# Patient Record
Sex: FEMALE | Race: WHITE | NOT HISPANIC OR LATINO | Employment: FULL TIME | URBAN - METROPOLITAN AREA
[De-identification: names, ages, dates, MRNs, and addresses within clinical notes are randomized per-mention and may not be internally consistent; named-entity substitution may affect disease eponyms.]

---

## 2017-01-17 ENCOUNTER — OFFICE VISIT (OUTPATIENT)
Dept: INTERNAL MEDICINE | Facility: CLINIC | Age: 27
End: 2017-01-17
Payer: COMMERCIAL

## 2017-01-17 VITALS
TEMPERATURE: 98 F | BODY MASS INDEX: 50.14 KG/M2 | OXYGEN SATURATION: 96 % | HEART RATE: 90 BPM | RESPIRATION RATE: 17 BRPM | HEIGHT: 62 IN | DIASTOLIC BLOOD PRESSURE: 86 MMHG | WEIGHT: 272.5 LBS | SYSTOLIC BLOOD PRESSURE: 110 MMHG

## 2017-01-17 DIAGNOSIS — L68.0 HIRSUTISM: ICD-10-CM

## 2017-01-17 DIAGNOSIS — E66.01 MORBID OBESITY DUE TO EXCESS CALORIES: ICD-10-CM

## 2017-01-17 DIAGNOSIS — Z83.3 FAMILY HISTORY OF DIABETES MELLITUS: Primary | ICD-10-CM

## 2017-01-17 DIAGNOSIS — Z23 NEED FOR INFLUENZA VACCINATION: ICD-10-CM

## 2017-01-17 DIAGNOSIS — R06.83 SNORES: ICD-10-CM

## 2017-01-17 DIAGNOSIS — Z87.42 HISTORY OF PCOS: ICD-10-CM

## 2017-01-17 PROCEDURE — 99203 OFFICE O/P NEW LOW 30 MIN: CPT | Mod: 25,S$GLB,, | Performed by: INTERNAL MEDICINE

## 2017-01-17 PROCEDURE — 90686 IIV4 VACC NO PRSV 0.5 ML IM: CPT | Mod: S$GLB,,, | Performed by: INTERNAL MEDICINE

## 2017-01-17 PROCEDURE — 99999 PR PBB SHADOW E&M-EST. PATIENT-LVL III: CPT | Mod: PBBFAC,,, | Performed by: INTERNAL MEDICINE

## 2017-01-17 PROCEDURE — 90471 IMMUNIZATION ADMIN: CPT | Mod: S$GLB,,, | Performed by: INTERNAL MEDICINE

## 2017-01-17 PROCEDURE — 1159F MED LIST DOCD IN RCRD: CPT | Mod: S$GLB,,, | Performed by: INTERNAL MEDICINE

## 2017-01-17 NOTE — PROGRESS NOTES
"Subjective:       Patient ID: Glenda De La Fuente is a 26 y.o. female.    Chief Complaint: Establish Care    HPI Comments: Est. pcp    HPI: 25 y/o with PCOS morbid obesity here to establish care. Has regular menses +hirsutisim not exercising regularlly counting calories with goal of 1500 zee/day. Still not losing weight. Does admit to emotional binge eating at time no purging or self induced vomitting. Formed bowel movements. Does have witness snoring with apnea does endorse frequent daytime somulence both mother and father with DM    Review of Systems   Constitutional: Negative for activity change, appetite change, fatigue, fever and unexpected weight change.   HENT: Negative for ear pain, rhinorrhea and sore throat.    Eyes: Negative for discharge and visual disturbance.   Respiratory: Negative for chest tightness, shortness of breath and wheezing.    Cardiovascular: Negative for chest pain, palpitations and leg swelling.   Gastrointestinal: Negative for abdominal pain, constipation and diarrhea.   Endocrine: Negative for cold intolerance and heat intolerance.   Genitourinary: Negative for dysuria and hematuria.   Musculoskeletal: Negative for joint swelling and neck stiffness.   Skin: Negative for rash.   Neurological: Negative for dizziness, syncope, weakness and headaches.   Psychiatric/Behavioral: Negative for suicidal ideas.       Objective:     Vitals:    01/17/17 1511   BP: 110/86   BP Location: Left arm   Patient Position: Sitting   BP Method: Manual   Pulse: 90   Resp: 17   Temp: 98.1 °F (36.7 °C)   TempSrc: Oral   SpO2: 96%   Weight: 123.6 kg (272 lb 7.8 oz)   Height: 5' 2" (1.575 m)          Physical Exam   Constitutional: She is oriented to person, place, and time. She appears well-developed and well-nourished.   HENT:   Head: Normocephalic and atraumatic.   Eyes: Conjunctivae are normal. Pupils are equal, round, and reactive to light.   Neck: Normal range of motion.   Cardiovascular: Normal rate and " regular rhythm.  Exam reveals no gallop and no friction rub.    No murmur heard.  Pulmonary/Chest: Effort normal and breath sounds normal. She has no wheezes. She has no rales.   Abdominal: Soft. Bowel sounds are normal. There is no tenderness. There is no rebound and no guarding.   Musculoskeletal: Normal range of motion. She exhibits no edema or tenderness.   Neurological: She is alert and oriented to person, place, and time. No cranial nerve deficit.   Skin: Skin is warm and dry.   Psychiatric: She has a normal mood and affect.       Assessment:       1. Family history of diabetes mellitus    2. Hirsutism    3. History of PCOS    4. Morbid obesity due to excess calories    5. Snores    6. Need for influenza vaccination        Plan:    1/2/3. A.m. Cortisol, a1c and testosterone,     4. The patient is asked to make an attempt to improve diet and exercise patterns to aid in medical management of this problem.      5. Referral for sleep study    6. Flu vaccine today    F/u six weeks

## 2017-01-17 NOTE — LETTER
January 17, 2017      Glenda Krystle De La Fuente  [address] [place] [address]             [place] [address]  [place] [address]  Phone: [phone] Ms. De La Fuente    Was treated here on 01/17/2017    May Return to work on 01/18/2017    No Restrictions            Adeel Soto MD

## 2017-01-20 ENCOUNTER — TELEPHONE (OUTPATIENT)
Dept: FAMILY MEDICINE | Facility: CLINIC | Age: 27
End: 2017-01-20

## 2017-01-30 ENCOUNTER — HOSPITAL ENCOUNTER (EMERGENCY)
Facility: OTHER | Age: 27
Discharge: HOME | End: 2017-01-30
Attending: EMERGENCY MEDICINE
Payer: COMMERCIAL

## 2017-01-30 VITALS
WEIGHT: 274.69 LBS | SYSTOLIC BLOOD PRESSURE: 137 MMHG | RESPIRATION RATE: 20 BRPM | TEMPERATURE: 96 F | BODY MASS INDEX: 50.55 KG/M2 | HEIGHT: 62 IN | OXYGEN SATURATION: 100 % | HEART RATE: 88 BPM | DIASTOLIC BLOOD PRESSURE: 92 MMHG

## 2017-01-30 DIAGNOSIS — M54.2 CHRONIC NECK PAIN: ICD-10-CM

## 2017-01-30 DIAGNOSIS — G89.29 CHRONIC NECK PAIN: ICD-10-CM

## 2017-01-30 DIAGNOSIS — M54.50 ACUTE RIGHT-SIDED LOW BACK PAIN WITHOUT SCIATICA: Primary | ICD-10-CM

## 2017-01-30 LAB
B-HCG UR QL: NEGATIVE
CTP QC/QA: YES

## 2017-01-30 PROCEDURE — 96372 THER/PROPH/DIAG INJ SC/IM: CPT

## 2017-01-30 PROCEDURE — 99283 EMERGENCY DEPT VISIT LOW MDM: CPT

## 2017-01-30 PROCEDURE — 81003 URINALYSIS AUTO W/O SCOPE: CPT

## 2017-01-30 PROCEDURE — 63600175 PHARM REV CODE 636 W HCPCS: Performed by: EMERGENCY MEDICINE

## 2017-01-30 PROCEDURE — 81025 URINE PREGNANCY TEST: CPT | Performed by: EMERGENCY MEDICINE

## 2017-01-30 PROCEDURE — 81025 URINE PREGNANCY TEST: CPT

## 2017-01-30 RX ORDER — DIAZEPAM 10 MG/2ML
10 INJECTION INTRAMUSCULAR
Status: COMPLETED | OUTPATIENT
Start: 2017-01-30 | End: 2017-01-30

## 2017-01-30 RX ORDER — KETOROLAC TROMETHAMINE 30 MG/ML
60 INJECTION, SOLUTION INTRAMUSCULAR; INTRAVENOUS
Status: COMPLETED | OUTPATIENT
Start: 2017-01-30 | End: 2017-01-30

## 2017-01-30 RX ORDER — DIAZEPAM 10 MG/1
10 TABLET ORAL EVERY 8 HOURS PRN
Qty: 12 TABLET | Refills: 0 | Status: SHIPPED | OUTPATIENT
Start: 2017-01-30 | End: 2017-04-10

## 2017-01-30 RX ORDER — IBUPROFEN 800 MG/1
800 TABLET ORAL EVERY 6 HOURS PRN
Qty: 20 TABLET | Refills: 0 | Status: SHIPPED | OUTPATIENT
Start: 2017-01-30 | End: 2017-09-24

## 2017-01-30 RX ADMIN — KETOROLAC TROMETHAMINE 60 MG: 30 INJECTION, SOLUTION INTRAMUSCULAR at 08:01

## 2017-01-30 RX ADMIN — DIAZEPAM 10 MG: 5 INJECTION, SOLUTION INTRAMUSCULAR; INTRAVENOUS at 08:01

## 2017-01-30 NOTE — ED PROVIDER NOTES
"Encounter Date: 1/30/2017       History     Chief Complaint   Patient presents with    Neck Pain     Pt here with c/o neck pain that began on Saturday, pt now c/o lower back pain. Denies injury/trauma     Review of patient's allergies indicates:  No Known Allergies  The history is provided by the patient.    26-year-old who complains of pain to her lower back.  Patient reports "pain" to her right lower back that began 2 days ago and has been constant.  It worsens when she moves.  Patient took Percocet yesterday without improvement.  (She had this left over from a surgery in August).  The pain does not radiate.  She denies urinary symptoms.  Patient also has "slipped disc" in her neck.  This is a chronic problem but has worsened recently.  She denies trauma or heavy lifting.  No fever.  She denies abdominal pain.  She rates her pain as 8 out of 10.  Past Medical History   Diagnosis Date    Asthma childhood    Morbid obesity     PCOS (polycystic ovarian syndrome)      No past medical history pertinent negatives.  Past Surgical History   Procedure Laterality Date    Appendectomy  2011    Salpingoopherectomy Right 2008    Hernia repair       Family History   Problem Relation Age of Onset    Heart attack Father     Hypertension Father     Diabetes Mother     Heart attack Mother      Social History   Substance Use Topics    Smoking status: Never Smoker    Smokeless tobacco: None    Alcohol use Yes      Comment: occasionally apple cider     Review of Systems   Constitutional: Negative for fever.   Respiratory: Negative for shortness of breath.    Cardiovascular: Negative for chest pain.   Gastrointestinal: Negative for abdominal pain.   Genitourinary: Negative for dysuria.   Musculoskeletal: Positive for back pain and neck pain.   Skin: Negative for rash.   Neurological: Negative for weakness and numbness.       Physical Exam   Initial Vitals   BP Pulse Resp Temp SpO2   01/30/17 0722 01/30/17 0722 01/30/17 " "0722 01/30/17 0722 01/30/17 0722   137/92 88 20 96.3 °F (35.7 °C) 100 %     Physical Exam    Nursing note and vitals reviewed.  Constitutional: She appears well-developed and well-nourished.   HENT:   Head: Normocephalic and atraumatic.   Eyes: Conjunctivae and EOM are normal. Pupils are equal, round, and reactive to light.   Neck: Normal range of motion. Neck supple.       Cardiovascular: Normal rate and regular rhythm.   Pulmonary/Chest: Breath sounds normal.   Abdominal: Soft. There is no tenderness. There is no rebound and no guarding.   Musculoskeletal: Normal range of motion.        Arms:  Neurological: She is alert and oriented to person, place, and time. She has normal strength. No cranial nerve deficit.   Skin: Skin is warm and dry.   Psychiatric: She has a normal mood and affect.         ED Course   Procedures  Labs Reviewed   POCT URINE PREGNANCY   POCT URINALYSIS W/O SCOPE             Medical Decision Making:   Initial Assessment:   26-year-old who complains of pain to the lower back.  She also has chronic neck pain that has "flared up" over the last few days.  Patient has no neurological deficits.  ED Management:  Patient's pain appears to be musculoskeletal.  Her urine shows no evidence of hematuria or leukocytosis.  She has no neurological deficits.  Patient will be treated with Toradol and Valium and discharged on ibuprofen and Valium (12 pills)                   ED Course     Clinical Impression:   The primary encounter diagnosis was Acute right-sided low back pain without sciatica. A diagnosis of Chronic neck pain was also pertinent to this visit.          Gricelda Rainey MD  01/30/17 0750    "

## 2017-01-30 NOTE — ED AVS SNAPSHOT
[facility] EMERGENCY DEPARTMENT  [address] [address]               Glenda De La Fuente   2017  7:14 AM   ED    Description:  Female : 1990   Department:  [facility] Emergency Department           Your Care was Coordinated By:     Provider Role From To    Gricelda Rainey MD Attending Provider 17 0717 --      Reason for Visit     Neck Pain           Diagnoses this Visit        Comments    Acute right-sided low back pain without sciatica    -  Primary     Chronic neck pain           ED Disposition     None           To Do List           Follow-up Information     Follow up with Adeel Soto MD. Schedule an appointment as soon as possible for a visit today.    Specialty:  Internal Medicine    Contact information:    [address]  [address]  [place] [address]  [phone]          Follow up with [facility] Emergency Department.    Specialty:  Emergency Medicine    Why:  If symptoms worsen    Contact information:    [address] [address]  [phone]       These Medications        Disp Refills Start End    diazePAM (VALIUM) 10 MG Tab 12 tablet 0 2017 3/1/2017    Take 1 tablet (10 mg total) by mouth every 8 (eight) hours as needed (muscle spasm). - Oral    Pharmacy: [facility] [address] [place] [address] [address] Ph #: [phone]       ibuprofen (ADVIL,MOTRIN) 800 MG tablet 20 tablet 0 2017     Take 1 tablet (800 mg total) by mouth every 6 (six) hours as needed for Pain. - Oral    Pharmacy: [facility] [address] [place], [place] [address] [address] Ph #: [phone]         [facility] On Call     [facility] On Call Nurse Care Line -  Assistance  Registered nurses in the [facility] On Call Center provide clinical advisement, health education, appointment booking, and other advisory services.  Call for this free service at [phone].             Medications           Message regarding Medications     Verify the  "changes and/or additions to your medication regime listed below are the same as discussed with your clinician today.  If any of these changes or additions are incorrect, please notify your healthcare provider.        START taking these NEW medications        Refills    diazePAM (VALIUM) 10 MG Tab 0    Sig: Take 1 tablet (10 mg total) by mouth every 8 (eight) hours as needed (muscle spasm).    Class: Print    Route: Oral    ibuprofen (ADVIL,MOTRIN) 800 MG tablet 0    Sig: Take 1 tablet (800 mg total) by mouth every 6 (six) hours as needed for Pain.    Class: Print    Route: Oral      These medications were administered today        Dose Freq    ketorolac injection 60 mg 60 mg ED 1 Time    Sig: Inject 60 mg into the muscle ED 1 Time.    Class: Normal    Route: Intramuscular    diazePAM injection 10 mg 10 mg ED 1 Time    Sig: Inject 2 mLs (10 mg total) into the muscle ED 1 Time.    Class: Normal    Route: Intramuscular           Verify that the below list of medications is an accurate representation of the medications you are currently taking.  If none reported, the list may be blank. If incorrect, please contact your healthcare provider. Carry this list with you in case of emergency.           Current Medications     diazePAM (VALIUM) 10 MG Tab Take 1 tablet (10 mg total) by mouth every 8 (eight) hours as needed (muscle spasm).    diazePAM injection 10 mg Inject 2 mLs (10 mg total) into the muscle ED 1 Time.    ibuprofen (ADVIL,MOTRIN) 800 MG tablet Take 1 tablet (800 mg total) by mouth every 6 (six) hours as needed for Pain.    ketorolac injection 60 mg Inject 60 mg into the muscle ED 1 Time.           Clinical Reference Information           Your Vitals Were     BP Pulse Temp Resp Height Weight    137/92 88 96.3 °F (35.7 °C) (Oral) 20 5' 2" (1.575 m) 124.6 kg (274 lb 11.1 oz)    Last Period SpO2 BMI          01/08/2017 100% 50.24 kg/m2        Allergies as of 1/30/2017     No Known Allergies      Immunizations " Administered on Date of Encounter - 1/30/2017     None      ED Micro, Lab, POCT     Start Ordered       Status Ordering Provider    01/30/17 0724 01/30/17 0723  POCT urine pregnancy  Once      Final result     01/30/17 0724 01/30/17 0723  POCT URINALYSIS W/O SCOPE  Once      Final result       ED Imaging Orders     None        Discharge Instructions         Back Pain (Acute or Chronic)    Back pain is one of the most common problems. The good news is that most people feel better in 1 to 2 weeks, and most of the rest in 1 to 2 months. Most people can remain active.  People experience and describe pain differently; not everyone is the same.  · The pain can be sharp, stabbing, shooting, aching, cramping or burning.  · Movement, standing, bending, lifting, sitting, or walking may worsen pain.  · It can be localized to one spot or area, or it can be more generalized.  · It can spread or radiate upwards, to the front, or go down your arms or legs (sciatica).  · It can cause muscle spasm.  Most of the time, mechanical problems with the muscles or spine cause the pain. Mechanical problems are usually caused by an injury to the muscles or ligaments. While illness can cause back pain, it is usually not caused by a serious illness. Mechanical problems include:   · Physical activity such as sports, exercise, work, or normal activity  · Overexertion, lifting, pushing, pulling incorrectly or too aggressively  · Sudden twisting, bending, or stretching from an accident, or accidental movement  · Poor posture  · Stretching or moving wrong, without noticing pain at the time  · Poor coordination, lack of regular exercise (check with your doctor about this)  · Spinal disc disease or arthritis  · Stress  Pain can also be related to pregnancy, or illness like appendicitis, bladder or kidney infections, pelvic infections, and many other things.  Acute back pain usually gets better in 1 to 2 weeks. Back pain related to disk disease,  arthritis in the spinal joints or spinal stenosis (narrowing of the spinal canal) can become chronic and last for months or years.  Unless you had a physical injury (for example, a car accident or fall) X-rays are usually not needed for the initial evaluation of back pain. If pain continues and does not respond to medical treatment, X-rays and other tests may be needed.  Home care  Try these home care recommendations:  · When in bed, try to find a position of comfort. A firm mattress is best. Try lying flat on your back with pillows under your knees. You can also try lying on your side with your knees bent up towards your chest and a pillow between your knees.  · At first, do not try to stretch out the sore spots. If there is a strain, it is not like the good soreness you get after exercising without an injury. In this case, stretching may make it worse.  · Avoid prolong sitting, long car rides, or travel. This puts more stress on the lower back than standing or walking.  · During the first 24 to 72 hours after an acute injury or flare up of chronic back pain, apply an ice pack to the painful area for 20 minutes and then remove it for 20 minutes. Do this over a period of 60 to 90 minutes or several times a day. This will reduce swelling and pain. Wrap the ice pack in a thin towel or plastic to protect your skin.  · You can start with ice, then switch to heat. Heat (hot shower, hot bath, or heating pad) reduces pain and works well for muscle spasms. Heat can be applied to the painful area for 20 minutes then remove it for 20 minutes. Do this over a period of 60 to 90 minutes or several times a day. Do not sleep on a heating pad. It can lead to skin burns or tissue damage.  · You can alternate ice and heat therapy. Talk with your doctor about the best treatment for your back pain.  · Therapeutic massage can help relax the back muscles without stretching them.  · Be aware of safe lifting methods and do not lift  anything without stretching first.  Medicines  Talk to your doctor before using medicine, especially if you have other medical problems or are taking other medicines.  · You may use over-the-counter medicine as directed on the bottle to control pain, unless another pain medicine was prescribed. If you have chronic conditions like diabetes, liver or kidney disease, stomach ulcers, or gastrointestinal bleeding, or are taking blood thinners, talk to your doctor before taking any medicine.  · Be careful if you are given a prescription medicines, narcotics, or medicine for muscle spasms. They can cause drowsiness, affect your coordination, reflexes, and judgement. Do not drive or operate heavy machinery.  Follow-up care  Follow up with your healthcare provider, or as advised.   A radiologist will review any X-rays that were taken. Your provide will notify you of any new findings that may affect your care.  Call 911  Call emergency services if any of the following occur:  · Trouble breathing  · Confusion  · Very drowsy or trouble awakening  · Fainting or loss of consciousness  · Rapid or very slow heart rate  · Loss of bowel or bladder control  When to seek medical advice  Call your healthcare provider right away if any of these occur:   · Pain becomes worse or spreads to your legs  · Weakness or numbness in one or both legs  · Numbness in the groin or genital area  © 8621-1856 [employer]. [address] [place] [address], [place] [address]. All rights reserved. This information is not intended as a substitute for professional medical care. Always follow your healthcare professional's instructions.          Your Scheduled Appointments     Feb 28, 2017  3:00 PM CST   Established Patient Visit with Adeel Soto MD   [facility] ([facility]    [address] [place], [place] [address] [place] [address]   [phone]            Mar 24, 2017  8:30 AM CDT   Consult with Neri Trujillo MD   [facility]  [facility]    [address]  [place] [address] [phone]               [facility] complies with applicable Federal civil rights laws and does not discriminate on the basis of race, color, national origin, age, disability, or sex.        Language Assistance Services     ATTENTION: Language assistance services are available, free of charge. Please call [phone].      ATENCIÓN: Si habla español, tiene a hendrickson disposición servicios gratuitos de asistencia lingüística. Llame al [phone].     CHÚ Ý: N?u b?n nói Ti?ng Vi?t, có các d?ch v? h? tr? ngôn ng? mi?n phí dành cho b?n. G?i s? [phone].

## 2017-01-30 NOTE — DISCHARGE INSTRUCTIONS
Back Pain (Acute or Chronic)    Back pain is one of the most common problems. The good news is that most people feel better in 1 to 2 weeks, and most of the rest in 1 to 2 months. Most people can remain active.  People experience and describe pain differently; not everyone is the same.  · The pain can be sharp, stabbing, shooting, aching, cramping or burning.  · Movement, standing, bending, lifting, sitting, or walking may worsen pain.  · It can be localized to one spot or area, or it can be more generalized.  · It can spread or radiate upwards, to the front, or go down your arms or legs (sciatica).  · It can cause muscle spasm.  Most of the time, mechanical problems with the muscles or spine cause the pain. Mechanical problems are usually caused by an injury to the muscles or ligaments. While illness can cause back pain, it is usually not caused by a serious illness. Mechanical problems include:   · Physical activity such as sports, exercise, work, or normal activity  · Overexertion, lifting, pushing, pulling incorrectly or too aggressively  · Sudden twisting, bending, or stretching from an accident, or accidental movement  · Poor posture  · Stretching or moving wrong, without noticing pain at the time  · Poor coordination, lack of regular exercise (check with your doctor about this)  · Spinal disc disease or arthritis  · Stress  Pain can also be related to pregnancy, or illness like appendicitis, bladder or kidney infections, pelvic infections, and many other things.  Acute back pain usually gets better in 1 to 2 weeks. Back pain related to disk disease, arthritis in the spinal joints or spinal stenosis (narrowing of the spinal canal) can become chronic and last for months or years.  Unless you had a physical injury (for example, a car accident or fall) X-rays are usually not needed for the initial evaluation of back pain. If pain continues and does not respond to medical treatment, X-rays and other tests may be  needed.  Home care  Try these home care recommendations:  · When in bed, try to find a position of comfort. A firm mattress is best. Try lying flat on your back with pillows under your knees. You can also try lying on your side with your knees bent up towards your chest and a pillow between your knees.  · At first, do not try to stretch out the sore spots. If there is a strain, it is not like the good soreness you get after exercising without an injury. In this case, stretching may make it worse.  · Avoid prolong sitting, long car rides, or travel. This puts more stress on the lower back than standing or walking.  · During the first 24 to 72 hours after an acute injury or flare up of chronic back pain, apply an ice pack to the painful area for 20 minutes and then remove it for 20 minutes. Do this over a period of 60 to 90 minutes or several times a day. This will reduce swelling and pain. Wrap the ice pack in a thin towel or plastic to protect your skin.  · You can start with ice, then switch to heat. Heat (hot shower, hot bath, or heating pad) reduces pain and works well for muscle spasms. Heat can be applied to the painful area for 20 minutes then remove it for 20 minutes. Do this over a period of 60 to 90 minutes or several times a day. Do not sleep on a heating pad. It can lead to skin burns or tissue damage.  · You can alternate ice and heat therapy. Talk with your doctor about the best treatment for your back pain.  · Therapeutic massage can help relax the back muscles without stretching them.  · Be aware of safe lifting methods and do not lift anything without stretching first.  Medicines  Talk to your doctor before using medicine, especially if you have other medical problems or are taking other medicines.  · You may use over-the-counter medicine as directed on the bottle to control pain, unless another pain medicine was prescribed. If you have chronic conditions like diabetes, liver or kidney disease,  stomach ulcers, or gastrointestinal bleeding, or are taking blood thinners, talk to your doctor before taking any medicine.  · Be careful if you are given a prescription medicines, narcotics, or medicine for muscle spasms. They can cause drowsiness, affect your coordination, reflexes, and judgement. Do not drive or operate heavy machinery.  Follow-up care  Follow up with your healthcare provider, or as advised.   A radiologist will review any X-rays that were taken. Your provide will notify you of any new findings that may affect your care.  Call 911  Call emergency services if any of the following occur:  · Trouble breathing  · Confusion  · Very drowsy or trouble awakening  · Fainting or loss of consciousness  · Rapid or very slow heart rate  · Loss of bowel or bladder control  When to seek medical advice  Call your healthcare provider right away if any of these occur:   · Pain becomes worse or spreads to your legs  · Weakness or numbness in one or both legs  · Numbness in the groin or genital area  © 4249-3843 [employer]. [address] [place] [address], [place] [address]. All rights reserved. This information is not intended as a substitute for professional medical care. Always follow your healthcare professional's instructions.

## 2017-03-07 ENCOUNTER — HOSPITAL ENCOUNTER (EMERGENCY)
Facility: HOSPITAL | Age: 27
Discharge: HOME | End: 2017-03-07
Attending: EMERGENCY MEDICINE
Payer: COMMERCIAL

## 2017-03-07 VITALS
HEART RATE: 88 BPM | HEIGHT: 62 IN | RESPIRATION RATE: 18 BRPM | SYSTOLIC BLOOD PRESSURE: 158 MMHG | WEIGHT: 275 LBS | DIASTOLIC BLOOD PRESSURE: 79 MMHG | TEMPERATURE: 99 F | OXYGEN SATURATION: 98 % | BODY MASS INDEX: 50.61 KG/M2

## 2017-03-07 DIAGNOSIS — R16.0 HEPATOMEGALY: ICD-10-CM

## 2017-03-07 DIAGNOSIS — K21.9 GASTROESOPHAGEAL REFLUX DISEASE, ESOPHAGITIS PRESENCE NOT SPECIFIED: ICD-10-CM

## 2017-03-07 DIAGNOSIS — R10.33 PERIUMBILICAL ABDOMINAL PAIN: ICD-10-CM

## 2017-03-07 DIAGNOSIS — R11.0 NAUSEA: ICD-10-CM

## 2017-03-07 DIAGNOSIS — R10.31 RIGHT LOWER QUADRANT ABDOMINAL PAIN: Primary | ICD-10-CM

## 2017-03-07 LAB
ALBUMIN SERPL BCP-MCNC: 3.6 G/DL
ALP SERPL-CCNC: 63 U/L
ALT SERPL W/O P-5'-P-CCNC: 33 U/L
ANION GAP SERPL CALC-SCNC: 9 MMOL/L
AST SERPL-CCNC: 17 U/L
B-HCG UR QL: NEGATIVE
BACTERIA #/AREA URNS HPF: ABNORMAL /HPF
BASOPHILS # BLD AUTO: 0.02 K/UL
BASOPHILS NFR BLD: 0.2 %
BILIRUB SERPL-MCNC: 0.2 MG/DL
BILIRUB UR QL STRIP: NEGATIVE
BUN SERPL-MCNC: 10 MG/DL
CALCIUM SERPL-MCNC: 9.4 MG/DL
CHLORIDE SERPL-SCNC: 102 MMOL/L
CLARITY UR: ABNORMAL
CO2 SERPL-SCNC: 26 MMOL/L
COLOR UR: YELLOW
CREAT SERPL-MCNC: 0.9 MG/DL
CTP QC/QA: YES
DIFFERENTIAL METHOD: NORMAL
EOSINOPHIL # BLD AUTO: 0.2 K/UL
EOSINOPHIL NFR BLD: 1.8 %
ERYTHROCYTE [DISTWIDTH] IN BLOOD BY AUTOMATED COUNT: 14.3 %
EST. GFR  (AFRICAN AMERICAN): >60 ML/MIN/1.73 M^2
EST. GFR  (NON AFRICAN AMERICAN): >60 ML/MIN/1.73 M^2
GLUCOSE SERPL-MCNC: 119 MG/DL
GLUCOSE UR QL STRIP: NEGATIVE
HCT VFR BLD AUTO: 38 %
HGB BLD-MCNC: 12.4 G/DL
HGB UR QL STRIP: NEGATIVE
KETONES UR QL STRIP: NEGATIVE
LEUKOCYTE ESTERASE UR QL STRIP: ABNORMAL
LIPASE SERPL-CCNC: 46 U/L
LYMPHOCYTES # BLD AUTO: 2.4 K/UL
LYMPHOCYTES NFR BLD: 25.6 %
MCH RBC QN AUTO: 27.9 PG
MCHC RBC AUTO-ENTMCNC: 32.6 %
MCV RBC AUTO: 86 FL
MICROSCOPIC COMMENT: ABNORMAL
MONOCYTES # BLD AUTO: 0.7 K/UL
MONOCYTES NFR BLD: 6.9 %
NEUTROPHILS # BLD AUTO: 6.3 K/UL
NEUTROPHILS NFR BLD: 65.5 %
NITRITE UR QL STRIP: NEGATIVE
PH UR STRIP: 5 [PH] (ref 5–8)
PLATELET # BLD AUTO: 217 K/UL
PMV BLD AUTO: 10.7 FL
POTASSIUM SERPL-SCNC: 3.8 MMOL/L
PROT SERPL-MCNC: 8.5 G/DL
PROT UR QL STRIP: NEGATIVE
RBC # BLD AUTO: 4.44 M/UL
RBC #/AREA URNS HPF: 1 /HPF (ref 0–4)
SODIUM SERPL-SCNC: 137 MMOL/L
SP GR UR STRIP: 1.01 (ref 1–1.03)
SQUAMOUS #/AREA URNS HPF: 7 /HPF
URN SPEC COLLECT METH UR: ABNORMAL
UROBILINOGEN UR STRIP-ACNC: NEGATIVE EU/DL
WBC # BLD AUTO: 9.54 K/UL
WBC #/AREA URNS HPF: 2 /HPF (ref 0–5)

## 2017-03-07 PROCEDURE — 96374 THER/PROPH/DIAG INJ IV PUSH: CPT

## 2017-03-07 PROCEDURE — 83690 ASSAY OF LIPASE: CPT

## 2017-03-07 PROCEDURE — 25500020 PHARM REV CODE 255: Performed by: EMERGENCY MEDICINE

## 2017-03-07 PROCEDURE — 80053 COMPREHEN METABOLIC PANEL: CPT

## 2017-03-07 PROCEDURE — 25000003 PHARM REV CODE 250: Performed by: PHYSICIAN ASSISTANT

## 2017-03-07 PROCEDURE — 87086 URINE CULTURE/COLONY COUNT: CPT

## 2017-03-07 PROCEDURE — 99284 EMERGENCY DEPT VISIT MOD MDM: CPT | Mod: 25

## 2017-03-07 PROCEDURE — 85025 COMPLETE CBC W/AUTO DIFF WBC: CPT

## 2017-03-07 PROCEDURE — 81025 URINE PREGNANCY TEST: CPT | Performed by: PHYSICIAN ASSISTANT

## 2017-03-07 PROCEDURE — 63600175 PHARM REV CODE 636 W HCPCS: Performed by: PHYSICIAN ASSISTANT

## 2017-03-07 PROCEDURE — 81000 URINALYSIS NONAUTO W/SCOPE: CPT

## 2017-03-07 PROCEDURE — 96361 HYDRATE IV INFUSION ADD-ON: CPT

## 2017-03-07 RX ORDER — SODIUM CHLORIDE 9 MG/ML
1000 INJECTION, SOLUTION INTRAVENOUS
Status: COMPLETED | OUTPATIENT
Start: 2017-03-07 | End: 2017-03-07

## 2017-03-07 RX ORDER — ONDANSETRON 4 MG/1
4 TABLET, FILM COATED ORAL EVERY 6 HOURS PRN
Qty: 12 TABLET | Refills: 0 | Status: SHIPPED | OUTPATIENT
Start: 2017-03-07 | End: 2017-03-12

## 2017-03-07 RX ORDER — ONDANSETRON 4 MG/1
4 TABLET, FILM COATED ORAL EVERY 6 HOURS PRN
Qty: 12 TABLET | Refills: 0 | Status: SHIPPED | OUTPATIENT
Start: 2017-03-07 | End: 2017-03-07 | Stop reason: CLARIF

## 2017-03-07 RX ORDER — ONDANSETRON 2 MG/ML
4 INJECTION INTRAMUSCULAR; INTRAVENOUS
Status: COMPLETED | OUTPATIENT
Start: 2017-03-07 | End: 2017-03-07

## 2017-03-07 RX ORDER — FAMOTIDINE 20 MG/1
20 TABLET, FILM COATED ORAL 2 TIMES DAILY
Qty: 12 TABLET | Refills: 0 | Status: SHIPPED | OUTPATIENT
Start: 2017-03-07 | End: 2017-11-28

## 2017-03-07 RX ADMIN — SODIUM CHLORIDE 1000 ML: 0.9 INJECTION, SOLUTION INTRAVENOUS at 07:03

## 2017-03-07 RX ADMIN — IOHEXOL 15 ML: 300 INJECTION, SOLUTION INTRAVENOUS at 08:03

## 2017-03-07 RX ADMIN — ONDANSETRON 4 MG: 2 INJECTION INTRAMUSCULAR; INTRAVENOUS at 07:03

## 2017-03-07 RX ADMIN — IOHEXOL 100 ML: 350 INJECTION, SOLUTION INTRAVENOUS at 08:03

## 2017-03-07 RX ADMIN — LIDOCAINE HYDROCHLORIDE: 20 SOLUTION ORAL; TOPICAL at 09:03

## 2017-03-07 NOTE — ED AVS SNAPSHOT
[facility]  [address] [place] [place] [address]               Glenda De La Fuente   3/7/2017  6:54 PM   ED    Description:  Female : 1990   Department:  [facility]           Your Care was Coordinated By:     Provider Role From To    Analy Pineda MD Attending Provider 17 --    Karlee Yi PA-C Physician Assistant 17 --      Reason for Visit     Abdominal Pain           Diagnoses this Visit        Comments    Right lower quadrant abdominal pain    -  Primary     Periumbilical abdominal pain         Nausea         Gastroesophageal reflux disease, esophagitis presence not specified         Hepatomegaly           ED Disposition     None           To Do List           Follow-up Information     Follow up with Adeel Soto MD. Schedule an appointment as soon as possible for a visit in 2 days.    Specialty:  Internal Medicine    Why:  for follow up    Contact information:    [address]  [address]  [facility] [address]  [phone]          Follow up with Tunde Estrada MD. Schedule an appointment as soon as possible for a visit in 2 days.    Specialties:  General Surgery, Bariatrics    Why:  for re-evaluation    Contact information:    [address]  [address]  [facility] [address]  [facility] [address]  [phone]          Go to [facility].    Specialty:  Emergency Medicine    Why:  As needed, If symptoms worsen    Contact information:    [address] [place] [address] [phone]       These Medications        Disp Refills Start End    ondansetron (ZOFRAN) 4 MG tablet 12 tablet 0 3/7/2017 3/12/2017    Take 1 tablet (4 mg total) by mouth every 6 (six) hours as needed. - Oral    Pharmacy: [id number] [place] [address] Ph #: [phone]       famotidine (PEPCID) 20 MG tablet 12 tablet 0 3/7/2017 3/13/2017    Take 1 tablet (20 mg total) by  mouth 2 (two) times daily. - Oral    Pharmacy: [id number] - [place] [facility] Ph #: [phone]         [facility]     [facility] Nurse Care Line - 24/7 Assistance  Registered nurses in the [facility] Center provide clinical advisement, health education, appointment booking, and other advisory services.  Call for this free service at [phone].             Medications           Message regarding Medications     Verify the changes and/or additions to your medication regime listed below are the same as discussed with your clinician today.  If any of these changes or additions are incorrect, please notify your healthcare provider.        START taking these NEW medications        Refills    ondansetron (ZOFRAN) 4 MG tablet 0    Sig: Take 1 tablet (4 mg total) by mouth every 6 (six) hours as needed.    Class: Normal    Route: Oral    famotidine (PEPCID) 20 MG tablet 0    Sig: Take 1 tablet (20 mg total) by mouth 2 (two) times daily.    Class: Print    Route: Oral      These medications were administered today        Dose Freq    0.9%  NaCl infusion 1,000 mL ED 1 Time    Sig: Inject 1,000 mLs into the vein ED 1 Time.    Class: Normal    Route: Intravenous    Cosign for Ordering: Required by Analy Pineda MD    ondansetron injection 4 mg 4 mg ED 1 Time    Sig: Inject 4 mg into the vein ED 1 Time.    Class: Normal    Route: Intravenous    Cosign for Ordering: Required by Analy Pineda MD    omnipaque 300 iohexol 15 mL 15 mL IMG once as needed    Sig: Take 15 mLs by mouth ONCE PRN for contrast.    Class: Normal    Route: Oral    omnipaque 350 iohexol 100 mL 100 mL IMG once as needed    Sig: Inject 100 mLs into the vein ONCE PRN for contrast.    Class: Normal    Route: Intravenous    (pyxis) gi cocktail (mylanta 30 mL, lidocaine 2 % viscous 10 mL, dicyclomine 10 mL) 50 mL  ED 1 Time    Sig: Take by mouth ED 1 Time.    Class: Normal    Route: Oral    Cosign for Ordering:  "Required by Analy Pineda MD           Verify that the below list of medications is an accurate representation of the medications you are currently taking.  If none reported, the list may be blank. If incorrect, please contact your healthcare provider. Carry this list with you in case of emergency.           Current Medications     diazePAM (VALIUM) 10 MG Tab Take 1 tablet (10 mg total) by mouth every 8 (eight) hours as needed (muscle spasm).    famotidine (PEPCID) 20 MG tablet Take 1 tablet (20 mg total) by mouth 2 (two) times daily.    ibuprofen (ADVIL,MOTRIN) 800 MG tablet Take 1 tablet (800 mg total) by mouth every 6 (six) hours as needed for Pain.    ondansetron (ZOFRAN) 4 MG tablet Take 1 tablet (4 mg total) by mouth every 6 (six) hours as needed.           Clinical Reference Information           Your Vitals Were     BP Pulse Temp Resp Height Weight    158/79 (BP Location: Right arm, Patient Position: Sitting, BP Method: Automatic) 88 98.5 °F (36.9 °C) (Oral) 18 5' 2" (1.575 m) 124.7 kg (275 lb)    Last Period SpO2 BMI          02/05/2017 98% 50.3 kg/m2        Allergies as of 3/7/2017     No Known Allergies      Immunizations Administered on Date of Encounter - 3/7/2017     None      ED Micro, Lab, POCT     Start Ordered       Status Ordering Provider    03/07/17 1927 03/07/17 1926  Urinalysis  STAT      Final result     03/07/17 1927 03/07/17 1926  Urine culture **CANNOT BE ORDERED STAT**  Once      In process     03/07/17 1926 03/07/17 1926  Urinalysis Microscopic  Once      Final result     03/07/17 1919 03/07/17 1921  POCT urine pregnancy  Once      Final result     03/07/17 1917 03/07/17 1916  CBC auto differential  STAT      Final result     03/07/17 1917 03/07/17 1916  Comprehensive metabolic panel  STAT      Final result     03/07/17 1917 03/07/17 1916  Lipase  STAT      Final result       ED Imaging Orders     Start Ordered       Status Ordering Provider    03/07/17 1924 03/07/17 1926  CT Abdomen " Pelvis With Contrast  1 time imaging      Final result     03/07/17 1918 03/07/17 1921    1 time imaging,   Status:  Canceled      Canceled         Discharge Instructions             Abdominal Pain    Abdominal pain is pain in the stomach or belly area. Everyone has this pain from time to time. In many cases it goes away on its own. But abdominal pain can sometimes be due to a serious problem, such as appendicitis. So its important to know when to seek help.  Causes of abdominal pain  There are many possible causes of abdominal pain. Common causes in adults include:  · Constipation, diarrhea, or gas  · Stomach acid flowing back up into the esophagus (acid reflux or heartburn)  · Severe acid reflux, called GERD (gastroesophageal reflux disease)  · A sore in the lining of the stomach or small intestine (peptic ulcer)  · Inflammation of the gallbladder, liver, or pancreas  · Gallstones or kidney stones  · Appendicitis   · Intestinal blockage   · An internal organ pushing through a muscle or other tissue (hernia)  · Urinary tract infections  · In women, menstrual cramps, fibroids, or endometriosis  · Inflammation or infection of the intestines  Diagnosing the cause of abdominal pain  Your healthcare provider will do a physical exam help find the cause of your pain. If needed, tests will be ordered. Belly pain has many possible causes. So it can be hard to find the reason for your pain. Giving details about your pain can help. Tell your provider where and when you feel the pain, and what makes it better or worse. Also let your provider know if you have other symptoms such as:  · Fever  · Tiredness  · Upset stomach (nausea)  · Vomiting  · Changes in bathroom habits  Treating abdominal pain  Some causes of pain need emergency medical treatment right away. These include appendicitis or a bowel blockage. Other problems can be treated with rest, fluids, or medicines. Your healthcare provider can give you specific  instructions for treatment or self-care based on what is causing your pain.  If you have vomiting or diarrhea, sip water or other clear fluids. When you are ready to eat solid foods again, start with small amounts of easy-to-digest, low-fat foods. These include Bananas, Rice, Applesauce, Toast   When to seek medical care  Call 911 or go to the hospital right away if you:  · Cant pass stool and are vomiting  · Are vomiting blood or have bloody diarrhea or black, tarry diarrhea  · Have chest, neck, or shoulder pain  · Feel like you might pass out  · Have pain in your shoulder blades with nausea  · Have sudden, severe belly pain  · Have new, severe pain unlike any you have felt before  · Have a belly that is rigid, hard, and tender to touch  Call your healthcare provider if you have:  · Pain for more than 5 days  · Bloating for more than 2 days  · Diarrhea for more than 5 days  · A fever of 100.4°F (38.0°C) or higher, or as directed by your provider  · Pain that gets worse  · Weight loss for no reason  · Continued lack of appetite  · Blood in your stool  How to prevent abdominal pain  Here are some tips to help prevent abdominal pain:  · Eat smaller amounts of food at one time.  · Avoid greasy, fried, or other high-fat foods.  · Avoid foods that give you gas.  · Exercise regularly.  · Drink plenty of fluids.  To help prevent GERD symptoms:  · Quit smoking.  · Reduce alcohol and certain foods that increase stomach acid.  · Avoid aspirin and over-the-counter pain and fever medicines (NSAIDS or nonsteroidal anti-inflammatory drugs), if possible  · Lose extra weight.  · Finish eating at least 2 hours before you go to bed or lie down.  · Raise the head of your bed.  Date Last Reviewed: 7/1/2016  © 4049-4519 [employer]. [address] [place] [address], [place] [address]. All rights reserved. This information is not intended as a substitute for professional medical care. Always follow your healthcare professional's  instructions.        Discharge Instructions for Gastroesophageal Reflux Disease (GERD)  Gastroesophageal reflux disease (GERD) is a backflow of acid from the stomach into the swallowing tube (esophagus).  Home care  These home care steps can help you manage GERD:  · Maintain a healthy weight. Get help to lose any extra pounds.  · Avoid lying down after meals.  · Avoid eating late at night.  · Elevate the head of your bed by 6 inches. You can do this by placing wooden blocks or bed risers under the head of your bed.  · Avoid wearing tight-fitting clothes.  · Avoid foods that might irritate your stomach, such as the following:  ¨ Alcohol  ¨ Fat  ¨ Chocolate  ¨ Caffeine  ¨ Spearmint or peppermint  · Talk to your healthcare provider if you are taking any of the following medicines. These medicines can make GERD symptoms worse:  ¨ Calcium channel blockers  ¨ Theophylline  ¨ Anticholinergic medicines, such as oxybutynin and benzatropine  · Begin an exercise program. Ask your healthcare provider how to get started. You can benefit from simple activities, such as walking or gardening.  · Break the smoking habit. Enroll in a stop-smoking program to improve your chances of success.  · Limit alcohol intake to no more than 2 drinks a day.  · Take your medicines exactly as directed. Dont skip doses.  · Avoid over-the-counter nonsteroidal anti-inflammatory medicines, such as aspirin and ibuprofen, unless recommended by your healthcare provider for certain conditions.   · If possible, avoid nitrates (heart medicines, such as nitroglycerin and isosorbide dinitrate ).  Follow-up care  Make a follow-up appointment as directed by our staff.     When to call the healthcare provider  Call your healthcare provider immediately if you have any of the following:  · Trouble swallowing  · Pain when swallowing  · Feeling of food caught in your chest or throat  · Pain in the neck, chest, or back  · Heartburn that causes you to  vomit  · Vomiting blood  · Black or tarry stools (from digested blood)  · More saliva (watering of the mouth) than usual  · Weight loss of more than 3% to 5% of your total body weight in a month  · Hoarseness or sore throat that wont go away  · Choking, coughing, or wheezing   Date Last Reviewed: 7/1/2016 © 2000-2016 [employer]. [address] [place] [address], [place] [address]. All rights reserved. This information is not intended as a substitute for professional medical care. Always follow your healthcare professional's instructions.            Lifestyle Changes for Controlling GERD    When you have GERD, stomach acid feels as if its backing up toward your mouth. Whether or not you take medicine to control your GERD, your symptoms can often be improved with lifestyle changes. Talk to your healthcare provider about the following suggestions. These suggestions may help you get relief from your symptoms.  Raise your head  Reflux is more likely to strike when youre lying down flat, because stomach fluid can flow backward more easily. Raising the head of your bed 4 to 6 inches can help. To do this:  · Slide blocks or books under the legs at the head of your bed. Or, place a wedge under the mattress. Many foam [employer] can make a suitable wedge for you. The wedge should run from your waist to the top of your head.  · Dont just prop your head on several pillows. This increases pressure on your stomach. It can make GERD worse.  Watch your eating habits  Certain foods may increase the acid in your stomach or relax the lower esophageal sphincter. This makes GERD more likely. Its best to avoid the following if they cause you symptoms:  · Coffee, tea, and carbonated drinks (with and without caffeine)  · Fatty, fried, or spicy food  · Mint, chocolate, onions, and tomatoes  · Peppermint  · Any other foods that seem to irritate your stomach or cause you pain  Relieve the pressure  Tips include the following:  · Eat  smaller meals, even if you have to eat more often.  · Dont lie down right after you eat. Wait a few hours for your stomach to empty.  · Avoid tight belts and tight-fitting clothes.  · Lose excess weight.  Tobacco and alcohol  Avoid smoking tobacco and drinking alcohol. They can make GERD symptoms worse.  Date Last Reviewed: 7/1/2016  © 6162-8830 [employer]. [address] [place] [address], [place] [address]. All rights reserved. This information is not intended as a substitute for professional medical care. Always follow your healthcare professional's instructions.        Your Scheduled Appointments     Mar 24, 2017  8:30 AM CDT   Consult with Neri Trujillo MD   [facility]    [address] [place] [address] [phone]               [facility] complies with applicable Federal civil rights laws and does not discriminate on the basis of race, color, national origin, age, disability, or sex.        Language Assistance Services     ATTENTION: Language assistance services are available, free of charge. Please call [phone].      ATENCIÓN: Si habla español, tiene a hendrickson disposición servicios gratuitos de asistencia lingüística. Llame al [phone].     CHÚ Ý: N?u b?n nói Ti?ng Vi?t, có các d?ch v? h? tr? ngôn ng? mi?n phí dành cho b?n. G?i s? [phone].

## 2017-03-08 NOTE — ED PROVIDER NOTES
"Encounter Date: 3/7/2017    SCRIBE #1 NOTE: I, Nancy Mckoy, am scribing for, and in the presence of,  Karlee Yi PA-C. I have scribed the following portions of the note - Other sections scribed: HPI/ROS.       History     Chief Complaint   Patient presents with    Abdominal Pain     pt c/o generalized abd pain this morning, RLQ hernia repain last august, "the same area is now protuding out."     Review of patient's allergies indicates:  No Known Allergies  HPI Comments: CC: Abdominal Pain    HPI: This obese 26 y.o. F presents to ED for evaluation of acute onset moderate lower abdominal pain with associated N/V/D and heart burn intermittently for 6 days. The pt states that she has not vomited in 6 days, but she is still intermittently nauseous.  She reports 6 episodes of diarrhea today and states that she has not had a formed stool since the onset of worsening pain. The abdominal pain is exacerbated by palpation. She had a RLQ hernia repair in August 2016 by Dr. Estrada with no complications. She reports the surgical area has gradually been protruding for 1.5 months but became more painful and protruding more within the past 6 days. The pt denies fever, chills, blood in stool, headache, light-headedness, and any other associated symptoms. No alleviating factors reported.     The history is provided by the patient. No  was used.     Past Medical History:   Diagnosis Date    Asthma childhood    Morbid obesity     PCOS (polycystic ovarian syndrome)      Past Surgical History:   Procedure Laterality Date    APPENDECTOMY  2011    HERNIA REPAIR      salpingoopherectomy Right 2008     Family History   Problem Relation Age of Onset    Heart attack Father     Hypertension Father     Diabetes Mother     Heart attack Mother      Social History   Substance Use Topics    Smoking status: Never Smoker    Smokeless tobacco: None    Alcohol use Yes      Comment: occasionally apple cider "     Review of Systems   Constitutional: Negative for chills and fever.   HENT: Negative for congestion, ear pain, rhinorrhea and sore throat.    Eyes: Negative for redness and visual disturbance.   Respiratory: Negative for cough and shortness of breath.    Cardiovascular: Negative for chest pain.   Gastrointestinal: Positive for abdominal pain (lower), diarrhea and nausea. Negative for blood in stool and vomiting.        (+) heart burn   Genitourinary: Negative for dysuria and frequency.   Musculoskeletal: Negative for myalgias.   Skin: Negative for wound.   Neurological: Negative for dizziness, light-headedness and headaches.       Physical Exam   Initial Vitals   BP Pulse Resp Temp SpO2   03/07/17 1807 03/07/17 1807 03/07/17 1807 03/07/17 1807 03/07/17 1807   164/72 93 18 98.1 °F (36.7 °C) 97 %     Physical Exam    Nursing note and vitals reviewed.  Constitutional: She appears well-developed and well-nourished.   HENT:   Head: Normocephalic.   Right Ear: External ear normal.   Left Ear: External ear normal.   Mouth/Throat: Oropharynx is clear and moist.   Eyes: Conjunctivae are normal.   Neck: Normal range of motion.   Cardiovascular: Normal rate and regular rhythm. Exam reveals no gallop and no friction rub.    No murmur heard.  Pulmonary/Chest: Breath sounds normal. No respiratory distress. She has no wheezes. She has no rhonchi. She has no rales.   Abdominal: Soft. Bowel sounds are normal. She exhibits mass. She exhibits no distension. There is tenderness. There is no rebound, no guarding, no tenderness at McBurney's point and negative Martinez's sign.   + small protruding mass in RLQ, severely tender to palpation. Soft and reducible. Moderate tenderness to palpation in periumbilical and epigastric areas.   Neurological: She is alert.   Skin: Skin is warm and dry.   Psychiatric: She has a normal mood and affect.         ED Course   Procedures  Labs Reviewed   COMPREHENSIVE METABOLIC PANEL - Abnormal; Notable for  the following:        Result Value    Glucose 119 (*)     Total Protein 8.5 (*)     All other components within normal limits   URINALYSIS - Abnormal; Notable for the following:     Appearance, UA Hazy (*)     Leukocytes, UA Trace (*)     All other components within normal limits   URINALYSIS MICROSCOPIC - Abnormal; Notable for the following:     Bacteria, UA Few (*)     All other components within normal limits   CULTURE, URINE   CBC W/ AUTO DIFFERENTIAL   LIPASE   POCT URINE PREGNANCY             Medical Decision Making:   Initial Assessment:   Pt is 25 y/o female with PSHx of hernia repair who presents for evaluation of 6 days of worsening abdominal pain and protrusion of mass at site of her previous hernia with associated NVD. Pt is afebrile and appears uncomfortable due to pain. On exam, there is a small protruding mass in RLQ that is severely tender to palpation, soft and reducible. Moderate tenderness in periumbilical and epigastric. No peritoneal signs. CBC, CMP and lipase unremarkable. CT abdomen shows no evidence of obstruction or recurrence of hernia. + for hepatomegaly and hepatic steatosis which could be a cause of abdominal pain. Pt was given Gi cocktail, fluids  And zofran. Upon re-evaluation pt reports she is feeling better. Discussed CT findings with pt and instructed to follow up with PCP. Discharged to home with zofran and pepcid for symptomatic treatment. Return to ED if develop worsening symptoms, unable to tolerate PO or as needed.    I discussed this pt with Dr. Pineda who agrees with assessment and plan.  Differential Diagnosis:   Recurrent hernia, strangulated or incarcerated hernia, bowel obstruction, appendicitis, pancreatitis.            Scribe Attestation:   Scribe #1: I performed the above scribed service and the documentation accurately describes the services I performed. I attest to the accuracy of the note.    Attending Attestation:           Physician Attestation for  Scribe:  Physician Attestation Statement for Scribe #1: I, Karlee Yi PA-C, reviewed documentation, as scribed by Nancy Mckoy in my presence, and it is both accurate and complete.                 ED Course     Clinical Impression:   The primary encounter diagnosis was Right lower quadrant abdominal pain. Diagnoses of Periumbilical abdominal pain, Nausea, Gastroesophageal reflux disease, esophagitis presence not specified, and Hepatomegaly were also pertinent to this visit.    Disposition:   Disposition: Discharged  Condition: Stable       Karlee Yi PA-C  03/13/17 0942

## 2017-03-08 NOTE — DISCHARGE INSTRUCTIONS
Abdominal Pain    Abdominal pain is pain in the stomach or belly area. Everyone has this pain from time to time. In many cases it goes away on its own. But abdominal pain can sometimes be due to a serious problem, such as appendicitis. So its important to know when to seek help.  Causes of abdominal pain  There are many possible causes of abdominal pain. Common causes in adults include:  · Constipation, diarrhea, or gas  · Stomach acid flowing back up into the esophagus (acid reflux or heartburn)  · Severe acid reflux, called GERD (gastroesophageal reflux disease)  · A sore in the lining of the stomach or small intestine (peptic ulcer)  · Inflammation of the gallbladder, liver, or pancreas  · Gallstones or kidney stones  · Appendicitis   · Intestinal blockage   · An internal organ pushing through a muscle or other tissue (hernia)  · Urinary tract infections  · In women, menstrual cramps, fibroids, or endometriosis  · Inflammation or infection of the intestines  Diagnosing the cause of abdominal pain  Your healthcare provider will do a physical exam help find the cause of your pain. If needed, tests will be ordered. Belly pain has many possible causes. So it can be hard to find the reason for your pain. Giving details about your pain can help. Tell your provider where and when you feel the pain, and what makes it better or worse. Also let your provider know if you have other symptoms such as:  · Fever  · Tiredness  · Upset stomach (nausea)  · Vomiting  · Changes in bathroom habits  Treating abdominal pain  Some causes of pain need emergency medical treatment right away. These include appendicitis or a bowel blockage. Other problems can be treated with rest, fluids, or medicines. Your healthcare provider can give you specific instructions for treatment or self-care based on what is causing your pain.  If you have vomiting or diarrhea, sip water or other clear fluids. When you are ready to eat solid foods again,  start with small amounts of easy-to-digest, low-fat foods. These include Bananas, Rice, Applesauce, Toast   When to seek medical care  Call 911 or go to the hospital right away if you:  · Cant pass stool and are vomiting  · Are vomiting blood or have bloody diarrhea or black, tarry diarrhea  · Have chest, neck, or shoulder pain  · Feel like you might pass out  · Have pain in your shoulder blades with nausea  · Have sudden, severe belly pain  · Have new, severe pain unlike any you have felt before  · Have a belly that is rigid, hard, and tender to touch  Call your healthcare provider if you have:  · Pain for more than 5 days  · Bloating for more than 2 days  · Diarrhea for more than 5 days  · A fever of 100.4°F (38.0°C) or higher, or as directed by your provider  · Pain that gets worse  · Weight loss for no reason  · Continued lack of appetite  · Blood in your stool  How to prevent abdominal pain  Here are some tips to help prevent abdominal pain:  · Eat smaller amounts of food at one time.  · Avoid greasy, fried, or other high-fat foods.  · Avoid foods that give you gas.  · Exercise regularly.  · Drink plenty of fluids.  To help prevent GERD symptoms:  · Quit smoking.  · Reduce alcohol and certain foods that increase stomach acid.  · Avoid aspirin and over-the-counter pain and fever medicines (NSAIDS or nonsteroidal anti-inflammatory drugs), if possible  · Lose extra weight.  · Finish eating at least 2 hours before you go to bed or lie down.  · Raise the head of your bed.  Date Last Reviewed: 7/1/2016  © 2174-8843 [employer]. [address] [place] [address], [place] [address]. All rights reserved. This information is not intended as a substitute for professional medical care. Always follow your healthcare professional's instructions.        Discharge Instructions for Gastroesophageal Reflux Disease (GERD)  Gastroesophageal reflux disease (GERD) is a backflow of acid from the stomach into the swallowing tube  (esophagus).  Home care  These home care steps can help you manage GERD:  · Maintain a healthy weight. Get help to lose any extra pounds.  · Avoid lying down after meals.  · Avoid eating late at night.  · Elevate the head of your bed by 6 inches. You can do this by placing wooden blocks or bed risers under the head of your bed.  · Avoid wearing tight-fitting clothes.  · Avoid foods that might irritate your stomach, such as the following:  ¨ Alcohol  ¨ Fat  ¨ Chocolate  ¨ Caffeine  ¨ Spearmint or peppermint  · Talk to your healthcare provider if you are taking any of the following medicines. These medicines can make GERD symptoms worse:  ¨ Calcium channel blockers  ¨ Theophylline  ¨ Anticholinergic medicines, such as oxybutynin and benzatropine  · Begin an exercise program. Ask your healthcare provider how to get started. You can benefit from simple activities, such as walking or gardening.  · Break the smoking habit. Enroll in a stop-smoking program to improve your chances of success.  · Limit alcohol intake to no more than 2 drinks a day.  · Take your medicines exactly as directed. Dont skip doses.  · Avoid over-the-counter nonsteroidal anti-inflammatory medicines, such as aspirin and ibuprofen, unless recommended by your healthcare provider for certain conditions.   · If possible, avoid nitrates (heart medicines, such as nitroglycerin and isosorbide dinitrate ).  Follow-up care  Make a follow-up appointment as directed by our staff.     When to call the healthcare provider  Call your healthcare provider immediately if you have any of the following:  · Trouble swallowing  · Pain when swallowing  · Feeling of food caught in your chest or throat  · Pain in the neck, chest, or back  · Heartburn that causes you to vomit  · Vomiting blood  · Black or tarry stools (from digested blood)  · More saliva (watering of the mouth) than usual  · Weight loss of more than 3% to 5% of your total body weight in a  month  · Hoarseness or sore throat that wont go away  · Choking, coughing, or wheezing   Date Last Reviewed: 7/1/2016 © 2000-2016 [employer]. [address] [place] [address], [place] [address]. All rights reserved. This information is not intended as a substitute for professional medical care. Always follow your healthcare professional's instructions.            Lifestyle Changes for Controlling GERD    When you have GERD, stomach acid feels as if its backing up toward your mouth. Whether or not you take medicine to control your GERD, your symptoms can often be improved with lifestyle changes. Talk to your healthcare provider about the following suggestions. These suggestions may help you get relief from your symptoms.  Raise your head  Reflux is more likely to strike when youre lying down flat, because stomach fluid can flow backward more easily. Raising the head of your bed 4 to 6 inches can help. To do this:  · Slide blocks or books under the legs at the head of your bed. Or, place a wedge under the mattress. Many [employer] can make a suitable wedge for you. The wedge should run from your waist to the top of your head.  · Dont just prop your head on several pillows. This increases pressure on your stomach. It can make GERD worse.  Watch your eating habits  Certain foods may increase the acid in your stomach or relax the lower esophageal sphincter. This makes GERD more likely. Its best to avoid the following if they cause you symptoms:  · Coffee, tea, and carbonated drinks (with and without caffeine)  · Fatty, fried, or spicy food  · Mint, chocolate, onions, and tomatoes  · Peppermint  · Any other foods that seem to irritate your stomach or cause you pain  Relieve the pressure  Tips include the following:  · Eat smaller meals, even if you have to eat more often.  · Dont lie down right after you eat. Wait a few hours for your stomach to empty.  · Avoid tight belts and tight-fitting clothes.  · Lose  excess weight.  Tobacco and alcohol  Avoid smoking tobacco and drinking alcohol. They can make GERD symptoms worse.  Date Last Reviewed: 7/1/2016  © 5434-7718 [employer]. [address] [place] [address], [place] [address]. All rights reserved. This information is not intended as a substitute for professional medical care. Always follow your healthcare professional's instructions.

## 2017-03-08 NOTE — ED TRIAGE NOTES
C/o abd pain to area of hernia repair (tightness) with some protrusion, pain across abd since today, , heartburn x 1 week  , N/D x 6 days. Diarrhea x 6 today. Took pepto bismal tabs today with no relief.

## 2017-03-09 LAB — BACTERIA UR CULT: NORMAL

## 2017-03-24 ENCOUNTER — OFFICE VISIT (OUTPATIENT)
Dept: SLEEP MEDICINE | Facility: CLINIC | Age: 27
End: 2017-03-24
Payer: COMMERCIAL

## 2017-03-24 ENCOUNTER — LAB VISIT (OUTPATIENT)
Dept: LAB | Facility: HOSPITAL | Age: 27
End: 2017-03-24
Attending: INTERNAL MEDICINE
Payer: COMMERCIAL

## 2017-03-24 VITALS
OXYGEN SATURATION: 97 % | DIASTOLIC BLOOD PRESSURE: 82 MMHG | HEART RATE: 73 BPM | SYSTOLIC BLOOD PRESSURE: 129 MMHG | BODY MASS INDEX: 49.84 KG/M2 | WEIGHT: 272.5 LBS

## 2017-03-24 DIAGNOSIS — Z83.3 FAMILY HISTORY OF DIABETES MELLITUS: ICD-10-CM

## 2017-03-24 DIAGNOSIS — E66.01 MORBID OBESITY DUE TO EXCESS CALORIES: ICD-10-CM

## 2017-03-24 DIAGNOSIS — Z87.42 HISTORY OF PCOS: ICD-10-CM

## 2017-03-24 DIAGNOSIS — L68.0 HIRSUTISM: ICD-10-CM

## 2017-03-24 DIAGNOSIS — G47.33 OSA (OBSTRUCTIVE SLEEP APNEA): Primary | ICD-10-CM

## 2017-03-24 DIAGNOSIS — F51.12 INSUFFICIENT SLEEP SYNDROME: ICD-10-CM

## 2017-03-24 LAB
ALBUMIN SERPL BCP-MCNC: 3.9 G/DL
ALP SERPL-CCNC: 64 U/L
ALT SERPL W/O P-5'-P-CCNC: 74 U/L
ANION GAP SERPL CALC-SCNC: 10 MMOL/L
AST SERPL-CCNC: 60 U/L
BASOPHILS # BLD AUTO: 0.02 K/UL
BASOPHILS NFR BLD: 0.4 %
BILIRUB SERPL-MCNC: 0.4 MG/DL
BUN SERPL-MCNC: 10 MG/DL
CALCIUM SERPL-MCNC: 9.8 MG/DL
CHLORIDE SERPL-SCNC: 101 MMOL/L
CHOLEST/HDLC SERPL: 7.3 {RATIO}
CO2 SERPL-SCNC: 27 MMOL/L
CORTIS SERPL-MCNC: 15.8 UG/DL
CREAT SERPL-MCNC: 0.8 MG/DL
DIFFERENTIAL METHOD: NORMAL
EOSINOPHIL # BLD AUTO: 0.1 K/UL
EOSINOPHIL NFR BLD: 1.7 %
ERYTHROCYTE [DISTWIDTH] IN BLOOD BY AUTOMATED COUNT: 13.9 %
EST. GFR  (AFRICAN AMERICAN): >60 ML/MIN/1.73 M^2
EST. GFR  (NON AFRICAN AMERICAN): >60 ML/MIN/1.73 M^2
GLUCOSE SERPL-MCNC: 141 MG/DL
HCT VFR BLD AUTO: 37.9 %
HDL/CHOLESTEROL RATIO: 13.6 %
HDLC SERPL-MCNC: 213 MG/DL
HDLC SERPL-MCNC: 29 MG/DL
HGB BLD-MCNC: 12.3 G/DL
LDLC SERPL CALC-MCNC: ABNORMAL MG/DL
LYMPHOCYTES # BLD AUTO: 1.5 K/UL
LYMPHOCYTES NFR BLD: 30.4 %
MCH RBC QN AUTO: 27.2 PG
MCHC RBC AUTO-ENTMCNC: 32.5 %
MCV RBC AUTO: 84 FL
MONOCYTES # BLD AUTO: 0.4 K/UL
MONOCYTES NFR BLD: 7.7 %
NEUTROPHILS # BLD AUTO: 2.9 K/UL
NEUTROPHILS NFR BLD: 59.4 %
NONHDLC SERPL-MCNC: 184 MG/DL
PLATELET # BLD AUTO: 227 K/UL
PMV BLD AUTO: 10.1 FL
POTASSIUM SERPL-SCNC: 4 MMOL/L
PROLACTIN SERPL IA-MCNC: 33.9 NG/ML
PROT SERPL-MCNC: 8.5 G/DL
RBC # BLD AUTO: 4.53 M/UL
SODIUM SERPL-SCNC: 138 MMOL/L
TRIGL SERPL-MCNC: 424 MG/DL
TSH SERPL DL<=0.005 MIU/L-ACNC: 2.19 UIU/ML
WBC # BLD AUTO: 4.83 K/UL

## 2017-03-24 PROCEDURE — 82533 TOTAL CORTISOL: CPT

## 2017-03-24 PROCEDURE — 84443 ASSAY THYROID STIM HORMONE: CPT

## 2017-03-24 PROCEDURE — 85025 COMPLETE CBC W/AUTO DIFF WBC: CPT

## 2017-03-24 PROCEDURE — 84402 ASSAY OF FREE TESTOSTERONE: CPT

## 2017-03-24 PROCEDURE — 83036 HEMOGLOBIN GLYCOSYLATED A1C: CPT

## 2017-03-24 PROCEDURE — 80061 LIPID PANEL: CPT

## 2017-03-24 PROCEDURE — 84146 ASSAY OF PROLACTIN: CPT

## 2017-03-24 PROCEDURE — 99999 PR PBB SHADOW E&M-EST. PATIENT-LVL III: CPT | Mod: PBBFAC,,, | Performed by: INTERNAL MEDICINE

## 2017-03-24 PROCEDURE — 80053 COMPREHEN METABOLIC PANEL: CPT

## 2017-03-24 PROCEDURE — 36415 COLL VENOUS BLD VENIPUNCTURE: CPT

## 2017-03-24 PROCEDURE — 99244 OFF/OP CNSLTJ NEW/EST MOD 40: CPT | Mod: S$GLB,,, | Performed by: INTERNAL MEDICINE

## 2017-03-24 NOTE — PROGRESS NOTES
Glenda De La Fuente  was seen as a new patient at the request of  Adeel Soto MD for the evaluation of kari.    CHIEF COMPLAINT:    Chief Complaint   Patient presents with    Sleep Apnea       HISTORY OF PRESENT ILLNESS: Glenda De La Fuente is a 26 y.o. female is here for sleep evaluation.   Patient with loud snoring.  Waken up due to loud snoring.  +fatigue upon awake 3-4 days per week.  +sleepiness a/w driving.  No mva a/w driving. No cataplexy.  No parasomnia.      +discomfort (numb and tingling sensation) in legs at night when laying down or sitting still.  No occurrence during the day.  Usually improved with getting up and walk or rubbing of legs against bed.  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  Patient work night shift on Wednesday, Friday and Saturday.  Patient work day job on Thursday.      [place] Sleepiness Scale score during initial sleep evaluation was 14.    SLEEP ROUTINE:  Activity the hour prior to sleep: shower and watch tv    Bed partner:  girlfriend  Time to bed:  11 pm   Lights off:  yes  Sleep onset latency:  5 minutes        Disruptions or awakenings:    3 times (no difficulty going back to sleep)    Wakeup time:      5-7 am   Perceived sleep quality:  Tire on some days       Daytime naps:      none  Weekend sleep routine:      same  Caffeine use: none  exercise habit:   Gym 3-4 times per week      PAST MEDICAL HISTORY:    Active Ambulatory Problems     Diagnosis Date Noted    Abdominal wall mass 07/20/2016    Ventral incisional hernia without obstruction or gangrene 08/11/2016    Recurrent ventral hernia 08/31/2016    History of PCOS 01/17/2017    Hirsutism 01/17/2017     Resolved Ambulatory Problems     Diagnosis Date Noted    Annual physical exam 07/20/2016     Past Medical History:   Diagnosis Date    Asthma childhood    GERD (gastroesophageal reflux disease)     Morbid obesity     PCOS (polycystic ovarian syndrome)                 PAST SURGICAL  HISTORY:    Past Surgical History:   Procedure Laterality Date    APPENDECTOMY  2011    HERNIA REPAIR      HERNIA REPAIR      salpingoopherectomy Right 2008         FAMILY HISTORY:                Family History   Problem Relation Age of Onset    Heart attack Father     Hypertension Father     Diabetes Mother     Heart attack Mother        SOCIAL HISTORY:          Tobacco:   History   Smoking Status    Never Smoker   Smokeless Tobacco    Not on file       alcohol use:    History   Alcohol Use    Yes     Comment: occasionally apple cider                 Occupation:  Sitter and caremanager    ALLERGIES:  Review of patient's allergies indicates:  No Known Allergies    CURRENT MEDICATIONS:    Current Outpatient Prescriptions   Medication Sig Dispense Refill    ibuprofen (ADVIL,MOTRIN) 800 MG tablet Take 1 tablet (800 mg total) by mouth every 6 (six) hours as needed for Pain. 20 tablet 0    diazePAM (VALIUM) 10 MG Tab Take 1 tablet (10 mg total) by mouth every 8 (eight) hours as needed (muscle spasm). 12 tablet 0    famotidine (PEPCID) 20 MG tablet Take 1 tablet (20 mg total) by mouth 2 (two) times daily. 12 tablet 0     No current facility-administered medications for this visit.                   REVIEW OF SYSTEMS:     Sleep related symptoms as per HPI.  CONST:  Fluctuating weight  HEENT: +intermittent sinus congestion  PULM: Denies dyspnea  CARD:  Denies palpitations   GI:  + acid reflux  : Denies polyuria  NEURO: Denies headaches  PSYCH: +stress.  HEME: Denies anemia   Otherwise, a balance of systems reviewed is negative.          PHYSICAL EXAM:  Vitals:    03/24/17 0841   BP: 129/82   Pulse: 73   SpO2: 97%   Weight: 123.6 kg (272 lb 7.8 oz)   PainSc: 0-No pain     Body mass index is 49.84 kg/(m^2).     GENERAL: Normal development, well groomed  HEENT:  Conjunctivae are non-erythematous; Pupils equal, round, and reactive to light; Nose is symmetrical; Nasal mucosa is pink and moist; Septum is midline;  Inferior turbinates are normal; Nasal airflow is normal; Posterior pharynx is pink; Modified Mallampati: 4; Posterior palate is normal; Tonsils +1; Uvula is normal and pink;Tongue is normal; Dentition is fair; No TMJ tenderness; Jaw opening and protrusion without click and without discomfort.  NECK: Supple. Neck circumference is 20 inches. No thyromegaly. No palpable nodes.     SKIN: On face and neck: No abrasions, no rashes, no lesions.  No subcutaneous nodules are palpable.  RESPIRATORY: Chest is clear to auscultation.  Normal chest expansion and non-labored breathing at rest.  CARDIOVASCULAR: Normal S1, S2.  No murmurs, gallops or rubs. No carotid bruits bilaterally.  EXTREMITIES: No edema. No clubbing. No cyanosis. Station normal. Gait normal.        NEURO/PSYCH: Oriented to time, place and person. Normal attention span and concentration. Affect is full. Mood is normal.                                              DATA   No prior sleep study  Lab Results   Component Value Date    TSH 2.187 03/24/2017     ASSESSMENT    ICD-10-CM ICD-9-CM    1. NICHOLE (obstructive sleep apnea) G47.33 327.23 Home Sleep Studies   2. Morbid obesity due to excess calories E66.01 278.01 Ambulatory Referral to Medical [facility]   3. Insufficient sleep syndrome F51.12 307.44        PLAN:    Sleep Apnea NEC. The patient symptomatically has loud snoring, restless sleep, daytime somnolence with findings of elevated bmi, enlarged neck. This warrants further investigation for possible obstructive sleep apnea.  Patient will be contacted after sleep study is done.      Obesity - candidate for bariatric surgery.  Exercising regularly.  Will refer to [facility].      Insufficient sleep - constrained by work schedule.      Diagnostic: Polysomnogram. The nature of this procedure and its indication was discussed with the patient.     Education: During our discussion today, we talked about the etiology of obstructive sleep apnea as well as the  potential ramifications of untreated sleep apnea, which could include daytime sleepiness, hypertension, heart disease and/or stroke.     Precautions: The patient was advised to abstain from driving should they feel sleepy or drowsy.       Thank you for allowing me the opportunity to participate in the care of your patient.    Patient will Return after sleep study.    Please cc note to  Adeel Soto MD.

## 2017-03-24 NOTE — LETTER
March 24, 2017      Adeel Soto MD  [address]  [address]  [place] [address]           [facility]  [address]  [place] [address]  Phone: [phone]  Fax: [phone]          Patient: Glenda De La Fuente   MR Number: [id number]   YOB: 1990   Date of Visit: 3/24/2017       Dear Dr. Adeel Soto:    Thank you for referring Glenda De La Fuente to me for evaluation. Attached you will find relevant portions of my assessment and plan of care.    If you have questions, please do not hesitate to call me. I look forward to following Glenda De La Fuente along with you.    Sincerely,    Neri Trujillo MD    Enclosure  CC:  No Recipients    If you would like to receive this communication electronically, please contact [email or web address] or [phone] to request more information on [employer] access.    For providers and/or their staff who would like to refer a patient to [facility], please contact us through our one-stop-shop provider referral line, [facility], at [phone].    If you feel you have received this communication in error or would no longer like to receive these types of communications, please e-mail [email or web address]

## 2017-03-24 NOTE — MR AVS SNAPSHOT
[facility]  [address]  [place] [address]  Phone: [phone]  Fax: [phone]                  Glenda De La Fuente   3/24/2017 8:30 AM   Office Visit    Description:  Female : 1990   Provider:  Neri Trujillo MD   Department:  [facility]           Reason for Visit     Sleep Apnea           Diagnoses this Visit        Comments    NICHOLE (obstructive sleep apnea)    -  Primary     Morbid obesity due to excess calories         Insufficient sleep syndrome                To Do List           Goals (5 Years of Data)     None      Follow-Up and Disposition     Return after sleep study.      [facility]     [facility] -  Assistance  Registered nurses in the [facility] provide clinical advisement, health education, appointment booking, and other advisory services.  Call for this free service at [phone].             Medications           Message regarding Medications     Verify the changes and/or additions to your medication regime listed below are the same as discussed with your clinician today.  If any of these changes or additions are incorrect, please notify your healthcare provider.             Verify that the below list of medications is an accurate representation of the medications you are currently taking.  If none reported, the list may be blank. If incorrect, please contact your healthcare provider. Carry this list with you in case of emergency.           Current Medications     ibuprofen (ADVIL,MOTRIN) 800 MG tablet Take 1 tablet (800 mg total) by mouth every 6 (six) hours as needed for Pain.    diazePAM (VALIUM) 10 MG Tab Take 1 tablet (10 mg total) by mouth every 8 (eight) hours as needed (muscle spasm).    famotidine (PEPCID) 20 MG tablet Take 1 tablet (20 mg total) by mouth 2 (two) times daily.           Clinical Reference Information           Your Vitals Were     BP Pulse Weight Last Period SpO2 BMI    129/82 73  123.6 kg (272 lb 7.8 oz) 02/05/2017 97% 49.84 kg/m2      Blood Pressure          Most Recent Value    BP  129/82      Allergies as of 3/24/2017     No Known Allergies      Immunizations Administered on Date of Encounter - 3/24/2017     None      Orders Placed During Today's Visit      Normal Orders This Visit    Ambulatory Referral to [facility]     Future Labs/Procedures Expected by Expires    Home Sleep Studies  As directed 3/24/2018      Instructions    [facility] or [facility] will contact you to schedule your sleep study. Their number is [phone] (ext 1). The [facility] is located on 7th floor of the [facility].    We will call you when the sleep study results are ready - if you have not heard from us by 2 weeks from the date of the study, please call [phone] (ext 2).    You are advised to abstain from driving should you feel sleepy or drowsy.     [facility]  [place]  [phone]    Programs include   *30 day free membership   *1 hour complimentary personal training session   *1 hour nutrition talk   *discounted [facility] membership      Bariatric surgery [phone]          Language Assistance Services     ATTENTION: Language assistance services are available, free of charge. Please call [phone].      ATENCIÓN: Si habla español, tiene a hendrickson disposición servicios gratuitos de asistencia lingüística. Llame al [phone].     CHÚ Ý: N?u b?n nói Ti?ng Vi?t, có các d?ch v? h? tr? ngôn ng? mi?n phí dành cho b?n. G?i s? [phone].         [facility] complies with applicable Federal civil rights laws and does not discriminate on the basis of race, color, national origin, age, disability, or sex.

## 2017-03-24 NOTE — PATIENT INSTRUCTIONS
G[facility]da or [facility] will contact you to schedule your sleep study. Their number is [phone] (ext 1). The [facility] is located on 7th floor of the [facility].    We will call you when the sleep study results are ready - if you have not heard from us by 2 weeks from the date of the study, please call [phone] (ext 2).    You are advised to abstain from driving should you feel sleepy or drowsy.     [facility]  [place]  [phone]    Programs include   *30 day free membership   *1 hour complimentary personal training session   *1 hour nutrition talk   *discounted [facility] membership      Bariatric surgery [phone]

## 2017-03-25 LAB
ESTIMATED AVG GLUCOSE: 140 MG/DL
HBA1C MFR BLD HPLC: 6.5 %

## 2017-03-26 LAB — TESTOST FREE SERPL-MCNC: 2.9 PG/ML

## 2017-03-29 ENCOUNTER — TELEPHONE (OUTPATIENT)
Dept: SLEEP MEDICINE | Facility: OTHER | Age: 27
End: 2017-03-29

## 2017-04-06 ENCOUNTER — PATIENT MESSAGE (OUTPATIENT)
Dept: ADMINISTRATIVE | Facility: OTHER | Age: 27
End: 2017-04-06

## 2017-04-07 ENCOUNTER — TELEPHONE (OUTPATIENT)
Dept: FAMILY MEDICINE | Facility: CLINIC | Age: 27
End: 2017-04-07

## 2017-04-07 NOTE — TELEPHONE ENCOUNTER
----- Message from Marah Tran sent at 4/6/2017  4:16 PM CDT -----  Contact: self  Pt is requesting a call in regards to upcoming sleep study.  [phone]          Thanks

## 2017-05-01 ENCOUNTER — TELEPHONE (OUTPATIENT)
Dept: SLEEP MEDICINE | Facility: OTHER | Age: 27
End: 2017-05-01

## 2017-05-01 NOTE — TELEPHONE ENCOUNTER
Called to confirm home sleep study for May 2nd.  Patient canceled.  Cannot afford study at this time.

## 2017-09-24 ENCOUNTER — HOSPITAL ENCOUNTER (EMERGENCY)
Facility: HOSPITAL | Age: 27
Discharge: HOME | End: 2017-09-24
Attending: EMERGENCY MEDICINE
Payer: COMMERCIAL

## 2017-09-24 VITALS
SYSTOLIC BLOOD PRESSURE: 114 MMHG | DIASTOLIC BLOOD PRESSURE: 55 MMHG | RESPIRATION RATE: 18 BRPM | HEIGHT: 62 IN | TEMPERATURE: 99 F | BODY MASS INDEX: 51.53 KG/M2 | OXYGEN SATURATION: 97 % | HEART RATE: 83 BPM | WEIGHT: 280 LBS

## 2017-09-24 DIAGNOSIS — K43.9 VENTRAL HERNIA WITHOUT OBSTRUCTION OR GANGRENE: Primary | ICD-10-CM

## 2017-09-24 LAB
B-HCG UR QL: NEGATIVE
CTP QC/QA: YES

## 2017-09-24 PROCEDURE — 99283 EMERGENCY DEPT VISIT LOW MDM: CPT | Mod: 25

## 2017-09-24 PROCEDURE — 81025 URINE PREGNANCY TEST: CPT | Performed by: EMERGENCY MEDICINE

## 2017-09-24 NOTE — ED TRIAGE NOTES
Right lower abdominal pain for a few days intermittent sharp diarrhea no nausea or vomiting had hernia surgery a year ago and now noticed a 2nd lump to area

## 2017-09-24 NOTE — ED PROVIDER NOTES
"Encounter Date: 9/24/2017    SCRIBE #1 NOTE: I, Shiva Vogt, am scribing for, and in the presence of,  Dave Sanders NP. I have scribed the following portions of the note - Other sections scribed: HPI and ROS.       History     Chief Complaint   Patient presents with    Abdominal Pain     RLQ abdominal pain, intermitten sharp pain x 3 day;  hernia repair a year ago, reports a second lump appeared a month ago, "like when I had hernia;" denies N/V; reports loose stools     CC: Abdominal Pain    HPI: This 27 y.o. Morbid obese Female with asthma, GERD and PCOS presents to the ED c/o acute onset of intermittent and mild (3/10) RLQ abdominal pain described as "sharp pain." She reports having a hernia repair a year ago and states a second lump appeared 1 month ago similar to her previous hernia. When the second lump appeared a month ago, the pt began experiencing abdominal pain and diarrhea. RLQ abdominal pain is exacerbated with palpation. She has not taken any medications to relieve her pain. She denies fever, nausea, chills, chest pain, neck pain, back pain or emesis. No other symptoms or alleviating factors present.      The history is provided by the patient. No  was used.     Review of patient's allergies indicates:  No Known Allergies  Past Medical History:   Diagnosis Date    Asthma childhood    GERD (gastroesophageal reflux disease)     Morbid obesity     PCOS (polycystic ovarian syndrome)      Past Surgical History:   Procedure Laterality Date    APPENDECTOMY  2011    HERNIA REPAIR      salpingoopherectomy Right 2008     Family History   Problem Relation Age of Onset    Heart attack Father     Hypertension Father     Diabetes Mother     Heart attack Mother      Social History   Substance Use Topics    Smoking status: Never Smoker    Smokeless tobacco: Never Used    Alcohol use Yes      Comment: occasionally apple cider     Review of Systems   Constitutional: Negative for " chills and fever.   HENT: Negative for rhinorrhea.    Eyes: Negative for redness.   Respiratory: Negative for cough and shortness of breath.    Cardiovascular: Negative for chest pain.   Gastrointestinal: Positive for abdominal pain (RLQ) and diarrhea (intermittent). Negative for nausea and vomiting.   Genitourinary: Negative for dysuria and hematuria.   Musculoskeletal: Negative for arthralgias, back pain and neck pain.   Skin: Negative for rash.   Neurological: Negative for dizziness and headaches.       Physical Exam     Initial Vitals [09/24/17 1034]   BP Pulse Resp Temp SpO2   (!) 146/79 79 20 98.7 °F (37.1 °C) 98 %      MAP       101.33         Physical Exam    Nursing note and vitals reviewed.  Constitutional: Vital signs are normal. She appears well-developed and well-nourished. She is not diaphoretic. She is Obese .  Non-toxic appearance. She does not appear ill. No distress.   HENT:   Head: Normocephalic and atraumatic.   Right Ear: External ear normal.   Left Ear: External ear normal.   Nose: Nose normal.   Eyes: Conjunctivae and EOM are normal. Pupils are equal, round, and reactive to light. Right eye exhibits no discharge. Left eye exhibits no discharge. No scleral icterus.   Neck: Normal range of motion. Neck supple. No thyromegaly present. No tracheal deviation present. No JVD present.   Cardiovascular: Normal rate, regular rhythm, normal heart sounds and intact distal pulses. Exam reveals no gallop and no friction rub.    No murmur heard.  Pulmonary/Chest: Breath sounds normal. No stridor. No respiratory distress. She has no wheezes. She has no rhonchi. She has no rales.   Abdominal: Soft. Bowel sounds are normal. She exhibits mass. She exhibits no distension. There is no tenderness. There is no rigidity, no rebound, no guarding, no CVA tenderness, no tenderness at McBurney's point and negative Martinez's sign. A hernia is present. Hernia confirmed positive in the ventral area.       Musculoskeletal:  Normal range of motion. She exhibits no edema or tenderness.   Lymphadenopathy:     She has no cervical adenopathy.   Neurological: She is alert and oriented to person, place, and time. She has normal strength and normal reflexes. She displays normal reflexes. No cranial nerve deficit or sensory deficit.   Skin: Skin is warm and dry. No rash and no abscess noted. No erythema. No pallor.   Psychiatric: She has a normal mood and affect. Her behavior is normal. Judgment and thought content normal.         ED Course   Procedures  Labs Reviewed   POCT URINE PREGNANCY             Medical Decision Making:   Differential Diagnosis:   I considered but doubt incarcerated hernia, strangulated hernia, bowel obstruction, cholecystitis, gastroenteritis, appendicitis  ED Management:  Urgent evaluation of a 27-year-old morbidly obese female presenting complaining of mild intermittent right lower quadrant abdominal pain that began one month ago and has been continuous. Patient reports previous ventral hernia repair in the same area that occurred secondary to an appendectomy. Patient states that she feels a mass that feels similar to her previous hernia. She denies fever, vomiting, constipation, or any other systemic symptoms. She does report intermittent diarrhea but states that she has not had diarrhea for several days. She is well-appearing and in no apparent distress. There is no abdominal TTP. There is a palpable mass in the area of the right lower quadrant. Patient's body habitus makes reduction difficult but hernia is reducible. Suspect ventral hernia secondary to abdominal surgery. I doubt incarceration or strangulation. Agents previous surgery was performed by Dr. Estrada. I instructed the patient to follow-up with Dr. Estrada in his office sometime in the coming week. ED return precautions given. Patient is pain-free at the time of discharge. Patient expressed understanding of diagnosis and discharge instructions.  Other:   I  have discussed this case with another health care provider.       <> Summary of the Discussion: Case discussed with my attending physician Danny Sky M.D. who agreed with the assessment and plan.            Scribe Attestation:   Scribe #1: I performed the above scribed service and the documentation accurately describes the services I performed. I attest to the accuracy of the note.    Attending Attestation:     Physician Attestation Statement for NP/PA:   I discussed this assessment and plan of this patient with the NP/PA, but I did not personally examine the patient. The face to face encounter was performed by the NP/PA.        Physician Attestation for Scribe:  Physician Attestation Statement for Scribe #1: I, Dave Sanders NP, reviewed documentation, as scribed by Shiva Vogt in my presence, and it is both accurate and complete.                 ED Course      Clinical Impression:   The encounter diagnosis was Ventral hernia without obstruction or gangrene.    Disposition:   Disposition: Discharged  Condition: Stable                        Dave Sanders NP  09/24/17 1712       Danny Sky MD  09/25/17 [id number]

## 2017-11-28 ENCOUNTER — HOSPITAL ENCOUNTER (EMERGENCY)
Facility: HOSPITAL | Age: 27
Discharge: HOME | End: 2017-11-28
Attending: FAMILY MEDICINE
Payer: COMMERCIAL

## 2017-11-28 VITALS
HEART RATE: 80 BPM | RESPIRATION RATE: 16 BRPM | SYSTOLIC BLOOD PRESSURE: 129 MMHG | OXYGEN SATURATION: 97 % | HEIGHT: 62 IN | WEIGHT: 280 LBS | TEMPERATURE: 99 F | DIASTOLIC BLOOD PRESSURE: 77 MMHG | BODY MASS INDEX: 51.53 KG/M2

## 2017-11-28 DIAGNOSIS — R10.31 RIGHT LOWER QUADRANT ABDOMINAL PAIN: Primary | ICD-10-CM

## 2017-11-28 DIAGNOSIS — K43.9 VENTRAL HERNIA WITHOUT OBSTRUCTION OR GANGRENE: ICD-10-CM

## 2017-11-28 LAB
ALBUMIN SERPL BCP-MCNC: 3.2 G/DL
ALP SERPL-CCNC: 74 U/L
ALT SERPL W/O P-5'-P-CCNC: 38 U/L
ANION GAP SERPL CALC-SCNC: 12 MMOL/L
AST SERPL-CCNC: 48 U/L
B-HCG UR QL: NEGATIVE
BACTERIA #/AREA URNS AUTO: ABNORMAL /HPF
BASOPHILS # BLD AUTO: 0.02 K/UL
BASOPHILS NFR BLD: 0.2 %
BILIRUB SERPL-MCNC: 0.2 MG/DL
BILIRUB UR QL STRIP: NEGATIVE
BUN SERPL-MCNC: 9 MG/DL
CALCIUM SERPL-MCNC: 9.6 MG/DL
CHLORIDE SERPL-SCNC: 101 MMOL/L
CLARITY UR REFRACT.AUTO: CLEAR
CO2 SERPL-SCNC: 24 MMOL/L
COLOR UR AUTO: YELLOW
CREAT SERPL-MCNC: 0.8 MG/DL
CTP QC/QA: YES
DIFFERENTIAL METHOD: ABNORMAL
EOSINOPHIL # BLD AUTO: 0.1 K/UL
EOSINOPHIL NFR BLD: 1.3 %
ERYTHROCYTE [DISTWIDTH] IN BLOOD BY AUTOMATED COUNT: 14.4 %
EST. GFR  (AFRICAN AMERICAN): >60 ML/MIN/1.73 M^2
EST. GFR  (NON AFRICAN AMERICAN): >60 ML/MIN/1.73 M^2
GLUCOSE SERPL-MCNC: 142 MG/DL
GLUCOSE UR QL STRIP: NEGATIVE
HCT VFR BLD AUTO: 36 %
HGB BLD-MCNC: 11.6 G/DL
HGB UR QL STRIP: NEGATIVE
HYALINE CASTS UR QL AUTO: 2 /LPF
IMM GRANULOCYTES # BLD AUTO: 0.04 K/UL
IMM GRANULOCYTES NFR BLD AUTO: 0.4 %
KETONES UR QL STRIP: ABNORMAL
LEUKOCYTE ESTERASE UR QL STRIP: NEGATIVE
LIPASE SERPL-CCNC: 41 U/L
LYMPHOCYTES # BLD AUTO: 2.4 K/UL
LYMPHOCYTES NFR BLD: 26.8 %
MCH RBC QN AUTO: 27.5 PG
MCHC RBC AUTO-ENTMCNC: 32.2 G/DL
MCV RBC AUTO: 85 FL
MICROSCOPIC COMMENT: ABNORMAL
MONOCYTES # BLD AUTO: 0.6 K/UL
MONOCYTES NFR BLD: 6.1 %
NEUTROPHILS # BLD AUTO: 5.9 K/UL
NEUTROPHILS NFR BLD: 65.2 %
NITRITE UR QL STRIP: NEGATIVE
NRBC BLD-RTO: 0 /100 WBC
PH UR STRIP: 5 [PH] (ref 5–8)
PLATELET # BLD AUTO: 222 K/UL
PMV BLD AUTO: 10.3 FL
POTASSIUM SERPL-SCNC: 4 MMOL/L
PROT SERPL-MCNC: 8.4 G/DL
PROT UR QL STRIP: ABNORMAL
RBC # BLD AUTO: 4.22 M/UL
RBC #/AREA URNS AUTO: 1 /HPF (ref 0–4)
SODIUM SERPL-SCNC: 137 MMOL/L
SP GR UR STRIP: 1.02 (ref 1–1.03)
SQUAMOUS #/AREA URNS AUTO: 1 /HPF
URN SPEC COLLECT METH UR: ABNORMAL
UROBILINOGEN UR STRIP-ACNC: NEGATIVE EU/DL
WBC # BLD AUTO: 9.08 K/UL
WBC #/AREA URNS AUTO: 1 /HPF (ref 0–5)

## 2017-11-28 PROCEDURE — 96374 THER/PROPH/DIAG INJ IV PUSH: CPT

## 2017-11-28 PROCEDURE — 81001 URINALYSIS AUTO W/SCOPE: CPT

## 2017-11-28 PROCEDURE — 99284 EMERGENCY DEPT VISIT MOD MDM: CPT | Mod: 25

## 2017-11-28 PROCEDURE — 63600175 PHARM REV CODE 636 W HCPCS

## 2017-11-28 PROCEDURE — 83690 ASSAY OF LIPASE: CPT

## 2017-11-28 PROCEDURE — 25000003 PHARM REV CODE 250

## 2017-11-28 PROCEDURE — 80053 COMPREHEN METABOLIC PANEL: CPT

## 2017-11-28 PROCEDURE — 85025 COMPLETE CBC W/AUTO DIFF WBC: CPT

## 2017-11-28 PROCEDURE — 99284 EMERGENCY DEPT VISIT MOD MDM: CPT | Mod: ,,,

## 2017-11-28 PROCEDURE — 81025 URINE PREGNANCY TEST: CPT | Performed by: EMERGENCY MEDICINE

## 2017-11-28 PROCEDURE — 96375 TX/PRO/DX INJ NEW DRUG ADDON: CPT

## 2017-11-28 PROCEDURE — 25500020 PHARM REV CODE 255: Performed by: FAMILY MEDICINE

## 2017-11-28 RX ORDER — ONDANSETRON 2 MG/ML
4 INJECTION INTRAMUSCULAR; INTRAVENOUS
Status: COMPLETED | OUTPATIENT
Start: 2017-11-28 | End: 2017-11-28

## 2017-11-28 RX ORDER — ACETAMINOPHEN 10 MG/ML
1000 INJECTION, SOLUTION INTRAVENOUS EVERY 8 HOURS
Status: DISCONTINUED | OUTPATIENT
Start: 2017-11-28 | End: 2017-11-28 | Stop reason: HOSPADM

## 2017-11-28 RX ORDER — MECLIZINE HCL 12.5 MG 12.5 MG/1
25 TABLET ORAL
Status: COMPLETED | OUTPATIENT
Start: 2017-11-28 | End: 2017-11-28

## 2017-11-28 RX ADMIN — ONDANSETRON 4 MG: 2 INJECTION INTRAMUSCULAR; INTRAVENOUS at 01:11

## 2017-11-28 RX ADMIN — ACETAMINOPHEN 1000 MG: 10 INJECTION, SOLUTION INTRAVENOUS at 02:11

## 2017-11-28 RX ADMIN — IOHEXOL 100 ML: 350 INJECTION, SOLUTION INTRAVENOUS at 03:11

## 2017-11-28 RX ADMIN — MECLIZINE 25 MG: 12.5 TABLET ORAL at 02:11

## 2017-11-28 NOTE — ED TRIAGE NOTES
"Patient states RLQ abd pain with "mass" that is tender to palpation, diarrhea x 1 month daily. Nausea vomiting x 2 days. Diarrhea 1 this am, vomited x 1 this am.   "

## 2017-11-28 NOTE — ED PROVIDER NOTES
"Encounter Date: 11/28/2017       History     Chief Complaint   Patient presents with    Abdominal Pain     for 3 days lower r abd pain, nausea, vomiting , now dizzy     27-year-old female with medical history of obesity, asthma, PCOS, acid reflux and hernia repair presents to the ED with abdominal pain.  Patient states abdominal pain is located in the right lower quadrant.  Patient had history of hernia repair.  Patient states pain is worse with palpation and standing.  Pain ceases when laying down.  Patient also endorses diarrhea ×1 month and intermittent nausea and vomiting ×3 days. Patient also endorses intermittent dizziness described as "I feel like I'm on a boat rocking back and forth".  Patient is able to tolerate some solids and liquids.  Patient denies fever, chills, chest pain, shortness of breath, constipation, changes in urination, back pain.          Review of patient's allergies indicates:  No Known Allergies  Past Medical History:   Diagnosis Date    Asthma childhood    GERD (gastroesophageal reflux disease)     Morbid obesity     PCOS (polycystic ovarian syndrome)      Past Surgical History:   Procedure Laterality Date    APPENDECTOMY  2011    HERNIA REPAIR      salpingoopherectomy Right 2008     Family History   Problem Relation Age of Onset    Heart attack Father     Hypertension Father     Diabetes Mother     Heart attack Mother      Social History   Substance Use Topics    Smoking status: Never Smoker    Smokeless tobacco: Never Used    Alcohol use Yes      Comment: occasionally apple cider     Review of Systems   Constitutional: Negative for chills, diaphoresis, fatigue and fever.   HENT: Negative for sore throat.    Respiratory: Negative for shortness of breath.    Cardiovascular: Negative for chest pain.   Gastrointestinal: Positive for abdominal pain, diarrhea, nausea and vomiting. Negative for anal bleeding and blood in stool.   Genitourinary: Negative for dysuria, flank pain, " pelvic pain and vaginal bleeding.   Musculoskeletal: Negative for back pain and myalgias.   Skin: Negative for rash.   Neurological: Positive for dizziness. Negative for syncope, weakness, light-headedness and headaches.   Hematological: Does not bruise/bleed easily.   Psychiatric/Behavioral: The patient is not nervous/anxious.        Physical Exam     Initial Vitals [11/28/17 1013]   BP Pulse Resp Temp SpO2   (!) 189/87 85 18 98.7 °F (37.1 °C) 99 %      MAP       121         Physical Exam    Vitals reviewed.  Constitutional: Vital signs are normal. She appears well-developed and well-nourished. She is not diaphoretic. No distress.   HENT:   Head: Normocephalic and atraumatic.   Right Ear: Hearing, tympanic membrane, external ear and ear canal normal. No tenderness. No mastoid tenderness. Tympanic membrane is not erythematous. No middle ear effusion.   Left Ear: Hearing, tympanic membrane, external ear and ear canal normal. No tenderness. No mastoid tenderness. Tympanic membrane is not erythematous.  No middle ear effusion.   Nose: Nose normal.   Mouth/Throat: Oropharynx is clear and moist.   Eyes: Conjunctivae, EOM and lids are normal. Pupils are equal, round, and reactive to light. Lids are everted and swept, no foreign bodies found. Right eye exhibits no discharge. Left eye exhibits no discharge.   Neck: Trachea normal and normal range of motion. Neck supple.   Cardiovascular: Normal rate, regular rhythm, intact distal pulses and normal pulses.   Pulmonary/Chest: Breath sounds normal. She has no wheezes. She has no rhonchi. She has no rales. She exhibits no tenderness.   Abdominal: Normal appearance and bowel sounds are normal. She exhibits no distension. There is tenderness in the right lower quadrant. There is no rebound and no guarding.       Two areas of tenderness in RLQ. Reproducible hernia palpated in distal RLQ. No hernia palpated near umbilicus where patient has moderate tenderness. Patient states h/o  ventral hernia near umbilicus but mesh is in place.   Musculoskeletal: She exhibits no edema or tenderness.   Neurological: She is alert and oriented to person, place, and time. She has normal strength. No cranial nerve deficit or sensory deficit.   Skin: Skin is warm. Capillary refill takes less than 2 seconds. No rash noted. No cyanosis.   Psychiatric: She has a normal mood and affect.         ED Course   Procedures  Labs Reviewed   URINALYSIS, REFLEX TO URINE CULTURE - Abnormal; Notable for the following:        Result Value    Protein, UA 1+ (*)     Ketones, UA 1+ (*)     All other components within normal limits    Narrative:     Preferred Collection Type->Urine, Clean Catch   URINALYSIS MICROSCOPIC - Abnormal; Notable for the following:     Hyaline Casts, UA 2 (*)     All other components within normal limits    Narrative:     Preferred Collection Type->Urine, Clean Catch   CBC W/ AUTO DIFFERENTIAL - Abnormal; Notable for the following:     Hemoglobin 11.6 (*)     Hematocrit 36.0 (*)     All other components within normal limits   COMPREHENSIVE METABOLIC PANEL - Abnormal; Notable for the following:     Glucose 142 (*)     Albumin 3.2 (*)     AST 48 (*)     All other components within normal limits   LIPASE   POCT URINE PREGNANCY        Imaging Results          CT Abdomen Pelvis With Contrast (Final result)  Result time 11/28/17 15:43:13    Final result by Andrea Bejarano MD (11/28/17 15:43:13)                 Impression:        1.  Right lower quadrant suspected previous ventral hernia repair with anterolateral inferior ventral abdominal wall two wide necked bowel containing hernias, grossly stable in size and configuration from most recent prior, as above. No evidence of bowel obstruction, strangulation, or inflammation at this time.    2. Right adnexal interval 2.6 cm low-attenuation mass, which may represent a complex ovarian cyst, and could result in patient's current reported history of right lower  quadrant/pelvic pain. Further evaluation with pelvic ultrasound recommended.    3. Hepatomegaly and hepatic steatosis.    4. Grossly stable few additional findings as above.      Electronically signed by: SYBIL RODRIGUEZ MD, MD  Date:     11/28/17  Time:    15:43              Narrative:    CT of the abdomen and pelvis with 100 cc of Omni 350 IV contrast. No oral contrast.    Results: Comparison made to CT abdomen and pelvis most recent 3/7/17 and 1/22/12.    The lung bases show minimal dependent atelectasis and several small pulmonary and pleural-based soft tissue nodules within the bilateral lower lobes and lingula, unchanged from 2012 and likely benign.  The visualized heart and pericardium are unremarkable.    The liver is enlarged with diffuse parenchymal low attenuation consistent with fatty infiltration, without focal process seen. The gallbladder, pancreas, spleen, stomach, duodenum, and adrenal glands are without significant abnormality.  No intrahepatic or extrahepatic biliary ductal dilatation. Small accessory splenule noted.    The kidneys are normal in size, shape and location with symmetric enhancement.  No hydronephrosis.   The urinary bladder is within normal limits.  There is a 2.6 cm low attenuation (average 35 Hounsfield units) rounded structure with subtle peripheral enhancement at the right adnexa region. The uterus and left adnexa region are within normal limits. Trace volume nonspecific free pelvic fluid. Small pelvic phleboliths noted.    No ascites or free air.  No lymphadenopathy.    Prior right lower quadrant suspected ventral hernia repair with 2 more separate compartments along the lower aspect again noted. The inferior most aspect is wide-necked containing a few loops of small bowel, without evidence of strangulation or adjacent inflammation grossly similar in size and configuration to most recent prior. There is grossly stable size and configuration moderate sized wide-neck hernia at the  "anterolateral infraumbilical lower right ventral abdominal wall, which now contains the cecum and also base of the terminal ileum without adjacent inflammation or evidence of strangulation. This was previously filled with fluid on CT exam dated 3/7/17, and also previously contained the cecum and terminal ileum on study dated 8/4/16.    Appendix is not identified. The terminal ileum otherwise appear within normal limits.  The small and large loops of bowel are normal in caliber without focal wall thickening or adjacent fat stranding.  No pneumatosis or portal venous gas.    The aorta tapers appropriately in its descent through the abdomen.    The osseous structures appear grossly stable without acute or destructive process seen.                                 Medical Decision Making:   History:   Old Medical Records: I decided to obtain old medical records.  Old Records Summarized: records from clinic visits.  Clinical Tests:   Lab Tests: Ordered and Reviewed  Radiological Study: Ordered and Reviewed       APC / Resident Notes:   27-year-old female with medical history of obesity, asthma, PCOS, acid reflux and hernia repair presents to the ED with abdominal pain.     DDX includes is not limited to hernia, gastroenteritis, pancreatitis, electrolyte abnormality, muscle strain.  We will get labs and CT abdomen and pelvis. IV zofran 4mg, IV tylenol 1g given for pain and nausea control. Patient reports improvement in symptoms. Labs benign. CT shows, "Right lower quadrant suspected previous ventral hernia repair with anterolateral inferior ventral abdominal wall two wide necked bowel containing hernias, grossly stable in size and configuration from most recent prior, as above. No evidence of bowel obstruction, strangulation, or inflammation at this time" also shows right adnexal mass representing complex cyst. On reassessment, minimal abdominal tenderness around umbilicus, with no guarding or rebound. Hernia is " reproducible. No tenderness in pelvic region. Will have patient follow up with general surgery and gynecology outpatient. Patient tolerated po challenge. Discharged to home in stable condition, return to ED warnings given, follow up and patient care instructions given.        I have discussed and reviewed with my supervising physician.              ED Course      Clinical Impression:   The primary encounter diagnosis was Right lower quadrant abdominal pain. A diagnosis of Ventral hernia without obstruction or gangrene was also pertinent to this visit.    Disposition:   Disposition: Discharged  Condition: Stable                        Brit Donaldson PA-C  11/28/17 6246

## 2017-11-28 NOTE — ED NOTES
Patient identifiers verified and correct for Ms De La Fuente  C/C: Abd pain diarrhea  APPEARANCE: awake and alert in NAD.  SKIN: warm, dry and intact. No breakdown or bruising.  MUSCULOSKELETAL: Patient moving all extremities spontaneously, no obvious swelling or deformities noted. Ambulates independently.  RESPIRATORY: Denies shortness of breath.Respirations unlabored.   CARDIAC: Denies CP, 2+ distal pulses; no peripheral edema  ABDOMEN: Abdomen large, round tender to RLQ  : voids spontaneously, denies difficulty  Neurologic: AAO x 4; follows commands equal strength in all extremities; denies numbness/tingling. Occas dizziness denies weakness

## 2017-12-05 PROCEDURE — 81025 URINE PREGNANCY TEST: CPT | Performed by: EMERGENCY MEDICINE

## 2017-12-05 PROCEDURE — 87880 STREP A ASSAY W/OPTIC: CPT

## 2017-12-05 PROCEDURE — 99283 EMERGENCY DEPT VISIT LOW MDM: CPT | Mod: 25

## 2017-12-06 ENCOUNTER — HOSPITAL ENCOUNTER (EMERGENCY)
Facility: HOSPITAL | Age: 27
Discharge: HOME | End: 2017-12-06
Attending: EMERGENCY MEDICINE
Payer: COMMERCIAL

## 2017-12-06 VITALS
DIASTOLIC BLOOD PRESSURE: 70 MMHG | SYSTOLIC BLOOD PRESSURE: 125 MMHG | HEART RATE: 87 BPM | RESPIRATION RATE: 18 BRPM | WEIGHT: 280 LBS | HEIGHT: 62 IN | BODY MASS INDEX: 51.53 KG/M2 | OXYGEN SATURATION: 98 % | TEMPERATURE: 98 F

## 2017-12-06 DIAGNOSIS — J02.0 STREP PHARYNGITIS: Primary | ICD-10-CM

## 2017-12-06 LAB
B-HCG UR QL: NEGATIVE
CTP QC/QA: YES
DEPRECATED S PYO AG THROAT QL EIA: POSITIVE

## 2017-12-06 PROCEDURE — 63600175 PHARM REV CODE 636 W HCPCS: Performed by: NURSE PRACTITIONER

## 2017-12-06 PROCEDURE — 96372 THER/PROPH/DIAG INJ SC/IM: CPT

## 2017-12-06 PROCEDURE — 25000003 PHARM REV CODE 250: Performed by: NURSE PRACTITIONER

## 2017-12-06 RX ORDER — DEXAMETHASONE SODIUM PHOSPHATE 4 MG/ML
8 INJECTION, SOLUTION INTRA-ARTICULAR; INTRALESIONAL; INTRAMUSCULAR; INTRAVENOUS; SOFT TISSUE
Status: COMPLETED | OUTPATIENT
Start: 2017-12-06 | End: 2017-12-06

## 2017-12-06 RX ADMIN — DEXAMETHASONE SODIUM PHOSPHATE 8 MG: 4 INJECTION, SOLUTION INTRAMUSCULAR; INTRAVENOUS at 01:12

## 2017-12-06 RX ADMIN — PENICILLIN G BENZATHINE 1.2 MILLION UNITS: 1200000 INJECTION, SUSPENSION INTRAMUSCULAR at 01:12

## 2017-12-06 RX ADMIN — Medication 15 ML: at 02:12

## 2017-12-06 NOTE — ED PROVIDER NOTES
Encounter Date: 12/5/2017    SCRIBE #1 NOTE: I, Rod Tobar, am scribing for, and in the presence of,  CORIE Card. I have scribed the following portions of the note - Other sections scribed: HPI, ROS.       History     Chief Complaint   Patient presents with    Sore Throat     starting today; pt reports tonsil are swolen and bleeding     CC: Sore Throat    HPI: This 27 y.o. female with a medical history of Asthma (childhood); GERD (gastroesophageal reflux disease); Morbid obesity; and PCOS (polycystic ovarian syndrome) presents to the ED c/o sore throat accompanied by subjective fevers and dry coughs that began 1 day ago. Pt has attempted treatment with Advil PM at 1930 yesterday. Pt has a history of throat infections. Symptoms are acute in onset and severe (10/10). Pt denies any rhinorrhea, shortness of breath, dysuria, or any other associated symptoms. Pt has no modifying factors.        The history is provided by the patient. No  was used.     Review of patient's allergies indicates:  No Known Allergies  Past Medical History:   Diagnosis Date    Asthma childhood    GERD (gastroesophageal reflux disease)     Morbid obesity     PCOS (polycystic ovarian syndrome)      Past Surgical History:   Procedure Laterality Date    APPENDECTOMY  2011    HERNIA REPAIR      salpingoopherectomy Right 2008     Family History   Problem Relation Age of Onset    Heart attack Father     Hypertension Father     Diabetes Mother     Heart attack Mother      Social History   Substance Use Topics    Smoking status: Never Smoker    Smokeless tobacco: Never Used    Alcohol use Yes      Comment: occasionally apple cider     Review of Systems   Constitutional: Positive for fever (Subjective).   HENT: Positive for sore throat and trouble swallowing (painful). Negative for congestion, ear pain and rhinorrhea.    Respiratory: Positive for cough (Nonproductive). Negative for shortness of breath.     Cardiovascular: Negative for chest pain.   Gastrointestinal: Negative for nausea.   Genitourinary: Negative for dysuria.   Musculoskeletal: Negative for neck pain and neck stiffness.   Skin: Negative for rash and wound.   Neurological: Negative for weakness.   Psychiatric/Behavioral: Negative for confusion.       Physical Exam     Initial Vitals [12/05/17 2344]   BP Pulse Resp Temp SpO2   132/82 96 18 97.6 °F (36.4 °C) 98 %      MAP       98.67         Physical Exam    Nursing note and vitals reviewed.  Constitutional: She appears well-developed and well-nourished. She is not diaphoretic. She is Obese . She is cooperative.  Non-toxic appearance. She does not have a sickly appearance. No distress.   Appears uncomfortable.   HENT:   Head: Normocephalic and atraumatic.   Right Ear: Tympanic membrane and external ear normal. Tympanic membrane is not erythematous.   Left Ear: Tympanic membrane and external ear normal. Tympanic membrane is not erythematous.   Mouth/Throat: Uvula is midline and mucous membranes are normal. No trismus in the jaw. Oropharyngeal exudate, posterior oropharyngeal edema (+3 tonsils) and posterior oropharyngeal erythema present. No tonsillar abscesses.   Eyes: Conjunctivae and EOM are normal.   Neck: Normal range of motion, full passive range of motion without pain and phonation normal. No stridor present. No tracheal tenderness present. No tracheal deviation present.   Cardiovascular: Normal rate, regular rhythm and normal heart sounds. Exam reveals no gallop and no friction rub.    No murmur heard.  Pulmonary/Chest: Effort normal and breath sounds normal. No stridor. No tachypnea and no bradypnea. No respiratory distress. She has no wheezes. She has no rhonchi. She has no rales. She exhibits no tenderness.   Lymphadenopathy:     She has cervical adenopathy.        Right cervical: Superficial cervical adenopathy present.        Left cervical: Superficial cervical adenopathy present.    Neurological: She is alert and oriented to person, place, and time. She has normal strength. No sensory deficit. Coordination normal. GCS eye subscore is 4. GCS verbal subscore is 5. GCS motor subscore is 6.   Skin: Skin is warm, dry and intact. No bruising and no rash noted. No cyanosis or erythema. Nails show no clubbing.   Psychiatric: She has a normal mood and affect. Her behavior is normal. Thought content normal.         ED Course   Procedures  Labs Reviewed   THROAT SCREEN, RAPID - Abnormal; Notable for the following:        Result Value    Rapid Strep A Screen Positive (*)     All other components within normal limits   POCT URINE PREGNANCY             Medical Decision Making:   Clinical Tests:   Lab Tests: Ordered and Reviewed       APC / Resident Notes:   This is an evaluation of a 27 y.o. female that presents to the Emergency Department for sore throat.  The patient is a non-toxic, afebrile, and comfortable however overall well appearing female. On physical exam, there is tonsillar exudates, erythema, with +3 tonsils. She has no trismus, uvula deviation, drooling, stridor, or respiratory distress. No sign of PTA. There is cervical lymphadenopathy. Neck is soft and supple with no meningeal signs. Breath sounds clear and equal bilaterally.  Vital Signs Are Reassuring. Rapid Strep Test: Positive    Given the above findings, my overall impression is strep pharyngitis. Given the above findings, I do not think the patient has OM, OE, peritonsillar abscess, retropharyngeal abscess, epiglotitis, meningitis, or airway compromise.    ED Course: Magic Mouthwash, Bicillin and Decadron. The patient will be discharged home. Home care: OTC medications for symptomatic relief, sore throat self care DC instructions. The diagnosis, treatment plan, instructions for follow-up and reevaluation with Primary Care as well as ED return precautions have been discussed with the patient and understanding of the information was  verbalized. All questions or concerns from the patient have been addressed. This case was discussed with Dr. Adler who is in agreement with my assessment and plan. DEMETRIA Hernandez, JAYLYN          Scribe Attestation:   Scribe #1: I performed the above scribed service and the documentation accurately describes the services I performed. I attest to the accuracy of the note.    Attending Attestation:           Physician Attestation for Scribe:  Physician Attestation Statement for Scribe #1: I, CORIE Card, reviewed documentation, as scribed by Rod Tobar in my presence, and it is both accurate and complete.                 ED Course      Clinical Impression:   The encounter diagnosis was Strep pharyngitis.    Disposition:   Disposition: Discharged  Condition: Stable                        CORIE Card  12/06/17 0217

## 2017-12-06 NOTE — ED TRIAGE NOTES
"Pt reports "tonislls are swollen, and unable to lay down or sleep.  Swallowing complaints.  Symptoms began yesterday.  Pt reports fever and took Advil at 1930.  "

## 2017-12-06 NOTE — DISCHARGE INSTRUCTIONS
Please return to the Emergency Department for any new or worsening symptoms including: Difficulty swallowing, difficulty breathing, worsening sore throat, fever, chest pain, shortness of breath, loss of consciousness, dizziness, weakness, or any other concerns.     Please follow up with your Primary Care Provider within in the week. If you do not have one, you may contact the one listed on your discharge paperwork or you may also call the [facility] Appointment Desk at [phone] to schedule an appointment with one.     Please take all medication as prescribed.  Magic mouthwash, gargle and spit to help with sore throat.  You can also use Cepacol over-the-counter lozenges to help with the sore throat.

## 2017-12-27 ENCOUNTER — OFFICE VISIT (OUTPATIENT)
Dept: FAMILY MEDICINE | Facility: CLINIC | Age: 27
End: 2017-12-27
Payer: COMMERCIAL

## 2017-12-27 ENCOUNTER — LAB VISIT (OUTPATIENT)
Dept: LAB | Facility: HOSPITAL | Age: 27
End: 2017-12-27
Attending: INTERNAL MEDICINE
Payer: COMMERCIAL

## 2017-12-27 VITALS
HEART RATE: 96 BPM | DIASTOLIC BLOOD PRESSURE: 76 MMHG | WEIGHT: 293 LBS | SYSTOLIC BLOOD PRESSURE: 124 MMHG | TEMPERATURE: 97 F | RESPIRATION RATE: 17 BRPM | HEIGHT: 62 IN | BODY MASS INDEX: 53.92 KG/M2 | OXYGEN SATURATION: 97 %

## 2017-12-27 DIAGNOSIS — N94.9 ADNEXAL CYST: ICD-10-CM

## 2017-12-27 DIAGNOSIS — E28.2 PCOS (POLYCYSTIC OVARIAN SYNDROME): ICD-10-CM

## 2017-12-27 DIAGNOSIS — J03.90 TONSILLITIS: Primary | ICD-10-CM

## 2017-12-27 DIAGNOSIS — E66.01 MORBID OBESITY: ICD-10-CM

## 2017-12-27 DIAGNOSIS — G47.33 OSA ON CPAP: ICD-10-CM

## 2017-12-27 LAB
ANION GAP SERPL CALC-SCNC: 11 MMOL/L
BASOPHILS # BLD AUTO: 0.04 K/UL
BASOPHILS NFR BLD: 0.5 %
BUN SERPL-MCNC: 6 MG/DL
CALCIUM SERPL-MCNC: 9.8 MG/DL
CHLORIDE SERPL-SCNC: 100 MMOL/L
CO2 SERPL-SCNC: 23 MMOL/L
CREAT SERPL-MCNC: 0.8 MG/DL
DIFFERENTIAL METHOD: NORMAL
EOSINOPHIL # BLD AUTO: 0.3 K/UL
EOSINOPHIL NFR BLD: 3.9 %
ERYTHROCYTE [DISTWIDTH] IN BLOOD BY AUTOMATED COUNT: 14.2 %
EST. GFR  (AFRICAN AMERICAN): >60 ML/MIN/1.73 M^2
EST. GFR  (NON AFRICAN AMERICAN): >60 ML/MIN/1.73 M^2
GLUCOSE SERPL-MCNC: 176 MG/DL
HCT VFR BLD AUTO: 37.2 %
HGB BLD-MCNC: 12.4 G/DL
LYMPHOCYTES # BLD AUTO: 2.2 K/UL
LYMPHOCYTES NFR BLD: 27.5 %
MCH RBC QN AUTO: 28 PG
MCHC RBC AUTO-ENTMCNC: 33.3 G/DL
MCV RBC AUTO: 84 FL
MONOCYTES # BLD AUTO: 0.5 K/UL
MONOCYTES NFR BLD: 6 %
NEUTROPHILS # BLD AUTO: 5 K/UL
NEUTROPHILS NFR BLD: 61.5 %
PLATELET # BLD AUTO: 243 K/UL
PMV BLD AUTO: 10.5 FL
POTASSIUM SERPL-SCNC: 4.1 MMOL/L
RBC # BLD AUTO: 4.43 M/UL
SODIUM SERPL-SCNC: 134 MMOL/L
TSH SERPL DL<=0.005 MIU/L-ACNC: 2.04 UIU/ML
WBC # BLD AUTO: 8.11 K/UL

## 2017-12-27 PROCEDURE — 99214 OFFICE O/P EST MOD 30 MIN: CPT | Mod: S$GLB,,, | Performed by: INTERNAL MEDICINE

## 2017-12-27 PROCEDURE — 80048 BASIC METABOLIC PNL TOTAL CA: CPT

## 2017-12-27 PROCEDURE — 84443 ASSAY THYROID STIM HORMONE: CPT

## 2017-12-27 PROCEDURE — 83036 HEMOGLOBIN GLYCOSYLATED A1C: CPT

## 2017-12-27 PROCEDURE — 99999 PR PBB SHADOW E&M-EST. PATIENT-LVL IV: CPT | Mod: PBBFAC,,, | Performed by: INTERNAL MEDICINE

## 2017-12-27 PROCEDURE — 85025 COMPLETE CBC W/AUTO DIFF WBC: CPT

## 2017-12-27 PROCEDURE — 36415 COLL VENOUS BLD VENIPUNCTURE: CPT | Mod: PN

## 2017-12-27 RX ORDER — LIDOCAINE HYDROCHLORIDE 20 MG/ML
SOLUTION ORAL; TOPICAL
COMMUNITY
Start: 2017-12-21 | End: 2018-04-16

## 2017-12-27 RX ORDER — PENICILLIN V POTASSIUM 500 MG/1
TABLET, FILM COATED ORAL
COMMUNITY
Start: 2017-12-21 | End: 2018-04-16

## 2017-12-27 NOTE — PROGRESS NOTES
"Subjective:       Patient ID: Glenda De La Fuente is a 27 y.o. female.    Chief Complaint: Sore Throat    F/u urgent care    HPI: 26 y/o here for follow up of recurrent sore throat seen in ED three weeks ago with +strep throat swab given single dose of PCN while in ED and discharge with oral steroids. Symptoms abated for approximately two weeks and then returned went to outside ED five days ago and was prescribed PCN to take orally (on day 5 of 7) sore throat has improved mild non productive cough no difficulty with speech or swallowing. No fevers/chills   Regarding weight not following any diet up 20lbs in last year. Has been prescribed metformin in past for PCOS stopped due to loose stools and abdominal cramping      Review of Systems   Constitutional: Negative for activity change, appetite change, fatigue, fever and unexpected weight change.   HENT: Positive for sore throat. Negative for ear pain and rhinorrhea.    Eyes: Negative for discharge and visual disturbance.   Respiratory: Negative for chest tightness, shortness of breath and wheezing.    Cardiovascular: Negative for chest pain, palpitations and leg swelling.   Gastrointestinal: Negative for abdominal pain, constipation and diarrhea.   Endocrine: Negative for cold intolerance and heat intolerance.   Genitourinary: Negative for dysuria and hematuria.   Musculoskeletal: Negative for joint swelling and neck stiffness.   Skin: Negative for rash.   Neurological: Negative for dizziness, syncope, weakness and headaches.   Psychiatric/Behavioral: Negative for suicidal ideas.       Objective:     Vitals:    12/27/17 1424   BP: 124/76   BP Location: Right arm   Patient Position: Sitting   BP Method: Large (Manual)   Pulse: 96   Resp: 17   Temp: 97.4 °F (36.3 °C)   TempSrc: Oral   SpO2: 97%   Weight: 133 kg (293 lb 3.4 oz)   Height: 5' 2" (1.575 m)          Physical Exam   Constitutional: She is oriented to person, place, and time. She appears well-developed and " well-nourished.   HENT:   Head: Normocephalic and atraumatic.   Symmetric tonisils 2+ without exudate minmal erythema   Eyes: Conjunctivae are normal. Pupils are equal, round, and reactive to light.   Neck: Normal range of motion. No thyromegaly present.   Cardiovascular: Normal rate and regular rhythm.  Exam reveals no gallop and no friction rub.    No murmur heard.  Pulmonary/Chest: Effort normal and breath sounds normal. She has no wheezes. She has no rales.   Abdominal: Soft. Bowel sounds are normal. There is no tenderness. There is no rebound and no guarding.   Musculoskeletal: Normal range of motion. She exhibits no edema or tenderness.   Lymphadenopathy:     She has no cervical adenopathy.   Neurological: She is alert and oriented to person, place, and time. No cranial nerve deficit.   Skin: Skin is warm and dry.   Psychiatric: She has a normal mood and affect.       Assessment:       1. Tonsillitis    2. Morbid obesity    3. Adnexal cyst    4. PCOS (polycystic ovarian syndrome)    5. NICHOLE on CPAP        Plan:    1. Resolving complete oral PCN    2/4. Needs lifestyle modifications would possible benefit from metformin therapy but had adverse GI effects in past, referral to medical fitness program return in two months if no significant improvement will plan to start metformin    3. Incidental finding on abdomina CT one month ago for abdominal pain has irregular menses needs vaginal ultrasound to better characterize    5. New script for cpap supplies with nasal pillows

## 2017-12-28 ENCOUNTER — PATIENT MESSAGE (OUTPATIENT)
Dept: FAMILY MEDICINE | Facility: CLINIC | Age: 27
End: 2017-12-28

## 2017-12-28 DIAGNOSIS — E11.9 TYPE 2 DIABETES MELLITUS WITHOUT COMPLICATION, WITHOUT LONG-TERM CURRENT USE OF INSULIN: ICD-10-CM

## 2017-12-28 LAB
ESTIMATED AVG GLUCOSE: 189 MG/DL
HBA1C MFR BLD HPLC: 8.2 %

## 2017-12-28 RX ORDER — METFORMIN HYDROCHLORIDE 500 MG/1
TABLET ORAL
Qty: 120 TABLET | Refills: 1 | Status: SHIPPED | OUTPATIENT
Start: 2017-12-28 | End: 2018-02-28 | Stop reason: SDUPTHER

## 2018-01-02 ENCOUNTER — HOSPITAL ENCOUNTER (OUTPATIENT)
Dept: RADIOLOGY | Facility: HOSPITAL | Age: 28
Discharge: HOME | End: 2018-01-02
Attending: INTERNAL MEDICINE
Payer: COMMERCIAL

## 2018-01-02 DIAGNOSIS — N94.9 ADNEXAL CYST: ICD-10-CM

## 2018-01-02 PROCEDURE — 76856 US EXAM PELVIC COMPLETE: CPT | Mod: 26,,, | Performed by: RADIOLOGY

## 2018-01-02 PROCEDURE — 76830 TRANSVAGINAL US NON-OB: CPT | Mod: TC

## 2018-01-02 PROCEDURE — 76830 TRANSVAGINAL US NON-OB: CPT | Mod: 26,,, | Performed by: RADIOLOGY

## 2018-01-03 ENCOUNTER — PATIENT MESSAGE (OUTPATIENT)
Dept: FAMILY MEDICINE | Facility: CLINIC | Age: 28
End: 2018-01-03

## 2018-01-04 ENCOUNTER — PATIENT MESSAGE (OUTPATIENT)
Dept: FAMILY MEDICINE | Facility: CLINIC | Age: 28
End: 2018-01-04

## 2018-01-04 NOTE — TELEPHONE ENCOUNTER
No evidence of complex cyst seen on transvaginal ultrasound to correleate with cyst seen on CT from Nov 2017.

## 2018-01-05 DIAGNOSIS — E11.9 TYPE 2 DIABETES MELLITUS WITHOUT COMPLICATION: ICD-10-CM

## 2018-02-02 DIAGNOSIS — Z13.5 DIABETIC RETINOPATHY SCREENING: ICD-10-CM

## 2018-02-28 ENCOUNTER — OFFICE VISIT (OUTPATIENT)
Dept: OBSTETRICS AND GYNECOLOGY | Facility: CLINIC | Age: 28
End: 2018-02-28
Payer: COMMERCIAL

## 2018-02-28 ENCOUNTER — OFFICE VISIT (OUTPATIENT)
Dept: FAMILY MEDICINE | Facility: CLINIC | Age: 28
End: 2018-02-28
Payer: COMMERCIAL

## 2018-02-28 VITALS
SYSTOLIC BLOOD PRESSURE: 125 MMHG | HEIGHT: 62 IN | DIASTOLIC BLOOD PRESSURE: 73 MMHG | BODY MASS INDEX: 53.43 KG/M2 | WEIGHT: 290.38 LBS

## 2018-02-28 VITALS
HEART RATE: 100 BPM | SYSTOLIC BLOOD PRESSURE: 118 MMHG | RESPIRATION RATE: 17 BRPM | BODY MASS INDEX: 53.84 KG/M2 | DIASTOLIC BLOOD PRESSURE: 72 MMHG | TEMPERATURE: 99 F | OXYGEN SATURATION: 98 % | HEIGHT: 62 IN | WEIGHT: 292.56 LBS

## 2018-02-28 DIAGNOSIS — K43.6 VENTRAL HERNIA WITH BOWEL OBSTRUCTION: ICD-10-CM

## 2018-02-28 DIAGNOSIS — E66.01 MORBID OBESITY: ICD-10-CM

## 2018-02-28 DIAGNOSIS — E11.9 TYPE 2 DIABETES MELLITUS WITHOUT COMPLICATION, WITHOUT LONG-TERM CURRENT USE OF INSULIN: Primary | ICD-10-CM

## 2018-02-28 DIAGNOSIS — Z79.4 TYPE 2 DIABETES MELLITUS WITHOUT COMPLICATION, WITH LONG-TERM CURRENT USE OF INSULIN: ICD-10-CM

## 2018-02-28 DIAGNOSIS — E11.9 TYPE 2 DIABETES MELLITUS WITHOUT COMPLICATION, WITH LONG-TERM CURRENT USE OF INSULIN: ICD-10-CM

## 2018-02-28 DIAGNOSIS — Z00.00 ANNUAL PHYSICAL EXAM: Primary | ICD-10-CM

## 2018-02-28 DIAGNOSIS — N76.3 CHRONIC VULVITIS: ICD-10-CM

## 2018-02-28 DIAGNOSIS — N76.1 CHRONIC VAGINITIS: ICD-10-CM

## 2018-02-28 PROCEDURE — 87480 CANDIDA DNA DIR PROBE: CPT

## 2018-02-28 PROCEDURE — 99999 PR PBB SHADOW E&M-EST. PATIENT-LVL IV: CPT | Mod: PBBFAC,,, | Performed by: INTERNAL MEDICINE

## 2018-02-28 PROCEDURE — 99214 OFFICE O/P EST MOD 30 MIN: CPT | Mod: S$GLB,,, | Performed by: INTERNAL MEDICINE

## 2018-02-28 PROCEDURE — 3008F BODY MASS INDEX DOCD: CPT | Mod: S$GLB,,, | Performed by: OBSTETRICS & GYNECOLOGY

## 2018-02-28 PROCEDURE — 99999 PR PBB SHADOW E&M-EST. PATIENT-LVL III: CPT | Mod: PBBFAC,,, | Performed by: OBSTETRICS & GYNECOLOGY

## 2018-02-28 PROCEDURE — 99395 PREV VISIT EST AGE 18-39: CPT | Mod: S$GLB,,, | Performed by: OBSTETRICS & GYNECOLOGY

## 2018-02-28 RX ORDER — INSULIN PUMP SYRINGE, 3 ML
EACH MISCELLANEOUS
Qty: 1 EACH | Refills: 0 | Status: SHIPPED | OUTPATIENT
Start: 2018-02-28 | End: 2019-06-07

## 2018-02-28 RX ORDER — LANCETS
EACH MISCELLANEOUS
Qty: 30 EACH | Refills: 5 | Status: SHIPPED | OUTPATIENT
Start: 2018-02-28 | End: 2019-06-07

## 2018-02-28 RX ORDER — CLOTRIMAZOLE AND BETAMETHASONE DIPROPIONATE 10; .64 MG/G; MG/G
CREAM TOPICAL
Qty: 15 G | Refills: 6 | Status: SHIPPED | OUTPATIENT
Start: 2018-02-28 | End: 2019-02-28

## 2018-02-28 RX ORDER — METFORMIN HYDROCHLORIDE 500 MG/1
500 TABLET ORAL 2 TIMES DAILY WITH MEALS
Qty: 60 TABLET | Refills: 3 | Status: SHIPPED | OUTPATIENT
Start: 2018-02-28 | End: 2018-03-22 | Stop reason: SDUPTHER

## 2018-02-28 NOTE — PATIENT INSTRUCTIONS

## 2018-02-28 NOTE — PROGRESS NOTES
Subjective:      Chief Complaint:    Chief Complaint   Patient presents with    Gynecologic Exam       Menstrual History:    OB History     No data available                                                 GR   0    Menarche age: 12     Patient's last menstrual period was 01/01/2018.            Objective:        History of Present Illness AND  Examination detailed DICTATE:       HISTORY OF PRESENT ILLNESS:  The patient is a 27-year-old lady, nulligravida,   here for yearly annual examination.    PAST MEDICAL HISTORY:  Asthma, obesity, diabetes mellitus, alternative   lifestyle.    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    The patient is complaining of vulvitis, irritation.  Reviewing the patient's   blood sugar is elevated, hemoglobin A1c 8.2, the patient is taking metformin.  I   urged the patient to go back to her primary care and reevaluate a treatment for   diabetes and possibly initiate insulin.  The patient's ultrasound in December   was negative; however, the CT scan indicated recurrence of ventral hernia.    REVIEW OF SYSTEMS:  HEAD, EAR, EYES, NOSE, AND THROAT:  Negative.  CARDIORESPIRATORY:  Negative.  GASTROINTESTINAL:  See present illness.  GENITOURINARY:  See present illness.  NEUROMUSCULAR:  No problem.    PHYSICAL EXAMINATION:  GENERAL:  Well-developed, well-nourished, alert, oriented female, not in acute   distress.  VITAL SIGNS:  Blood pressure 125/73, weight 290.  HEAD:  Normocephalic.  EYES:  React.  NECK:  Supple.  Thyroid is not palpable.  No nodes.  CHEST:  Clear.  HEART:  Regular sinus, no murmur.  BREASTS:  No lumps, mass, discharge, skin changes, retraction, nipple changes   noted.  Axilla negative.  ABDOMEN:  Upper normal.  Lower, hernia palpable on the right side.  PELVIC:  External marked vulvitis compatible with diabetes.  [place], urethral   and [place] glands are negative.  Vagina is reasonably clear.   Cervix is small.    Uterus small.  Right adnexa absent, left is normal.  Cultures were taken to rule   out the possibility of yeast infection.  RECTAL:  Negative.  MUSCULOSKELETAL:  Normal range of motion.  SKIN:  Normal.  Blood vessels palpable.    IMPRESSION:  Vulvitis most likely diabetic.    PLAN:  We will put the patient on Lotrisone ointment twice a day for the next 10   days, then p.r.n.  Continue with calcium, vitamin D and multivitamins.    Depending on the culture, we will decide treatment.  I have strongly urged the   patient to return to her primary care for diabetes control.      TAHIRA/QUINTON  dd: 02/28/2018 10:21:14 (CST)  td: 03/01/2018 01:14:01 (CST)  Doc ID   #[id number]  Job ID #[id number]    CC:           Assessment:      Diagnosis:ANNUAL  EXAM    VENTRAL   HERNIA    DM     VULVITIS        Plan:      Return in 12  months

## 2018-02-28 NOTE — PROGRESS NOTES
Subjective:       Patient ID: Glenda Peña is a 27 y.o. female.    Chief Complaint: forms (State paperwork adoption)    Diabetes   She presents for her follow-up diabetic visit. She has type 2 diabetes mellitus. There are no hypoglycemic associated symptoms. Pertinent negatives for hypoglycemia include no dizziness or headaches. Pertinent negatives for diabetes include no chest pain, no fatigue, no foot paresthesias, no foot ulcerations, no polydipsia, no polyuria, no visual change, no weakness and no weight loss. When asked about current treatments, none (stopped metformin 1000mg one month ago due to loose bowels.) were reported. She is compliant with treatment none of the time. She is following a generally unhealthy diet. When asked about meal planning, she reported none. An ACE inhibitor/angiotensin II receptor blocker is not being taken. She does not see a podiatrist.Eye exam is not current.   she did well on metformin 500mg bid without side effects but when attempting to titrate further she developed loose stool to point she was not able to work so she discontinue medicaiton all together. She and partner are applying for foster parents she has not been exercising regularlly she admits to poor dietary habits  Review of Systems   Constitutional: Negative for activity change, appetite change, fatigue, fever, unexpected weight change and weight loss.   HENT: Negative for ear pain, rhinorrhea and sore throat.    Eyes: Negative for discharge and visual disturbance.   Respiratory: Negative for chest tightness, shortness of breath and wheezing.    Cardiovascular: Negative for chest pain, palpitations and leg swelling.   Gastrointestinal: Negative for abdominal pain, constipation and diarrhea.   Endocrine: Negative for cold intolerance, heat intolerance, polydipsia and polyuria.   Genitourinary: Negative for dysuria and hematuria.   Musculoskeletal: Negative for joint swelling and neck stiffness.   Skin: Negative  "for rash.   Neurological: Negative for dizziness, syncope, weakness and headaches.   Psychiatric/Behavioral: Negative for suicidal ideas.       Objective:     Vitals:    02/28/18 1539   BP: 118/72   BP Location: Right arm   Patient Position: Sitting   BP Method: Large (Manual)   Pulse: 100   Resp: 17   Temp: 98.5 °F (36.9 °C)   TempSrc: Oral   SpO2: 98%   Weight: 132.7 kg (292 lb 8.8 oz)   Height: 5' 2" (1.575 m)          Physical Exam   Constitutional: She is oriented to person, place, and time. She appears well-developed and well-nourished.   HENT:   Head: Normocephalic and atraumatic.   Eyes: Conjunctivae are normal. Pupils are equal, round, and reactive to light. No scleral icterus.   Neck: Normal range of motion. No thyromegaly present.   Cardiovascular: Normal rate and regular rhythm.  Exam reveals no gallop and no friction rub.    No murmur heard.  No LE edema   Pulmonary/Chest: Effort normal and breath sounds normal. She has no wheezes. She has no rales.   Abdominal: Soft. Bowel sounds are normal. There is no tenderness. There is no rebound and no guarding.   Musculoskeletal: Normal range of motion. She exhibits no edema or tenderness.   Neurological: She is alert and oriented to person, place, and time. No cranial nerve deficit.   Protective Sensation (w/ 10 gram monofilament):  Right: Intact  Left: Intact    Visual Inspection:  Normal -  Bilateral    Pedal Pulses:   Right: Present  Left: Present    Posterior tibialis:   Right:Present  Left: Present       Skin: Skin is warm and dry.   Psychiatric: She has a normal mood and affect.       Assessment:       1. Type 2 diabetes mellitus without complication, without long-term current use of insulin    2. Morbid obesity        Plan:    1. Resume metformin to titrate to 500mg BID given obesity would like to hold basal insulin due to risk of weight gain. Trial GLP-1 inhibitor. BP on lowe end of normal and she has had difficulty with vaginitis in past therefore will " avoid SGT inhibitors. Repeat labs in one month. lirgurtatid sent to [facility] to assist with PA authorization.     2. The patient is asked to make an attempt to improve diet and exercise patterns to aid in medical management of this problem.

## 2018-03-01 ENCOUNTER — TELEPHONE (OUTPATIENT)
Dept: OPTOMETRY | Facility: CLINIC | Age: 28
End: 2018-03-01

## 2018-03-01 ENCOUNTER — PATIENT MESSAGE (OUTPATIENT)
Dept: ADMINISTRATIVE | Facility: OTHER | Age: 28
End: 2018-03-01

## 2018-03-01 LAB
CANDIDA RRNA VAG QL PROBE: POSITIVE
G VAGINALIS RRNA GENITAL QL PROBE: POSITIVE
T VAGINALIS RRNA GENITAL QL PROBE: NEGATIVE

## 2018-03-05 ENCOUNTER — TELEPHONE (OUTPATIENT)
Dept: FAMILY MEDICINE | Facility: CLINIC | Age: 28
End: 2018-03-05

## 2018-03-05 NOTE — LETTER
March 5, 2018    Glenda Peña  [address] [place] [address]             [facility]  [address]  [place] [address]  Phone: [phone]  Fax: [phone] Dear Ms. Casey:    I have been unable to reach you by phone for your appointment to attend a Diabetes class.  Please call me at the clinic [phone] to book your appointment.      If you have any questions or concerns, please don't hesitate to call.    Sincerely,        Jacinda Avila MA

## 2018-03-05 NOTE — TELEPHONE ENCOUNTER
----- Message from Sandra Nathan sent at 3/5/2018 12:19 PM CST -----  Contact: Self   Patient returned your call. Please call again at [phone].

## 2018-03-21 ENCOUNTER — LAB VISIT (OUTPATIENT)
Dept: LAB | Facility: HOSPITAL | Age: 28
End: 2018-03-21
Attending: INTERNAL MEDICINE
Payer: COMMERCIAL

## 2018-03-21 DIAGNOSIS — E11.9 TYPE 2 DIABETES MELLITUS WITHOUT COMPLICATION, WITHOUT LONG-TERM CURRENT USE OF INSULIN: ICD-10-CM

## 2018-03-21 LAB
ALBUMIN SERPL BCP-MCNC: 3.3 G/DL
ALP SERPL-CCNC: 90 U/L
ALT SERPL W/O P-5'-P-CCNC: 42 U/L
ANION GAP SERPL CALC-SCNC: 9 MMOL/L
AST SERPL-CCNC: 41 U/L
BASOPHILS # BLD AUTO: 0.02 K/UL
BASOPHILS NFR BLD: 0.3 %
BILIRUB SERPL-MCNC: 0.2 MG/DL
BUN SERPL-MCNC: 9 MG/DL
CALCIUM SERPL-MCNC: 9.3 MG/DL
CHLORIDE SERPL-SCNC: 101 MMOL/L
CO2 SERPL-SCNC: 26 MMOL/L
CREAT SERPL-MCNC: 0.8 MG/DL
DIFFERENTIAL METHOD: ABNORMAL
EOSINOPHIL # BLD AUTO: 0.1 K/UL
EOSINOPHIL NFR BLD: 1.1 %
ERYTHROCYTE [DISTWIDTH] IN BLOOD BY AUTOMATED COUNT: 14.3 %
EST. GFR  (AFRICAN AMERICAN): >60 ML/MIN/1.73 M^2
EST. GFR  (NON AFRICAN AMERICAN): >60 ML/MIN/1.73 M^2
ESTIMATED AVG GLUCOSE: 235 MG/DL
GLUCOSE SERPL-MCNC: 254 MG/DL
HBA1C MFR BLD HPLC: 9.8 %
HCT VFR BLD AUTO: 38.8 %
HGB BLD-MCNC: 12.1 G/DL
LYMPHOCYTES # BLD AUTO: 2.1 K/UL
LYMPHOCYTES NFR BLD: 25.7 %
MCH RBC QN AUTO: 27.3 PG
MCHC RBC AUTO-ENTMCNC: 31.2 G/DL
MCV RBC AUTO: 87 FL
MONOCYTES # BLD AUTO: 0.6 K/UL
MONOCYTES NFR BLD: 7.3 %
NEUTROPHILS # BLD AUTO: 5.2 K/UL
NEUTROPHILS NFR BLD: 65.3 %
PLATELET # BLD AUTO: 233 K/UL
PMV BLD AUTO: 10.9 FL
POTASSIUM SERPL-SCNC: 4.2 MMOL/L
PROT SERPL-MCNC: 7.7 G/DL
RBC # BLD AUTO: 4.44 M/UL
SODIUM SERPL-SCNC: 136 MMOL/L
WBC # BLD AUTO: 7.98 K/UL

## 2018-03-21 PROCEDURE — 85025 COMPLETE CBC W/AUTO DIFF WBC: CPT

## 2018-03-21 PROCEDURE — 80053 COMPREHEN METABOLIC PANEL: CPT

## 2018-03-21 PROCEDURE — 83036 HEMOGLOBIN GLYCOSYLATED A1C: CPT

## 2018-03-21 PROCEDURE — 36415 COLL VENOUS BLD VENIPUNCTURE: CPT | Mod: PN

## 2018-03-22 ENCOUNTER — PATIENT MESSAGE (OUTPATIENT)
Dept: FAMILY MEDICINE | Facility: CLINIC | Age: 28
End: 2018-03-22

## 2018-03-22 ENCOUNTER — TELEPHONE (OUTPATIENT)
Dept: FAMILY MEDICINE | Facility: CLINIC | Age: 28
End: 2018-03-22

## 2018-03-22 DIAGNOSIS — E11.9 TYPE 2 DIABETES MELLITUS WITHOUT COMPLICATION, WITHOUT LONG-TERM CURRENT USE OF INSULIN: ICD-10-CM

## 2018-03-22 RX ORDER — LISINOPRIL 2.5 MG/1
2.5 TABLET ORAL DAILY
Qty: 90 TABLET | Refills: 0 | Status: SHIPPED | OUTPATIENT
Start: 2018-03-22 | End: 2018-07-06 | Stop reason: SDUPTHER

## 2018-03-22 RX ORDER — METFORMIN HYDROCHLORIDE 500 MG/1
1000 TABLET ORAL 2 TIMES DAILY WITH MEALS
Qty: 120 TABLET | Refills: 2 | Status: SHIPPED | OUTPATIENT
Start: 2018-03-22 | End: 2019-01-30 | Stop reason: SDUPTHER

## 2018-03-28 ENCOUNTER — OFFICE VISIT (OUTPATIENT)
Dept: FAMILY MEDICINE | Facility: CLINIC | Age: 28
End: 2018-03-28
Payer: COMMERCIAL

## 2018-03-28 VITALS
BODY MASS INDEX: 53.92 KG/M2 | OXYGEN SATURATION: 96 % | WEIGHT: 293 LBS | HEIGHT: 62 IN | SYSTOLIC BLOOD PRESSURE: 120 MMHG | RESPIRATION RATE: 17 BRPM | DIASTOLIC BLOOD PRESSURE: 88 MMHG | TEMPERATURE: 99 F | HEART RATE: 108 BPM

## 2018-03-28 DIAGNOSIS — E66.01 MORBID OBESITY: ICD-10-CM

## 2018-03-28 DIAGNOSIS — R80.9 TYPE 2 DIABETES MELLITUS WITH MICROALBUMINURIA, WITHOUT LONG-TERM CURRENT USE OF INSULIN: Primary | ICD-10-CM

## 2018-03-28 DIAGNOSIS — E11.29 TYPE 2 DIABETES MELLITUS WITH MICROALBUMINURIA, WITHOUT LONG-TERM CURRENT USE OF INSULIN: Primary | ICD-10-CM

## 2018-03-28 PROCEDURE — 99999 PR PBB SHADOW E&M-EST. PATIENT-LVL III: CPT | Mod: PBBFAC,,, | Performed by: INTERNAL MEDICINE

## 2018-03-28 PROCEDURE — 3046F HEMOGLOBIN A1C LEVEL >9.0%: CPT | Mod: CPTII,S$GLB,, | Performed by: INTERNAL MEDICINE

## 2018-03-28 PROCEDURE — 99214 OFFICE O/P EST MOD 30 MIN: CPT | Mod: S$GLB,,, | Performed by: INTERNAL MEDICINE

## 2018-03-28 RX ORDER — PEN NEEDLE, DIABETIC 30 GX3/16"
1 NEEDLE, DISPOSABLE MISCELLANEOUS DAILY
Qty: 100 EACH | Refills: 1 | Status: SHIPPED | OUTPATIENT
Start: 2018-03-28 | End: 2019-06-07

## 2018-03-28 NOTE — PROGRESS NOTES
"Subjective:       Patient ID: Glenda Peña is a 27 y.o. female.    Chief Complaint: Medication Management and Follow-up    Diabetes   She presents for her follow-up diabetic visit. She has type 2 diabetes mellitus. Pertinent negatives for hypoglycemia include no dizziness or headaches. Pertinent negatives for diabetes include no blurred vision, no chest pain, no fatigue, no foot paresthesias, no foot ulcerations, no weakness and no weight loss. Symptoms are stable. Diabetic complications include nephropathy. Risk factors for coronary artery disease include diabetes mellitus, obesity and sedentary lifestyle. Current diabetic treatment includes oral agent (monotherapy) (since last vist restarted metformin and has titrated to 1000mg bid, got vicotza yesterday first dose yesterday).     Review of Systems   Constitutional: Negative for activity change, appetite change, fatigue, fever, unexpected weight change and weight loss.   HENT: Negative for ear pain, rhinorrhea and sore throat.    Eyes: Negative for blurred vision, discharge and visual disturbance.   Respiratory: Negative for chest tightness, shortness of breath and wheezing.    Cardiovascular: Negative for chest pain, palpitations and leg swelling.   Gastrointestinal: Negative for abdominal pain, constipation and diarrhea.   Endocrine: Negative for cold intolerance and heat intolerance.   Genitourinary: Negative for dysuria and hematuria.   Musculoskeletal: Negative for joint swelling and neck stiffness.   Skin: Negative for rash.   Neurological: Negative for dizziness, syncope, weakness and headaches.   Psychiatric/Behavioral: Negative for suicidal ideas.       Objective:     Vitals:    03/28/18 1555   BP: 120/88   BP Location: Left arm   Patient Position: Sitting   BP Method: Large (Manual)   Pulse: 108   Resp: 17   Temp: 98.7 °F (37.1 °C)   TempSrc: Oral   SpO2: 96%   Weight: 133 kg (293 lb 3.4 oz)   Height: 5' 2" (1.575 m)          Physical Exam "   Constitutional: She is oriented to person, place, and time. She appears well-developed and well-nourished.   obese   HENT:   Head: Normocephalic and atraumatic.   Eyes: Conjunctivae are normal. Pupils are equal, round, and reactive to light.   Neck: Normal range of motion.   Cardiovascular: Normal rate and regular rhythm.  Exam reveals no gallop and no friction rub.    No murmur heard.  No pitting edema ot LE    Pulmonary/Chest: Effort normal and breath sounds normal. She has no wheezes. She has no rales.   Abdominal: Soft. Bowel sounds are normal. There is no tenderness. There is no rebound and no guarding.   Musculoskeletal: Normal range of motion. She exhibits no edema or tenderness.   Neurological: She is alert and oriented to person, place, and time. No cranial nerve deficit.   Skin: Skin is warm and dry.   Psychiatric: She has a normal mood and affect.       Assessment:       1. Type 2 diabetes mellitus with microalbuminuria, without long-term current use of insulin    2. Morbid obesity        Plan:        1. With microalbuminuria started low dose acei, titrate glp-1 inhibitor, liraglutide weekly to goal of 1.8mg daily. Repeat a1c and urine microalbumin in three months return with log.   2. The patient is asked to make an attempt to improve diet and exercise patterns to aid in medical management of this problem.  Discussed graduated exercise program declines nutrion referal at this time

## 2018-04-04 DIAGNOSIS — E11.9 TYPE 2 DIABETES MELLITUS WITHOUT COMPLICATION, WITH LONG-TERM CURRENT USE OF INSULIN: Primary | ICD-10-CM

## 2018-04-04 DIAGNOSIS — Z79.4 TYPE 2 DIABETES MELLITUS WITHOUT COMPLICATION, WITH LONG-TERM CURRENT USE OF INSULIN: Primary | ICD-10-CM

## 2018-04-13 ENCOUNTER — TELEPHONE (OUTPATIENT)
Dept: FAMILY MEDICINE | Facility: CLINIC | Age: 28
End: 2018-04-13

## 2018-04-13 ENCOUNTER — PATIENT MESSAGE (OUTPATIENT)
Dept: FAMILY MEDICINE | Facility: CLINIC | Age: 28
End: 2018-04-13

## 2018-04-13 NOTE — TELEPHONE ENCOUNTER
----- Message from Adeel Soto MD sent at 4/13/2018  2:04 PM CDT -----  Needs ov with any provider next week

## 2018-04-13 NOTE — TELEPHONE ENCOUNTER
Patient advised of Dr. Soto's recommendation of scheduling an office visit for further evaluation of caused of nausea and vomiting.  Patient verbalized understanding, scheduled appointment for 4/16/2018.

## 2018-04-16 ENCOUNTER — LAB VISIT (OUTPATIENT)
Dept: LAB | Facility: HOSPITAL | Age: 28
End: 2018-04-16
Attending: FAMILY MEDICINE
Payer: COMMERCIAL

## 2018-04-16 ENCOUNTER — OFFICE VISIT (OUTPATIENT)
Dept: FAMILY MEDICINE | Facility: CLINIC | Age: 28
End: 2018-04-16
Payer: COMMERCIAL

## 2018-04-16 VITALS
HEART RATE: 90 BPM | TEMPERATURE: 98 F | BODY MASS INDEX: 53.71 KG/M2 | SYSTOLIC BLOOD PRESSURE: 102 MMHG | HEIGHT: 62 IN | RESPIRATION RATE: 12 BRPM | DIASTOLIC BLOOD PRESSURE: 70 MMHG | WEIGHT: 291.88 LBS | OXYGEN SATURATION: 97 %

## 2018-04-16 DIAGNOSIS — K21.00 GERD WITH ESOPHAGITIS: Primary | ICD-10-CM

## 2018-04-16 DIAGNOSIS — E66.01 MORBID OBESITY WITH BMI OF 50.0-59.9, ADULT: ICD-10-CM

## 2018-04-16 DIAGNOSIS — K43.2 VENTRAL INCISIONAL HERNIA WITHOUT OBSTRUCTION OR GANGRENE: ICD-10-CM

## 2018-04-16 DIAGNOSIS — K21.00 GERD WITH ESOPHAGITIS: ICD-10-CM

## 2018-04-16 PROCEDURE — 99999 PR PBB SHADOW E&M-EST. PATIENT-LVL IV: CPT | Mod: PBBFAC,,, | Performed by: FAMILY MEDICINE

## 2018-04-16 PROCEDURE — 99214 OFFICE O/P EST MOD 30 MIN: CPT | Mod: S$GLB,,, | Performed by: FAMILY MEDICINE

## 2018-04-16 PROCEDURE — 87449 NOS EACH ORGANISM AG IA: CPT

## 2018-04-16 PROCEDURE — 87338 HPYLORI STOOL AG IA: CPT

## 2018-04-16 RX ORDER — PANTOPRAZOLE SODIUM 40 MG/1
40 TABLET, DELAYED RELEASE ORAL
Qty: 60 TABLET | Refills: 2 | Status: SHIPPED | OUTPATIENT
Start: 2018-04-16 | End: 2019-12-04 | Stop reason: SDUPTHER

## 2018-04-16 RX ORDER — ONDANSETRON 8 MG/1
8 TABLET, ORALLY DISINTEGRATING ORAL
Qty: 12 TABLET | Refills: 0 | Status: SHIPPED | OUTPATIENT
Start: 2018-04-16 | End: 2019-08-28

## 2018-04-16 NOTE — PROGRESS NOTES
Chief Complaint   Patient presents with    Nausea     with  vomiting, lower abd pain and heartburn x 1 month.        HPI    Glenda Peña is 27 y.o. female. The primary encounter diagnosis was GERD with esophagitis. Diagnoses of Morbid obesity with BMI of 50.0-59.9, adult and Ventral incisional hernia without obstruction or gangrene were also pertinent to this visit.    27 year old female comes to clinic with report of persistent nausea and vomiting.  Patient reports that this issue has been present for about 2 months.  Patient reports awakening with extreme nausea.  She notes that she has a history of GERD and avoids her triggers.  She then begins to have vomiting with severe heartburn.  This occurred 2 times each week over the last month.    She does note abdominal pain that developed over the last week which she associates with nausea and vomiting.  She also reports a history of a ventral hernia.  She does report loose liquid stools. Patient reports that the Victoza worsened her symptoms but after discontinuing the medication it did not resolve them.    Review of Systems   Constitutional: Negative for activity change.   HENT: Negative for congestion.    Respiratory: Negative for shortness of breath.    Cardiovascular: Negative for chest pain.   Gastrointestinal: Positive for abdominal distention, abdominal pain, diarrhea, nausea and vomiting. Negative for constipation.   Genitourinary: Negative for dysuria.   Musculoskeletal: Negative for gait problem.   Skin: Negative for rash.   Neurological: Negative for dizziness.   Psychiatric/Behavioral: Negative for suicidal ideas.           Current Outpatient Prescriptions:     blood sugar diagnostic Strp, To check BG one time daily, to use with insurance preferred meter, Disp: 30 each, Rfl: 5    blood-glucose meter kit, To check BG one time daily, to use with insurance preferred meter, Disp: 1 each, Rfl: 0    clotrimazole-betamethasone 1-0.05% (LOTRISONE)  "cream, Apply to affected area 2 times daily, Disp: 15 g, Rfl: 6    lancets [facility], To check BG one time daily, to use with insurance preferred meter, Disp: 30 each, Rfl: 5    liraglutide 0.6 mg/0.1 mL, 18 mg/3 mL, subq PNIJ 0.6 mg/0.1 mL (18 mg/3 mL) PnIj, Inject 1.8 mg into the skin once daily., Disp: 9 mL, Rfl: 11    lisinopril (PRINIVIL,ZESTRIL) 2.5 MG tablet, Take 1 tablet (2.5 mg total) by mouth once daily., Disp: 90 tablet, Rfl: 0    metFORMIN (GLUCOPHAGE) 500 MG tablet, Take 2 tablets (1,000 mg total) by mouth 2 (two) times daily with meals., Disp: 120 tablet, Rfl: 2    ondansetron (ZOFRAN-ODT) 8 MG TbDL, Take 1 tablet (8 mg total) by mouth every 24 hours as needed (severe nausea)., Disp: 12 tablet, Rfl: 0    pantoprazole (PROTONIX) 40 MG tablet, Take 1 tablet (40 mg total) by mouth before breakfast., Disp: 60 tablet, Rfl: 2    pen needle, diabetic 31 gauge x 3/16" Ndle, 1 Units by Misc.(Non-Drug; Combo Route) route once daily., Disp: 100 each, Rfl: 1    ranitidine (ZANTAC) 75 MG tablet, Take 75 mg by mouth 2 (two) times daily., Disp: , Rfl:       Blood pressure 102/70, pulse 90, temperature 98.2 °F (36.8 °C), temperature source Oral, resp. rate 12, height 5' 2" (1.575 m), weight 132.4 kg (291 lb 14.2 oz), last menstrual period 03/25/2018, SpO2 97 %.    Physical Exam   Constitutional: Vital signs are normal. She appears well-developed and well-nourished. She does not appear ill. No distress.   Morbidly obese female   HENT:   Mouth/Throat: Normal dentition.   Neck: Trachea normal. No thyromegaly present.   Cardiovascular: Normal rate, regular rhythm and intact distal pulses.    No murmur heard.  Pulmonary/Chest: Effort normal. She has no decreased breath sounds. She has no wheezes. She exhibits no deformity.   Abdominal: Bowel sounds are normal. She exhibits no distension, no fluid wave and no mass. There is no tenderness. A hernia is present. Hernia confirmed positive in the ventral area. "   Musculoskeletal:   Normal gait. No decreased range of motion of major joints.   Neurological: She is not disoriented.   Skin: Skin is intact. Capillary refill takes less than 2 seconds.   Psychiatric: Her speech is normal and behavior is normal. Her mood appears not anxious. She does not exhibit a depressed mood.       Lab Visit on 04/16/2018   Component Date Value Ref Range Status    C. diff Antigen 04/16/2018 Negative  Negative Final    C difficile Toxins A+B, EIA 04/16/2018 Negative  Negative Final   Lab Visit on 03/21/2018   Component Date Value Ref Range Status    Microalbum.,U,Random 03/21/2018 287.0  ug/mL Final    Creatinine, Random Ur 03/21/2018 162.0  15.0 - 325.0 mg/dL Final    Microalb Creat Ratio 03/21/2018 177.2* 0.0 - 30.0 ug/mg Final   Lab Visit on 03/21/2018   Component Date Value Ref Range Status    WBC 03/21/2018 7.98  3.90 - 12.70 K/uL Final    RBC 03/21/2018 4.44  4.00 - 5.40 M/uL Final    Hemoglobin 03/21/2018 12.1  12.0 - 16.0 g/dL Final    Hematocrit 03/21/2018 38.8  37.0 - 48.5 % Final    MCV 03/21/2018 87  82 - 98 fL Final    MCH 03/21/2018 27.3  27.0 - 31.0 pg Final    MCHC 03/21/2018 31.2* 32.0 - 36.0 g/dL Final    RDW 03/21/2018 14.3  11.5 - 14.5 % Final    Platelets 03/21/2018 233  150 - 350 K/uL Final    MPV 03/21/2018 10.9  9.2 - 12.9 fL Final    Gran # (ANC) 03/21/2018 5.2  1.8 - 7.7 K/uL Final    Lymph # 03/21/2018 2.1  1.0 - 4.8 K/uL Final    Mono # 03/21/2018 0.6  0.3 - 1.0 K/uL Final    Eos # 03/21/2018 0.1  0.0 - 0.5 K/uL Final    Baso # 03/21/2018 0.02  0.00 - 0.20 K/uL Final    Gran% 03/21/2018 65.3  38.0 - 73.0 % Final    Lymph% 03/21/2018 25.7  18.0 - 48.0 % Final    Mono% 03/21/2018 7.3  4.0 - 15.0 % Final    Eosinophil% 03/21/2018 1.1  0.0 - 8.0 % Final    Basophil% 03/21/2018 0.3  0.0 - 1.9 % Final    Differential Method 03/21/2018 Automated   Final    Sodium 03/21/2018 136  136 - 145 mmol/L Final    Potassium 03/21/2018 4.2  3.5 - 5.1 mmol/L  Final    Chloride 03/21/2018 101  95 - 110 mmol/L Final    CO2 03/21/2018 26  23 - 29 mmol/L Final    Glucose 03/21/2018 254* 70 - 110 mg/dL Final    BUN, Bld 03/21/2018 9  6 - 20 mg/dL Final    Creatinine 03/21/2018 0.8  0.5 - 1.4 mg/dL Final    Calcium 03/21/2018 9.3  8.7 - 10.5 mg/dL Final    Total Protein 03/21/2018 7.7  6.0 - 8.4 g/dL Final    Albumin 03/21/2018 3.3* 3.5 - 5.2 g/dL Final    Total Bilirubin 03/21/2018 0.2  0.1 - 1.0 mg/dL Final    Alkaline Phosphatase 03/21/2018 90  55 - 135 U/L Final    AST 03/21/2018 41* 10 - 40 U/L Final    ALT 03/21/2018 42  10 - 44 U/L Final    Anion Gap 03/21/2018 9  8 - 16 mmol/L Final    eGFR if African American 03/21/2018 >60  >60 mL/min/1.73 m^2 Final    eGFR if non African American 03/21/2018 >60  >60 mL/min/1.73 m^2 Final    Hemoglobin A1C 03/21/2018 9.8* 4.0 - 5.6 % Final    Estimated Avg Glucose 03/21/2018 235* 68 - 131 mg/dL Final   Office Visit on 02/28/2018   Component Date Value Ref Range Status    Trichomonas vaginalis 02/28/2018 Negative  Negative Final    Gardnerella vaginalis 02/28/2018 Positive* Negative Final    Candida sp 02/28/2018 Positive* Negative Final   ]    Assessment:    1. GERD with esophagitis    2. Morbid obesity with BMI of 50.0-59.9, adult    3. Ventral incisional hernia without obstruction or gangrene          Glenda was seen today for nausea.    Diagnoses and all orders for this visit:    GERD with esophagitis  -     H. pylori antigen, stool; Future  -     pantoprazole (PROTONIX) 40 MG tablet; Take 1 tablet (40 mg total) by mouth before breakfast.  -     ondansetron (ZOFRAN-ODT) 8 MG TbDL; Take 1 tablet (8 mg total) by mouth every 24 hours as needed (severe nausea).  -     Clostridium difficile EIA; Future  - Worsened. Patient now with loose stools.  Obtain H.pylori and C.diff.  If negative testing will proceed with EGD and possible colonoscopy.  - PPI ordered. Consider elimination diet.    Morbid obesity with BMI of  50.0-59.9, adult  -     Ambulatory consult to Bariatric Medicine  - Unstable. Patient notes extreme difficulty. Referral to medical Bariatrics placed.    Ventral incisional hernia without obstruction or gangrene   -Unchanged. Previously evaluated by surgery. No surgical intervention until appropriate weight loss.   -See plan above.        FOLLOW UP: Follow-up in about 4 weeks (around 5/14/2018) for Follow up.

## 2018-04-16 NOTE — PATIENT INSTRUCTIONS
Understanding H. pylori and Ulcers     An ulcer can form in two areas of the digestive tract; the stomach and the duodenum (where the stomach meets the small intestine).     Traditionally, ulcers, or sores in the lining of your digestive tract, were thought to be caused by too much spicy food, stress, or an anxious personality. We now know that most ulcers are probably due to infection with bacteria known as Helicobacter pylori (H. pylori).  Common ulcer symptoms  These include the following:  · Burning, cramping, or hungerlike pain in the stomach area, often 1 to 3 hours after a meal or in the middle of the night  · Pain that gets better or worse with eating  · Nausea or vomiting  · Black, tarry, or bloody stools (which means the ulcer is bleeding)  Or you may have no symptoms.  Your evaluation  An evaluation by your healthcare provider can show if you have an ulcer and determine whether it was caused by H. pylori. Your healthcare provider may ask you questions, examine you, and possibly do some tests. These may include:  · A special X-ray called an upper gastrointestinal series, to help locate an ulcer. Before the test, you drink a chalky liquid, called barium. This liquid helps the ulcer show up on the X-ray.  · An endoscopic exam, done with a long tube with a camera on the end. The tube is passed through your mouth into your stomach, and allows the healthcare provider to get a closer look at your ulcer. You will be lightly sedated for this procedure. Your healthcare provider can also take a tissue sample to test for H. pylori.  · Blood, stool, and breath tests are also available to show whether you have H. pylori in your digestive tract.  Your treatment  To kill H. pylori so your ulcer can heal, your healthcare provider will probably prescribe antibiotics. Other ulcer medicines that help reduce stomach acid may also be prescribed as well. Testing after treatment may be recommended to be sure the H. pylori  infection is gone. Usually, killing H. pylori helps keep the ulcer from returning. However, you can develop ulcers more than once in your lifetime.   Date Last Reviewed: 7/1/2016 © 2000-2017 [employer]. [address] [place] [address], [place] [address]. All rights reserved. This information is not intended as a substitute for professional medical care. Always follow your healthcare professional's instructions.        Clostridium Difficile Toxin (Stool)  Does this test have other names?  C. diff, C. difficile  What is this test?  This is a test to look at your stool for toxins produced by Clostridium difficile bacteria.  Your gastrointestinal (GI) tract is home to many healthy bacteria, and sometimes C. difficile is one of them. But in some cases, taking broad-spectrum antibiotics can upset the balance of healthy bacteria in your gut and cause new or antibiotic-resistant strains of C. difficile to grow out of control. These germs can then release toxins into your GI tract, inflaming the colon and causing continuing diarrhea. The resulting illness can be serious, even life-threatening, if you have a weak immune system.  Even if they are not taking antibiotics, people with a weak immune system often have an overabundance of C. difficile bacteria.  Why do I need this test?  Your doctor might order this test if you have any of these symptoms, especially if you are in the hospital or were recently taking antibiotics:  · Diarrhea. This means 3 or more loose stools per day for at least 2 days.  · Blood or mucus in your stool  · Stomach pain, nausea, loss of appetite, or fever, particularly if you have persistent diarrhea  What other tests might I have along with this test?  Your healthcare provider might order other stool tests to look for C. difficile infection, such as a glutamate dehydrogenase, or GDH, test and a stool culture test.  If your stool tests are negative, but your doctor still strongly suspects C.  difficile infection, you may have a sigmoidoscopy or colonoscopy to help make a diagnosis. In this procedure, a doctor examines your colon with a very thin, flexible lighted tube that is equipped with a tiny video camera on the end.  This infection is a major cause of illness and death among older adults who are in the hospital, so healthcare providers are likely to test people in this group who develop persistent diarrhea after taking antibiotics.  What do my test results mean?  Many things may affect your lab test results. These include the method each lab uses to do the test. Even if your test results are different from the normal value, you may not have a problem. To learn what the results mean for you, talk with your healthcare provider.  A normal result for this stool test is negative, which means you had no C. difficile toxins in your sample. But this test result is not accurate all of the time. A small portion of people might have the infection even if the result is negative. If your healthcare provider still suspects infection, he or she may do other tests.  If your stool tests positive for C. difficile toxins, your doctor may decide that you have antibiotic-associated colitis, or inflammation of the colon.  How is this test done?  This test requires a stool sample. Your healthcare provider will instruct you how to collect a sample into a disposable specimen container with a lid. Do not collect fecal material from the toilet bowl or put toilet paper into the specimen container.  Does this test pose any risks?  This test poses no known risks.  What might affect my test results?  Contaminating the stool sample with toilet water, urine, or other substances can make it unfit for testing or skew the results.  How do I get ready for this test?  You don't need to prepare for this test.  © 8949-3817 [employer]. [address] [place] [address], [place] [address]. All rights reserved. This information is not  intended as a substitute for professional medical care. Always follow your healthcare professional's instructions.

## 2018-04-17 LAB
C DIFF GDH STL QL: NEGATIVE
C DIFF TOX A+B STL QL IA: NEGATIVE

## 2018-04-20 LAB — H PYLORI AG STL QL: NOT DETECTED

## 2018-04-22 ENCOUNTER — HOSPITAL ENCOUNTER (OUTPATIENT)
Facility: HOSPITAL | Age: 28
Discharge: HOME | End: 2018-04-23
Attending: EMERGENCY MEDICINE | Admitting: EMERGENCY MEDICINE
Payer: COMMERCIAL

## 2018-04-22 DIAGNOSIS — Z79.4 TYPE 2 DIABETES MELLITUS WITHOUT COMPLICATION, WITH LONG-TERM CURRENT USE OF INSULIN: ICD-10-CM

## 2018-04-22 DIAGNOSIS — E11.9 TYPE 2 DIABETES MELLITUS WITHOUT COMPLICATION, WITH LONG-TERM CURRENT USE OF INSULIN: ICD-10-CM

## 2018-04-22 DIAGNOSIS — R10.9 INTRACTABLE ABDOMINAL PAIN: ICD-10-CM

## 2018-04-22 DIAGNOSIS — I10 ESSENTIAL HYPERTENSION: ICD-10-CM

## 2018-04-22 DIAGNOSIS — R10.32 LEFT LOWER QUADRANT PAIN: Primary | ICD-10-CM

## 2018-04-22 LAB
ALBUMIN SERPL BCP-MCNC: 3.6 G/DL
ALP SERPL-CCNC: 80 U/L
ALT SERPL W/O P-5'-P-CCNC: 75 U/L
ANION GAP SERPL CALC-SCNC: 14 MMOL/L
AST SERPL-CCNC: 71 U/L
B-HCG UR QL: NEGATIVE
BACTERIA #/AREA URNS HPF: ABNORMAL /HPF
BASOPHILS # BLD AUTO: 0.03 K/UL
BASOPHILS NFR BLD: 0.3 %
BILIRUB SERPL-MCNC: 0.3 MG/DL
BILIRUB UR QL STRIP: NEGATIVE
BUN SERPL-MCNC: 7 MG/DL
CALCIUM SERPL-MCNC: 10.3 MG/DL
CHLORIDE SERPL-SCNC: 97 MMOL/L
CLARITY UR: CLEAR
CO2 SERPL-SCNC: 23 MMOL/L
COLOR UR: YELLOW
CREAT SERPL-MCNC: 0.9 MG/DL
CTP QC/QA: YES
DIFFERENTIAL METHOD: NORMAL
EOSINOPHIL # BLD AUTO: 0.1 K/UL
EOSINOPHIL NFR BLD: 1.1 %
EPITH CASTS #/AREA URNS LPF: 3 /LPF
ERYTHROCYTE [DISTWIDTH] IN BLOOD BY AUTOMATED COUNT: 14.3 %
EST. GFR  (AFRICAN AMERICAN): >60 ML/MIN/1.73 M^2
EST. GFR  (NON AFRICAN AMERICAN): >60 ML/MIN/1.73 M^2
GLUCOSE SERPL-MCNC: 283 MG/DL
GLUCOSE UR QL STRIP: ABNORMAL
HCT VFR BLD AUTO: 38.2 %
HGB BLD-MCNC: 12.6 G/DL
HGB UR QL STRIP: NEGATIVE
KETONES UR QL STRIP: ABNORMAL
LEUKOCYTE ESTERASE UR QL STRIP: NEGATIVE
LIPASE SERPL-CCNC: 68 U/L
LYMPHOCYTES # BLD AUTO: 2.5 K/UL
LYMPHOCYTES NFR BLD: 23.4 %
MCH RBC QN AUTO: 27.6 PG
MCHC RBC AUTO-ENTMCNC: 33 G/DL
MCV RBC AUTO: 84 FL
MICROSCOPIC COMMENT: ABNORMAL
MONOCYTES # BLD AUTO: 0.6 K/UL
MONOCYTES NFR BLD: 5.7 %
NEUTROPHILS # BLD AUTO: 7.4 K/UL
NEUTROPHILS NFR BLD: 69.1 %
NITRITE UR QL STRIP: NEGATIVE
PH UR STRIP: 5 [PH] (ref 5–8)
PLATELET # BLD AUTO: 243 K/UL
PMV BLD AUTO: 10.9 FL
POTASSIUM SERPL-SCNC: 3.9 MMOL/L
PROT SERPL-MCNC: 8.2 G/DL
PROT UR QL STRIP: NEGATIVE
RBC # BLD AUTO: 4.56 M/UL
SODIUM SERPL-SCNC: 134 MMOL/L
SP GR UR STRIP: 1.02 (ref 1–1.03)
URN SPEC COLLECT METH UR: ABNORMAL
UROBILINOGEN UR STRIP-ACNC: NEGATIVE EU/DL
WBC # BLD AUTO: 10.68 K/UL
YEAST URNS QL MICRO: ABNORMAL

## 2018-04-22 PROCEDURE — 80053 COMPREHEN METABOLIC PANEL: CPT

## 2018-04-22 PROCEDURE — 63600175 PHARM REV CODE 636 W HCPCS: Performed by: EMERGENCY MEDICINE

## 2018-04-22 PROCEDURE — 81000 URINALYSIS NONAUTO W/SCOPE: CPT

## 2018-04-22 PROCEDURE — 96376 TX/PRO/DX INJ SAME DRUG ADON: CPT

## 2018-04-22 PROCEDURE — 96361 HYDRATE IV INFUSION ADD-ON: CPT

## 2018-04-22 PROCEDURE — 96374 THER/PROPH/DIAG INJ IV PUSH: CPT

## 2018-04-22 PROCEDURE — 85025 COMPLETE CBC W/AUTO DIFF WBC: CPT

## 2018-04-22 PROCEDURE — 81025 URINE PREGNANCY TEST: CPT | Performed by: EMERGENCY MEDICINE

## 2018-04-22 PROCEDURE — 99285 EMERGENCY DEPT VISIT HI MDM: CPT | Mod: 25

## 2018-04-22 PROCEDURE — 25000003 PHARM REV CODE 250: Performed by: EMERGENCY MEDICINE

## 2018-04-22 PROCEDURE — 83690 ASSAY OF LIPASE: CPT

## 2018-04-22 PROCEDURE — 96375 TX/PRO/DX INJ NEW DRUG ADDON: CPT

## 2018-04-22 PROCEDURE — 82150 ASSAY OF AMYLASE: CPT

## 2018-04-22 RX ORDER — MORPHINE SULFATE 10 MG/ML
4 INJECTION INTRAMUSCULAR; INTRAVENOUS; SUBCUTANEOUS
Status: COMPLETED | OUTPATIENT
Start: 2018-04-22 | End: 2018-04-22

## 2018-04-22 RX ORDER — ONDANSETRON 2 MG/ML
4 INJECTION INTRAMUSCULAR; INTRAVENOUS
Status: COMPLETED | OUTPATIENT
Start: 2018-04-22 | End: 2018-04-22

## 2018-04-22 RX ADMIN — MORPHINE SULFATE 4 MG: 10 INJECTION INTRAVENOUS at 10:04

## 2018-04-22 RX ADMIN — SODIUM CHLORIDE 1000 ML: 0.9 INJECTION, SOLUTION INTRAVENOUS at 10:04

## 2018-04-22 RX ADMIN — ONDANSETRON 4 MG: 2 INJECTION INTRAMUSCULAR; INTRAVENOUS at 10:04

## 2018-04-22 NOTE — LETTER
April 23, 2018         [place] [address]  Phone: [phone]  Fax: [phone]       Patient: Glenda Peña   YOB: 1990  Date of Visit: 04/23/2018    To Whom It May Concern:    Opal Peña  was at [facility] on 04/22/18 and  04/23/2018. He may return to work/school on 04/27/18 with no restrictions. If you have any questions or concerns, or if I can be of further assistance, please do not hesitate to contact me.    Sincerely,        DEMETRIA Felix, FNP-C  Hospitalist - Department of Hospital Medicine  [facility]  [address] [place] [address]  Office [phone]; Pager [phone]

## 2018-04-23 VITALS
RESPIRATION RATE: 18 BRPM | HEART RATE: 81 BPM | BODY MASS INDEX: 53.84 KG/M2 | WEIGHT: 292.56 LBS | SYSTOLIC BLOOD PRESSURE: 109 MMHG | TEMPERATURE: 99 F | HEIGHT: 62 IN | DIASTOLIC BLOOD PRESSURE: 60 MMHG | OXYGEN SATURATION: 92 %

## 2018-04-23 PROBLEM — R10.32 LEFT LOWER QUADRANT PAIN: Status: ACTIVE | Noted: 2018-04-23

## 2018-04-23 PROBLEM — I10 ESSENTIAL HYPERTENSION: Status: ACTIVE | Noted: 2018-04-23

## 2018-04-23 LAB
AMYLASE SERPL-CCNC: 38 U/L
BACTERIA GENITAL QL WET PREP: ABNORMAL
C TRACH DNA SPEC QL NAA+PROBE: NOT DETECTED
CLUE CELLS VAG QL WET PREP: ABNORMAL
FILAMENT FUNGI VAG WET PREP-#/AREA: ABNORMAL
N GONORRHOEA DNA SPEC QL NAA+PROBE: NOT DETECTED
POCT GLUCOSE: 158 MG/DL (ref 70–110)
SPECIMEN SOURCE: ABNORMAL
T VAGINALIS GENITAL QL WET PREP: ABNORMAL
WBC #/AREA VAG WET PREP: ABNORMAL
YEAST GENITAL QL WET PREP: ABNORMAL

## 2018-04-23 PROCEDURE — 63600175 PHARM REV CODE 636 W HCPCS: Performed by: EMERGENCY MEDICINE

## 2018-04-23 PROCEDURE — G0378 HOSPITAL OBSERVATION PER HR: HCPCS

## 2018-04-23 PROCEDURE — 63600175 PHARM REV CODE 636 W HCPCS: Performed by: SURGERY

## 2018-04-23 PROCEDURE — 87210 SMEAR WET MOUNT SALINE/INK: CPT

## 2018-04-23 PROCEDURE — 96376 TX/PRO/DX INJ SAME DRUG ADON: CPT

## 2018-04-23 PROCEDURE — 25000003 PHARM REV CODE 250: Performed by: SURGERY

## 2018-04-23 PROCEDURE — 25500020 PHARM REV CODE 255: Performed by: EMERGENCY MEDICINE

## 2018-04-23 PROCEDURE — 63600175 PHARM REV CODE 636 W HCPCS: Performed by: NURSE PRACTITIONER

## 2018-04-23 PROCEDURE — 87491 CHLMYD TRACH DNA AMP PROBE: CPT

## 2018-04-23 PROCEDURE — 96375 TX/PRO/DX INJ NEW DRUG ADDON: CPT

## 2018-04-23 PROCEDURE — 96374 THER/PROPH/DIAG INJ IV PUSH: CPT

## 2018-04-23 PROCEDURE — 25000003 PHARM REV CODE 250: Performed by: EMERGENCY MEDICINE

## 2018-04-23 RX ORDER — AMOXICILLIN 250 MG
1 CAPSULE ORAL 2 TIMES DAILY
Status: DISCONTINUED | OUTPATIENT
Start: 2018-04-23 | End: 2018-04-23 | Stop reason: HOSPADM

## 2018-04-23 RX ORDER — ONDANSETRON 2 MG/ML
4 INJECTION INTRAMUSCULAR; INTRAVENOUS EVERY 8 HOURS PRN
Status: DISCONTINUED | OUTPATIENT
Start: 2018-04-23 | End: 2018-04-23

## 2018-04-23 RX ORDER — ONDANSETRON 8 MG/1
8 TABLET, ORALLY DISINTEGRATING ORAL EVERY 8 HOURS PRN
Status: DISCONTINUED | OUTPATIENT
Start: 2018-04-23 | End: 2018-04-23 | Stop reason: HOSPADM

## 2018-04-23 RX ORDER — IBUPROFEN 200 MG
24 TABLET ORAL
Status: DISCONTINUED | OUTPATIENT
Start: 2018-04-23 | End: 2018-04-23 | Stop reason: HOSPADM

## 2018-04-23 RX ORDER — IBUPROFEN 200 MG
16 TABLET ORAL
Status: DISCONTINUED | OUTPATIENT
Start: 2018-04-23 | End: 2018-04-23 | Stop reason: HOSPADM

## 2018-04-23 RX ORDER — ACETAMINOPHEN 325 MG/1
650 TABLET ORAL EVERY 8 HOURS PRN
Status: DISCONTINUED | OUTPATIENT
Start: 2018-04-23 | End: 2018-04-23 | Stop reason: HOSPADM

## 2018-04-23 RX ORDER — ZOLPIDEM TARTRATE 5 MG/1
5 TABLET ORAL NIGHTLY PRN
Status: DISCONTINUED | OUTPATIENT
Start: 2018-04-23 | End: 2018-04-23 | Stop reason: HOSPADM

## 2018-04-23 RX ORDER — MORPHINE SULFATE 10 MG/ML
2 INJECTION INTRAMUSCULAR; INTRAVENOUS; SUBCUTANEOUS EVERY 4 HOURS PRN
Status: DISCONTINUED | OUTPATIENT
Start: 2018-04-23 | End: 2018-04-23

## 2018-04-23 RX ORDER — METRONIDAZOLE 500 MG/1
250 TABLET ORAL EVERY 12 HOURS
Qty: 7 TABLET | Refills: 0 | Status: SHIPPED | OUTPATIENT
Start: 2018-04-23 | End: 2018-04-30

## 2018-04-23 RX ORDER — FAMOTIDINE 20 MG/1
20 TABLET, FILM COATED ORAL 2 TIMES DAILY
Status: DISCONTINUED | OUTPATIENT
Start: 2018-04-23 | End: 2018-04-23 | Stop reason: SDUPTHER

## 2018-04-23 RX ORDER — LISINOPRIL 2.5 MG/1
2.5 TABLET ORAL DAILY
Status: DISCONTINUED | OUTPATIENT
Start: 2018-04-23 | End: 2018-04-23 | Stop reason: HOSPADM

## 2018-04-23 RX ORDER — MORPHINE SULFATE 10 MG/ML
4 INJECTION INTRAMUSCULAR; INTRAVENOUS; SUBCUTANEOUS EVERY 4 HOURS PRN
Status: DISCONTINUED | OUTPATIENT
Start: 2018-04-23 | End: 2018-04-23

## 2018-04-23 RX ORDER — MORPHINE SULFATE 10 MG/ML
4 INJECTION INTRAMUSCULAR; INTRAVENOUS; SUBCUTANEOUS
Status: COMPLETED | OUTPATIENT
Start: 2018-04-23 | End: 2018-04-23

## 2018-04-23 RX ORDER — INSULIN ASPART 100 [IU]/ML
1-10 INJECTION, SOLUTION INTRAVENOUS; SUBCUTANEOUS
Status: DISCONTINUED | OUTPATIENT
Start: 2018-04-23 | End: 2018-04-23 | Stop reason: HOSPADM

## 2018-04-23 RX ORDER — DEXTROSE MONOHYDRATE, SODIUM CHLORIDE, AND POTASSIUM CHLORIDE 50; 1.49; 9 G/1000ML; G/1000ML; G/1000ML
INJECTION, SOLUTION INTRAVENOUS CONTINUOUS
Status: DISCONTINUED | OUTPATIENT
Start: 2018-04-23 | End: 2018-04-23 | Stop reason: HOSPADM

## 2018-04-23 RX ORDER — ENOXAPARIN SODIUM 100 MG/ML
40 INJECTION SUBCUTANEOUS EVERY 24 HOURS
Status: DISCONTINUED | OUTPATIENT
Start: 2018-04-23 | End: 2018-04-23 | Stop reason: HOSPADM

## 2018-04-23 RX ORDER — KETOROLAC TROMETHAMINE 30 MG/ML
15 INJECTION, SOLUTION INTRAMUSCULAR; INTRAVENOUS EVERY 6 HOURS PRN
Status: DISCONTINUED | OUTPATIENT
Start: 2018-04-23 | End: 2018-04-23 | Stop reason: HOSPADM

## 2018-04-23 RX ORDER — FAMOTIDINE 20 MG/1
20 TABLET, FILM COATED ORAL 2 TIMES DAILY
Status: DISCONTINUED | OUTPATIENT
Start: 2018-04-23 | End: 2018-04-23 | Stop reason: HOSPADM

## 2018-04-23 RX ADMIN — DEXTROSE MONOHYDRATE, SODIUM CHLORIDE, AND POTASSIUM CHLORIDE: 50; 9; 1.49 INJECTION, SOLUTION INTRAVENOUS at 07:04

## 2018-04-23 RX ADMIN — MORPHINE SULFATE 4 MG: 10 INJECTION INTRAVENOUS at 03:04

## 2018-04-23 RX ADMIN — IOHEXOL 15 ML: 350 INJECTION, SOLUTION INTRAVENOUS at 01:04

## 2018-04-23 RX ADMIN — FAMOTIDINE 20 MG: 20 TABLET, FILM COATED ORAL at 09:04

## 2018-04-23 RX ADMIN — MORPHINE SULFATE 4 MG: 10 INJECTION INTRAVENOUS at 07:04

## 2018-04-23 RX ADMIN — PIPERACILLIN AND TAZOBACTAM 4.5 G: 4; .5 INJECTION, POWDER, LYOPHILIZED, FOR SOLUTION INTRAVENOUS; PARENTERAL at 09:04

## 2018-04-23 RX ADMIN — IOHEXOL 100 ML: 350 INJECTION, SOLUTION INTRAVENOUS at 01:04

## 2018-04-23 RX ADMIN — MORPHINE SULFATE 2 MG: 10 INJECTION INTRAVENOUS at 12:04

## 2018-04-23 RX ADMIN — DOCUSATE SODIUM AND SENNOSIDES 1 TABLET: 8.6; 5 TABLET, FILM COATED ORAL at 09:04

## 2018-04-23 RX ADMIN — ENOXAPARIN SODIUM 40 MG: 100 INJECTION SUBCUTANEOUS at 03:04

## 2018-04-23 RX ADMIN — LISINOPRIL 2.5 MG: 2.5 TABLET ORAL at 09:04

## 2018-04-23 RX ADMIN — KETOROLAC TROMETHAMINE 15 MG: 30 INJECTION, SOLUTION INTRAMUSCULAR; INTRAVENOUS at 01:04

## 2018-04-23 NOTE — ED PROVIDER NOTES
"Encounter Date: 4/22/2018    SCRIBE #1 NOTE: I, Henri Terry II, am scribing for, and in the presence of,  Markus Shay MD. I have scribed the following portions of the note - Other sections scribed: HPI, ROS, PE.       History     Chief Complaint   Patient presents with    Abdominal Pain     Pt c/o LLQ abdominal "stabbing" pain since 3pm with N/V/D. Pt states shes been having loose stools for a few mos.  Pt states she was just recently been tested for h-pylori. No meds taken at home      CC: Abdominal Pain      HPI: This 27 y.o. female presents to the ED c/o acute onset, non radiating abdominal pain with associated nausea and one episode of vomiting x7 hours. Pt reports at 3pm she began having LLQ abdominal pain and describes the pain as cramping sensation. Pt reports her abdominal pain progressively worsened and her pain became a stabbing sensation. Pt reports similar pain in the past consistent with her ruptured appendix. No alleviating factors. Abdominal pain is exacerbated with palpation. Pt denies diarrhea, weakness, back pain, and dysuria.     Pt reports she was being tested for H. Pylori because of prior diarrhea issues. She reports her last BM was normal earlier today. She reports her LMP was the end of March. Pt denies the possibility of being pregnant because her spouse is transgender.      PMHx: asthma, morbid obesity, PCOS, GERD, and IDDM      The history is provided by the patient. No  was used.     Review of patient's allergies indicates:   Allergen Reactions    Iodine and iodide containing products Hives    Latex, natural rubber Rash     Past Medical History:   Diagnosis Date    Asthma childhood    Diabetes mellitus     GERD (gastroesophageal reflux disease)     Morbid obesity     PCOS (polycystic ovarian syndrome)      Past Surgical History:   Procedure Laterality Date    APPENDECTOMY  2011    HERNIA REPAIR      salpingoopherectomy Right 2008     Family History "   Problem Relation Age of Onset    Heart attack Father     Hypertension Father     Diabetes Mother     Heart attack Mother      Social History   Substance Use Topics    Smoking status: Never Smoker    Smokeless tobacco: Never Used    Alcohol use Yes      Comment: occasionally apple cider     Review of Systems   Constitutional: Negative for chills and fever.   HENT: Negative for congestion, ear pain, rhinorrhea and sore throat.    Eyes: Negative for pain and visual disturbance.   Respiratory: Negative for cough and shortness of breath.    Cardiovascular: Negative for chest pain.   Gastrointestinal: Positive for abdominal pain, nausea and vomiting. Negative for diarrhea.   Genitourinary: Negative for dysuria.   Musculoskeletal: Negative for back pain and neck pain.   Skin: Negative for rash.   Neurological: Negative for headaches.   Psychiatric/Behavioral: Negative for confusion.       Physical Exam     Initial Vitals [04/22/18 2116]   BP Pulse Resp Temp SpO2   (!) 146/67 105 16 98.3 °F (36.8 °C) 99 %      MAP       93.33         Physical Exam    Nursing note and vitals reviewed.  Constitutional: She appears well-developed and well-nourished. She is not diaphoretic. No distress.   HENT:   Head: Normocephalic and atraumatic.   Nose: Nose normal.   Eyes: EOM are normal. Pupils are equal, round, and reactive to light. No scleral icterus.   Neck: Normal range of motion. Neck supple.   Cardiovascular: Normal rate, regular rhythm, normal heart sounds and intact distal pulses. Exam reveals no gallop and no friction rub.    No murmur heard.  Pulmonary/Chest: Breath sounds normal. No stridor. No respiratory distress. She has no wheezes. She has no rhonchi. She has no rales.   Abdominal: Soft. Normal appearance and bowel sounds are normal. She exhibits no distension. There is tenderness in the left lower quadrant.   LLQ tenderness to palpation with referred pain to rebound and guarding   Musculoskeletal: Normal range of  motion. She exhibits no edema or tenderness.   Neurological: She is oriented to person, place, and time. No cranial nerve deficit.   Skin: Skin is warm and dry. No rash noted.   Psychiatric: She has a normal mood and affect. Her behavior is normal.         ED Course   Procedures  Labs Reviewed   COMPREHENSIVE METABOLIC PANEL - Abnormal; Notable for the following:        Result Value    Sodium 134 (*)     Glucose 283 (*)     AST 71 (*)     ALT 75 (*)     All other components within normal limits   LIPASE - Abnormal; Notable for the following:     Lipase 68 (*)     All other components within normal limits   URINALYSIS - Abnormal; Notable for the following:     Glucose, UA 3+ (*)     Ketones, UA Trace (*)     All other components within normal limits   URINALYSIS MICROSCOPIC - Abnormal; Notable for the following:     Epithelial Casts, UA 3 (*)     All other components within normal limits   VAGINAL SCREEN - Abnormal; Notable for the following:     Bacteria - Vaginal Screen Occasional (*)     All other components within normal limits   CBC W/ AUTO DIFFERENTIAL   POCT URINE PREGNANCY             Medical Decision Making:   Initial Assessment:   26 yo female 3 of PCO as presenting with sudden onset left lower quadrant versus adnexal pain.  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  She denies vaginal bleeding.  Patient does not believe she is pregnant as she does not have sex with men.  Has a history of loose bowels for which she is currently being worked up with primary care.  States that this pain is not similar to her previous pain that she has had either due to a chronic hernia or gastrointestinal discomfort.  On exam, patient is very tender to palpation with guarding and some peritoneal signs.  She is referred pain with palpation on the right to her left adnexa.  Mild tachycardia.  Patient given morphine and Zofran.  We'll get a pelvic ultrasound.  Differential  includes ovarian torsion, TOA, small bowel traction, diverticulitis.    Pelvic ultrasound not revealing for ovarian torsion or TOA.  CT scan of her abdomen showed fat stranding in the left lower quadrant though relatively nonspecific.  Patient continues to have significant abdominal pain requiring IV pain medication.  She is in a moderate amount of distress on exam.  I'm not exactly certain what the cause of her symptoms are, diverticulitis considered less likely given no fevers and no diverticular disease on CT scan.  We will admit for intractable pain and observation with general surgery consult in the morning.            Scribe Attestation:   Scribe #1: I performed the above scribed service and the documentation accurately describes the services I performed. I attest to the accuracy of the note.    Attending Attestation:           Physician Attestation for Scribe:  Physician Attestation Statement for Scribe #1: I, Markus Shay MD, reviewed documentation, as scribed by Henri Hunt II in my presence, and it is both accurate and complete.                    Clinical Impression:   The primary encounter diagnosis was Left lower quadrant pain. Diagnoses of Intractable abdominal pain, Type 2 diabetes mellitus without complication, with long-term current use of insulin, and Essential hypertension were also pertinent to this visit.    Disposition:   Disposition: Placed in Observation  Condition: Fair                        Markus Shay MD  04/23/18 3468

## 2018-04-23 NOTE — ED TRIAGE NOTES
"Pt presents to ED c/o LLQ abdominal pain since approx 1500 that has gotten worse. "Severe lower to the left abdominal pain." Currently rates pain 10/10.   "

## 2018-04-23 NOTE — HOSPITAL COURSE
Patient responded well to bowel rest and IV fluids. Seen by surgery who did not believe CT findings were the source of the patient's pain. She does have 2 hernias that she is aware of that are problematic to her for a long time. She tells me that Dr. Estrada refuses to do surgery unless she looses weight first. There is no obvious evidence of obstruction. Patient reports good BMs (NB) and her bowel sounds are positive X 4 quads. She has been able to tolerate her meal here without vomiting. No documented loose stools or diarrhea while hospitalized. I have reviewed the patients chart and find that the chronicity of the symptoms date back several months. She has seen GYN and primary care and has had extensive scanning. Surgery did note the importance of patient follow up with GI outpatient for colonoscopy. Her vitals are stable.

## 2018-04-23 NOTE — NURSING
Patient arrived to unit via stretcher and escorted to bed. Vital signs found in flow sheets and patient is in NAD.

## 2018-04-23 NOTE — ASSESSMENT & PLAN NOTE
LL quad - report history of R ovary removal and appendectomy - CT abdomen significant for hernia and non-specific soft tissue stranding in the LLQ + stable appearance eo ventral abd wall hernia and R lateral anterior abdominal wall hernia unobstructed loops of small bowel and large bowel - Seen by surgery who does not think this is the source of the patient's problem. Her abdominal pain has been chronic for some time and she tells me that Dr. Estrada keeps telling her that he won't repair her hernia's. Patient has history of ventricle hernia repair - has anterior lateral hernia as well. Likely the source of her chronic abdominal complaints. Will try clear liquid and advance as tolerated. Other DD given her symptom of diarrhea and nausea is gastroenteritis but doubt as this is a chronic problem - has been worked up outpatient for viriff and others that has been basically unrevealing - no documented diarrhea while in the hospital  Continue home protonix  Stop taking zantac

## 2018-04-23 NOTE — PROGRESS NOTES
TN taught mother in law, pt very drowsy, TN taught S&S for Sepsis.  Teach back Marifer: 1. Shaking and chills, 2. Pain in joints.  .Arlette Harvey RN, BSN, [facility]  4/23/2018    TN reviewed what pt is responsible for when she is discharged:  Things that YOU are responsible for to Manage Your Care At Home:  1. Getting your prescriptions filled.  2. Taking you medications as directed. DO NOT MISS ANY DOSES!  3. Going to your follow-up doctor appointments. This is important because it allows the doctor to monitor your progress and to determine if any changes need to be made to your treatment plan.    Arlette Harvey RN [facility] [phone]

## 2018-04-23 NOTE — H&P
"[facility] Medicine  History & Physical    Patient Name: Glenda Peña  MRN: [id number]  Admission Date: 4/22/2018  Attending Physician: Alka Quezada MD   Primary Care Provider: Adeel Soto MD         Patient information was obtained from patient and ER records.     Subjective:     Principal Problem:Left lower quadrant pain    Chief Complaint:   Chief Complaint   Patient presents with    Abdominal Pain     Pt c/o LLQ abdominal "stabbing" pain since 3pm with N/V/D. Pt states shes been having loose stools for a few mos.  Pt states she was just recently been tested for h-pylori. No meds taken at home         HPI: Glenda Peña is a 27 y.o. with history SHX including appendectomy 2008, R ovary tumor removal 2008. Patient present with what she reports as a 2 month history of LLQ abdominal pain that is non-radiating. She reports that she has had multiple workup for abdominal pain. For the past 3-4 days she reports diarrhea 3-4 loose stools daily and one episode of vomiting NBNB. She endorses nausea without vomiting since that time. Patient reports normal Menstrual period that is regular and ended at the end of march. Important to mention is patient had recent GYN appointment and was RX ABX for bacterial vaginosis (unkn medications) which she reports completing.    Past Medical History:   Diagnosis Date    Asthma childhood    Diabetes mellitus     GERD (gastroesophageal reflux disease)     Morbid obesity     PCOS (polycystic ovarian syndrome)        Past Surgical History:   Procedure Laterality Date    APPENDECTOMY  2011    HERNIA REPAIR      salpingoopherectomy Right 2008       Review of patient's allergies indicates:   Allergen Reactions    Shellfish containing products Hives     To hands    Latex, natural rubber Rash     Burning sensation       No current facility-administered medications on file prior to encounter.      Current Outpatient Prescriptions on " "File Prior to Encounter   Medication Sig    lisinopril (PRINIVIL,ZESTRIL) 2.5 MG tablet Take 1 tablet (2.5 mg total) by mouth once daily.    metFORMIN (GLUCOPHAGE) 500 MG tablet Take 2 tablets (1,000 mg total) by mouth 2 (two) times daily with meals.    ranitidine (ZANTAC) 75 MG tablet Take 75 mg by mouth 2 (two) times daily.    blood sugar diagnostic Strp To check BG one time daily, to use with insurance preferred meter    blood-glucose meter kit To check BG one time daily, to use with insurance preferred meter    clotrimazole-betamethasone 1-0.05% (LOTRISONE) cream Apply to affected area 2 times daily    lancets Misc To check BG one time daily, to use with insurance preferred meter    liraglutide 0.6 mg/0.1 mL, 18 mg/3 mL, subq PNIJ 0.6 mg/0.1 mL (18 mg/3 mL) PnIj Inject 1.8 mg into the skin once daily.    ondansetron (ZOFRAN-ODT) 8 MG TbDL Take 1 tablet (8 mg total) by mouth every 24 hours as needed (severe nausea).    pantoprazole (PROTONIX) 40 MG tablet Take 1 tablet (40 mg total) by mouth before breakfast.    pen needle, diabetic 31 gauge x 3/16" Ndle 1 Units by Misc.(Non-Drug; Combo Route) route once daily.     Family History     Problem Relation (Age of Onset)    Diabetes Mother    Heart attack Father, Mother    Hypertension Father        Social History Main Topics    Smoking status: Never Smoker    Smokeless tobacco: Never Used    Alcohol use Yes      Comment: occasionally apple cider    Drug use: No    Sexual activity: Yes     Partners: Female     Review of Systems   Constitutional: Negative for appetite change, chills, diaphoresis and fever.        Body mass index is 53.51 kg/m².   HENT: Negative for congestion, hearing loss, sore throat, tinnitus and trouble swallowing.    Eyes: Negative for photophobia, discharge, itching and visual disturbance.   Respiratory: Negative for apnea, cough, wheezing and stridor.    Cardiovascular: Negative for chest pain, palpitations and leg swelling. "   Gastrointestinal: Positive for abdominal pain, diarrhea, nausea and vomiting. Negative for abdominal distention, blood in stool and constipation.   Endocrine: Negative for polydipsia, polyphagia and polyuria.   Genitourinary: Positive for vaginal discharge. Negative for difficulty urinating, dysuria, flank pain and frequency.   Musculoskeletal: Negative for arthralgias, joint swelling and neck stiffness.   Skin: Negative for color change, rash and wound.   Neurological: Negative for dizziness, tremors, seizures, light-headedness, numbness and headaches.   Hematological: Negative for adenopathy.   Psychiatric/Behavioral: Negative for hallucinations and self-injury.     Objective:     Vital Signs (Most Recent):  Temp: 98.2 °F (36.8 °C) (04/23/18 1127)  Pulse: 82 (04/23/18 1127)  Resp: 20 (04/23/18 1127)  BP: (!) 115/53 (04/23/18 1127)  SpO2: (!) 93 % (04/23/18 1127) Vital Signs (24h Range):  Temp:  [98.2 °F (36.8 °C)-98.5 °F (36.9 °C)] 98.2 °F (36.8 °C)  Pulse:  [] 82  Resp:  [16-20] 20  SpO2:  [93 %-99 %] 93 %  BP: (105-146)/(53-84) 115/53     Weight: 132.7 kg (292 lb 8.8 oz)  Body mass index is 53.51 kg/m².    Physical Exam   Constitutional: She appears well-developed and well-nourished. She is cooperative.   HENT:   Head: Normocephalic and atraumatic.   Eyes: Conjunctivae, EOM and lids are normal. Pupils are equal, round, and reactive to light.   Neck: Normal range of motion and full passive range of motion without pain. Neck supple. No JVD present. No edema present. No thyroid mass present.   Cardiovascular: Normal rate, S1 normal, S2 normal and intact distal pulses.    No murmur heard.  Pulmonary/Chest: Effort normal.   Abdominal: Soft. Bowel sounds are normal. She exhibits no distension and no abdominal bruit. There is no splenomegaly or hepatomegaly. There is no tenderness. There is no CVA tenderness.   Musculoskeletal: Normal range of motion.   Lymphadenopathy:     She has no cervical adenopathy.      She has no axillary adenopathy.   Neurological: She is alert. She has normal reflexes. She displays no tremor. She displays no seizure activity.   Skin: Skin is warm, dry and intact.   Psychiatric: She has a normal mood and affect. Her speech is normal. Thought content normal. Cognition and memory are normal.         CRANIAL NERVES     CN III, IV, VI   Pupils are equal, round, and reactive to light.  Extraocular motions are normal.        Significant Labs:   BMP:   Recent Labs  Lab 04/22/18 2157   *   *   K 3.9   CL 97   CO2 23   BUN 7   CREATININE 0.9   CALCIUM 10.3     CBC:   Recent Labs  Lab 04/22/18 2157   WBC 10.68   HGB 12.6   HCT 38.2        CMP:   Recent Labs  Lab 04/22/18 2157   *   K 3.9   CL 97   CO2 23   *   BUN 7   CREATININE 0.9   CALCIUM 10.3   PROT 8.2   ALBUMIN 3.6   BILITOT 0.3   ALKPHOS 80   AST 71*   ALT 75*   ANIONGAP 14   EGFRNONAA >60     Lipid Panel: No results for input(s): CHOL, HDL, LDLCALC, TRIG, CHOLHDL in the last 48 hours.  TSH:   Recent Labs  Lab 12/27/17  1528   TSH 2.035     Urine Culture: No results for input(s): LABURIN in the last 48 hours.  Urine Studies:   Recent Labs  Lab 04/22/18 2157   COLORU Yellow   APPEARANCEUA Clear   PHUR 5.0   SPECGRAV 1.020   PROTEINUA Negative   GLUCUA 3+*   KETONESU Trace*   BILIRUBINUA Negative   OCCULTUA Negative   NITRITE Negative   UROBILINOGEN Negative   LEUKOCYTESUR Negative   BACTERIA Rare       Significant Imaging:   Imaging Results          CT Abdomen Pelvis With Contrast (Final result)  Result time 04/23/18 02:28:51    Final result by Mehul Fenton MD (04/23/18 02:28:51)                 Impression:      Nonspecific soft tissue stranding in the left lower abdominal quadrant adjacent to the sigmoid colon.  No evidence of diverticular disease to suggest acute diverticulitis.    Grossly stable appearance of ventral abdominal wall hernia and right lateral anterior abdominal wall hernia containing unobstructed  loops of small and large bowel.    Hepatic steatosis along with hepatomegaly.    Additional findings as above.      Electronically signed by: Mehul Fenton MD  Date:    04/23/2018  Time:    02:28             Narrative:    EXAMINATION:  CT ABDOMEN PELVIS WITH CONTRAST    CLINICAL HISTORY:  LLQ abd pain, Hx of hernia and repair;    TECHNIQUE:  Low dose axial images, sagittal and coronal reformations were obtained from the lung bases to the pubic symphysis following the IV administration of 100 mL of Omnipaque 350 and the oral administration of 15 mL of Omnipaque 350.    COMPARISON:  CT scan dated 11/28/2017 and pelvic ultrasound dated 04/22/2018.    FINDINGS:  There are no pleural effusions.  There is no evidence of a pneumothorax.  No airspace opacities are present.    The heart is unremarkable.  There is normal tapering of the abdominal aorta.  The abdominal aorta and its branch vessels are within normal limits.  The portal veins and mesenteric vessels are within normal limits.  The IVC the remainder of the venous structures are unremarkable.  There is no evidence of lymphadenopathy in the abdomen or pelvis.    The esophagus, stomach, and duodenum are within normal limits.  The small bowel loops are unremarkable.  The appendix is not visualized.  The cecum is herniated through a right lateral anterior abdominal wall hernia.  Similar findings were present on the prior examination.  Portion of the small bowel loops extend into the abdominal hernia.  There is no evidence of bowel obstruction.  There is also a large ventral abdominal wall hernia containing nonobstructed loops of small bowel.  The large bowel is otherwise unremarkable.    There is fatty infiltration of the liver.  The liver is also enlarged.  The gallbladder is unremarkable.  The biliary tree is within normal limits.  The spleen, pancreas, and adrenal glands are within normal limits.    The kidneys, ureters, and urinary bladder are within normal limits.   The uterus appears unremarkable.  There is stable appearance of soft tissue in the left adnexa.  The right ovary is not definitively identified.    There is no evidence of free fluid in the abdomen or pelvis.  There is no evidence of free air.  There is no evidence of pneumatosis.    The psoas margins are unremarkable.  There is a stable appearance of a ventral abdominal wall hernia as described above.                               US Pelvis Comp with Transvag NON-OB (xpd) (Final result)  Result time 04/22/18 23:29:11   Procedure changed from US Pelvis Complete Non OB     Final result by Mehul Fenton MD (04/22/18 23:29:11)                 Impression:      Difficult examination secondary to patient body habitus.  No definitive sonographic evidence of ovarian torsion.  However, torsion/detorsion physiology is not entirely excluded.  Suggest Obstetrics and Gynecology assessment.    Possible soft tissue in the right adnexa.  Outpatient MRI may be obtained to evaluate for residual right-sided ovarian tissue.      Electronically signed by: Mehul Fenton MD  Date:    04/22/2018  Time:    23:29             Narrative:    EXAMINATION:  US PELVIS COMP WITH TRANSVAG NON-OB (XPD)    CLINICAL HISTORY:  Hx of PCOS, ovarian tumor. Severe Left adnexal pain, sudden onset. Torsion?;    TECHNIQUE:  Transabdominal sonography of the pelvis was performed, followed by transvaginal sonography to better evaluate the uterus and ovaries.  The examination was technically difficult due to patient's body habitus.    COMPARISON:  Ultrasound dated 01/02/2018 and CT scan of the abdomen and pelvis dated 11/28/2017    FINDINGS:  The uterus measures 5.8 x 3.1 x 3.7 cm.  The endometrial stripe is within normal limits measuring 5.2 mm.  There are multiple Nabothian cysts in the cervix.    There is a 2.4 x 3.0 x 2.5 cm soft tissue in the right adnexa.  There is an adjacent cystic structure, which is difficult to delineate.  The left ovary measures 3.2 x  2.3 x 2.8 cm.  There is a left ovarian cyst measuring 1.1 cm in the greatest dimension.  Flow to both adnexa is the very difficult given patient body habitus.    No evidence of free fluid identified.                                  Assessment/Plan:     * Left lower quadrant pain    LL quad - report history of R ovary removal and appendectomy - CT abdomen significant for hernia and non-specific soft tissue stranding in the LLQ + stable appearance eo ventral abd wall hernia and R lateral anterior abdominal wall hernia unobstructed loops of small bowel and large bowel - Seen by surgery who does not think this is the source of the patient's problem. Her abdominal pain has been chronic for some time and she tells me that Dr. Estrada keeps telling her that he won't repair her hernia's. Patient has history of ventricle hernia repair - has anterior lateral hernia as well. Likely the source of her chronic abdominal complaints. Will try clear liquid and advance as tolerated. Other DD given her symptom of diarrhea and nausea is gastroenteritis but doubt as this is a chronic problem - has been worked up outpatient for cdiff and others that has been basically unrevealing - no documented diarrhea while in the hospital  Continue home protonix  Stop taking zantac        Type 2 diabetes mellitus without complication, with long-term current use of insulin    Hold home oral hyperglycemics if indicated - while hospitalized will use combined insulin therapy with basal and prandial insulin coverage, POCT glucose checks, hypoglycemic protocol and correction scale - HgA1c          Essential hypertension    Grossly stable BP - continue home lisinopril 2.5          VTE Risk Mitigation         Ordered     enoxaparin injection 40 mg  Daily      04/23/18 1427             DEMETRIA Felix, FNP-C  Hospitalist - Department of Hospital Medicine  [address], [place] [address]  Office [phone]; Pager  [phone]

## 2018-04-23 NOTE — PROGRESS NOTES
gen surg  Consult dictated [id number]  Sigmoid colitis-bowel rest, IV abx, if resolves will need cscope in month or two to determine etiology  eventual repair of recurrent RLQ inc hernia after weight loss-this does not appear to be causing her current symptoms  Thank you for consult  Will follow

## 2018-04-23 NOTE — HPI
Glenda Peña is a 27 y.o. with history SHX including appendectomy 2008, R ovary tumor removal 2008. Patient present with what she reports as a 2 month history of LLQ abdominal pain that is non-radiating. She reports that she has had multiple workup for abdominal pain. For the past 3-4 days she reports diarrhea 3-4 loose stools daily and one episode of vomiting NBNB. She endorses nausea without vomiting since that time. Patient reports normal Menstrual period that is regular and ended at the end of march. Important to mention is patient had recent GYN appointment and was RX ABX for bacterial vaginosis (unkn medications) which she reports completing.

## 2018-04-23 NOTE — PROGRESS NOTES
Patient is discharged. Telemetry monitor removed. IV taken out. Tip intact. Awaiting trasnportation

## 2018-04-23 NOTE — SUBJECTIVE & OBJECTIVE
"Past Medical History:   Diagnosis Date    Asthma childhood    Diabetes mellitus     GERD (gastroesophageal reflux disease)     Morbid obesity     PCOS (polycystic ovarian syndrome)        Past Surgical History:   Procedure Laterality Date    APPENDECTOMY  2011    HERNIA REPAIR      salpingoopherectomy Right 2008       Review of patient's allergies indicates:   Allergen Reactions    Shellfish containing products Hives     To hands    Latex, natural rubber Rash     Burning sensation       No current facility-administered medications on file prior to encounter.      Current Outpatient Prescriptions on File Prior to Encounter   Medication Sig    lisinopril (PRINIVIL,ZESTRIL) 2.5 MG tablet Take 1 tablet (2.5 mg total) by mouth once daily.    metFORMIN (GLUCOPHAGE) 500 MG tablet Take 2 tablets (1,000 mg total) by mouth 2 (two) times daily with meals.    ranitidine (ZANTAC) 75 MG tablet Take 75 mg by mouth 2 (two) times daily.    blood sugar diagnostic Strp To check BG one time daily, to use with insurance preferred meter    blood-glucose meter kit To check BG one time daily, to use with insurance preferred meter    clotrimazole-betamethasone 1-0.05% (LOTRISONE) cream Apply to affected area 2 times daily    lancets Misc To check BG one time daily, to use with insurance preferred meter    liraglutide 0.6 mg/0.1 mL, 18 mg/3 mL, subq PNIJ 0.6 mg/0.1 mL (18 mg/3 mL) PnIj Inject 1.8 mg into the skin once daily.    ondansetron (ZOFRAN-ODT) 8 MG TbDL Take 1 tablet (8 mg total) by mouth every 24 hours as needed (severe nausea).    pantoprazole (PROTONIX) 40 MG tablet Take 1 tablet (40 mg total) by mouth before breakfast.    pen needle, diabetic 31 gauge x 3/16" Ndle 1 Units by Misc.(Non-Drug; Combo Route) route once daily.     Family History     Problem Relation (Age of Onset)    Diabetes Mother    Heart attack Father, Mother    Hypertension Father        Social History Main Topics    Smoking status: Never " Smoker    Smokeless tobacco: Never Used    Alcohol use Yes      Comment: occasionally apple cider    Drug use: No    Sexual activity: Yes     Partners: Female     Review of Systems   Constitutional: Negative for appetite change, chills, diaphoresis and fever.        Body mass index is 53.51 kg/m².   HENT: Negative for congestion, hearing loss, sore throat, tinnitus and trouble swallowing.    Eyes: Negative for photophobia, discharge, itching and visual disturbance.   Respiratory: Negative for apnea, cough, wheezing and stridor.    Cardiovascular: Negative for chest pain, palpitations and leg swelling.   Gastrointestinal: Positive for abdominal pain, diarrhea, nausea and vomiting. Negative for abdominal distention, blood in stool and constipation.   Endocrine: Negative for polydipsia, polyphagia and polyuria.   Genitourinary: Positive for vaginal discharge. Negative for difficulty urinating, dysuria, flank pain and frequency.   Musculoskeletal: Negative for arthralgias, joint swelling and neck stiffness.   Skin: Negative for color change, rash and wound.   Neurological: Negative for dizziness, tremors, seizures, light-headedness, numbness and headaches.   Hematological: Negative for adenopathy.   Psychiatric/Behavioral: Negative for hallucinations and self-injury.     Objective:     Vital Signs (Most Recent):  Temp: 98.2 °F (36.8 °C) (04/23/18 1127)  Pulse: 82 (04/23/18 1127)  Resp: 20 (04/23/18 1127)  BP: (!) 115/53 (04/23/18 1127)  SpO2: (!) 93 % (04/23/18 1127) Vital Signs (24h Range):  Temp:  [98.2 °F (36.8 °C)-98.5 °F (36.9 °C)] 98.2 °F (36.8 °C)  Pulse:  [] 82  Resp:  [16-20] 20  SpO2:  [93 %-99 %] 93 %  BP: (105-146)/(53-84) 115/53     Weight: 132.7 kg (292 lb 8.8 oz)  Body mass index is 53.51 kg/m².    Physical Exam   Constitutional: She appears well-developed and well-nourished. She is cooperative.   HENT:   Head: Normocephalic and atraumatic.   Eyes: Conjunctivae, EOM and lids are normal. Pupils  are equal, round, and reactive to light.   Neck: Normal range of motion and full passive range of motion without pain. Neck supple. No JVD present. No edema present. No thyroid mass present.   Cardiovascular: Normal rate, S1 normal, S2 normal and intact distal pulses.    No murmur heard.  Pulmonary/Chest: Effort normal.   Abdominal: Soft. Bowel sounds are normal. She exhibits no distension and no abdominal bruit. There is no splenomegaly or hepatomegaly. There is no tenderness. There is no CVA tenderness.   Musculoskeletal: Normal range of motion.   Lymphadenopathy:     She has no cervical adenopathy.     She has no axillary adenopathy.   Neurological: She is alert. She has normal reflexes. She displays no tremor. She displays no seizure activity.   Skin: Skin is warm, dry and intact.   Psychiatric: She has a normal mood and affect. Her speech is normal. Thought content normal. Cognition and memory are normal.         CRANIAL NERVES     CN III, IV, VI   Pupils are equal, round, and reactive to light.  Extraocular motions are normal.        Significant Labs:   BMP:   Recent Labs  Lab 04/22/18 2157   *   *   K 3.9   CL 97   CO2 23   BUN 7   CREATININE 0.9   CALCIUM 10.3     CBC:   Recent Labs  Lab 04/22/18 2157   WBC 10.68   HGB 12.6   HCT 38.2        CMP:   Recent Labs  Lab 04/22/18 2157   *   K 3.9   CL 97   CO2 23   *   BUN 7   CREATININE 0.9   CALCIUM 10.3   PROT 8.2   ALBUMIN 3.6   BILITOT 0.3   ALKPHOS 80   AST 71*   ALT 75*   ANIONGAP 14   EGFRNONAA >60     Lipid Panel: No results for input(s): CHOL, HDL, LDLCALC, TRIG, CHOLHDL in the last 48 hours.  TSH:   Recent Labs  Lab 12/27/17  1528   TSH 2.035     Urine Culture: No results for input(s): LABURIN in the last 48 hours.  Urine Studies:   Recent Labs  Lab 04/22/18 2157   COLORU Yellow   APPEARANCEUA Clear   PHUR 5.0   SPECGRAV 1.020   PROTEINUA Negative   GLUCUA 3+*   KETONESU Trace*   BILIRUBINUA Negative   OCCULTUA  Negative   NITRITE Negative   UROBILINOGEN Negative   LEUKOCYTESUR Negative   BACTERIA Rare       Significant Imaging:   Imaging Results          CT Abdomen Pelvis With Contrast (Final result)  Result time 04/23/18 02:28:51    Final result by Mehul Fenton MD (04/23/18 02:28:51)                 Impression:      Nonspecific soft tissue stranding in the left lower abdominal quadrant adjacent to the sigmoid colon.  No evidence of diverticular disease to suggest acute diverticulitis.    Grossly stable appearance of ventral abdominal wall hernia and right lateral anterior abdominal wall hernia containing unobstructed loops of small and large bowel.    Hepatic steatosis along with hepatomegaly.    Additional findings as above.      Electronically signed by: Mehul Fenton MD  Date:    04/23/2018  Time:    02:28             Narrative:    EXAMINATION:  CT ABDOMEN PELVIS WITH CONTRAST    CLINICAL HISTORY:  LLQ abd pain, Hx of hernia and repair;    TECHNIQUE:  Low dose axial images, sagittal and coronal reformations were obtained from the lung bases to the pubic symphysis following the IV administration of 100 mL of Omnipaque 350 and the oral administration of 15 mL of Omnipaque 350.    COMPARISON:  CT scan dated 11/28/2017 and pelvic ultrasound dated 04/22/2018.    FINDINGS:  There are no pleural effusions.  There is no evidence of a pneumothorax.  No airspace opacities are present.    The heart is unremarkable.  There is normal tapering of the abdominal aorta.  The abdominal aorta and its branch vessels are within normal limits.  The portal veins and mesenteric vessels are within normal limits.  The IVC the remainder of the venous structures are unremarkable.  There is no evidence of lymphadenopathy in the abdomen or pelvis.    The esophagus, stomach, and duodenum are within normal limits.  The small bowel loops are unremarkable.  The appendix is not visualized.  The cecum is herniated through a right lateral anterior  abdominal wall hernia.  Similar findings were present on the prior examination.  Portion of the small bowel loops extend into the abdominal hernia.  There is no evidence of bowel obstruction.  There is also a large ventral abdominal wall hernia containing nonobstructed loops of small bowel.  The large bowel is otherwise unremarkable.    There is fatty infiltration of the liver.  The liver is also enlarged.  The gallbladder is unremarkable.  The biliary tree is within normal limits.  The spleen, pancreas, and adrenal glands are within normal limits.    The kidneys, ureters, and urinary bladder are within normal limits.  The uterus appears unremarkable.  There is stable appearance of soft tissue in the left adnexa.  The right ovary is not definitively identified.    There is no evidence of free fluid in the abdomen or pelvis.  There is no evidence of free air.  There is no evidence of pneumatosis.    The psoas margins are unremarkable.  There is a stable appearance of a ventral abdominal wall hernia as described above.                               US Pelvis Comp with Transvag NON-OB (xpd) (Final result)  Result time 04/22/18 23:29:11   Procedure changed from US Pelvis Complete Non OB     Final result by Mehul Fenton MD (04/22/18 23:29:11)                 Impression:      Difficult examination secondary to patient body habitus.  No definitive sonographic evidence of ovarian torsion.  However, torsion/detorsion physiology is not entirely excluded.  Suggest Obstetrics and Gynecology assessment.    Possible soft tissue in the right adnexa.  Outpatient MRI may be obtained to evaluate for residual right-sided ovarian tissue.      Electronically signed by: Mehul Fenton MD  Date:    04/22/2018  Time:    23:29             Narrative:    EXAMINATION:  US PELVIS COMP WITH TRANSVAG NON-OB (XPD)    CLINICAL HISTORY:  Hx of PCOS, ovarian tumor. Severe Left adnexal pain, sudden onset. Torsion?;    TECHNIQUE:  Transabdominal  sonography of the pelvis was performed, followed by transvaginal sonography to better evaluate the uterus and ovaries.  The examination was technically difficult due to patient's body habitus.    COMPARISON:  Ultrasound dated 01/02/2018 and CT scan of the abdomen and pelvis dated 11/28/2017    FINDINGS:  The uterus measures 5.8 x 3.1 x 3.7 cm.  The endometrial stripe is within normal limits measuring 5.2 mm.  There are multiple Nabothian cysts in the cervix.    There is a 2.4 x 3.0 x 2.5 cm soft tissue in the right adnexa.  There is an adjacent cystic structure, which is difficult to delineate.  The left ovary measures 3.2 x 2.3 x 2.8 cm.  There is a left ovarian cyst measuring 1.1 cm in the greatest dimension.  Flow to both adnexa is the very difficult given patient body habitus.    No evidence of free fluid identified.

## 2018-04-23 NOTE — CONSULTS
Consult is from Dr. Quezada to Dr. Gunn regarding left lower quadrant   abdominal pain.    HISTORY OF PRESENT ILLNESS:  A 27-year-old female who presents with several week   history of intermittent episodes of crampy left lower abdominal pain which   would first come and go, but returned yesterday and would not resolve.  She has   been having about six months of loose brown bowel movements.  She denies   hematochezia.  She also occasionally has episodes of nausea and vomiting, which   has been going on for a few months.  She denies fever or chills.  She has never   had upper or lower endoscopy.    PAST MEDICAL HISTORY:  Polycystic ovary, gastroesophageal reflux disease and   diabetes mellitus.    PAST SURGICAL HISTORY:  Appendectomy, right oophorectomy and laparoscopic hernia   repair.    ALLERGIES:  SHELLFISH.    SOCIAL HISTORY:  She does not smoke.  She socially consumes alcoholic beverages.    PHYSICAL EXAMINATION:  VITAL SIGNS:  Blood pressure 135/84, respiratory rate 20, pulse 95, temperature   98.2.  GENERAL:  Alert and oriented x4.  HEENT:  No cervical or supraclavicular masses.  LUNGS:  Clear to auscultation bilaterally.  CARDIOVASCULAR:  Regular rate and rhythm.  GENITOURINARY:  No inguinal or femoral hernia bilaterally.  ABDOMEN:  Obese, soft, positive bowel sounds, nondistended, moderate tenderness   to palpation left lower quadrant.  No peritoneal signs.  She has fullness in her   right lower quadrant at the site of her recurrent incisional hernia.    CT scan of the abdomen and pelvis revealed some stranding around the fat of her   sigmoid colon with no visualized diverticula and a recurrent hernia in her right   lower quadrant containing bowel.  There is no sign of mechanical obstruction or   bowel wall thickening in or around the hernia -- I have reviewed the films.    ASSESSMENT AND PLAN:  Left lower quadrant abdominal pain -- likely due to   colitis -- agree with admit bowel rest.  We will begin  intravenous antibiotics,   if resolves.  The patient should have lower endoscopy in the next month or two   to delineate the etiology, whether it be diverticulitis, inflammatory bowel   disease for another etiology, if she would fail to improve with antibiotics this   hospital stay could end up requiring sigmoid colectomy with an ostomy, also   will likely require repair of her recurrent right lower quadrant incisional   hernia, which does not appear to be causing her current symptoms, discussed with   the patient at length that she should lose a large amount of weight before   repair to maximize chances of successful repair and minimize chances of   recurrence and wound healing complications.  Thank you for the consult, we will   follow.      DANETTE/IN  dd: 04/23/2018 07:20:16 (CDT)  td: 04/23/2018 11:41:50 (CDT)  Doc ID   #[id number]  Job ID #[id number]    CC:

## 2018-04-23 NOTE — PROGRESS NOTES
[facility]    WRITTEN HEALTHCARE AND DISCHARGE INFORMATION    Follow-up Information     Adeel Soto MD On 5/1/2018.    Specialty:  Internal Medicine  Why:  @4:00pm, for Hospital Follow up  Contact information:  [place] [address]  [phone]                       Help at Home           [phone]  After discharge for assistance [facility] On Call Nurse Care Line 24/7  Assistance     Things You are responsible For To Manage Your Care At Home:  1.    Getting your prescriptions filled   2.    Taking your medications as directed, DO NOT MISS ANY DOSES!  3.    Going to your follow-up doctor appointment. This is important because it  allow the doctor to monitor your progress and determine if  any changes need to made to your treatment plan.     Thank you for choosing [facility] for your care.  Please answer any calls you may receive from [facility] we want to continue to support you as you manage your healthcare needs. [facility] is happy to have the opportunity to serve you.      Sincerely,  Your [facility] Team,  RUDY Mccabe, ACM-RN; [facility]  [phone]

## 2018-04-23 NOTE — DISCHARGE SUMMARY
[facility] Hospital Medicine  Discharge Summary      Patient Name: Glenda Peña  MRN: [id number]  Admission Date: 4/22/2018  Hospital Length of Stay: 0 days  Discharge Date and Time:  04/23/2018 5:16 PM  Attending Physician: Alka Quezada MD   Discharging Provider: CORIE Hall  Primary Care Provider: Adeel Soto MD      HPI:   Glenda Peña is a 27 y.o. with history SHX including appendectomy 2008, R ovary tumor removal 2008. Patient present with what she reports as a 2 month history of LLQ abdominal pain that is non-radiating. She reports that she has had multiple workup for abdominal pain. For the past 3-4 days she reports diarrhea 3-4 loose stools daily and one episode of vomiting NBNB. She endorses nausea without vomiting since that time. Patient reports normal Menstrual period that is regular and ended at the end of march. Important to mention is patient had recent GYN appointment and was RX ABX for bacterial vaginosis (unkn medications) which she reports completing.    * No surgery found *      Hospital Course:   Patient responded well to bowel rest and IV fluids. Seen by surgery who did not believe CT findings were the source of the patient's pain. She does have 2 hernias that she is aware of that are problematic to her for a long time. She tells me that Dr. Estrada refuses to do surgery unless she looses weight first. There is no obvious evidence of obstruction. Patient reports good BMs (NB) and her bowel sounds are positive X 4 quads. She has been able to tolerate her meal here without vomiting. No documented loose stools or diarrhea while hospitalized. I have reviewed the patients chart and find that the chronicity of the symptoms date back several months. She has seen GYN and primary care and has had extensive scanning. Surgery did note the importance of patient follow up with GI outpatient for colonoscopy. Her vitals are stable.     Consults:   Consults          Status Ordering Provider     Inpatient consult to General Surgery  Once     Provider:  Real Swartz III, MD    Acknowledged FIGUEROA EDMOND          No new Assessment & Plan notes have been filed under this hospital service since the last note was generated.  Service: Hospital Medicine    Final Active Diagnoses:    Diagnosis Date Noted POA    PRINCIPAL PROBLEM:  Left lower quadrant pain [R10.32] 04/23/2018 Yes    Type 2 diabetes mellitus without complication, with long-term current use of insulin [E11.9, Z79.4] 12/28/2017 Not Applicable    Essential hypertension [I10] 04/23/2018 Yes      Problems Resolved During this Admission:    Diagnosis Date Noted Date Resolved POA       Discharged Condition: stable    Disposition: Home or Self Care    Follow Up:  Follow-up Information     Adeel Soto MD On 5/1/2018.    Specialty:  Internal Medicine  Why:  @4:00pm, for Hospital Follow up  Contact information:  [address] [place] [id number] [phone]             Romero Baires MD.    Specialty:  Gastroenterology  Contact information:  [address] [place] [address]  [address]  [facility]  [place] [address] [phone]                 Patient Instructions:     Diet Cardiac     Activity as tolerated         Significant Diagnostic Studies: Labs:   CMP   Recent Labs  Lab 04/22/18  2157   *   K 3.9   CL 97   CO2 23   *   BUN 7   CREATININE 0.9   CALCIUM 10.3   PROT 8.2   ALBUMIN 3.6   BILITOT 0.3   ALKPHOS 80   AST 71*   ALT 75*   ANIONGAP 14   ESTGFRAFRICA >60   EGFRNONAA >60   , CBC   Recent Labs  Lab 04/22/18  2157   WBC 10.68   HGB 12.6   HCT 38.2      , INR   Lab Results   Component Value Date    INR 1.0 01/22/2012   , Lipid Panel   Lab Results   Component Value Date    CHOL 213 (H) 03/24/2017    HDL 29 (L) 03/24/2017    LDLCALC Invalid, Trig>400.0 03/24/2017    TRIG 424 (H) 03/24/2017    CHOLHDL 13.6 (L) 03/24/2017   , Troponin No results for  "input(s): TROPONINI in the last 168 hours. and A1C:   Recent Labs  Lab 12/27/17  1528 03/21/18  0708   HGBA1C 8.2* 9.8*       Pending Diagnostic Studies:     None         Medications:  Reconciled Home Medications:      Medication List      START taking these medications    metroNIDAZOLE 500 MG tablet  Commonly known as:  FLAGYL  Take 0.5 tablets (250 mg total) by mouth every 12 (twelve) hours.        CONTINUE taking these medications    blood sugar diagnostic Strp  To check BG one time daily, to use with insurance preferred meter     blood-glucose meter kit  To check BG one time daily, to use with insurance preferred meter     clotrimazole-betamethasone 1-0.05% cream  Commonly known as:  LOTRISONE  Apply to affected area 2 times daily     lancets Misc  To check BG one time daily, to use with insurance preferred meter     liraglutide 0.6 mg/0.1 mL (18 mg/3 mL) subq PNIJ 0.6 mg/0.1 mL (18 mg/3 mL) Pnij  Inject 1.8 mg into the skin once daily.     lisinopril 2.5 MG tablet  Commonly known as:  PRINIVIL,ZESTRIL  Take 1 tablet (2.5 mg total) by mouth once daily.     metFORMIN 500 MG tablet  Commonly known as:  GLUCOPHAGE  Take 2 tablets (1,000 mg total) by mouth 2 (two) times daily with meals.     ondansetron 8 MG Tbdl  Commonly known as:  ZOFRAN-ODT  Take 1 tablet (8 mg total) by mouth every 24 hours as needed (severe nausea).     pantoprazole 40 MG tablet  Commonly known as:  PROTONIX  Take 1 tablet (40 mg total) by mouth before breakfast.     pen needle, diabetic 31 gauge x 3/16" Ndle  1 Units by Misc.(Non-Drug; Combo Route) route once daily.     ranitidine 75 MG tablet  Commonly known as:  ZANTAC  Take 75 mg by mouth 2 (two) times daily.            Indwelling Lines/Drains at time of discharge:   Lines/Drains/Airways     Epidural Line                 Perineural Analgesia/Anesthesia Assessment (using dermatomes) Epidural 08/31/16 1111 600 days                Time spent on the discharge of patient: 45 minutes  Patient " was seen and examined on the date of discharge and determined to be suitable for discharge.         DEMETRIA Felix, FNP-C  Hospitalist - Department of Hospital Medicine  [address] [place] [address]  Office [phone]; Pager [phone]

## 2018-05-01 ENCOUNTER — OFFICE VISIT (OUTPATIENT)
Dept: FAMILY MEDICINE | Facility: CLINIC | Age: 28
End: 2018-05-01
Payer: COMMERCIAL

## 2018-05-01 VITALS
RESPIRATION RATE: 17 BRPM | BODY MASS INDEX: 53.02 KG/M2 | WEIGHT: 288.13 LBS | HEART RATE: 90 BPM | HEIGHT: 62 IN | TEMPERATURE: 99 F | OXYGEN SATURATION: 97 % | SYSTOLIC BLOOD PRESSURE: 130 MMHG | DIASTOLIC BLOOD PRESSURE: 80 MMHG

## 2018-05-01 DIAGNOSIS — R10.32 LLQ ABDOMINAL PAIN: Primary | ICD-10-CM

## 2018-05-01 DIAGNOSIS — Z79.4 TYPE 2 DIABETES MELLITUS WITHOUT COMPLICATION, WITH LONG-TERM CURRENT USE OF INSULIN: ICD-10-CM

## 2018-05-01 DIAGNOSIS — E66.01 MORBID OBESITY WITH BMI OF 50.0-59.9, ADULT: ICD-10-CM

## 2018-05-01 DIAGNOSIS — E11.9 TYPE 2 DIABETES MELLITUS WITHOUT COMPLICATION, WITH LONG-TERM CURRENT USE OF INSULIN: ICD-10-CM

## 2018-05-01 DIAGNOSIS — K43.2 VENTRAL INCISIONAL HERNIA WITHOUT OBSTRUCTION OR GANGRENE: Primary | ICD-10-CM

## 2018-05-01 PROCEDURE — 99214 OFFICE O/P EST MOD 30 MIN: CPT | Mod: S$GLB,,, | Performed by: INTERNAL MEDICINE

## 2018-05-01 PROCEDURE — 99999 PR PBB SHADOW E&M-EST. PATIENT-LVL III: CPT | Mod: PBBFAC,,, | Performed by: INTERNAL MEDICINE

## 2018-05-01 PROCEDURE — 3046F HEMOGLOBIN A1C LEVEL >9.0%: CPT | Mod: CPTII,S$GLB,, | Performed by: INTERNAL MEDICINE

## 2018-05-01 PROCEDURE — 3075F SYST BP GE 130 - 139MM HG: CPT | Mod: CPTII,S$GLB,, | Performed by: INTERNAL MEDICINE

## 2018-05-01 PROCEDURE — 3079F DIAST BP 80-89 MM HG: CPT | Mod: CPTII,S$GLB,, | Performed by: INTERNAL MEDICINE

## 2018-05-02 PROBLEM — K43.6 VENTRAL HERNIA WITH BOWEL OBSTRUCTION: Status: RESOLVED | Noted: 2018-02-28 | Resolved: 2018-05-02

## 2018-05-02 NOTE — PROGRESS NOTES
"Subjective:       Patient ID: Glenda Peña is a 27 y.o. female.    Chief Complaint: Hospital Follow Up    Hospital follow up    HPI: 26 y/o w/ morbid obesity DM surigcal ventral hernia presents after hospitalization for nausea and lower abdominal pain. Treated conservatively with bowel rest, had imaging concerning for distal colitis. She has seen GI today and planning colonscopy within next month. Abdominal pain has completely resolved moving bowels two to three times per day no melena or BRBPR. She had stopped victoza prior to onset of abdominal pain due to nausea. She continues on twice daily metformin. Not checking blood glucose at home.       Review of Systems   Constitutional: Negative for activity change, appetite change, fatigue, fever and unexpected weight change.   HENT: Negative for ear pain, rhinorrhea and sore throat.    Eyes: Negative for discharge and visual disturbance.   Respiratory: Negative for chest tightness, shortness of breath and wheezing.    Cardiovascular: Negative for chest pain, palpitations and leg swelling.   Gastrointestinal: Negative for abdominal pain, constipation and diarrhea.   Endocrine: Negative for cold intolerance and heat intolerance.   Genitourinary: Negative for dysuria and hematuria.   Musculoskeletal: Negative for joint swelling and neck stiffness.   Skin: Negative for rash.   Neurological: Negative for dizziness, syncope, weakness and headaches.   Psychiatric/Behavioral: Negative for suicidal ideas.       Objective:     Vitals:    05/01/18 1555   BP: 130/80   BP Location: Left arm   Patient Position: Sitting   BP Method: Large (Manual)   Pulse: 90   Resp: 17   Temp: 98.8 °F (37.1 °C)   TempSrc: Oral   SpO2: 97%   Weight: 130.7 kg (288 lb 2.3 oz)   Height: 5' 2" (1.575 m)          Physical Exam   Constitutional: She is oriented to person, place, and time. She appears well-developed and well-nourished.   HENT:   Head: Normocephalic and atraumatic.   Eyes: " Conjunctivae are normal. No scleral icterus.   Neck: Normal range of motion.   Cardiovascular: Normal rate and regular rhythm.  Exam reveals no gallop and no friction rub.    No murmur heard.  Pulmonary/Chest: Effort normal and breath sounds normal. She has no wheezes. She has no rales.   Abdominal: Soft. Bowel sounds are normal. There is no tenderness. There is no rebound and no guarding.   No tenderness to palpation   Musculoskeletal: Normal range of motion. She exhibits no edema or tenderness.   Lymphadenopathy:     She has no cervical adenopathy.   Neurological: She is alert and oriented to person, place, and time. No cranial nerve deficit.   Skin: Skin is warm and dry.   Psychiatric: She has a normal mood and affect.       Assessment and Plan   1. Ventral incisional hernia without obstruction or gangrene  No evidence of acute obstruction needs weight loss to improve recovery    2. Type 2 diabetes mellitus without complication, with long-term current use of insulin  Will likely need insulin therapy trial of GLP-1 exacerbated nausea. Will await completion of GI work up before addition of further medications    3. Morbid obesity with BMI of 50.0-59.9, adult  The patient is asked to make an attempt to improve diet and exercise patterns to aid in medical management of this problem.  One month f/u after cscope

## 2018-05-15 ENCOUNTER — HOSPITAL ENCOUNTER (OUTPATIENT)
Dept: RADIOLOGY | Facility: HOSPITAL | Age: 28
Discharge: HOME | End: 2018-05-15
Attending: INTERNAL MEDICINE
Payer: COMMERCIAL

## 2018-05-15 DIAGNOSIS — R10.32 LLQ ABDOMINAL PAIN: ICD-10-CM

## 2018-05-15 PROCEDURE — 76700 US EXAM ABDOM COMPLETE: CPT | Mod: 26,,, | Performed by: RADIOLOGY

## 2018-05-15 PROCEDURE — 76700 US EXAM ABDOM COMPLETE: CPT | Mod: TC

## 2018-06-21 ENCOUNTER — OFFICE VISIT (OUTPATIENT)
Dept: OBSTETRICS AND GYNECOLOGY | Facility: CLINIC | Age: 28
End: 2018-06-21
Payer: COMMERCIAL

## 2018-06-21 VITALS
WEIGHT: 282.31 LBS | HEIGHT: 62 IN | DIASTOLIC BLOOD PRESSURE: 63 MMHG | BODY MASS INDEX: 51.95 KG/M2 | SYSTOLIC BLOOD PRESSURE: 125 MMHG

## 2018-06-21 DIAGNOSIS — R10.2 PELVIC PAIN IN FEMALE: Primary | ICD-10-CM

## 2018-06-21 DIAGNOSIS — K43.9 VENTRAL HERNIA WITHOUT OBSTRUCTION OR GANGRENE: ICD-10-CM

## 2018-06-21 DIAGNOSIS — N76.1 CHRONIC VAGINITIS: ICD-10-CM

## 2018-06-21 DIAGNOSIS — E10.9 TYPE 1 DIABETES MELLITUS WITHOUT COMPLICATION: ICD-10-CM

## 2018-06-21 DIAGNOSIS — B37.31 CANDIDAL VULVITIS: ICD-10-CM

## 2018-06-21 PROCEDURE — 99213 OFFICE O/P EST LOW 20 MIN: CPT | Mod: S$GLB,,, | Performed by: OBSTETRICS & GYNECOLOGY

## 2018-06-21 PROCEDURE — 3078F DIAST BP <80 MM HG: CPT | Mod: CPTII,S$GLB,, | Performed by: OBSTETRICS & GYNECOLOGY

## 2018-06-21 PROCEDURE — 87480 CANDIDA DNA DIR PROBE: CPT

## 2018-06-21 PROCEDURE — 3074F SYST BP LT 130 MM HG: CPT | Mod: CPTII,S$GLB,, | Performed by: OBSTETRICS & GYNECOLOGY

## 2018-06-21 PROCEDURE — 3008F BODY MASS INDEX DOCD: CPT | Mod: CPTII,S$GLB,, | Performed by: OBSTETRICS & GYNECOLOGY

## 2018-06-21 PROCEDURE — 99999 PR PBB SHADOW E&M-EST. PATIENT-LVL III: CPT | Mod: PBBFAC,,, | Performed by: OBSTETRICS & GYNECOLOGY

## 2018-06-21 PROCEDURE — 87510 GARDNER VAG DNA DIR PROBE: CPT

## 2018-06-21 PROCEDURE — 3046F HEMOGLOBIN A1C LEVEL >9.0%: CPT | Mod: CPTII,S$GLB,, | Performed by: OBSTETRICS & GYNECOLOGY

## 2018-06-21 NOTE — PROGRESS NOTES
Subjective:      Chief Complaint:    Chief Complaint   Patient presents with    Pelvic Pain       Menstrual History:    OB History     No data available                             GR   0    Menarche age: 12     Patient's last menstrual period was 01/01/2018.            Objective:        History of Present Illness AND  Examination detailed DICTATE:       HISTORY OF PRESENT ILLNESS:  The patient is 27 years of age, nulligravida.  The   patient has been here in early February period for yearly followup and checkup.    The patient has a severe uncontrolled diabetes.  Also patient in early May was   in Emergency Room because of sudden onset of pelvic pain that spontaneously   resolved.  The patient had a CT scan of pelvis and ultrasound of pelvis, which   were all normal limits.  All the things shown up as a ventral hernia.  My   impression is that most likely the pain is due to possible strangulation of a   small piece of a bowel or omentum that is pushed in the hernia causing pain,   then it recedes.  Then pain disappears.  So my recommendations ought to be at   hernia repair.  Also patient when was here was found to have severe diabetic   vulvitis and vaginitis, bacteria and yeast.  The patient was given at that time   Lotrisone ointment  and bacterial vaginosis was treated.  The patient has   returned now for followup and exam.    PHYSICAL EXAMINATION:  VITAL SIGNS:  Blood pressure is 125/63 and weight 282.  ABDOMEN:  The hernia is palpable.  PELVIC:  External vulva is much, much better.  The inflammation of vulva is   substantially resolved.  Vagina now is clear.  Uterus normal size.  Adnexa   negative.  No pain elicited on examination.  Repeat cultures were obtained.    PLAN:  Repeat culture, and if it is indicated, we will treat, but on exam it   appears to be the infection has resolved.    RECOMMENDATION:  Also patient to follow up with her surgeon that she already has   been communicating with.      TAHIRA/HN   dd: 06/21/2018 15:51:11 (CDT)  td: 06/22/2018 03:58:47 (CDT)  Doc ID   #[id number]  Job ID #[id number]    CC:           Assessment:      Diagnosis:VENTRAL   HERNIA   VAGINITIS   PELVIC   PAIN   VULVITIS        Plan:      Return in 6  months

## 2018-06-22 LAB
CANDIDA RRNA VAG QL PROBE: NEGATIVE
G VAGINALIS RRNA GENITAL QL PROBE: POSITIVE
T VAGINALIS RRNA GENITAL QL PROBE: NEGATIVE

## 2018-06-23 ENCOUNTER — TELEPHONE (OUTPATIENT)
Dept: ADMINISTRATIVE | Facility: HOSPITAL | Age: 28
End: 2018-06-23

## 2018-06-25 ENCOUNTER — TELEPHONE (OUTPATIENT)
Dept: FAMILY MEDICINE | Facility: CLINIC | Age: 28
End: 2018-06-25

## 2018-06-25 NOTE — TELEPHONE ENCOUNTER
----- Message from Hollie Moss sent at 6/25/2018 11:56 AM CDT -----  Contact: Sharlene /Dr Real Dorman/[phone]  Calling TO get updated clinical notes due to Pt coming in tomorrow for surg.Need asap

## 2018-06-25 NOTE — TELEPHONE ENCOUNTER
Spoke with Jorge Alberto Christine re: med records and was informed that records are needed for consultation on tomorrow. Also spoke with pt and informed her that a release of medical records is needed.Suggested she come in ASAP to sign release.

## 2018-06-26 ENCOUNTER — OFFICE VISIT (OUTPATIENT)
Dept: FAMILY MEDICINE | Facility: CLINIC | Age: 28
End: 2018-06-26
Payer: COMMERCIAL

## 2018-06-26 VITALS
SYSTOLIC BLOOD PRESSURE: 116 MMHG | WEIGHT: 286.19 LBS | HEIGHT: 62 IN | HEART RATE: 89 BPM | TEMPERATURE: 98 F | BODY MASS INDEX: 52.66 KG/M2 | OXYGEN SATURATION: 97 % | RESPIRATION RATE: 12 BRPM | DIASTOLIC BLOOD PRESSURE: 84 MMHG

## 2018-06-26 DIAGNOSIS — K21.9 CHRONIC GERD: ICD-10-CM

## 2018-06-26 DIAGNOSIS — K43.2 RECURRENT VENTRAL HERNIA: Primary | ICD-10-CM

## 2018-06-26 PROCEDURE — 3008F BODY MASS INDEX DOCD: CPT | Mod: CPTII,S$GLB,, | Performed by: FAMILY MEDICINE

## 2018-06-26 PROCEDURE — 99999 PR PBB SHADOW E&M-EST. PATIENT-LVL IV: CPT | Mod: PBBFAC,,, | Performed by: FAMILY MEDICINE

## 2018-06-26 PROCEDURE — 3079F DIAST BP 80-89 MM HG: CPT | Mod: CPTII,S$GLB,, | Performed by: FAMILY MEDICINE

## 2018-06-26 PROCEDURE — 3074F SYST BP LT 130 MM HG: CPT | Mod: CPTII,S$GLB,, | Performed by: FAMILY MEDICINE

## 2018-06-26 PROCEDURE — 99213 OFFICE O/P EST LOW 20 MIN: CPT | Mod: S$GLB,,, | Performed by: FAMILY MEDICINE

## 2018-06-26 NOTE — PATIENT INSTRUCTIONS
Small Bowel Obstruction     Small bowel obstruction can lead to tissue damage and even tissue death.   A small bowel obstruction occurs when part or all of the small intestine (bowel) is blocked. As a result, digestive contents cant move through the bowel properly and out of the body. Treatment is needed right away to remove the blockage. This can ease painful symptoms. It can also prevent serious problems, such as tissue death or bursting (rupture) of the small bowel. Without treatment, a small bowel obstruction can be fatal.  Causes of small bowel obstruction  A small bowel obstruction can be caused by:  · Scar tissue (adhesions). These may form after belly (abdominal) surgery or an infection.  · Hernia. A hernia is when an organ pushes through a weak spot or tear in the abdomen wall. Part of the small bowel can push out and be seen as a bulge under the belly. Hernias can also occur internally.  · Certain health problems. These include when part of the bowel slides inside another part (intussusception). Other causes include irritable bowel disease such as Crohns disease, and inflammation and sores in the intestine (ulcerative colitis).  · Abnormal tissue growths (tumors). These can form on the inside or outside of the small bowel. They are usually due to cancer.  Symptoms of small bowel obstruction  Common symptoms include:  · Belly cramping and pain  · Belly swelling and bloating  · Upset stomach (nausea) and vomiting  · Can't  pass gas  · Can't pass stool (constipation)  · Diarrhea  Diagnosing small bowel obstruction  Your provider will ask about your symptoms and health history. Youll also have a physical exam. Tests may also be done to confirm the problem. These can include:  · Imaging tests. These provide pictures of the small bowel. Common tests include X-rays and a CT scan.  · Blood tests. These check for infection and other problems, such as excess fluid loss (dehydration).  · Upper GI  (gastrointestinal) series with a small bowel follow-through. This test takes X-rays of the upper digestive tract from the mouth through the small bowel. An X-ray dye (contrast fluid) is used. The dye coats the inside of your upper digestive tract so it will show up clearly on X-rays.  Treating small bowel obstruction  Treatment takes place in a hospital. As part of your care, the following may be done:  · No food or drink is given by mouth. This allows your bowels to rest.  · An IV (intravenous) line is placed in a vein in your arm or hand. The IV line is used to give fluids. It may also be used to give medicines. These may be needed to ease pain, nausea, and other symptoms. They may also be needed to treat or prevent infections.  · A soft, thin, flexible tube (nasogastric tube) is inserted through your nose and into your stomach. The tube is used to remove extra gas and fluid in your stomach and bowels. This helps to ease symptoms such as pain and swelling.  · In severe cases, surgery is done. This may be needed if the small bowel is almost or totally blocked, or there is a hole in the bowel (bowel perforation). During surgery, the blockage is removed. Parts of the bowel may also be removed if there is tissue death. Other repair may be done as well, depending on what caused the blockage. Your healthcare provider will give you more information about surgery, if needed.  · Youll be watched closely in the hospital until your symptoms improve. Your provider will tell you when you can go home.  Long-term concerns   After treatment, most people recover with no lasting effects. If a long part of the bowel is removed, there is a greater chance for lifelong digestive problems. Bowel movements may become irregular. Work with your provider to learn the best ways to manage any symptoms you may have, and to protect your health.  When to call your healthcare provider  Call your provider right away if you have any of the  following:  · Severe pain (Call 911)  · Belly swelling or cramping that wont go away  · Cant pass stool or gas  · Nausea or vomiting (especially if the vomit looks or smells like stool)   Date Last Reviewed: 7/1/2016  © 8218-1387 [employer]. [address] [place] [address], [place] [address]. All rights reserved. This information is not intended as a substitute for professional medical care. Always follow your healthcare professional's instructions.

## 2018-06-26 NOTE — PROGRESS NOTES
Chief Complaint   Patient presents with    Follow-up       HPI    Glenda Peña is 27 y.o. female. The primary encounter diagnosis was Recurrent ventral hernia. A diagnosis of Chronic GERD was also pertinent to this visit.    27-year-old female comes to clinic for follow up of abdominal pain.  Patient reports since her last visit she has had no improvement.  She reports that prior to her appointment today she suffered a severe episode of acid reflux follow by nausea and vomiting.  Patient reports that this continues to happen episodically throughout the day.  It is also accompanied by bloating and abdominal cramping/pain.  She denies changes in her bowel movement during this time.    Patient reports that she is due for follow up with her primary care doctor is Oliverio with general surgery who plans to repair a large hernia.    Review of Systems   Constitutional: Negative for activity change, chills, diaphoresis, fatigue and fever.   Respiratory: Negative for shortness of breath.    Cardiovascular: Negative for chest pain.   Gastrointestinal: Positive for abdominal distention, abdominal pain, nausea and vomiting. Negative for constipation and diarrhea.   Musculoskeletal: Negative for gait problem.   Psychiatric/Behavioral: Negative for suicidal ideas.           Current Outpatient Prescriptions:     blood sugar diagnostic Strp, To check BG one time daily, to use with insurance preferred meter, Disp: 30 each, Rfl: 5    blood-glucose meter kit, To check BG one time daily, to use with insurance preferred meter, Disp: 1 each, Rfl: 0    clotrimazole-betamethasone 1-0.05% (LOTRISONE) cream, Apply to affected area 2 times daily, Disp: 15 g, Rfl: 6    lancets [facility], To check BG one time daily, to use with insurance preferred meter, Disp: 30 each, Rfl: 5    metFORMIN (GLUCOPHAGE) 500 MG tablet, Take 2 tablets (1,000 mg total) by mouth 2 (two) times daily with meals., Disp: 120 tablet, Rfl: 2    ondansetron  "(ZOFRAN-ODT) 8 MG TbDL, Take 1 tablet (8 mg total) by mouth every 24 hours as needed (severe nausea)., Disp: 12 tablet, Rfl: 0    pantoprazole (PROTONIX) 40 MG tablet, Take 1 tablet (40 mg total) by mouth before breakfast., Disp: 60 tablet, Rfl: 2    pen needle, diabetic 31 gauge x 3/16" Ndle, 1 Units by Misc.(Non-Drug; Combo Route) route once daily., Disp: 100 each, Rfl: 1    ranitidine (ZANTAC) 75 MG tablet, Take 75 mg by mouth 2 (two) times daily., Disp: , Rfl:     lisinopril (PRINIVIL,ZESTRIL) 2.5 MG tablet, Take 1 tablet (2.5 mg total) by mouth once daily., Disp: 90 tablet, Rfl: 0      Blood pressure 116/84, pulse 89, temperature 98 °F (36.7 °C), temperature source Oral, resp. rate 12, height 5' 2" (1.575 m), weight 129.8 kg (286 lb 2.5 oz), last menstrual period 06/22/2018, SpO2 97 %.    Physical Exam   Constitutional: Vital signs are normal. She appears well-developed.   Cardiovascular: Normal heart sounds.    No murmur heard.  Pulmonary/Chest: Effort normal and breath sounds normal.   Psychiatric: She has a normal mood and affect. Her behavior is normal.       Office Visit on 06/21/2018   Component Date Value Ref Range Status    Trichomonas vaginalis 06/21/2018 Negative  Negative Final    Gardnerella vaginalis 06/21/2018 Positive* Negative Final    Candida sp 06/21/2018 Negative  Negative Final   Admission on 04/22/2018, Discharged on 04/23/2018   Component Date Value Ref Range Status    WBC 04/22/2018 10.68  3.90 - 12.70 K/uL Final    RBC 04/22/2018 4.56  4.00 - 5.40 M/uL Final    Hemoglobin 04/22/2018 12.6  12.0 - 16.0 g/dL Final    Hematocrit 04/22/2018 38.2  37.0 - 48.5 % Final    MCV 04/22/2018 84  82 - 98 fL Final    MCH 04/22/2018 27.6  27.0 - 31.0 pg Final    MCHC 04/22/2018 33.0  32.0 - 36.0 g/dL Final    RDW 04/22/2018 14.3  11.5 - 14.5 % Final    Platelets 04/22/2018 243  150 - 350 K/uL Final    MPV 04/22/2018 10.9  9.2 - 12.9 fL Final    Gran # (ANC) 04/22/2018 7.4  1.8 - 7.7 " K/uL Final    Lymph # 04/22/2018 2.5  1.0 - 4.8 K/uL Final    Mono # 04/22/2018 0.6  0.3 - 1.0 K/uL Final    Eos # 04/22/2018 0.1  0.0 - 0.5 K/uL Final    Baso # 04/22/2018 0.03  0.00 - 0.20 K/uL Final    Gran% 04/22/2018 69.1  38.0 - 73.0 % Final    Lymph% 04/22/2018 23.4  18.0 - 48.0 % Final    Mono% 04/22/2018 5.7  4.0 - 15.0 % Final    Eosinophil% 04/22/2018 1.1  0.0 - 8.0 % Final    Basophil% 04/22/2018 0.3  0.0 - 1.9 % Final    Differential Method 04/22/2018 Automated   Final    Sodium 04/22/2018 134* 136 - 145 mmol/L Final    Potassium 04/22/2018 3.9  3.5 - 5.1 mmol/L Final    Chloride 04/22/2018 97  95 - 110 mmol/L Final    CO2 04/22/2018 23  23 - 29 mmol/L Final    Glucose 04/22/2018 283* 70 - 110 mg/dL Final    BUN, Bld 04/22/2018 7  6 - 20 mg/dL Final    Creatinine 04/22/2018 0.9  0.5 - 1.4 mg/dL Final    Calcium 04/22/2018 10.3  8.7 - 10.5 mg/dL Final    Total Protein 04/22/2018 8.2  6.0 - 8.4 g/dL Final    Albumin 04/22/2018 3.6  3.5 - 5.2 g/dL Final    Total Bilirubin 04/22/2018 0.3  0.1 - 1.0 mg/dL Final    Alkaline Phosphatase 04/22/2018 80  55 - 135 U/L Final    AST 04/22/2018 71* 10 - 40 U/L Final    ALT 04/22/2018 75* 10 - 44 U/L Final    Anion Gap 04/22/2018 14  8 - 16 mmol/L Final    eGFR if African American 04/22/2018 >60  >60 mL/min/1.73 m^2 Final    eGFR if non African American 04/22/2018 >60  >60 mL/min/1.73 m^2 Final    Lipase 04/22/2018 68* 4 - 60 U/L Final    Specimen UA 04/22/2018 Urine, Clean Catch   Final    Color, UA 04/22/2018 Yellow  Yellow, Straw, Meghan Final    Appearance, UA 04/22/2018 Clear  Clear Final    pH, UA 04/22/2018 5.0  5.0 - 8.0 Final    Specific Gravity, UA 04/22/2018 1.020  1.005 - 1.030 Final    Protein, UA 04/22/2018 Negative  Negative Final    Glucose, UA 04/22/2018 3+* Negative Final    Ketones, UA 04/22/2018 Trace* Negative Final    Bilirubin (UA) 04/22/2018 Negative  Negative Final    Occult Blood UA 04/22/2018 Negative   Negative Final    Nitrite, UA 04/22/2018 Negative  Negative Final    Urobilinogen, UA 04/22/2018 Negative  <2.0 EU/dL Final    Leukocytes, UA 04/22/2018 Negative  Negative Final    POC Preg Test, Ur 04/22/2018 Negative  Negative Final     Acceptable 04/22/2018 Yes   Final    Bacteria, UA 04/22/2018 Rare  None-Occ /hpf Final    Yeast, UA 04/22/2018 None  None Final    Epithelial Casts, UA 04/22/2018 3* None /lpf Final    Microscopic Comment 04/22/2018 SEE COMMENT   Final    Trichomonas 04/23/2018 None  None Final    Clue Cells, Wet Prep 04/23/2018 None  None Final    Budding Yeast 04/23/2018 None  None Final    Fungal Hyphae 04/23/2018 None  None Final    WBC - Vaginal Screen 04/23/2018 None  None Final    Bacteria - Vaginal Screen 04/23/2018 Occasional* None Final    Wet Prep Source 04/23/2018 Vagina  None Final    Chlamydia, Amplified DNA 04/23/2018 Not Detected  Not Detected Final    N gonorrhoeae, amplified DNA 04/23/2018 Not Detected  Not Detected Final    POCT Glucose 04/23/2018 158* 70 - 110 mg/dL Final    Amylase 04/22/2018 38  20 - 110 U/L Final   Lab Visit on 04/16/2018   Component Date Value Ref Range Status    H. Pylori Antigen, Stool 04/16/2018 Not detected  Not detected Final    C. diff Antigen 04/16/2018 Negative  Negative Final    C difficile Toxins A+B, EIA 04/16/2018 Negative  Negative Final   ]    Assessment:    1. Recurrent ventral hernia    2. Chronic GERD          Glenda was seen today for follow-up.    Diagnoses and all orders for this visit:    Recurrent ventral hernia   -Stable. Suspect symptoms due to intermittent obstruction.  Proceed with hernia repair.    Chronic GERD   -Unchanged. Patient counseled to use PPI in morning before breakfast, H2 blocker BID prn symptoms.   -Avoid food triggers and continue to make lifestyle modifications.    Other orders  -     Cancel: (In Office Administered) Pneumococcal Polysaccharide Vaccine (23 Valent) (SQ/IM)  -      Cancel: (In Office Administered) Tdap Vaccine          FOLLOW UP: Follow-up if symptoms worsen or fail to improve.

## 2018-07-06 RX ORDER — LISINOPRIL 2.5 MG/1
2.5 TABLET ORAL DAILY
Qty: 90 TABLET | Refills: 0 | Status: SHIPPED | OUTPATIENT
Start: 2018-07-06 | End: 2019-08-19 | Stop reason: SDUPTHER

## 2018-07-23 ENCOUNTER — PATIENT MESSAGE (OUTPATIENT)
Dept: BARIATRICS | Facility: CLINIC | Age: 28
End: 2018-07-23

## 2018-08-08 ENCOUNTER — HOSPITAL ENCOUNTER (EMERGENCY)
Facility: HOSPITAL | Age: 28
Discharge: HOME | End: 2018-08-08
Attending: EMERGENCY MEDICINE
Payer: COMMERCIAL

## 2018-08-08 VITALS
HEART RATE: 77 BPM | DIASTOLIC BLOOD PRESSURE: 66 MMHG | SYSTOLIC BLOOD PRESSURE: 131 MMHG | BODY MASS INDEX: 51.53 KG/M2 | RESPIRATION RATE: 18 BRPM | WEIGHT: 280 LBS | OXYGEN SATURATION: 95 % | HEIGHT: 62 IN | TEMPERATURE: 99 F

## 2018-08-08 DIAGNOSIS — R11.2 NON-INTRACTABLE VOMITING WITH NAUSEA, UNSPECIFIED VOMITING TYPE: Primary | ICD-10-CM

## 2018-08-08 DIAGNOSIS — K21.9 GASTROESOPHAGEAL REFLUX DISEASE, ESOPHAGITIS PRESENCE NOT SPECIFIED: ICD-10-CM

## 2018-08-08 LAB
ALBUMIN SERPL BCP-MCNC: 4.3 G/DL
ALP SERPL-CCNC: 88 U/L
ALT SERPL W/O P-5'-P-CCNC: 108 U/L
AMORPH CRY URNS QL MICRO: NORMAL
ANION GAP SERPL CALC-SCNC: 16 MMOL/L
AST SERPL-CCNC: 111 U/L
B-HCG UR QL: NEGATIVE
B-OH-BUTYR BLD STRIP-SCNC: 0.1 MMOL/L
BACTERIA #/AREA URNS HPF: NORMAL /HPF
BASOPHILS # BLD AUTO: 0.01 K/UL
BASOPHILS NFR BLD: 0.1 %
BILIRUB SERPL-MCNC: 0.5 MG/DL
BILIRUB UR QL STRIP: NEGATIVE
BUN SERPL-MCNC: 10 MG/DL
CALCIUM SERPL-MCNC: 10 MG/DL
CHLORIDE SERPL-SCNC: 102 MMOL/L
CLARITY UR: CLEAR
CO2 SERPL-SCNC: 19 MMOL/L
COLOR UR: YELLOW
CREAT SERPL-MCNC: 0.9 MG/DL
CTP QC/QA: YES
DIFFERENTIAL METHOD: ABNORMAL
EOSINOPHIL # BLD AUTO: 0 K/UL
EOSINOPHIL NFR BLD: 0.4 %
ERYTHROCYTE [DISTWIDTH] IN BLOOD BY AUTOMATED COUNT: 14.5 %
EST. GFR  (AFRICAN AMERICAN): >60 ML/MIN/1.73 M^2
EST. GFR  (NON AFRICAN AMERICAN): >60 ML/MIN/1.73 M^2
GLUCOSE SERPL-MCNC: 258 MG/DL
GLUCOSE UR QL STRIP: ABNORMAL
HCT VFR BLD AUTO: 41.9 %
HGB BLD-MCNC: 13.5 G/DL
HGB UR QL STRIP: NEGATIVE
HYALINE CASTS #/AREA URNS LPF: 0 /LPF
KETONES UR QL STRIP: ABNORMAL
LEUKOCYTE ESTERASE UR QL STRIP: NEGATIVE
LIPASE SERPL-CCNC: 56 U/L
LYMPHOCYTES # BLD AUTO: 1.4 K/UL
LYMPHOCYTES NFR BLD: 13.7 %
MCH RBC QN AUTO: 27.6 PG
MCHC RBC AUTO-ENTMCNC: 32.2 G/DL
MCV RBC AUTO: 86 FL
MICROSCOPIC COMMENT: NORMAL
MONOCYTES # BLD AUTO: 0.7 K/UL
MONOCYTES NFR BLD: 6.4 %
NEUTROPHILS # BLD AUTO: 8.2 K/UL
NEUTROPHILS NFR BLD: 79.2 %
NITRITE UR QL STRIP: NEGATIVE
PH UR STRIP: 5 [PH] (ref 5–8)
PLATELET # BLD AUTO: 261 K/UL
PMV BLD AUTO: 10.6 FL
POCT GLUCOSE: 227 MG/DL (ref 70–110)
POCT GLUCOSE: 262 MG/DL (ref 70–110)
POTASSIUM SERPL-SCNC: 4.1 MMOL/L
PROT SERPL-MCNC: 9.7 G/DL
PROT UR QL STRIP: ABNORMAL
RBC # BLD AUTO: 4.9 M/UL
RBC #/AREA URNS HPF: 1 /HPF (ref 0–4)
SODIUM SERPL-SCNC: 137 MMOL/L
SP GR UR STRIP: 1.03 (ref 1–1.03)
SQUAMOUS #/AREA URNS HPF: 2 /HPF
URN SPEC COLLECT METH UR: ABNORMAL
UROBILINOGEN UR STRIP-ACNC: NEGATIVE EU/DL
WBC # BLD AUTO: 10.36 K/UL
WBC #/AREA URNS HPF: 1 /HPF (ref 0–5)
YEAST URNS QL MICRO: NORMAL

## 2018-08-08 PROCEDURE — 63600175 PHARM REV CODE 636 W HCPCS: Performed by: NURSE PRACTITIONER

## 2018-08-08 PROCEDURE — 82962 GLUCOSE BLOOD TEST: CPT

## 2018-08-08 PROCEDURE — 96374 THER/PROPH/DIAG INJ IV PUSH: CPT

## 2018-08-08 PROCEDURE — 25000003 PHARM REV CODE 250: Performed by: PHYSICIAN ASSISTANT

## 2018-08-08 PROCEDURE — 80053 COMPREHEN METABOLIC PANEL: CPT

## 2018-08-08 PROCEDURE — 81000 URINALYSIS NONAUTO W/SCOPE: CPT

## 2018-08-08 PROCEDURE — 81025 URINE PREGNANCY TEST: CPT | Performed by: NURSE PRACTITIONER

## 2018-08-08 PROCEDURE — 83690 ASSAY OF LIPASE: CPT

## 2018-08-08 PROCEDURE — 96361 HYDRATE IV INFUSION ADD-ON: CPT

## 2018-08-08 PROCEDURE — 82010 KETONE BODYS QUAN: CPT

## 2018-08-08 PROCEDURE — 99284 EMERGENCY DEPT VISIT MOD MDM: CPT | Mod: 25

## 2018-08-08 PROCEDURE — 25000003 PHARM REV CODE 250: Performed by: NURSE PRACTITIONER

## 2018-08-08 PROCEDURE — 85025 COMPLETE CBC W/AUTO DIFF WBC: CPT

## 2018-08-08 RX ORDER — ONDANSETRON 2 MG/ML
8 INJECTION INTRAMUSCULAR; INTRAVENOUS
Status: COMPLETED | OUTPATIENT
Start: 2018-08-08 | End: 2018-08-08

## 2018-08-08 RX ADMIN — SODIUM CHLORIDE 1000 ML: 0.9 INJECTION, SOLUTION INTRAVENOUS at 02:08

## 2018-08-08 RX ADMIN — LIDOCAINE HYDROCHLORIDE: 20 SOLUTION ORAL; TOPICAL at 05:08

## 2018-08-08 RX ADMIN — ONDANSETRON 8 MG: 2 INJECTION INTRAMUSCULAR; INTRAVENOUS at 02:08

## 2018-08-08 NOTE — ED PROVIDER NOTES
Encounter Date: 8/8/2018     This is a 28 y.o. female complaining of nausea, vomiting, diarrhea, and weakness. Symptoms began about 2 hours. 10+ episodes of vomiting. Denies significant abdominal pain. Hx of ventral hernia repair and appendectomy.    I have evaluated and conducted a medical screening exam with initial orders entered, if indicated, to expedite care. The patient will be placed in a treatment area when one is available. Care will be transferred to an alternate provider for a full assessment including but not limited to: history, physical exam, additional orders, and final disposition.    Dave Sanders NP         History   No chief complaint on file.    28-year-old female with past medical history PCOS, DM, morbid obesity, recurrent ventral hernia, right anterior lateral wall hernia, s/p R salpingo-oophorectomy, severe GERD, presents to ED complaining of nausea and vomiting began around noon today.  Patient states she woke with belching and acid taste in her mouth, around noon she began with emesis.  She admits to greater than 10 episodes nonbloody emesis, although she does admit to bilious emesis over the past few episodes. No urinary complaints. No flank pain. No vaginal complaints. LMP 2 weeks ago. No fever. No change in bowel or bladder habits. Denies significant abdominal pain. States ate meat wrapped in roblero for dinner. No alleviating/exacerbating factors. No radiation.          Review of patient's allergies indicates:   Allergen Reactions    Shellfish containing products Hives     To hands    Latex, natural rubber Rash     Burning sensation     Past Medical History:   Diagnosis Date    Asthma childhood    Diabetes mellitus     GERD (gastroesophageal reflux disease)     Morbid obesity     PCOS (polycystic ovarian syndrome)      Past Surgical History:   Procedure Laterality Date    APPENDECTOMY  2011    HERNIA REPAIR      salpingoopherectomy Right 2008     Family History   Problem  Relation Age of Onset    Heart attack Father     Hypertension Father     Diabetes Mother     Heart attack Mother      Social History   Substance Use Topics    Smoking status: Never Smoker    Smokeless tobacco: Never Used    Alcohol use Yes      Comment: occasionally apple cider     Review of Systems   Constitutional: Negative for chills and fever.   HENT: Negative for sore throat.    Eyes: Negative.    Respiratory: Negative for shortness of breath.    Cardiovascular: Negative for chest pain.   Gastrointestinal: Positive for nausea and vomiting. Negative for abdominal pain, constipation and diarrhea.   Endocrine: Negative.    Genitourinary: Negative for dysuria, flank pain, frequency, hematuria, pelvic pain and vaginal bleeding.   Musculoskeletal: Negative for back pain, neck pain and neck stiffness.   Skin: Negative for rash.   Neurological: Negative for dizziness, weakness, light-headedness and headaches.   Hematological: Does not bruise/bleed easily.   Psychiatric/Behavioral: Negative.    All other systems reviewed and are negative.      Physical Exam     Initial Vitals [08/08/18 1421]   BP Pulse Resp Temp SpO2   (!) 171/96 (!) 111 16 98.8 °F (37.1 °C) 98 %      MAP       --         Physical Exam    Nursing note and vitals reviewed.  Constitutional: She appears well-developed and well-nourished. She is not diaphoretic. No distress.   Well-appearing, nontoxic. Resting comfortably on exam table.   HENT:   Head: Normocephalic and atraumatic.   Eyes: Conjunctivae and EOM are normal. Pupils are equal, round, and reactive to light.   Neck: Normal range of motion. Neck supple. No tracheal deviation present.   Cardiovascular: Intact distal pulses.   Pulmonary/Chest: Breath sounds normal. No stridor. No respiratory distress. She has no wheezes.   Abdominal:   Abdomen overall soft, normal bowel sounds ×4.  No significant tenderness to palpation of any quadrant.  No rebound or guarding.  No palpable mass or  distention.  No flank or CVA tenderness.  Negative Martinez sign.  No pain over McBurney's point.   Lymphadenopathy:     She has no cervical adenopathy.   Neurological: She is alert and oriented to person, place, and time.   Skin: Skin is warm and dry. Capillary refill takes less than 2 seconds.   Psychiatric: She has a normal mood and affect. Her behavior is normal. Judgment and thought content normal.         ED Course   Procedures  Labs Reviewed   CBC W/ AUTO DIFFERENTIAL - Abnormal; Notable for the following:        Result Value    Gran # (ANC) 8.2 (*)     Gran% 79.2 (*)     Lymph% 13.7 (*)     All other components within normal limits   COMPREHENSIVE METABOLIC PANEL - Abnormal; Notable for the following:     CO2 19 (*)     Glucose 258 (*)     Total Protein 9.7 (*)      (*)      (*)     All other components within normal limits   URINALYSIS, REFLEX TO URINE CULTURE - Abnormal; Notable for the following:     Protein, UA 2+ (*)     Glucose, UA 3+ (*)     Ketones, UA 1+ (*)     All other components within normal limits    Narrative:     Preferred Collection Type->Urine, Clean Catch   POCT GLUCOSE - Abnormal; Notable for the following:     POCT Glucose 262 (*)     All other components within normal limits   POCT GLUCOSE - Abnormal; Notable for the following:     POCT Glucose 227 (*)     All other components within normal limits   LIPASE   URINALYSIS MICROSCOPIC    Narrative:     Preferred Collection Type->Urine, Clean Catch   BETA - HYDROXYBUTYRATE, SERUM   POCT URINE PREGNANCY   POCT GLUCOSE MONITORING CONTINUOUS          Imaging Results          X-Ray Abdomen Flat And Erect (Final result)  Result time 08/08/18 17:18:37    Final result by Jhonny Guzman Jr., MD (08/08/18 17:18:37)                 Impression:      No findings to a to indicate small bowel obstruction.    There are some dysraphism changes in the lower lumbar spine.      Electronically signed by: Jhonny Guzman  MD  Date:    08/08/2018  Time:    17:18             Narrative:    EXAMINATION:  XR ABDOMEN FLAT AND ERECT    CLINICAL HISTORY:  abdominal pain, r/o obstructive pattern;    TECHNIQUE:  Flat and erect AP views of the abdomen were performed.    COMPARISON:  None    FINDINGS:  There appears to be some this raphe changes in the lower lumbar spine.  Scattered bowel gas though no focally dilated loops of bowel.  No free intraperitoneal air.  No significant bowel dilatation.                                 Medical Decision Making:   Differential Diagnosis:   Small-bowel obstruction, gastroenteritis, GERD, PUD, UTI, colitis  ED Management:  27yo F with 2 abdominal hernias, with severe GERD, with excessive n/v. No significant abdominal pain. Overall well-appearing, nontoxic. Vitals reassuring.     Following up with general surgeon, however he will not repair recurrent ventral hernia until pt loses 50lbs. Urinalysis with evidence of mild dehydration. No leukocytosis. CMP without significant electrolyte disturbance. Beta wnl. Bicarb mildly decreased. I do not think this is an acute change. Repeat glucose improved.  Without significant ttp, now tolerating PO, low suspicion for SBO. Xray without obvious obstructive pattern. I do not feel need for CT at this time. I've encouraged vigorous rehydration, follow-up with GI. On reevaluation, pt feels improved, comfortable with d/c. I do feel she is safe and stable for d/c without further intervention. Return precautions given.  Other:   I have discussed this case with another health care provider.       <> Summary of the Discussion: Dr. Sosa              Attending Attestation:     Physician Attestation Statement for NP/PA:   I discussed this assessment and plan of this patient with the NP/PA, but I did not personally examine the patient. The face to face encounter was performed by the NP/PA.                     Clinical Impression:   The primary encounter diagnosis was  Non-intractable vomiting with nausea, unspecified vomiting type. A diagnosis of Gastroesophageal reflux disease, esophagitis presence not specified was also pertinent to this visit.      Disposition:   Disposition: Discharged  Condition: Stable                        Ishmael Dotson PA-C  08/08/18 [id number]

## 2018-08-08 NOTE — DISCHARGE INSTRUCTIONS
Continue current care. Follow-up with your gastroenterologist for further management. Return to this ED if you begin with fever, if unable to tolerate by mouth intake, if any other problems occur.

## 2018-11-04 ENCOUNTER — PATIENT MESSAGE (OUTPATIENT)
Dept: OBSTETRICS AND GYNECOLOGY | Facility: CLINIC | Age: 28
End: 2018-11-04

## 2018-11-14 ENCOUNTER — TELEPHONE (OUTPATIENT)
Dept: OBSTETRICS AND GYNECOLOGY | Facility: CLINIC | Age: 28
End: 2018-11-14

## 2018-11-14 NOTE — TELEPHONE ENCOUNTER
11/14/18 @ 1725 (TIFFANIE)  SPOKE WITH MS GAYE, APPT MADE FOR PT ON 11/21/18 @ 3422 TO SEE DR DEE ABOUT POSSIBLE B.V.      ----- Message from Jah Johnson sent at 11/14/2018  3:37 PM CST -----  Contact: Glenda [phone]  Patient is requesting a call back from the staff. She would like to speak to someone in regards to a reoccurring issue. Please call at your earliest convenience.

## 2018-11-28 ENCOUNTER — HOSPITAL ENCOUNTER (EMERGENCY)
Facility: HOSPITAL | Age: 28
Discharge: HOME | End: 2018-11-28
Attending: EMERGENCY MEDICINE
Payer: COMMERCIAL

## 2018-11-28 VITALS
DIASTOLIC BLOOD PRESSURE: 71 MMHG | OXYGEN SATURATION: 97 % | TEMPERATURE: 99 F | HEIGHT: 62 IN | SYSTOLIC BLOOD PRESSURE: 136 MMHG | BODY MASS INDEX: 51.16 KG/M2 | RESPIRATION RATE: 16 BRPM | WEIGHT: 278 LBS | HEART RATE: 98 BPM

## 2018-11-28 DIAGNOSIS — K43.2 INCISIONAL HERNIA, WITHOUT OBSTRUCTION OR GANGRENE: Primary | ICD-10-CM

## 2018-11-28 DIAGNOSIS — R19.7 DIARRHEA, UNSPECIFIED TYPE: ICD-10-CM

## 2018-11-28 DIAGNOSIS — R11.0 NAUSEA: ICD-10-CM

## 2018-11-28 DIAGNOSIS — R10.9 ABDOMINAL PAIN, UNSPECIFIED ABDOMINAL LOCATION: ICD-10-CM

## 2018-11-28 LAB
ALBUMIN SERPL BCP-MCNC: 3.7 G/DL
ALP SERPL-CCNC: 98 U/L
ALT SERPL W/O P-5'-P-CCNC: 67 U/L
ANION GAP SERPL CALC-SCNC: 13 MMOL/L
AST SERPL-CCNC: 50 U/L
B-HCG UR QL: NEGATIVE
BACTERIA #/AREA URNS HPF: NORMAL /HPF
BACTERIA GENITAL QL WET PREP: ABNORMAL
BASOPHILS # BLD AUTO: 0.01 K/UL
BASOPHILS NFR BLD: 0.1 %
BILIRUB SERPL-MCNC: 0.5 MG/DL
BILIRUB UR QL STRIP: NEGATIVE
BUN SERPL-MCNC: 9 MG/DL
CALCIUM SERPL-MCNC: 9.7 MG/DL
CHLORIDE SERPL-SCNC: 97 MMOL/L
CLARITY UR: ABNORMAL
CLUE CELLS VAG QL WET PREP: ABNORMAL
CO2 SERPL-SCNC: 24 MMOL/L
COLOR UR: YELLOW
CREAT SERPL-MCNC: 0.9 MG/DL
CTP QC/QA: YES
DIFFERENTIAL METHOD: NORMAL
EOSINOPHIL # BLD AUTO: 0.1 K/UL
EOSINOPHIL NFR BLD: 0.9 %
ERYTHROCYTE [DISTWIDTH] IN BLOOD BY AUTOMATED COUNT: 13.9 %
EST. GFR  (AFRICAN AMERICAN): >60 ML/MIN/1.73 M^2
EST. GFR  (NON AFRICAN AMERICAN): >60 ML/MIN/1.73 M^2
FILAMENT FUNGI VAG WET PREP-#/AREA: ABNORMAL
GLUCOSE SERPL-MCNC: 339 MG/DL
GLUCOSE UR QL STRIP: ABNORMAL
HCT VFR BLD AUTO: 40 %
HGB BLD-MCNC: 12.9 G/DL
HGB UR QL STRIP: NEGATIVE
HYALINE CASTS #/AREA URNS LPF: 0 /LPF
KETONES UR QL STRIP: ABNORMAL
LEUKOCYTE ESTERASE UR QL STRIP: ABNORMAL
LIPASE SERPL-CCNC: 38 U/L
LYMPHOCYTES # BLD AUTO: 2.2 K/UL
LYMPHOCYTES NFR BLD: 24.9 %
MCH RBC QN AUTO: 27.8 PG
MCHC RBC AUTO-ENTMCNC: 32.3 G/DL
MCV RBC AUTO: 86 FL
MICROSCOPIC COMMENT: NORMAL
MONOCYTES # BLD AUTO: 0.4 K/UL
MONOCYTES NFR BLD: 4.4 %
NEUTROPHILS # BLD AUTO: 6 K/UL
NEUTROPHILS NFR BLD: 69.7 %
NITRITE UR QL STRIP: NEGATIVE
PH UR STRIP: 5 [PH] (ref 5–8)
PLATELET # BLD AUTO: 203 K/UL
PMV BLD AUTO: 10.7 FL
POCT GLUCOSE: 328 MG/DL (ref 70–110)
POTASSIUM SERPL-SCNC: 3.7 MMOL/L
PROT SERPL-MCNC: 8.6 G/DL
PROT UR QL STRIP: ABNORMAL
RBC # BLD AUTO: 4.64 M/UL
RBC #/AREA URNS HPF: 0 /HPF (ref 0–4)
SODIUM SERPL-SCNC: 134 MMOL/L
SP GR UR STRIP: 1.03 (ref 1–1.03)
SPECIMEN SOURCE: ABNORMAL
SQUAMOUS #/AREA URNS HPF: NORMAL /HPF
T VAGINALIS GENITAL QL WET PREP: ABNORMAL
URN SPEC COLLECT METH UR: ABNORMAL
UROBILINOGEN UR STRIP-ACNC: NEGATIVE EU/DL
WBC # BLD AUTO: 8.64 K/UL
WBC #/AREA URNS HPF: 2 /HPF (ref 0–5)
WBC #/AREA VAG WET PREP: ABNORMAL
YEAST GENITAL QL WET PREP: ABNORMAL
YEAST URNS QL MICRO: NORMAL

## 2018-11-28 PROCEDURE — 96374 THER/PROPH/DIAG INJ IV PUSH: CPT

## 2018-11-28 PROCEDURE — 83690 ASSAY OF LIPASE: CPT

## 2018-11-28 PROCEDURE — 99285 EMERGENCY DEPT VISIT HI MDM: CPT | Mod: 25

## 2018-11-28 PROCEDURE — 80053 COMPREHEN METABOLIC PANEL: CPT

## 2018-11-28 PROCEDURE — 87210 SMEAR WET MOUNT SALINE/INK: CPT

## 2018-11-28 PROCEDURE — 87491 CHLMYD TRACH DNA AMP PROBE: CPT

## 2018-11-28 PROCEDURE — 96375 TX/PRO/DX INJ NEW DRUG ADDON: CPT

## 2018-11-28 PROCEDURE — 25500020 PHARM REV CODE 255: Performed by: EMERGENCY MEDICINE

## 2018-11-28 PROCEDURE — 96376 TX/PRO/DX INJ SAME DRUG ADON: CPT

## 2018-11-28 PROCEDURE — 81000 URINALYSIS NONAUTO W/SCOPE: CPT

## 2018-11-28 PROCEDURE — 85025 COMPLETE CBC W/AUTO DIFF WBC: CPT

## 2018-11-28 PROCEDURE — 63600175 PHARM REV CODE 636 W HCPCS: Performed by: EMERGENCY MEDICINE

## 2018-11-28 PROCEDURE — 81025 URINE PREGNANCY TEST: CPT | Performed by: EMERGENCY MEDICINE

## 2018-11-28 PROCEDURE — 25000003 PHARM REV CODE 250: Performed by: EMERGENCY MEDICINE

## 2018-11-28 RX ORDER — HYDROCODONE BITARTRATE AND ACETAMINOPHEN 5; 325 MG/1; MG/1
1 TABLET ORAL EVERY 4 HOURS PRN
Qty: 10 TABLET | Refills: 0 | Status: SHIPPED | OUTPATIENT
Start: 2018-11-28 | End: 2019-06-07

## 2018-11-28 RX ORDER — ONDANSETRON 4 MG/1
4 TABLET, FILM COATED ORAL EVERY 6 HOURS
Qty: 12 TABLET | Refills: 0 | Status: SHIPPED | OUTPATIENT
Start: 2018-11-28 | End: 2019-06-07

## 2018-11-28 RX ORDER — MORPHINE SULFATE 4 MG/ML
6 INJECTION, SOLUTION INTRAMUSCULAR; INTRAVENOUS
Status: COMPLETED | OUTPATIENT
Start: 2018-11-28 | End: 2018-11-28

## 2018-11-28 RX ORDER — CIPROFLOXACIN 500 MG/1
500 TABLET ORAL 2 TIMES DAILY
Qty: 14 TABLET | Refills: 0 | Status: SHIPPED | OUTPATIENT
Start: 2018-11-28 | End: 2018-12-05

## 2018-11-28 RX ORDER — ONDANSETRON 2 MG/ML
4 INJECTION INTRAMUSCULAR; INTRAVENOUS
Status: COMPLETED | OUTPATIENT
Start: 2018-11-28 | End: 2018-11-28

## 2018-11-28 RX ORDER — METRONIDAZOLE 500 MG/1
500 TABLET ORAL 3 TIMES DAILY
Qty: 21 TABLET | Refills: 0 | Status: SHIPPED | OUTPATIENT
Start: 2018-11-28 | End: 2018-12-05

## 2018-11-28 RX ADMIN — IOHEXOL 85 ML: 350 INJECTION, SOLUTION INTRAVENOUS at 10:11

## 2018-11-28 RX ADMIN — MORPHINE SULFATE 6 MG: 4 INJECTION INTRAVENOUS at 09:11

## 2018-11-28 RX ADMIN — SODIUM CHLORIDE 1000 ML: 0.9 INJECTION, SOLUTION INTRAVENOUS at 09:11

## 2018-11-28 RX ADMIN — ONDANSETRON 4 MG: 2 INJECTION INTRAMUSCULAR; INTRAVENOUS at 09:11

## 2018-11-28 RX ADMIN — MORPHINE SULFATE 6 MG: 4 INJECTION INTRAVENOUS at 12:11

## 2018-11-28 NOTE — DISCHARGE INSTRUCTIONS
You had a CT scan today with contrast.  You should not take year metformin for the next 48 hr.  After 48 hr, you may resume taking metformin.

## 2018-11-28 NOTE — ED TRIAGE NOTES
"Pt arrived to the ED via personal transport with c/o abdominal pain. Pt stated "that pain started on last night and had diarrhea with nausea that comes and goes". Pt denies chest pain/discomfort, SOB, vomiting, fever.  "

## 2018-11-28 NOTE — ED PROVIDER NOTES
"Encounter Date: 11/28/2018    SCRIBE #1 NOTE: I, Nicolás Joyce, am scribing for, and in the presence of,  Zina Gaxiola MD. I have scribed the following portions of the note - Other sections scribed: HPI and ROS.       History     Chief Complaint   Patient presents with    Abdominal Pain     "I have two hernias and they are hurting real bad" since last night. C/O left lower abd pain. + diarrhea, + nausea.  Denies any urinary complaint.     CC: Abdominal Pain    HPI: This is a 28 y.o. female PMHx morbid obesity, polycystic ovarian syndrome, GERD, DM and PSHx appendectomy, right salpingectomy, hernia repair who presents with non-radiating, waxing/waning abdominal pain which she relates to her 2 hernias. Describes pain as achy, stabbing. States her hernias are "protruding" and that they hurt with palpation. There is associated nausea and diarrhea. No emesis, urinary symptoms, fever, chills. No known medical allergies. She has had a previous hernia repair with mesh placed in 2016.     Patient also reports abnormal vagina itching but attributes this to BV. She was diagnosed with BV approximately 3 months ago and treated, states she gets itching but no discharge with BV.       The history is provided by the patient. No  was used.     Review of patient's allergies indicates:   Allergen Reactions    Shellfish containing products Hives     To hands    Latex, natural rubber Rash     Burning sensation     Past Medical History:   Diagnosis Date    Asthma childhood    Diabetes mellitus     GERD (gastroesophageal reflux disease)     Morbid obesity     PCOS (polycystic ovarian syndrome)      Past Surgical History:   Procedure Laterality Date    APPENDECTOMY  2011    HERNIA REPAIR      REPAIR-HERNIA-LAPAROSCOPIC-VENTRAL N/A 8/31/2016    Performed by Tunde Estrada [facility] at [facility]    salpingoopherectomy Right 2008     Family History   Problem Relation Age of Onset    Heart attack Father     " Hypertension Father     Diabetes Mother     Heart attack Mother      Social History     Tobacco Use    Smoking status: Never Smoker    Smokeless tobacco: Never Used   Substance Use Topics    Alcohol use: Yes     Comment: occasionally apple cider    Drug use: No     Review of Systems   Constitutional: Negative for chills, diaphoresis and fever.   HENT: Negative for rhinorrhea and sore throat.    Eyes: Negative for visual disturbance.   Respiratory: Negative for cough and shortness of breath.    Cardiovascular: Negative for chest pain.   Gastrointestinal: Positive for abdominal pain, diarrhea and nausea. Negative for constipation and vomiting.   Genitourinary: Negative for dysuria and vaginal discharge.        +vaginal itching   Musculoskeletal: Negative for arthralgias.   Skin: Negative for rash.   Allergic/Immunologic: Negative for immunocompromised state.   Neurological: Negative for weakness and numbness.   All other systems reviewed and are negative.      Physical Exam     Initial Vitals [11/28/18 0836]   BP Pulse Resp Temp SpO2   (!) 149/88 107 18 98.8 °F (37.1 °C) 98 %      MAP       --         Physical Exam    Constitutional: She appears well-developed and well-nourished. She is not diaphoretic. No distress.   Obese female   HENT:   Mouth/Throat: Oropharynx is clear and moist.   Eyes: Pupils are equal, round, and reactive to light.   Patient noted to have drooping of her right eyelid, she states she has a lazy eye and this is not new or changed.   Neck: Neck supple.   Cardiovascular: Normal rate and regular rhythm.   Pulmonary/Chest: Breath sounds normal.   Abdominal: Soft. Bowel sounds are normal. She exhibits no distension. There is tenderness (There is tenderness in the left lower quadrant without rigidity or guarding.).   There is a large incisional hernia located to the right lower quadrant superior to well-healed abdominal surgical scar.  There is no overlying skin changes noted.  This area is  tender to palpation.   Genitourinary: Vagina normal. Pelvic exam was performed with patient prone. Cervix exhibits discharge (scant white discharge). Cervix exhibits no motion tenderness and no friability. Right adnexum displays no tenderness. Left adnexum displays no mass and no tenderness. No vaginal discharge found.   Genitourinary Comments: Pelvic exam performed with RUDY Hawkins present as chaperone   Musculoskeletal: She exhibits no edema.   Neurological: She is alert and oriented to person, place, and time.   Skin: Skin is warm and dry.   Psychiatric: She has a normal mood and affect.         ED Course   Procedures  Labs Reviewed   COMPREHENSIVE METABOLIC PANEL - Abnormal; Notable for the following components:       Result Value    Sodium 134 (*)     Glucose 339 (*)     Total Protein 8.6 (*)     AST 50 (*)     ALT 67 (*)     All other components within normal limits   URINALYSIS, REFLEX TO URINE CULTURE - Abnormal; Notable for the following components:    Appearance, UA Hazy (*)     Protein, UA 2+ (*)     Glucose, UA 3+ (*)     Ketones, UA Trace (*)     Leukocytes, UA Trace (*)     All other components within normal limits    Narrative:     Preferred Collection Type->Urine, Clean Catch   VAGINAL SCREEN - Abnormal; Notable for the following components:    Bacteria - Vaginal Screen Few (*)     All other components within normal limits   POCT GLUCOSE - Abnormal; Notable for the following components:    POCT Glucose 328 (*)     All other components within normal limits   C. TRACHOMATIS/N. GONORRHOEAE BY AMP DNA   CBC W/ AUTO DIFFERENTIAL   LIPASE   URINALYSIS MICROSCOPIC    Narrative:     Preferred Collection Type->Urine, Clean Catch   POCT URINE PREGNANCY          Imaging Results          CT Abdomen Pelvis With Contrast (Final result)  Result time 11/28/18 10:47:12    Final result by Chet Barrett MD (11/28/18 10:47:12)                 Impression:      Large right lower quadrant ventral wall hernias containing  loops of small bowel and proximal colon, similar to the 04/23/2018 exam.  There is mild fat stranding/ inflammatory change and trace amount of fluid within the hernia sacs.  No evidence of obstruction, free air, or loculated fluid collection.    Markedly enlarged liver (30.5 cm) with extensive fatty change.    Splenomegaly.      Electronically signed by: Chet Barrett MD  Date:    11/28/2018  Time:    10:47             Narrative:    EXAMINATION:  CT ABDOMEN PELVIS WITH CONTRAST    CLINICAL HISTORY:  Hernia, complicated;    TECHNIQUE:  Low dose axial images, sagittal and coronal reformations were obtained from the lung bases to the pubic symphysis following the IV administration of 85 mL of Omnipaque 350 and the oral administration of 30 mL of Omnipaque 350.    COMPARISON:  None.    FINDINGS:  The liver is markedly enlarged measuring 30.5 cm.  There is extensive low attenuation consistent with fatty change.  No biliary or pancreatic ductal dilatation.  The gallbladder is grossly unremarkable.  There is a small focus of enhancement in the inferior right hepatic lobe, unchanged.    Spleen is enlarged measuring 15.5 cm in length.  Adrenal glands unremarkable.  Increased number of small periaortic lymph nodes.    No renal masses or hydronephrosis.  The abdominal aorta tapers normally.    The uterus, bladder, and adnexa are grossly unremarkable.    There are multiple right lower quadrant ventral wall hernias containing loops of small bowel and proximal colon.  There is mild scattered fat stranding/inflammatory change within the hernia sacs.  Trace amount of fluid in the central lower hernia sac.  No evidence of bowel obstruction.  No free air.  No loculated fluid collections.  Terminal ileum and appendix are unremarkable.    No mesenteric lymphadenopathy.  Subcentimeter subpleural pulmonary micro nodules in the lower lobes, unchanged.    Partial osseous fusion at L4-5.  There is prominent L3-4 and L5-S1 facet  arthropathy.                                 Medical Decision Making:   Initial Assessment:   28-year-old female presenting with abdominal pain, nausea, diarrhea.  Differential includes incarcerated versus strangulated hernia versus gastroenteritis versus diverticulitis versus UTI verses BV.  Workup initiated with labs, will get CT abdomen pelvis as patient has had multiple previous abdominal surgeries, will perform pelvic exam.  Will treat with IV fluids, Zofran and morphine.  ED Management:  Wet prep negative for Trichomonas, yeast or clue cells.  I reviewed laboratory findings as well as CT findings with patient.  Will start course of Cipro and Flagyl as there is some stranding noted around the colon in combination with some nausea and diarrhea.  I have given patient the information for general surgeon to follow up.  We reviewed strict return precautions including vomiting, fever, worsening or changing abdominal pain. This time, will discharge with referral to general surgery, medications as above.            Scribe Attestation:   Scribe #1: I performed the above scribed service and the documentation accurately describes the services I performed. I attest to the accuracy of the note.    Attending Attestation:           Physician Attestation for Scribe:  Physician Attestation Statement for Scribe #1: I, Zina Gaxiola MD, reviewed documentation, as scribed by Nicolás Cook in my presence, and it is both accurate and complete.                 ED Course as of Nov 28 1150   Wed Nov 28, 2018   1110 Patient re-examined.  There is still mild tenderness to palpation of the right lower quadrant.  With gentle pressure I do feel the hernia reduced, however does not stay EN.  CT scan does show loops of bowel in the right lower quadrant hernia with mild fat stranding noted.  There is no signs of obstruction or strangulation.  Patient does not have a white count, she is afebrile.  Urinalysis with some leukocytes.  Vaginal  screen negative for BV.  Labs concerning for hyperglycemia, patient given 1 L of fluids, will recheck.  She has seen Dr. Sladana and past for possible redo of incisional hernia, she was told that she would need to lose approximately 75 lb.  She says that she is trying to lose weight but it is difficult.  Given report of some diarrhea along with some stranding of the loops of bowel in the hernia, will cover for colitis.  [LH]      ED Course User Index  [LH] Zina Gaxiola MD     Clinical Impression:   The primary encounter diagnosis was Incisional hernia, without obstruction or gangrene. Diagnoses of Abdominal pain, unspecified abdominal location, Diarrhea, unspecified type, and Nausea were also pertinent to this visit.                             Zina Gaxiola MD  11/28/18 7708

## 2018-11-29 LAB
C TRACH DNA SPEC QL NAA+PROBE: NOT DETECTED
N GONORRHOEA DNA SPEC QL NAA+PROBE: NOT DETECTED

## 2018-12-20 ENCOUNTER — HOSPITAL ENCOUNTER (EMERGENCY)
Facility: HOSPITAL | Age: 28
Discharge: HOME | End: 2018-12-20
Attending: EMERGENCY MEDICINE
Payer: COMMERCIAL

## 2018-12-20 VITALS
WEIGHT: 272 LBS | HEART RATE: 98 BPM | HEIGHT: 62 IN | RESPIRATION RATE: 20 BRPM | BODY MASS INDEX: 50.05 KG/M2 | TEMPERATURE: 98 F | DIASTOLIC BLOOD PRESSURE: 67 MMHG | SYSTOLIC BLOOD PRESSURE: 135 MMHG | OXYGEN SATURATION: 96 %

## 2018-12-20 DIAGNOSIS — R10.9 ABDOMINAL PAIN, UNSPECIFIED ABDOMINAL LOCATION: Primary | ICD-10-CM

## 2018-12-20 DIAGNOSIS — K43.2 INCISIONAL HERNIA, WITHOUT OBSTRUCTION OR GANGRENE: ICD-10-CM

## 2018-12-20 LAB
ALBUMIN SERPL BCP-MCNC: 3.8 G/DL
ALP SERPL-CCNC: 90 U/L
ALT SERPL W/O P-5'-P-CCNC: 77 U/L
ANION GAP SERPL CALC-SCNC: 12 MMOL/L
AST SERPL-CCNC: 57 U/L
B-HCG UR QL: NEGATIVE
BACTERIA #/AREA URNS HPF: NORMAL /HPF
BASOPHILS # BLD AUTO: 0.02 K/UL
BASOPHILS NFR BLD: 0.2 %
BILIRUB SERPL-MCNC: 0.6 MG/DL
BILIRUB UR QL STRIP: NEGATIVE
BUN SERPL-MCNC: 8 MG/DL
CALCIUM SERPL-MCNC: 9.9 MG/DL
CHLORIDE SERPL-SCNC: 99 MMOL/L
CLARITY UR: CLEAR
CO2 SERPL-SCNC: 26 MMOL/L
COLOR UR: YELLOW
CREAT SERPL-MCNC: 0.8 MG/DL
CTP QC/QA: YES
DIFFERENTIAL METHOD: NORMAL
EOSINOPHIL # BLD AUTO: 0.1 K/UL
EOSINOPHIL NFR BLD: 0.9 %
ERYTHROCYTE [DISTWIDTH] IN BLOOD BY AUTOMATED COUNT: 14.1 %
EST. GFR  (AFRICAN AMERICAN): >60 ML/MIN/1.73 M^2
EST. GFR  (NON AFRICAN AMERICAN): >60 ML/MIN/1.73 M^2
GLUCOSE SERPL-MCNC: 216 MG/DL
GLUCOSE UR QL STRIP: ABNORMAL
HCT VFR BLD AUTO: 38.8 %
HGB BLD-MCNC: 12.7 G/DL
HGB UR QL STRIP: ABNORMAL
HYALINE CASTS #/AREA URNS LPF: 1 /LPF
KETONES UR QL STRIP: ABNORMAL
LACTATE SERPL-SCNC: 2 MMOL/L
LEUKOCYTE ESTERASE UR QL STRIP: ABNORMAL
LYMPHOCYTES # BLD AUTO: 3 K/UL
LYMPHOCYTES NFR BLD: 30.1 %
MCH RBC QN AUTO: 28 PG
MCHC RBC AUTO-ENTMCNC: 32.7 G/DL
MCV RBC AUTO: 86 FL
MICROSCOPIC COMMENT: NORMAL
MONOCYTES # BLD AUTO: 0.6 K/UL
MONOCYTES NFR BLD: 5.8 %
NEUTROPHILS # BLD AUTO: 6.3 K/UL
NEUTROPHILS NFR BLD: 63 %
NITRITE UR QL STRIP: NEGATIVE
PH UR STRIP: 5 [PH] (ref 5–8)
PLATELET # BLD AUTO: 215 K/UL
PMV BLD AUTO: 10.6 FL
POTASSIUM SERPL-SCNC: 3.8 MMOL/L
PROT SERPL-MCNC: 8.8 G/DL
PROT UR QL STRIP: ABNORMAL
RBC # BLD AUTO: 4.53 M/UL
RBC #/AREA URNS HPF: 1 /HPF (ref 0–4)
SODIUM SERPL-SCNC: 137 MMOL/L
SP GR UR STRIP: 1.03 (ref 1–1.03)
SQUAMOUS #/AREA URNS HPF: 5 /HPF
URN SPEC COLLECT METH UR: ABNORMAL
UROBILINOGEN UR STRIP-ACNC: NEGATIVE EU/DL
WBC # BLD AUTO: 10 K/UL
WBC #/AREA URNS HPF: 3 /HPF (ref 0–5)

## 2018-12-20 PROCEDURE — 96375 TX/PRO/DX INJ NEW DRUG ADDON: CPT

## 2018-12-20 PROCEDURE — 99284 EMERGENCY DEPT VISIT MOD MDM: CPT | Mod: 25

## 2018-12-20 PROCEDURE — 81025 URINE PREGNANCY TEST: CPT | Performed by: PHYSICIAN ASSISTANT

## 2018-12-20 PROCEDURE — 25500020 PHARM REV CODE 255: Performed by: EMERGENCY MEDICINE

## 2018-12-20 PROCEDURE — 96374 THER/PROPH/DIAG INJ IV PUSH: CPT

## 2018-12-20 PROCEDURE — 83605 ASSAY OF LACTIC ACID: CPT

## 2018-12-20 PROCEDURE — 63600175 PHARM REV CODE 636 W HCPCS: Performed by: PHYSICIAN ASSISTANT

## 2018-12-20 PROCEDURE — 80053 COMPREHEN METABOLIC PANEL: CPT

## 2018-12-20 PROCEDURE — 81000 URINALYSIS NONAUTO W/SCOPE: CPT

## 2018-12-20 PROCEDURE — 85025 COMPLETE CBC W/AUTO DIFF WBC: CPT

## 2018-12-20 RX ORDER — ONDANSETRON 4 MG/1
4 TABLET, ORALLY DISINTEGRATING ORAL EVERY 6 HOURS PRN
Qty: 20 TABLET | Refills: 0 | Status: SHIPPED | OUTPATIENT
Start: 2018-12-20 | End: 2019-06-07

## 2018-12-20 RX ORDER — METRONIDAZOLE 500 MG/1
500 TABLET ORAL 3 TIMES DAILY
Qty: 30 TABLET | Refills: 0 | Status: SHIPPED | OUTPATIENT
Start: 2018-12-20 | End: 2018-12-30

## 2018-12-20 RX ORDER — ONDANSETRON 2 MG/ML
4 INJECTION INTRAMUSCULAR; INTRAVENOUS
Status: COMPLETED | OUTPATIENT
Start: 2018-12-20 | End: 2018-12-20

## 2018-12-20 RX ORDER — OXYCODONE AND ACETAMINOPHEN 5; 325 MG/1; MG/1
1 TABLET ORAL EVERY 6 HOURS PRN
Qty: 12 TABLET | Refills: 0 | Status: SHIPPED | OUTPATIENT
Start: 2018-12-20 | End: 2019-06-07

## 2018-12-20 RX ORDER — CIPROFLOXACIN 500 MG/1
500 TABLET ORAL 2 TIMES DAILY
Qty: 20 TABLET | Refills: 0 | Status: SHIPPED | OUTPATIENT
Start: 2018-12-20 | End: 2018-12-30

## 2018-12-20 RX ORDER — MORPHINE SULFATE 4 MG/ML
4 INJECTION, SOLUTION INTRAMUSCULAR; INTRAVENOUS
Status: COMPLETED | OUTPATIENT
Start: 2018-12-20 | End: 2018-12-20

## 2018-12-20 RX ADMIN — ONDANSETRON 4 MG: 2 INJECTION INTRAMUSCULAR; INTRAVENOUS at 07:12

## 2018-12-20 RX ADMIN — MORPHINE SULFATE 4 MG: 4 INJECTION INTRAVENOUS at 07:12

## 2018-12-20 RX ADMIN — IOHEXOL 100 ML: 350 INJECTION, SOLUTION INTRAVENOUS at 08:12

## 2018-12-21 NOTE — ED TRIAGE NOTES
Pt. Reports LLQ abd pain that started on yesterday. Pt. Denies any vomiting, c/o nausea. C/o diarrhea on yesterday. Pt. States pain is squeezing and intense pain. Pain is 10/10, denies taking any medication for the pain.

## 2018-12-21 NOTE — ED PROVIDER NOTES
"Encounter Date: 12/20/2018    SCRIBE #1 NOTE: I, Jasper Puckett, am scribing for, and in the presence of,  Ishmael Dotson PA-C. I have scribed the following portions of the note - Other sections scribed: HPI, ROS.       History     Chief Complaint   Patient presents with    Abdominal Pain     " I have pain in my lower left abdomen and my abdomen is swollen" Pt denies urinary symptoms     CC: Abdominal Pain    HPI  The patient is a 29 y/o female with pmhx of asthma, DM, GERD, PCOS, and morbid obesity that presents for emergent evaluation of severe (10/10) lower abdominal pain with distention, nausea, and diarrhea that began 7219-3540 yesterday evening. She reports her symptoms are the same from her last visit, noting her history of abdominal hernias. Her pain is worse from the midline to RLQ. Her last BM was last night. She otherwise denies fever, chills, vomiting, melena, blood in stool, HA, weakness, numbness, or SOB.     Past Surgical HX: Appendectomy, hernia repairs, salpingoopherectomy       The history is provided by the patient. No  was used.     Review of patient's allergies indicates:   Allergen Reactions    Shellfish containing products Hives     To hands    Latex, natural rubber Rash     Burning sensation     Past Medical History:   Diagnosis Date    Asthma childhood    Diabetes mellitus     GERD (gastroesophageal reflux disease)     Morbid obesity     PCOS (polycystic ovarian syndrome)      Past Surgical History:   Procedure Laterality Date    APPENDECTOMY  2011    HERNIA REPAIR      REPAIR-HERNIA-LAPAROSCOPIC-VENTRAL N/A 8/31/2016    [facility]formed [facility]    salpingoopherectomy Right 2008     Family History   Problem Relation Age of Onset    Heart attack Father     Hypertension Father     Diabetes Mother     Heart attack Mother      Social History     Tobacco Use    Smoking status: Never Smoker    Smokeless tobacco: Never Used   Substance Use " Topics    Alcohol use: Yes     Comment: occasionally apple cider    Drug use: No     Review of Systems   Constitutional: Negative for appetite change, chills and fever.   HENT: Negative for congestion, rhinorrhea and sore throat.    Eyes: Negative for visual disturbance.   Respiratory: Negative for cough and shortness of breath.    Cardiovascular: Negative for chest pain.   Gastrointestinal: Positive for abdominal distention, abdominal pain, diarrhea and nausea. Negative for anal bleeding, blood in stool, constipation and vomiting.   Genitourinary: Negative for dysuria, flank pain and hematuria.   Musculoskeletal: Negative for back pain and neck pain.   Skin: Negative for rash.   Neurological: Negative for dizziness, syncope and weakness.   All other systems reviewed and are negative.      Physical Exam     Initial Vitals [12/20/18 1852]   BP Pulse Resp Temp SpO2   137/84 101 18 98.4 °F (36.9 °C) 97 %      MAP       --         Physical Exam    Nursing note and vitals reviewed.  Constitutional: She appears well-developed and well-nourished. She is not diaphoretic. No distress.   Well-appearing, nontoxic.  Uncomfortable.   HENT:   Head: Normocephalic and atraumatic.   Eyes: Conjunctivae and EOM are normal. Pupils are equal, round, and reactive to light.   Neck: Normal range of motion. Neck supple. No tracheal deviation present.   Cardiovascular: Intact distal pulses.   Pulmonary/Chest: Breath sounds normal. No stridor. No respiratory distress. She has no wheezes.   Abdominal:   Abdomen overall soft, normal bowel sounds ×4. TTP suprapubic, LLQ. There is large, midline lower abdomen hernia with exquisite ttp; unable to assess reducibility due to size. Well-healed horizontal RLQ scar. No rebound. Voluntary guarding to lower abdomen.  No flank or CVA tenderness.  Negative Martinez sign.  No pain over McBurney's point.   Musculoskeletal: Normal range of motion. She exhibits no tenderness.   Lymphadenopathy:     She has no  cervical adenopathy.   Neurological: She is alert and oriented to person, place, and time.   Skin: Skin is warm and dry. Capillary refill takes less than 2 seconds.   Psychiatric: She has a normal mood and affect. Her behavior is normal. Judgment and thought content normal.         ED Course   Procedures  Labs Reviewed   URINALYSIS, REFLEX TO URINE CULTURE - Abnormal; Notable for the following components:       Result Value    Protein, UA 2+ (*)     Glucose, UA 1+ (*)     Ketones, UA Trace (*)     Occult Blood UA 2+ (*)     Leukocytes, UA Trace (*)     All other components within normal limits    Narrative:     Preferred Collection Type->Urine, Clean Catch   COMPREHENSIVE METABOLIC PANEL - Abnormal; Notable for the following components:    Glucose 216 (*)     Total Protein 8.8 (*)     AST 57 (*)     ALT 77 (*)     All other components within normal limits   CBC W/ AUTO DIFFERENTIAL   LACTIC ACID, PLASMA   URINALYSIS MICROSCOPIC    Narrative:     Preferred Collection Type->Urine, Clean Catch   POCT URINE PREGNANCY          Imaging Results          CT Abdomen Pelvis With Contrast (Final result)  Result time 12/20/18 21:30:45    Final result by Andrea Bejarano MD (12/20/18 21:30:45)                 Impression:      1. Continued large right lower quadrant ventral wall hernias containing loops of small bowel and proximal colon with mild stranding of the inferior-most associated mesentery that may represent scarring, overall similar to most recent exam.  No evidence of obstruction, free air or loculated fluid collection.  2. Continued subcutaneous stranding of the overlying ventral wall with interval skin thickening/induration that may represent a nonspecific cellulitis.  Correlate clinically.  3. Hepatomegaly and hepatic steatosis.  4. Splenomegaly.      Electronically signed by: Andrea Bejarano MD  Date:    12/20/2018  Time:    21:30             Narrative:    EXAMINATION:  CT ABDOMEN PELVIS WITH CONTRAST    CLINICAL  HISTORY:  RLQ herniea. R/O obstruction;    TECHNIQUE:  Low dose axial images, sagittal and coronal reformations were obtained from the lung bases to the pubic symphysis following the IV administration of 100 mL of Omnipaque 350 .  Oral contrast was not given.    COMPARISON:  CT abdomen and pelvis 11/28/2018    FINDINGS:  Lung bases show a few scattered linear opacities consistent with subsegmental scarring versus atelectasis and a small simple appearing pulmonary cyst within the left lower lobe, without focal consolidation.  Base of the heart is within normal limits.    Liver remains enlarged with diffuse parenchymal hypoattenuation and peripheral sparing consistent with fatty infiltration.  Spleen is enlarged without focal process seen.  Gallbladder, pancreas, stomach, duodenum and bilateral adrenal glands are within normal limits.  Small accessory splenule noted.  No biliary ductal dilatation.    Bilateral kidneys are normal in size, shape and location with symmetric enhancement.  No hydronephrosis or significant perinephric stranding.  Bilateral ureters are within normal limits.  Urinary bladder is within normal limits.  Uterus is within normal limits.  There is a 2.9 cm well-circumscribed oval-shaped fluid density structure along the superior aspect of the left adnexal region suggestive of a simple cyst.  No right adnexal mass seen.  A few punctate pelvic phleboliths noted.    No ascites, free air or lymphadenopathy.  Aorta is within normal limits.    Redemonstration of multiple right lower quadrant ventral wall hernias containing loops of small bowel and proximal colon with mild stranding of the fat along the more inferior aspect grossly similar in configuration to most recent prior.  There is mild overlying subcutaneous fat stranding and skin thickening which may reflect cellulitis, but no organized fluid collection or subcutaneous gas foci.  No associated bowel obstruction.  No convincing evidence for bowel  inflammation.  Appendix is not identified; however, no pericecal inflammatory change.  Terminal ileum is within normal limits.  No pneumatosis or portal venous gas.    Osseous structures appear stable without acute process seen.                                 Medical Decision Making:   Differential Diagnosis:   Bowel obstruction, chronic abdominal pain, constipation  Clinical Tests:   Lab Tests: Ordered and Reviewed  Radiological Study: Ordered and Reviewed  ED Management:  19-year-old female, NIDDM, obesity, with history of surgical ventral hernia with lower abdominal pain x2 days.  Patient states she has had pain like this before, however pain is radiating from left right which is atypical.  Nausea without emesis.  No vaginal complaints. No fever.  Seen on 11/28/2018 with similar presentation, treated for possible distal colitis.  She admits symptoms had resolved, however acutely worsened yesterday.  There is a large midline lower abdomen hernia with exquisite tenderness.  There is tenderness to the left lower quadrant as well.  There is voluntary guarding.  No rebound. No obvious peritoneal signs. Labs pending, pain control, CT to rule out obstruction.    CT without obstruction. There is some stranding about mesentery; may be some induration of the overlying skin, however I see no evidence of infectious process on my evaluation. Given continued stranding, acute pain, will empirically cover with cipro/flagyl. Pt will f/u with general surgery. She has a surgeon, however he won't intervene until patient loses weight. I feel she can be safely discharged without further intervention. Return precautions given.             Scribe Attestation:   Scribe #1: I performed the above scribed service and the documentation accurately describes the services I performed. I attest to the accuracy of the note.    Attending Attestation:           Physician Attestation for Scribe:  Physician Attestation Statement for Scribe #1: I,  Ishmael Dotson PA-C, reviewed documentation, as scribed by Jasper Puckett in my presence, and it is both accurate and complete.                    Clinical Impression:   The primary encounter diagnosis was Abdominal pain, unspecified abdominal location. A diagnosis of Incisional hernia, without obstruction or gangrene was also pertinent to this visit.      Disposition:   Disposition: Discharged  Condition: Stable                        Ishmael Dotson PA-C  12/21/18 0100

## 2018-12-21 NOTE — DISCHARGE INSTRUCTIONS
Take all medications as prescribed.  Try to take with meals to limit nausea.  Zofran for nausea.  Percocet for pain. Follow up with General surgery.  Return to this ED if unable to tolerate by mouth intake, if pain persists or worsens despite treatment, if you begin with fever, if any other problems occur.

## 2019-01-30 DIAGNOSIS — E11.9 TYPE 2 DIABETES MELLITUS WITHOUT COMPLICATION, WITHOUT LONG-TERM CURRENT USE OF INSULIN: ICD-10-CM

## 2019-01-30 RX ORDER — METFORMIN HYDROCHLORIDE 500 MG/1
1000 TABLET ORAL 2 TIMES DAILY WITH MEALS
Qty: 120 TABLET | Refills: 0 | Status: SHIPPED | OUTPATIENT
Start: 2019-01-30 | End: 2019-02-07

## 2019-02-07 DIAGNOSIS — E11.9 TYPE 2 DIABETES MELLITUS WITHOUT COMPLICATION, WITHOUT LONG-TERM CURRENT USE OF INSULIN: ICD-10-CM

## 2019-02-07 RX ORDER — METFORMIN HYDROCHLORIDE 500 MG/1
TABLET ORAL
Qty: 120 TABLET | Refills: 0 | Status: SHIPPED | OUTPATIENT
Start: 2019-02-07 | End: 2019-04-18 | Stop reason: SDUPTHER

## 2019-03-30 ENCOUNTER — OFFICE VISIT (OUTPATIENT)
Dept: URGENT CARE | Facility: CLINIC | Age: 29
End: 2019-03-30
Payer: COMMERCIAL

## 2019-03-30 VITALS
OXYGEN SATURATION: 98 % | BODY MASS INDEX: 48.76 KG/M2 | HEIGHT: 62 IN | WEIGHT: 265 LBS | RESPIRATION RATE: 18 BRPM | HEART RATE: 87 BPM | TEMPERATURE: 99 F | DIASTOLIC BLOOD PRESSURE: 88 MMHG | SYSTOLIC BLOOD PRESSURE: 139 MMHG

## 2019-03-30 DIAGNOSIS — J02.9 SORE THROAT: ICD-10-CM

## 2019-03-30 DIAGNOSIS — J01.00 ACUTE NON-RECURRENT MAXILLARY SINUSITIS: Primary | ICD-10-CM

## 2019-03-30 LAB
CTP QC/QA: YES
S PYO RRNA THROAT QL PROBE: NEGATIVE

## 2019-03-30 PROCEDURE — 3075F PR MOST RECENT SYSTOLIC BLOOD PRESS GE 130-139MM HG: ICD-10-PCS | Mod: CPTII,S$GLB,, | Performed by: SURGERY

## 2019-03-30 PROCEDURE — 87880 STREP A ASSAY W/OPTIC: CPT | Mod: QW,S$GLB,, | Performed by: SURGERY

## 2019-03-30 PROCEDURE — 3079F PR MOST RECENT DIASTOLIC BLOOD PRESSURE 80-89 MM HG: ICD-10-PCS | Mod: CPTII,S$GLB,, | Performed by: SURGERY

## 2019-03-30 PROCEDURE — 3079F DIAST BP 80-89 MM HG: CPT | Mod: CPTII,S$GLB,, | Performed by: SURGERY

## 2019-03-30 PROCEDURE — 3008F BODY MASS INDEX DOCD: CPT | Mod: CPTII,S$GLB,, | Performed by: SURGERY

## 2019-03-30 PROCEDURE — 87880 POCT RAPID STREP A: ICD-10-PCS | Mod: QW,S$GLB,, | Performed by: SURGERY

## 2019-03-30 PROCEDURE — 3008F PR BODY MASS INDEX (BMI) DOCUMENTED: ICD-10-PCS | Mod: CPTII,S$GLB,, | Performed by: SURGERY

## 2019-03-30 PROCEDURE — 96372 THER/PROPH/DIAG INJ SC/IM: CPT | Mod: S$GLB,,, | Performed by: SURGERY

## 2019-03-30 PROCEDURE — 99214 OFFICE O/P EST MOD 30 MIN: CPT | Mod: 25,S$GLB,, | Performed by: SURGERY

## 2019-03-30 PROCEDURE — 96372 PR INJECTION,THERAP/PROPH/DIAG2ST, IM OR SUBCUT: ICD-10-PCS | Mod: S$GLB,,, | Performed by: SURGERY

## 2019-03-30 PROCEDURE — 3075F SYST BP GE 130 - 139MM HG: CPT | Mod: CPTII,S$GLB,, | Performed by: SURGERY

## 2019-03-30 PROCEDURE — 99214 PR OFFICE/OUTPT VISIT, EST, LEVL IV, 30-39 MIN: ICD-10-PCS | Mod: 25,S$GLB,, | Performed by: SURGERY

## 2019-03-30 RX ORDER — AZELASTINE 1 MG/ML
2 SPRAY, METERED NASAL 2 TIMES DAILY
Qty: 30 ML | Refills: 0 | Status: SHIPPED | OUTPATIENT
Start: 2019-03-30 | End: 2019-06-07

## 2019-03-30 RX ORDER — BETAMETHASONE SODIUM PHOSPHATE AND BETAMETHASONE ACETATE 3; 3 MG/ML; MG/ML
6 INJECTION, SUSPENSION INTRA-ARTICULAR; INTRALESIONAL; INTRAMUSCULAR; SOFT TISSUE
Status: COMPLETED | OUTPATIENT
Start: 2019-03-30 | End: 2019-03-30

## 2019-03-30 RX ORDER — AMOXICILLIN AND CLAVULANATE POTASSIUM 875; 125 MG/1; MG/1
1 TABLET, FILM COATED ORAL 2 TIMES DAILY
Qty: 14 TABLET | Refills: 0 | Status: SHIPPED | OUTPATIENT
Start: 2019-03-30 | End: 2019-04-06

## 2019-03-30 RX ORDER — NAPROXEN 500 MG/1
500 TABLET ORAL 2 TIMES DAILY PRN
Qty: 20 TABLET | Refills: 0 | Status: SHIPPED | OUTPATIENT
Start: 2019-03-30 | End: 2019-04-09

## 2019-03-30 RX ADMIN — BETAMETHASONE SODIUM PHOSPHATE AND BETAMETHASONE ACETATE 6 MG: 3; 3 INJECTION, SUSPENSION INTRA-ARTICULAR; INTRALESIONAL; INTRAMUSCULAR; SOFT TISSUE at 10:03

## 2019-03-30 NOTE — PROGRESS NOTES
"Subjective:       Patient ID: Glenda Peña is a 28 y.o. female.    Vitals:  height is 5' 2" (1.575 m) and weight is 120.2 kg (265 lb). Her temperature is 99.4 °F (37.4 °C). Her blood pressure is 139/88 and her pulse is 87. Her respiration is 18 and oxygen saturation is 98%.     Chief Complaint: URI    URI    This is a new problem. Episode onset: 4-5 days. The problem has been gradually worsening. There has been no fever. Associated symptoms include congestion, coughing, ear pain, a plugged ear sensation, rhinorrhea, sinus pain and a sore throat. Pertinent negatives include no nausea, rash, vomiting or wheezing. She has tried nothing for the symptoms. The treatment provided no relief.       Constitution: Negative for chills, sweating, fatigue and fever.   HENT: Positive for ear pain, congestion, postnasal drip, sinus pain, sinus pressure and sore throat. Negative for voice change.    Neck: Negative for painful lymph nodes.   Eyes: Negative for eye redness.   Respiratory: Positive for cough. Negative for chest tightness, sputum production, bloody sputum, COPD, shortness of breath, stridor, wheezing and asthma.    Gastrointestinal: Negative for nausea and vomiting.   Musculoskeletal: Negative for muscle ache.   Skin: Negative for rash.   Allergic/Immunologic: Negative for seasonal allergies and asthma.   Hematologic/Lymphatic: Negative for swollen lymph nodes.       Objective:      Physical Exam   Constitutional: She is oriented to person, place, and time. She appears well-developed and well-nourished. She is cooperative.  Non-toxic appearance. She does not appear ill. No distress.   HENT:   Head: Normocephalic and atraumatic.   Right Ear: Hearing, tympanic membrane, external ear and ear canal normal.   Left Ear: Hearing, tympanic membrane, external ear and ear canal normal.   Nose: Mucosal edema and rhinorrhea present. No nasal deformity. No epistaxis. Right sinus exhibits maxillary sinus tenderness. Right " sinus exhibits no frontal sinus tenderness. Left sinus exhibits maxillary sinus tenderness. Left sinus exhibits no frontal sinus tenderness.   Mouth/Throat: Uvula is midline and mucous membranes are normal. No trismus in the jaw. Normal dentition. No uvula swelling. Posterior oropharyngeal edema and posterior oropharyngeal erythema present. Tonsils are 2+ on the right. Tonsils are 2+ on the left. Tonsillar exudate.   Green purulent PND   Eyes: Conjunctivae and lids are normal. No scleral icterus.   Sclera clear bilat   Neck: Trachea normal, full passive range of motion without pain and phonation normal. Neck supple.   Cardiovascular: Normal rate, regular rhythm, normal heart sounds, intact distal pulses and normal pulses.   Pulmonary/Chest: Effort normal and breath sounds normal. No respiratory distress.   Abdominal: Soft. Normal appearance and bowel sounds are normal. She exhibits no distension. There is no tenderness.   Musculoskeletal: Normal range of motion. She exhibits no edema or deformity.   Neurological: She is alert and oriented to person, place, and time. She exhibits normal muscle tone. Coordination normal.   Skin: Skin is warm, dry and intact. She is not diaphoretic. No pallor.   Psychiatric: She has a normal mood and affect. Her speech is normal and behavior is normal. Judgment and thought content normal. Cognition and memory are normal.   Nursing note and vitals reviewed.      Assessment:       1. Acute non-recurrent maxillary sinusitis    2. Sore throat        Plan:         Acute non-recurrent maxillary sinusitis  -     betamethasone acetate-betamethasone sodium phosphate injection 6 mg  -     amoxicillin-clavulanate 875-125mg (AUGMENTIN) 875-125 mg per tablet; Take 1 tablet by mouth 2 (two) times daily. for 7 days  Dispense: 14 tablet; Refill: 0  -     azelastine (ASTELIN) 137 mcg (0.1 %) nasal spray; 2 sprays (274 mcg total) by Nasal route 2 (two) times daily.  Dispense: 30 mL; Refill: 0  -      naproxen (NAPROSYN) 500 MG tablet; Take 1 tablet (500 mg total) by mouth 2 (two) times daily as needed (for pain, with meals).  Dispense: 20 tablet; Refill: 0    Sore throat  -     POCT rapid strep A      Results for orders placed or performed in visit on 03/30/19   POCT rapid strep A   Result Value Ref Range    Rapid Strep A Screen Negative Negative     Acceptable Yes        Patient Instructions     Sinusitis (Antibiotic Treatment)    The sinuses are air-filled spaces within the bones of the face. They connect to the inside of the nose. Sinusitis is an inflammation of the tissue lining the sinus cavity. Sinus inflammation can occur during a cold. It can also be due to allergies to pollens and other particles in the air. Sinusitis can cause symptoms of sinus congestion and fullness. A sinus infection causes fever, headache and facial pain. There is often green or yellow drainage from the nose or into the back of the throat (post-nasal drip). You have been given antibiotics to treat this condition.  Home care:  · Take the full course of antibiotics as instructed. Do not stop taking them, even if you feel better.  · Drink plenty of water, hot tea, and other liquids. This may help thin mucus. It also may promote sinus drainage.  · Heat may help soothe painful areas of the face. Use a towel soaked in hot water. Or,  the shower and direct the hot spray onto your face. Using a vaporizer along with a menthol rub at night may also help.   · An expectorant containing guaifenesin may help thin the mucus and promote drainage from the sinuses.  · Over-the-counter decongestants may be used unless a similar medicine was prescribed. Nasal sprays work the fastest. Use one that contains phenylephrine or oxymetazoline. First blow the nose gently. Then use the spray. Do not use these medicines more often than directed on the label or symptoms may get worse. You may also use tablets containing pseudoephedrine.  Avoid products that combine ingredients, because side effects may be increased. Read labels. You can also ask the pharmacist for help. (NOTE: Persons with high blood pressure should not use decongestants. They can raise blood pressure.)  · Over-the-counter antihistamines may help if allergies contributed to your sinusitis.    · Do not use nasal rinses or irrigation during an acute sinus infection, unless told to by your health care provider. Rinsing may spread the infection to other sinuses.  · Use acetaminophen or ibuprofen to control pain, unless another pain medicine was prescribed. (If you have chronic liver or kidney disease or ever had a stomach ulcer, talk with your doctor before using these medicines. Aspirin should never be used in anyone under 18 years of age who is ill with a fever. It may cause severe liver damage.)  · Don't smoke. This can worsen symptoms.  Follow-up care  Follow up with your healthcare provider or our staff if you are not improving within the next week.  When to seek medical advice  Call your healthcare provider if any of these occur:  · Facial pain or headache becoming more severe  · Stiff neck  · Unusual drowsiness or confusion  · Swelling of the forehead or eyelids  · Vision problems, including blurred or double vision  · Fever of 100.4ºF (38ºC) or higher, or as directed by your healthcare provider  · Seizure  · Breathing problems  · Symptoms not resolving within 10 days  Date Last Reviewed: 4/13/2015  © 3354-0826 [employer] [address] [place] [address], [place] [address]. All rights reserved. This information is not intended as a substitute for professional medical care. Always follow your healthcare professional's instructions.

## 2019-03-30 NOTE — PATIENT INSTRUCTIONS
Sinusitis (Antibiotic Treatment)    The sinuses are air-filled spaces within the bones of the face. They connect to the inside of the nose. Sinusitis is an inflammation of the tissue lining the sinus cavity. Sinus inflammation can occur during a cold. It can also be due to allergies to pollens and other particles in the air. Sinusitis can cause symptoms of sinus congestion and fullness. A sinus infection causes fever, headache and facial pain. There is often green or yellow drainage from the nose or into the back of the throat (post-nasal drip). You have been given antibiotics to treat this condition.  Home care:  · Take the full course of antibiotics as instructed. Do not stop taking them, even if you feel better.  · Drink plenty of water, hot tea, and other liquids. This may help thin mucus. It also may promote sinus drainage.  · Heat may help soothe painful areas of the face. Use a towel soaked in hot water. Or,  the shower and direct the hot spray onto your face. Using a vaporizer along with a menthol rub at night may also help.   · An expectorant containing guaifenesin may help thin the mucus and promote drainage from the sinuses.  · Over-the-counter decongestants may be used unless a similar medicine was prescribed. Nasal sprays work the fastest. Use one that contains phenylephrine or oxymetazoline. First blow the nose gently. Then use the spray. Do not use these medicines more often than directed on the label or symptoms may get worse. You may also use tablets containing pseudoephedrine. Avoid products that combine ingredients, because side effects may be increased. Read labels. You can also ask the pharmacist for help. (NOTE: Persons with high blood pressure should not use decongestants. They can raise blood pressure.)  · Over-the-counter antihistamines may help if allergies contributed to your sinusitis.    · Do not use nasal rinses or irrigation during an acute sinus infection, unless told to by  your health care provider. Rinsing may spread the infection to other sinuses.  · Use acetaminophen or ibuprofen to control pain, unless another pain medicine was prescribed. (If you have chronic liver or kidney disease or ever had a stomach ulcer, talk with your doctor before using these medicines. Aspirin should never be used in anyone under 18 years of age who is ill with a fever. It may cause severe liver damage.)  · Don't smoke. This can worsen symptoms.  Follow-up care  Follow up with your healthcare provider or our staff if you are not improving within the next week.  When to seek medical advice  Call your healthcare provider if any of these occur:  · Facial pain or headache becoming more severe  · Stiff neck  · Unusual drowsiness or confusion  · Swelling of the forehead or eyelids  · Vision problems, including blurred or double vision  · Fever of 100.4ºF (38ºC) or higher, or as directed by your healthcare provider  · Seizure  · Breathing problems  · Symptoms not resolving within 10 days  Date Last Reviewed: 4/13/2015  © 3567-1492 [employer]. [address] [place] [address], [place] [address]. All rights reserved. This information is not intended as a substitute for professional medical care. Always follow your healthcare professional's instructions.

## 2019-04-18 DIAGNOSIS — E11.9 TYPE 2 DIABETES MELLITUS WITHOUT COMPLICATION, WITHOUT LONG-TERM CURRENT USE OF INSULIN: ICD-10-CM

## 2019-04-18 RX ORDER — METFORMIN HYDROCHLORIDE 500 MG/1
1000 TABLET ORAL 2 TIMES DAILY WITH MEALS
Qty: 120 TABLET | Refills: 0 | Status: SHIPPED | OUTPATIENT
Start: 2019-04-18 | End: 2019-08-19 | Stop reason: SDUPTHER

## 2019-06-07 ENCOUNTER — OFFICE VISIT (OUTPATIENT)
Dept: OBSTETRICS AND GYNECOLOGY | Facility: CLINIC | Age: 29
End: 2019-06-07
Payer: COMMERCIAL

## 2019-06-07 VITALS
SYSTOLIC BLOOD PRESSURE: 126 MMHG | HEIGHT: 62 IN | WEIGHT: 267.88 LBS | DIASTOLIC BLOOD PRESSURE: 74 MMHG | BODY MASS INDEX: 49.3 KG/M2

## 2019-06-07 DIAGNOSIS — N89.8 VAGINAL DISCHARGE: Primary | ICD-10-CM

## 2019-06-07 DIAGNOSIS — L29.2 VULVAR ITCHING: ICD-10-CM

## 2019-06-07 LAB
BACTERIAL VAGINOSIS DNA: NEGATIVE
CANDIDA GLABRATA DNA: NEGATIVE
CANDIDA KRUSEI DNA: NEGATIVE
CANDIDA RRNA VAG QL PROBE: POSITIVE
T VAGINALIS RRNA GENITAL QL PROBE: NEGATIVE

## 2019-06-07 PROCEDURE — 99999 PR PBB SHADOW E&M-EST. PATIENT-LVL III: CPT | Mod: PBBFAC,,, | Performed by: OBSTETRICS & GYNECOLOGY

## 2019-06-07 PROCEDURE — 99213 PR OFFICE/OUTPT VISIT, EST, LEVL III, 20-29 MIN: ICD-10-PCS | Mod: S$GLB,,, | Performed by: OBSTETRICS & GYNECOLOGY

## 2019-06-07 PROCEDURE — 99999 PR PBB SHADOW E&M-EST. PATIENT-LVL III: ICD-10-PCS | Mod: PBBFAC,,, | Performed by: OBSTETRICS & GYNECOLOGY

## 2019-06-07 PROCEDURE — 3078F PR MOST RECENT DIASTOLIC BLOOD PRESSURE < 80 MM HG: ICD-10-PCS | Mod: CPTII,S$GLB,, | Performed by: OBSTETRICS & GYNECOLOGY

## 2019-06-07 PROCEDURE — 87510 GARDNER VAG DNA DIR PROBE: CPT

## 2019-06-07 PROCEDURE — 3074F PR MOST RECENT SYSTOLIC BLOOD PRESSURE < 130 MM HG: ICD-10-PCS | Mod: CPTII,S$GLB,, | Performed by: OBSTETRICS & GYNECOLOGY

## 2019-06-07 PROCEDURE — 99213 OFFICE O/P EST LOW 20 MIN: CPT | Mod: S$GLB,,, | Performed by: OBSTETRICS & GYNECOLOGY

## 2019-06-07 PROCEDURE — 87480 CANDIDA DNA DIR PROBE: CPT

## 2019-06-07 PROCEDURE — 3008F PR BODY MASS INDEX (BMI) DOCUMENTED: ICD-10-PCS | Mod: CPTII,S$GLB,, | Performed by: OBSTETRICS & GYNECOLOGY

## 2019-06-07 PROCEDURE — 3074F SYST BP LT 130 MM HG: CPT | Mod: CPTII,S$GLB,, | Performed by: OBSTETRICS & GYNECOLOGY

## 2019-06-07 PROCEDURE — 3078F DIAST BP <80 MM HG: CPT | Mod: CPTII,S$GLB,, | Performed by: OBSTETRICS & GYNECOLOGY

## 2019-06-07 PROCEDURE — 3008F BODY MASS INDEX DOCD: CPT | Mod: CPTII,S$GLB,, | Performed by: OBSTETRICS & GYNECOLOGY

## 2019-06-07 RX ORDER — CLOTRIMAZOLE AND BETAMETHASONE DIPROPIONATE 10; .64 MG/G; MG/G
CREAM TOPICAL
Qty: 15 G | Refills: 1 | Status: SHIPPED | OUTPATIENT
Start: 2019-06-07 | End: 2019-08-28

## 2019-06-07 NOTE — PATIENT INSTRUCTIONS
Clothing and Laundry   Wear all-white cotton underwear.   Do not wear pantyhose (wear thigh high or knee high hose instead).   Wear loose-fitting pants or skirts.   Remove wet bathing suits and exercise clothing promptly.   Use hypoallergenic, dermatologically approved detergent such as Tide Free, or All Free and Clear.   Double-rinse underwear and any other clothing that comes into contact with the vulva.   Do not use fabric softener on undergarments.   Hygiene   Use soft, white, unscented toilet paper.   Use lukewarm or cool sitz baths to relieve burning and irritation.   Avoid getting shampoo on the vulvar area.   Do not use bubble bath, feminine hygiene products, or any perfumed creams or soaps.   Wash the vulva with cool to lukewarm water only.   Rinse the vulva with water after urination.   Urinate before the bladder is full.   Prevent constipation by adding fiber to your diet (if necessary, use a psyllium product such as Metamucil) and drinking at least 8 glasses of water daily.   Use unscented menstrual pads and tampons. Do not wear panty liners daily.   Sexual intercourse   Use a lubricant that is water soluble, e.g., Astroglide or KY and unscented.   Ask your physician for a prescription for a topical anesthestic, e.g., Lidocaine gel 5%. (This may sting for the first 3-5 minutes after application.)   Apply ice or a frozen blue gel pack (lunch box size) wrapped in one layer of a hand towel to relieve burning after intercourse.   Urinate (to prevent infection) and rinse vulva with cool water after sexual intercourse.   Do not use contraceptive creams or spermicides.   Physical Activities   Avoid exercises that put direct pressure on the vulva such as bicycle riding and horseback riding.   Limit intense exercises that create a lot of friction in the vulvar area (try lower intensity exercises such as walking).   Use a frozen gel pack wrapped in a towel to relieve symptoms after exercise.   Enroll in an  exercise class such as yoga to learn stretching and relaxation exercises.   Don't swim in highly chlorinated pools.   Avoid the use of hot tubs.

## 2019-06-07 NOTE — PROGRESS NOTES
Gynecology    SUBJECTIVE:     Chief Complaint: Vaginitis       History of Present Illness:  28 year old with symptoms of a yeast infection.  Recently had antibiotics for a sinus infection.  Has had clumpy, white discharge that started three weeks ago.  She tried the one day monistat. Also tried teraconazole for 7 days.  Itching improved, but then returned.  Adjusted sugar intake at home to try and resolve the situation.      Does also have what she thinks is some condyloma on the labia.      Review of Systems:  Review of Systems   Constitutional: Negative for appetite change, fever and unexpected weight change.   Respiratory: Negative for shortness of breath.    Cardiovascular: Negative for chest pain.   Gastrointestinal: Negative for nausea and vomiting.   Genitourinary: Positive for vaginal discharge. Negative for menorrhagia, menstrual problem, pelvic pain, vaginal bleeding, vaginal pain and vaginal odor.        OBJECTIVE:     Physical Exam:  Physical Exam   Constitutional: She is oriented to person, place, and time. She appears well-developed and well-nourished.   Pulmonary/Chest: Effort normal.   Abdominal: Soft.   Genitourinary: Vagina normal and uterus normal. No labial fusion. There is no rash, tenderness, lesion or injury on the right labia. There is no rash, tenderness, lesion or injury on the left labia. Uterus is not deviated, not enlarged, not fixed and not tender. Cervix exhibits no motion tenderness, no discharge and no friability. Right adnexum displays no mass, no tenderness and no fullness. Left adnexum displays no mass, no tenderness and no fullness. No erythema, tenderness or bleeding in the vagina. No foreign body in the vagina. No signs of injury around the vagina. No vaginal discharge found.   Genitourinary Comments: Urethra: normal appearing urethra with no masses, tenderness or lesions  Urethral meatus: normal size, anterior vaginal wall with no prolapse, no lesions  Erythema on the vulva;  few occasional skin tags- not consistent with condyloma   Neurological: She is alert and oriented to person, place, and time.   Psychiatric: She has a normal mood and affect. Her behavior is normal. Judgment and thought content normal.   Nursing note and vitals reviewed.      Chaperoned by: Argelia    ASSESSMENT:       ICD-10-CM ICD-9-CM    1. Vaginal discharge N89.8 623.5 Vaginosis Screen by DNA Probe   2. Vulvar itching L29.2 698.1 clotrimazole-betamethasone 1-0.05% (LOTRISONE) cream          Plan:      Glenda was seen today for vaginitis.    Diagnoses and all orders for this visit:    Vaginal discharge  -     Vaginosis Screen by DNA Probe    Vulvar itching  -     clotrimazole-betamethasone 1-0.05% (LOTRISONE) cream; Apply to affected area 2 times daily    - vaginosis screen today- treat based on results  - lotrisone for suspected yeast of the skin  - discussed likely this is a result of elevated glcuose; discussed better glucose control to reduce recurrent yeast infections/vulvar irritation  - patient also mentions her desire for pregnancy one day; we discussed the importance of her health prior to becoming pregnant; reviewed the risks of obesity and diabetes in pregnancy and with becoming pregnant; briefly discussed the option of the medi fit program    Orders Placed This Encounter   Procedures    Vaginosis Screen by DNA Probe       Follow up if symptoms worsen or fail to improve.    Jennifer Hwang

## 2019-06-09 DIAGNOSIS — B37.9 YEAST INFECTION: Primary | ICD-10-CM

## 2019-06-09 RX ORDER — FLUCONAZOLE 150 MG/1
150 TABLET ORAL ONCE
Qty: 1 TABLET | Refills: 0 | Status: SHIPPED | OUTPATIENT
Start: 2019-06-09 | End: 2019-06-09

## 2019-06-28 ENCOUNTER — PATIENT MESSAGE (OUTPATIENT)
Dept: OBSTETRICS AND GYNECOLOGY | Facility: CLINIC | Age: 29
End: 2019-06-28

## 2019-06-28 RX ORDER — FLUCONAZOLE 150 MG/1
150 TABLET ORAL DAILY
Qty: 1 TABLET | Refills: 1 | Status: SHIPPED | OUTPATIENT
Start: 2019-06-28 | End: 2019-06-29

## 2019-07-08 ENCOUNTER — OFFICE VISIT (OUTPATIENT)
Dept: URGENT CARE | Facility: CLINIC | Age: 29
End: 2019-07-08
Payer: COMMERCIAL

## 2019-07-08 VITALS
WEIGHT: 267 LBS | TEMPERATURE: 98 F | HEART RATE: 86 BPM | DIASTOLIC BLOOD PRESSURE: 80 MMHG | SYSTOLIC BLOOD PRESSURE: 136 MMHG | OXYGEN SATURATION: 99 % | BODY MASS INDEX: 49.13 KG/M2 | HEIGHT: 62 IN

## 2019-07-08 DIAGNOSIS — A08.4 VIRAL GASTROENTERITIS: Primary | ICD-10-CM

## 2019-07-08 PROCEDURE — 3079F PR MOST RECENT DIASTOLIC BLOOD PRESSURE 80-89 MM HG: ICD-10-PCS | Mod: CPTII,S$GLB,, | Performed by: NURSE PRACTITIONER

## 2019-07-08 PROCEDURE — 3079F DIAST BP 80-89 MM HG: CPT | Mod: CPTII,S$GLB,, | Performed by: NURSE PRACTITIONER

## 2019-07-08 PROCEDURE — 3008F PR BODY MASS INDEX (BMI) DOCUMENTED: ICD-10-PCS | Mod: CPTII,S$GLB,, | Performed by: NURSE PRACTITIONER

## 2019-07-08 PROCEDURE — 3008F BODY MASS INDEX DOCD: CPT | Mod: CPTII,S$GLB,, | Performed by: NURSE PRACTITIONER

## 2019-07-08 PROCEDURE — 3075F PR MOST RECENT SYSTOLIC BLOOD PRESS GE 130-139MM HG: ICD-10-PCS | Mod: CPTII,S$GLB,, | Performed by: NURSE PRACTITIONER

## 2019-07-08 PROCEDURE — 99214 OFFICE O/P EST MOD 30 MIN: CPT | Mod: S$GLB,,, | Performed by: NURSE PRACTITIONER

## 2019-07-08 PROCEDURE — 99214 PR OFFICE/OUTPT VISIT, EST, LEVL IV, 30-39 MIN: ICD-10-PCS | Mod: S$GLB,,, | Performed by: NURSE PRACTITIONER

## 2019-07-08 PROCEDURE — 3075F SYST BP GE 130 - 139MM HG: CPT | Mod: CPTII,S$GLB,, | Performed by: NURSE PRACTITIONER

## 2019-07-08 RX ORDER — PROMETHAZINE HYDROCHLORIDE 12.5 MG/1
12.5 TABLET ORAL EVERY 6 HOURS PRN
Qty: 10 TABLET | Refills: 0 | Status: SHIPPED | OUTPATIENT
Start: 2019-07-08 | End: 2019-08-28

## 2019-07-08 RX ORDER — ONDANSETRON 4 MG/1
4 TABLET, FILM COATED ORAL EVERY 6 HOURS PRN
Qty: 18 TABLET | Refills: 0 | Status: SHIPPED | OUTPATIENT
Start: 2019-07-08 | End: 2019-08-28 | Stop reason: SDUPTHER

## 2019-07-08 RX ORDER — DICYCLOMINE HYDROCHLORIDE 10 MG/1
10 CAPSULE ORAL 4 TIMES DAILY
Qty: 28 CAPSULE | Refills: 0 | Status: SHIPPED | OUTPATIENT
Start: 2019-07-08 | End: 2019-07-15

## 2019-07-08 NOTE — LETTER
July 8, 2019      [facility] [address], [address] [address]  Phone: [phone]  Fax: [phone]       Patient: Glenda Peña   YOB: 1990  Date of Visit: 07/08/2019    To Whom It May Concern:    Opal Peña  was at [facility] on 07/08/2019. She may return to work/school on 07/09/19 with no restrictions. If you have any questions or concerns, or if I can be of further assistance, please do not hesitate to contact me.    Sincerely,    Charly Vinson MA

## 2019-07-08 NOTE — PROGRESS NOTES
"Subjective:       Patient ID: Glenda Peña is a 28 y.o. female.    Vitals:  height is 5' 2" (1.575 m) and weight is 121.1 kg (267 lb). Her temperature is 98 °F (36.7 °C). Her blood pressure is 136/80 and her pulse is 86. Her oxygen saturation is 99%.     Chief Complaint: Emesis    Pt has history of gerd, she ate [employer] which induced nausea/vomiting due to her history.  She complains of nausea/vomiting    Emesis    This is a new problem. The current episode started today. The problem occurs 5 to 10 times per day. The problem has been unchanged. The emesis has an appearance of stomach contents and projectile. There has been no fever. Pertinent negatives include no arthralgias, chest pain, chills, coughing, diarrhea, dizziness, fever, headaches or myalgias. Risk factors include suspect food intake. She has tried nothing for the symptoms. The treatment provided no relief.       Constitution: Negative for chills, fatigue and fever.   HENT: Negative for congestion and sore throat.    Neck: Negative for painful lymph nodes.   Cardiovascular: Negative for chest pain and leg swelling.   Eyes: Negative for double vision and blurred vision.   Respiratory: Negative for cough and shortness of breath.    Gastrointestinal: Positive for nausea and vomiting. Negative for diarrhea.   Genitourinary: Negative for dysuria, frequency, urgency and history of kidney stones.   Musculoskeletal: Negative for joint pain, joint swelling, muscle cramps and muscle ache.   Skin: Negative for color change, pale, rash and bruising.   Allergic/Immunologic: Negative for seasonal allergies.   Neurological: Negative for dizziness, history of vertigo, light-headedness, passing out and headaches.   Hematologic/Lymphatic: Negative for swollen lymph nodes.   Psychiatric/Behavioral: Negative for nervous/anxious, sleep disturbance and depression. The patient is not nervous/anxious.        Objective:      Physical Exam   Constitutional: She is " oriented to person, place, and time. Vital signs are normal. She appears well-developed and well-nourished. She is active and cooperative. She is easily aroused.  Non-toxic appearance. She does not have a sickly appearance. She does not appear ill. No distress.   HENT:   Head: Normocephalic and atraumatic.   Right Ear: External ear normal.   Left Ear: External ear normal.   Nose: Nose normal.   Mouth/Throat: Mucous membranes are normal.   Eyes: Pupils are equal, round, and reactive to light. Conjunctivae, EOM and lids are normal.   Neck: Trachea normal and full passive range of motion without pain. Neck supple.   Cardiovascular: Normal rate, regular rhythm and normal heart sounds.   Pulses:       Radial pulses are 2+ on the right side, and 2+ on the left side.        Dorsalis pedis pulses are 2+ on the right side, and 2+ on the left side.        Posterior tibial pulses are 2+ on the right side, and 2+ on the left side.   Pulmonary/Chest: Effort normal and breath sounds normal. No respiratory distress.   Abdominal: Soft. Normal appearance and bowel sounds are normal. She exhibits no distension, no abdominal bruit, no pulsatile midline mass and no mass. There is tenderness (mild). There is no rigidity, no rebound, no guarding, no CVA tenderness, no tenderness at McBurney's point and negative Martinez's sign. A hernia is present.       Musculoskeletal: Normal range of motion. She exhibits no edema.   Neurological: She is alert, oriented to person, place, and time and easily aroused. She has normal strength. Gait normal.   Skin: Skin is warm, dry and intact. Capillary refill takes less than 2 seconds. No rash noted. She is not diaphoretic. No pallor.   Psychiatric: She has a normal mood and affect. Her speech is normal and behavior is normal. Judgment and thought content normal. Cognition and memory are normal.   Nursing note and vitals reviewed.      Assessment:       1. Viral gastroenteritis        Plan:        Presenting to the Urgent Care for generalized abdominal pain associated with nausea, non-bloody/bilious vomiting, and non-bloody diarrhea. Denies fever, inability to tolerate PO, significant weight loss, recent hospitalization or use of antibiotics, or symptoms lasting greater than a week. Reported eating [employer] prior to symptoms. Vitals reassuring. Patient is non-toxic appearing and in no acute distress. Abdomen soft, but has mild diffuse TTP. There is large, midline right lower abdomen hernia with no exquisite ttp; Well-healed horizontal RLQ scar. No rebound.    Moist mucus membranes- no evidence of volume depletion or dehydration.    Given rapid onset and characteristics of this patient's spectrum of symptoms, short duration of symptoms, and benign abdominal exam, patient likely has a variety of viral gastroenteritis. I do not feel there is indication for stool studies to assess for bacterial etiology at this time. No strong indication for imaging of abdomen at this time. Given the above, I have also considered but doubt C. difficile, IBD, infectious  SBO, pancreatitis, acute cholecystitis,  and appendicitis. Marijuana denies.     Patient asked for Phenergan and advised to take only if no relief with Zofran. ER precautions discussed.       Viral gastroenteritis  -     ondansetron (ZOFRAN) 4 MG tablet; Take 1 tablet (4 mg total) by mouth every 6 (six) hours as needed for Nausea.  Dispense: 18 tablet; Refill: 0  -     dicyclomine (BENTYL) 10 MG capsule; Take 1 capsule (10 mg total) by mouth 4 (four) times daily. for 7 days  Dispense: 28 capsule; Refill: 0  -     promethazine (PHENERGAN) 12.5 MG Tab; Take 1 tablet (12.5 mg total) by mouth every 6 (six) hours as needed (take for severe nausea).  Dispense: 10 tablet; Refill: 0      Patient Instructions   Go to the Emergency Department for any new or worsening symptoms including: worsening abdominal pain, dark\black\bloody bowel movements, vomiting blood, hard  abdomen, fever, chest pain, shortness of breath, loss of consciousness or any other concerns.    If you were prescribed a narcotic or controlled medication, do not drive or operate heavy equipment or machinery while taking these medications.    You must understand that you've received an Urgent Care treatment only and that you may be released before all your medical problems are known or treated. You, the patient, will arrange for follow up care as instructed.    Follow up with your PCP or specialty clinic as directed within 2-5 days if not improved or as needed.  You can call [phone] to schedule an appointment with the appropriate provider.      Viral Gastroenteritis (Adult)    Gastroenteritis is commonly called the stomach flu. It is most often caused by a virus that affects the stomach and intestinal tract and usually lasts from 2 to 7 days. Common viruses causing gastroenteritis include norovirus, rotavirus, and hepatitis A. Non-viral causes of gastroenteritis include bacteria, parasites, and toxins.  The danger from repeated vomiting or diarrhea is dehydration. This is the loss of too much fluid from the body. When this occurs, body fluids must be replaced. Antibiotics do not help with this illness because it is usually viral.Simple home treatment will be helpful.  Symptoms of viral gastroenteritis may include:  · Watery, loose stools  · Stomach pain or abdominal cramps  · Fever and chills  · Nausea and vomiting  · Loss of bowel control  · Headache  Home care  Gastroenteritis is transmitted by contact with the stool or vomit of an infected person. This can occur from person to person or from contact with a contaminated surface.  Follow these guidelines when caring for yourself at home:  · If symptoms are severe, rest at home for the next 24 hours or until you are feeling better.  · Wash your hands with soap and water or use alcohol-based  to prevent the spread of infection. Wash your hands after  touching anyone who is sick.  · Wash your hands or use alcohol-based  after using the toilet and before meals. Clean the toilet after each use.  Remember these tips when preparing food:  · People with diarrhea should not prepare or serve food to others. When preparing foods, wash your hands before and after.  · Wash your hands after using cutting boards, countertops, knives, or utensils that have been in contact with raw food.  · Keep uncooked meats away from cooked and ready-to-eat foods.  Medicine  You may use acetaminophen or NSAID medicines like ibuprofen or naproxen to control fever unless another medicine was given. If you have chronic liver or kidney disease, talk with your healthcare provider before using these medicines. Also talk with your provider if you've had a stomach ulcer or gastrointestinal bleeding. Don't give aspirin to anyone under 18 years of age who is ill with a fever. It may cause severe liver damage. Don't use NSAIDS is you are already taking one for another condition (like arthritis) or are on aspirin (such as for heart disease or after a stroke).  If medicine for vomiting or diarrhea are prescribed, take these only as directed. Do not take over-the-counter medicines for vomiting or diarrhea unless instructed by your healthcare provider.  Diet  Follow these guidelines for food:  · Water and liquids are important so you don't get dehydrated. Drink a small amount at a time or suck on ice chips if you are vomiting.  · If you eat, avoid fatty, greasy, spicy, or fried foods.  · Don't eat dairy if you have diarrhea. This can make diarrhea worse.  · Avoid tobacco, alcohol, and caffeine which may worsen symptoms.  During the first 24 hours (the first full day), follow the diet below:  · Beverages. Sports drinks, soft drinks without caffeine, ginger ale, mineral water (plain or flavored), decaffeinated tea and coffee. If you are very dehydrated, sports drinks aren't a good choice. They have  too much sugar and not enough electrolytes. In this case, commercially available products called oral rehydration solutions, are best.  · Soups. Eat clear broth, consommé, and bouillon.  · Desserts. Eat gelatin, popsicles, and fruit juice bars.  During the next 24 hours (the second day), you may add the following to the above:  · Hot cereal, plain toast, bread, rolls, and crackers  · Plain noodles, rice, mashed potatoes, chicken noodle or rice soup  · Unsweetened canned fruit (avoid pineapple), bananas  · Limit fat intake to less than 15 grams per day. Do this by avoiding margarine, butter, oils, mayonnaise, sauces, gravies, fried foods, peanut butter, meat, poultry, and fish.  · Limit fiber and avoid raw or cooked vegetables, fresh fruits (except bananas), and bran cereals.  · Limit caffeine and chocolate. Don't use spices or seasonings other than salt.  · Limit dairy products.  · Avoid alcohol.  During the next 24 hours:  · Gradually resume a normal diet as you feel better and your symptoms improve.  · If at any time it starts getting worse again, go back to clear liquids until you feel better.  Follow-up care  Follow up with your healthcare provider, or as advised. Call your provider if you don't get better within 24 hours or if diarrhea lasts more than a week. Also follow up if you are unable to keep down liquids and get dehydrated. If a stool (diarrhea) sample was taken, call as directed for the results.  Call 911  Call 911 if any of these occur:  · Trouble breathing  · Chest pain  · Confused  · Severe drowsiness or trouble awakening  · Fainting or loss of consciousness  · Rapid heart rate  · Seizure  · Stiff neck  When to seek medical advice  Call your healthcare provider right away if any of these occur:  · Abdominal pain that gets worse  · Continued vomiting (unable to keep liquids down)  · Frequent diarrhea (more than 5 times a day)  · Blood in vomit or stool (black or red color)  · Dark urine, reduced  urine output, or extreme thirst  · Weakness or dizziness  · Drowsiness  · Fever of 100.4°F (38°C) oral or higher that does not get better with fever medicine  · New rash  Date Last Reviewed: 1/3/2016  © 7488-6277 [employer]. [address] [place] [address], [place] [address]. All rights reserved. This information is not intended as a substitute for professional medical care. Always follow your healthcare professional's instructions.

## 2019-07-10 ENCOUNTER — TELEPHONE (OUTPATIENT)
Dept: URGENT CARE | Facility: CLINIC | Age: 29
End: 2019-07-10

## 2019-07-23 DIAGNOSIS — N76.1 CHRONIC VAGINITIS: ICD-10-CM

## 2019-07-23 RX ORDER — TERCONAZOLE 80 MG/1
80 SUPPOSITORY VAGINAL NIGHTLY
Qty: 6 SUPPOSITORY | Refills: 2 | Status: ON HOLD | OUTPATIENT
Start: 2019-07-23 | End: 2020-03-10

## 2019-08-05 ENCOUNTER — PATIENT OUTREACH (OUTPATIENT)
Dept: ADMINISTRATIVE | Facility: HOSPITAL | Age: 29
End: 2019-08-05

## 2019-08-06 DIAGNOSIS — Z13.5 SCREENING FOR DIABETIC RETINOPATHY: Primary | ICD-10-CM

## 2019-08-07 ENCOUNTER — OFFICE VISIT (OUTPATIENT)
Dept: FAMILY MEDICINE | Facility: CLINIC | Age: 29
End: 2019-08-07
Payer: COMMERCIAL

## 2019-08-07 ENCOUNTER — CLINICAL SUPPORT (OUTPATIENT)
Dept: FAMILY MEDICINE | Facility: CLINIC | Age: 29
End: 2019-08-07
Attending: INTERNAL MEDICINE
Payer: COMMERCIAL

## 2019-08-07 VITALS
HEART RATE: 89 BPM | OXYGEN SATURATION: 98 % | DIASTOLIC BLOOD PRESSURE: 86 MMHG | WEIGHT: 268.5 LBS | BODY MASS INDEX: 49.41 KG/M2 | SYSTOLIC BLOOD PRESSURE: 138 MMHG | RESPIRATION RATE: 17 BRPM | TEMPERATURE: 98 F | HEIGHT: 62 IN

## 2019-08-07 DIAGNOSIS — E11.65 TYPE 2 DIABETES MELLITUS WITH HYPERGLYCEMIA, WITHOUT LONG-TERM CURRENT USE OF INSULIN: ICD-10-CM

## 2019-08-07 DIAGNOSIS — Z87.42 HISTORY OF PCOS: ICD-10-CM

## 2019-08-07 DIAGNOSIS — Z00.00 WELL WOMAN EXAM (NO GYNECOLOGICAL EXAM): Primary | ICD-10-CM

## 2019-08-07 DIAGNOSIS — E66.01 MORBID OBESITY WITH BMI OF 45.0-49.9, ADULT: ICD-10-CM

## 2019-08-07 DIAGNOSIS — Z13.5 SCREENING FOR DIABETIC RETINOPATHY: ICD-10-CM

## 2019-08-07 PROBLEM — K43.9 VENTRAL HERNIA WITHOUT OBSTRUCTION OR GANGRENE: Status: RESOLVED | Noted: 2018-06-21 | Resolved: 2019-08-07

## 2019-08-07 LAB
LEFT EYE DM RETINOPATHY: NEGATIVE
RIGHT EYE DM RETINOPATHY: NEGATIVE

## 2019-08-07 PROCEDURE — 3075F SYST BP GE 130 - 139MM HG: CPT | Mod: CPTII,S$GLB,, | Performed by: FAMILY MEDICINE

## 2019-08-07 PROCEDURE — 92250 FUNDUS PHOTOGRAPHY W/I&R: CPT | Mod: TC,S$GLB,, | Performed by: INTERNAL MEDICINE

## 2019-08-07 PROCEDURE — 3079F DIAST BP 80-89 MM HG: CPT | Mod: CPTII,S$GLB,, | Performed by: FAMILY MEDICINE

## 2019-08-07 PROCEDURE — 99999 PR PBB SHADOW E&M-EST. PATIENT-LVL I: ICD-10-PCS | Mod: PBBFAC,,,

## 2019-08-07 PROCEDURE — 99999 PR PBB SHADOW E&M-EST. PATIENT-LVL IV: ICD-10-PCS | Mod: PBBFAC,,, | Performed by: FAMILY MEDICINE

## 2019-08-07 PROCEDURE — 3079F PR MOST RECENT DIASTOLIC BLOOD PRESSURE 80-89 MM HG: ICD-10-PCS | Mod: CPTII,S$GLB,, | Performed by: FAMILY MEDICINE

## 2019-08-07 PROCEDURE — 99999 PR PBB SHADOW E&M-EST. PATIENT-LVL IV: CPT | Mod: PBBFAC,,, | Performed by: FAMILY MEDICINE

## 2019-08-07 PROCEDURE — 99395 PREV VISIT EST AGE 18-39: CPT | Mod: S$GLB,,, | Performed by: FAMILY MEDICINE

## 2019-08-07 PROCEDURE — 99395 PR PREVENTIVE VISIT,EST,18-39: ICD-10-PCS | Mod: S$GLB,,, | Performed by: FAMILY MEDICINE

## 2019-08-07 PROCEDURE — 3075F PR MOST RECENT SYSTOLIC BLOOD PRESS GE 130-139MM HG: ICD-10-PCS | Mod: CPTII,S$GLB,, | Performed by: FAMILY MEDICINE

## 2019-08-07 PROCEDURE — 92250 DIABETIC EYE SCREENING PHOTO: ICD-10-PCS | Mod: 26,S$GLB,, | Performed by: OPTOMETRIST

## 2019-08-07 PROCEDURE — 92250 DIABETIC EYE SCREENING PHOTO: ICD-10-PCS | Mod: TC,S$GLB,, | Performed by: INTERNAL MEDICINE

## 2019-08-07 PROCEDURE — 99999 PR PBB SHADOW E&M-EST. PATIENT-LVL I: CPT | Mod: PBBFAC,,,

## 2019-08-07 PROCEDURE — 92250 FUNDUS PHOTOGRAPHY W/I&R: CPT | Mod: 26,S$GLB,, | Performed by: OPTOMETRIST

## 2019-08-07 RX ORDER — NORETHINDRONE AND ETHINYL ESTRADIOL 0.4-0.035
1 KIT ORAL DAILY
Refills: 11 | Status: ON HOLD | COMMUNITY
Start: 2019-08-02 | End: 2020-03-10

## 2019-08-07 NOTE — PROGRESS NOTES
"Well Woman VISIT      CHIEF COMPLAINT  Chief Complaint   Patient presents with    [facility] [place] [facility]       HPI  Glenda Peña is a 28 y.o. female who presents for physical.     Social Factors  Tobacco use: No  Ready to Quit: No  Alcohol: No  Intimate partner violence screening  "Do you feel safe in your current relationship?" Yes   "Have you ever been in a relationship in which your partner frightened you or hurt you?" No  Living Will/POA: No  Regular Exercise: No    Depression  Over the past two weeks, have you felt down, depressed, or hopeless? No  Over the past two weeks, have you felt little interest or pleasure in doing things? No    Reproductive Health  Followed by OBGYN    CHD, HTN, DM2  CHD Risk Factors: diabetes mellitus, hypertension and obesity (BMI >= 30 kg/m2)  Women 45 years and older should be screened for dyslipidemia if at increased risk of CHD  Women 20 to 45 years of age should be screened for dyslipidemia if at increased risk of CHD  Asymptomatic adults with sustained blood pressure greater than 135/80 mm Hg (treated or untreated) should be screened for type 2 diabetes mellitus    Estimated body mass index is 49.1 kg/m² as calculated from the following:    Height as of this encounter: 5' 2.01" (1.575 m).    Weight as of this encounter: 121.8 kg (268 lb 8.3 oz).      Screening  Mammogram needed: n/a  Colonoscopy needed: n/a  Osteoporosis screen needed: n/a     Women 50 to 74 years of age should be screened for breast cancer with mammography biennially.  Women should be screened for cervical cancer with Pap tests beginning at 21 years of age. Low-risk women should receive Pap testing every three years. Co-testing for human papillomavirus is an option beginning at 30 years of age, and can extend the screening interval to five years. Cervical cancer screening should be discontinued at 65 years of age or after total hysterectomy if the woman has a benign gynecologic history  Adults 50 to 75 " "years of age should be screened for colorectal cancer with an FOBT annually, sigmoidoscopy every five years with an FOBT every three years, or colonoscopy every 10 years.  Women 65 years and older should be screened for osteoporosis. Women younger than 65 years should be screened if the risk of fracture is greater than or equal to that of a 65-year-old white woman without additional risk factors.      Immunizations  delayed    ALLERGIES and MEDS were verified.   PMHx, PSHx, FHx, SOCIALHx were updated as pertinent.    REVIEW OF SYSTEMS  Review of Systems   Constitutional: Negative.    HENT: Negative.    Eyes: Negative.    Respiratory: Negative.    Cardiovascular: Negative.    Gastrointestinal: Negative.    Genitourinary: Negative.    Musculoskeletal: Negative.    Skin: Negative.    Neurological: Negative.          PHYSICAL EXAM  VITAL SIGNS: /86 (BP Location: Left arm, Patient Position: Sitting, BP Method: Large (Manual))   Pulse 89   Temp 98.1 °F (36.7 °C) (Oral)   Resp 17   Ht 5' 2.01" (1.575 m)   Wt 121.8 kg (268 lb 8.3 oz)   LMP 07/27/2019   SpO2 98%   BMI 49.10 kg/m²   GEN: Well developed, Well nourished, No acute distress.  HENT: Normocephalic, Atraumatic, Bilateral external ears normal, Nose normal, Oropharynx moist, No oral exudates.   Eyes: PERRL, EOMI, Conjunctiva normal, No discharge.   Neck: Supple, No tenderness.  Lymphatic: No cervical or supraclavicular lymphadenopathy noted.   Cardiovascular: Normal heart rate, Normal rhythm, No murmurs, No rubs, No gallops.   Thorax & Lungs: Normal breath sounds, No respiratory distress, No wheezing.  Abdomen: Soft, No tenderness, Bowel sounds normal.  Breast: deferred  Genital: deferred   Skin: Warm, Dry, No erythema, No rash.   Extremities: No edema, No tenderness.       ASSESSMENT/PLAN    Glenda was seen today for establish care.    Diagnoses and all orders for this visit:    Well woman exam (no gynecological exam)  -     CBC auto differential; " Future  -     Comprehensive metabolic panel; Future  -     Lipid panel; Future  -     Hemoglobin A1c; Future  -     Microalbumin/creatinine urine ratio; Future  -     Urinalysis; Future    Morbid obesity with BMI of 45.0-49.9, adult    Type 2 diabetes mellitus with hyperglycemia, without long-term current use of insulin  -     Lipid panel; Future  -     Hemoglobin A1c; Future    History of PCOS         FOLLOW UP: 3 months or sooner if needed      Emiliano Dunbar MD

## 2019-08-07 NOTE — PROGRESS NOTES
.Glenda Peña is a 28 y.o. female here for a diabetic eye screening with non-dilated fundus photos per PCP orders.    Patient cooperative?: Yes  Small pupils?: Yes  Last eye exam: ?  Pt. states her right eye is lazy and photos may not be too clear.    For exam results, see Encounter Report.

## 2019-08-16 ENCOUNTER — HOSPITAL ENCOUNTER (EMERGENCY)
Facility: HOSPITAL | Age: 29
Discharge: HOME | End: 2019-08-16
Attending: EMERGENCY MEDICINE
Payer: COMMERCIAL

## 2019-08-16 ENCOUNTER — PATIENT MESSAGE (OUTPATIENT)
Dept: FAMILY MEDICINE | Facility: CLINIC | Age: 29
End: 2019-08-16

## 2019-08-16 VITALS
HEART RATE: 99 BPM | WEIGHT: 268 LBS | DIASTOLIC BLOOD PRESSURE: 72 MMHG | BODY MASS INDEX: 49.32 KG/M2 | OXYGEN SATURATION: 96 % | TEMPERATURE: 98 F | RESPIRATION RATE: 18 BRPM | SYSTOLIC BLOOD PRESSURE: 138 MMHG | HEIGHT: 62 IN

## 2019-08-16 DIAGNOSIS — M70.62 TROCHANTERIC BURSITIS OF LEFT HIP: ICD-10-CM

## 2019-08-16 DIAGNOSIS — E11.69 HYPERLIPIDEMIA ASSOCIATED WITH TYPE 2 DIABETES MELLITUS: ICD-10-CM

## 2019-08-16 DIAGNOSIS — E11.65 TYPE 2 DIABETES MELLITUS WITH HYPERGLYCEMIA, WITHOUT LONG-TERM CURRENT USE OF INSULIN: Primary | ICD-10-CM

## 2019-08-16 DIAGNOSIS — M25.559 HIP PAIN: ICD-10-CM

## 2019-08-16 DIAGNOSIS — R80.9 PROTEINURIA, UNSPECIFIED TYPE: ICD-10-CM

## 2019-08-16 DIAGNOSIS — E11.9 TYPE 2 DIABETES MELLITUS WITHOUT COMPLICATION, WITHOUT LONG-TERM CURRENT USE OF INSULIN: ICD-10-CM

## 2019-08-16 DIAGNOSIS — M54.32 SCIATICA OF LEFT SIDE: Primary | ICD-10-CM

## 2019-08-16 DIAGNOSIS — E66.01 MORBID OBESITY WITH BMI OF 45.0-49.9, ADULT: ICD-10-CM

## 2019-08-16 DIAGNOSIS — E78.5 HYPERLIPIDEMIA ASSOCIATED WITH TYPE 2 DIABETES MELLITUS: ICD-10-CM

## 2019-08-16 LAB
B-HCG UR QL: NEGATIVE
CTP QC/QA: YES

## 2019-08-16 PROCEDURE — 81025 URINE PREGNANCY TEST: CPT | Performed by: PHYSICIAN ASSISTANT

## 2019-08-16 PROCEDURE — 99284 EMERGENCY DEPT VISIT MOD MDM: CPT | Mod: 25

## 2019-08-16 PROCEDURE — 25000003 PHARM REV CODE 250: Performed by: PHYSICIAN ASSISTANT

## 2019-08-16 PROCEDURE — 96372 THER/PROPH/DIAG INJ SC/IM: CPT

## 2019-08-16 PROCEDURE — 63600175 PHARM REV CODE 636 W HCPCS: Performed by: PHYSICIAN ASSISTANT

## 2019-08-16 RX ORDER — LIDOCAINE 50 MG/G
1 PATCH TOPICAL
Status: DISCONTINUED | OUTPATIENT
Start: 2019-08-16 | End: 2019-08-16 | Stop reason: HOSPADM

## 2019-08-16 RX ORDER — KETOROLAC TROMETHAMINE 30 MG/ML
15 INJECTION, SOLUTION INTRAMUSCULAR; INTRAVENOUS
Status: COMPLETED | OUTPATIENT
Start: 2019-08-16 | End: 2019-08-16

## 2019-08-16 RX ORDER — ORPHENADRINE CITRATE 30 MG/ML
60 INJECTION INTRAMUSCULAR; INTRAVENOUS
Status: COMPLETED | OUTPATIENT
Start: 2019-08-16 | End: 2019-08-16

## 2019-08-16 RX ORDER — METHOCARBAMOL 500 MG/1
1000 TABLET, FILM COATED ORAL 3 TIMES DAILY
Qty: 30 TABLET | Refills: 0 | Status: SHIPPED | OUTPATIENT
Start: 2019-08-16 | End: 2019-08-21

## 2019-08-16 RX ADMIN — LIDOCAINE 1 PATCH: 50 PATCH TOPICAL at 07:08

## 2019-08-16 RX ADMIN — ORPHENADRINE CITRATE 60 MG: 60 INJECTION INTRAMUSCULAR; INTRAVENOUS at 08:08

## 2019-08-16 RX ADMIN — KETOROLAC TROMETHAMINE 15 MG: 30 INJECTION, SOLUTION INTRAMUSCULAR at 07:08

## 2019-08-17 NOTE — DISCHARGE INSTRUCTIONS
Please follow-up with family doctorThis week.  You may take medication as prescribed.  Please use heat pack to the lower back.  You may use an ice pack or heat pack to the left hip.

## 2019-08-17 NOTE — ED NOTES
Patient denies feeling and adverse reactions to injection and injection site.  Patient discharged to home in stable condition with prescription x 1.

## 2019-08-17 NOTE — ED PROVIDER NOTES
"Encounter Date: 8/16/2019    SCRIBE #1 NOTE: ILaura, am scribing for, and in the presence of,  Ama Gan PA-C. I have scribed the following portions of the note - Other sections scribed: HPI, ROS, PE.       History     Chief Complaint   Patient presents with    Hip Pain     Pt reports L hip pain increasing in the last 2 weeks. Pt states pain/numbness travels down the L leg into the last 3 toes.      CC: Hip Pain         Back Pain    HPI: This 29 y.o. Female with a PMHx of asthma, GERD, and PCOS presents to the ED for evaluation of "sharp" L hip pain for x2 weeks that worsened in the last x3 days. Patient also notes experiencing intermittent, "burning", lower back pain for x2 months which also gradually worsened in the last x3 days. Patient c/o secondary numbness to 1st, 2nd, and 3rd toe of L foot and notes tingling behind L leg. Patient notes certain positions exacerbate pain. Patient denies recent injury, trauma or MRI. Patient denies swelling, redness, fever, numbness/tingling to groin, chills or N/V/D. No alleviating factors reported.  No UTI symptoms including dysuria hematuria.  No saddle anesthesia, bowel or bladder dysfunction.    The history is provided by the patient. No  was used.     Review of patient's allergies indicates:   Allergen Reactions    Shellfish containing products Hives     To hands    Latex, natural rubber Rash     Burning sensation     Past Medical History:   Diagnosis Date    Asthma childhood    Diabetes mellitus     GERD (gastroesophageal reflux disease)     Morbid obesity     PCOS (polycystic ovarian syndrome)      Past Surgical History:   Procedure Laterality Date    APPENDECTOMY  2011    HERNIA REPAIR      OOPHORECTOMY      REPAIR-HERNIA-LAPAROSCOPIC-VENTRAL N/A 8/31/2016    Performed by [facility] at [facility]    salpingoopherectomy Right 2008     Family History   Problem Relation Age of Onset    Heart attack Father     " Hypertension Father     Diabetes Mother     Heart attack Mother     Breast cancer Neg Hx     Colon cancer Neg Hx     Ovarian cancer Neg Hx      Social History     Tobacco Use    Smoking status: Never Smoker    Smokeless tobacco: Never Used   Substance Use Topics    Alcohol use: Yes     Comment: occasionally apple cider    Drug use: No     Review of Systems   Constitutional: Negative for chills and fever.   Respiratory: Negative for cough and shortness of breath.    Cardiovascular: Negative for chest pain.   Gastrointestinal: Negative for abdominal pain, nausea and vomiting.   Genitourinary: Negative for dysuria and flank pain.   Musculoskeletal: Positive for back pain.        (+) L hip pain   Skin: Negative for rash and wound.   Neurological: Positive for numbness. Negative for weakness.        (+) tingling to posterior aspect of L leg     Hematological: Does not bruise/bleed easily.   Psychiatric/Behavioral: Negative for confusion.       Physical Exam     Initial Vitals [08/16/19 1730]   BP Pulse Resp Temp SpO2   (!) 167/81 96 18 98.4 °F (36.9 °C) 96 %      MAP       --         Physical Exam    Vitals reviewed.  Constitutional: She appears well-developed and well-nourished. She is not diaphoretic. No distress.   HENT:   Head: Normocephalic and atraumatic.   Eyes: EOM are normal.   Neck: Neck supple.   Cardiovascular: Normal rate, regular rhythm, normal heart sounds and intact distal pulses.   Pulmonary/Chest: Breath sounds normal.   Musculoskeletal:        Left hip: She exhibits tenderness. She exhibits normal range of motion.        Lumbar back: She exhibits tenderness.   There is tendernes to palpation to outer left hip over greater trochanter/bursa area. ROM in left hip is fully in tact. There is tenderness to palpation to the perispinal muscle of left lower back/buttock.    Neurological: She is alert and oriented to person, place, and time. She has normal strength. No sensory deficit. She exhibits  normal muscle tone. Gait normal.   Skin: Skin is warm and dry.         ED Course   Procedures  Labs Reviewed   POCT URINE PREGNANCY          Imaging Results          X-Ray Hip 2 View Left (Final result)  Result time 08/16/19 19:46:45    Final result by Boogie Harris MD (08/16/19 19:46:45)                 Impression:      No acute fracture      Electronically signed by: Boogie Harris MD  Date:    08/16/2019  Time:    19:46             Narrative:    EXAMINATION:  XR HIP 2 VIEW LEFT    CLINICAL HISTORY:  Pain in unspecified hip    TECHNIQUE:  AP view of the pelvis and frog leg lateral view of the left hip were performed.    COMPARISON:  CT 12/20/2018    FINDINGS:  There is no evidence of acute fracture or subluxation.  Scoliotic changes is noted in the lower lumbosacral spine.  Soft tissue structures are otherwise unremarkable.                                       APC / Resident Notes:   Patient was seen in the ER promptly upon arrival.  She is afebrile, no acute distress.  Physical examination reveals tenderness on palpation to the left paraspinal muscle of lumbar spine.  She also has tenderness to the left greater trochanter/bursa.  Range of motion of the hip is fully intact otherwise.  Sensation fully intact. No neurological deficits on exam.    X-ray of left hip was unremarkable. Patient given Toradol, Norflex and Lidoderm patch in ED.  Will prescribed home on Robaxin and Lidoderm patch.  She was advised to use a heat pack over the lower back.  Advised to take NSAIDs for pain.  The patient low back pain likely secondary to sciatica.  Her left hip pain likely secondary to bursitis.  She is to follow up with family doctor this week.  She was given strict return precautions to the ED.  Stable for discharge and close follow-up at this time.       Scribe Attestation:   Scribe #1: I performed the above scribed service and the documentation accurately describes the services I performed. I attest to the accuracy  of the note.    Attending Attestation:           Physician Attestation for Scribe:  Physician Attestation Statement for Scribe #1: I, Ama Gan PA-C, reviewed documentation, as scribed by Laura Ace in my presence, and it is both accurate and complete.                    Clinical Impression:       ICD-10-CM ICD-9-CM   1. Sciatica of left side M54.32 724.3   2. Hip pain M25.559 719.45   3. Trochanteric bursitis of left hip M70.62 726.5         Disposition:   Disposition: Discharged  Condition: Stable                        Ama Gan PA-C  08/16/19 2035

## 2019-08-19 PROBLEM — E78.5 HYPERLIPIDEMIA ASSOCIATED WITH TYPE 2 DIABETES MELLITUS: Status: ACTIVE | Noted: 2019-08-19

## 2019-08-19 PROBLEM — E11.69 HYPERLIPIDEMIA ASSOCIATED WITH TYPE 2 DIABETES MELLITUS: Status: ACTIVE | Noted: 2019-08-19

## 2019-08-19 PROBLEM — B37.31 CANDIDAL VULVITIS: Status: RESOLVED | Noted: 2018-06-21 | Resolved: 2019-08-19

## 2019-08-19 PROBLEM — N76.3 CHRONIC VULVITIS: Status: RESOLVED | Noted: 2018-02-28 | Resolved: 2019-08-19

## 2019-08-19 RX ORDER — METFORMIN HYDROCHLORIDE 500 MG/1
1000 TABLET ORAL 2 TIMES DAILY WITH MEALS
Qty: 120 TABLET | Refills: 0 | Status: SHIPPED | OUTPATIENT
Start: 2019-08-19 | End: 2019-11-04 | Stop reason: SDUPTHER

## 2019-08-19 RX ORDER — LISINOPRIL 2.5 MG/1
2.5 TABLET ORAL DAILY
Qty: 90 TABLET | Refills: 0 | Status: SHIPPED | OUTPATIENT
Start: 2019-08-19 | End: 2019-12-04 | Stop reason: SDUPTHER

## 2019-08-20 ENCOUNTER — TELEPHONE (OUTPATIENT)
Dept: FAMILY MEDICINE | Facility: CLINIC | Age: 29
End: 2019-08-20

## 2019-08-20 NOTE — TELEPHONE ENCOUNTER
She would need Trulicity 0.75mg every 7 days.  If it is covered.  I will send in the prescription    Thanks  Dr. Dunbar

## 2019-08-21 ENCOUNTER — PATIENT MESSAGE (OUTPATIENT)
Dept: FAMILY MEDICINE | Facility: CLINIC | Age: 29
End: 2019-08-21

## 2019-08-21 DIAGNOSIS — E11.65 TYPE 2 DIABETES MELLITUS WITH HYPERGLYCEMIA, WITHOUT LONG-TERM CURRENT USE OF INSULIN: Primary | ICD-10-CM

## 2019-08-22 ENCOUNTER — PATIENT MESSAGE (OUTPATIENT)
Dept: FAMILY MEDICINE | Facility: CLINIC | Age: 29
End: 2019-08-22

## 2019-08-27 NOTE — TELEPHONE ENCOUNTER
Please get PA started for trulicity as she had stomach pains with victoza in the past.    Thanks  Dr. Dunbar

## 2019-08-28 ENCOUNTER — OFFICE VISIT (OUTPATIENT)
Dept: FAMILY MEDICINE | Facility: CLINIC | Age: 29
End: 2019-08-28
Payer: COMMERCIAL

## 2019-08-28 VITALS
RESPIRATION RATE: 17 BRPM | TEMPERATURE: 98 F | BODY MASS INDEX: 47.51 KG/M2 | HEIGHT: 62 IN | SYSTOLIC BLOOD PRESSURE: 136 MMHG | HEART RATE: 102 BPM | OXYGEN SATURATION: 96 % | DIASTOLIC BLOOD PRESSURE: 84 MMHG | WEIGHT: 258.19 LBS

## 2019-08-28 DIAGNOSIS — A08.4 VIRAL GASTROENTERITIS: ICD-10-CM

## 2019-08-28 DIAGNOSIS — B96.89 ACUTE BACTERIAL BRONCHITIS: Primary | ICD-10-CM

## 2019-08-28 DIAGNOSIS — E11.65 TYPE 2 DIABETES MELLITUS WITH HYPERGLYCEMIA, WITHOUT LONG-TERM CURRENT USE OF INSULIN: Primary | ICD-10-CM

## 2019-08-28 DIAGNOSIS — J20.8 ACUTE BACTERIAL BRONCHITIS: Primary | ICD-10-CM

## 2019-08-28 DIAGNOSIS — E11.65 TYPE 2 DIABETES MELLITUS WITH HYPERGLYCEMIA, WITHOUT LONG-TERM CURRENT USE OF INSULIN: ICD-10-CM

## 2019-08-28 PROCEDURE — 3075F SYST BP GE 130 - 139MM HG: CPT | Mod: CPTII,S$GLB,, | Performed by: FAMILY MEDICINE

## 2019-08-28 PROCEDURE — 3008F BODY MASS INDEX DOCD: CPT | Mod: CPTII,S$GLB,, | Performed by: FAMILY MEDICINE

## 2019-08-28 PROCEDURE — 2024F 7 FLD RTA PHOTO EVC RTNOPTHY: CPT | Mod: S$GLB,,, | Performed by: FAMILY MEDICINE

## 2019-08-28 PROCEDURE — 3046F PR MOST RECENT HEMOGLOBIN A1C LEVEL > 9.0%: ICD-10-PCS | Mod: CPTII,S$GLB,, | Performed by: FAMILY MEDICINE

## 2019-08-28 PROCEDURE — 99999 PR PBB SHADOW E&M-EST. PATIENT-LVL IV: ICD-10-PCS | Mod: PBBFAC,,, | Performed by: FAMILY MEDICINE

## 2019-08-28 PROCEDURE — 99999 PR PBB SHADOW E&M-EST. PATIENT-LVL IV: CPT | Mod: PBBFAC,,, | Performed by: FAMILY MEDICINE

## 2019-08-28 PROCEDURE — 99214 PR OFFICE/OUTPT VISIT, EST, LEVL IV, 30-39 MIN: ICD-10-PCS | Mod: S$GLB,,, | Performed by: FAMILY MEDICINE

## 2019-08-28 PROCEDURE — 2024F PR 7 FIELD PHOTOS WITH INTERP/ REVIEW: ICD-10-PCS | Mod: S$GLB,,, | Performed by: FAMILY MEDICINE

## 2019-08-28 PROCEDURE — 3079F PR MOST RECENT DIASTOLIC BLOOD PRESSURE 80-89 MM HG: ICD-10-PCS | Mod: CPTII,S$GLB,, | Performed by: FAMILY MEDICINE

## 2019-08-28 PROCEDURE — 3008F PR BODY MASS INDEX (BMI) DOCUMENTED: ICD-10-PCS | Mod: CPTII,S$GLB,, | Performed by: FAMILY MEDICINE

## 2019-08-28 PROCEDURE — 3046F HEMOGLOBIN A1C LEVEL >9.0%: CPT | Mod: CPTII,S$GLB,, | Performed by: FAMILY MEDICINE

## 2019-08-28 PROCEDURE — 99214 OFFICE O/P EST MOD 30 MIN: CPT | Mod: S$GLB,,, | Performed by: FAMILY MEDICINE

## 2019-08-28 PROCEDURE — 3079F DIAST BP 80-89 MM HG: CPT | Mod: CPTII,S$GLB,, | Performed by: FAMILY MEDICINE

## 2019-08-28 PROCEDURE — 3075F PR MOST RECENT SYSTOLIC BLOOD PRESS GE 130-139MM HG: ICD-10-PCS | Mod: CPTII,S$GLB,, | Performed by: FAMILY MEDICINE

## 2019-08-28 RX ORDER — ONDANSETRON 4 MG/1
4 TABLET, FILM COATED ORAL EVERY 6 HOURS PRN
Qty: 18 TABLET | Refills: 0 | Status: SHIPPED | OUTPATIENT
Start: 2019-08-28 | End: 2020-03-17 | Stop reason: SDUPTHER

## 2019-08-28 RX ORDER — AZITHROMYCIN 250 MG/1
TABLET, FILM COATED ORAL
Qty: 6 TABLET | Refills: 0 | Status: SHIPPED | OUTPATIENT
Start: 2019-08-28 | End: 2019-09-02

## 2019-08-28 RX ORDER — ALBUTEROL SULFATE 90 UG/1
2 AEROSOL, METERED RESPIRATORY (INHALATION) EVERY 6 HOURS PRN
Qty: 18 G | Refills: 0 | Status: ON HOLD | OUTPATIENT
Start: 2019-08-28 | End: 2020-04-05 | Stop reason: SDUPTHER

## 2019-08-28 NOTE — PROGRESS NOTES
Routine Office Visit    Patient Name: Glenda Peña    : 1990  MRN: [id number]    Subjective:  Glenda is a 29 y.o. female who presents today for:    1. Congestion  Patient presenting today with 5 days of sore throat and then developed into congestion.  There is green nasal discharge.  There has been a productive cough as well.  All of her kids have been sick recently and her  is a teacher with several sick students.  No fevers, chills, or sweats.  She states she gets short of breath with small exertions.      Past Medical History  Past Medical History:   Diagnosis Date    Asthma childhood    Diabetes mellitus     GERD (gastroesophageal reflux disease)     Morbid obesity     PCOS (polycystic ovarian syndrome)        Past Surgical History  Past Surgical History:   Procedure Laterality Date    APPENDECTOMY      HERNIA REPAIR      OOPHORECTOMY      REPAIR-HERNIA-LAPAROSCOPIC-VENTRAL N/A 2016    [facility]    salpingoopherectomy Right        Family History  Family History   Problem Relation Age of Onset    Heart attack Father     Hypertension Father     Diabetes Mother     Heart attack Mother     Breast cancer Neg Hx     Colon cancer Neg Hx     Ovarian cancer Neg Hx        Social History  Social History     Socioeconomic History    Marital status:      Spouse name: Not on file    Number of children: Not on file    Years of education: Not on file    Highest education level: Not on file   Occupational History    Not on file   Social Needs    Financial resource strain: Not on file    Food insecurity:     Worry: Not on file     Inability: Not on file    Transportation needs:     Medical: Not on file     Non-medical: Not on file   Tobacco Use    Smoking status: Never Smoker    Smokeless tobacco: Never Used   Substance and Sexual Activity    Alcohol use: Yes     Comment: occasionally apple cider    Drug use: No    Sexual  activity: Yes     Partners: Female   Lifestyle    Physical activity:     Days per week: Not on file     Minutes per session: Not on file    Stress: Not on file   Relationships    Social connections:     Talks on phone: Not on file     Gets together: Not on file     Attends Synagogue service: Not on file     Active member of club or organization: Not on file     Attends meetings of clubs or organizations: Not on file     Relationship status: Not on file   Other Topics Concern    Not on file   Social History Narrative    Not on file       Current Medications  Current Outpatient Medications on File Prior to Visit   Medication Sig Dispense Refill    BALZIVA, 28, 0.4-35 mg-mcg per tablet Take 1 tablet by mouth once daily.  11    empagliflozin (JARDIANCE) 10 mg Tab Take 10 mg by mouth once daily. 30 tablet 2    lisinopril (PRINIVIL,ZESTRIL) 2.5 MG tablet Take 1 tablet (2.5 mg total) by mouth once daily. 90 tablet 0    metFORMIN (GLUCOPHAGE) 500 MG tablet Take 2 tablets (1,000 mg total) by mouth 2 (two) times daily with meals. 120 tablet 0    ranitidine (ZANTAC) 75 MG tablet Take 75 mg by mouth 2 (two) times daily.      terconazole (TERAZOL 3) 80 mg vaginal suppository Place 1 suppository (80 mg total) vaginally nightly. 6 suppository 2    [DISCONTINUED] ondansetron (ZOFRAN) 4 MG tablet Take 1 tablet (4 mg total) by mouth every 6 (six) hours as needed for Nausea. 18 tablet 0    [DISCONTINUED] ondansetron (ZOFRAN-ODT) 8 MG TbDL Take 1 tablet (8 mg total) by mouth every 24 hours as needed (severe nausea). 12 tablet 0    [DISCONTINUED] promethazine (PHENERGAN) 12.5 MG Tab Take 1 tablet (12.5 mg total) by mouth every 6 (six) hours as needed (take for severe nausea). 10 tablet 0    pantoprazole (PROTONIX) 40 MG tablet Take 1 tablet (40 mg total) by mouth before breakfast. 60 tablet 2    [DISCONTINUED] clotrimazole-betamethasone 1-0.05% (LOTRISONE) cream Apply to affected area 2 times daily 15 g 1     "[DISCONTINUED] imiquimod (ALDARA) 5 % cream Apply topically 3 (three) times a week. 24 packet 1    [DISCONTINUED] semaglutide (OZEMPIC) 0.25 mg or 0.5 mg(2 mg/1.5 mL) PnIj Inject 0.25 mg into the skin every 7 days. 4.5 mL 0     No current facility-administered medications on file prior to visit.        Allergies   Review of patient's allergies indicates:   Allergen Reactions    Shellfish containing products Hives     To hands    Latex, natural rubber Rash     Burning sensation       Review of Systems (Pertinent positives)  Review of Systems   Constitutional: Positive for malaise/fatigue.   HENT: Positive for congestion.    Eyes: Negative.    Respiratory: Positive for cough, sputum production and shortness of breath. Negative for hemoptysis and wheezing.    Cardiovascular: Negative.    Gastrointestinal: Negative.    Musculoskeletal: Negative.    Skin: Negative.          /84 (BP Location: Left arm, Patient Position: Sitting, BP Method: X-Large (Manual))   Pulse 102   Temp 98.1 °F (36.7 °C) (Oral)   Resp 17   Ht 5' 2.01" (1.575 m)   Wt 117.1 kg (258 lb 2.5 oz)   SpO2 96%   BMI 47.21 kg/m²     GENERAL APPEARANCE: in no apparent distress and well developed and well nourished  HEENT: PERRL, EOMI, Sclera clear, anicteric, Oropharynx clear, no lesions, Neck supple with midline trachea  NECK: normal, supple, no adenopathy, thyroid normal in size  RESPIRATORY: appears well, vitals normal, no respiratory distress, acyanotic, normal RR, chest clear, no wheezing, crepitations, rhonchi, normal symmetric air entry  HEART: regular rate and rhythm, S1, S2 normal, no murmur, click, rub or gallop.    ABDOMEN: abdomen is soft without tenderness, no masses, no hernias, no organomegaly, no rebound, no guarding. Suprapubic tenderness absent. No CVA tenderness.  SKIN: flushed appearance  PSYCH: Alert, oriented x 3, thought content appropriate, speech normal, pleasant and cooperative, good eye contact, well " [facility]    Assessment/Plan:  Glenda Peña is a 29 y.o. female who presents today for :    Glenda was seen today for nasal congestion.    Diagnoses and all orders for this visit:    Acute bacterial bronchitis  -     albuterol (VENTOLIN HFA) 90 mcg/actuation inhaler; Inhale 2 puffs into the lungs every 6 (six) hours as needed for Wheezing. Rescue  -     azithromycin (Z-RONAN) 250 MG tablet; Take 2 tablets by mouth on day 1; Take 1 tablet by mouth on days 2-5    Viral gastroenteritis  -     ondansetron (ZOFRAN) 4 MG tablet; Take 1 tablet (4 mg total) by mouth every 6 (six) hours as needed for Nausea.      1.  Patient has history of childhood asthma and with shortness of breath with small exertion will treat as bronchitis  2.  zpak to be started on Saturday if no better  3.  She is to go to the ED for any worsening shortness of breath  4.  zofran refilled per her request for nausea from diabetes medications  5.  Follow up in December for type 2 DM and have labs done before        Emiliano Dunbar MD

## 2019-09-19 ENCOUNTER — PATIENT MESSAGE (OUTPATIENT)
Dept: FAMILY MEDICINE | Facility: CLINIC | Age: 29
End: 2019-09-19

## 2019-09-19 DIAGNOSIS — N76.0 VAGINITIS AND VULVOVAGINITIS: Primary | ICD-10-CM

## 2019-09-19 RX ORDER — FLUCONAZOLE 150 MG/1
150 TABLET ORAL ONCE
Qty: 1 TABLET | Refills: 0 | Status: SHIPPED | OUTPATIENT
Start: 2019-09-19 | End: 2019-09-19

## 2019-10-02 ENCOUNTER — PATIENT MESSAGE (OUTPATIENT)
Dept: FAMILY MEDICINE | Facility: CLINIC | Age: 29
End: 2019-10-02

## 2019-10-03 ENCOUNTER — PATIENT MESSAGE (OUTPATIENT)
Dept: FAMILY MEDICINE | Facility: CLINIC | Age: 29
End: 2019-10-03

## 2019-11-04 ENCOUNTER — PATIENT MESSAGE (OUTPATIENT)
Dept: FAMILY MEDICINE | Facility: CLINIC | Age: 29
End: 2019-11-04

## 2019-11-04 DIAGNOSIS — E11.65 TYPE 2 DIABETES MELLITUS WITH HYPERGLYCEMIA, WITHOUT LONG-TERM CURRENT USE OF INSULIN: ICD-10-CM

## 2019-11-04 DIAGNOSIS — E11.9 TYPE 2 DIABETES MELLITUS WITHOUT COMPLICATION, WITHOUT LONG-TERM CURRENT USE OF INSULIN: ICD-10-CM

## 2019-11-04 RX ORDER — METFORMIN HYDROCHLORIDE 500 MG/1
1000 TABLET ORAL 2 TIMES DAILY WITH MEALS
Qty: 120 TABLET | Refills: 0 | Status: SHIPPED | OUTPATIENT
Start: 2019-11-04 | End: 2019-12-30 | Stop reason: SDUPTHER

## 2019-11-15 ENCOUNTER — PATIENT OUTREACH (OUTPATIENT)
Dept: ADMINISTRATIVE | Facility: OTHER | Age: 29
End: 2019-11-15

## 2019-11-19 ENCOUNTER — OFFICE VISIT (OUTPATIENT)
Dept: SURGERY | Facility: CLINIC | Age: 29
End: 2019-11-19
Payer: COMMERCIAL

## 2019-11-19 VITALS
DIASTOLIC BLOOD PRESSURE: 65 MMHG | HEART RATE: 82 BPM | BODY MASS INDEX: 46.72 KG/M2 | TEMPERATURE: 99 F | SYSTOLIC BLOOD PRESSURE: 125 MMHG | HEIGHT: 62 IN | WEIGHT: 253.88 LBS

## 2019-11-19 DIAGNOSIS — K43.9 VENTRAL HERNIA WITHOUT OBSTRUCTION OR GANGRENE: Primary | ICD-10-CM

## 2019-11-19 PROCEDURE — 99203 OFFICE O/P NEW LOW 30 MIN: CPT | Mod: S$GLB,,, | Performed by: SURGERY

## 2019-11-19 PROCEDURE — 99999 PR PBB SHADOW E&M-EST. PATIENT-LVL IV: ICD-10-PCS | Mod: PBBFAC,,, | Performed by: SURGERY

## 2019-11-19 PROCEDURE — 99203 PR OFFICE/OUTPT VISIT, NEW, LEVL III, 30-44 MIN: ICD-10-PCS | Mod: S$GLB,,, | Performed by: SURGERY

## 2019-11-19 PROCEDURE — 99999 PR PBB SHADOW E&M-EST. PATIENT-LVL IV: CPT | Mod: PBBFAC,,, | Performed by: SURGERY

## 2019-11-19 NOTE — PROGRESS NOTES
History & Physical    SUBJECTIVE:     History of Present Illness:  Patient is a 29 y.o. female presents with large ventral hernia. Onset of symptoms was almost immediately after her last hernia repair with gradually worsening course since that time. She has previously had partial hysterectomy and open appendectomy. Last surgery was 2016 for incisional hernia repair. She reports episodes of pain and cramping which occur spontaneously and last for hours. During these episodes, she develops nausea, vomiting. She has chronic constipation and diarrhea. She is a diabetic, and she is morbidly obese. She does not take any blood thinners or steroids.     Chief Complaint   Patient presents with    Consult       Review of patient's allergies indicates:   Allergen Reactions    Shellfish containing products Hives     To hands    Latex, natural rubber Rash     Burning sensation       Current Outpatient Medications   Medication Sig Dispense Refill    albuterol (VENTOLIN HFA) 90 mcg/actuation inhaler Inhale 2 puffs into the lungs every 6 (six) hours as needed for Wheezing. Rescue 18 g 0    BALZIVA, 28, 0.4-35 mg-mcg per tablet Take 1 tablet by mouth once daily.  11    dulaglutide (TRULICITY) 0.75 mg/0.5 mL PnIj Inject 0.5 mLs (0.75 mg total) into the skin every 7 days. 4 Syringe 1    empagliflozin (JARDIANCE) 10 mg Tab Take 10 mg by mouth once daily. 30 tablet 2    lisinopril (PRINIVIL,ZESTRIL) 2.5 MG tablet Take 1 tablet (2.5 mg total) by mouth once daily. 90 tablet 0    metFORMIN (GLUCOPHAGE) 500 MG tablet Take 2 tablets (1,000 mg total) by mouth 2 (two) times daily with meals. 120 tablet 0    ondansetron (ZOFRAN) 4 MG tablet Take 1 tablet (4 mg total) by mouth every 6 (six) hours as needed for Nausea. 18 tablet 0    ranitidine (ZANTAC) 75 MG tablet Take 75 mg by mouth 2 (two) times daily.      terconazole (TERAZOL 3) 80 mg vaginal suppository Place 1 suppository (80 mg total) vaginally nightly. 6 suppository 2     "pantoprazole (PROTONIX) 40 MG tablet Take 1 tablet (40 mg total) by mouth before breakfast. 60 tablet 2     No current facility-administered medications for this visit.        Past Medical History:   Diagnosis Date    Asthma childhood    Diabetes mellitus     GERD (gastroesophageal reflux disease)     Morbid obesity     PCOS (polycystic ovarian syndrome)      Past Surgical History:   Procedure Laterality Date    APPENDECTOMY  2011    HERNIA REPAIR      OOPHORECTOMY      salpingoopherectomy Right 2008     Family History   Problem Relation Age of Onset    Heart attack Father     Hypertension Father     Diabetes Mother     Heart attack Mother     Breast cancer Neg Hx     Colon cancer Neg Hx     Ovarian cancer Neg Hx      Social History     Tobacco Use    Smoking status: Never Smoker    Smokeless tobacco: Never Used   Substance Use Topics    Alcohol use: Yes     Comment: occasionally apple cider    Drug use: No        Review of Systems:  Review of Systems   Constitutional: Negative for chills and fever.   HENT: Negative for congestion and rhinorrhea.    Eyes: Negative for photophobia and visual disturbance.   Respiratory: Negative for cough and shortness of breath.    Cardiovascular: Negative for chest pain and palpitations.   Gastrointestinal: Positive for abdominal pain, constipation, diarrhea and nausea. Negative for vomiting.   Endocrine: Negative for cold intolerance and heat intolerance.   Musculoskeletal: Negative for arthralgias and myalgias.   Skin: Negative for rash and wound.   Allergic/Immunologic: Negative for immunocompromised state.   Neurological: Negative for tremors and weakness.   Hematological: Negative for adenopathy.   Psychiatric/Behavioral: Negative for agitation.       OBJECTIVE:     Vital Signs (Most Recent)  Temp: 99.2 °F (37.3 °C) (11/19/19 0809)  Pulse: 82 (11/19/19 0809)  BP: 125/65 (11/19/19 0809)  5' 2" (1.575 m)  115.2 kg (253 lb 13.8 oz)     Physical Exam:  Physical " Exam   Constitutional: She is oriented to person, place, and time. She appears well-developed and well-nourished. No distress.   HENT:   Head: Normocephalic and atraumatic.   Eyes: EOM are normal. No scleral icterus.   Neck: Normal range of motion. Neck supple.   Cardiovascular: Normal rate and regular rhythm.   Pulmonary/Chest: Effort normal. No respiratory distress.   Abdominal:   Soft, obese  Large recurrent ventral hernia, well healed scar overlying hernia   Musculoskeletal: Normal range of motion. She exhibits no edema or tenderness.   Neurological: She is alert and oriented to person, place, and time.   Skin: Skin is warm and dry.   Psychiatric: She has a normal mood and affect.     ASSESSMENT/PLAN:   30 yo female w large recurrent ventral hernia  -referral to bariatric medicine for weight loss prior to surgery, depending on insurance coverage  -will need repeat CT scan prior to surgery

## 2019-11-22 ENCOUNTER — HOSPITAL ENCOUNTER (EMERGENCY)
Facility: HOSPITAL | Age: 29
Discharge: HOME | End: 2019-11-23
Attending: EMERGENCY MEDICINE
Payer: COMMERCIAL

## 2019-11-22 DIAGNOSIS — R10.31 RIGHT LOWER QUADRANT ABDOMINAL PAIN: Primary | ICD-10-CM

## 2019-11-22 DIAGNOSIS — K43.2 INCISIONAL HERNIA, WITHOUT OBSTRUCTION OR GANGRENE: ICD-10-CM

## 2019-11-22 LAB
ALBUMIN SERPL BCP-MCNC: 3.7 G/DL (ref 3.5–5.2)
ALP SERPL-CCNC: 73 U/L (ref 55–135)
ALT SERPL W/O P-5'-P-CCNC: 60 U/L (ref 10–44)
ANION GAP SERPL CALC-SCNC: 14 MMOL/L (ref 8–16)
AST SERPL-CCNC: 71 U/L (ref 10–40)
B-HCG UR QL: NEGATIVE
BACTERIA #/AREA URNS HPF: ABNORMAL /HPF
BASOPHILS # BLD AUTO: 0.02 K/UL (ref 0–0.2)
BASOPHILS NFR BLD: 0.4 % (ref 0–1.9)
BILIRUB SERPL-MCNC: 0.3 MG/DL (ref 0.1–1)
BILIRUB UR QL STRIP: NEGATIVE
BUN SERPL-MCNC: 7 MG/DL (ref 6–20)
CALCIUM SERPL-MCNC: 10.1 MG/DL (ref 8.7–10.5)
CHLORIDE SERPL-SCNC: 100 MMOL/L (ref 95–110)
CLARITY UR: CLEAR
CO2 SERPL-SCNC: 20 MMOL/L (ref 23–29)
COLOR UR: ABNORMAL
CREAT SERPL-MCNC: 0.8 MG/DL (ref 0.5–1.4)
CTP QC/QA: YES
DEPRECATED S PYO AG THROAT QL EIA: NEGATIVE
DIFFERENTIAL METHOD: ABNORMAL
EOSINOPHIL # BLD AUTO: 0 K/UL (ref 0–0.5)
EOSINOPHIL NFR BLD: 0.6 % (ref 0–8)
ERYTHROCYTE [DISTWIDTH] IN BLOOD BY AUTOMATED COUNT: 13.7 % (ref 11.5–14.5)
EST. GFR  (AFRICAN AMERICAN): >60 ML/MIN/1.73 M^2
EST. GFR  (NON AFRICAN AMERICAN): >60 ML/MIN/1.73 M^2
GLUCOSE SERPL-MCNC: 145 MG/DL (ref 70–110)
GLUCOSE SERPL-MCNC: 153 MG/DL (ref 70–110)
GLUCOSE UR QL STRIP: ABNORMAL
HCT VFR BLD AUTO: 39.2 % (ref 37–48.5)
HGB BLD-MCNC: 12.3 G/DL (ref 12–16)
HGB UR QL STRIP: NEGATIVE
IMM GRANULOCYTES # BLD AUTO: 0.03 K/UL (ref 0–0.04)
IMM GRANULOCYTES NFR BLD AUTO: 0.6 % (ref 0–0.5)
KETONES UR QL STRIP: NEGATIVE
LEUKOCYTE ESTERASE UR QL STRIP: NEGATIVE
LIPASE SERPL-CCNC: 89 U/L (ref 4–60)
LYMPHOCYTES # BLD AUTO: 1 K/UL (ref 1–4.8)
LYMPHOCYTES NFR BLD: 19.1 % (ref 18–48)
MCH RBC QN AUTO: 26.3 PG (ref 27–31)
MCHC RBC AUTO-ENTMCNC: 31.4 G/DL (ref 32–36)
MCV RBC AUTO: 84 FL (ref 82–98)
MICROSCOPIC COMMENT: ABNORMAL
MONOCYTES # BLD AUTO: 0.5 K/UL (ref 0.3–1)
MONOCYTES NFR BLD: 9.8 % (ref 4–15)
NEUTROPHILS # BLD AUTO: 3.5 K/UL (ref 1.8–7.7)
NEUTROPHILS NFR BLD: 69.5 % (ref 38–73)
NITRITE UR QL STRIP: NEGATIVE
NRBC BLD-RTO: 0 /100 WBC
PH UR STRIP: 8 [PH] (ref 5–8)
PLATELET # BLD AUTO: 215 K/UL (ref 150–350)
PMV BLD AUTO: 10.4 FL (ref 9.2–12.9)
POCT GLUCOSE: 153 MG/DL (ref 70–110)
POTASSIUM SERPL-SCNC: 4.2 MMOL/L (ref 3.5–5.1)
PROT SERPL-MCNC: 8.5 G/DL (ref 6–8.4)
PROT UR QL STRIP: NEGATIVE
RBC # BLD AUTO: 4.68 M/UL (ref 4–5.4)
SODIUM SERPL-SCNC: 134 MMOL/L (ref 136–145)
SP GR UR STRIP: 1.02 (ref 1–1.03)
URN SPEC COLLECT METH UR: ABNORMAL
UROBILINOGEN UR STRIP-ACNC: NEGATIVE EU/DL
WBC # BLD AUTO: 5.08 K/UL (ref 3.9–12.7)
YEAST URNS QL MICRO: ABNORMAL

## 2019-11-22 PROCEDURE — 81000 URINALYSIS NONAUTO W/SCOPE: CPT

## 2019-11-22 PROCEDURE — 87081 CULTURE SCREEN ONLY: CPT

## 2019-11-22 PROCEDURE — 81025 URINE PREGNANCY TEST: CPT | Performed by: EMERGENCY MEDICINE

## 2019-11-22 PROCEDURE — 80053 COMPREHEN METABOLIC PANEL: CPT

## 2019-11-22 PROCEDURE — 96374 THER/PROPH/DIAG INJ IV PUSH: CPT

## 2019-11-22 PROCEDURE — 99285 EMERGENCY DEPT VISIT HI MDM: CPT | Mod: 25

## 2019-11-22 PROCEDURE — 25000003 PHARM REV CODE 250: Performed by: PHYSICIAN ASSISTANT

## 2019-11-22 PROCEDURE — 25500020 PHARM REV CODE 255: Performed by: PHYSICIAN ASSISTANT

## 2019-11-22 PROCEDURE — 63600175 PHARM REV CODE 636 W HCPCS: Performed by: PHYSICIAN ASSISTANT

## 2019-11-22 PROCEDURE — 82962 GLUCOSE BLOOD TEST: CPT

## 2019-11-22 PROCEDURE — 87880 STREP A ASSAY W/OPTIC: CPT

## 2019-11-22 PROCEDURE — 85025 COMPLETE CBC W/AUTO DIFF WBC: CPT

## 2019-11-22 PROCEDURE — 83690 ASSAY OF LIPASE: CPT

## 2019-11-22 RX ORDER — MORPHINE SULFATE 10 MG/ML
6 INJECTION INTRAMUSCULAR; INTRAVENOUS; SUBCUTANEOUS
Status: COMPLETED | OUTPATIENT
Start: 2019-11-22 | End: 2019-11-22

## 2019-11-22 RX ORDER — ONDANSETRON 8 MG/1
8 TABLET, ORALLY DISINTEGRATING ORAL
Status: COMPLETED | OUTPATIENT
Start: 2019-11-22 | End: 2019-11-22

## 2019-11-22 RX ORDER — TRAMADOL HYDROCHLORIDE 50 MG/1
50 TABLET ORAL EVERY 6 HOURS PRN
Qty: 6 TABLET | Refills: 0 | Status: ON HOLD | OUTPATIENT
Start: 2019-11-22 | End: 2020-03-06 | Stop reason: CLARIF

## 2019-11-22 RX ADMIN — ONDANSETRON 8 MG: 8 TABLET, ORALLY DISINTEGRATING ORAL at 10:11

## 2019-11-22 RX ADMIN — IOHEXOL 100 ML: 350 INJECTION, SOLUTION INTRAVENOUS at 10:11

## 2019-11-22 RX ADMIN — MORPHINE SULFATE 6 MG: 10 INJECTION INTRAVENOUS at 10:11

## 2019-11-23 VITALS
OXYGEN SATURATION: 95 % | SYSTOLIC BLOOD PRESSURE: 123 MMHG | DIASTOLIC BLOOD PRESSURE: 67 MMHG | HEART RATE: 93 BPM | TEMPERATURE: 101 F | WEIGHT: 253 LBS | RESPIRATION RATE: 20 BRPM | BODY MASS INDEX: 46.56 KG/M2 | HEIGHT: 62 IN

## 2019-11-23 NOTE — DISCHARGE INSTRUCTIONS
You  may take over-the-counter Tylenol and/or Ibuprofen as directed as needed for pain relief.  For breakthrough pain not relieved with the above, take Tramadol 50 mg as directed.  Begin exercise and diet plan in an effort to lose weight, not only for surgery approval but for overall health and energy.  Follow-up with general surgery for further recommendations.  Return to the ED for any concerning symptoms.    Our goal in the emergency department is to always give you outstanding care and exceptional service. You may receive a survey by mail or e-mail in the next week regarding your experience in our ED. We would greatly appreciate your completing and returning the survey. Your feedback provides us with a way to recognize our staff who give very good care and it helps us learn how to improve when your experience was below our aspiration of excellence.

## 2019-11-23 NOTE — ED PROVIDER NOTES
"Encounter Date: 11/22/2019    SCRIBE #1 NOTE: I, Arin Petit, am scribing for, and in the presence of,  Kaylin Flores PA-C. I have scribed the following portions of the note - Other sections scribed: HPI, MDM.       History     Chief Complaint   Patient presents with    Cough     x 2 days; complaining of sob; took breathing tx today; complains of lower abdmoninal pain from hernia x 1 yr, worsening today    Shortness of Breath    Abdominal Pain       Time of initial assessment: 9:16 PM    CC: Abdominal Pain    HPI:  This is a 29 y.o. female, with a PMHx of Asthma (childhood), Diabetes Mellitus, GERD, Morbid Obesity, and Polycystic Ovarian Syndrome, who presents to the Emergency Department with a cc of abdominal pain secondary to two hernias x2 hours. She explains that she used a nebulizer breathing treatment that resulted in heart palpitations, feeling "shaky", and coughing 30 minutes PTA. She believes that coughing exacerbated the abdominal pain. She describes the sensation as a severe, intermittent, cramping RLQ abdominal pain accompanied by SOB that occurs every few minutes. She states, "it feels like someone is pinching and pulling me." Associated symptoms include abdominal distension, constipation, productive cough, and sore throat. Her last normal bowel movement was 3.5 hours ago. She denies congestion, fever, chills, n/v/d, back pain, CP, and dysuria. No medication was taken PTA. She notes that sometimes she attempts treatment with Percocet. She has had known sick contact with daughter who has Strep Throat, per her . She notes that her symptoms are inconsistent with her childhood Asthma. She reports a prior history of similar symptoms x1.5 years, and this was an exacerbated episode.     Pt has a PSHx of hernia repair, but the hernia returned 6 months post-op. She visited her doctor last week and was told that she currently has two hernias. She has been instructed to lose 100 lbs, but states " that it is difficult due to the severe pain. She has also been advised to have bariatric surgery, but states that this option is too expensive.         The history is provided by the patient.     Review of patient's allergies indicates:   Allergen Reactions    Shellfish containing products Hives     To hands    Latex, natural rubber Rash     Burning sensation     Past Medical History:   Diagnosis Date    Asthma childhood    Diabetes mellitus     GERD (gastroesophageal reflux disease)     Morbid obesity     PCOS (polycystic ovarian syndrome)      Past Surgical History:   Procedure Laterality Date    APPENDECTOMY  2011    HERNIA REPAIR      OOPHORECTOMY      salpingoopherectomy Right 2008     Family History   Problem Relation Age of Onset    Heart attack Father     Hypertension Father     Diabetes Mother     Heart attack Mother     Breast cancer Neg Hx     Colon cancer Neg Hx     Ovarian cancer Neg Hx      Social History     Tobacco Use    Smoking status: Never Smoker    Smokeless tobacco: Never Used   Substance Use Topics    Alcohol use: Yes     Comment: occasionally apple cider    Drug use: No     Review of Systems   Constitutional: Negative for chills and fever.   HENT: Positive for sore throat. Negative for congestion.    Eyes: Negative for visual disturbance.   Respiratory: Positive for cough (productive) and shortness of breath.    Cardiovascular: Positive for palpitations. Negative for chest pain.   Gastrointestinal: Positive for abdominal distention, abdominal pain and constipation. Negative for diarrhea, nausea and vomiting.   Genitourinary: Negative for dysuria.   Musculoskeletal: Negative for back pain.   Skin: Negative for rash.   Neurological: Negative for weakness.   Hematological: Does not bruise/bleed easily.   Psychiatric/Behavioral: Negative for dysphoric mood.       Physical Exam     Initial Vitals [11/22/19 2039]   BP Pulse Resp Temp SpO2   129/72 (!) 117 20 99.8 °F (37.7 °C)  97 %      MAP       --         Physical Exam    Nursing note and vitals reviewed.  Constitutional: She appears well-developed and well-nourished. She is not diaphoretic. She is Obese . She appears distressed.   HENT:   Head: Normocephalic and atraumatic.   Nose normal. Post oropharyngeal cavity moist with mild tonsillar edema and right tonsillar exudate. Airway patent. No drooling, stridor, or hot potato voice.   Eyes: Conjunctivae and EOM are normal. Pupils are equal, round, and reactive to light.   Neck: Normal range of motion. Neck supple.   Tonsillar exudates on right tonsil.   Cardiovascular: Regular rhythm, normal heart sounds and intact distal pulses. Tachycardia present.    Pulmonary/Chest: She has no wheezes. She has no rhonchi. She has no rales.   Patient tachypneic, speaking in short sentences. Lungs are clear bilaterally. SpO2 97% on RA. No adventitious breath sounds or accessory muscle usage.   Abdominal: Soft. Bowel sounds are normal. She exhibits distension. There is no tenderness. There is no rebound and no guarding.   Large, soft abdomen. Significant ttp to the RLQ, where there are two reducible incisional hernias palpated. No overlying skin changes. No rebound or guarding.   Musculoskeletal: Normal range of motion.   Lymphadenopathy:     She has no cervical adenopathy.   Neurological: She is alert and oriented to person, place, and time. GCS score is 15. GCS eye subscore is 4. GCS verbal subscore is 5. GCS motor subscore is 6.   Skin: Skin is warm and dry.   Psychiatric:   Patient appears anxious.         ED Course   Procedures  Labs Reviewed   CBC W/ AUTO DIFFERENTIAL - Abnormal; Notable for the following components:       Result Value    Mean Corpuscular Hemoglobin 26.3 (*)     Mean Corpuscular Hemoglobin Conc 31.4 (*)     Immature Granulocytes 0.6 (*)     All other components within normal limits   COMPREHENSIVE METABOLIC PANEL - Abnormal; Notable for the following components:    Sodium 134 (*)      CO2 20 (*)     Glucose 145 (*)     Total Protein 8.5 (*)     AST 71 (*)     ALT 60 (*)     All other components within normal limits   LIPASE - Abnormal; Notable for the following components:    Lipase 89 (*)     All other components within normal limits   URINALYSIS, REFLEX TO URINE CULTURE - Abnormal; Notable for the following components:    Glucose, UA 3+ (*)     All other components within normal limits    Narrative:     Preferred Collection Type->Urine, Clean Catch   URINALYSIS MICROSCOPIC - Abnormal; Notable for the following components:    Yeast, UA Few (*)     All other components within normal limits    Narrative:     Preferred Collection Type->Urine, Clean Catch   POCT GLUCOSE - Abnormal; Notable for the following components:    POCT Glucose 153 (*)     All other components within normal limits   THROAT SCREEN, RAPID   CULTURE, STREP A,  THROAT   POCT URINE PREGNANCY          Imaging Results          CT Abdomen Pelvis With Contrast (Final result)  Result time 11/22/19 23:14:27    Final result by Alonzo Shane MD (11/22/19 23:14:27)                 Impression:      1. No acute intra-abdominal abnormalities identified.  2. Stable large right lower quadrant ventral wall hernia containing multiple loops of small bowel and portion of the cecum.  No evidence of bowel obstruction.  3. Hepatosplenomegaly with hepatic steatosis.      Electronically signed by: Alonzo Shane MD  Date:    11/22/2019  Time:    23:14             Narrative:    EXAMINATION:  CT ABDOMEN PELVIS WITH CONTRAST    CLINICAL HISTORY:  Incisional hernia;    TECHNIQUE:  Low dose axial images, sagittal and coronal reformations were obtained from the lung bases to the pubic symphysis following the IV administration of 100 mL of Omnipaque 350 .  Oral contrast was not given.    COMPARISON:  CT abdomen and pelvis from December 2018.    FINDINGS:  The visualized portion of the heart is unremarkable.  The lung bases are clear.    Liver is  enlarged measuring 29 cm and diffusely hypoattenuating in appearance consistent with diffuse fatty infiltration.  Spleen is enlarged measuring 14 cm.  There is no intra-or extrahepatic biliary ductal dilatation.  The gallbladder is unremarkable.  The stomach, pancreas, spleen, and adrenal glands are unremarkable.    Kidneys, ureters, urinary bladder, and uterus show no significant abnormalities.  No significant adnexal abnormalities are seen.    Redemonstration of large right lower quadrant ventral wall hernia containing multiple loops of small bowel and portion of the cecum.  No evidence of bowel obstruction.  Appendix is not definitely visualized.  No free air or free fluid.    Aorta tapers normally.    No acute osseous abnormality identified.  Multilevel degenerative changes are seen within the spine.  Subcutaneous soft tissue structures are unremarkable.                                 Medical Decision Making:   History:   Old Medical Records: I decided to obtain old medical records.  Initial Assessment:   This is a 29 y.o. female, with a PMHx of Asthma (childhood), Diabetes Mellitus, GERD, Morbid Obesity, and Polycystic Ovarian Syndrome, who presents to the Emergency Department with a cc of abdominal pain secondary to two Hernias x2 hours.  Clinical Tests:   Lab Tests: Ordered and Reviewed  Radiological Study: Ordered and Reviewed  ED Management:  9:16 PM: Patient was initially assessed by Kaylin Flores PA-C.       APC / Resident Notes:   This is an evaluation of a 29 y.o. female that presents to the Emergency Department for Abdominal Pain. Physical Exam shows a non-toxic, afebrile, and uncomfortable appearing female.    Vital Signs Are Reassuring.  SpO2 97% on room air.  RESULTS:  CBC with no leukocytosis, normal H&H.  CMP with mild transaminitis.  Lipase 89.  UPT negative.  POCT glucose 153.  UA without evidence of infection, few budding yeast.  Rapid strep negative. CT without acute intra-abdominal  abnormalities identified, noting stable large right lower quadrant ventral wall hernia containing multiple loops of small bowel in portion of the cecum without evidence of bowel obstruction.    My overall impression is Abdominal Pain - Unclear etiology vs. Strangulated hernia.  Given the laboratory studies, and unremarkable CT, I have a very low suspicion for appendicitis, ischemic bowel, bowel perforation, bowel obstruction,  pancreatitis, UTI, pyelonephritis, kidney stone, diverticulitis, cholecystitis, or any other life threatening disease.    ED Course: Morphine 6 mg IV, Zofran.  Upon reassessment, patient appears much more comfortable with normal, even respirations.  Abdominal pain significantly improved with ED management.  Additional D/C Information: Abdominal Pain Precaution, cough suppressants, stool softeners.  Patient advised close follow-up with General surgery, diet and exercise plan. The diagnosis, treatment plan, instructions for follow-up and reevaluation with her PCP as well as ED return precautions were discussed and understanding was verbalized. All questions or concerns have been addressed.     This case was discussed with Dr. Adler who is in agreement with my assessment and plan.          Scribe Attestation:   Scribe #1: I performed the above scribed service and the documentation accurately describes the services I performed. I attest to the accuracy of the note.                          Clinical Impression:     1. Right lower quadrant abdominal pain    2. Incisional hernia, without obstruction or gangrene          Disposition:   Disposition: Discharged  Condition: Stable     Scribe attestation: I, Kaylin Flores, personally performed the services described in this documentation. All medical record entries made by the scribe were at my direction and in my presence.  I have reviewed the chart and agree that the record reflects my personal performance and is accurate and  complete.                   Kaylin Flores PA-C  11/23/19 0705

## 2019-11-23 NOTE — ED TRIAGE NOTES
Pt cc Abdominal Pain, Shortness of Breath, and  Cough Pt reports x 2 days; complaining of sob; took breathing tx today; complains of lower abdmoninal pain from hernia x 1 yr, worsening today

## 2019-11-24 LAB — BACTERIA THROAT CULT: NORMAL

## 2019-11-25 ENCOUNTER — PATIENT MESSAGE (OUTPATIENT)
Dept: SURGERY | Facility: CLINIC | Age: 29
End: 2019-11-25

## 2019-12-04 ENCOUNTER — OFFICE VISIT (OUTPATIENT)
Dept: FAMILY MEDICINE | Facility: CLINIC | Age: 29
End: 2019-12-04
Payer: COMMERCIAL

## 2019-12-04 ENCOUNTER — PATIENT MESSAGE (OUTPATIENT)
Dept: FAMILY MEDICINE | Facility: CLINIC | Age: 29
End: 2019-12-04

## 2019-12-04 VITALS
HEIGHT: 62 IN | BODY MASS INDEX: 46.57 KG/M2 | RESPIRATION RATE: 17 BRPM | HEART RATE: 88 BPM | DIASTOLIC BLOOD PRESSURE: 80 MMHG | WEIGHT: 253.06 LBS | TEMPERATURE: 100 F | SYSTOLIC BLOOD PRESSURE: 118 MMHG

## 2019-12-04 DIAGNOSIS — B37.31 CANDIDAL VAGINITIS: Primary | ICD-10-CM

## 2019-12-04 DIAGNOSIS — K21.00 GERD WITH ESOPHAGITIS: ICD-10-CM

## 2019-12-04 DIAGNOSIS — E11.69 HYPERLIPIDEMIA ASSOCIATED WITH TYPE 2 DIABETES MELLITUS: ICD-10-CM

## 2019-12-04 DIAGNOSIS — I10 ESSENTIAL HYPERTENSION: ICD-10-CM

## 2019-12-04 DIAGNOSIS — E78.5 HYPERLIPIDEMIA ASSOCIATED WITH TYPE 2 DIABETES MELLITUS: ICD-10-CM

## 2019-12-04 DIAGNOSIS — E11.9 TYPE 2 DIABETES MELLITUS WITHOUT COMPLICATION, WITHOUT LONG-TERM CURRENT USE OF INSULIN: Primary | ICD-10-CM

## 2019-12-04 DIAGNOSIS — E11.65 TYPE 2 DIABETES MELLITUS WITH HYPERGLYCEMIA, WITHOUT LONG-TERM CURRENT USE OF INSULIN: ICD-10-CM

## 2019-12-04 PROCEDURE — 3079F DIAST BP 80-89 MM HG: CPT | Mod: CPTII,S$GLB,, | Performed by: FAMILY MEDICINE

## 2019-12-04 PROCEDURE — 3046F HEMOGLOBIN A1C LEVEL >9.0%: CPT | Mod: CPTII,S$GLB,, | Performed by: FAMILY MEDICINE

## 2019-12-04 PROCEDURE — 3008F BODY MASS INDEX DOCD: CPT | Mod: CPTII,S$GLB,, | Performed by: FAMILY MEDICINE

## 2019-12-04 PROCEDURE — 99214 PR OFFICE/OUTPT VISIT, EST, LEVL IV, 30-39 MIN: ICD-10-PCS | Mod: S$GLB,,, | Performed by: FAMILY MEDICINE

## 2019-12-04 PROCEDURE — 3074F PR MOST RECENT SYSTOLIC BLOOD PRESSURE < 130 MM HG: ICD-10-PCS | Mod: CPTII,S$GLB,, | Performed by: FAMILY MEDICINE

## 2019-12-04 PROCEDURE — 2024F 7 FLD RTA PHOTO EVC RTNOPTHY: CPT | Mod: S$GLB,,, | Performed by: FAMILY MEDICINE

## 2019-12-04 PROCEDURE — 99214 OFFICE O/P EST MOD 30 MIN: CPT | Mod: S$GLB,,, | Performed by: FAMILY MEDICINE

## 2019-12-04 PROCEDURE — 99999 PR PBB SHADOW E&M-EST. PATIENT-LVL III: CPT | Mod: PBBFAC,,, | Performed by: FAMILY MEDICINE

## 2019-12-04 PROCEDURE — 3008F PR BODY MASS INDEX (BMI) DOCUMENTED: ICD-10-PCS | Mod: CPTII,S$GLB,, | Performed by: FAMILY MEDICINE

## 2019-12-04 PROCEDURE — 3046F PR MOST RECENT HEMOGLOBIN A1C LEVEL > 9.0%: ICD-10-PCS | Mod: CPTII,S$GLB,, | Performed by: FAMILY MEDICINE

## 2019-12-04 PROCEDURE — 3079F PR MOST RECENT DIASTOLIC BLOOD PRESSURE 80-89 MM HG: ICD-10-PCS | Mod: CPTII,S$GLB,, | Performed by: FAMILY MEDICINE

## 2019-12-04 PROCEDURE — 99999 PR PBB SHADOW E&M-EST. PATIENT-LVL III: ICD-10-PCS | Mod: PBBFAC,,, | Performed by: FAMILY MEDICINE

## 2019-12-04 PROCEDURE — 2024F PR 7 FIELD PHOTOS WITH INTERP/ REVIEW: ICD-10-PCS | Mod: S$GLB,,, | Performed by: FAMILY MEDICINE

## 2019-12-04 PROCEDURE — 3074F SYST BP LT 130 MM HG: CPT | Mod: CPTII,S$GLB,, | Performed by: FAMILY MEDICINE

## 2019-12-04 RX ORDER — PANTOPRAZOLE SODIUM 40 MG/1
40 TABLET, DELAYED RELEASE ORAL
Qty: 60 TABLET | Refills: 2 | Status: SHIPPED | OUTPATIENT
Start: 2019-12-04 | End: 2020-03-17 | Stop reason: SDUPTHER

## 2019-12-04 RX ORDER — FLUCONAZOLE 150 MG/1
TABLET ORAL
Refills: 0 | COMMUNITY
Start: 2019-09-19 | End: 2019-12-05 | Stop reason: SDUPTHER

## 2019-12-04 RX ORDER — GLIPIZIDE 5 MG/1
5 TABLET, FILM COATED, EXTENDED RELEASE ORAL
Qty: 90 TABLET | Refills: 3 | Status: SHIPPED | OUTPATIENT
Start: 2019-12-04 | End: 2020-03-19

## 2019-12-04 RX ORDER — LISINOPRIL 2.5 MG/1
2.5 TABLET ORAL DAILY
Qty: 90 TABLET | Refills: 1 | Status: SHIPPED | OUTPATIENT
Start: 2019-12-04 | End: 2020-08-12

## 2019-12-04 NOTE — PROGRESS NOTES
Routine Office Visit    Patient Name: Glenda Peña    : 1990  MRN: [id number]    Subjective:  Glenda is a 29 y.o. female who presents today for:    1. Type 2 dm  Patient presenting today for follow up of type 2 DM.  She has not been sticking with diabetic diet.  She states she craves salt and fruit.  She ha been taking her medications as prescribed.  No hypoglycemic episodes.  No neuropathy.  No changes in vision.  She does get episodes of polydipsia.      Past Medical History  Past Medical History:   Diagnosis Date    Asthma childhood    Diabetes mellitus     GERD (gastroesophageal reflux disease)     Morbid obesity     PCOS (polycystic ovarian syndrome)        Past Surgical History  Past Surgical History:   Procedure Laterality Date    APPENDECTOMY  2011    HERNIA REPAIR      OOPHORECTOMY      salpingoopherectomy Right 2008       Family History  Family History   Problem Relation Age of Onset    Heart attack Father     Hypertension Father     Diabetes Mother     Heart attack Mother     Breast cancer Neg Hx     Colon cancer Neg Hx     Ovarian cancer Neg Hx        Social History  Social History     Socioeconomic History    Marital status:      Spouse name: Not on file    Number of children: Not on file    Years of education: Not on file    Highest education level: Not on file   Occupational History    Not on file   Social Needs    Financial resource strain: Not on file    Food insecurity:     Worry: Not on file     Inability: Not on file    Transportation needs:     Medical: Not on file     Non-medical: Not on file   Tobacco Use    Smoking status: Never Smoker    Smokeless tobacco: Never Used   Substance and Sexual Activity    Alcohol use: Yes     Comment: occasionally apple cider    Drug use: No    Sexual activity: Yes     Partners: Female   Lifestyle    Physical activity:     Days per week: Not on file     Minutes per session: Not on file    Stress: Not on file    Relationships    Social connections:     Talks on phone: Not on file     Gets together: Not on file     Attends Rastafarian service: Not on file     Active member of club or organization: Not on file     Attends meetings of clubs or organizations: Not on file     Relationship status: Not on file   Other Topics Concern    Not on file   Social History Narrative    Not on file       Current Medications  Current Outpatient Medications on File Prior to Visit   Medication Sig Dispense Refill    albuterol (VENTOLIN HFA) 90 mcg/actuation inhaler Inhale 2 puffs into the lungs every 6 (six) hours as needed for Wheezing. Rescue 18 g 0    BALZIVA, 28, 0.4-35 mg-mcg per tablet Take 1 tablet by mouth once daily.  11    fluconazole (DIFLUCAN) 150 MG Tab TAKE ONE TABLET BY MOUTH AS A ONE TIME DOSE  0    metFORMIN (GLUCOPHAGE) 500 MG tablet Take 2 tablets (1,000 mg total) by mouth 2 (two) times daily with meals. 120 tablet 0    ondansetron (ZOFRAN) 4 MG tablet Take 1 tablet (4 mg total) by mouth every 6 (six) hours as needed for Nausea. 18 tablet 0    terconazole (TERAZOL 3) 80 mg vaginal suppository Place 1 suppository (80 mg total) vaginally nightly. 6 suppository 2    traMADol (ULTRAM) 50 mg tablet Take 1 tablet (50 mg total) by mouth every 6 (six) hours as needed for Pain. 6 tablet 0    [DISCONTINUED] dulaglutide (TRULICITY) 0.75 mg/0.5 mL PnIj Inject 0.5 mLs (0.75 mg total) into the skin every 7 days. 4 Syringe 1    [DISCONTINUED] empagliflozin (JARDIANCE) 10 mg Tab Take 10 mg by mouth once daily. 30 tablet 2    [DISCONTINUED] lisinopril (PRINIVIL,ZESTRIL) 2.5 MG tablet Take 1 tablet (2.5 mg total) by mouth once daily. 90 tablet 0    [DISCONTINUED] pantoprazole (PROTONIX) 40 MG tablet Take 1 tablet (40 mg total) by mouth before breakfast. 60 tablet 2    [DISCONTINUED] ranitidine (ZANTAC) 75 MG tablet Take 75 mg by mouth 2 (two) times daily.       No current facility-administered medications on file prior to  "visit.        Allergies   Review of patient's allergies indicates:   Allergen Reactions    Shellfish containing products Hives     To hands    Latex, natural rubber Rash     Burning sensation       Review of Systems (Pertinent positives)  Review of Systems   Constitutional: Negative for weight loss.   HENT: Negative.    Eyes: Positive for blurred vision.   Respiratory: Negative.    Cardiovascular: Negative for chest pain.   Gastrointestinal: Negative.    Musculoskeletal: Negative.    Skin: Negative.    Neurological: Positive for dizziness, weakness and headaches. Negative for tremors and seizures.   Endo/Heme/Allergies: Positive for polydipsia.   Psychiatric/Behavioral: The patient is not nervous/anxious.          /80 (BP Location: Left arm, Patient Position: Sitting, BP Method: Large (Automatic))   Pulse 88   Temp 99.5 °F (37.5 °C) (Oral)   Resp 17   Ht 5' 2" (1.575 m)   Wt 114.8 kg (253 lb 1.4 oz)   BMI 46.29 kg/m²     GENERAL APPEARANCE: in no apparent distress and well developed and well nourished  HEENT: PERRL, EOMI, Sclera clear, anicteric, Oropharynx clear, no lesions, Neck supple with midline trachea  NECK: normal, supple, no adenopathy, thyroid normal in size  RESPIRATORY: appears well, vitals normal, no respiratory distress, acyanotic, normal RR, chest clear, no wheezing, crepitations, rhonchi, normal symmetric air entry  HEART: regular rate and rhythm, S1, S2 normal, no murmur, click, rub or gallop.    ABDOMEN: abdomen is soft without tenderness, no masses, no hernias, no organomegaly, no rebound, no guarding. Suprapubic tenderness absent. No CVA tenderness.  NEUROLOGIC: normal without focal findings, CN II-XII are intact.   SKIN: no rashes, no wounds, no other lesions  PSYCH: Alert, oriented x 3, thought content appropriate, speech normal, pleasant and cooperative, good eye contact, well groomed    Assessment/Plan:  Glenda Peña is a 29 y.o. female who presents today for :    Glenda " was seen today for follow-up.    Diagnoses and all orders for this visit:    Type 2 diabetes mellitus without complication, without long-term current use of insulin  -     dulaglutide (TRULICITY) 1.5 mg/0.5 mL PnIj; Inject 1.5 mg into the skin every 7 days.  -     lisinopril (PRINIVIL,ZESTRIL) 2.5 MG tablet; Take 1 tablet (2.5 mg total) by mouth once daily.  -     glipiZIDE (GLUCOTROL) 5 MG TR24; Take 1 tablet (5 mg total) by mouth daily with breakfast.    GERD with esophagitis  -     pantoprazole (PROTONIX) 40 MG tablet; Take 1 tablet (40 mg total) by mouth before breakfast.    Hyperlipidemia associated with type 2 diabetes mellitus    Essential hypertension    Type 2 diabetes mellitus with hyperglycemia, without long-term current use of insulin      1.  Increase trulicity to 1.5mg weekly  2. Medications refilled  3.  Labs to be done today  4.  Follow up 3 months or sooner if needed  5.  Refer to diabetic education      Emiliano Dunbar MD

## 2019-12-05 ENCOUNTER — PATIENT MESSAGE (OUTPATIENT)
Dept: FAMILY MEDICINE | Facility: CLINIC | Age: 29
End: 2019-12-05

## 2019-12-05 DIAGNOSIS — E11.9 TYPE 2 DIABETES MELLITUS WITHOUT COMPLICATION, WITHOUT LONG-TERM CURRENT USE OF INSULIN: ICD-10-CM

## 2019-12-05 RX ORDER — FLUCONAZOLE 150 MG/1
TABLET ORAL
Qty: 1 TABLET | Refills: 1 | Status: ON HOLD | OUTPATIENT
Start: 2019-12-05 | End: 2020-03-11 | Stop reason: HOSPADM

## 2019-12-17 ENCOUNTER — PATIENT MESSAGE (OUTPATIENT)
Dept: FAMILY MEDICINE | Facility: CLINIC | Age: 29
End: 2019-12-17

## 2019-12-30 DIAGNOSIS — E11.9 TYPE 2 DIABETES MELLITUS WITHOUT COMPLICATION, WITHOUT LONG-TERM CURRENT USE OF INSULIN: ICD-10-CM

## 2019-12-30 RX ORDER — METFORMIN HYDROCHLORIDE 500 MG/1
1000 TABLET ORAL 2 TIMES DAILY WITH MEALS
Qty: 120 TABLET | Refills: 0 | Status: ON HOLD | OUTPATIENT
Start: 2019-12-30 | End: 2020-03-10

## 2020-01-07 ENCOUNTER — PATIENT MESSAGE (OUTPATIENT)
Dept: OBSTETRICS AND GYNECOLOGY | Facility: CLINIC | Age: 30
End: 2020-01-07

## 2020-02-17 ENCOUNTER — PATIENT MESSAGE (OUTPATIENT)
Dept: SURGERY | Facility: CLINIC | Age: 30
End: 2020-02-17

## 2020-02-17 ENCOUNTER — PATIENT OUTREACH (OUTPATIENT)
Dept: ADMINISTRATIVE | Facility: OTHER | Age: 30
End: 2020-02-17

## 2020-02-18 ENCOUNTER — TELEPHONE (OUTPATIENT)
Dept: SURGERY | Facility: CLINIC | Age: 30
End: 2020-02-18

## 2020-02-18 NOTE — TELEPHONE ENCOUNTER
Tried calling patient regarding appointment today with Dr. [facility] no answer.      Delaney Currie

## 2020-02-19 ENCOUNTER — PATIENT OUTREACH (OUTPATIENT)
Dept: ADMINISTRATIVE | Facility: HOSPITAL | Age: 30
End: 2020-02-19

## 2020-02-19 LAB — A1C: 8.9

## 2020-02-27 ENCOUNTER — PATIENT MESSAGE (OUTPATIENT)
Dept: FAMILY MEDICINE | Facility: CLINIC | Age: 30
End: 2020-02-27

## 2020-03-01 ENCOUNTER — PATIENT MESSAGE (OUTPATIENT)
Dept: FAMILY MEDICINE | Facility: CLINIC | Age: 30
End: 2020-03-01

## 2020-03-05 ENCOUNTER — PATIENT MESSAGE (OUTPATIENT)
Dept: SURGERY | Facility: CLINIC | Age: 30
End: 2020-03-05

## 2020-03-05 ENCOUNTER — HOSPITAL ENCOUNTER (INPATIENT)
Facility: HOSPITAL | Age: 30
LOS: 6 days | Discharge: HOME HEALTH | DRG: 372 | End: 2020-03-12
Attending: EMERGENCY MEDICINE | Admitting: EMERGENCY MEDICINE
Payer: COMMERCIAL

## 2020-03-05 DIAGNOSIS — K43.9 VENTRAL HERNIA WITHOUT OBSTRUCTION OR GANGRENE: ICD-10-CM

## 2020-03-05 DIAGNOSIS — N76.1 CHRONIC VAGINITIS: ICD-10-CM

## 2020-03-05 DIAGNOSIS — N76.3 CHRONIC VULVITIS: ICD-10-CM

## 2020-03-05 DIAGNOSIS — I10 ESSENTIAL HYPERTENSION: ICD-10-CM

## 2020-03-05 DIAGNOSIS — E11.69 HYPERLIPIDEMIA ASSOCIATED WITH TYPE 2 DIABETES MELLITUS: ICD-10-CM

## 2020-03-05 DIAGNOSIS — K43.6 VENTRAL HERNIA WITH BOWEL OBSTRUCTION: ICD-10-CM

## 2020-03-05 DIAGNOSIS — N70.93 TUBO-OVARIAN ABSCESS: ICD-10-CM

## 2020-03-05 DIAGNOSIS — K21.9 CHRONIC GERD: ICD-10-CM

## 2020-03-05 DIAGNOSIS — E11.65 TYPE 2 DIABETES MELLITUS WITH HYPERGLYCEMIA, UNSPECIFIED WHETHER LONG TERM INSULIN USE: ICD-10-CM

## 2020-03-05 DIAGNOSIS — K46.9 ABDOMINAL HERNIA WITHOUT OBSTRUCTION AND WITHOUT GANGRENE, RECURRENCE NOT SPECIFIED, UNSPECIFIED HERNIA TYPE: ICD-10-CM

## 2020-03-05 DIAGNOSIS — R10.32 LEFT LOWER QUADRANT PAIN: ICD-10-CM

## 2020-03-05 DIAGNOSIS — A42.9 ACTINOMYCES INFECTION: ICD-10-CM

## 2020-03-05 DIAGNOSIS — Z87.42 HISTORY OF PCOS: ICD-10-CM

## 2020-03-05 DIAGNOSIS — K43.2 RECURRENT VENTRAL HERNIA: ICD-10-CM

## 2020-03-05 DIAGNOSIS — R10.2 PELVIC PAIN IN FEMALE: ICD-10-CM

## 2020-03-05 DIAGNOSIS — R10.84 GENERALIZED ABDOMINAL PAIN: ICD-10-CM

## 2020-03-05 DIAGNOSIS — R22.2 ABDOMINAL WALL MASS: ICD-10-CM

## 2020-03-05 DIAGNOSIS — K43.2 VENTRAL INCISIONAL HERNIA WITHOUT OBSTRUCTION OR GANGRENE: ICD-10-CM

## 2020-03-05 DIAGNOSIS — L68.0 HIRSUTISM: ICD-10-CM

## 2020-03-05 DIAGNOSIS — E11.65 TYPE 2 DIABETES MELLITUS WITH HYPERGLYCEMIA, WITH LONG-TERM CURRENT USE OF INSULIN: ICD-10-CM

## 2020-03-05 DIAGNOSIS — B37.31 CANDIDAL VULVITIS: ICD-10-CM

## 2020-03-05 DIAGNOSIS — E66.01 MORBID OBESITY WITH BMI OF 45.0-49.9, ADULT: ICD-10-CM

## 2020-03-05 DIAGNOSIS — E78.5 HYPERLIPIDEMIA ASSOCIATED WITH TYPE 2 DIABETES MELLITUS: ICD-10-CM

## 2020-03-05 DIAGNOSIS — K65.1 INTRA-ABDOMINAL ABSCESS: Primary | ICD-10-CM

## 2020-03-05 DIAGNOSIS — Z79.4 TYPE 2 DIABETES MELLITUS WITH HYPERGLYCEMIA, WITH LONG-TERM CURRENT USE OF INSULIN: ICD-10-CM

## 2020-03-05 DIAGNOSIS — E11.65 TYPE 2 DIABETES MELLITUS WITH HYPERGLYCEMIA, WITHOUT LONG-TERM CURRENT USE OF INSULIN: ICD-10-CM

## 2020-03-05 LAB
ALBUMIN SERPL BCP-MCNC: 2.8 G/DL (ref 3.5–5.2)
ALP SERPL-CCNC: 89 U/L (ref 55–135)
ALT SERPL W/O P-5'-P-CCNC: 25 U/L (ref 10–44)
ANION GAP SERPL CALC-SCNC: 12 MMOL/L (ref 8–16)
AST SERPL-CCNC: 26 U/L (ref 10–40)
B-HCG UR QL: NEGATIVE
BASOPHILS # BLD AUTO: 0.02 K/UL (ref 0–0.2)
BASOPHILS NFR BLD: 0.3 % (ref 0–1.9)
BILIRUB SERPL-MCNC: 0.2 MG/DL (ref 0.1–1)
BILIRUB UR QL STRIP: NEGATIVE
BUN SERPL-MCNC: 11 MG/DL (ref 6–20)
CALCIUM SERPL-MCNC: 9.6 MG/DL (ref 8.7–10.5)
CHLORIDE SERPL-SCNC: 99 MMOL/L (ref 95–110)
CLARITY UR: ABNORMAL
CO2 SERPL-SCNC: 26 MMOL/L (ref 23–29)
COLOR UR: YELLOW
CREAT SERPL-MCNC: 0.9 MG/DL (ref 0.5–1.4)
CTP QC/QA: YES
DIFFERENTIAL METHOD: ABNORMAL
EOSINOPHIL # BLD AUTO: 0.2 K/UL (ref 0–0.5)
EOSINOPHIL NFR BLD: 3 % (ref 0–8)
ERYTHROCYTE [DISTWIDTH] IN BLOOD BY AUTOMATED COUNT: 14.5 % (ref 11.5–14.5)
EST. GFR  (AFRICAN AMERICAN): >60 ML/MIN/1.73 M^2
EST. GFR  (NON AFRICAN AMERICAN): >60 ML/MIN/1.73 M^2
GLUCOSE SERPL-MCNC: 241 MG/DL (ref 70–110)
GLUCOSE UR QL STRIP: ABNORMAL
HCT VFR BLD AUTO: 32.3 % (ref 37–48.5)
HGB BLD-MCNC: 9.7 G/DL (ref 12–16)
HGB UR QL STRIP: NEGATIVE
IMM GRANULOCYTES # BLD AUTO: 0.02 K/UL (ref 0–0.04)
IMM GRANULOCYTES NFR BLD AUTO: 0.3 % (ref 0–0.5)
KETONES UR QL STRIP: NEGATIVE
LEUKOCYTE ESTERASE UR QL STRIP: NEGATIVE
LYMPHOCYTES # BLD AUTO: 2.6 K/UL (ref 1–4.8)
LYMPHOCYTES NFR BLD: 34.9 % (ref 18–48)
MCH RBC QN AUTO: 26.1 PG (ref 27–31)
MCHC RBC AUTO-ENTMCNC: 30 G/DL (ref 32–36)
MCV RBC AUTO: 87 FL (ref 82–98)
MONOCYTES # BLD AUTO: 0.5 K/UL (ref 0.3–1)
MONOCYTES NFR BLD: 6.1 % (ref 4–15)
NEUTROPHILS # BLD AUTO: 4.1 K/UL (ref 1.8–7.7)
NEUTROPHILS NFR BLD: 55.4 % (ref 38–73)
NITRITE UR QL STRIP: NEGATIVE
NRBC BLD-RTO: 0 /100 WBC
PH UR STRIP: 5 [PH] (ref 5–8)
PLATELET # BLD AUTO: 474 K/UL (ref 150–350)
PMV BLD AUTO: 9.4 FL (ref 9.2–12.9)
POTASSIUM SERPL-SCNC: 4.2 MMOL/L (ref 3.5–5.1)
PROT SERPL-MCNC: 8.8 G/DL (ref 6–8.4)
PROT UR QL STRIP: NEGATIVE
RBC # BLD AUTO: 3.72 M/UL (ref 4–5.4)
SODIUM SERPL-SCNC: 137 MMOL/L (ref 136–145)
SP GR UR STRIP: 1.02 (ref 1–1.03)
URN SPEC COLLECT METH UR: ABNORMAL
UROBILINOGEN UR STRIP-ACNC: NEGATIVE EU/DL
WBC # BLD AUTO: 7.39 K/UL (ref 3.9–12.7)

## 2020-03-05 PROCEDURE — 80053 COMPREHEN METABOLIC PANEL: CPT

## 2020-03-05 PROCEDURE — 96365 THER/PROPH/DIAG IV INF INIT: CPT

## 2020-03-05 PROCEDURE — 99285 EMERGENCY DEPT VISIT HI MDM: CPT | Mod: 25

## 2020-03-05 PROCEDURE — 83036 HEMOGLOBIN GLYCOSYLATED A1C: CPT

## 2020-03-05 PROCEDURE — 81003 URINALYSIS AUTO W/O SCOPE: CPT

## 2020-03-05 PROCEDURE — 96361 HYDRATE IV INFUSION ADD-ON: CPT

## 2020-03-05 PROCEDURE — 96376 TX/PRO/DX INJ SAME DRUG ADON: CPT

## 2020-03-05 PROCEDURE — 81025 URINE PREGNANCY TEST: CPT | Performed by: EMERGENCY MEDICINE

## 2020-03-05 PROCEDURE — 85025 COMPLETE CBC W/AUTO DIFF WBC: CPT

## 2020-03-05 PROCEDURE — 63600175 PHARM REV CODE 636 W HCPCS: Performed by: EMERGENCY MEDICINE

## 2020-03-05 PROCEDURE — 96375 TX/PRO/DX INJ NEW DRUG ADDON: CPT

## 2020-03-05 RX ORDER — OXYCODONE AND ACETAMINOPHEN 5; 325 MG/1; MG/1
TABLET ORAL
Status: ON HOLD | COMMUNITY
Start: 2020-03-01 | End: 2020-03-11 | Stop reason: HOSPADM

## 2020-03-05 RX ORDER — HYDROMORPHONE HYDROCHLORIDE 2 MG/ML
2 INJECTION, SOLUTION INTRAMUSCULAR; INTRAVENOUS; SUBCUTANEOUS
Status: COMPLETED | OUTPATIENT
Start: 2020-03-05 | End: 2020-03-05

## 2020-03-05 RX ORDER — ONDANSETRON 2 MG/ML
4 INJECTION INTRAMUSCULAR; INTRAVENOUS
Status: COMPLETED | OUTPATIENT
Start: 2020-03-05 | End: 2020-03-05

## 2020-03-05 RX ORDER — AMOXICILLIN 500 MG/1
1000 CAPSULE ORAL
Status: ON HOLD | COMMUNITY
Start: 2020-02-29 | End: 2020-03-11 | Stop reason: HOSPADM

## 2020-03-05 RX ADMIN — ONDANSETRON HYDROCHLORIDE 4 MG: 2 SOLUTION INTRAMUSCULAR; INTRAVENOUS at 11:03

## 2020-03-05 RX ADMIN — SODIUM CHLORIDE 1000 ML: 0.9 INJECTION, SOLUTION INTRAVENOUS at 11:03

## 2020-03-05 RX ADMIN — HYDROMORPHONE HYDROCHLORIDE 2 MG: 2 INJECTION, SOLUTION INTRAMUSCULAR; INTRAVENOUS; SUBCUTANEOUS at 11:03

## 2020-03-06 PROBLEM — E11.65 TYPE 2 DIABETES MELLITUS WITH HYPERGLYCEMIA: Status: ACTIVE | Noted: 2020-03-06

## 2020-03-06 PROBLEM — K65.1 INTRA-ABDOMINAL ABSCESS: Status: ACTIVE | Noted: 2020-03-06

## 2020-03-06 LAB
APPEARANCE FLD: CLEAR
BODY FLD TYPE: NORMAL
BODY FLUID COMMENTS: NORMAL
COLOR FLD: YELLOW
ESTIMATED AVG GLUCOSE: 220 MG/DL (ref 68–131)
HBA1C MFR BLD HPLC: 9.3 % (ref 4–5.6)
INR PPP: 1 (ref 0.8–1.2)
LACTATE SERPL-SCNC: 1.6 MMOL/L (ref 0.5–2.2)
LYMPHOCYTES NFR FLD MANUAL: 56 %
MONOS+MACROS NFR FLD MANUAL: 8 %
NEUTROPHILS NFR FLD MANUAL: 36 %
POCT GLUCOSE: 112 MG/DL (ref 70–110)
PROTHROMBIN TIME: 11.4 SEC (ref 9–12.5)
WBC # FLD: NORMAL /CU MM

## 2020-03-06 PROCEDURE — 11000001 HC ACUTE MED/SURG PRIVATE ROOM

## 2020-03-06 PROCEDURE — 85610 PROTHROMBIN TIME: CPT

## 2020-03-06 PROCEDURE — 25500020 PHARM REV CODE 255: Performed by: EMERGENCY MEDICINE

## 2020-03-06 PROCEDURE — 36415 COLL VENOUS BLD VENIPUNCTURE: CPT

## 2020-03-06 PROCEDURE — 63600175 PHARM REV CODE 636 W HCPCS: Performed by: PHYSICIAN ASSISTANT

## 2020-03-06 PROCEDURE — 83605 ASSAY OF LACTIC ACID: CPT

## 2020-03-06 PROCEDURE — 63600175 PHARM REV CODE 636 W HCPCS: Performed by: EMERGENCY MEDICINE

## 2020-03-06 PROCEDURE — 99223 1ST HOSP IP/OBS HIGH 75: CPT | Mod: ,,, | Performed by: PHYSICIAN ASSISTANT

## 2020-03-06 PROCEDURE — 87205 SMEAR GRAM STAIN: CPT

## 2020-03-06 PROCEDURE — 89051 BODY FLUID CELL COUNT: CPT

## 2020-03-06 PROCEDURE — 87075 CULTR BACTERIA EXCEPT BLOOD: CPT

## 2020-03-06 PROCEDURE — 63600175 PHARM REV CODE 636 W HCPCS: Performed by: OBSTETRICS & GYNECOLOGY

## 2020-03-06 PROCEDURE — 99223 PR INITIAL HOSPITAL CARE,LEVL III: ICD-10-PCS | Mod: ,,, | Performed by: PHYSICIAN ASSISTANT

## 2020-03-06 PROCEDURE — 87070 CULTURE OTHR SPECIMN AEROBIC: CPT

## 2020-03-06 PROCEDURE — 63600175 PHARM REV CODE 636 W HCPCS: Performed by: RADIOLOGY

## 2020-03-06 RX ORDER — GLUCAGON 1 MG
1 KIT INJECTION
Status: DISCONTINUED | OUTPATIENT
Start: 2020-03-06 | End: 2020-03-12 | Stop reason: HOSPADM

## 2020-03-06 RX ORDER — HYDROMORPHONE HYDROCHLORIDE 2 MG/ML
0.5 INJECTION, SOLUTION INTRAMUSCULAR; INTRAVENOUS; SUBCUTANEOUS EVERY 4 HOURS PRN
Status: DISCONTINUED | OUTPATIENT
Start: 2020-03-06 | End: 2020-03-12 | Stop reason: HOSPADM

## 2020-03-06 RX ORDER — SODIUM CHLORIDE 9 MG/ML
INJECTION, SOLUTION INTRAVENOUS CONTINUOUS
Status: DISCONTINUED | OUTPATIENT
Start: 2020-03-06 | End: 2020-03-12 | Stop reason: HOSPADM

## 2020-03-06 RX ORDER — MIDAZOLAM HYDROCHLORIDE 1 MG/ML
INJECTION INTRAMUSCULAR; INTRAVENOUS CODE/TRAUMA/SEDATION MEDICATION
Status: COMPLETED | OUTPATIENT
Start: 2020-03-06 | End: 2020-03-06

## 2020-03-06 RX ORDER — ONDANSETRON 2 MG/ML
8 INJECTION INTRAMUSCULAR; INTRAVENOUS EVERY 8 HOURS PRN
Status: DISCONTINUED | OUTPATIENT
Start: 2020-03-06 | End: 2020-03-12 | Stop reason: HOSPADM

## 2020-03-06 RX ORDER — INSULIN ASPART 100 [IU]/ML
1-10 INJECTION, SOLUTION INTRAVENOUS; SUBCUTANEOUS EVERY 6 HOURS PRN
Status: DISCONTINUED | OUTPATIENT
Start: 2020-03-06 | End: 2020-03-12 | Stop reason: HOSPADM

## 2020-03-06 RX ORDER — FENTANYL CITRATE 50 UG/ML
INJECTION, SOLUTION INTRAMUSCULAR; INTRAVENOUS CODE/TRAUMA/SEDATION MEDICATION
Status: COMPLETED | OUTPATIENT
Start: 2020-03-06 | End: 2020-03-06

## 2020-03-06 RX ORDER — ONDANSETRON 2 MG/ML
4 INJECTION INTRAMUSCULAR; INTRAVENOUS EVERY 8 HOURS PRN
Status: DISCONTINUED | OUTPATIENT
Start: 2020-03-06 | End: 2020-03-06

## 2020-03-06 RX ORDER — HYDROMORPHONE HYDROCHLORIDE 2 MG/ML
1 INJECTION, SOLUTION INTRAMUSCULAR; INTRAVENOUS; SUBCUTANEOUS EVERY 4 HOURS PRN
Status: DISCONTINUED | OUTPATIENT
Start: 2020-03-06 | End: 2020-03-12 | Stop reason: HOSPADM

## 2020-03-06 RX ADMIN — ONDANSETRON HYDROCHLORIDE 8 MG: 2 SOLUTION INTRAMUSCULAR; INTRAVENOUS at 03:03

## 2020-03-06 RX ADMIN — HYDROMORPHONE HYDROCHLORIDE 1 MG: 2 INJECTION, SOLUTION INTRAMUSCULAR; INTRAVENOUS; SUBCUTANEOUS at 10:03

## 2020-03-06 RX ADMIN — HYDROMORPHONE HYDROCHLORIDE 1 MG: 2 INJECTION, SOLUTION INTRAMUSCULAR; INTRAVENOUS; SUBCUTANEOUS at 06:03

## 2020-03-06 RX ADMIN — FENTANYL CITRATE 50 MCG: 50 INJECTION, SOLUTION INTRAMUSCULAR; INTRAVENOUS at 04:03

## 2020-03-06 RX ADMIN — HYDROMORPHONE HYDROCHLORIDE 1 MG: 2 INJECTION, SOLUTION INTRAMUSCULAR; INTRAVENOUS; SUBCUTANEOUS at 03:03

## 2020-03-06 RX ADMIN — PIPERACILLIN AND TAZOBACTAM 4.5 G: 4; .5 INJECTION, POWDER, FOR SOLUTION INTRAVENOUS at 10:03

## 2020-03-06 RX ADMIN — PIPERACILLIN AND TAZOBACTAM 4.5 G: 4; .5 INJECTION, POWDER, FOR SOLUTION INTRAVENOUS at 01:03

## 2020-03-06 RX ADMIN — HYDROMORPHONE HYDROCHLORIDE 1 MG: 2 INJECTION, SOLUTION INTRAMUSCULAR; INTRAVENOUS; SUBCUTANEOUS at 02:03

## 2020-03-06 RX ADMIN — FENTANYL CITRATE 50 MCG: 50 INJECTION, SOLUTION INTRAMUSCULAR; INTRAVENOUS at 05:03

## 2020-03-06 RX ADMIN — MIDAZOLAM HYDROCHLORIDE 1 MG: 1 INJECTION, SOLUTION INTRAMUSCULAR; INTRAVENOUS at 04:03

## 2020-03-06 RX ADMIN — IOHEXOL 85 ML: 350 INJECTION, SOLUTION INTRAVENOUS at 12:03

## 2020-03-06 RX ADMIN — AMPICILLIN SODIUM AND SULBACTAM SODIUM 3 G: 2; 1 INJECTION, POWDER, FOR SOLUTION INTRAMUSCULAR; INTRAVENOUS at 06:03

## 2020-03-06 RX ADMIN — SODIUM CHLORIDE: 0.9 INJECTION, SOLUTION INTRAVENOUS at 02:03

## 2020-03-06 RX ADMIN — SODIUM CHLORIDE: 0.9 INJECTION, SOLUTION INTRAVENOUS at 03:03

## 2020-03-06 RX ADMIN — MIDAZOLAM HYDROCHLORIDE 1 MG: 1 INJECTION, SOLUTION INTRAMUSCULAR; INTRAVENOUS at 05:03

## 2020-03-06 NOTE — CONSULTS
Inpatient Radiology Pre-procedure Note    History of Present Illness:  Glenda Peña is a 29 y.o. female with h/o recent admission to [facility] (2/17/20) for tx of left TOA including percutaneous drainage catheter placement with Cx growing actinomyces and IV Abx. Pt subsequently d/c with Amox which she continues to this admission.     Pt now admitted with residual/recurrent Lt TOA with new CT showing multiple fluid collection concerning for abscesses as described: 0.9 x 1.5 x 2.1-cm in rectovaginal space, 1.3 x 2.0 x 5.1-cm surrounding the cecum and 1.7 x 0.8 x 3.1-cm anterior to the cecum within a large ventral hernia.     New inpatient IR consult placed to assess for potential image-guided decompression of these collections.     Admission H&P reviewed.  Past Medical History:   Diagnosis Date    Asthma childhood    Diabetes mellitus     GERD (gastroesophageal reflux disease)     Morbid obesity     PCOS (polycystic ovarian syndrome)      Past Surgical History:   Procedure Laterality Date    APPENDECTOMY  2011    HERNIA REPAIR      OOPHORECTOMY      salpingoopherectomy Right 2008     Review of Systems:   As documented in primary team H&P    Home Meds:   Prior to Admission medications    Medication Sig Start Date End Date Taking? Authorizing Provider   amoxicillin (AMOXIL) 500 MG capsule Take 1,000 mg by mouth. 2/29/20 3/30/20 Yes Historical Provider, MD   dulaglutide (TRULICITY) 1.5 mg/0.5 mL PnIj Inject 1.5 mg into the skin every 7 days. 12/5/19  Yes Emiliano Dunbar MD   glipiZIDE (GLUCOTROL) 5 MG TR24 Take 1 tablet (5 mg total) by mouth daily with breakfast. 12/4/19 12/3/20 Yes Emiliano Dunbar MD   lisinopril (PRINIVIL,ZESTRIL) 2.5 MG tablet Take 1 tablet (2.5 mg total) by mouth once daily. 12/4/19 12/3/20 Yes Emiliano Dunbar MD   metFORMIN (GLUCOPHAGE) 500 MG tablet Take 2 tablets (1,000 mg total) by mouth 2 (two) times daily with meals. 12/30/19  Yes Emiliano Dunbar MD   oxyCODONE-acetaminophen  (PERCOCET) 5-325 mg per tablet  3/1/20  Yes Historical Provider, MD   pantoprazole (PROTONIX) 40 MG tablet Take 1 tablet (40 mg total) by mouth before breakfast. 12/4/19 12/3/20 Yes Emiliano Dunbar MD   albuterol (VENTOLIN HFA) 90 mcg/actuation inhaler Inhale 2 puffs into the lungs every 6 (six) hours as needed for Wheezing. Rescue 8/28/19 8/27/20  MD JESSICA Rico, 28, 0.4-35 mg-mcg per tablet Take 1 tablet by mouth once daily. 8/2/19   Historical Provider, MD   fluconazole (DIFLUCAN) 150 MG Tab TAKE ONE TABLET BY MOUTH AS A ONE TIME DOSE 12/5/19   Emiliano Dunbar MD   ondansetron (ZOFRAN) 4 MG tablet Take 1 tablet (4 mg total) by mouth every 6 (six) hours as needed for Nausea. 8/28/19   Emiliano Dunbar MD   terconazole (TERAZOL 3) 80 mg vaginal suppository Place 1 suppository (80 mg total) vaginally nightly. 7/23/19   Prasad Espinoza MD   traMADol (ULTRAM) 50 mg tablet Take 1 tablet (50 mg total) by mouth every 6 (six) hours as needed for Pain. 11/22/19 3/6/20  Kaylin Flores PA-C     Scheduled Meds:    piperacillin-tazobactam (ZOSYN) IVPB  4.5 g Intravenous Q8H     Continuous Infusions:    sodium chloride 0.9% 100 mL/hr at 03/06/20 0357     PRN Meds:dextrose 50%, glucagon (human recombinant), HYDROmorphone, HYDROmorphone, influenza, insulin aspart U-100, ondansetron, pneumoc 13-mary conj-dip cr(PF)     Anticoagulants/Antiplatelets: no anticoagulation    Allergies:   Review of patient's allergies indicates:   Allergen Reactions    Shellfish containing products Hives     To hands    Latex, natural rubber Rash     Burning sensation     Sedation Hx: have not been any systemic reactions    Labs:  No results for input(s): INR in the last 168 hours.    Invalid input(s):  PT,  PTT    Recent Labs   Lab 03/05/20  2300   WBC 7.39   HGB 9.7*   HCT 32.3*   MCV 87   *      Recent Labs   Lab 03/05/20  2300   *      K 4.2   CL 99   CO2 26   BUN 11   CREATININE 0.9   CALCIUM 9.6   ALT 25    AST 26   ALBUMIN 2.8*   BILITOT 0.2     Vitals:  Temp: 97.8 °F (36.6 °C) (03/06/20 1119)  Pulse: 97 (03/06/20 1119)  Resp: 19 (03/06/20 1119)  BP: 109/60 (03/06/20 1119)  SpO2: (!) 93 % (03/06/20 1119)       Physical Exam:  ASA: III  Mallampati: III    General: no acute distress  Mental Status: alert and oriented to person, place and time  HEENT: normocephalic, atraumatic  Chest: unlabored breathing  Heart: regular heart rate  Abdomen: nondistended. +pain without TTP/r/g  Extremity: moves all extremities    A/P:  29 y.o. female with h/o recent admission to [facility] (2/17/20) for tx of left TOA including percutaneous drainage catheter placement with Cx growing actinomyces who presents with residual/recurrent Lt TOA with new CT showing multiple fluid collection concerning for abscesses.     1. Multiple intra-peritoneal abscesses - Sizable left adnexal TOA appears to be in location and of appropriate size for safe percutaneous access and drainage catheter placement.     However, the 1.3 x 2.0 x 5.1-cm surrounding the cecum is immediately against cecum at all locations with only 1.3-cm distance from abscess surface to colonic surface. Pt body habitus and location within a large ventral hernia both make safe guidance of needle very difficult and may make potential catheter placement extremely difficult if not impossible. Furthermore, small diameter of 1.3-cm to colon markedly increases risk of bowel perforation.     Adjacent 1.7 x 0.8 x 3.1-cm abscess wthin large ventral hernia again in location that would make access difficult and appears to be too small to place drain but, no bowel in the proximity to risk perforation.    0.9 x 1.5 x 2.1-cm in rectovaginal space against uterus is potentially accessible however with high risk of inadvertent uterine perforation and too small to accept a drainage catheter. Size of this collection may be amenable to conservative tx with IV Abx.     Will attempt CT-guided placement of a  percutaneous abscess drainage catheter into the left TOA under moderate conscious sedation.     Will likely attempt to aspirate fluid from the 1.7 x 0.8 x 3.1-cm collection in the ventral hernia, not adjacent to bowel and consider aspiration of the chapo-cecal collection pending results/difficulty encounter attempting to access with the 1st aspiration attempt.     Risks (including, but not limited to, pain, bleeding, infection, damage to nearby structures, failure to obtain sufficient material for a diagnosis, the need for additional procedures, and death), benefits, and alternatives were discussed with the patient. All questions were answered to the best of my abilities. The patient wishes to proceed with the procedure. Written informed consent was obtained.    Thank you for considering IR for the care of your patient.     Tommy White MD  Interventional Radiology

## 2020-03-06 NOTE — HPI
29 y.o.  presents to er c/o abdominal pain. Pt recently discharged from  after receiving ir drainage of left toa and iv abx for actinomyces. Pt also has large ventral hernias. Pt denies f/c/n/v or vb.

## 2020-03-06 NOTE — ED NOTES
Per MD, no order for blood cultures before antibiotic administration. Okay to start antibiotics at this time per MD without cultures.

## 2020-03-06 NOTE — H&P
29 y.o.  presents to er c/o abdominal pain. Pt recently discharged from  after receiving ir drainage of left toa and iv abx for actinomyces. Pt also has large ventral hernias. Pt denies f/c/n/v or vb.    Past Medical History:   Diagnosis Date    Asthma childhood    Diabetes mellitus     GERD (gastroesophageal reflux disease)     Morbid obesity     PCOS (polycystic ovarian syndrome)      Past Surgical History:   Procedure Laterality Date    APPENDECTOMY  2011    HERNIA REPAIR      OOPHORECTOMY      salpingoopherectomy Right      Family History   Problem Relation Age of Onset    Heart attack Father     Hypertension Father     Diabetes Mother     Heart attack Mother     Breast cancer Neg Hx     Colon cancer Neg Hx     Ovarian cancer Neg Hx      Review of patient's allergies indicates:   Allergen Reactions    Shellfish containing products Hives     To hands    Latex, natural rubber Rash     Burning sensation       Current Facility-Administered Medications:     0.9%  NaCl infusion, , Intravenous, Continuous, Rubens Emery MD, Last Rate: 100 mL/hr at 20 0357    hydromorphone (PF) injection 0.5 mg, 0.5 mg, Intravenous, Q4H PRN, Rubens Emery MD    hydromorphone (PF) injection 1 mg, 1 mg, Intravenous, Q4H PRN, Rubens Emery MD, 1 mg at 20 1053    influenza (QUADRIVALENT PF) vaccine 0.5 mL, 0.5 mL, Intramuscular, vaccine x 1 dose, Khai Parks MD    ondansetron injection 8 mg, 8 mg, Intravenous, Q8H PRN, Maryann Lopez MD    piperacillin-tazobactam 4.5 g in sodium chloride 0.9% 100 mL IVPB (ready to mix system), 4.5 g, Intravenous, Q8H, Rubens Emery MD, Last Rate: 25 mL/hr at 20 1045, 4.5 g at 20 1045    pneumoc 13-mary conj-dip cr(PF) (PREVNAR 13 (PF)) 0.5 mL, 0.5 mL, Intramuscular, vaccine x 1 dose, Khai Parks MD  Social History     Socioeconomic History    Marital status:      Spouse name: Not on file    Number of children:  Not on file    Years of education: Not on file    Highest education level: Not on file   Occupational History    Not on file   Social Needs    Financial resource strain: Not on file    Food insecurity:     Worry: Not on file     Inability: Not on file    Transportation needs:     Medical: Not on file     Non-medical: Not on file   Tobacco Use    Smoking status: Never Smoker    Smokeless tobacco: Never Used   Substance and Sexual Activity    Alcohol use: Not Currently     Comment: occasionally apple cider    Drug use: No    Sexual activity: Yes     Partners: Female   Lifestyle    Physical activity:     Days per week: Not on file     Minutes per session: Not on file    Stress: Not on file   Relationships    Social connections:     Talks on phone: Not on file     Gets together: Not on file     Attends Congregation service: Not on file     Active member of club or organization: Not on file     Attends meetings of clubs or organizations: Not on file     Relationship status: Not on file   Other Topics Concern    Not on file   Social History Narrative    Not on file         Vitals:    03/06/20 0751   BP: 134/72   Pulse: 96   Resp: 19   Temp: 97.3 °F (36.3 °C)     General Appearance: Alert, appropriate appearance for age. No acute distress.  Gastrointestinal Exam: obese, mildly tender to palpation, large right ventral hernias, no guarding or rebound  Pelvic Exam Female: Exam deferred.   Psychiatric Exam: Alert and oriented. Appropriate affect.    Labs/ ct reviewed    Assessment: left toa/ possible intra-abdominal abscess/ actinomyces/ large right ventral hernias/ uncontrolled dm  Plan: continue iv abx/ ir consult/ / id consult/ surgery consult/ medicine consult

## 2020-03-06 NOTE — HOSPITAL COURSE
29 years ole female is admitted for  Intraabdominal abscess,managed by primary OBGYN and surgery team,she has history of DM 2,hosptal medicine is consulted for DM management.she has blood sugar of 247 and last HbA1C 2/2020 was 8.9,started patient on SSI.

## 2020-03-06 NOTE — HPI
30 y/o F with RLQ pain for the past 3-4 days. She was recently admitted at [facility] on 02/17/2020 for lower abdominal pain and diagnosed with tubo-ovarian abscess, which was treated with pigtail catheter and IV antibiotics.  Cultures grew out Actinomyces, currently taking amoxicillin.  She has continued to have chronic pain since the surgery, but now the pain has localized to the right side.     She has a right-sided ventral wall hernia from previous open appendectomy. This was repaired in 2016 using Strattice, but has recurred.

## 2020-03-06 NOTE — ED NOTES
MD at bedside discussing results and plan of care with patient and patient's spouse. Both verbalized understanding.    Pt currently c/o pain 7/10 but denies wanting any more pain medicine at this time.

## 2020-03-06 NOTE — PROGRESS NOTES
Discussed POC re: placement of drain per doctor's notes. Advised patient needs to remain NPO. Understanding voiced. Family members at bedside. OSMAR.

## 2020-03-06 NOTE — SUBJECTIVE & OBJECTIVE
No current facility-administered medications on file prior to encounter.      Current Outpatient Medications on File Prior to Encounter   Medication Sig    amoxicillin (AMOXIL) 500 MG capsule Take 1,000 mg by mouth.    dulaglutide (TRULICITY) 1.5 mg/0.5 mL PnIj Inject 1.5 mg into the skin every 7 days.    glipiZIDE (GLUCOTROL) 5 MG TR24 Take 1 tablet (5 mg total) by mouth daily with breakfast.    lisinopril (PRINIVIL,ZESTRIL) 2.5 MG tablet Take 1 tablet (2.5 mg total) by mouth once daily.    metFORMIN (GLUCOPHAGE) 500 MG tablet Take 2 tablets (1,000 mg total) by mouth 2 (two) times daily with meals.    oxyCODONE-acetaminophen (PERCOCET) 5-325 mg per tablet     pantoprazole (PROTONIX) 40 MG tablet Take 1 tablet (40 mg total) by mouth before breakfast.    albuterol (VENTOLIN HFA) 90 mcg/actuation inhaler Inhale 2 puffs into the lungs every 6 (six) hours as needed for Wheezing. Rescue    BALZIVA, 28, 0.4-35 mg-mcg per tablet Take 1 tablet by mouth once daily.    fluconazole (DIFLUCAN) 150 MG Tab TAKE ONE TABLET BY MOUTH AS A ONE TIME DOSE    ondansetron (ZOFRAN) 4 MG tablet Take 1 tablet (4 mg total) by mouth every 6 (six) hours as needed for Nausea.    terconazole (TERAZOL 3) 80 mg vaginal suppository Place 1 suppository (80 mg total) vaginally nightly.    [DISCONTINUED] traMADol (ULTRAM) 50 mg tablet Take 1 tablet (50 mg total) by mouth every 6 (six) hours as needed for Pain.       Review of patient's allergies indicates:   Allergen Reactions    Shellfish containing products Hives     To hands    Latex, natural rubber Rash     Burning sensation       Past Medical History:   Diagnosis Date    Asthma childhood    Diabetes mellitus     GERD (gastroesophageal reflux disease)     Morbid obesity     PCOS (polycystic ovarian syndrome)      Past Surgical History:   Procedure Laterality Date    APPENDECTOMY  2011    HERNIA REPAIR      OOPHORECTOMY      salpingoopherectomy Right 2008     Family History      Problem Relation (Age of Onset)    Diabetes Mother    Heart attack Father, Mother    Hypertension Father        Tobacco Use    Smoking status: Never Smoker    Smokeless tobacco: Never Used   Substance and Sexual Activity    Alcohol use: Not Currently     Comment: occasionally apple cider    Drug use: No    Sexual activity: Yes     Partners: Female     Review of Systems   Constitutional: Positive for fatigue. Negative for chills and fever.   HENT: Negative.    Eyes: Negative.    Respiratory: Negative.    Cardiovascular: Negative.    Gastrointestinal: Positive for abdominal pain and diarrhea. Negative for nausea and vomiting.   Endocrine: Negative.    Genitourinary: Negative.    Musculoskeletal: Negative.    Allergic/Immunologic: Negative.    Neurological: Negative.    Hematological: Negative.    Psychiatric/Behavioral: Negative.      Objective:     Vital Signs (Most Recent):  Temp: 97.8 °F (36.6 °C) (03/06/20 1119)  Pulse: 97 (03/06/20 1119)  Resp: 19 (03/06/20 1119)  BP: 109/60 (03/06/20 1119)  SpO2: (!) 93 % (03/06/20 1119) Vital Signs (24h Range):  Temp:  [97.3 °F (36.3 °C)-99.5 °F (37.5 °C)] 97.8 °F (36.6 °C)  Pulse:  [] 97  Resp:  [17-20] 19  SpO2:  [93 %-98 %] 93 %  BP: (109-153)/(56-88) 109/60     Weight: 112.5 kg (248 lb)  Body mass index is 45.36 kg/m².    Physical Exam   Constitutional: She is oriented to person, place, and time. She appears well-developed and well-nourished.   HENT:   Head: Normocephalic and atraumatic.   Eyes: Conjunctivae and EOM are normal.   Neck: Normal range of motion.   Cardiovascular: Normal rate and regular rhythm.   Pulmonary/Chest: Effort normal and breath sounds normal.   Abdominal: Soft.   Large right sided incisional hernia, reducible, abdomen is tender in this area in addition lower pelvic tenderness. No overlying skin changes   Musculoskeletal: Normal range of motion.   Neurological: She is alert and oriented to person, place, and time.   Skin: Skin is warm  and dry.   Psychiatric: She has a normal mood and affect.       Significant Labs:  CBC:   Recent Labs   Lab 03/05/20  2300   WBC 7.39   RBC 3.72*   HGB 9.7*   HCT 32.3*   *   MCV 87   MCH 26.1*   MCHC 30.0*     BMP:   Recent Labs   Lab 03/05/20  2300   *      K 4.2   CL 99   CO2 26   BUN 11   CREATININE 0.9   CALCIUM 9.6     Recent Labs   Lab 03/05/20  2244   COLORU Yellow   SPECGRAV 1.020   PHUR 5.0   PROTEINUA Negative   NITRITE Negative   LEUKOCYTESUR Negative   UROBILINOGEN Negative       Significant Diagnostics:  CT: I have reviewed all pertinent results/findings within the past 24 hours and my personal findings are:  fluid collections adjacent to the right colon, pelvic fluid collection

## 2020-03-06 NOTE — PLAN OF CARE
POC discussed. Understanding voiced. Whiteboard updated with Spectralink. OSMAR    AML (acute myeloblastic leukemia)

## 2020-03-06 NOTE — ED TRIAGE NOTES
Pt presents to ED via POV with c/o abdominal pain x several days that worsened tonight. Pt reports being discharged from the hospital 2/29/20 after a diagnosis of a left sided tubo-ovarian abscess. She states the abscess was drained and sent for cultures and found to be infected. She is taking oral antibiotics at home and was prescribed pain medicine as well. She reports she has had pain since being discharged but the pain worsened tonight. It is RLQ cramping pain. She took two percocet 5's around 7 pm without relief in symptoms. She c/o waves of nausea but denies vomiting. She c/o some diarrhea which she attributes to the antibiotics. She reports having a hx of hernia repair in the same area as her current pain. Pt appears uncomfortable but in no acute distress; family at bedside.

## 2020-03-06 NOTE — SUBJECTIVE & OBJECTIVE
Past Medical History:   Diagnosis Date    Asthma childhood    Diabetes mellitus     GERD (gastroesophageal reflux disease)     Morbid obesity     PCOS (polycystic ovarian syndrome)        Past Surgical History:   Procedure Laterality Date    APPENDECTOMY  2011    HERNIA REPAIR      OOPHORECTOMY      salpingoopherectomy Right 2008       Review of patient's allergies indicates:   Allergen Reactions    Shellfish containing products Hives     To hands    Latex, natural rubber Rash     Burning sensation       No current facility-administered medications on file prior to encounter.      Current Outpatient Medications on File Prior to Encounter   Medication Sig    amoxicillin (AMOXIL) 500 MG capsule Take 1,000 mg by mouth.    dulaglutide (TRULICITY) 1.5 mg/0.5 mL PnIj Inject 1.5 mg into the skin every 7 days.    glipiZIDE (GLUCOTROL) 5 MG TR24 Take 1 tablet (5 mg total) by mouth daily with breakfast.    lisinopril (PRINIVIL,ZESTRIL) 2.5 MG tablet Take 1 tablet (2.5 mg total) by mouth once daily.    metFORMIN (GLUCOPHAGE) 500 MG tablet Take 2 tablets (1,000 mg total) by mouth 2 (two) times daily with meals.    oxyCODONE-acetaminophen (PERCOCET) 5-325 mg per tablet     pantoprazole (PROTONIX) 40 MG tablet Take 1 tablet (40 mg total) by mouth before breakfast.    albuterol (VENTOLIN HFA) 90 mcg/actuation inhaler Inhale 2 puffs into the lungs every 6 (six) hours as needed for Wheezing. Rescue    BALZIVA, 28, 0.4-35 mg-mcg per tablet Take 1 tablet by mouth once daily.    fluconazole (DIFLUCAN) 150 MG Tab TAKE ONE TABLET BY MOUTH AS A ONE TIME DOSE    ondansetron (ZOFRAN) 4 MG tablet Take 1 tablet (4 mg total) by mouth every 6 (six) hours as needed for Nausea.    terconazole (TERAZOL 3) 80 mg vaginal suppository Place 1 suppository (80 mg total) vaginally nightly.    [DISCONTINUED] traMADol (ULTRAM) 50 mg tablet Take 1 tablet (50 mg total) by mouth every 6 (six) hours as needed for Pain.     Family  History     Problem Relation (Age of Onset)    Diabetes Mother    Heart attack Father, Mother    Hypertension Father        Tobacco Use    Smoking status: Never Smoker    Smokeless tobacco: Never Used   Substance and Sexual Activity    Alcohol use: Not Currently     Comment: occasionally apple cider    Drug use: No    Sexual activity: Yes     Partners: Female     Review of Systems   Constitutional: Negative for activity change and appetite change.   HENT: Negative for congestion and dental problem.    Eyes: Negative for discharge and itching.   Respiratory: Negative for apnea and chest tightness.    Cardiovascular: Negative for chest pain and leg swelling.   Gastrointestinal: Positive for abdominal pain. Negative for abdominal distention.   Endocrine: Negative for cold intolerance, heat intolerance and polydipsia.   Genitourinary: Negative for difficulty urinating and dyspareunia.   Musculoskeletal: Negative for arthralgias and back pain.   Skin: Negative for color change and pallor.   Allergic/Immunologic: Negative for environmental allergies and food allergies.   Neurological: Negative for dizziness and facial asymmetry.   Hematological: Negative for adenopathy. Does not bruise/bleed easily.   Psychiatric/Behavioral: Negative for agitation and behavioral problems.     Objective:     Vital Signs (Most Recent):  Temp: 97.8 °F (36.6 °C) (03/06/20 1119)  Pulse: 97 (03/06/20 1119)  Resp: 19 (03/06/20 1119)  BP: 109/60 (03/06/20 1119)  SpO2: (!) 93 % (03/06/20 1119) Vital Signs (24h Range):  Temp:  [97.3 °F (36.3 °C)-99.5 °F (37.5 °C)] 97.8 °F (36.6 °C)  Pulse:  [] 97  Resp:  [17-20] 19  SpO2:  [93 %-98 %] 93 %  BP: (109-153)/(56-88) 109/60     Weight: 112.5 kg (248 lb)  Body mass index is 45.36 kg/m².    Physical Exam   Constitutional: She is oriented to person, place, and time. No distress.   HENT:   Head: Atraumatic.   Eyes: Pupils are equal, round, and reactive to light. EOM are normal.   Neck: Normal  range of motion. Neck supple.   Cardiovascular: Normal rate and normal heart sounds.   Pulmonary/Chest: Effort normal and breath sounds normal.   Abdominal: Soft.   Musculoskeletal: Normal range of motion. She exhibits no edema or deformity.   Neurological: She is oriented to person, place, and time. Coordination normal.   Skin: Skin is warm and dry.   Psychiatric: She has a normal mood and affect. Her behavior is normal.       Significant Labs:   BMP:   Recent Labs   Lab 03/05/20  2300   *      K 4.2   CL 99   CO2 26   BUN 11   CREATININE 0.9   CALCIUM 9.6     CBC:   Recent Labs   Lab 03/05/20  2300   WBC 7.39   HGB 9.7*   HCT 32.3*   *       Significant Imaging:reviewed.

## 2020-03-06 NOTE — ASSESSMENT & PLAN NOTE
30 y/o female with DMII presents with abdominal pain found to have intra-peritoneal abscess. She was recently admitted to [facility] (2/17/2020) for similar symptoms found to have left tuboovarian abscess s/p percutaneous drainage catheter. Cultures +actinomyces. She was discharged with oral amoxicillin. Her catheter was removed prior to discharged.     CT here showing multiple fluid collection concerning for abscesses as described: 0.9 x 1.5 x 2.1-cm in rectovaginal space, 1.3 x 2.0 x 5.1-cm surrounding the cecum and 1.7 x 0.8 x 3.1-cm anterior to the cecum within a large ventral hernia.     Seen by IR and will attempt to aspirate the fluid today. General surgery following. She is currently on zosyn. No fever chills or night sweats.       Recommendations  1. Will discontinue zosyn and de escalate to unasyn as without pseudomonal growth  2. Agree with IR aspiration today. Please send fluid for cell count and cultures  3. Will tailor abx accordingly  4. Discussed with ID staff. ID will follow closely with you

## 2020-03-06 NOTE — CONSULTS
[facility]  General Surgery  Consult Note    Patient Name: Glenda Peña  MRN: [id number]  Code Status: Full Code  Admission Date: 3/5/2020  Hospital Length of Stay: 0 days  Attending Physician: Khai Parks MD  Primary Care Provider: Emiliano Dunbar MD    Patient information was obtained from patient and ER records.     Consults  Subjective:     Principal Problem: <principal problem not specified>    History of Present Illness: 30 y/o F with RLQ pain for the past 3-4 days. She was recently admitted at [facility] on 02/17/2020 for lower abdominal pain and diagnosed with tubo-ovarian abscess, which was treated with pigtail catheter and IV antibiotics.  Cultures grew out Actinomyces, currently taking amoxicillin.   She has continued to have chronic pain since the surgery, but now the pain has localized to the right side.     She has a right-sided ventral wall hernia from previous open appendectomy. This was repaired in 2016 using Strattice, but has recurred.    No current facility-administered medications on file prior to encounter.      Current Outpatient Medications on File Prior to Encounter   Medication Sig    amoxicillin (AMOXIL) 500 MG capsule Take 1,000 mg by mouth.    dulaglutide (TRULICITY) 1.5 mg/0.5 mL PnIj Inject 1.5 mg into the skin every 7 days.    glipiZIDE (GLUCOTROL) 5 MG TR24 Take 1 tablet (5 mg total) by mouth daily with breakfast.    lisinopril (PRINIVIL,ZESTRIL) 2.5 MG tablet Take 1 tablet (2.5 mg total) by mouth once daily.    metFORMIN (GLUCOPHAGE) 500 MG tablet Take 2 tablets (1,000 mg total) by mouth 2 (two) times daily with meals.    oxyCODONE-acetaminophen (PERCOCET) 5-325 mg per tablet     pantoprazole (PROTONIX) 40 MG tablet Take 1 tablet (40 mg total) by mouth before breakfast.    albuterol (VENTOLIN HFA) 90 mcg/actuation inhaler Inhale 2 puffs into the lungs every 6 (six) hours as needed for Wheezing. Rescue    BALZIVA, 28, 0.4-35 mg-mcg per  tablet Take 1 tablet by mouth once daily.    fluconazole (DIFLUCAN) 150 MG Tab TAKE ONE TABLET BY MOUTH AS A ONE TIME DOSE    ondansetron (ZOFRAN) 4 MG tablet Take 1 tablet (4 mg total) by mouth every 6 (six) hours as needed for Nausea.    terconazole (TERAZOL 3) 80 mg vaginal suppository Place 1 suppository (80 mg total) vaginally nightly.    [DISCONTINUED] traMADol (ULTRAM) 50 mg tablet Take 1 tablet (50 mg total) by mouth every 6 (six) hours as needed for Pain.       Review of patient's allergies indicates:   Allergen Reactions    Shellfish containing products Hives     To hands    Latex, natural rubber Rash     Burning sensation       Past Medical History:   Diagnosis Date    Asthma childhood    Diabetes mellitus     GERD (gastroesophageal reflux disease)     Morbid obesity     PCOS (polycystic ovarian syndrome)      Past Surgical History:   Procedure Laterality Date    APPENDECTOMY  2011    HERNIA REPAIR      OOPHORECTOMY      salpingoopherectomy Right 2008     Family History     Problem Relation (Age of Onset)    Diabetes Mother    Heart attack Father, Mother    Hypertension Father        Tobacco Use    Smoking status: Never Smoker    Smokeless tobacco: Never Used   Substance and Sexual Activity    Alcohol use: Not Currently     Comment: occasionally apple cider    Drug use: No    Sexual activity: Yes     Partners: Female     Review of Systems   Constitutional: Positive for fatigue. Negative for chills and fever.   HENT: Negative.    Eyes: Negative.    Respiratory: Negative.    Cardiovascular: Negative.    Gastrointestinal: Positive for abdominal pain and diarrhea. Negative for nausea and vomiting.   Endocrine: Negative.    Genitourinary: Negative.    Musculoskeletal: Negative.    Allergic/Immunologic: Negative.    Neurological: Negative.    Hematological: Negative.    Psychiatric/Behavioral: Negative.      Objective:     Vital Signs (Most Recent):  Temp: 97.8 °F (36.6 °C) (03/06/20  1119)  Pulse: 97 (03/06/20 1119)  Resp: 19 (03/06/20 1119)  BP: 109/60 (03/06/20 1119)  SpO2: (!) 93 % (03/06/20 1119) Vital Signs (24h Range):  Temp:  [97.3 °F (36.3 °C)-99.5 °F (37.5 °C)] 97.8 °F (36.6 °C)  Pulse:  [] 97  Resp:  [17-20] 19  SpO2:  [93 %-98 %] 93 %  BP: (109-153)/(56-88) 109/60     Weight: 112.5 kg (248 lb)  Body mass index is 45.36 kg/m².    Physical Exam   Constitutional: She is oriented to person, place, and time. She appears well-developed and well-nourished.   HENT:   Head: Normocephalic and atraumatic.   Eyes: Conjunctivae and EOM are normal.   Neck: Normal range of motion.   Cardiovascular: Normal rate and regular rhythm.   Pulmonary/Chest: Effort normal and breath sounds normal.   Abdominal: Soft.   Large right sided incisional hernia, reducible, abdomen is tender in this area in addition lower pelvic tenderness. No overlying skin changes   Musculoskeletal: Normal range of motion.   Neurological: She is alert and oriented to person, place, and time.   Skin: Skin is warm and dry.   Psychiatric: She has a normal mood and affect.       Significant Labs:  CBC:   Recent Labs   Lab 03/05/20  2300   WBC 7.39   RBC 3.72*   HGB 9.7*   HCT 32.3*   *   MCV 87   MCH 26.1*   MCHC 30.0*     BMP:   Recent Labs   Lab 03/05/20  2300   *      K 4.2   CL 99   CO2 26   BUN 11   CREATININE 0.9   CALCIUM 9.6     Recent Labs   Lab 03/05/20  2244   COLORU Yellow   SPECGRAV 1.020   PHUR 5.0   PROTEINUA Negative   NITRITE Negative   LEUKOCYTESUR Negative   UROBILINOGEN Negative       Significant Diagnostics:  CT: I have reviewed all pertinent results/findings within the past 24 hours and my personal findings are:  fluid collections adjacent to the right colon, pelvic fluid collection    Assessment/Plan:     Intra-abdominal abscess  Recommend conservative treatment at this point  -IV antibiotics  -serial abdominal exams  -IR consult for possible abscess drainage     The ventral hernia does  not appear to be obstructing. Abscess likely caused from the prior tuboovarian abscess with the fluid layering in the dependant hernia sac. Will continue to follow the patients progress with conservative management    VTE Risk Mitigation (From admission, onward)         Ordered     IP VTE HIGH RISK PATIENT  Once      03/06/20 0350     Place ADRIANA hose  Until discontinued      03/06/20 0350                Thank you for your consult. I will follow-up with patient. Please contact us if you have any additional questions.    Ramila Zapata MD  General Surgery  [facility]

## 2020-03-06 NOTE — CONSULTS
[facility] Hospital Medicine  Consult Note    Patient Name: Glenda Peña  MRN: [id number]  Admission Date: 3/5/2020  Hospital Length of Stay: 0 days  Attending Physician: Charly  Primary Care Provider: Emiliano Dunbar MD           Patient information was obtained from parent and past medical records.     Consults  Subjective:     Principal Problem: Intra-abdominal abscess    Chief Complaint:   Chief Complaint   Patient presents with    Abdominal Pain     pt reports severe RLQ cramping abdominal pain ongoing since being discharged from hospital last 20 but started to worsen last night; pt reports being in the hospital last week for an ovarian abscess (left sided) and is still currently taking ABX; pt took 2 Percocets at 7pm tonight with no relief; pt crying and appears uncomfortable        HPI: 29 y.o.  presents to er c/o abdominal pain. Pt recently discharged from  after receiving ir drainage of left toa and iv abx for actinomyces. Pt also has large ventral hernias. Pt denies f/c/n/v or vb.    Past Medical History:   Diagnosis Date    Asthma childhood    Diabetes mellitus     GERD (gastroesophageal reflux disease)     Morbid obesity     PCOS (polycystic ovarian syndrome)        Past Surgical History:   Procedure Laterality Date    APPENDECTOMY  2011    HERNIA REPAIR      OOPHORECTOMY      salpingoopherectomy Right 2008       Review of patient's allergies indicates:   Allergen Reactions    Shellfish containing products Hives     To hands    Latex, natural rubber Rash     Burning sensation       No current facility-administered medications on file prior to encounter.      Current Outpatient Medications on File Prior to Encounter   Medication Sig    amoxicillin (AMOXIL) 500 MG capsule Take 1,000 mg by mouth.    dulaglutide (TRULICITY) 1.5 mg/0.5 mL PnIj Inject 1.5 mg into the skin every 7 days.    glipiZIDE (GLUCOTROL) 5 MG TR24 Take 1 tablet (5 mg total) by  mouth daily with breakfast.    lisinopril (PRINIVIL,ZESTRIL) 2.5 MG tablet Take 1 tablet (2.5 mg total) by mouth once daily.    metFORMIN (GLUCOPHAGE) 500 MG tablet Take 2 tablets (1,000 mg total) by mouth 2 (two) times daily with meals.    oxyCODONE-acetaminophen (PERCOCET) 5-325 mg per tablet     pantoprazole (PROTONIX) 40 MG tablet Take 1 tablet (40 mg total) by mouth before breakfast.    albuterol (VENTOLIN HFA) 90 mcg/actuation inhaler Inhale 2 puffs into the lungs every 6 (six) hours as needed for Wheezing. Rescue    BALZIVA, 28, 0.4-35 mg-mcg per tablet Take 1 tablet by mouth once daily.    fluconazole (DIFLUCAN) 150 MG Tab TAKE ONE TABLET BY MOUTH AS A ONE TIME DOSE    ondansetron (ZOFRAN) 4 MG tablet Take 1 tablet (4 mg total) by mouth every 6 (six) hours as needed for Nausea.    terconazole (TERAZOL 3) 80 mg vaginal suppository Place 1 suppository (80 mg total) vaginally nightly.    [DISCONTINUED] traMADol (ULTRAM) 50 mg tablet Take 1 tablet (50 mg total) by mouth every 6 (six) hours as needed for Pain.     Family History     Problem Relation (Age of Onset)    Diabetes Mother    Heart attack Father, Mother    Hypertension Father        Tobacco Use    Smoking status: Never Smoker    Smokeless tobacco: Never Used   Substance and Sexual Activity    Alcohol use: Not Currently     Comment: occasionally apple cider    Drug use: No    Sexual activity: Yes     Partners: Female     Review of Systems   Constitutional: Negative for activity change and appetite change.   HENT: Negative for congestion and dental problem.    Eyes: Negative for discharge and itching.   Respiratory: Negative for apnea and chest tightness.    Cardiovascular: Negative for chest pain and leg swelling.   Gastrointestinal: Positive for abdominal pain. Negative for abdominal distention.   Endocrine: Negative for cold intolerance, heat intolerance and polydipsia.   Genitourinary: Negative for difficulty urinating and dyspareunia.    Musculoskeletal: Negative for arthralgias and back pain.   Skin: Negative for color change and pallor.   Allergic/Immunologic: Negative for environmental allergies and food allergies.   Neurological: Negative for dizziness and facial asymmetry.   Hematological: Negative for adenopathy. Does not bruise/bleed easily.   Psychiatric/Behavioral: Negative for agitation and behavioral problems.     Objective:     Vital Signs (Most Recent):  Temp: 97.8 °F (36.6 °C) (03/06/20 1119)  Pulse: 97 (03/06/20 1119)  Resp: 19 (03/06/20 1119)  BP: 109/60 (03/06/20 1119)  SpO2: (!) 93 % (03/06/20 1119) Vital Signs (24h Range):  Temp:  [97.3 °F (36.3 °C)-99.5 °F (37.5 °C)] 97.8 °F (36.6 °C)  Pulse:  [] 97  Resp:  [17-20] 19  SpO2:  [93 %-98 %] 93 %  BP: (109-153)/(56-88) 109/60     Weight: 112.5 kg (248 lb)  Body mass index is 45.36 kg/m².    Physical Exam   Constitutional: She is oriented to person, place, and time. No distress.   HENT:   Head: Atraumatic.   Eyes: Pupils are equal, round, and reactive to light. EOM are normal.   Neck: Normal range of motion. Neck supple.   Cardiovascular: Normal rate and normal heart sounds.   Pulmonary/Chest: Effort normal and breath sounds normal.   Abdominal: Soft.   Musculoskeletal: Normal range of motion. She exhibits no edema or deformity.   Neurological: She is oriented to person, place, and time. Coordination normal.   Skin: Skin is warm and dry.   Psychiatric: She has a normal mood and affect. Her behavior is normal.       Significant Labs:   BMP:   Recent Labs   Lab 03/05/20  2300   *      K 4.2   CL 99   CO2 26   BUN 11   CREATININE 0.9   CALCIUM 9.6     CBC:   Recent Labs   Lab 03/05/20  2300   WBC 7.39   HGB 9.7*   HCT 32.3*   *       Significant Imaging:reviewed.    Assessment/Plan:     * Intra-abdominal abscess  Per primary and surgery.      Type 2 diabetes mellitus with hyperglycemia  On SSI.        VTE Risk Mitigation (From admission, onward)          Ordered     IP VTE HIGH RISK PATIENT  Once      03/06/20 0350     Place ADRIANA hose  Until discontinued      03/06/20 0350                    Thank you for your consult. I will sign off. Please contact us if you have any additional questions.    Dorie Cavazos MD  Department of Hospital Medicine   [facility]

## 2020-03-06 NOTE — ASSESSMENT & PLAN NOTE
Recommend conservative treatment at this point  -IV antibiotics  -serial abdominal exams  -IR consult for possible abscess drainage

## 2020-03-06 NOTE — SUBJECTIVE & OBJECTIVE
Past Medical History:   Diagnosis Date    Asthma childhood    Diabetes mellitus     GERD (gastroesophageal reflux disease)     Morbid obesity     PCOS (polycystic ovarian syndrome)        Past Surgical History:   Procedure Laterality Date    APPENDECTOMY  2011    HERNIA REPAIR      OOPHORECTOMY      salpingoopherectomy Right 2008       Review of patient's allergies indicates:   Allergen Reactions    Shellfish containing products Hives     To hands    Latex, natural rubber Rash     Burning sensation       Medications:  Medications Prior to Admission   Medication Sig    amoxicillin (AMOXIL) 500 MG capsule Take 1,000 mg by mouth.    dulaglutide (TRULICITY) 1.5 mg/0.5 mL PnIj Inject 1.5 mg into the skin every 7 days.    glipiZIDE (GLUCOTROL) 5 MG TR24 Take 1 tablet (5 mg total) by mouth daily with breakfast.    lisinopril (PRINIVIL,ZESTRIL) 2.5 MG tablet Take 1 tablet (2.5 mg total) by mouth once daily.    metFORMIN (GLUCOPHAGE) 500 MG tablet Take 2 tablets (1,000 mg total) by mouth 2 (two) times daily with meals.    oxyCODONE-acetaminophen (PERCOCET) 5-325 mg per tablet     pantoprazole (PROTONIX) 40 MG tablet Take 1 tablet (40 mg total) by mouth before breakfast.    albuterol (VENTOLIN HFA) 90 mcg/actuation inhaler Inhale 2 puffs into the lungs every 6 (six) hours as needed for Wheezing. Rescue    BALZIVA, 28, 0.4-35 mg-mcg per tablet Take 1 tablet by mouth once daily.    fluconazole (DIFLUCAN) 150 MG Tab TAKE ONE TABLET BY MOUTH AS A ONE TIME DOSE    ondansetron (ZOFRAN) 4 MG tablet Take 1 tablet (4 mg total) by mouth every 6 (six) hours as needed for Nausea.    terconazole (TERAZOL 3) 80 mg vaginal suppository Place 1 suppository (80 mg total) vaginally nightly.     Antibiotics (From admission, onward)    Start     Stop Route Frequency Ordered    03/06/20 0230  piperacillin-tazobactam 4.5 g in sodium chloride 0.9% 100 mL IVPB (ready to mix system)      -- IV Every 8 hours (non-standard times)  03/06/20 0117        Antifungals (From admission, onward)    None        Antivirals (From admission, onward)    None           Immunization History   Administered Date(s) Administered    DTaP 1989, 04/16/1990, 07/08/1993, 04/15/1994    HIB 07/08/1993    Hepatitis B, Pediatric/Adolescent 09/23/2002, 02/02/2004    IPV 1989, 04/15/1990, 04/15/1994    Influenza 11/24/2019    Influenza - Quadrivalent - PF (6 months and older) 01/17/2017    MMR 07/08/1993, 04/15/1994    Td (ADULT) 09/23/2002    Td (Adult), Unspecified Formulation 09/23/2002       Family History     Problem Relation (Age of Onset)    Diabetes Mother    Heart attack Father, Mother    Hypertension Father        Social History     Socioeconomic History    Marital status:      Spouse name: Not on file    Number of children: Not on file    Years of education: Not on file    Highest education level: Not on file   Occupational History    Not on file   Social Needs    Financial resource strain: Not on file    Food insecurity:     Worry: Not on file     Inability: Not on file    Transportation needs:     Medical: Not on file     Non-medical: Not on file   Tobacco Use    Smoking status: Never Smoker    Smokeless tobacco: Never Used   Substance and Sexual Activity    Alcohol use: Not Currently     Comment: occasionally apple cider    Drug use: No    Sexual activity: Yes     Partners: Female   Lifestyle    Physical activity:     Days per week: Not on file     Minutes per session: Not on file    Stress: Not on file   Relationships    Social connections:     Talks on phone: Not on file     Gets together: Not on file     Attends Confucianism service: Not on file     Active member of club or organization: Not on file     Attends meetings of clubs or organizations: Not on file     Relationship status: Not on file   Other Topics Concern    Not on file   Social History Narrative    Not on file     Review of Systems   Constitutional:  Negative for activity change, appetite change, chills, diaphoresis, fatigue and fever.   Respiratory: Negative for cough and shortness of breath.    Cardiovascular: Negative for chest pain.   Gastrointestinal: Positive for abdominal pain and diarrhea. Negative for constipation, nausea and vomiting.   Genitourinary: Negative for dyspareunia.   Musculoskeletal: Negative for arthralgias and back pain.   Skin: Positive for wound. Negative for color change, pallor and rash.   Neurological: Negative for headaches.     Objective:     Vital Signs (Most Recent):  Temp: 97.8 °F (36.6 °C) (03/06/20 1119)  Pulse: 97 (03/06/20 1119)  Resp: 19 (03/06/20 1119)  BP: 109/60 (03/06/20 1119)  SpO2: (!) 93 % (03/06/20 1119) Vital Signs (24h Range):  Temp:  [97.3 °F (36.3 °C)-99.5 °F (37.5 °C)] 97.8 °F (36.6 °C)  Pulse:  [] 97  Resp:  [17-20] 19  SpO2:  [93 %-98 %] 93 %  BP: (109-153)/(56-88) 109/60     Weight: 112.5 kg (248 lb)  Body mass index is 45.36 kg/m².    Estimated Creatinine Clearance: 108.4 mL/min (based on SCr of 0.9 mg/dL).    Physical Exam   Constitutional: She appears well-developed and well-nourished. No distress.   HENT:   Head: Normocephalic.   Eyes: Pupils are equal, round, and reactive to light.   Cardiovascular: Normal rate, regular rhythm and normal heart sounds. Exam reveals no friction rub.   No murmur heard.  Pulmonary/Chest: Effort normal. No stridor. No respiratory distress. She has no wheezes.   Abdominal: Soft. She exhibits no distension. There is no tenderness. There is no guarding. A hernia (ventral) is present.   Surgical scare from appendectomy   Musculoskeletal: Normal range of motion. She exhibits no edema.   Neurological: She is alert. No cranial nerve deficit. Coordination normal.   Skin: Skin is warm. She is not diaphoretic. No erythema.   previous catheter wound of LLQ   Psychiatric: She has a normal mood and affect.   Vitals reviewed.      Significant Labs: All pertinent labs within the past  24 hours have been reviewed.    Significant Imaging: I have reviewed all pertinent imaging results/findings within the past 24 hours.

## 2020-03-06 NOTE — HPI
30 y/o female presents with abdominal pain found to have intra-peritoneal abscess. She was recently admitted to [facility] (2/17/2020) for similar symptoms found to have left tuboovarian abscess s/p percutaneous drainage catheter. Cultures +actinomyces. She was discharged with oral amoxicillin. Her catheter was removed prior to discharged.     CT showing multiple fluid collection concerning for abscesses as described: 0.9 x 1.5 x 2.1-cm in rectovaginal space, 1.3 x 2.0 x 5.1-cm surrounding the cecum and 1.7 x 0.8 x 3.1-cm anterior to the cecum within a large ventral hernia.     Seen by IR and will attempt to aspirate the fluid today. General surgery following. She is currently on zosyn. No fever chills or night sweats.

## 2020-03-06 NOTE — PROGRESS NOTES
Patient arrived from ED via stretcher, oriented to surroundings and POC, stated an understanding. Call light within reach.

## 2020-03-06 NOTE — CONSULTS
[facility]  Infectious Disease  Consult Note    Patient Name: Glenda Peña  MRN: [id number]  Admission Date: 3/5/2020  Hospital Length of Stay: 0 days  Attending Physician: Khai Parks MD  Primary Care Provider: Emiliano Dunbar MD     Isolation Status: No active isolations    Inpatient consult to Infectious Diseases  Consult performed by: Diana Tobar PA-C  Consult ordered by: Maryann Lopez MD        Assessment/Plan:     * Intra-abdominal abscess  28 y/o female with DMII presents with abdominal pain found to have intra-peritoneal abscess. She was recently admitted to [facility] (2/17/2020) for similar symptoms found to have left tuboovarian abscess s/p percutaneous drainage catheter. Cultures +actinomyces. She was discharged with oral amoxicillin. Her catheter was removed prior to discharged.     CT here showing multiple fluid collection concerning for abscesses as described: 0.9 x 1.5 x 2.1-cm in rectovaginal space, 1.3 x 2.0 x 5.1-cm surrounding the cecum and 1.7 x 0.8 x 3.1-cm anterior to the cecum within a large ventral hernia.     Seen by IR and will attempt to aspirate the fluid today. General surgery following. She is currently on zosyn. No fever chills or night sweats.       Recommendations  1. Will discontinue zosyn and de escalate to unasyn as without pseudomonal growth  2. Agree with IR aspiration today. Please send fluid for cell count and cultures  3. Will tailor abx accordingly  4. Discussed with ID staff. ID will follow closely with you           Thank you for your consult. ID will follow. If you have any questions over the weekend please contact Dr. Hamida Tobar PA-C  Infectious Disease  [facility]    Subjective:     Principal Problem: Intra-abdominal abscess    HPI: 28 y/o female presents with abdominal pain found to have intra-peritoneal abscess. She was recently admitted to [facility] (2/17/2020) for similar symptoms found to have left  tuboovarian abscess s/p percutaneous drainage catheter. Cultures +actinomyces. She was discharged with oral amoxicillin. Her catheter was removed prior to discharged.     CT showing multiple fluid collection concerning for abscesses as described: 0.9 x 1.5 x 2.1-cm in rectovaginal space, 1.3 x 2.0 x 5.1-cm surrounding the cecum and 1.7 x 0.8 x 3.1-cm anterior to the cecum within a large ventral hernia.     Seen by IR and will attempt to aspirate the fluid today. General surgery following. She is currently on zosyn. No fever chills or night sweats.     Past Medical History:   Diagnosis Date    Asthma childhood    Diabetes mellitus     GERD (gastroesophageal reflux disease)     Morbid obesity     PCOS (polycystic ovarian syndrome)        Past Surgical History:   Procedure Laterality Date    APPENDECTOMY  2011    HERNIA REPAIR      OOPHORECTOMY      salpingoopherectomy Right 2008       Review of patient's allergies indicates:   Allergen Reactions    Shellfish containing products Hives     To hands    Latex, natural rubber Rash     Burning sensation       Medications:  Medications Prior to Admission   Medication Sig    amoxicillin (AMOXIL) 500 MG capsule Take 1,000 mg by mouth.    dulaglutide (TRULICITY) 1.5 mg/0.5 mL PnIj Inject 1.5 mg into the skin every 7 days.    glipiZIDE (GLUCOTROL) 5 MG TR24 Take 1 tablet (5 mg total) by mouth daily with breakfast.    lisinopril (PRINIVIL,ZESTRIL) 2.5 MG tablet Take 1 tablet (2.5 mg total) by mouth once daily.    metFORMIN (GLUCOPHAGE) 500 MG tablet Take 2 tablets (1,000 mg total) by mouth 2 (two) times daily with meals.    oxyCODONE-acetaminophen (PERCOCET) 5-325 mg per tablet     pantoprazole (PROTONIX) 40 MG tablet Take 1 tablet (40 mg total) by mouth before breakfast.    albuterol (VENTOLIN HFA) 90 mcg/actuation inhaler Inhale 2 puffs into the lungs every 6 (six) hours as needed for Wheezing. Rescue    BALZIVA, 28, 0.4-35 mg-mcg per tablet Take 1 tablet by  mouth once daily.    fluconazole (DIFLUCAN) 150 MG Tab TAKE ONE TABLET BY MOUTH AS A ONE TIME DOSE    ondansetron (ZOFRAN) 4 MG tablet Take 1 tablet (4 mg total) by mouth every 6 (six) hours as needed for Nausea.    terconazole (TERAZOL 3) 80 mg vaginal suppository Place 1 suppository (80 mg total) vaginally nightly.     Antibiotics (From admission, onward)    Start     Stop Route Frequency Ordered    03/06/20 0230  piperacillin-tazobactam 4.5 g in sodium chloride 0.9% 100 mL IVPB (ready to mix system)      -- IV Every 8 hours (non-standard times) 03/06/20 0117        Antifungals (From admission, onward)    None        Antivirals (From admission, onward)    None           Immunization History   Administered Date(s) Administered    DTaP 1989, 04/16/1990, 07/08/1993, 04/15/1994    HIB 07/08/1993    Hepatitis B, Pediatric/Adolescent 09/23/2002, 02/02/2004    IPV 1989, 04/15/1990, 04/15/1994    Influenza 11/24/2019    Influenza - Quadrivalent - PF (6 months and older) 01/17/2017    MMR 07/08/1993, 04/15/1994    Td (ADULT) 09/23/2002    Td (Adult), Unspecified Formulation 09/23/2002       Family History     Problem Relation (Age of Onset)    Diabetes Mother    Heart attack Father, Mother    Hypertension Father        Social History     Socioeconomic History    Marital status:      Spouse name: Not on file    Number of children: Not on file    Years of education: Not on file    Highest education level: Not on file   Occupational History    Not on file   Social Needs    Financial resource strain: Not on file    Food insecurity:     Worry: Not on file     Inability: Not on file    Transportation needs:     Medical: Not on file     Non-medical: Not on file   Tobacco Use    Smoking status: Never Smoker    Smokeless tobacco: Never Used   Substance and Sexual Activity    Alcohol use: Not Currently     Comment: occasionally apple cider    Drug use: No    Sexual activity: Yes      Partners: Female   Lifestyle    Physical activity:     Days per week: Not on file     Minutes per session: Not on file    Stress: Not on file   Relationships    Social connections:     Talks on phone: Not on file     Gets together: Not on file     Attends Nondenominational service: Not on file     Active member of club or organization: Not on file     Attends meetings of clubs or organizations: Not on file     Relationship status: Not on file   Other Topics Concern    Not on file   Social History Narrative    Not on file     Review of Systems   Constitutional: Negative for activity change, appetite change, chills, diaphoresis, fatigue and fever.   Respiratory: Negative for cough and shortness of breath.    Cardiovascular: Negative for chest pain.   Gastrointestinal: Positive for abdominal pain and diarrhea. Negative for constipation, nausea and vomiting.   Genitourinary: Negative for dyspareunia.   Musculoskeletal: Negative for arthralgias and back pain.   Skin: Positive for wound. Negative for color change, pallor and rash.   Neurological: Negative for headaches.     Objective:     Vital Signs (Most Recent):  Temp: 97.8 °F (36.6 °C) (03/06/20 1119)  Pulse: 97 (03/06/20 1119)  Resp: 19 (03/06/20 1119)  BP: 109/60 (03/06/20 1119)  SpO2: (!) 93 % (03/06/20 1119) Vital Signs (24h Range):  Temp:  [97.3 °F (36.3 °C)-99.5 °F (37.5 °C)] 97.8 °F (36.6 °C)  Pulse:  [] 97  Resp:  [17-20] 19  SpO2:  [93 %-98 %] 93 %  BP: (109-153)/(56-88) 109/60     Weight: 112.5 kg (248 lb)  Body mass index is 45.36 kg/m².    Estimated Creatinine Clearance: 108.4 mL/min (based on SCr of 0.9 mg/dL).    Physical Exam   Constitutional: She appears well-developed and well-nourished. No distress.   HENT:   Head: Normocephalic.   Eyes: Pupils are equal, round, and reactive to light.   Cardiovascular: Normal rate, regular rhythm and normal heart sounds. Exam reveals no friction rub.   No murmur heard.  Pulmonary/Chest: Effort normal. No stridor.  No respiratory distress. She has no wheezes.   Abdominal: Soft. She exhibits no distension. There is no tenderness. There is no guarding. A hernia (ventral) is present.   Surgical scare from appendectomy   Musculoskeletal: Normal range of motion. She exhibits no edema.   Neurological: She is alert. No cranial nerve deficit. Coordination normal.   Skin: Skin is warm. She is not diaphoretic. No erythema.   previous catheter wound of LLQ   Psychiatric: She has a normal mood and affect.   Vitals reviewed.      Significant Labs: All pertinent labs within the past 24 hours have been reviewed.    Significant Imaging: I have reviewed all pertinent imaging results/findings within the past 24 hours.

## 2020-03-07 LAB
GRAM STN SPEC: NORMAL
POCT GLUCOSE: 141 MG/DL (ref 70–110)
POCT GLUCOSE: 142 MG/DL (ref 70–110)
POCT GLUCOSE: 160 MG/DL (ref 70–110)
POCT GLUCOSE: 174 MG/DL (ref 70–110)
POCT GLUCOSE: 216 MG/DL (ref 70–110)

## 2020-03-07 PROCEDURE — 25000003 PHARM REV CODE 250: Performed by: OBSTETRICS & GYNECOLOGY

## 2020-03-07 PROCEDURE — 63600175 PHARM REV CODE 636 W HCPCS: Performed by: HOSPITALIST

## 2020-03-07 PROCEDURE — 63600175 PHARM REV CODE 636 W HCPCS: Performed by: PHYSICIAN ASSISTANT

## 2020-03-07 PROCEDURE — 11000001 HC ACUTE MED/SURG PRIVATE ROOM

## 2020-03-07 PROCEDURE — 63600175 PHARM REV CODE 636 W HCPCS: Performed by: EMERGENCY MEDICINE

## 2020-03-07 RX ORDER — OXYCODONE AND ACETAMINOPHEN 10; 325 MG/1; MG/1
1 TABLET ORAL EVERY 4 HOURS PRN
Status: DISCONTINUED | OUTPATIENT
Start: 2020-03-07 | End: 2020-03-12 | Stop reason: HOSPADM

## 2020-03-07 RX ORDER — OXYCODONE AND ACETAMINOPHEN 5; 325 MG/1; MG/1
1 TABLET ORAL EVERY 4 HOURS PRN
Status: DISCONTINUED | OUTPATIENT
Start: 2020-03-07 | End: 2020-03-12 | Stop reason: HOSPADM

## 2020-03-07 RX ORDER — ACETAMINOPHEN 325 MG/1
650 TABLET ORAL EVERY 6 HOURS PRN
Status: DISCONTINUED | OUTPATIENT
Start: 2020-03-07 | End: 2020-03-12 | Stop reason: HOSPADM

## 2020-03-07 RX ADMIN — AMPICILLIN SODIUM AND SULBACTAM SODIUM 3 G: 2; 1 INJECTION, POWDER, FOR SOLUTION INTRAMUSCULAR; INTRAVENOUS at 12:03

## 2020-03-07 RX ADMIN — ACETAMINOPHEN 650 MG: 325 TABLET ORAL at 06:03

## 2020-03-07 RX ADMIN — HYDROMORPHONE HYDROCHLORIDE 1 MG: 2 INJECTION, SOLUTION INTRAMUSCULAR; INTRAVENOUS; SUBCUTANEOUS at 07:03

## 2020-03-07 RX ADMIN — AMPICILLIN SODIUM AND SULBACTAM SODIUM 3 G: 2; 1 INJECTION, POWDER, FOR SOLUTION INTRAMUSCULAR; INTRAVENOUS at 06:03

## 2020-03-07 RX ADMIN — HYDROMORPHONE HYDROCHLORIDE 1 MG: 2 INJECTION, SOLUTION INTRAMUSCULAR; INTRAVENOUS; SUBCUTANEOUS at 02:03

## 2020-03-07 RX ADMIN — INSULIN ASPART 2 UNITS: 100 INJECTION, SOLUTION INTRAVENOUS; SUBCUTANEOUS at 04:03

## 2020-03-07 RX ADMIN — OXYCODONE HYDROCHLORIDE AND ACETAMINOPHEN 1 TABLET: 10; 325 TABLET ORAL at 11:03

## 2020-03-07 RX ADMIN — AMPICILLIN SODIUM AND SULBACTAM SODIUM 6 G: 2; 1 INJECTION, POWDER, FOR SOLUTION INTRAMUSCULAR; INTRAVENOUS at 11:03

## 2020-03-07 RX ADMIN — OXYCODONE HYDROCHLORIDE AND ACETAMINOPHEN 1 TABLET: 10; 325 TABLET ORAL at 04:03

## 2020-03-07 RX ADMIN — SODIUM CHLORIDE: 0.9 INJECTION, SOLUTION INTRAVENOUS at 12:03

## 2020-03-07 RX ADMIN — AMPICILLIN SODIUM AND SULBACTAM SODIUM 3 G: 2; 1 INJECTION, POWDER, FOR SOLUTION INTRAMUSCULAR; INTRAVENOUS at 05:03

## 2020-03-07 RX ADMIN — INSULIN ASPART 4 UNITS: 100 INJECTION, SOLUTION INTRAVENOUS; SUBCUTANEOUS at 11:03

## 2020-03-07 RX ADMIN — OXYCODONE HYDROCHLORIDE AND ACETAMINOPHEN 1 TABLET: 10; 325 TABLET ORAL at 08:03

## 2020-03-07 NOTE — HOSPITAL COURSE
3/6- Attempts made by IR to drain pelvic fluid collection, no fluid was able to be aspirated. Infectious disease consulted on patient and has deescalated antibitoics

## 2020-03-07 NOTE — BRIEF OP NOTE
Radiology Post-Procedure Note    Pre Op Diagnosis: Intra-abdominal abscesses  Post Op Diagnosis: Same    Procedure: CT-guided aspiration of left adnexal collection    Procedure performed by: Tommy White MD    Written Informed Consent Obtained: Yes  Specimen Removed: YES, 3-cc of thin, straw-colored fluid  Estimated Blood Loss: Minimal    Findings:   Multiple attempts made to gain access into the apparent sizable left adnexal collection. Pt experienced worse than expected pain throughout the procedure with each attempt at access and, despite multiple scans appearing to show access needle well placed within the collection, pt pain and high resistance to advancing guide wire suggest either not a well-formed/loculated collection vs persistent viable tissue within the suspected TOA. Given this, the decision was made to forgo attempting placing drain or attempting to aspirate the tiny collections in the right paracolic gutter.     3-cc of serous fluid was aspirated and sent for culture.     Thank you for considering IR for the care of your patient.     Tommy White MD  Interventional Radiology

## 2020-03-07 NOTE — SEDATION DOCUMENTATION
Report called to RUDY Mclean in MB unit. Abdominal abscess drain placement attempted - scant fluid aspirated with no ability to place drain. Tolerated well. Specimen sent to lab for testing. Bandaid to JUMA RAMOS, no redness, swelling or hematoma noted. BENJI PRASAD.

## 2020-03-07 NOTE — PROGRESS NOTES
Report received for [address]. Shonna Pressley called to advised that an abdominal abscess drain could not be placed. Scant fluid was aspirated from the site. No other sites accessed due to their difficult position. Speciman sent to lab. Patient received 2 mg of Versed and 100 mcg of Fentanyl, and the pt is tolerating it well.   Pulse-100, Respirations- 18, SpO2- 96%-99%, B/P- 122/65

## 2020-03-07 NOTE — PROGRESS NOTES
[facility]  General Surgery  Progress Note    Subjective:     History of Present Illness:  28 y/o F with RLQ pain for the past 3-4 days. She was recently admitted at [facility] on 02/17/2020 for lower abdominal pain and diagnosed with tubo-ovarian abscess, which was treated with pigtail catheter and IV antibiotics.  Cultures grew out Actinomyces, currently taking amoxicillin.   She has continued to have chronic pain since the surgery, but now the pain has localized to the right side.     She has a right-sided ventral wall hernia from previous open appendectomy. This was repaired in 2016 using Strattice, but has recurred.    Post-Op Info:  * No surgery found *         Interval History: No acute events, pain is about the same to slightly improved. Tolerating diet.    Medications:  Continuous Infusions:   sodium chloride 0.9% 100 mL/hr at 03/07/20 0033     Scheduled Meds:   ampicillin-sulbactim (UNASYN) IVPB  3 g Intravenous Q6H     PRN Meds:acetaminophen, dextrose 50%, glucagon (human recombinant), HYDROmorphone, HYDROmorphone, influenza, insulin aspart U-100, ondansetron, oxyCODONE-acetaminophen, oxyCODONE-acetaminophen, pneumoc 13-mary conj-dip cr(PF)     Review of patient's allergies indicates:   Allergen Reactions    Shellfish containing products Hives     To hands    Latex, natural rubber Rash     Burning sensation     Objective:     Vital Signs (Most Recent):  Temp: 99.4 °F (37.4 °C) (03/07/20 0727)  Pulse: 99 (03/07/20 0727)  Resp: 18 (03/07/20 0727)  BP: 120/62 (03/07/20 0727)  SpO2: (!) 94 % (03/07/20 0727) Vital Signs (24h Range):  Temp:  [96.9 °F (36.1 °C)-99.4 °F (37.4 °C)] 99.4 °F (37.4 °C)  Pulse:  [] 99  Resp:  [16-20] 18  SpO2:  [93 %-99 %] 94 %  BP: (101-129)/(55-72) 120/62     Weight: 112.5 kg (248 lb)  Body mass index is 45.36 kg/m².    Intake/Output - Last 3 Shifts       03/05 0700 - 03/06 0659 03/06 0700 - 03/07 0659 03/07 0700 - 03/08 0659    P.O.  2100      I.V. (mL/kg) 205 (1.8)      IV Piggyback 1100      Total Intake(mL/kg) 1305 (11.6) 2100 (18.7)     Urine (mL/kg/hr) 300 1550 (0.6)     Total Output 300 1550     Net +1005 +550                  Physical Exam   Constitutional: She is oriented to person, place, and time. She appears well-developed and well-nourished.   HENT:   Head: Normocephalic and atraumatic.   Neck: Normal range of motion.   Cardiovascular: Normal rate and regular rhythm.   Pulmonary/Chest: Effort normal and breath sounds normal.   Abdominal: Soft. She exhibits no distension. There is tenderness.   Tenderness to LLQ and RLQ  Right lower abd hernia present, reducible, tender, no skin changes   Musculoskeletal: Normal range of motion.   Neurological: She is alert and oriented to person, place, and time.   Skin: Skin is warm and dry. No erythema.   Psychiatric: She has a normal mood and affect.       Significant Labs:  CBC:   Recent Labs   Lab 03/05/20  2300   WBC 7.39   RBC 3.72*   HGB 9.7*   HCT 32.3*   *   MCV 87   MCH 26.1*   MCHC 30.0*     BMP:   Recent Labs   Lab 03/05/20  2300   *      K 4.2   CL 99   CO2 26   BUN 11   CREATININE 0.9   CALCIUM 9.6     Microbiology Results (last 7 days)     Procedure Component Value Units Date/Time    Gram stain [[id number]] Collected:  03/06/20 1730    Order Status:  Completed Specimen:  Abscess from Abdomen Updated:  03/06/20 2117     Gram Stain Result Cytospin indicates:      Moderate WBC's      No organisms seen    Culture, Anaerobe [[id number]] Collected:  03/06/20 1730    Order Status:  Sent Specimen:  Abscess from Abdomen Updated:  03/06/20 1849    Aerobic culture [[id number]] Collected:  03/06/20 1730    Order Status:  Sent Specimen:  Abscess from Abdomen Updated:  03/06/20 1849          Significant Diagnostics:  no new scans    Assessment/Plan:     * Intra-abdominal abscess  Continue conservative treatment with IV antibiotics  Will continue to monitor  No surgical intervention  planned          Ramila Zapata MD  General Surgery  [facility]

## 2020-03-07 NOTE — ASSESSMENT & PLAN NOTE
Continue conservative treatment with IV antibiotics  Will continue to monitor  No surgical intervention planned

## 2020-03-07 NOTE — PROGRESS NOTES
30 y/o female with an  intra-peritoneal abscess and large ventral hernia. She was recently admitted to [facility] (2/17/2020) for similar symptoms found to have left tuboovarian abscess s/p percutaneous drainage catheter. Cultures +actinomyces. She was discharged with oral amoxicillin.pt was readmitted due to pain. She has been afebrile since admission and had an unsuccessful attempt by IR to drain abscess. Today she states her pain is maybe slightly improved. She is tolerating diet.    Vital Signs (Most Recent):  Temp: 99.4 °F (37.4 °C) (03/07/20 0727)  Pulse: 99 (03/07/20 0727)  Resp: 18 (03/07/20 0727)  BP: 120/62 (03/07/20 0727)  SpO2: (!) 94 % (03/07/20 0727)    Vital Signs Range (Last 24H):  Temp:  [96.9 °F (36.1 °C)-99.4 °F (37.4 °C)]   Pulse:  []   Resp:  [16-20]   BP: (101-129)/(55-72)   SpO2:  [93 %-99 %]     Gen - NAD  Abd - soft, tender , large incisional hernia       Recent Labs   Lab 03/05/20  2300   WBC 7.39   HGB 9.7*   HCT 32.3*   *         HD#1   Continue unasyn as per ID  Diabetes control-sliding scale  Continue present tx

## 2020-03-07 NOTE — SUBJECTIVE & OBJECTIVE
Interval History: No acute events, pain is about the same to slightly improved. Tolerating diet.    Medications:  Continuous Infusions:   sodium chloride 0.9% 100 mL/hr at 03/07/20 0033     Scheduled Meds:   ampicillin-sulbactim (UNASYN) IVPB  3 g Intravenous Q6H     PRN Meds:acetaminophen, dextrose 50%, glucagon (human recombinant), HYDROmorphone, HYDROmorphone, influenza, insulin aspart U-100, ondansetron, oxyCODONE-acetaminophen, oxyCODONE-acetaminophen, pneumoc 13-mary conj-dip cr(PF)     Review of patient's allergies indicates:   Allergen Reactions    Shellfish containing products Hives     To hands    Latex, natural rubber Rash     Burning sensation     Objective:     Vital Signs (Most Recent):  Temp: 99.4 °F (37.4 °C) (03/07/20 0727)  Pulse: 99 (03/07/20 0727)  Resp: 18 (03/07/20 0727)  BP: 120/62 (03/07/20 0727)  SpO2: (!) 94 % (03/07/20 0727) Vital Signs (24h Range):  Temp:  [96.9 °F (36.1 °C)-99.4 °F (37.4 °C)] 99.4 °F (37.4 °C)  Pulse:  [] 99  Resp:  [16-20] 18  SpO2:  [93 %-99 %] 94 %  BP: (101-129)/(55-72) 120/62     Weight: 112.5 kg (248 lb)  Body mass index is 45.36 kg/m².    Intake/Output - Last 3 Shifts       03/05 0700 - 03/06 0659 03/06 0700 - 03/07 0659 03/07 0700 - 03/08 0659    P.O.  2100     I.V. (mL/kg) 205 (1.8)      IV Piggyback 1100      Total Intake(mL/kg) 1305 (11.6) 2100 (18.7)     Urine (mL/kg/hr) 300 1550 (0.6)     Total Output 300 1550     Net +1005 +550                  Physical Exam   Constitutional: She is oriented to person, place, and time. She appears well-developed and well-nourished.   HENT:   Head: Normocephalic and atraumatic.   Neck: Normal range of motion.   Cardiovascular: Normal rate and regular rhythm.   Pulmonary/Chest: Effort normal and breath sounds normal.   Abdominal: Soft. She exhibits no distension. There is tenderness.   Tenderness to LLQ and RLQ  Right lower abd hernia present, reducible, tender, no skin changes   Musculoskeletal: Normal range of  motion.   Neurological: She is alert and oriented to person, place, and time.   Skin: Skin is warm and dry. No erythema.   Psychiatric: She has a normal mood and affect.       Significant Labs:  CBC:   Recent Labs   Lab 03/05/20  2300   WBC 7.39   RBC 3.72*   HGB 9.7*   HCT 32.3*   *   MCV 87   MCH 26.1*   MCHC 30.0*     BMP:   Recent Labs   Lab 03/05/20  2300   *      K 4.2   CL 99   CO2 26   BUN 11   CREATININE 0.9   CALCIUM 9.6     Microbiology Results (last 7 days)     Procedure Component Value Units Date/Time    Gram stain [[id number]] Collected:  03/06/20 1730    Order Status:  Completed Specimen:  Abscess from Abdomen Updated:  03/06/20 2117     Gram Stain Result Cytospin indicates:      Moderate WBC's      No organisms seen    Culture, Anaerobe [[id number]] Collected:  03/06/20 1730    Order Status:  Sent Specimen:  Abscess from Abdomen Updated:  03/06/20 1849    Aerobic culture [[id number]] Collected:  03/06/20 1730    Order Status:  Sent Specimen:  Abscess from Abdomen Updated:  03/06/20 1849          Significant Diagnostics:  no new scans

## 2020-03-07 NOTE — PLAN OF CARE
Pt progressing well. NAD noted. VSS. Pain well controlled with IV Dilaudid. Blood sugar 142 at midnight. Pt ambulating and voiding. Tolerating IV antibiotic therapy. POC discussed with pt. Understanding verbalized.

## 2020-03-08 LAB
POCT GLUCOSE: 147 MG/DL (ref 70–110)
POCT GLUCOSE: 168 MG/DL (ref 70–110)
POCT GLUCOSE: 169 MG/DL (ref 70–110)
POCT GLUCOSE: 182 MG/DL (ref 70–110)

## 2020-03-08 PROCEDURE — 25000003 PHARM REV CODE 250: Performed by: OBSTETRICS & GYNECOLOGY

## 2020-03-08 PROCEDURE — 63600175 PHARM REV CODE 636 W HCPCS: Performed by: PHYSICIAN ASSISTANT

## 2020-03-08 PROCEDURE — 63600175 PHARM REV CODE 636 W HCPCS: Performed by: HOSPITALIST

## 2020-03-08 PROCEDURE — 11000001 HC ACUTE MED/SURG PRIVATE ROOM

## 2020-03-08 RX ADMIN — OXYCODONE HYDROCHLORIDE AND ACETAMINOPHEN 1 TABLET: 10; 325 TABLET ORAL at 10:03

## 2020-03-08 RX ADMIN — OXYCODONE HYDROCHLORIDE AND ACETAMINOPHEN 1 TABLET: 10; 325 TABLET ORAL at 04:03

## 2020-03-08 RX ADMIN — AMPICILLIN SODIUM AND SULBACTAM SODIUM 3 G: 2; 1 INJECTION, POWDER, FOR SOLUTION INTRAMUSCULAR; INTRAVENOUS at 12:03

## 2020-03-08 RX ADMIN — INSULIN ASPART 2 UNITS: 100 INJECTION, SOLUTION INTRAVENOUS; SUBCUTANEOUS at 05:03

## 2020-03-08 RX ADMIN — OXYCODONE HYDROCHLORIDE AND ACETAMINOPHEN 1 TABLET: 10; 325 TABLET ORAL at 06:03

## 2020-03-08 RX ADMIN — INSULIN ASPART 2 UNITS: 100 INJECTION, SOLUTION INTRAVENOUS; SUBCUTANEOUS at 12:03

## 2020-03-08 RX ADMIN — OXYCODONE HYDROCHLORIDE AND ACETAMINOPHEN 1 TABLET: 10; 325 TABLET ORAL at 09:03

## 2020-03-08 RX ADMIN — INSULIN ASPART 1 UNITS: 100 INJECTION, SOLUTION INTRAVENOUS; SUBCUTANEOUS at 12:03

## 2020-03-08 RX ADMIN — AMPICILLIN SODIUM AND SULBACTAM SODIUM 3 G: 2; 1 INJECTION, POWDER, FOR SOLUTION INTRAMUSCULAR; INTRAVENOUS at 06:03

## 2020-03-08 RX ADMIN — AMPICILLIN SODIUM AND SULBACTAM SODIUM 3 G: 2; 1 INJECTION, POWDER, FOR SOLUTION INTRAMUSCULAR; INTRAVENOUS at 05:03

## 2020-03-08 RX ADMIN — OXYCODONE HYDROCHLORIDE AND ACETAMINOPHEN 1 TABLET: 10; 325 TABLET ORAL at 12:03

## 2020-03-08 RX ADMIN — OXYCODONE HYDROCHLORIDE AND ACETAMINOPHEN 1 TABLET: 10; 325 TABLET ORAL at 02:03

## 2020-03-08 NOTE — PLAN OF CARE
Problem: Diabetes Comorbidity  Goal: Blood Glucose Level Within Desired Range  3/8/2020 1513 by Maria [address], RN  Outcome: Ongoing, Not Progressing     Glucose monitoring q 6 hours with moderate sliding scale. Coverage required this afternoon prior to meal. Will continue to monitor.

## 2020-03-08 NOTE — PLAN OF CARE
Problem: Fall Injury Risk  Goal: Absence of Fall and Fall-Related Injury  Outcome: Ongoing, Progressing     Problem: Adult Inpatient Plan of Care  Goal: Absence of Hospital-Acquired Illness or Injury  Outcome: Ongoing, Progressing     Patient ambulating to bathroom and in hallway without issue. Steady gait. Using non-skid socks. Frequent rounding to ensure safety. Bed in low locked position with call light within reach.

## 2020-03-08 NOTE — PLAN OF CARE
VSS. NAD. Ambulating and voiding. Pain well controlled with prn pain meds. Afebrile throughout shift. Discussed POC, pain management, and abx schedule. Pt verbalizes understanding.

## 2020-03-08 NOTE — PLAN OF CARE
Problem: Infection  Goal: Infection Symptom Resolution  3/8/2020 1517 by Maria [address] RN  Outcome: Ongoing, Progressing   Patient remains afebrile. Unasyn given per orders q 6 hours and patient tolerating well. Fluids encouraged.

## 2020-03-08 NOTE — PROGRESS NOTES
Progress Note  Gynecology        SUBJECTIVE:     Hospital Day 4    Ms. Peña states she feels much improved. Her pain is well controlled with current medications. The patient is ambulating well. Ms. Peña is tolerating a normal diet. Flatus has been passed. Urinary output is adequate.    OBJECTIVE:     Vital Signs (Most Recent):  Temp: 99.4 °F (37.4 °C) (03/08/20 0756)  Pulse: 88 (03/08/20 0756)  Resp: 20 (03/08/20 0756)  BP: (!) 114/56 (03/08/20 0756)  SpO2: 97 % (03/07/20 1621)    Vital Signs Range (Last 24H):  Temp:  [98.2 °F (36.8 °C)-99.4 °F (37.4 °C)]   Pulse:  []   Resp:  [16-20]   BP: (112-135)/(56-80)   SpO2:  [97 %]     I & O (Last 24H):No intake or output data in the 24 hours ending 03/08/20 1226  Physical Exam:  General:    no distress               Abdomen:  obese, large ventral hernia, mildly tender to deep palpation, no g/r               DVT Evaluation:  No evidence of DVT on either side seen on physical exam.     Hemoglobin/Hematocrit  Recent Labs   Lab 03/05/20  2300   HGB 9.7*   HCT 32.3*     ABO/Rh  Lab Results   Component Value Date    [facility] B POS 01/22/2012     Rubella  No results for input(s): [id number] in the last 168 hours.    ASSESSMENT/PLAN:     Left toa/ possible intra-abdominal abscess/ actinomyces/ large ventral hernia/ uncontrolled dm/ recent ir drainage unsuccessful    Continue current care/ f/u recent abscess cx

## 2020-03-08 NOTE — SUBJECTIVE & OBJECTIVE
Interval History: continues to do well, pain improving    Medications:  Continuous Infusions:   sodium chloride 0.9% 100 mL/hr at 03/07/20 0033     Scheduled Meds:   ampicillin-sulbactim (UNASYN) IVPB  3 g Intravenous Q6H     PRN Meds:acetaminophen, dextrose 50%, glucagon (human recombinant), HYDROmorphone, HYDROmorphone, influenza, insulin aspart U-100, ondansetron, oxyCODONE-acetaminophen, oxyCODONE-acetaminophen, pneumoc 13-mary conj-dip cr(PF)     Review of patient's allergies indicates:   Allergen Reactions    Shellfish containing products Hives     To hands    Latex, natural rubber Rash     Burning sensation     Objective:     Vital Signs (Most Recent):  Temp: 99.4 °F (37.4 °C) (03/08/20 0756)  Pulse: 88 (03/08/20 0756)  Resp: 20 (03/08/20 0756)  BP: (!) 114/56 (03/08/20 0756)  SpO2: 97 % (03/07/20 1621) Vital Signs (24h Range):  Temp:  [98.2 °F (36.8 °C)-99.4 °F (37.4 °C)] 99.4 °F (37.4 °C)  Pulse:  [] 88  Resp:  [16-20] 20  SpO2:  [97 %] 97 %  BP: (112-135)/(56-80) 114/56     Weight: 112.5 kg (248 lb)  Body mass index is 45.36 kg/m².    Intake/Output - Last 3 Shifts       03/06 0700 - 03/07 0659 03/07 0700 - 03/08 0659 03/08 0700 - 03/09 0659    P.O. 2100      I.V. (mL/kg)       IV Piggyback       Total Intake(mL/kg) 2100 (18.7)      Urine (mL/kg/hr) 1550 (0.6)      Total Output 1550      Net +550                   Physical Exam   Constitutional: She is oriented to person, place, and time. She appears well-developed and well-nourished.   HENT:   Head: Normocephalic and atraumatic.   Cardiovascular: Normal rate and regular rhythm.   Pulmonary/Chest: Effort normal.   Abdominal: Soft. There is tenderness. A hernia is present.   Neurological: She is alert and oriented to person, place, and time.   Skin: Skin is warm and dry.       Significant Labs:  none    Significant Diagnostics:  none

## 2020-03-09 ENCOUNTER — PATIENT MESSAGE (OUTPATIENT)
Dept: FAMILY MEDICINE | Facility: CLINIC | Age: 30
End: 2020-03-09

## 2020-03-09 LAB
POCT GLUCOSE: 167 MG/DL (ref 70–110)
POCT GLUCOSE: 187 MG/DL (ref 70–110)
POCT GLUCOSE: 190 MG/DL (ref 70–110)
POCT GLUCOSE: 213 MG/DL (ref 70–110)
POCT GLUCOSE: 241 MG/DL (ref 70–110)

## 2020-03-09 PROCEDURE — 63600175 PHARM REV CODE 636 W HCPCS: Performed by: EMERGENCY MEDICINE

## 2020-03-09 PROCEDURE — 11000001 HC ACUTE MED/SURG PRIVATE ROOM

## 2020-03-09 PROCEDURE — 25000003 PHARM REV CODE 250: Performed by: OBSTETRICS & GYNECOLOGY

## 2020-03-09 PROCEDURE — 99233 PR SUBSEQUENT HOSPITAL CARE,LEVL III: ICD-10-PCS | Mod: ,,, | Performed by: PHYSICIAN ASSISTANT

## 2020-03-09 PROCEDURE — 99233 SBSQ HOSP IP/OBS HIGH 50: CPT | Mod: ,,, | Performed by: PHYSICIAN ASSISTANT

## 2020-03-09 PROCEDURE — 63600175 PHARM REV CODE 636 W HCPCS: Performed by: PHYSICIAN ASSISTANT

## 2020-03-09 RX ORDER — FLUCONAZOLE 150 MG/1
150 TABLET ORAL ONCE
Status: COMPLETED | OUTPATIENT
Start: 2020-03-09 | End: 2020-03-09

## 2020-03-09 RX ADMIN — INSULIN ASPART 1 UNITS: 100 INJECTION, SOLUTION INTRAVENOUS; SUBCUTANEOUS at 12:03

## 2020-03-09 RX ADMIN — AMPICILLIN SODIUM AND SULBACTAM SODIUM 3 G: 2; 1 INJECTION, POWDER, FOR SOLUTION INTRAMUSCULAR; INTRAVENOUS at 06:03

## 2020-03-09 RX ADMIN — OXYCODONE HYDROCHLORIDE AND ACETAMINOPHEN 1 TABLET: 10; 325 TABLET ORAL at 07:03

## 2020-03-09 RX ADMIN — AMPICILLIN SODIUM AND SULBACTAM SODIUM 3 G: 2; 1 INJECTION, POWDER, FOR SOLUTION INTRAMUSCULAR; INTRAVENOUS at 05:03

## 2020-03-09 RX ADMIN — OXYCODONE HYDROCHLORIDE AND ACETAMINOPHEN 1 TABLET: 10; 325 TABLET ORAL at 04:03

## 2020-03-09 RX ADMIN — OXYCODONE HYDROCHLORIDE AND ACETAMINOPHEN 1 TABLET: 10; 325 TABLET ORAL at 03:03

## 2020-03-09 RX ADMIN — INSULIN ASPART 4 UNITS: 100 INJECTION, SOLUTION INTRAVENOUS; SUBCUTANEOUS at 06:03

## 2020-03-09 RX ADMIN — INSULIN ASPART 2 UNITS: 100 INJECTION, SOLUTION INTRAVENOUS; SUBCUTANEOUS at 05:03

## 2020-03-09 RX ADMIN — OXYCODONE HYDROCHLORIDE AND ACETAMINOPHEN 1 TABLET: 10; 325 TABLET ORAL at 08:03

## 2020-03-09 RX ADMIN — AMPICILLIN SODIUM AND SULBACTAM SODIUM 3 G: 2; 1 INJECTION, POWDER, FOR SOLUTION INTRAMUSCULAR; INTRAVENOUS at 12:03

## 2020-03-09 RX ADMIN — INSULIN ASPART 4 UNITS: 100 INJECTION, SOLUTION INTRAVENOUS; SUBCUTANEOUS at 12:03

## 2020-03-09 RX ADMIN — SODIUM CHLORIDE: 0.9 INJECTION, SOLUTION INTRAVENOUS at 05:03

## 2020-03-09 RX ADMIN — FLUCONAZOLE 150 MG: 150 TABLET ORAL at 05:03

## 2020-03-09 RX ADMIN — OXYCODONE HYDROCHLORIDE AND ACETAMINOPHEN 1 TABLET: 10; 325 TABLET ORAL at 12:03

## 2020-03-09 RX ADMIN — PSYLLIUM HUSK 1 PACKET: 3.4 POWDER ORAL at 05:03

## 2020-03-09 NOTE — PROGRESS NOTES
Pt c/o vaginal itching and no BM since Thursday 3/5. Dr. Leon called and updated on pt's c/o, new orders received.

## 2020-03-09 NOTE — PLAN OF CARE
Pt progressing well. NAD noted. VSS. Pain well controlled with Percocet 10. Pt tolerating IV antibiotic therapy. Ambulating and voiding. POC discussed pt. Understanding verbalized.

## 2020-03-09 NOTE — SUBJECTIVE & OBJECTIVE
Interval History: Pt afebrile.  Continue to have abd pain. Seen by gen sx no plans for surgical intervention.  Cxs from [facility].    Review of Systems   Constitutional: Negative for activity change, appetite change, chills, diaphoresis, fatigue and fever.   Respiratory: Negative for cough and shortness of breath.    Cardiovascular: Negative for chest pain.   Gastrointestinal: Positive for abdominal pain and diarrhea. Negative for constipation, nausea and vomiting.   Genitourinary: Negative for difficulty urinating and dysuria.   Musculoskeletal: Negative for arthralgias and back pain.   Skin: Positive for wound. Negative for color change, pallor and rash.   Neurological: Negative for headaches.     Objective:     Vital Signs (Most Recent):  Temp: 99.2 °F (37.3 °C) (03/09/20 1400)  Pulse: 92 (03/09/20 1400)  Resp: 20 (03/09/20 1400)  BP: 129/70 (03/09/20 1400)  SpO2: 96 % (03/09/20 0754) Vital Signs (24h Range):  Temp:  [98 °F (36.7 °C)-99.2 °F (37.3 °C)] 99.2 °F (37.3 °C)  Pulse:  [87-98] 92  Resp:  [18-20] 20  SpO2:  [96 %] 96 %  BP: (110-134)/(58-77) 129/70     Weight: 112.5 kg (248 lb)  Body mass index is 45.36 kg/m².    Estimated Creatinine Clearance: 108.4 mL/min (based on SCr of 0.9 mg/dL).    Physical Exam   Constitutional: She appears well-developed and well-nourished. No distress.   HENT:   Head: Normocephalic.   Eyes: Pupils are equal, round, and reactive to light.   Cardiovascular: Normal rate, regular rhythm and normal heart sounds. Exam reveals no friction rub.   No murmur heard.  Pulmonary/Chest: Effort normal. No stridor. No respiratory distress. She has no wheezes.   Abdominal: Soft. She exhibits no distension. There is tenderness. There is no guarding. A hernia (ventral) is present.   Surgical scar from appendectomy   Musculoskeletal: Normal range of motion. She exhibits no edema.   Neurological: She is alert. No cranial nerve deficit. Coordination normal.   Skin: Skin is warm. She is not  diaphoretic. No erythema.   previous catheter wound of LLQ   Psychiatric: She has a normal mood and affect.   Vitals reviewed.      Significant Labs: All pertinent labs within the past 24 hours have been reviewed.    Significant Imaging: I have reviewed all pertinent imaging results/findings within the past 24 hours.

## 2020-03-09 NOTE — PROGRESS NOTES
[facility]  General Surgery  Progress Note    Subjective:     History of Present Illness:  28 y/o F with RLQ pain for the past 3-4 days. She was recently admitted at [facility] on 02/17/2020 for lower abdominal pain and diagnosed with tubo-ovarian abscess, which was treated with pigtail catheter and IV antibiotics.  Cultures grew out Actinomyces, currently taking amoxicillin.   She has continued to have chronic pain since the surgery, but now the pain has localized to the right side.     She has a right-sided ventral wall hernia from previous open appendectomy. This was repaired in 2016 using Strattice, but has recurred.    Post-Op Info:  * No surgery found *         Interval History: continues to improve. Tolerating diet, passing flatus    Medications:  Continuous Infusions:   sodium chloride 0.9% 100 mL/hr at 03/09/20 0535     Scheduled Meds:   ampicillin-sulbactim (UNASYN) IVPB  3 g Intravenous Q6H     PRN Meds:acetaminophen, dextrose 50%, glucagon (human recombinant), HYDROmorphone, HYDROmorphone, influenza, insulin aspart U-100, ondansetron, oxyCODONE-acetaminophen, oxyCODONE-acetaminophen, pneumoc 13-mary conj-dip cr(PF)     Review of patient's allergies indicates:   Allergen Reactions    Shellfish containing products Hives     To hands    Latex, natural rubber Rash     Burning sensation     Objective:     Vital Signs (Most Recent):  Temp: 98 °F (36.7 °C) (03/09/20 0754)  Pulse: 87 (03/09/20 0754)  Resp: 18 (03/09/20 0754)  BP: 132/77 (03/09/20 0754)  SpO2: 96 % (03/09/20 0754) Vital Signs (24h Range):  Temp:  [98 °F (36.7 °C)-98.4 °F (36.9 °C)] 98 °F (36.7 °C)  Pulse:  [87-98] 87  Resp:  [18-20] 18  SpO2:  [95 %-96 %] 96 %  BP: (110-134)/(57-77) 132/77     Weight: 112.5 kg (248 lb)  Body mass index is 45.36 kg/m².    Intake/Output - Last 3 Shifts       03/07 0700 - 03/08 0659 03/08 0700 - 03/09 0659 03/09 0700 - 03/10 0659    P.O.  2240     Total Intake(mL/kg)  2240 (19.9)      Urine (mL/kg/hr)  1900 (0.7)     Total Output  1900     Net  +340                  Physical Exam   Constitutional: She is oriented to person, place, and time. She appears well-developed and well-nourished.   HENT:   Head: Normocephalic and atraumatic.   Eyes: Conjunctivae are normal.   Neck: Normal range of motion.   Cardiovascular: Normal rate and regular rhythm.   Pulmonary/Chest: Effort normal.   Abdominal: Soft. She exhibits no distension. There is tenderness. A hernia is present.   RLQ/LLQ, improving   Musculoskeletal: Normal range of motion.   Neurological: She is alert and oriented to person, place, and time.   Skin: Skin is warm and dry.   Psychiatric: She has a normal mood and affect.       Significant Labs:  CBC:   Recent Labs   Lab 03/05/20  2300   WBC 7.39   RBC 3.72*   HGB 9.7*   HCT 32.3*   *   MCV 87   MCH 26.1*   MCHC 30.0*     BMP:   Recent Labs   Lab 03/05/20  2300   *      K 4.2   CL 99   CO2 26   BUN 11   CREATININE 0.9   CALCIUM 9.6       Significant Diagnostics:  no new    Assessment/Plan:     * Intra-abdominal abscess  Continue conservative treatment with IV antibiotics  Will continue to monitor  No surgical intervention planned at this time  GYN plans to rescan and check labs tomorrow, will follow up results          Ramila Zapata MD  General Surgery  [facility]

## 2020-03-09 NOTE — PLAN OF CARE
Problem: Fall Injury Risk  Goal: Absence of Fall and Fall-Related Injury  Outcome: Ongoing, Progressing     Problem: Adult Inpatient Plan of Care  Goal: Plan of Care Review  Outcome: Ongoing, Progressing     Problem: Adult Inpatient Plan of Care  Goal: Optimal Comfort and Wellbeing  Outcome: Ongoing, Progressing     Problem: Adult Inpatient Plan of Care  Goal: Readiness for Transition of Care  Outcome: Ongoing, Progressing     Problem: Diabetes Comorbidity  Goal: Blood Glucose Level Within Desired Range  Outcome: Ongoing, Progressing     Problem: Infection  Goal: Infection Symptom Resolution  Outcome: Ongoing, Progressing       VSS. Afebrile.  Ambulating in burr without difficulty.   Tolerating regular diet without any GI symptoms voiced.  Voiding without difficulty. + flatus. No BM.  Saline lock to left forearm intact and patent.   Flushed per hospital protocol.   ABX administered as ordered.  Flushed per hospital protocol.   Dextrostick performed as ordered. SS insulin administered as ordered.   Pain controlled with PRN meds.  Plan of care reviewed with patient. All questions answered.

## 2020-03-09 NOTE — SUBJECTIVE & OBJECTIVE
Interval History: continues to improve. Tolerating diet, passing flatus    Medications:  Continuous Infusions:   sodium chloride 0.9% 100 mL/hr at 03/09/20 0535     Scheduled Meds:   ampicillin-sulbactim (UNASYN) IVPB  3 g Intravenous Q6H     PRN Meds:acetaminophen, dextrose 50%, glucagon (human recombinant), HYDROmorphone, HYDROmorphone, influenza, insulin aspart U-100, ondansetron, oxyCODONE-acetaminophen, oxyCODONE-acetaminophen, pneumoc 13-mary conj-dip cr(PF)     Review of patient's allergies indicates:   Allergen Reactions    Shellfish containing products Hives     To hands    Latex, natural rubber Rash     Burning sensation     Objective:     Vital Signs (Most Recent):  Temp: 98 °F (36.7 °C) (03/09/20 0754)  Pulse: 87 (03/09/20 0754)  Resp: 18 (03/09/20 0754)  BP: 132/77 (03/09/20 0754)  SpO2: 96 % (03/09/20 0754) Vital Signs (24h Range):  Temp:  [98 °F (36.7 °C)-98.4 °F (36.9 °C)] 98 °F (36.7 °C)  Pulse:  [87-98] 87  Resp:  [18-20] 18  SpO2:  [95 %-96 %] 96 %  BP: (110-134)/(57-77) 132/77     Weight: 112.5 kg (248 lb)  Body mass index is 45.36 kg/m².    Intake/Output - Last 3 Shifts       03/07 0700 - 03/08 0659 03/08 0700 - 03/09 0659 03/09 0700 - 03/10 0659    P.O.  2240     Total Intake(mL/kg)  2240 (19.9)     Urine (mL/kg/hr)  1900 (0.7)     Total Output  1900     Net  +340                  Physical Exam   Constitutional: She is oriented to person, place, and time. She appears well-developed and well-nourished.   HENT:   Head: Normocephalic and atraumatic.   Eyes: Conjunctivae are normal.   Neck: Normal range of motion.   Cardiovascular: Normal rate and regular rhythm.   Pulmonary/Chest: Effort normal.   Abdominal: Soft. She exhibits no distension. There is tenderness. A hernia is present.   RLQ/LLQ, improving   Musculoskeletal: Normal range of motion.   Neurological: She is alert and oriented to person, place, and time.   Skin: Skin is warm and dry.   Psychiatric: She has a normal mood and affect.        Significant Labs:  CBC:   Recent Labs   Lab 03/05/20  2300   WBC 7.39   RBC 3.72*   HGB 9.7*   HCT 32.3*   *   MCV 87   MCH 26.1*   MCHC 30.0*     BMP:   Recent Labs   Lab 03/05/20  2300   *      K 4.2   CL 99   CO2 26   BUN 11   CREATININE 0.9   CALCIUM 9.6       Significant Diagnostics:  no new

## 2020-03-09 NOTE — ASSESSMENT & PLAN NOTE
Continue conservative treatment with IV antibiotics  Will continue to monitor  No surgical intervention planned at this time  GYN plans to rescan and check labs tomorrow, will follow up results

## 2020-03-09 NOTE — PROGRESS NOTES
Reports still some pain but thinks improving  yelena reg diet  No BM in 3-4 days but +flatus  Had diarrhea previously    Temp:  [98 °F (36.7 °C)-98.4 °F (36.9 °C)]   Pulse:  [87-98]   Resp:  [18-20]   BP: (110-134)/(57-77)   SpO2:  [95 %-96 %]   abd soft mildly tender left lower quadrant no rebound    Abscesses- clinically improving  Remains afebrile  Repeat CT scan and CBC in am   ID following  cx pending although may not be diagnostic since apparently unable to effectively drain

## 2020-03-10 PROBLEM — N70.93 TUBO-OVARIAN ABSCESS: Status: ACTIVE | Noted: 2020-02-17

## 2020-03-10 PROBLEM — A42.9 ACTINOMYCES INFECTION: Status: ACTIVE | Noted: 2020-02-20

## 2020-03-10 LAB
ALBUMIN SERPL BCP-MCNC: 2.8 G/DL (ref 3.5–5.2)
ALP SERPL-CCNC: 80 U/L (ref 55–135)
ALT SERPL W/O P-5'-P-CCNC: 23 U/L (ref 10–44)
ANION GAP SERPL CALC-SCNC: 8 MMOL/L (ref 8–16)
AST SERPL-CCNC: 21 U/L (ref 10–40)
BACTERIA SPEC ANAEROBE CULT: NORMAL
BASOPHILS # BLD AUTO: 0.04 K/UL (ref 0–0.2)
BASOPHILS NFR BLD: 0.8 % (ref 0–1.9)
BILIRUB SERPL-MCNC: 0.2 MG/DL (ref 0.1–1)
BUN SERPL-MCNC: 7 MG/DL (ref 6–20)
CALCIUM SERPL-MCNC: 9.3 MG/DL (ref 8.7–10.5)
CHLORIDE SERPL-SCNC: 102 MMOL/L (ref 95–110)
CO2 SERPL-SCNC: 30 MMOL/L (ref 23–29)
CREAT SERPL-MCNC: 0.8 MG/DL (ref 0.5–1.4)
DIFFERENTIAL METHOD: ABNORMAL
EOSINOPHIL # BLD AUTO: 0.2 K/UL (ref 0–0.5)
EOSINOPHIL NFR BLD: 3.8 % (ref 0–8)
ERYTHROCYTE [DISTWIDTH] IN BLOOD BY AUTOMATED COUNT: 14.6 % (ref 11.5–14.5)
EST. GFR  (AFRICAN AMERICAN): >60 ML/MIN/1.73 M^2
EST. GFR  (NON AFRICAN AMERICAN): >60 ML/MIN/1.73 M^2
GLUCOSE SERPL-MCNC: 183 MG/DL (ref 70–110)
HCT VFR BLD AUTO: 31 % (ref 37–48.5)
HGB BLD-MCNC: 9.3 G/DL (ref 12–16)
IMM GRANULOCYTES # BLD AUTO: 0.01 K/UL (ref 0–0.04)
IMM GRANULOCYTES NFR BLD AUTO: 0.2 % (ref 0–0.5)
LYMPHOCYTES # BLD AUTO: 2.2 K/UL (ref 1–4.8)
LYMPHOCYTES NFR BLD: 41 % (ref 18–48)
MCH RBC QN AUTO: 25.8 PG (ref 27–31)
MCHC RBC AUTO-ENTMCNC: 30 G/DL (ref 32–36)
MCV RBC AUTO: 86 FL (ref 82–98)
MONOCYTES # BLD AUTO: 0.4 K/UL (ref 0.3–1)
MONOCYTES NFR BLD: 7.4 % (ref 4–15)
NEUTROPHILS # BLD AUTO: 2.5 K/UL (ref 1.8–7.7)
NEUTROPHILS NFR BLD: 46.8 % (ref 38–73)
NRBC BLD-RTO: 0 /100 WBC
PLATELET # BLD AUTO: 338 K/UL (ref 150–350)
PMV BLD AUTO: 9.4 FL (ref 9.2–12.9)
POCT GLUCOSE: 141 MG/DL (ref 70–110)
POCT GLUCOSE: 145 MG/DL (ref 70–110)
POCT GLUCOSE: 178 MG/DL (ref 70–110)
POCT GLUCOSE: 236 MG/DL (ref 70–110)
POTASSIUM SERPL-SCNC: 5.2 MMOL/L (ref 3.5–5.1)
PROT SERPL-MCNC: 7.9 G/DL (ref 6–8.4)
RBC # BLD AUTO: 3.61 M/UL (ref 4–5.4)
SODIUM SERPL-SCNC: 140 MMOL/L (ref 136–145)
WBC # BLD AUTO: 5.29 K/UL (ref 3.9–12.7)

## 2020-03-10 PROCEDURE — 80053 COMPREHEN METABOLIC PANEL: CPT

## 2020-03-10 PROCEDURE — 25500020 PHARM REV CODE 255: Performed by: OBSTETRICS & GYNECOLOGY

## 2020-03-10 PROCEDURE — 11000001 HC ACUTE MED/SURG PRIVATE ROOM

## 2020-03-10 PROCEDURE — 36415 COLL VENOUS BLD VENIPUNCTURE: CPT

## 2020-03-10 PROCEDURE — 25000003 PHARM REV CODE 250: Performed by: OBSTETRICS & GYNECOLOGY

## 2020-03-10 PROCEDURE — 85025 COMPLETE CBC W/AUTO DIFF WBC: CPT

## 2020-03-10 PROCEDURE — 63600175 PHARM REV CODE 636 W HCPCS: Performed by: PHYSICIAN ASSISTANT

## 2020-03-10 RX ORDER — METFORMIN HYDROCHLORIDE 1000 MG/1
2000 TABLET ORAL
COMMUNITY
End: 2020-06-29 | Stop reason: SDUPTHER

## 2020-03-10 RX ORDER — PANTOPRAZOLE SODIUM 40 MG/1
40 TABLET, DELAYED RELEASE ORAL
Status: DISCONTINUED | OUTPATIENT
Start: 2020-03-11 | End: 2020-03-12 | Stop reason: HOSPADM

## 2020-03-10 RX ADMIN — INSULIN ASPART 4 UNITS: 100 INJECTION, SOLUTION INTRAVENOUS; SUBCUTANEOUS at 11:03

## 2020-03-10 RX ADMIN — OXYCODONE HYDROCHLORIDE AND ACETAMINOPHEN 1 TABLET: 10; 325 TABLET ORAL at 08:03

## 2020-03-10 RX ADMIN — OXYCODONE HYDROCHLORIDE AND ACETAMINOPHEN 1 TABLET: 10; 325 TABLET ORAL at 12:03

## 2020-03-10 RX ADMIN — OXYCODONE HYDROCHLORIDE AND ACETAMINOPHEN 1 TABLET: 10; 325 TABLET ORAL at 04:03

## 2020-03-10 RX ADMIN — IOHEXOL 100 ML: 350 INJECTION, SOLUTION INTRAVENOUS at 08:03

## 2020-03-10 RX ADMIN — PSYLLIUM HUSK 1 PACKET: 3.4 POWDER ORAL at 08:03

## 2020-03-10 RX ADMIN — INSULIN ASPART 2 UNITS: 100 INJECTION, SOLUTION INTRAVENOUS; SUBCUTANEOUS at 06:03

## 2020-03-10 RX ADMIN — AMPICILLIN SODIUM AND SULBACTAM SODIUM 3 G: 2; 1 INJECTION, POWDER, FOR SOLUTION INTRAMUSCULAR; INTRAVENOUS at 06:03

## 2020-03-10 RX ADMIN — AMPICILLIN SODIUM AND SULBACTAM SODIUM 3 G: 2; 1 INJECTION, POWDER, FOR SOLUTION INTRAMUSCULAR; INTRAVENOUS at 12:03

## 2020-03-10 RX ADMIN — INSULIN ASPART 1 UNITS: 100 INJECTION, SOLUTION INTRAVENOUS; SUBCUTANEOUS at 12:03

## 2020-03-10 NOTE — ASSESSMENT & PLAN NOTE
Continue conservative treatment with antibiotics as per infectious disease recommendations  Will continue to monitor  No surgical intervention planned at this time  follow up final CT read

## 2020-03-10 NOTE — PROGRESS NOTES
[facility]  Infectious Disease  Progress Note    Patient Name: Glenda Peña  MRN: [id number]  Admission Date: 3/5/2020  Length of Stay: 3 days  Attending Physician: Khai Parks MD  Primary Care Provider: Emiliano Dunbar MD    Isolation Status: No active isolations  Assessment/Plan:      * Intra-abdominal abscess  28 y/o female with DMII presents with abdominal pain found to have intra-peritoneal abscess. She was recently admitted to [facility] (2/17/2020) for similar symptoms found to have left tuboovarian abscess s/p percutaneous drainage catheter. Cultures +actinomyces. She was discharged with oral amoxicillin. Her catheter was removed prior to discharged.     CT here showing multiple fluid collection concerning for abscesses as described: 0.9 x 1.5 x 2.1-cm in rectovaginal space, 1.3 x 2.0 x 5.1-cm surrounding the cecum and 1.7 x 0.8 x 3.1-cm anterior to the cecum within a large ventral hernia.     Seen by IR unable to drain.  3 cc aspirated cxs w/o growth. General surgery following-no plans for intervention at this time. She is currently on unasyn       Recommendations  1. Continue unasyn 3g q6h for actinomyces and intra-abdominal coverage.   2. Recommend 2 weeks of Unasyn w/ repeat imaging and ID f/u prior to discontinuing Unasyn.  3.  Pt will need further transition to PO abx for actinomyces coverage for 6-12 months after completing Unasyn.  4.ID f/u in 2 weeks  5.Please have the following outpatient labs drawn WEEKLY and faxed to Infectious Diseases clinic at [phone]: CBC with diff, CMP    Prior to discharge, please ensure the patient's follow-up has been scheduled.  If there is still no follow-up scheduled in Infectious Diseases clinic, please send an EPIC message to the Infectious Diseases charge nurse Gladys Barfield.  6. ID will sign off.  Please re-consult with any new growth on cxs or planned intervention..              Thank you for your consult. I will sign off. Please  contact us if you have any additional questions.    Willy Cardozo PA-C  Infectious Disease  [facility]    Subjective:     Principal Problem:Intra-abdominal abscess    HPI: 30 y/o female presents with abdominal pain found to have intra-peritoneal abscess. She was recently admitted to [facility] (2/17/2020) for similar symptoms found to have left tuboovarian abscess s/p percutaneous drainage catheter. Cultures +actinomyces. She was discharged with oral amoxicillin. Her catheter was removed prior to discharged.     CT showing multiple fluid collection concerning for abscesses as described: 0.9 x 1.5 x 2.1-cm in rectovaginal space, 1.3 x 2.0 x 5.1-cm surrounding the cecum and 1.7 x 0.8 x 3.1-cm anterior to the cecum within a large ventral hernia.     Seen by IR and will attempt to aspirate the fluid today. General surgery following. She is currently on zosyn. No fever chills or night sweats.   Interval History: Pt afebrile.  Continue to have abd pain. Seen by gen sx no plans for surgical intervention.  Cxs from IR aspiration-NGTD.    Review of Systems   Constitutional: Negative for activity change, appetite change, chills, diaphoresis, fatigue and fever.   Respiratory: Negative for cough and shortness of breath.    Cardiovascular: Negative for chest pain.   Gastrointestinal: Positive for abdominal pain and diarrhea. Negative for constipation, nausea and vomiting.   Genitourinary: Negative for difficulty urinating and dysuria.   Musculoskeletal: Negative for arthralgias and back pain.   Skin: Positive for wound. Negative for color change, pallor and rash.   Neurological: Negative for headaches.     Objective:     Vital Signs (Most Recent):  Temp: 99.2 °F (37.3 °C) (03/09/20 1400)  Pulse: 92 (03/09/20 1400)  Resp: 20 (03/09/20 1400)  BP: 129/70 (03/09/20 1400)  SpO2: 96 % (03/09/20 0754) Vital Signs (24h Range):  Temp:  [98 °F (36.7 °C)-99.2 °F (37.3 °C)] 99.2 °F (37.3 °C)  Pulse:  [87-98]  92  Resp:  [18-20] 20  SpO2:  [96 %] 96 %  BP: (110-134)/(58-77) 129/70     Weight: 112.5 kg (248 lb)  Body mass index is 45.36 kg/m².    Estimated Creatinine Clearance: 108.4 mL/min (based on SCr of 0.9 mg/dL).    Physical Exam   Constitutional: She appears well-developed and well-nourished. No distress.   HENT:   Head: Normocephalic.   Eyes: Pupils are equal, round, and reactive to light.   Cardiovascular: Normal rate, regular rhythm and normal heart sounds. Exam reveals no friction rub.   No murmur heard.  Pulmonary/Chest: Effort normal. No stridor. No respiratory distress. She has no wheezes.   Abdominal: Soft. She exhibits no distension. There is tenderness. There is no guarding. A hernia (ventral) is present.   Surgical scar from appendectomy   Musculoskeletal: Normal range of motion. She exhibits no edema.   Neurological: She is alert. No cranial nerve deficit. Coordination normal.   Skin: Skin is warm. She is not diaphoretic. No erythema.   previous catheter wound of LLQ   Psychiatric: She has a normal mood and affect.   Vitals reviewed.      Significant Labs: All pertinent labs within the past 24 hours have been reviewed.    Significant Imaging: I have reviewed all pertinent imaging results/findings within the past 24 hours.

## 2020-03-10 NOTE — ASSESSMENT & PLAN NOTE
28 y/o female with DMII presents with abdominal pain found to have intra-peritoneal abscess. She was recently admitted to [facility] (2/17/2020) for similar symptoms found to have left tuboovarian abscess s/p percutaneous drainage catheter. Cultures +actinomyces. She was discharged with oral amoxicillin. Her catheter was removed prior to discharged.     CT here showing multiple fluid collection concerning for abscesses as described: 0.9 x 1.5 x 2.1-cm in rectovaginal space, 1.3 x 2.0 x 5.1-cm surrounding the cecum and 1.7 x 0.8 x 3.1-cm anterior to the cecum within a large ventral hernia.     Seen by IR unable to drain.  3 cc aspirated cxs w/o growth. General surgery following-no plans for intervention at this time. She is currently on unasyn       Recommendations  1. Continue unasyn 3g q6h for actinomyces and intra-abdominal coverage.   2. Recommend 2 weeks of Unasyn w/ repeat imaging and ID f/u prior to discontinuing Unasyn.  3.  Pt will need further transition to PO abx for actinomyces coverage for 6-12 months after completing Unasyn.  4.ID f/u in 2 weeks  5.Please have the following outpatient labs drawn WEEKLY and faxed to Infectious Diseases clinic at [phone]: CBC with diff, CMP    Prior to discharge, please ensure the patient's follow-up has been scheduled.  If there is still no follow-up scheduled in Infectious Diseases clinic, please send an EPIC message to the Infectious Diseases charge nurse Gladys Barfield.  6. ID will sign off.  Please re-consult with any new growth on cxs or planned intervention..

## 2020-03-10 NOTE — PHYSICIAN QUERY
PT Name: Glenda Peña  MR #: [id number]     PHYSICIAN QUERY -  ACUITY OF CONDITION CLARIFICATION      CDS/: TASHA Lane,RNC-[email or web address]         Contact information:[email or web address]  This form is a permanent document in the medical record.     Query Date: March 10, 2020    By submitting this query, we are merely seeking further clarification of documentation to reflect the severity of illness of your patient. Please utilize your independent clinical judgment when addressing the question(s) below.    The Medical record reflects the following:     Indicators   Supporting Clinical Findings Location in Medical Record   X Documentation of condition TOA s/p recent discharge from  after IR drainage of TOA with +actinomyces OB progress note 3/10@820am   X Lab Value(s) WBCs remain normal OB progress note 3/10@820am   X Radiology Findings In this patient with reported history of recently treated tubo-ovarian abscess at outside institution, there are two apparent fluid collections within the pelvis adjacent to the uterus extending into the left adnexa which may relate to residual fluid collection/abscess.  There is an additional third complex peripherally enhancing fluid collection within the right lower quadrant adjacent to the cecum within the patient's large ventral hernia sac concerning for additional intra-abdominal abscess.  There is associated adjacent mesenteric fat stranding and peritoneal enhancement.  Appropriate subspecialty consultation is recommended as well as correlation with prior outside imaging Abdominal U/S 3/5   X Treatment                                 Medication Patient was discharged home on amoxil and re-presented with pain    S/p attempted IR drained which was unsuccessful.  Repeat CT today.  If abscesses stable or decreasing in size, will need to plan for PICC line and 2 weeks of Unasyn per ID OB progress note 3/10@820am    Other       Provider, please specify the  acuity/chronicity of TOA:    [   ] Acute   [   ] Chronic   [ x  ] Subacute   [   ] Acute and/on chronic   [   ] Past history only, not a current diagnosis   [   ] Ruled Out   [   ]  Clinically Undetermined       Please document in your progress notes daily for the duration of treatment until resolved, and include in your discharge summary.

## 2020-03-10 NOTE — PROGRESS NOTES
[facility] [place]  General Surgery  Progress Note    Subjective:     History of Present Illness:  28 y/o F with RLQ pain for the past 3-4 days. She was recently admitted at [facility] on 02/17/2020 for lower abdominal pain and diagnosed with tubo-ovarian abscess, which was treated with pigtail catheter and IV antibiotics.  Cultures grew out Actinomyces, currently taking amoxicillin.   She has continued to have chronic pain since the surgery, but now the pain has localized to the right side.     She has a right-sided ventral wall hernia from previous open appendectomy. This was repaired in 2016 using Strattice, but has recurred.    Post-Op Info:  * No surgery found *         Interval History: no acute changes    Medications:  Continuous Infusions:   sodium chloride 0.9% 100 mL/hr at 03/09/20 0535     Scheduled Meds:   ampicillin-sulbactim (UNASYN) IVPB  3 g Intravenous Q6H    [START ON 3/11/2020] pantoprazole  40 mg Oral Before breakfast    psyllium husk (with sugar)  1 packet Oral Daily     PRN Meds:acetaminophen, dextrose 50%, glucagon (human recombinant), HYDROmorphone, HYDROmorphone, influenza, insulin aspart U-100, ondansetron, oxyCODONE-acetaminophen, oxyCODONE-acetaminophen, pneumoc 13-mary conj-dip cr(PF)     Review of patient's allergies indicates:   Allergen Reactions    Shellfish containing products Hives     To hands    Latex, natural rubber Rash     Burning sensation     Objective:     Vital Signs (Most Recent):  Temp: 97.8 °F (36.6 °C) (03/10/20 0719)  Pulse: 77 (03/10/20 0719)  Resp: 20 (03/10/20 0719)  BP: (!) 113/59 (03/10/20 0719)  SpO2: (!) 94 % (03/10/20 0719) Vital Signs (24h Range):  Temp:  [97.8 °F (36.6 °C)-99.2 °F (37.3 °C)] 97.8 °F (36.6 °C)  Pulse:  [77-92] 77  Resp:  [18-21] 20  SpO2:  [94 %] 94 %  BP: (112-129)/(56-70) 113/59     Weight: 112.5 kg (248 lb)  Body mass index is 45.36 kg/m².    Intake/Output - Last 3 Shifts       03/08 0700 - 03/09 0659 03/09  0700 - 03/10 0659 03/10 0700 - 03/11 0659    P.O. 2240      Total Intake(mL/kg) 2240 (19.9)      Urine (mL/kg/hr) 1900 (0.7)      Total Output 1900      Net +340                   Physical Exam   Constitutional: She is oriented to person, place, and time. She appears well-developed and well-nourished.   HENT:   Head: Normocephalic and atraumatic.   Neck: Normal range of motion.   Cardiovascular: Normal rate and regular rhythm.   Pulmonary/Chest: Effort normal.   Abdominal: Soft. She exhibits no distension. There is tenderness. A hernia is present.   Musculoskeletal: Normal range of motion.   Neurological: She is alert and oriented to person, place, and time.       Significant Labs:  CBC:   Recent Labs   Lab 03/10/20  0559   WBC 5.29   RBC 3.61*   HGB 9.3*   HCT 31.0*      MCV 86   MCH 25.8*   MCHC 30.0*     BMP:   Recent Labs   Lab 03/10/20  0559   *      K 5.2*      CO2 30*   BUN 7   CREATININE 0.8   CALCIUM 9.3       Significant Diagnostics:  CT scan repeated today, no significant change on my review, will follow up final read    Assessment/Plan:     * Intra-abdominal abscess  Continue conservative treatment with antibiotics as per infectious disease recommendations  Will continue to monitor  No surgical intervention planned at this time  follow up final CT read          Ramila Zapata MD  General Surgery  [facility]

## 2020-03-10 NOTE — SUBJECTIVE & OBJECTIVE
Interval History: no acute changes    Medications:  Continuous Infusions:   sodium chloride 0.9% 100 mL/hr at 03/09/20 0535     Scheduled Meds:   ampicillin-sulbactim (UNASYN) IVPB  3 g Intravenous Q6H    [START ON 3/11/2020] pantoprazole  40 mg Oral Before breakfast    psyllium husk (with sugar)  1 packet Oral Daily     PRN Meds:acetaminophen, dextrose 50%, glucagon (human recombinant), HYDROmorphone, HYDROmorphone, influenza, insulin aspart U-100, ondansetron, oxyCODONE-acetaminophen, oxyCODONE-acetaminophen, pneumoc 13-mary conj-dip cr(PF)     Review of patient's allergies indicates:   Allergen Reactions    Shellfish containing products Hives     To hands    Latex, natural rubber Rash     Burning sensation     Objective:     Vital Signs (Most Recent):  Temp: 97.8 °F (36.6 °C) (03/10/20 0719)  Pulse: 77 (03/10/20 0719)  Resp: 20 (03/10/20 0719)  BP: (!) 113/59 (03/10/20 0719)  SpO2: (!) 94 % (03/10/20 0719) Vital Signs (24h Range):  Temp:  [97.8 °F (36.6 °C)-99.2 °F (37.3 °C)] 97.8 °F (36.6 °C)  Pulse:  [77-92] 77  Resp:  [18-21] 20  SpO2:  [94 %] 94 %  BP: (112-129)/(56-70) 113/59     Weight: 112.5 kg (248 lb)  Body mass index is 45.36 kg/m².    Intake/Output - Last 3 Shifts       03/08 0700 - 03/09 0659 03/09 0700 - 03/10 0659 03/10 0700 - 03/11 0659    P.O. 2240      Total Intake(mL/kg) 2240 (19.9)      Urine (mL/kg/hr) 1900 (0.7)      Total Output 1900      Net +340                   Physical Exam   Constitutional: She is oriented to person, place, and time. She appears well-developed and well-nourished.   HENT:   Head: Normocephalic and atraumatic.   Neck: Normal range of motion.   Cardiovascular: Normal rate and regular rhythm.   Pulmonary/Chest: Effort normal.   Abdominal: Soft. She exhibits no distension. There is tenderness. A hernia is present.   Musculoskeletal: Normal range of motion.   Neurological: She is alert and oriented to person, place, and time.       Significant Labs:  CBC:   Recent Labs    Lab 03/10/20  0559   WBC 5.29   RBC 3.61*   HGB 9.3*   HCT 31.0*      MCV 86   MCH 25.8*   MCHC 30.0*     BMP:   Recent Labs   Lab 03/10/20  0559   *      K 5.2*      CO2 30*   BUN 7   CREATININE 0.8   CALCIUM 9.3       Significant Diagnostics:  CT scan repeated today, no significant change on my review, will follow up final read

## 2020-03-10 NOTE — PLAN OF CARE
Pt. pain managed with prn percocet. Tolerating regular diet. Tolerating abx.  Enc. Pt. To drink fluids and ambulated. Cbc and Cmp this morning. POC reviewed with pt. Vss.

## 2020-03-10 NOTE — PLAN OF CARE
VSS. Afebrile  Ambulating in burr without difficulty.   Tolerating 2000 ADA diet without any GI symptoms voiced.   Voiding without difficulty. + flatus. No BM.  Metamucil administered.  Saline lock to left hand intact, and patent. Site clean, dry without any s/s of infiltration/infection noted. ABX administered as ordered.  Pain controlled with PRN meds.  Plan of care reviewed with patient. All questions answered.

## 2020-03-10 NOTE — PROGRESS NOTES
"Reports pain is "tolerable."  Denies n/v.  Ambulating and voiding.  Denies diarrhea.  Denies SOB    Vital Signs (Most Recent):  Temp: 97.8 °F (36.6 °C) (03/10/20 0719)  Pulse: 77 (03/10/20 0719)  Resp: 20 (03/10/20 0719)  BP: (!) 113/59 (03/10/20 0719)  SpO2: (!) 94 % (03/10/20 0719)    Vital Signs Range (Last 24H):  Temp:  [97.8 °F (36.6 °C)-99.2 °F (37.3 °C)]   Pulse:  [77-92]   Resp:  [18-21]   BP: (112-129)/(56-70)   SpO2:  [94 %]     Gen - NAD  Abd -large right sided hernia, abdomen is tender throughout, without rebound or guarding      Recent Labs   Lab 03/10/20  0559   WBC 5.29   HGB 9.3*   HCT 31.0*          HD# 5  1.  TOA s/p recent discharge from  after IR drainage of TOA with +actinomyces.  Patient was discharged home on amoxil and re-presented with pain.  WBCs remain normal.  There is no fever.  S/p attempted IR drained which was unsuccessful.  Repeat CT today.  If abscesses stable or decreasing in size, will need to plan for PICC line and 2 weeks of Unasyn per ID.    2.  DM - on SS insulin.  Will give trulicity since due on Mondays    "

## 2020-03-11 LAB
ALBUMIN SERPL BCP-MCNC: 3 G/DL (ref 3.5–5.2)
ALP SERPL-CCNC: 80 U/L (ref 55–135)
ALT SERPL W/O P-5'-P-CCNC: 24 U/L (ref 10–44)
ANION GAP SERPL CALC-SCNC: 11 MMOL/L (ref 8–16)
AST SERPL-CCNC: 23 U/L (ref 10–40)
BACTERIA SPEC AEROBE CULT: NO GROWTH
BASOPHILS # BLD AUTO: 0.03 K/UL (ref 0–0.2)
BASOPHILS NFR BLD: 0.6 % (ref 0–1.9)
BILIRUB SERPL-MCNC: 0.3 MG/DL (ref 0.1–1)
BUN SERPL-MCNC: 6 MG/DL (ref 6–20)
CALCIUM SERPL-MCNC: 9.9 MG/DL (ref 8.7–10.5)
CHLORIDE SERPL-SCNC: 101 MMOL/L (ref 95–110)
CO2 SERPL-SCNC: 28 MMOL/L (ref 23–29)
CREAT SERPL-MCNC: 0.8 MG/DL (ref 0.5–1.4)
DIFFERENTIAL METHOD: ABNORMAL
EOSINOPHIL # BLD AUTO: 0.2 K/UL (ref 0–0.5)
EOSINOPHIL NFR BLD: 3 % (ref 0–8)
ERYTHROCYTE [DISTWIDTH] IN BLOOD BY AUTOMATED COUNT: 14.9 % (ref 11.5–14.5)
EST. GFR  (AFRICAN AMERICAN): >60 ML/MIN/1.73 M^2
EST. GFR  (NON AFRICAN AMERICAN): >60 ML/MIN/1.73 M^2
GLUCOSE SERPL-MCNC: 154 MG/DL (ref 70–110)
HCT VFR BLD AUTO: 31.9 % (ref 37–48.5)
HGB BLD-MCNC: 9.3 G/DL (ref 12–16)
IMM GRANULOCYTES # BLD AUTO: 0.02 K/UL (ref 0–0.04)
IMM GRANULOCYTES NFR BLD AUTO: 0.4 % (ref 0–0.5)
LYMPHOCYTES # BLD AUTO: 2.1 K/UL (ref 1–4.8)
LYMPHOCYTES NFR BLD: 40.6 % (ref 18–48)
MCH RBC QN AUTO: 25.3 PG (ref 27–31)
MCHC RBC AUTO-ENTMCNC: 29.2 G/DL (ref 32–36)
MCV RBC AUTO: 87 FL (ref 82–98)
MONOCYTES # BLD AUTO: 0.4 K/UL (ref 0.3–1)
MONOCYTES NFR BLD: 8.2 % (ref 4–15)
NEUTROPHILS # BLD AUTO: 2.5 K/UL (ref 1.8–7.7)
NEUTROPHILS NFR BLD: 47.2 % (ref 38–73)
NRBC BLD-RTO: 0 /100 WBC
PLATELET # BLD AUTO: 358 K/UL (ref 150–350)
PMV BLD AUTO: 9.4 FL (ref 9.2–12.9)
POCT GLUCOSE: 155 MG/DL (ref 70–110)
POCT GLUCOSE: 157 MG/DL (ref 70–110)
POTASSIUM SERPL-SCNC: 4.6 MMOL/L (ref 3.5–5.1)
PROT SERPL-MCNC: 8.3 G/DL (ref 6–8.4)
RBC # BLD AUTO: 3.67 M/UL (ref 4–5.4)
SODIUM SERPL-SCNC: 140 MMOL/L (ref 136–145)
WBC # BLD AUTO: 5.25 K/UL (ref 3.9–12.7)

## 2020-03-11 PROCEDURE — 25000003 PHARM REV CODE 250: Performed by: OBSTETRICS & GYNECOLOGY

## 2020-03-11 PROCEDURE — 11000001 HC ACUTE MED/SURG PRIVATE ROOM

## 2020-03-11 PROCEDURE — 80053 COMPREHEN METABOLIC PANEL: CPT

## 2020-03-11 PROCEDURE — 63600175 PHARM REV CODE 636 W HCPCS: Performed by: OBSTETRICS & GYNECOLOGY

## 2020-03-11 PROCEDURE — 36415 COLL VENOUS BLD VENIPUNCTURE: CPT

## 2020-03-11 PROCEDURE — 63600175 PHARM REV CODE 636 W HCPCS: Performed by: PHYSICIAN ASSISTANT

## 2020-03-11 PROCEDURE — C1751 CATH, INF, PER/CENT/MIDLINE: HCPCS

## 2020-03-11 PROCEDURE — 85025 COMPLETE CBC W/AUTO DIFF WBC: CPT

## 2020-03-11 PROCEDURE — 36569 INSJ PICC 5 YR+ W/O IMAGING: CPT

## 2020-03-11 RX ORDER — SODIUM CHLORIDE 0.9 % (FLUSH) 0.9 %
10 SYRINGE (ML) INJECTION EVERY 6 HOURS
Start: 2020-03-11 | End: 2020-06-23 | Stop reason: CLARIF

## 2020-03-11 RX ORDER — SODIUM CHLORIDE 0.9 % (FLUSH) 0.9 %
10 SYRINGE (ML) INJECTION
Status: DISCONTINUED | OUTPATIENT
Start: 2020-03-11 | End: 2020-03-12 | Stop reason: HOSPADM

## 2020-03-11 RX ORDER — SODIUM CHLORIDE 0.9 % (FLUSH) 0.9 %
10 SYRINGE (ML) INJECTION EVERY 6 HOURS
Status: DISCONTINUED | OUTPATIENT
Start: 2020-03-11 | End: 2020-03-12 | Stop reason: HOSPADM

## 2020-03-11 RX ADMIN — OXYCODONE HYDROCHLORIDE AND ACETAMINOPHEN 1 TABLET: 10; 325 TABLET ORAL at 05:03

## 2020-03-11 RX ADMIN — AMPICILLIN SODIUM AND SULBACTAM SODIUM 3 G: 2; 1 INJECTION, POWDER, FOR SOLUTION INTRAMUSCULAR; INTRAVENOUS at 06:03

## 2020-03-11 RX ADMIN — INSULIN ASPART 2 UNITS: 100 INJECTION, SOLUTION INTRAVENOUS; SUBCUTANEOUS at 11:03

## 2020-03-11 RX ADMIN — OXYCODONE HYDROCHLORIDE AND ACETAMINOPHEN 1 TABLET: 10; 325 TABLET ORAL at 12:03

## 2020-03-11 RX ADMIN — INSULIN ASPART 4 UNITS: 100 INJECTION, SOLUTION INTRAVENOUS; SUBCUTANEOUS at 06:03

## 2020-03-11 RX ADMIN — AMPICILLIN SODIUM AND SULBACTAM SODIUM 3 G: 2; 1 INJECTION, POWDER, FOR SOLUTION INTRAMUSCULAR; INTRAVENOUS at 01:03

## 2020-03-11 RX ADMIN — PANTOPRAZOLE SODIUM 40 MG: 40 TABLET, DELAYED RELEASE ORAL at 06:03

## 2020-03-11 RX ADMIN — OXYCODONE HYDROCHLORIDE AND ACETAMINOPHEN 1 TABLET: 10; 325 TABLET ORAL at 09:03

## 2020-03-11 RX ADMIN — AMPICILLIN SODIUM AND SULBACTAM SODIUM 3 G: 2; 1 INJECTION, POWDER, FOR SOLUTION INTRAMUSCULAR; INTRAVENOUS at 12:03

## 2020-03-11 RX ADMIN — OXYCODONE HYDROCHLORIDE AND ACETAMINOPHEN 1 TABLET: 10; 325 TABLET ORAL at 02:03

## 2020-03-11 RX ADMIN — OXYCODONE HYDROCHLORIDE AND ACETAMINOPHEN 1 TABLET: 10; 325 TABLET ORAL at 10:03

## 2020-03-11 RX ADMIN — OXYCODONE HYDROCHLORIDE AND ACETAMINOPHEN 1 TABLET: 10; 325 TABLET ORAL at 06:03

## 2020-03-11 RX ADMIN — INSULIN ASPART 2 UNITS: 100 INJECTION, SOLUTION INTRAVENOUS; SUBCUTANEOUS at 06:03

## 2020-03-11 RX ADMIN — ONDANSETRON HYDROCHLORIDE 8 MG: 2 SOLUTION INTRAMUSCULAR; INTRAVENOUS at 01:03

## 2020-03-11 RX ADMIN — PSYLLIUM HUSK 1 PACKET: 3.4 POWDER ORAL at 05:03

## 2020-03-11 NOTE — PROGRESS NOTES
[facility]  Obstetrics & Gynecology  Progress Note    Patient Name: Glenda Peña  MRN: [id number]  Admission Date: 3/5/2020  Primary Care Provider: Emiliano Dunbar MD  Principal Problem: Intra-abdominal abscess    Subjective:     Interval History: Reports moderate pain in the lower pelvis bilaterally, R>L.  No N/v, tolerating diet, +flatus, no BM x 6 days.  Denies diarrhea or SOB.      Scheduled Meds:   ampicillin-sulbactim (UNASYN) IVPB  3 g Intravenous Q6H    pantoprazole  40 mg Oral Before breakfast    psyllium husk (with sugar)  1 packet Oral Daily     Continuous Infusions:   sodium chloride 0.9% 100 mL/hr at 03/09/20 0535     PRN Meds:acetaminophen, dextrose 50%, glucagon (human recombinant), HYDROmorphone, HYDROmorphone, influenza, insulin aspart U-100, ondansetron, oxyCODONE-acetaminophen, oxyCODONE-acetaminophen, pneumoc 13-mary conj-dip cr(PF)    Review of patient's allergies indicates:   Allergen Reactions    Shellfish containing products Hives     To hands    Latex, natural rubber Rash     Burning sensation       Objective:     Vital Signs (Most Recent):  Temp: 96.8 °F (36 °C) (03/11/20 0724)  Pulse: 88 (03/11/20 0724)  Resp: 20 (03/11/20 0724)  BP: 124/60 (03/11/20 0724)  SpO2: (!) 94 % (03/11/20 0724) Vital Signs (24h Range):  Temp:  [96.8 °F (36 °C)-99.1 °F (37.3 °C)] 96.8 °F (36 °C)  Pulse:  [86-99] 88  Resp:  [18-20] 20  SpO2:  [94 %-96 %] 94 %  BP: (108-139)/(60-92) 124/60     Weight: 112.5 kg (248 lb)  Body mass index is 45.36 kg/m².  Patient's last menstrual period was 01/31/2020 (approximate).    I&O (Last 24H):  No intake or output data in the 24 hours ending 03/11/20 1008    Physical Exam   Gen A&O x 4 NAD  CV RRR  Chest CTAB  Abdomen +BS, obese, tender especially in the right lower quadrant, large right sided hernia, no rebound or guarding.    Laboratory:  CBC:   Recent Labs   Lab 03/11/20  0829   WBC 5.25   RBC 3.67*   HGB 9.3*   HCT 31.9*   *   MCV 87   MCH  25.3*   MCHC 29.2*     CMP:   Recent Labs   Lab 03/11/20  0829   *   CALCIUM 9.9   ALBUMIN 3.0*   PROT 8.3      K 4.6   CO2 28      BUN 6   CREATININE 0.8   ALKPHOS 80   ALT 24   AST 23   BILITOT 0.3   cx NGTD    Diagnostic Results:  CT: Reviewed       Decreased size in the loculated right lower quadrant fluid collection which does abut the cecum.  Appendix is not definitely visualized..  Right anterolateral hernias associated.     The pelvic collection is relatively similar and somewhat heterogeneous.  Collection posterior to the uterus decreased in volume.     Continued extensive subcutaneous soft tissue stranding/anasarca.     Multiple scattered borderline enlarged retroperitoneal nodes and mesenteric nodes.     Hepatosplenomegaly.     Trace left pleural effusion.     Degenerative and dysraphic changes in the spine as above.     Several right lower lobe lung nodules less than 5 mm.  For multiple solid nodules all <6 mm, Fleischner Society 2017 guidelines recommend no routine follow up for a low risk patient, or follow up with non-contrast chest CT at 12 months after discovery in a high risk patient.     Additional findings as above     This report was flagged in Epic as abnormal.     Assessment/Plan:     Active Diagnoses:    Diagnosis Date Noted POA    PRINCIPAL PROBLEM:  Intra-abdominal abscess [K65.1] 03/06/2020 Yes    Type 2 diabetes mellitus with hyperglycemia [E11.65] 03/06/2020 Yes      Problems Resolved During this Admission:       1. TOA s/p recent discharge from  after IR drainage of TOA with +actinomyces. Patient was discharged home on amoxil and re-presented with pain. WBCs remain normal. There is no fever. S/p attempted IR drained which was unsuccessful. Abscesses all decreasing in size except anterior uterine collection remains stable at 3x11 cm.  Will need to plan for PICC line and 2 weeks of Unasyn followed by 6+months of oral abx per ID.   2. DM - on SS insulin.  Reasonably  well controlled as all glucoses are under 200.        Aleena Robertson MD  Obstetrics & Gynecology  [facility] [place]

## 2020-03-11 NOTE — PLAN OF CARE
Pt. Pain managed with prn percocet. Tolerating regular diet.  Enc. Pt. To ambulate and drink fluids. Glucose measured q6h. Tolerating abx. P.O.C reviewed with pt. Vss.

## 2020-03-11 NOTE — PROCEDURES
"Glenda Peña is a 29 y.o. female patient.    Temp: 96.8 °F (36 °C) (03/11/20 0724)  Pulse: 88 (03/11/20 0724)  Resp: 20 (03/11/20 0724)  BP: 124/60 (03/11/20 0724)  SpO2: (!) 94 % (03/11/20 0724)  Weight: 112.5 kg (248 lb) (03/06/20 0346)  Height: 5' 2" (157.5 cm) (03/06/20 0346)    PICC  Date/Time: 3/11/2020 1:00 PM  Performed by: Jasper Cohen RN  Consent Done: Yes  Time out: Immediately prior to procedure a time out was called to verify the correct patient, procedure, equipment, support staff and site/side marked as required  Indications: med administration  Anesthesia: local infiltration  Local anesthetic: lidocaine 1% without epinephrine  Anesthetic Total (mL): 1  Preparation: skin prepped with ChloraPrep  Skin prep agent dried: skin prep agent completely dried prior to procedure  Sterile barriers: all five maximum sterile barriers used - cap, mask, sterile gown, sterile gloves, and large sterile sheet  Hand hygiene: hand hygiene performed prior to central venous catheter insertion  Location details: right basilic  Catheter type: double lumen  Catheter size: 5 Fr  Catheter Length: 45cm    Ultrasound guidance: yes  Vessel Caliber: medium and large and patent, compressibility normal  Needle advanced into vessel with real time Ultrasound guidance.  Guidewire confirmed in vessel.  Sterile sheath used.  Number of attempts: 1  Post-procedure: blood return through all ports, chlorhexidine patch and sterile dressing applied  Estimated blood loss (mL): 0            Jasper Cohen  3/11/2020  "

## 2020-03-11 NOTE — PROGRESS NOTES
HIEN faxed pt referral to [facility] home infusion. HIEN spoke with Elisha, she stated she will come to meet with the pt and set up home health also.

## 2020-03-11 NOTE — PROGRESS NOTES
[facility]  Home Health Hub: [phone]    GENERAL   HOME HEALTH ORDERS    Patient Name: Glenda Peña  YOB: 1990    PCP: Emiliano Dunbar MD   PCP Address: [address]  PCP Phone Number: [phone]  PCP Fax: [phone]    Admit to Home Health    Diagnosis:   Active Hospital Problems    Diagnosis  POA    *Intra-abdominal abscess [K65.1]  Yes    Type 2 diabetes mellitus with hyperglycemia [E11.65]  Yes      Resolved Hospital Problems   No resolved problems to display.       Patient is homebound due to:  Intra-abdominal abscess    Allergies:   Review of patient's allergies indicates:   Allergen Reactions    Shellfish containing products Hives     To hands    Latex, natural rubber Rash     Burning sensation       Diet: diabetic diet: 2000 calorie Instruct on diet as prescribed    Activities as tolerated: SN to instruct patient on importance of home exercise program. Utilize tool for education.    Nursing:    Evaluate fall risk and need for Physical Therapy by performing time up and go (TUG) and 30 second chair rise.  o If the patient scores at risk on any one of the two tests performed then SN to order PT evaluate and treat.   o If PT consulted based on at risk score, then PT to evaluate need for OT in the home on evaluation visit.     SN to complete comprehensive assessment within 24 hours of discharge from hospital or referral from ambulatory clinic:    Perform and record the following vital signs:   BP   Respiratory Rate   Pulse   O2 Sat  o If room air O2 sat below 88%, notify physician so they can order an O2 qualifying test.    Weight (deliver scale to patient on admit if patient does not have properly functioning scale)   Skilled Nurse to record Target Weight = AM weight immediately after discharge unless otherwise specified   Standing Daily Weight:  o Instruct patient to take daily AM weights (record on log provided)   Protocol for  how to weigh patient:   o Nurse to hold all equipment  o Shoes off  o Early morning with enforcement to patient on timing and method        CONSULTS:     to evaluate for community resources/long-range planning.    MISCELLANEOUS CARE  See orders below for IV antibiotics    WOUND CARE ORDERS  n/a    Medications: Review discharge medications with patient and family and provide education.      Current Discharge Medication List      START taking these medications    Details   ampicillin sodium/sulbactam Na (AMPICILLIN/SULBACTAM 3 G/100 ML NS, READY TO MIX,) Inject 100 mLs (3 g total) into the vein every 6 (six) hours.      sodium chloride 0.9% (NORMAL SALINE FLUSH) injection Inject 10 mLs into the vein every 6 (six) hours.         CONTINUE these medications which have NOT CHANGED    Details   dulaglutide (TRULICITY) 1.5 mg/0.5 mL PnIj Inject 1.5 mg into the skin every 7 days.  Qty: 2 mL, Refills: 3    Associated Diagnoses: Type 2 diabetes mellitus without complication, without long-term current use of insulin      glipiZIDE (GLUCOTROL) 5 MG TR24 Take 1 tablet (5 mg total) by mouth daily with breakfast.  Qty: 90 tablet, Refills: 3    Associated Diagnoses: Type 2 diabetes mellitus without complication, without long-term current use of insulin      lisinopril (PRINIVIL,ZESTRIL) 2.5 MG tablet Take 1 tablet (2.5 mg total) by mouth once daily.  Qty: 90 tablet, Refills: 1    Associated Diagnoses: Type 2 diabetes mellitus without complication, without long-term current use of insulin      pantoprazole (PROTONIX) 40 MG tablet Take 1 tablet (40 mg total) by mouth before breakfast.  Qty: 60 tablet, Refills: 2    Associated Diagnoses: GERD with esophagitis      albuterol (VENTOLIN HFA) 90 mcg/actuation inhaler Inhale 2 puffs into the lungs every 6 (six) hours as needed for Wheezing. Rescue  Qty: 18 g, Refills: 0    Associated Diagnoses: Acute bacterial bronchitis      metFORMIN (GLUCOPHAGE) 1000 MG tablet Take 2,000 mg  by mouth.      ondansetron (ZOFRAN) 4 MG tablet Take 1 tablet (4 mg total) by mouth every 6 (six) hours as needed for Nausea.  Qty: 18 tablet, Refills: 0    Associated Diagnoses: Viral gastroenteritis         STOP taking these medications       amoxicillin (AMOXIL) 500 MG capsule Comments:   Reason for Stopping:         oxyCODONE-acetaminophen (PERCOCET) 5-325 mg per tablet Comments:   Reason for Stopping:         BALZIVA, 28, 0.4-35 mg-mcg per tablet Comments:   Reason for Stopping:         fluconazole (DIFLUCAN) 150 MG Tab Comments:   Reason for Stopping:         terconazole (TERAZOL 3) 80 mg vaginal suppository Comments:   Reason for Stopping:               I have seen and examined this patient face to face today. My clinical findings that support the need for the home health skilled services and home bound status are the following:  Need for IV abx through PICC line  I certify that this patient is confined to her home and needs intermittent skilled nursing care.    Aleena Robertson MD  03/11/2020

## 2020-03-12 VITALS
WEIGHT: 248 LBS | BODY MASS INDEX: 45.64 KG/M2 | RESPIRATION RATE: 19 BRPM | OXYGEN SATURATION: 97 % | TEMPERATURE: 97 F | DIASTOLIC BLOOD PRESSURE: 52 MMHG | HEART RATE: 90 BPM | HEIGHT: 62 IN | SYSTOLIC BLOOD PRESSURE: 104 MMHG

## 2020-03-12 PROBLEM — N76.3 CHRONIC VULVITIS: Status: RESOLVED | Noted: 2018-02-28 | Resolved: 2020-03-12

## 2020-03-12 LAB
POCT GLUCOSE: 164 MG/DL (ref 70–110)
POCT GLUCOSE: 195 MG/DL (ref 70–110)
POCT GLUCOSE: 210 MG/DL (ref 70–110)

## 2020-03-12 PROCEDURE — 25000003 PHARM REV CODE 250: Performed by: OBSTETRICS & GYNECOLOGY

## 2020-03-12 PROCEDURE — 63600175 PHARM REV CODE 636 W HCPCS: Performed by: PHYSICIAN ASSISTANT

## 2020-03-12 PROCEDURE — A4216 STERILE WATER/SALINE, 10 ML: HCPCS | Performed by: OBSTETRICS & GYNECOLOGY

## 2020-03-12 RX ADMIN — INSULIN ASPART 2 UNITS: 100 INJECTION, SOLUTION INTRAVENOUS; SUBCUTANEOUS at 05:03

## 2020-03-12 RX ADMIN — OXYCODONE HYDROCHLORIDE AND ACETAMINOPHEN 1 TABLET: 10; 325 TABLET ORAL at 02:03

## 2020-03-12 RX ADMIN — PANTOPRAZOLE SODIUM 40 MG: 40 TABLET, DELAYED RELEASE ORAL at 05:03

## 2020-03-12 RX ADMIN — INSULIN ASPART 4 UNITS: 100 INJECTION, SOLUTION INTRAVENOUS; SUBCUTANEOUS at 11:03

## 2020-03-12 RX ADMIN — OXYCODONE HYDROCHLORIDE AND ACETAMINOPHEN 1 TABLET: 10; 325 TABLET ORAL at 09:03

## 2020-03-12 RX ADMIN — Medication 10 ML: at 05:03

## 2020-03-12 RX ADMIN — PSYLLIUM HUSK 1 PACKET: 3.4 POWDER ORAL at 09:03

## 2020-03-12 RX ADMIN — AMPICILLIN SODIUM AND SULBACTAM SODIUM 3 G: 2; 1 INJECTION, POWDER, FOR SOLUTION INTRAMUSCULAR; INTRAVENOUS at 12:03

## 2020-03-12 RX ADMIN — OXYCODONE HYDROCHLORIDE AND ACETAMINOPHEN 1 TABLET: 10; 325 TABLET ORAL at 06:03

## 2020-03-12 RX ADMIN — Medication 10 ML: at 12:03

## 2020-03-12 RX ADMIN — INSULIN ASPART 1 UNITS: 100 INJECTION, SOLUTION INTRAVENOUS; SUBCUTANEOUS at 12:03

## 2020-03-12 RX ADMIN — AMPICILLIN SODIUM AND SULBACTAM SODIUM 3 G: 2; 1 INJECTION, POWDER, FOR SOLUTION INTRAMUSCULAR; INTRAVENOUS at 05:03

## 2020-03-12 NOTE — NURSING
Discharge instructions given. RN reviewed with patient follow up appts, signs and symptoms of sepsis, signs and symptoms of PICC line infiltration/infection, signs and symptoms to call 911 for. Teachback provided.  All questions answered. Pt verbalized understanding. RN gave pt phone numbers for [facility] and [facility] services.

## 2020-03-12 NOTE — DISCHARGE INSTRUCTIONS
General Discharge Instructions:    Keep incision clean with warm soapy water when you shower. Pat Dry.  Do not apply ointment or bandage unless instructed to do so.  You may or may not have steri strips (pieces of tape) that are across your incision.  If you do, they will fall off on their own.   If your incision comes open, starts draining nasty, foul smelling discharge, or it is hurting more than it has been, then CALL YOUR DOCTOR IMMEDIATELY!!!!!    May follow a regular diet, unless otherwise discussed with physician.  Drink plenty fluids.  Activity as tolerated.  No lifting or heavy exercise.  No sex, douching or tampons.  May return to work/school as discussed with physician.  Follow MD specific instructions, follow up as instructed.    Report to MD:    Pain that is worsening   Nausea and vomiting  Temp >100.4  Heavy vaginal bleeding  Incision comes open or starts draining nasty, foul smelling discharge    Protecting the PICC  If the PICC gets damaged, it wont work right and could raise your chance of infection. Call your healthcare team right away if any damage occurs. To protect the PICC at home:  · Prevent infection. Use good hand hygiene by following the guidelines on this sheet. Dont touch the catheter or dressing unless you need to. And always clean your hands before and after you come in contact with any part of the PICC. Your caregivers, family members, and any visitors should use good hand hygiene, too.  · Keep the PICC dry. The catheter and dressing must stay dry. Dont take baths, go swimming, use a hot tub, or do other things that could get the PICC wet. Take a sponge bath to avoid getting your catheter wet, unless your healthcare provider tells you otherwise. Ask your provider about the best way to keep your catheter dry when bathing or showering. If the dressing does get wet, change it only if you have been shown how. Otherwise, call your healthcare team right away for help.  · Avoid damage.  Dont use any sharp or pointy objects around the catheter. This includes scissors, pins, knives, razors, or anything else that could cut it or put a hole in it (puncture it). Also, dont let anything pull or rub on the catheter, such as clothing.  · Watch for signs of problems. Pay attention to how much of the catheter sticks out from your skin. If this changes at all, let your healthcare provider know. Also watch for cracks, leaks, or other damage. If the dressing becomes dirty, loose, or wet, change it (if you have been instructed to). Or call your healthcare team right away.  · Avoid lowering your chest below your waist. This includes bending at the waist to do things like tying your shoes. When your chest is below your waist, especially for a long time, the catheters internal tip could slip out of place in the vein.  · Tell your healthcare team if you vomit or have severe coughing. This can also make the catheter slip out of place    When to seek medical care  Call your provider right away if you have any of the following:  · Pain or burning in your shoulder, chest, back, arm, or leg  · Fever of 100.4°F (38.0°C) or higher  · Chills  · Signs of infection at the catheter site (pain, redness, drainage, burning, or stinging)  · Coughing, wheezing, or shortness of breath  · A racing or irregular heartbeat  · Muscle stiffness or trouble moving  · Tightness in your arm, above the catheter site  · Gurgling noises coming from the catheter  · The catheter falls out, breaks, cracks, leaks, or has other damage          Symptoms of sepsis  Symptoms of sepsis can include:  · Chills and shaking  · Rapid heartbeat  · Rapid breathing  · Shortness of breath  · Severe nausea or uncontrolled vomiting  · Confusion  · Dizziness  · Decreased urination  Severe pain, including in the back or joints

## 2020-03-12 NOTE — PROGRESS NOTES
Pt progressing well. NAD noted. VSS. Pain well controlled with po pain medication. Pt ambulating and voiding. Tolerating IV antibiotics via PICC line. POC discussed with pt. Understanding verbalized.

## 2020-03-12 NOTE — PROGRESS NOTES
Elisha with [facility] IV infusion services at bedside instructing patient on PICC  and ABx administration. All questions answered. Pt states that she has no questions regarding post discharge instructions.

## 2020-03-12 NOTE — PROGRESS NOTES
RN called Elisha from [facility]. Elisha informed RN to not give 1230 dose as she will be here to instruct patient on PICC line care, IV ABX administration. RN updated pt on the POC.

## 2020-03-12 NOTE — DISCHARGE SUMMARY
Date of Admit 03/05/2020  Date of Discharge 03/12/2020  Diagnosis bilateral toa/ actinomyces/ ventral hernia/ dm    Pt admitted thru er and received iv abx. IR drain placement unsuccessful. Cultures previously grew out actinomyces. Repeat CT showed abscesses decreasing in size. Pt will be discharged home w/ PICC line to receive iv abx for two weeks followed by six months of oral antibiotics per ID.    See Med rec for medicines  Instructions pelvic rest/ ada diet  F/u 1 week

## 2020-03-12 NOTE — CONSULTS
Elisha with [facility] already taught the pt. Medication will be delivered to pt home. Pt will be with [facility]. Follow up scheduled with ID.

## 2020-03-12 NOTE — PROGRESS NOTES
RUDY called Elisha with [facility]. Elisha informed RN that she is en route to hospital to complete patient teaching. RN updated pt.

## 2020-03-13 LAB — POCT GLUCOSE: 201 MG/DL (ref 70–110)

## 2020-03-17 DIAGNOSIS — E11.9 TYPE 2 DIABETES MELLITUS WITHOUT COMPLICATION, WITHOUT LONG-TERM CURRENT USE OF INSULIN: ICD-10-CM

## 2020-03-17 DIAGNOSIS — A08.4 VIRAL GASTROENTERITIS: ICD-10-CM

## 2020-03-17 DIAGNOSIS — K21.00 GERD WITH ESOPHAGITIS: ICD-10-CM

## 2020-03-17 RX ORDER — PANTOPRAZOLE SODIUM 40 MG/1
40 TABLET, DELAYED RELEASE ORAL
Qty: 60 TABLET | Refills: 2 | Status: SHIPPED | OUTPATIENT
Start: 2020-03-17 | End: 2020-07-05 | Stop reason: SDUPTHER

## 2020-03-17 RX ORDER — ONDANSETRON 4 MG/1
4 TABLET, FILM COATED ORAL EVERY 6 HOURS PRN
Qty: 18 TABLET | Refills: 0 | Status: SHIPPED | OUTPATIENT
Start: 2020-03-17 | End: 2020-08-12

## 2020-03-19 ENCOUNTER — OFFICE VISIT (OUTPATIENT)
Dept: FAMILY MEDICINE | Facility: CLINIC | Age: 30
End: 2020-03-19
Payer: COMMERCIAL

## 2020-03-19 VITALS
SYSTOLIC BLOOD PRESSURE: 142 MMHG | WEIGHT: 249.56 LBS | DIASTOLIC BLOOD PRESSURE: 88 MMHG | OXYGEN SATURATION: 98 % | HEART RATE: 92 BPM | BODY MASS INDEX: 45.92 KG/M2 | HEIGHT: 62 IN | TEMPERATURE: 98 F | RESPIRATION RATE: 17 BRPM

## 2020-03-19 DIAGNOSIS — K65.1 INTRA-ABDOMINAL ABSCESS: Primary | ICD-10-CM

## 2020-03-19 DIAGNOSIS — R22.2 ABDOMINAL WALL MASS: ICD-10-CM

## 2020-03-19 DIAGNOSIS — E11.65 TYPE 2 DIABETES MELLITUS WITH HYPERGLYCEMIA, WITHOUT LONG-TERM CURRENT USE OF INSULIN: ICD-10-CM

## 2020-03-19 LAB — GLUCOSE SERPL-MCNC: 275 MG/DL (ref 70–110)

## 2020-03-19 PROCEDURE — 99999 PR PBB SHADOW E&M-EST. PATIENT-LVL IV: CPT | Mod: PBBFAC,,, | Performed by: FAMILY MEDICINE

## 2020-03-19 PROCEDURE — 3077F PR MOST RECENT SYSTOLIC BLOOD PRESSURE >= 140 MM HG: ICD-10-PCS | Mod: CPTII,S$GLB,, | Performed by: FAMILY MEDICINE

## 2020-03-19 PROCEDURE — 2024F 7 FLD RTA PHOTO EVC RTNOPTHY: CPT | Mod: S$GLB,,, | Performed by: FAMILY MEDICINE

## 2020-03-19 PROCEDURE — 99999 PR PBB SHADOW E&M-EST. PATIENT-LVL IV: ICD-10-PCS | Mod: PBBFAC,,, | Performed by: FAMILY MEDICINE

## 2020-03-19 PROCEDURE — 3077F SYST BP >= 140 MM HG: CPT | Mod: CPTII,S$GLB,, | Performed by: FAMILY MEDICINE

## 2020-03-19 PROCEDURE — 2024F PR 7 FIELD PHOTOS WITH INTERP/ REVIEW: ICD-10-PCS | Mod: S$GLB,,, | Performed by: FAMILY MEDICINE

## 2020-03-19 PROCEDURE — 82962 POCT GLUCOSE, HAND-HELD DEVICE: ICD-10-PCS | Mod: S$GLB,,, | Performed by: FAMILY MEDICINE

## 2020-03-19 PROCEDURE — 99214 PR OFFICE/OUTPT VISIT, EST, LEVL IV, 30-39 MIN: ICD-10-PCS | Mod: S$GLB,,, | Performed by: FAMILY MEDICINE

## 2020-03-19 PROCEDURE — 3008F PR BODY MASS INDEX (BMI) DOCUMENTED: ICD-10-PCS | Mod: CPTII,S$GLB,, | Performed by: FAMILY MEDICINE

## 2020-03-19 PROCEDURE — 3008F BODY MASS INDEX DOCD: CPT | Mod: CPTII,S$GLB,, | Performed by: FAMILY MEDICINE

## 2020-03-19 PROCEDURE — 3046F HEMOGLOBIN A1C LEVEL >9.0%: CPT | Mod: CPTII,S$GLB,, | Performed by: FAMILY MEDICINE

## 2020-03-19 PROCEDURE — 3046F PR MOST RECENT HEMOGLOBIN A1C LEVEL > 9.0%: ICD-10-PCS | Mod: CPTII,S$GLB,, | Performed by: FAMILY MEDICINE

## 2020-03-19 PROCEDURE — 3079F PR MOST RECENT DIASTOLIC BLOOD PRESSURE 80-89 MM HG: ICD-10-PCS | Mod: CPTII,S$GLB,, | Performed by: FAMILY MEDICINE

## 2020-03-19 PROCEDURE — 82962 GLUCOSE BLOOD TEST: CPT | Mod: S$GLB,,, | Performed by: FAMILY MEDICINE

## 2020-03-19 PROCEDURE — 99214 OFFICE O/P EST MOD 30 MIN: CPT | Mod: S$GLB,,, | Performed by: FAMILY MEDICINE

## 2020-03-19 PROCEDURE — 3079F DIAST BP 80-89 MM HG: CPT | Mod: CPTII,S$GLB,, | Performed by: FAMILY MEDICINE

## 2020-03-19 RX ORDER — GLIPIZIDE 10 MG/1
10 TABLET, FILM COATED, EXTENDED RELEASE ORAL
Qty: 30 TABLET | Refills: 11 | Status: SHIPPED | OUTPATIENT
Start: 2020-03-19 | End: 2020-04-30 | Stop reason: SDUPTHER

## 2020-03-19 NOTE — PROGRESS NOTES
Routine Office Visit    Patient Name: Glenda Peña    : 1990  MRN: [id number]    Subjective:  Glenda is a 29 y.o. female who presents today for:    1. Follow up  Patient presenting today for follow up after being admitted for intraabdominal abscess and tubo-ovarian abscess.  She currently as PICC line as she is receiving Unasyn.  Patient is managing PICC herself.  She has returned to work and will be following up with ID soon.  Patient does have type 2 DM that is not well controlled. She is currently taking glipizide, metformin, and trulicity.  Did not tolerate jardiance due to recurring yeast infections.  Her blood sugars are doing better since getting treated with antibiotics.  There have been no fevers, chills, or sweats.      Past Medical History  Past Medical History:   Diagnosis Date    Asthma childhood    Diabetes mellitus     GERD (gastroesophageal reflux disease)     Morbid obesity     PCOS (polycystic ovarian syndrome)        Past Surgical History  Past Surgical History:   Procedure Laterality Date    APPENDECTOMY  2011    HERNIA REPAIR      OOPHORECTOMY      salpingoopherectomy Right        Family History  Family History   Problem Relation Age of Onset    Heart attack Father     Hypertension Father     Diabetes Mother     Heart attack Mother     Breast cancer Neg Hx     Colon cancer Neg Hx     Ovarian cancer Neg Hx        Social History  Social History     Socioeconomic History    Marital status:      Spouse name: Not on file    Number of children: Not on file    Years of education: Not on file    Highest education level: Not on file   Occupational History    Not on file   Social Needs    Financial resource strain: Not on file    Food insecurity:     Worry: Not on file     Inability: Not on file    Transportation needs:     Medical: Not on file     Non-medical: Not on file   Tobacco Use    Smoking status: Never Smoker    Smokeless tobacco: Never Used    Substance and Sexual Activity    Alcohol use: Not Currently     Comment: occasionally apple cider    Drug use: No    Sexual activity: Yes     Partners: Female   Lifestyle    Physical activity:     Days per week: Not on file     Minutes per session: Not on file    Stress: Not on file   Relationships    Social connections:     Talks on phone: Not on file     Gets together: Not on file     Attends Yazdanism service: Not on file     Active member of club or organization: Not on file     Attends meetings of clubs or organizations: Not on file     Relationship status: Not on file   Other Topics Concern    Not on file   Social History Narrative    Not on file       Current Medications  Current Outpatient Medications on File Prior to Visit   Medication Sig Dispense Refill    albuterol (VENTOLIN HFA) 90 mcg/actuation inhaler Inhale 2 puffs into the lungs every 6 (six) hours as needed for Wheezing. Rescue 18 g 0    ampicillin sodium/sulbactam Na (AMPICILLIN/SULBACTAM 3 G/100 ML NS, READY TO MIX,) Inject 100 mLs (3 g total) into the vein every 6 (six) hours.      dulaglutide (TRULICITY) 1.5 mg/0.5 mL PnIj Inject 1.5 mg into the skin every 7 days. 2 mL 3    lisinopril (PRINIVIL,ZESTRIL) 2.5 MG tablet Take 1 tablet (2.5 mg total) by mouth once daily. 90 tablet 1    metFORMIN (GLUCOPHAGE) 1000 MG tablet Take 2,000 mg by mouth.      ondansetron (ZOFRAN) 4 MG tablet Take 1 tablet (4 mg total) by mouth every 6 (six) hours as needed for Nausea. 18 tablet 0    pantoprazole (PROTONIX) 40 MG tablet Take 1 tablet (40 mg total) by mouth before breakfast. 60 tablet 2    sodium chloride 0.9% (NORMAL SALINE FLUSH) injection Inject 10 mLs into the vein every 6 (six) hours.      [DISCONTINUED] glipiZIDE (GLUCOTROL) 5 MG TR24 Take 1 tablet (5 mg total) by mouth daily with breakfast. 90 tablet 3     No current facility-administered medications on file prior to visit.        Allergies   Review of patient's allergies indicates:  "  Allergen Reactions    Latex, natural rubber Rash     Burning sensation       Review of Systems (Pertinent positives)  Review of Systems   Constitutional: Negative.    HENT: Negative for hearing loss.    Eyes: Negative for discharge.   Respiratory: Negative for wheezing.    Cardiovascular: Negative for chest pain and palpitations.   Gastrointestinal: Negative for blood in stool, constipation, diarrhea and vomiting.   Genitourinary: Negative for dysuria and hematuria.   Musculoskeletal: Negative for neck pain.   Skin: Negative.    Neurological: Negative for weakness and headaches.   Endo/Heme/Allergies: Negative for polydipsia.         BP (!) 142/88 (BP Location: Left arm, Patient Position: Sitting, BP Method: Large (Automatic))   Pulse 92   Temp 97.9 °F (36.6 °C) (Oral)   Resp 17   Ht 5' 2.01" (1.575 m)   Wt 113.2 kg (249 lb 9 oz)   LMP 02/03/2020   SpO2 98%   BMI 45.63 kg/m²     GENERAL APPEARANCE: in no apparent distress and well developed and well nourished  HEENT: PERRLA, EOMI, Sclera clear, anicteric, Oropharynx clear, no lesions, Neck supple with midline trachea  NECK: normal, supple, no adenopathy, thyroid normal in size  RESPIRATORY: appears well, vitals normal, no respiratory distress, acyanotic, normal RR, chest clear, no wheezing, crepitations, rhonchi, normal symmetric air entry  HEART: regular rate and rhythm, S1, S2 normal, no murmur, click, rub or gallop.    ABDOMEN: abdomen is soft without tenderness, no masses, no hernias, no organomegaly, no rebound, no guarding. Suprapubic tenderness absent. No CVA tenderness.  Extremities: warm/well perfused.  No abnormal hair patterns.  No clubbing, cyanosis or edema.  no muscular tenderness noted, full range of motion without pain.    SKIN: no rashes, no wounds, no other lesions  PSYCH: Alert, oriented x 3, thought content appropriate, speech normal, pleasant and cooperative, good eye contact, well groomed    Assessment/Plan:  Glenda Peña is a " 29 y.o. female who presents today for :    Glenda was seen today for hospital follow up.    Diagnoses and all orders for this visit:    Intra-abdominal abscess    Type 2 diabetes mellitus with hyperglycemia, without long-term current use of insulin  -     POCT Glucose, Hand-Held Device  -     glipiZIDE (GLUCOTROL) 10 MG TR24; Take 1 tablet (10 mg total) by mouth daily with breakfast.    Abdominal wall mass      1.  Patient afebrile today, but states she is starting to have left side pain and that has her concerned  2.  She is to return to the hospital for any fevers and or worsening pain  3.  Follow up with ID as scheduled  4.  Continue Unasyn as prescribed  5.  Follow up 3 months or sooner if neede  6.  Blood sugar still elevated, so will increase glipizide to 10mg  7.  Encouraged further weight loss and more adherence to diabetic diet  8.  Warned of hyoglycmeia when on glipizide, so she does have to eat when taking this medication      Emiliano Dunbar MD

## 2020-03-25 ENCOUNTER — TELEPHONE (OUTPATIENT)
Dept: INFECTIOUS DISEASES | Facility: CLINIC | Age: 30
End: 2020-03-25

## 2020-03-25 ENCOUNTER — TELEPHONE (OUTPATIENT)
Dept: INFECTIOUS DISEASES | Facility: HOSPITAL | Age: 30
End: 2020-03-25

## 2020-03-25 DIAGNOSIS — A42.9 ACTINOMYCES INFECTION: Primary | ICD-10-CM

## 2020-03-25 NOTE — TELEPHONE ENCOUNTER
----- Message from Felicity Steele sent at 3/25/2020 11:11 AM CDT -----  Contact: PT  PT said she would like to speak with nurse regarding pic line and possible removal    callback [phone]

## 2020-03-25 NOTE — TELEPHONE ENCOUNTER
Does this patient need a virtual visit or can she be scheduled for appointment in clinic for end of care evaluation &  Picc line removal?    Please advise    (currently has virtual visit set for Monday 3/30)

## 2020-03-27 ENCOUNTER — TELEPHONE (OUTPATIENT)
Dept: INFECTIOUS DISEASES | Facility: HOSPITAL | Age: 30
End: 2020-03-27

## 2020-03-27 ENCOUNTER — DOCUMENTATION ONLY (OUTPATIENT)
Dept: GYNECOLOGIC ONCOLOGY | Facility: CLINIC | Age: 30
End: 2020-03-27

## 2020-03-27 ENCOUNTER — HOSPITAL ENCOUNTER (OUTPATIENT)
Dept: RADIOLOGY | Facility: HOSPITAL | Age: 30
Discharge: HOME | DRG: 758 | End: 2020-03-27
Attending: INTERNAL MEDICINE
Payer: COMMERCIAL

## 2020-03-27 DIAGNOSIS — A42.9 ACTINOMYCES INFECTION: ICD-10-CM

## 2020-03-27 DIAGNOSIS — A42.9 ACTINOMYCES INFECTION: Primary | ICD-10-CM

## 2020-03-27 DIAGNOSIS — N70.93 TUBO-OVARIAN ABSCESS: ICD-10-CM

## 2020-03-27 PROCEDURE — 74177 CT ABD & PELVIS W/CONTRAST: CPT | Mod: 26,,, | Performed by: RADIOLOGY

## 2020-03-27 PROCEDURE — 74177 CT ABDOMEN PELVIS WITH CONTRAST: ICD-10-PCS | Mod: 26,,, | Performed by: RADIOLOGY

## 2020-03-27 PROCEDURE — 74177 CT ABD & PELVIS W/CONTRAST: CPT | Mod: TC

## 2020-03-27 PROCEDURE — 25500020 PHARM REV CODE 255: Performed by: INTERNAL MEDICINE

## 2020-03-27 RX ORDER — DOXYCYCLINE HYCLATE 100 MG
100 TABLET ORAL 2 TIMES DAILY
Qty: 28 TABLET | Refills: 0 | Status: ON HOLD | OUTPATIENT
Start: 2020-03-27 | End: 2020-04-01 | Stop reason: SDUPTHER

## 2020-03-27 RX ADMIN — IOHEXOL 100 ML: 350 INJECTION, SOLUTION INTRAVENOUS at 07:03

## 2020-03-27 NOTE — TELEPHONE ENCOUNTER
Pt called back. Reports stabbing pain that comes and goes. Denies fever >99.8. Reports complaince with abx.    Gyn-onc called me and they are reviewing scans and will call back.     Pt agreeable to going to [facility] for washout of asbcess. Awaiting callback from gyn to discuss ED vs direct admission

## 2020-03-27 NOTE — TELEPHONE ENCOUNTER
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 Repeat CT with enlarging loculated TOA [id number]. Discussed case with Dr Churchill who is on call for [facility]. She said that the surgeons that are capable of doing drainage procedure (GYN onc) are at [facility]. Recommending patient go to to [facility] for admission to washout of TOA with repeat cultures.     Called patient, no answer. Left voicemail to return call.

## 2020-03-27 NOTE — PROGRESS NOTES
"We received a phone call about this patient presenting to the ED vs direct admission for a "wash out by [facility]."  CT A/P on 3/10 showed a 3.5 x 11 cm fluid collection anterior the uterus.  It now measures 7 x 9 cm.  It is multi-loculated but for example there is one fluid collection that is nearly 5 cm in greatest dimension.  It is associated with increasing pain.  She is afebrile.    Even in the absence of the COVID pandemic, indication for emergent surgery would be sepsis.  She is currently afebrile and conservative management would be safest for the patient.  A surgery would be extremely morbid given her BMI, hernia, and acute TOA.  My recommendation is prolonged IV antibiotics and another re-attempt at draining the fluid collection.  Surgery could be done closer to home with General Surgery as needed.  OBGYN would prefer [facility] to do her surgery.  She can present to the [facility] ED if she has fevers as this could represent a ruptured TOA.  Hopefully draining one of the fluid collections and broadening antibiotics will resolve the issue to avoid a dangerous surgery.  We can see her as an outpatient.    Communicated this to OBGYN and ID.    "

## 2020-03-27 NOTE — TELEPHONE ENCOUNTER
Increased size and pain. Discussed case with OBGYN-onc at [facility]. Recommend IR to reattempt drainage. No operative manamgnet now unless she is septic given coronavirus situation in the hospital. Pt aware. Will refer to IR for diagnostic and therapeutic drainage. Please send cytology/path as well as routine, afb, and fungal cultures. Continue IV abx. F/u in 2 weeks in clinic and with outpatietn gyn provider

## 2020-03-29 ENCOUNTER — HOSPITAL ENCOUNTER (INPATIENT)
Facility: HOSPITAL | Age: 30
LOS: 3 days | Discharge: HOME | DRG: 758 | End: 2020-04-01
Attending: EMERGENCY MEDICINE | Admitting: OBSTETRICS & GYNECOLOGY
Payer: COMMERCIAL

## 2020-03-29 DIAGNOSIS — R10.32 LEFT LOWER QUADRANT PAIN: ICD-10-CM

## 2020-03-29 DIAGNOSIS — R10.30 LOWER ABDOMINAL PAIN: ICD-10-CM

## 2020-03-29 DIAGNOSIS — N70.93 TUBO-OVARIAN ABSCESS: Primary | ICD-10-CM

## 2020-03-29 DIAGNOSIS — A42.9 ACTINOMYCES INFECTION: ICD-10-CM

## 2020-03-29 PROBLEM — J45.909 ASTHMA: Status: ACTIVE | Noted: 2020-03-29

## 2020-03-29 LAB
ALBUMIN SERPL BCP-MCNC: 3.4 G/DL (ref 3.5–5.2)
ALP SERPL-CCNC: 71 U/L (ref 55–135)
ALT SERPL W/O P-5'-P-CCNC: 54 U/L (ref 10–44)
ANION GAP SERPL CALC-SCNC: 12 MMOL/L (ref 8–16)
AST SERPL-CCNC: 52 U/L (ref 10–40)
B-HCG UR QL: NEGATIVE
BASOPHILS # BLD AUTO: 0.02 K/UL (ref 0–0.2)
BASOPHILS NFR BLD: 0.6 % (ref 0–1.9)
BILIRUB SERPL-MCNC: 0.2 MG/DL (ref 0.1–1)
BILIRUB UR QL STRIP: NEGATIVE
BUN SERPL-MCNC: 7 MG/DL (ref 6–20)
CALCIUM SERPL-MCNC: 9 MG/DL (ref 8.7–10.5)
CHLORIDE SERPL-SCNC: 103 MMOL/L (ref 95–110)
CLARITY UR REFRACT.AUTO: CLEAR
CO2 SERPL-SCNC: 22 MMOL/L (ref 23–29)
COLOR UR AUTO: YELLOW
CREAT SERPL-MCNC: 0.7 MG/DL (ref 0.5–1.4)
CTP QC/QA: YES
DIFFERENTIAL METHOD: ABNORMAL
EOSINOPHIL # BLD AUTO: 0 K/UL (ref 0–0.5)
EOSINOPHIL NFR BLD: 1.2 % (ref 0–8)
ERYTHROCYTE [DISTWIDTH] IN BLOOD BY AUTOMATED COUNT: 16.2 % (ref 11.5–14.5)
EST. GFR  (AFRICAN AMERICAN): >60 ML/MIN/1.73 M^2
EST. GFR  (NON AFRICAN AMERICAN): >60 ML/MIN/1.73 M^2
GLUCOSE SERPL-MCNC: 167 MG/DL (ref 70–110)
GLUCOSE UR QL STRIP: NEGATIVE
HCT VFR BLD AUTO: 33.2 % (ref 37–48.5)
HGB BLD-MCNC: 9.9 G/DL (ref 12–16)
HGB UR QL STRIP: ABNORMAL
IMM GRANULOCYTES # BLD AUTO: 0.01 K/UL (ref 0–0.04)
IMM GRANULOCYTES NFR BLD AUTO: 0.3 % (ref 0–0.5)
KETONES UR QL STRIP: NEGATIVE
LEUKOCYTE ESTERASE UR QL STRIP: NEGATIVE
LYMPHOCYTES # BLD AUTO: 1.6 K/UL (ref 1–4.8)
LYMPHOCYTES NFR BLD: 46.8 % (ref 18–48)
MCH RBC QN AUTO: 26.3 PG (ref 27–31)
MCHC RBC AUTO-ENTMCNC: 29.8 G/DL (ref 32–36)
MCV RBC AUTO: 88 FL (ref 82–98)
MICROSCOPIC COMMENT: NORMAL
MONOCYTES # BLD AUTO: 0.3 K/UL (ref 0.3–1)
MONOCYTES NFR BLD: 9.5 % (ref 4–15)
NEUTROPHILS # BLD AUTO: 1.4 K/UL (ref 1.8–7.7)
NEUTROPHILS NFR BLD: 41.6 % (ref 38–73)
NITRITE UR QL STRIP: NEGATIVE
NRBC BLD-RTO: 0 /100 WBC
PH UR STRIP: 5 [PH] (ref 5–8)
PLATELET # BLD AUTO: 191 K/UL (ref 150–350)
PMV BLD AUTO: 10.7 FL (ref 9.2–12.9)
POTASSIUM SERPL-SCNC: 3.8 MMOL/L (ref 3.5–5.1)
PROT SERPL-MCNC: 8.6 G/DL (ref 6–8.4)
PROT UR QL STRIP: NEGATIVE
RBC # BLD AUTO: 3.76 M/UL (ref 4–5.4)
RBC #/AREA URNS AUTO: 2 /HPF (ref 0–4)
SODIUM SERPL-SCNC: 137 MMOL/L (ref 136–145)
SP GR UR STRIP: 1.02 (ref 1–1.03)
SQUAMOUS #/AREA URNS AUTO: 2 /HPF
URN SPEC COLLECT METH UR: ABNORMAL
WBC # BLD AUTO: 3.46 K/UL (ref 3.9–12.7)
WBC #/AREA URNS AUTO: 2 /HPF (ref 0–5)

## 2020-03-29 PROCEDURE — 99285 PR EMERGENCY DEPT VISIT,LEVEL V: ICD-10-PCS | Mod: ,,, | Performed by: EMERGENCY MEDICINE

## 2020-03-29 PROCEDURE — 99223 PR INITIAL HOSPITAL CARE,LEVL III: ICD-10-PCS | Mod: ,,, | Performed by: OBSTETRICS & GYNECOLOGY

## 2020-03-29 PROCEDURE — 86140 C-REACTIVE PROTEIN: CPT

## 2020-03-29 PROCEDURE — 81025 URINE PREGNANCY TEST: CPT | Performed by: PHYSICIAN ASSISTANT

## 2020-03-29 PROCEDURE — 25000003 PHARM REV CODE 250: Performed by: STUDENT IN AN ORGANIZED HEALTH CARE EDUCATION/TRAINING PROGRAM

## 2020-03-29 PROCEDURE — 99285 EMERGENCY DEPT VISIT HI MDM: CPT | Mod: 25

## 2020-03-29 PROCEDURE — 99285 EMERGENCY DEPT VISIT HI MDM: CPT | Mod: ,,, | Performed by: EMERGENCY MEDICINE

## 2020-03-29 PROCEDURE — 63600175 PHARM REV CODE 636 W HCPCS: Performed by: PHYSICIAN ASSISTANT

## 2020-03-29 PROCEDURE — 96374 THER/PROPH/DIAG INJ IV PUSH: CPT

## 2020-03-29 PROCEDURE — 99223 1ST HOSP IP/OBS HIGH 75: CPT | Mod: ,,, | Performed by: OBSTETRICS & GYNECOLOGY

## 2020-03-29 PROCEDURE — 85025 COMPLETE CBC W/AUTO DIFF WBC: CPT

## 2020-03-29 PROCEDURE — 96375 TX/PRO/DX INJ NEW DRUG ADDON: CPT

## 2020-03-29 PROCEDURE — 63600175 PHARM REV CODE 636 W HCPCS: Performed by: STUDENT IN AN ORGANIZED HEALTH CARE EDUCATION/TRAINING PROGRAM

## 2020-03-29 PROCEDURE — 20600001 HC STEP DOWN PRIVATE ROOM

## 2020-03-29 PROCEDURE — 81001 URINALYSIS AUTO W/SCOPE: CPT

## 2020-03-29 PROCEDURE — 80053 COMPREHEN METABOLIC PANEL: CPT

## 2020-03-29 RX ORDER — ALBUTEROL SULFATE 90 UG/1
2 AEROSOL, METERED RESPIRATORY (INHALATION) EVERY 6 HOURS PRN
Status: DISCONTINUED | OUTPATIENT
Start: 2020-03-29 | End: 2020-04-01 | Stop reason: HOSPADM

## 2020-03-29 RX ORDER — HYDROCODONE BITARTRATE AND ACETAMINOPHEN 10; 325 MG/1; MG/1
1 TABLET ORAL EVERY 4 HOURS PRN
Status: DISCONTINUED | OUTPATIENT
Start: 2020-03-29 | End: 2020-03-30

## 2020-03-29 RX ORDER — IBUPROFEN 600 MG/1
600 TABLET ORAL EVERY 6 HOURS
Status: DISCONTINUED | OUTPATIENT
Start: 2020-03-29 | End: 2020-03-31

## 2020-03-29 RX ORDER — HYDROMORPHONE HYDROCHLORIDE 1 MG/ML
1 INJECTION, SOLUTION INTRAMUSCULAR; INTRAVENOUS; SUBCUTANEOUS EVERY 4 HOURS PRN
Status: DISCONTINUED | OUTPATIENT
Start: 2020-03-29 | End: 2020-03-31

## 2020-03-29 RX ORDER — ONDANSETRON 2 MG/ML
4 INJECTION INTRAMUSCULAR; INTRAVENOUS
Status: COMPLETED | OUTPATIENT
Start: 2020-03-29 | End: 2020-03-29

## 2020-03-29 RX ORDER — CALCIUM CARBONATE 200(500)MG
500 TABLET,CHEWABLE ORAL 2 TIMES DAILY PRN
Status: DISCONTINUED | OUTPATIENT
Start: 2020-03-29 | End: 2020-04-01 | Stop reason: HOSPADM

## 2020-03-29 RX ORDER — HYDROMORPHONE HYDROCHLORIDE 1 MG/ML
1 INJECTION, SOLUTION INTRAMUSCULAR; INTRAVENOUS; SUBCUTANEOUS
Status: COMPLETED | OUTPATIENT
Start: 2020-03-29 | End: 2020-03-29

## 2020-03-29 RX ORDER — DIPHENHYDRAMINE HCL 25 MG
25 CAPSULE ORAL EVERY 6 HOURS PRN
Status: DISCONTINUED | OUTPATIENT
Start: 2020-03-29 | End: 2020-04-01 | Stop reason: HOSPADM

## 2020-03-29 RX ORDER — PANTOPRAZOLE SODIUM 40 MG/1
40 TABLET, DELAYED RELEASE ORAL
Status: DISCONTINUED | OUTPATIENT
Start: 2020-03-30 | End: 2020-04-01 | Stop reason: HOSPADM

## 2020-03-29 RX ORDER — DOXYCYCLINE HYCLATE 100 MG
100 TABLET ORAL 2 TIMES DAILY
Status: DISCONTINUED | OUTPATIENT
Start: 2020-03-29 | End: 2020-04-01 | Stop reason: HOSPADM

## 2020-03-29 RX ORDER — ONDANSETRON 8 MG/1
8 TABLET, ORALLY DISINTEGRATING ORAL EVERY 8 HOURS PRN
Status: DISCONTINUED | OUTPATIENT
Start: 2020-03-29 | End: 2020-04-01 | Stop reason: HOSPADM

## 2020-03-29 RX ORDER — ACETAMINOPHEN 325 MG/1
650 TABLET ORAL EVERY 6 HOURS
Status: DISCONTINUED | OUTPATIENT
Start: 2020-03-30 | End: 2020-04-01 | Stop reason: HOSPADM

## 2020-03-29 RX ORDER — SODIUM CHLORIDE, SODIUM LACTATE, POTASSIUM CHLORIDE, CALCIUM CHLORIDE 600; 310; 30; 20 MG/100ML; MG/100ML; MG/100ML; MG/100ML
INJECTION, SOLUTION INTRAVENOUS CONTINUOUS
Status: DISCONTINUED | OUTPATIENT
Start: 2020-03-29 | End: 2020-03-31

## 2020-03-29 RX ORDER — HYDRALAZINE HYDROCHLORIDE 20 MG/ML
10 INJECTION INTRAMUSCULAR; INTRAVENOUS EVERY 6 HOURS PRN
Status: DISCONTINUED | OUTPATIENT
Start: 2020-03-29 | End: 2020-04-01 | Stop reason: HOSPADM

## 2020-03-29 RX ORDER — GLUCAGON 1 MG
1 KIT INJECTION
Status: DISCONTINUED | OUTPATIENT
Start: 2020-03-29 | End: 2020-04-01 | Stop reason: HOSPADM

## 2020-03-29 RX ORDER — HYDROCODONE BITARTRATE AND ACETAMINOPHEN 5; 325 MG/1; MG/1
1 TABLET ORAL EVERY 4 HOURS PRN
Status: DISCONTINUED | OUTPATIENT
Start: 2020-03-29 | End: 2020-03-30

## 2020-03-29 RX ORDER — INSULIN ASPART 100 [IU]/ML
1-10 INJECTION, SOLUTION INTRAVENOUS; SUBCUTANEOUS EVERY 6 HOURS PRN
Status: DISCONTINUED | OUTPATIENT
Start: 2020-03-29 | End: 2020-04-01 | Stop reason: HOSPADM

## 2020-03-29 RX ORDER — LISINOPRIL 2.5 MG/1
2.5 TABLET ORAL DAILY
Status: DISCONTINUED | OUTPATIENT
Start: 2020-03-30 | End: 2020-04-01 | Stop reason: HOSPADM

## 2020-03-29 RX ADMIN — ONDANSETRON 4 MG: 2 INJECTION INTRAMUSCULAR; INTRAVENOUS at 09:03

## 2020-03-29 RX ADMIN — HYDROCODONE BITARTRATE AND ACETAMINOPHEN 1 TABLET: 10; 325 TABLET ORAL at 11:03

## 2020-03-29 RX ADMIN — DOXYCYCLINE HYCLATE 100 MG: 100 TABLET, COATED ORAL at 10:03

## 2020-03-29 RX ADMIN — IBUPROFEN 600 MG: 600 TABLET, FILM COATED ORAL at 10:03

## 2020-03-29 RX ADMIN — SODIUM CHLORIDE, SODIUM LACTATE, POTASSIUM CHLORIDE, AND CALCIUM CHLORIDE: .6; .31; .03; .02 INJECTION, SOLUTION INTRAVENOUS at 11:03

## 2020-03-29 RX ADMIN — ACETAMINOPHEN 650 MG: 325 TABLET ORAL at 11:03

## 2020-03-29 RX ADMIN — SODIUM CHLORIDE 1000 ML: 0.9 INJECTION, SOLUTION INTRAVENOUS at 09:03

## 2020-03-29 RX ADMIN — HYDROMORPHONE HYDROCHLORIDE 1 MG: 1 INJECTION, SOLUTION INTRAMUSCULAR; INTRAVENOUS; SUBCUTANEOUS at 09:03

## 2020-03-30 ENCOUNTER — DOCUMENTATION ONLY (OUTPATIENT)
Dept: HEMATOLOGY/ONCOLOGY | Facility: CLINIC | Age: 30
End: 2020-03-30

## 2020-03-30 LAB
APPEARANCE FLD: NORMAL
BODY FLD TYPE: NORMAL
COLOR FLD: YELLOW
CRP SERPL-MCNC: 18.8 MG/L (ref 0–8.2)
EOSINOPHIL NFR FLD MANUAL: 2 %
LYMPHOCYTES NFR FLD MANUAL: 41 %
MONOS+MACROS NFR FLD MANUAL: 55 %
NEUTROPHILS NFR FLD MANUAL: 2 %
POCT GLUCOSE: 125 MG/DL (ref 70–110)
POCT GLUCOSE: 130 MG/DL (ref 70–110)
POCT GLUCOSE: 182 MG/DL (ref 70–110)
POCT GLUCOSE: 72 MG/DL (ref 70–110)
WBC # FLD: 41 /CU MM

## 2020-03-30 PROCEDURE — 63600175 PHARM REV CODE 636 W HCPCS: Performed by: RADIOLOGY

## 2020-03-30 PROCEDURE — 63600175 PHARM REV CODE 636 W HCPCS: Performed by: STUDENT IN AN ORGANIZED HEALTH CARE EDUCATION/TRAINING PROGRAM

## 2020-03-30 PROCEDURE — 87206 SMEAR FLUORESCENT/ACID STAI: CPT

## 2020-03-30 PROCEDURE — 87116 MYCOBACTERIA CULTURE: CPT

## 2020-03-30 PROCEDURE — 63600175 PHARM REV CODE 636 W HCPCS: Performed by: OBSTETRICS & GYNECOLOGY

## 2020-03-30 PROCEDURE — 25000003 PHARM REV CODE 250: Performed by: STUDENT IN AN ORGANIZED HEALTH CARE EDUCATION/TRAINING PROGRAM

## 2020-03-30 PROCEDURE — 89051 BODY FLUID CELL COUNT: CPT

## 2020-03-30 PROCEDURE — 99233 SBSQ HOSP IP/OBS HIGH 50: CPT | Mod: ,,, | Performed by: OBSTETRICS & GYNECOLOGY

## 2020-03-30 PROCEDURE — 20600001 HC STEP DOWN PRIVATE ROOM

## 2020-03-30 PROCEDURE — 87102 FUNGUS ISOLATION CULTURE: CPT

## 2020-03-30 PROCEDURE — 25000003 PHARM REV CODE 250: Performed by: OBSTETRICS & GYNECOLOGY

## 2020-03-30 PROCEDURE — 87075 CULTR BACTERIA EXCEPT BLOOD: CPT

## 2020-03-30 PROCEDURE — 99233 PR SUBSEQUENT HOSPITAL CARE,LEVL III: ICD-10-PCS | Mod: ,,, | Performed by: OBSTETRICS & GYNECOLOGY

## 2020-03-30 PROCEDURE — 87070 CULTURE OTHR SPECIMN AEROBIC: CPT

## 2020-03-30 PROCEDURE — 63600175 PHARM REV CODE 636 W HCPCS: Performed by: PHYSICIAN ASSISTANT

## 2020-03-30 RX ORDER — MIDAZOLAM HYDROCHLORIDE 1 MG/ML
INJECTION INTRAMUSCULAR; INTRAVENOUS CODE/TRAUMA/SEDATION MEDICATION
Status: COMPLETED | OUTPATIENT
Start: 2020-03-30 | End: 2020-03-30

## 2020-03-30 RX ORDER — FENTANYL CITRATE 50 UG/ML
INJECTION, SOLUTION INTRAMUSCULAR; INTRAVENOUS CODE/TRAUMA/SEDATION MEDICATION
Status: COMPLETED | OUTPATIENT
Start: 2020-03-30 | End: 2020-03-30

## 2020-03-30 RX ORDER — OXYCODONE HYDROCHLORIDE 5 MG/1
5 TABLET ORAL EVERY 4 HOURS PRN
Status: DISCONTINUED | OUTPATIENT
Start: 2020-03-30 | End: 2020-04-01 | Stop reason: HOSPADM

## 2020-03-30 RX ORDER — HYDROMORPHONE HYDROCHLORIDE 1 MG/ML
INJECTION, SOLUTION INTRAMUSCULAR; INTRAVENOUS; SUBCUTANEOUS CODE/TRAUMA/SEDATION MEDICATION
Status: COMPLETED | OUTPATIENT
Start: 2020-03-30 | End: 2020-03-30

## 2020-03-30 RX ORDER — CEFOXITIN 2 G/1
2 INJECTION, POWDER, FOR SOLUTION INTRAVENOUS
Status: DISCONTINUED | OUTPATIENT
Start: 2020-03-30 | End: 2020-03-30

## 2020-03-30 RX ORDER — OXYCODONE HYDROCHLORIDE 10 MG/1
10 TABLET ORAL EVERY 4 HOURS PRN
Status: DISCONTINUED | OUTPATIENT
Start: 2020-03-30 | End: 2020-04-01 | Stop reason: HOSPADM

## 2020-03-30 RX ORDER — ENOXAPARIN SODIUM 100 MG/ML
40 INJECTION SUBCUTANEOUS EVERY 24 HOURS
Status: DISCONTINUED | OUTPATIENT
Start: 2020-03-30 | End: 2020-04-01 | Stop reason: HOSPADM

## 2020-03-30 RX ADMIN — CEFOXITIN 2 G: 2 INJECTION, POWDER, FOR SOLUTION INTRAVENOUS at 09:03

## 2020-03-30 RX ADMIN — IBUPROFEN 600 MG: 600 TABLET, FILM COATED ORAL at 05:03

## 2020-03-30 RX ADMIN — HYDROMORPHONE HYDROCHLORIDE 1 MG: 1 INJECTION, SOLUTION INTRAMUSCULAR; INTRAVENOUS; SUBCUTANEOUS at 08:03

## 2020-03-30 RX ADMIN — PANTOPRAZOLE SODIUM 40 MG: 40 TABLET, DELAYED RELEASE ORAL at 05:03

## 2020-03-30 RX ADMIN — IBUPROFEN 600 MG: 600 TABLET, FILM COATED ORAL at 12:03

## 2020-03-30 RX ADMIN — FENTANYL CITRATE 50 MCG: 50 INJECTION, SOLUTION INTRAMUSCULAR; INTRAVENOUS at 02:03

## 2020-03-30 RX ADMIN — DOXYCYCLINE HYCLATE 100 MG: 100 TABLET, COATED ORAL at 09:03

## 2020-03-30 RX ADMIN — ONDANSETRON 8 MG: 8 TABLET, ORALLY DISINTEGRATING ORAL at 01:03

## 2020-03-30 RX ADMIN — HYDROMORPHONE HYDROCHLORIDE 1 MG: 1 INJECTION, SOLUTION INTRAMUSCULAR; INTRAVENOUS; SUBCUTANEOUS at 02:03

## 2020-03-30 RX ADMIN — MIDAZOLAM HYDROCHLORIDE 2 MG: 1 INJECTION, SOLUTION INTRAMUSCULAR; INTRAVENOUS at 02:03

## 2020-03-30 RX ADMIN — DOXYCYCLINE HYCLATE 100 MG: 100 TABLET, COATED ORAL at 08:03

## 2020-03-30 RX ADMIN — HYDROMORPHONE HYDROCHLORIDE 1 MG: 1 INJECTION, SOLUTION INTRAMUSCULAR; INTRAVENOUS; SUBCUTANEOUS at 04:03

## 2020-03-30 RX ADMIN — ACETAMINOPHEN 650 MG: 325 TABLET ORAL at 05:03

## 2020-03-30 RX ADMIN — HYDROMORPHONE HYDROCHLORIDE 1 MG: 1 INJECTION, SOLUTION INTRAMUSCULAR; INTRAVENOUS; SUBCUTANEOUS at 12:03

## 2020-03-30 RX ADMIN — AMPICILLIN SODIUM AND SULBACTAM SODIUM 3 G: 2; 1 INJECTION, POWDER, FOR SOLUTION INTRAMUSCULAR; INTRAVENOUS at 04:03

## 2020-03-30 RX ADMIN — ACETAMINOPHEN 650 MG: 325 TABLET ORAL at 12:03

## 2020-03-30 RX ADMIN — OXYCODONE HYDROCHLORIDE 10 MG: 10 TABLET ORAL at 09:03

## 2020-03-30 RX ADMIN — PROMETHAZINE HYDROCHLORIDE 12.5 MG: 25 INJECTION INTRAMUSCULAR; INTRAVENOUS at 07:03

## 2020-03-30 RX ADMIN — SODIUM CHLORIDE, SODIUM LACTATE, POTASSIUM CHLORIDE, AND CALCIUM CHLORIDE: .6; .31; .03; .02 INJECTION, SOLUTION INTRAVENOUS at 04:03

## 2020-03-30 RX ADMIN — ENOXAPARIN SODIUM 40 MG: 100 INJECTION SUBCUTANEOUS at 04:03

## 2020-03-30 NOTE — CONSULTS
[facility]  Infectious Disease  Consult Note    Patient Name: Glenda Peña  MRN: [id number]  Admission Date: 3/29/2020  Hospital Length of Stay: 1 days  Attending Physician: Ritchie Millan MD  Primary Care Provider: Emiliano Dunbar MD         Inpatient consult to Infectious Diseases  Consult performed by: ARLETH Campoverde Jr.  Consult ordered by: Ricky Whitney MD      Consult received.  Full consult to follow.      ARLETH Ortiz  Infectious Disease  [facility]

## 2020-03-30 NOTE — PROGRESS NOTES
Pt procedure complete. VSS. Procedure site and drain CDI. Pt lab specimens collected and sent. PT to [facility]. Report given to [facility] RN. Report called to

## 2020-03-30 NOTE — PLAN OF CARE
[facility] Report received from RUDY Scott. Patient awake, alert and oriented resting quietly in stretcher.  Respirations even and unlabored, no apparent distress.  Pt reports pain 9/10 to L Lower abdomen where drain was placed.  Stretcher locked, in lowest position, side rail up.  Patient placed on continuous monitoring.  Plan of care discussed with pt , verbalized understanding and denied questions.  Denies needs at this time.  Will continue to monitor.

## 2020-03-30 NOTE — PLAN OF CARE
POC reviewed with patient; understanding verbalized. Pt being held NPO for possible abscess drainage in IR today. Complained of pain to the abdomen. Scheduled and PRN pain meds given as ordered. Accuchecks q6hr, no coverage needed. Pt with nonskid footwear on, bed in lowest position, and locked with bed rails up x2. Pt instructed to call prior to getting OOB. Pt has call light and personal items within reach. SCDs in place. Patient ambulates in room independently. VSS and afebrile this shift. All questions and concerns addressed at this time. Will continue to monitor.

## 2020-03-30 NOTE — ASSESSMENT & PLAN NOTE
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 Followed by ID Dr. Mei  - 7cm x 9cm on CT 3/27 (previously 3cm x 11 cm on 3/10)   - Has been on extended course of IV Unasyn with Home Health  - ID added Doxycycline 3/27 as well    PLAN:   - Continued Unasyn IV and Doxycycline PO on admission - will change to IV cefoxitin this morning and continue doxy PO  - IR consult placed. Aware fluid collection is multiloculated, but may be able to drain 5 cm pocket, place >1 drain, and at least obtain josefina cultures of the TOA  - NPO since midnight with maintenance IVF @ 125 mL/hr  - Pain control with scheduled and PRN PO medications and IV narcotic for BTP

## 2020-03-30 NOTE — H&P
Inpatient Radiology Pre-procedure Note    History of Present Illness:  Glenda Peña is a 29 y.o. female who presents for multiloculated left adnexal collection aspiration/drainage.     Admission H&P reviewed.  Past Medical History:   Diagnosis Date    Asthma childhood    Diabetes mellitus     GERD (gastroesophageal reflux disease)     Morbid obesity     PCOS (polycystic ovarian syndrome)      Past Surgical History:   Procedure Laterality Date    APPENDECTOMY  2011    HERNIA REPAIR      OOPHORECTOMY      salpingoopherectomy Right 2008       Review of Systems:   As documented in primary team H&P    Home Meds:   Prior to Admission medications    Medication Sig Start Date End Date Taking? Authorizing Provider   doxycycline (VIBRA-TABS) 100 MG tablet Take 1 tablet (100 mg total) by mouth 2 (two) times daily. Take with food for 14 days 3/27/20 4/10/20 Yes Shantel Mei MD   lisinopril (PRINIVIL,ZESTRIL) 2.5 MG tablet Take 1 tablet (2.5 mg total) by mouth once daily. 12/4/19 12/3/20 Yes Emiliano Dunbar MD   metFORMIN (GLUCOPHAGE) 1000 MG tablet Take 2,000 mg by mouth.   Yes Historical Provider, MD   pantoprazole (PROTONIX) 40 MG tablet Take 1 tablet (40 mg total) by mouth before breakfast. 3/17/20 3/17/21 Yes Emiliano Dunbar MD   albuterol (VENTOLIN HFA) 90 mcg/actuation inhaler Inhale 2 puffs into the lungs every 6 (six) hours as needed for Wheezing. Rescue 8/28/19 8/27/20  Emiliano Dunbar MD   ampicillin sodium/sulbactam Na (AMPICILLIN/SULBACTAM 3 G/100 ML NS, READY TO MIX,) Inject 100 mLs (3 g total) into the vein every 6 (six) hours. 3/11/20   Aleena Churchill MD   dulaglutide (TRULICITY) 1.5 mg/0.5 mL PnIj Inject 1.5 mg into the skin every 7 days. 3/17/20   Emiliano Dunbar MD   glipiZIDE (GLUCOTROL) 10 MG TR24 Take 1 tablet (10 mg total) by mouth daily with breakfast. 3/19/20 3/19/21  Emiliano Dunbar MD   ondansetron (ZOFRAN) 4 MG tablet Take 1 tablet (4 mg total) by mouth every 6 (six)  hours as needed for Nausea. 3/17/20   Emiliano Dunbar MD   sodium chloride 0.9% (NORMAL SALINE FLUSH) injection Inject 10 mLs into the vein every 6 (six) hours. 3/11/20   Aleena Churchill MD     Scheduled Meds:    acetaminophen  650 mg Oral Q6H    ceFOXItin (MEFOXIN) IVPB  2 g Intravenous Q6H    doxycycline  100 mg Oral BID    ibuprofen  600 mg Oral Q6H    lisinopriL  2.5 mg Oral Daily    pantoprazole  40 mg Oral Before breakfast     Continuous Infusions:    lactated ringers 75 mL/hr at 03/30/20 0918     PRN Meds:albuterol, calcium carbonate, Dextrose 10% Bolus, diphenhydrAMINE, glucagon (human recombinant), hydrALAZINE, HYDROmorphone, insulin aspart U-100, ondansetron, oxyCODONE, oxyCODONE, promethazine (PHENERGAN) IVPB  Anticoagulants/Antiplatelets: no anticoagulation    Allergies:   Review of patient's allergies indicates:   Allergen Reactions    Latex, natural rubber Rash     Burning sensation     Sedation Hx: have not been any systemic reactions    Labs:  No results for input(s): INR in the last 168 hours.    Invalid input(s):  PT,  PTT    Recent Labs   Lab 03/29/20 2105   WBC 3.46*   HGB 9.9*   HCT 33.2*   MCV 88         Recent Labs   Lab 03/29/20 2105   *      K 3.8      CO2 22*   BUN 7   CREATININE 0.7   CALCIUM 9.0   ALT 54*   AST 52*   ALBUMIN 3.4*   BILITOT 0.2         Vitals:  Temp: 97.7 °F (36.5 °C) (03/30/20 1105)  Pulse: 75 (03/30/20 1105)  Resp: 18 (03/30/20 1105)  BP: 129/71 (03/30/20 1105)  SpO2: 97 % (03/30/20 1105)     Physical Exam:  ASA: 2  Mallampati: 3    General: no acute distress  Mental Status: alert and oriented to person, place and time  HEENT: normocephalic, atraumatic  Chest: unlabored breathing  Heart: regular heart rate  Abdomen: nondistended  Extremity: moves all extremities    Plan:     Image guided Aspiration/drainage of left adnexal collection.     Sedation Plan: moderate sedation.     Jovani Desai MD   PGY-3 Radiology

## 2020-03-30 NOTE — ED NOTES
Pt who has had recurrent abscesses after 2016 surgery presents to ED with c/o worsening pain to Bilateral Lower Abd quadrants along with suspicion of abscess. Pt states that her PCP was supposed to set up an appt for her to get abscess drained, but stated that if she had severe, unbearable pain, she should come check in to ED. Pt reports 10/10 pain. Pt has PICC line to [facility], where she receives daily abx.

## 2020-03-30 NOTE — HPI
"Glenda Peña is a 29 y.o.  with PMH significant for morbid obesity, T2DM, HTN, and large complex midline and R-sided ventral who presented to [facility] ED with CC of worsening pelvic pain in the setting of known TOA. Pain on presentation rated 10/10. Reports a few episodes of nausea,  diarrhea and dizziness today but denies fevers, chills, body aches, cough, chest pain, shortness of breath, urinary symptoms.     The patient was initially admitted to [facility], where she was admitted for IR drainage of TOA and IV antibiotics. IR drain placement was not successful in resolving the TOA, but cultures grew actinomyces. She was discharged after improvement in pain. She then presented to [facility] with pelvic pain on 3/6/2020. CT A/P on 3/10 showed a 3.5 x 11 cm fluid collection anterior the uterus.  She was given IV antibiotics and IR drainage was attempted again, unsuccessfully. She was discharged with Unasyn and followed by ID. On 3/27 patient reported worsening pain, and CT AP showed heterogeneous, complex fluid collection with multiple loculations and interval increase in size, now approximately 7 x 9 cm transversely and 9 cm craniocaudal. Doxycycline was added by ID and per ID note, patient was to follow up with IR on an outpatient basis for attempted drainage.     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   2011: Open appendectomy  2016: Repair of incisional hernia  "

## 2020-03-30 NOTE — PROGRESS NOTES
Referral received from gyn onc team/Ricky Whitney MD to assist with pts possible home care needs.  Pt is currently admitted and was receiving IV antibiotics through [facility] ([phone]) services prior to this admission.  Per [facility] staff pts IV antibiotic infusion ends today 3/30/2020.  Pts dressing changes and line care management was managed through [facility] pt did not have any services with a home health agency.  Updated gyn onc team/Dr. Whitney who agree to contact SW as needs determine.  SW remains available.    Mary Jo Nathan LCSW, OSW-C  Oncology Social Worker  [phone]

## 2020-03-30 NOTE — ASSESSMENT & PLAN NOTE
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 Followed by ID Dr. Mei  - 7cm x 9cm on CT 3/27 (previously 3cm x 11 cm on 3/10)   - Has been on extended course of IV Unasyn with Home Health  - ID added Doxycycline 3/27 as well  - Continue Unasyn IV and Doxycycline PO while inpatient   - Will consult IR tomorrow for another attempt at drainage  - NPO overnight with maintenance IVF @ 125 mL/hr  - Pain control with scheduled and PRN PO medications and IV narcotic for BTP

## 2020-03-30 NOTE — PLAN OF CARE
Patient off floor in IR most of day so unable to see patient.  Chart reviewed.  History of tubo-ovarian abscess with cultures of actinomyces neui in past but nothig recent.  Has been on Unasyn as outpt since being seen at [facility] by ID.  Repeat CT showed increase in size and, per chart patient in progressive pain.  OBGYN following  Surgery deferred in past and IR at [facility] deferred IR aspiration    On Cefoxitin and doxy ( doxy added recently by Dr. Mei))  IR aspiration/drainage today    Afebrile without leukocytosis.    DC cefoxitin and start Unasyn - continue doxy.  Follow cultures  Long term suspect will need surgery as loculated and needed for source control.  See in am  Discussed with primary team      Real Araujo PA-C [facility]

## 2020-03-30 NOTE — SUBJECTIVE & OBJECTIVE
Past Medical History:   Diagnosis Date    Asthma childhood    Diabetes mellitus     GERD (gastroesophageal reflux disease)     Morbid obesity     PCOS (polycystic ovarian syndrome)        Past Surgical History:   Procedure Laterality Date    APPENDECTOMY  2011    HERNIA REPAIR      OOPHORECTOMY      salpingoopherectomy Right 2008       Review of patient's allergies indicates:   Allergen Reactions    Latex, natural rubber Rash     Burning sensation       Medications:  Medications Prior to Admission   Medication Sig    doxycycline (VIBRA-TABS) 100 MG tablet Take 1 tablet (100 mg total) by mouth 2 (two) times daily. Take with food for 14 days    lisinopril (PRINIVIL,ZESTRIL) 2.5 MG tablet Take 1 tablet (2.5 mg total) by mouth once daily.    metFORMIN (GLUCOPHAGE) 1000 MG tablet Take 2,000 mg by mouth.    pantoprazole (PROTONIX) 40 MG tablet Take 1 tablet (40 mg total) by mouth before breakfast.    albuterol (VENTOLIN HFA) 90 mcg/actuation inhaler Inhale 2 puffs into the lungs every 6 (six) hours as needed for Wheezing. Rescue    ampicillin sodium/sulbactam Na (AMPICILLIN/SULBACTAM 3 G/100 ML NS, READY TO MIX,) Inject 100 mLs (3 g total) into the vein every 6 (six) hours.    dulaglutide (TRULICITY) 1.5 mg/0.5 mL PnIj Inject 1.5 mg into the skin every 7 days.    glipiZIDE (GLUCOTROL) 10 MG TR24 Take 1 tablet (10 mg total) by mouth daily with breakfast.    ondansetron (ZOFRAN) 4 MG tablet Take 1 tablet (4 mg total) by mouth every 6 (six) hours as needed for Nausea.    sodium chloride 0.9% (NORMAL SALINE FLUSH) injection Inject 10 mLs into the vein every 6 (six) hours.     Antibiotics (From admission, onward)    Start     Stop Route Frequency Ordered    03/30/20 0745  ceFOXItin injection 2 g      -- IV Every 6 hours (non-standard times) 03/30/20 0701    03/29/20 2315  doxycycline tablet 100 mg      -- Oral 2 times daily 03/29/20 8881        Antifungals (From admission, onward)    None        Antivirals  (From admission, onward)    None           Immunization History   Administered Date(s) Administered    DTaP 1989, 04/16/1990, 07/08/1993, 04/15/1994    HIB 07/08/1993    Hepatitis B, Pediatric/Adolescent 09/23/2002, 02/02/2004    IPV 1989, 04/15/1990, 04/15/1994    Influenza 11/24/2019    Influenza - Quadrivalent - PF (6 months and older) 01/17/2017    MMR 07/08/1993, 04/15/1994    Td (ADULT) 09/23/2002    Td (Adult), Unspecified Formulation 09/23/2002       Family History     Problem Relation (Age of Onset)    Diabetes Mother    Heart attack Father, Mother    Hypertension Father        Social History     Socioeconomic History    Marital status:      Spouse name: Not on file    Number of children: Not on file    Years of education: Not on file    Highest education level: Not on file   Occupational History    Not on file   Social Needs    Financial resource strain: Not on file    Food insecurity:     Worry: Not on file     Inability: Not on file    Transportation needs:     Medical: Not on file     Non-medical: Not on file   Tobacco Use    Smoking status: Never Smoker    Smokeless tobacco: Never Used   Substance and Sexual Activity    Alcohol use: Not Currently     Comment: occasionally apple cider    Drug use: No    Sexual activity: Yes     Partners: Female   Lifestyle    Physical activity:     Days per week: Not on file     Minutes per session: Not on file    Stress: Not on file   Relationships    Social connections:     Talks on phone: Not on file     Gets together: Not on file     Attends [employer] service: Not on file     Active member of club or organization: Not on file     Attends meetings of clubs or organizations: Not on file     Relationship status: Not on file   Other Topics Concern    Not on file   Social History Narrative    Not on file     Review of Systems   Constitutional: Negative for appetite change, chills, diaphoresis, fatigue, fever and unexpected  weight change.   HENT: Negative for congestion, ear pain, hearing loss, sore throat and tinnitus.    Eyes: Negative for pain, redness and visual disturbance.   Respiratory: Negative for cough, chest tightness, shortness of breath and wheezing.    Cardiovascular: Negative for chest pain.   Gastrointestinal: Positive for abdominal pain. Negative for constipation, diarrhea, nausea and vomiting.   Endocrine: Negative for cold intolerance and heat intolerance.   Genitourinary: Negative for decreased urine volume, difficulty urinating, dysuria, flank pain, frequency, hematuria and urgency.   Musculoskeletal: Negative for arthralgias, back pain, myalgias and neck pain.   Skin: Negative for rash and wound.   Allergic/Immunologic: Negative for environmental allergies, food allergies and immunocompromised state.   Neurological: Negative for dizziness, facial asymmetry, weakness, light-headedness, numbness and headaches.   Hematological: Negative for adenopathy. Does not bruise/bleed easily.   Psychiatric/Behavioral: Negative for agitation, behavioral problems and confusion.     Objective:     Vital Signs (Most Recent):  Temp: 97.7 °F (36.5 °C) (03/30/20 1105)  Pulse: 75 (03/30/20 1105)  Resp: 18 (03/30/20 1105)  BP: 129/71 (03/30/20 1105)  SpO2: 97 % (03/30/20 1105) Vital Signs (24h Range):  Temp:  [97.7 °F (36.5 °C)-98.1 °F (36.7 °C)] 97.7 °F (36.5 °C)  Pulse:  [] 75  Resp:  [16-18] 18  SpO2:  [96 %-98 %] 97 %  BP: (108-142)/(55-78) 129/71     Weight: 116.7 kg (257 lb 4.4 oz)  Body mass index is 47.06 kg/m².    Estimated Creatinine Clearance: 142.3 mL/min (based on SCr of 0.7 mg/dL).    Physical Exam   Constitutional: She is oriented to person, place, and time. She appears well-developed and well-nourished. No distress.   HENT:   Head: Normocephalic and atraumatic.   Cardiovascular: Normal rate, regular rhythm and normal heart sounds. Exam reveals no gallop and no friction rub.   No murmur heard.  Pulmonary/Chest:  Effort normal and breath sounds normal. No respiratory distress. She has no wheezes. She has no rales.   Abdominal: Soft. Bowel sounds are normal. She exhibits no distension and no mass. There is no tenderness. There is no rebound and no guarding.   Musculoskeletal: She exhibits no edema.   Neurological: She is alert and oriented to person, place, and time.   Skin: Skin is warm and dry. She is not diaphoretic.   Psychiatric: She has a normal mood and affect. Her behavior is normal.       Significant Labs:   Blood Culture: No results for input(s): LABBLOO in the last 4320 hours.  CBC:   Recent Labs   Lab 03/29/20 2105   WBC 3.46*   HGB 9.9*   HCT 33.2*        CMP:   Recent Labs   Lab 03/29/20  2105      K 3.8      CO2 22*   *   BUN 7   CREATININE 0.7   CALCIUM 9.0   PROT 8.6*   ALBUMIN 3.4*   BILITOT 0.2   ALKPHOS 71   AST 52*   ALT 54*   ANIONGAP 12   EGFRNONAA >60.0     Wound Culture:   Recent Labs   Lab 03/06/20  1730   LABAERO No growth     All pertinent labs within the past 24 hours have been reviewed.    Significant Imaging: I have reviewed all pertinent imaging results/findings within the past 24 hours. Radiology Results (last 7 days)     ** No results found for the last 168 hours. **   Imaging History     2020  Date Procedure Name PACS Link Status Accession Number Location   03/27/20 07:25 AM CT Abdomen Pelvis With Contrast  Images Final [id number] [facility]   03/11/20 02:17 PM X-Ray Chest 1 View  Images Final [id number] [facility]   03/11/20 02:08 PM X-Ray Chest 1 View for PICC_Central line  Images Final [id number] [facility]   03/10/20 09:16 AM CT Abdomen Pelvis With Contrast  Images Final [id number] [facility]   03/06/20 06:00 PM CT Guided Needle Placement  Images Final [id number] [facility]   03/06/20 05:59 PM IR Abscess Aspiration  Images Final [id number] [facility]   03/05/20 11:58 PM CT Abdomen Pelvis With Contrast  Images Final [id number] [facility]

## 2020-03-30 NOTE — SUBJECTIVE & OBJECTIVE
Past Medical History:   Diagnosis Date    Asthma childhood    Diabetes mellitus     GERD (gastroesophageal reflux disease)     Morbid obesity     PCOS (polycystic ovarian syndrome)      Past Surgical History:   Procedure Laterality Date    APPENDECTOMY  2011    HERNIA REPAIR      OOPHORECTOMY      salpingoopherectomy Right 2008     Family History     Problem Relation (Age of Onset)    Diabetes Mother    Heart attack Father, Mother    Hypertension Father        Tobacco Use    Smoking status: Never Smoker    Smokeless tobacco: Never Used   Substance and Sexual Activity    Alcohol use: Not Currently     Comment: occasionally apple cider    Drug use: No    Sexual activity: Yes     Partners: Female       PTA Medications   Medication Sig    doxycycline (VIBRA-TABS) 100 MG tablet Take 1 tablet (100 mg total) by mouth 2 (two) times daily. Take with food for 14 days    lisinopril (PRINIVIL,ZESTRIL) 2.5 MG tablet Take 1 tablet (2.5 mg total) by mouth once daily.    metFORMIN (GLUCOPHAGE) 1000 MG tablet Take 2,000 mg by mouth.    pantoprazole (PROTONIX) 40 MG tablet Take 1 tablet (40 mg total) by mouth before breakfast.    albuterol (VENTOLIN HFA) 90 mcg/actuation inhaler Inhale 2 puffs into the lungs every 6 (six) hours as needed for Wheezing. Rescue    ampicillin sodium/sulbactam Na (AMPICILLIN/SULBACTAM 3 G/100 ML NS, READY TO MIX,) Inject 100 mLs (3 g total) into the vein every 6 (six) hours.    dulaglutide (TRULICITY) 1.5 mg/0.5 mL PnIj Inject 1.5 mg into the skin every 7 days.    glipiZIDE (GLUCOTROL) 10 MG TR24 Take 1 tablet (10 mg total) by mouth daily with breakfast.    ondansetron (ZOFRAN) 4 MG tablet Take 1 tablet (4 mg total) by mouth every 6 (six) hours as needed for Nausea.    sodium chloride 0.9% (NORMAL SALINE FLUSH) injection Inject 10 mLs into the vein every 6 (six) hours.       Review of patient's allergies indicates:   Allergen Reactions    Latex, natural rubber Rash     Burning  sensation      Objective:     Vital Signs (Most Recent):  Temp: 97.9 °F (36.6 °C) (03/30/20 0351)  Pulse: 86 (03/30/20 0351)  Resp: 16 (03/30/20 0351)  BP: (!) 108/57 (03/30/20 0351)  SpO2: 96 % (03/30/20 0351) Vital Signs (24h Range):  Temp:  [97.9 °F (36.6 °C)-98.1 °F (36.7 °C)] 97.9 °F (36.6 °C)  Pulse:  [] 86  Resp:  [16-18] 16  SpO2:  [96 %-98 %] 96 %  BP: (108-142)/(57-78) 108/57     Weight: 116.7 kg (257 lb 4.4 oz)  Body mass index is 47.06 kg/m².    Physical Exam:   Constitutional: She is oriented to person, place, and time. She appears well-developed. No distress.   Morbidly obese    HENT:   Head: Normocephalic and atraumatic.      Cardiovascular: Normal rate and regular rhythm.     Pulmonary/Chest: Effort normal. No respiratory distress. She has no wheezes.        Abdominal: Soft. Bowel sounds are normal. She exhibits no distension (abnormal abdominal contour with hernia palpable through anterior abdomen at and to the right of the midline). There is tenderness (lower abdomen and suprapubic area bilaterally).                 Neurological: She is alert and oriented to person, place, and time.    Skin: Skin is warm and dry.    Psychiatric: Her behavior is normal.       Laboratory:  No new labs since admission.

## 2020-03-30 NOTE — ED PROVIDER NOTES
Encounter Date: 3/29/2020       History     Chief Complaint   Patient presents with    Abscess     reports severe pain to lower abd area where abscess is. Currently on IV antibiotics. Denies fever/SOB      Ms Peña is a 28 yo female patient with PMHx of DM, GERD, asthma, PCOS, morbid obesity, currently on IV unasyn for treatment of TOA that presents to the ED for worsening lower abdominal pain. Pt is being followed by [facility], IR, ID. Pt states that IR tried four times to place a drain but were unsuccessful and that they will not do surgery unless she becomes septic due to her comorbidities. Pt taken warm baths, pain meds at home to alleviate pain but states that it continues to worsen. Rates pain 10/10. Localizes pain to bilateral lower abdomen. Reports a few episodes of nausea,  diarrhea and dizziness today but denies fevers, chills, body aches, cough, chest pain, shortness of breath, urinary symptoms.         Review of patient's allergies indicates:   Allergen Reactions    Latex, natural rubber Rash     Burning sensation     Past Medical History:   Diagnosis Date    Asthma childhood    Diabetes mellitus     GERD (gastroesophageal reflux disease)     Morbid obesity     PCOS (polycystic ovarian syndrome)      Past Surgical History:   Procedure Laterality Date    APPENDECTOMY  2011    HERNIA REPAIR      OOPHORECTOMY      salpingoopherectomy Right 2008     Family History   Problem Relation Age of Onset    Heart attack Father     Hypertension Father     Diabetes Mother     Heart attack Mother     Breast cancer Neg Hx     Colon cancer Neg Hx     Ovarian cancer Neg Hx      Social History     Tobacco Use    Smoking status: Never Smoker    Smokeless tobacco: Never Used   Substance Use Topics    Alcohol use: Not Currently     Comment: occasionally apple cider    Drug use: No     Review of Systems   Constitutional: Negative for chills and fever.   HENT: Negative for congestion, rhinorrhea and sore  throat.    Eyes: Negative for visual disturbance.   Respiratory: Negative for cough and shortness of breath.    Cardiovascular: Negative for chest pain.   Gastrointestinal: Positive for abdominal pain, diarrhea and nausea. Negative for vomiting.   Genitourinary: Negative for dysuria, flank pain and frequency.   Musculoskeletal: Negative for back pain, neck pain and neck stiffness.   Skin: Negative for rash.   Allergic/Immunologic: Negative for immunocompromised state.   Neurological: Positive for dizziness. Negative for syncope, light-headedness and headaches.   Psychiatric/Behavioral: Negative for confusion.       Physical Exam     Initial Vitals [03/29/20 2022]   BP Pulse Resp Temp SpO2   (!) 142/78 103 18 98.1 °F (36.7 °C) 98 %      MAP       --         Physical Exam    Constitutional: She appears well-developed. She is Obese . She is cooperative.  Non-toxic appearance. No distress.   HENT:   Head: Normocephalic and atraumatic.   Right Ear: Tympanic membrane normal.   Left Ear: Tympanic membrane normal.   Nose: Nose normal.   Mouth/Throat: Uvula is midline, oropharynx is clear and moist and mucous membranes are normal.   Eyes: Conjunctivae and EOM are normal. Pupils are equal, round, and reactive to light.   Neck: Normal range of motion. Neck supple.   Cardiovascular: Normal heart sounds. Tachycardia present.    Pulmonary/Chest: Effort normal and breath sounds normal. No respiratory distress.   Abdominal: Soft. She exhibits no distension. There is tenderness in the right lower quadrant, suprapubic area and left lower quadrant. There is no rigidity, no rebound, no guarding, no CVA tenderness, no tenderness at McBurney's point and negative Martinez's sign.   Musculoskeletal: Normal range of motion.   Neurological: She is alert and oriented to person, place, and time.   Skin: Skin is warm, dry and intact. No rash noted.         ED Course   Procedures  Labs Reviewed   URINALYSIS, REFLEX TO URINE CULTURE - Abnormal;  Notable for the following components:       Result Value    Occult Blood UA 1+ (*)     All other components within normal limits    Narrative:     Preferred Collection Type->Urine, Clean Catch   CBC W/ AUTO DIFFERENTIAL - Abnormal; Notable for the following components:    WBC 3.46 (*)     RBC 3.76 (*)     Hemoglobin 9.9 (*)     Hematocrit 33.2 (*)     Mean Corpuscular Hemoglobin 26.3 (*)     Mean Corpuscular Hemoglobin Conc 29.8 (*)     RDW 16.2 (*)     Gran # (ANC) 1.4 (*)     All other components within normal limits   COMPREHENSIVE METABOLIC PANEL - Abnormal; Notable for the following components:    CO2 22 (*)     Glucose 167 (*)     Total Protein 8.6 (*)     Albumin 3.4 (*)     AST 52 (*)     ALT 54 (*)     All other components within normal limits   URINALYSIS MICROSCOPIC    Narrative:     Preferred Collection Type->Urine, Clean Catch   POCT URINE PREGNANCY          Imaging Results    None          Medical Decision Making:   History:   Old Medical Records: I decided to obtain old medical records.  Clinical Tests:   Lab Tests: Ordered and Reviewed  ED Management:  Hemodynamically stable. Non-toxic and in no acute distress, but appears uncomfortable due to her pain. CT read from two days ago reports interval increase in size of complex, multilocular fluid collection in the left adnexal region likely representing tubo-ovarian abscess.  GynOnc consult placed. Pain improved after IV dilaudid. UA without infection. No leukocytosis. Discussed case with GynOnc on call who will admit for pain control and possibly IR drain placement in morning.                                  Clinical Impression:       ICD-10-CM ICD-9-CM   1. Tubo-ovarian abscess N70.93 614.2   2. Left lower quadrant pain R10.32 789.04   3. Lower abdominal pain R10.30 789.09         Disposition:   Disposition: Admitted  Condition: Stable     ED Disposition Condition    Admit                           Tunde Pompa PA-C  03/29/20 3452

## 2020-03-30 NOTE — HPI
"Glenda Peña is a 29 y.o.  with PMH significant for morbid obesity, T2DM, HTN, and large complex midline and R-sided ventral who presented to [facility] ED with CC of worsening pelvic pain in the setting of known TOA. Pain on presentation rated 10/10. Reported a few episodes of nausea,  diarrhea and dizziness  but denied fevers, chills, body aches, cough, chest pain, shortness of breath, urinary symptoms.      The patient was initially admitted to [facility], where she was admitted for IR drainage of TOA and IV antibiotics. IR drain placement was not successful in resolving the TOA, but cultures grew actinomyces. She was discharged after improvement in pain. She then presented to [facility] with pelvic pain on 3/6/2020. CT A/P on 3/10 showed a 3.5 x 11 cm fluid collection anterior the uterus.   She was given IV antibiotics and IR drainage was attempted again, unsuccessfully. She was discharged with Unasyn and followed by ID. On 3/27 patient reported worsening pain, and CT AP showed heterogeneous, complex fluid collection with multiple loculations and interval increase in size, now approximately 7 x 9 cm transversely and 9 cm craniocaudal. Doxycycline was added by ID and per ID note, patient was to follow up with IR on an outpatient basis for attempted drainage.      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   2011: Open appendectomy  2016: Repair of incisional hernia     2020 - In the ED, evaluation revealed no suspicion for incarceration of known ventral hernia. ED physician has low suspicion for COVID-19 given patient is afebrile and has no respiratory symptoms. patient's VS were WNL and stable. She was afebrile and nontoxic-appearing. Pain was mostly L-sided and midline pelvic pain, rated 10/10. Improved with IV narcotics. CBC showed " leukopenia with WBC 3. UPT negative and UA WNL but for RBCs. CMP shows mild transaminitis not seen on prior labs. Plan was admission for pain control, continue IV antibiotics, and IR drain placement and aspiration.     Interval Hx:   Patient went to IR and drain placed in large fluid collection and 2 other fluid collections were aspirated.  Cultures sent and gram stain of moderate WBC, no organism with cultures NGTD.  She reports drain with minimal output.  Per prior notes no surgical intervention will be pursued at this admission given need for extensive sx and COVID19 complications.  She complains of LLQ abd pain but The patient denies any recent fever, chills, or sweats.  No fever or leukocytosis since admit.

## 2020-03-30 NOTE — SUBJECTIVE & OBJECTIVE
Past Medical History:   Diagnosis Date    Asthma childhood    Diabetes mellitus     GERD (gastroesophageal reflux disease)     Morbid obesity     PCOS (polycystic ovarian syndrome)      Past Surgical History:   Procedure Laterality Date    APPENDECTOMY  2011    HERNIA REPAIR      OOPHORECTOMY      salpingoopherectomy Right 2008     Family History     Problem Relation (Age of Onset)    Diabetes Mother    Heart attack Father, Mother    Hypertension Father        Tobacco Use    Smoking status: Never Smoker    Smokeless tobacco: Never Used   Substance and Sexual Activity    Alcohol use: Not Currently     Comment: occasionally apple cider    Drug use: No    Sexual activity: Yes     Partners: Female         (Not in a hospital admission)    Review of patient's allergies indicates:   Allergen Reactions    Latex, natural rubber Rash     Burning sensation       Review of Systems   Constitutional: Positive for fatigue. Negative for activity change, chills and fever.   HENT: Negative for nasal congestion.    Respiratory: Negative for cough and shortness of breath.    Cardiovascular: Negative for chest pain.   Gastrointestinal: Positive for abdominal pain, diarrhea and nausea. Negative for constipation and vomiting.   Endocrine: Positive for diabetes.   Genitourinary: Positive for pelvic pain. Negative for dysuria and vaginal bleeding.   Neurological: Negative for headaches.      Objective:     Vital Signs (Most Recent):  Temp: 98.1 °F (36.7 °C) (03/29/20 2022)  Pulse: 67 (03/29/20 2226)  Resp: 16 (03/29/20 2226)  BP: 124/64 (03/29/20 2226)  SpO2: 96 % (03/29/20 2226) Vital Signs (24h Range):  Temp:  [98.1 °F (36.7 °C)] 98.1 °F (36.7 °C)  Pulse:  [] 67  Resp:  [16-18] 16  SpO2:  [96 %-98 %] 96 %  BP: (124-142)/(64-78) 124/64     Weight: 113.4 kg (250 lb)  Body mass index is 45.73 kg/m².    Per ED:  Physical Exam:   Constitutional: She is oriented to person, place, and time. She appears well-developed. No  distress.   Morbidly obese    HENT:   Head: Normocephalic and atraumatic.      Cardiovascular: Normal rate and regular rhythm.     Pulmonary/Chest: Effort normal. No respiratory distress. She has no wheezes.        Abdominal: Soft. Bowel sounds are normal. She exhibits no distension (abnormal abdominal contour with hernia palpable through anterior abdomen at and to the right of the midline). There is tenderness (lower abdomen and suprapubic area bilaterally).                 Neurological: She is alert and oriented to person, place, and time.    Skin: Skin is warm and dry.    Psychiatric: Her behavior is normal.       Laboratory:  CBC:   Recent Labs   Lab 03/29/20 2105   WBC 3.46*   HGB 9.9*   HCT 33.2*      , CMP:   Recent Labs   Lab 03/29/20 2105      K 3.8      CO2 22*   *   BUN 7   CREATININE 0.7   CALCIUM 9.0   PROT 8.6*   ALBUMIN 3.4*   BILITOT 0.2   ALKPHOS 71   AST 52*   ALT 54*   ANIONGAP 12   EGFRNONAA >60.0    and All pertinent labs from the last 24 hours have been reviewed.

## 2020-03-30 NOTE — CONSULTS
Radiology Consult    Glenda Peña is a 29 y.o. [facility] PMH significant for morbid obesity, T2DM, and HTN. She had a benign ovarian mass removed in 2008. Prior appendectomy.        IR consulted for Aspiration/drainage of multiloculated adnexal lesion (possibly a tubo-ovarian abscess). This was attempted twice previously. Cultures from 3 weeks ago grew actinomyces. Despite antibiotic treatment this lesion is not resolving.     Past Medical History:   Diagnosis Date    Asthma childhood    Diabetes mellitus     GERD (gastroesophageal reflux disease)     Morbid obesity     PCOS (polycystic ovarian syndrome)      Past Surgical History:   Procedure Laterality Date    APPENDECTOMY  2011    HERNIA REPAIR      OOPHORECTOMY      salpingoopherectomy Right 2008       Discussed with GYN/ONC Dr. Millan and Dr. Whitney.    Imaging reviewed with Radiology staff, Dr. Mcmillan.    Procedure: IR Abscess drainage with possible tube placement.     Scheduled Meds:    acetaminophen  650 mg Oral Q6H    ceFOXItin (MEFOXIN) IVPB  2 g Intravenous Q6H    doxycycline  100 mg Oral BID    ibuprofen  600 mg Oral Q6H    lisinopriL  2.5 mg Oral Daily    pantoprazole  40 mg Oral Before breakfast     Continuous Infusions:    lactated ringers 125 mL/hr at 03/29/20 2330     PRN Meds:albuterol, calcium carbonate, Dextrose 10% Bolus, diphenhydrAMINE, glucagon (human recombinant), hydrALAZINE, HYDROcodone-acetaminophen, HYDROcodone-acetaminophen, HYDROmorphone, insulin aspart U-100, ondansetron, promethazine (PHENERGAN) IVPB    Allergies:   Review of patient's allergies indicates:   Allergen Reactions    Latex, natural rubber Rash     Burning sensation       Labs:  No results for input(s): INR in the last 168 hours.    Invalid input(s):  PT,  PTT    Recent Labs   Lab 03/29/20  2105   WBC 3.46*   HGB 9.9*   HCT 33.2*   MCV 88         Recent Labs   Lab 03/29/20  2105   *      K 3.8      CO2 22*   BUN 7   CREATININE  0.7   CALCIUM 9.0   ALT 54*   AST 52*   ALBUMIN 3.4*   BILITOT 0.2         Vitals (Most Recent):  Temp: 97.9 °F (36.6 °C) (03/30/20 0351)  Pulse: 86 (03/30/20 0351)  Resp: 16 (03/30/20 0351)  BP: (!) 108/57 (03/30/20 0351)  SpO2: 96 % (03/30/20 0351)      Assessment and Plan:     Patient has a multiloculated adnexal lesion, presumably TOA. Cultures grew actinomyces previously and despite antibiotic treatment it is not resolving. Currently she has normal WBC count.     1. Keep the patient NPO.   2. Hold anticoagulants.  3. Will attempt to do procedure today 3/30.     Jovani Desai MD   PGY-3 Radiology

## 2020-03-30 NOTE — PROGRESS NOTES
"[facility]  Gynecologic Oncology  Progress Note      Patient Name: Glenda Peña  MRN: [id number]  Admission Date: 3/29/2020  Hospital Length of Stay: 1 days  Attending Provider: Ritchie Millan MD  Primary Care Provider: Emiliano Dunbar MD  Principal Problem: Tubo-ovarian abscess    Follow-up For: * No surgery found *  Post-Operative Day:    Subjective:      History of Present Illness:  Glenda Peña is a 29 y.o.  with PMH significant for morbid obesity, T2DM, HTN, and large complex midline and R-sided ventral who [place] [facility] [place] CC of worsening pelvic pain in the setting of known TOA. Pain on presentation rated 10/10. Reports a few episodes of nausea,  diarrhea and dizziness today but denies fevers, chills, body aches, cough, chest pain, shortness of breath, urinary symptoms.     The patient was initially admitted to [facility], where she was admitted for IR drainage of TOA and IV antibiotics. IR drain placement was not successful in resolving the TOA, but cultures grew actinomyces. She was discharged after improvement in pain. She then presented to [facility] with pelvic pain on 3/6/2020. CT A/P on 3/10 showed a 3.5 x 11 cm fluid collection anterior the uterus.   She was given IV antibiotics and IR drainage was attempted again, unsuccessfully. She was discharged with Unasyn and followed by ID. On 3/27 patient reported worsening pain, and CT AP showed heterogeneous, complex fluid collection with multiple loculations and interval increase in size, now approximately 7 x 9 cm transversely and 9 cm craniocaudal. Doxycycline was added by ID and per ID note, patient was to follow up with IR on an outpatient basis for attempted drainage.     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   2011: Open appendectomy  2016: Repair " of incisional hernia    Hospital Course:  03/29/2020 - In the ED, evaluation revealed no suspicion for incarceration of known ventral hernia. ED physician has low suspicion for COVID-19 given patient is afebrile and has no respiratory symptoms. patient's VS were WNL and stable. She was afebrile and nontoxic-appearing. Pain was mostly L-sided and midline pelvic pain, rated 10/10. Improved with IV narcotics. CBC showed leukopenia with WBC 3. UPT negative and UA WNL but for RBCs. CMP shows mild transaminitis not seen on prior labs. Plan is admission for pain control, continue IV antibiotics, and attempt at IR drain placement tomorrow if IR amenable.     03/30/2020 - Pain controlled, but requiring multiple PO agents as well as IV narcotics. VS remain stable. Patient well-appearing but with significant pain. Reviewed with the patient that she is very high risk for surgery given surgical history, large ventral hernia, morbid obesity, and medical comorbidities. Has been NPO overnight. IR consult placed this morning - will ask to attempt drainage and obtain new cultures. .      Past Medical History:   Diagnosis Date    Asthma childhood    Diabetes mellitus     GERD (gastroesophageal reflux disease)     Morbid obesity     PCOS (polycystic ovarian syndrome)      Past Surgical History:   Procedure Laterality Date    APPENDECTOMY  2011    HERNIA REPAIR      OOPHORECTOMY      salpingoopherectomy Right 2008     Family History     Problem Relation (Age of Onset)    Diabetes Mother    Heart attack Father, Mother    Hypertension Father        Tobacco Use    Smoking status: Never Smoker    Smokeless tobacco: Never Used   Substance and Sexual Activity    Alcohol use: Not Currently     Comment: occasionally apple cider    Drug use: No    Sexual activity: Yes     Partners: Female       PTA Medications   Medication Sig    doxycycline (VIBRA-TABS) 100 MG tablet Take 1 tablet (100 mg total) by mouth 2 (two) times daily. Take  with food for 14 days    lisinopril (PRINIVIL,ZESTRIL) 2.5 MG tablet Take 1 tablet (2.5 mg total) by mouth once daily.    metFORMIN (GLUCOPHAGE) 1000 MG tablet Take 2,000 mg by mouth.    pantoprazole (PROTONIX) 40 MG tablet Take 1 tablet (40 mg total) by mouth before breakfast.    albuterol (VENTOLIN HFA) 90 mcg/actuation inhaler Inhale 2 puffs into the lungs every 6 (six) hours as needed for Wheezing. Rescue    ampicillin sodium/sulbactam Na (AMPICILLIN/SULBACTAM 3 G/100 ML NS, READY TO MIX,) Inject 100 mLs (3 g total) into the vein every 6 (six) hours.    dulaglutide (TRULICITY) 1.5 mg/0.5 mL PnIj Inject 1.5 mg into the skin every 7 days.    glipiZIDE (GLUCOTROL) 10 MG TR24 Take 1 tablet (10 mg total) by mouth daily with breakfast.    ondansetron (ZOFRAN) 4 MG tablet Take 1 tablet (4 mg total) by mouth every 6 (six) hours as needed for Nausea.    sodium chloride 0.9% (NORMAL SALINE FLUSH) injection Inject 10 mLs into the vein every 6 (six) hours.       Review of patient's allergies indicates:   Allergen Reactions    Latex, natural rubber Rash     Burning sensation      Objective:     Vital Signs (Most Recent):  Temp: 97.9 °F (36.6 °C) (03/30/20 0351)  Pulse: 86 (03/30/20 0351)  Resp: 16 (03/30/20 0351)  BP: (!) 108/57 (03/30/20 0351)  SpO2: 96 % (03/30/20 0351) Vital Signs (24h Range):  Temp:  [97.9 °F (36.6 °C)-98.1 °F (36.7 °C)] 97.9 °F (36.6 °C)  Pulse:  [] 86  Resp:  [16-18] 16  SpO2:  [96 %-98 %] 96 %  BP: (108-142)/(57-78) 108/57     Weight: 116.7 kg (257 lb 4.4 oz)  Body mass index is 47.06 kg/m².    Physical Exam:   Constitutional: She is oriented to person, place, and time. She appears well-developed. No distress.   Morbidly obese    HENT:   Head: Normocephalic and atraumatic.      Cardiovascular: Normal rate and regular rhythm.     Pulmonary/Chest: Effort normal. No respiratory distress. She has no wheezes.        Abdominal: Soft. Bowel sounds are normal. She exhibits no distension  (abnormal abdominal contour with hernia palpable through anterior abdomen at and to the right of the midline). There is tenderness (lower abdomen and suprapubic area bilaterally).                 Neurological: She is alert and oriented to person, place, and time.    Skin: Skin is warm and dry.    Psychiatric: Her behavior is normal.       Laboratory:  No new labs since admission.     Assessment/Plan:     * Tubo-ovarian abscess  - Culture from OSH - Actinomyces. Followed by ID Dr. Mei  - 7cm x 9cm on CT 3/27 (previously 3cm x 11 cm on 3/10)   - Has been on extended course of IV Unasyn with Home Health  - ID added Doxycycline 3/27 as well    PLAN:   - Continued Unasyn IV and Doxycycline PO on admission - will change to IV cefoxitin this morning and continue doxy PO  - IR consult placed. Aware fluid collection is multiloculated, but may be able to drain 5 cm pocket, place >1 drain, and at least obtain josefina cultures of the TOA  - NPO since midnight with maintenance IVF @ 125 mL/hr  - Pain control with scheduled and PRN PO medications and IV narcotic for BTP     Asthma  - Continue PRN albuterol (home med)    Type 2 diabetes mellitus with hyperglycemia  - SSI for NPO patients     GERD (gastroesophageal reflux disease)  - Continue home PPI  - TUMS PRN    Essential hypertension  - Continue home Lisinopril  - Hydralazine PRN SBP > 180 mmHg    Morbid obesity with BMI of 45.0-49.9, adult  - Encourage ambulation   - SCDs for DVT PPX      Ricky Whitney MD  Gynecologic Oncology  [facility]

## 2020-03-30 NOTE — CONSULTS
Please see H&P for this admission from 3/29/2020.     Ricky Whitney M.D.  Obstetrics & Gynecology  PGY-3

## 2020-03-30 NOTE — HOSPITAL COURSE
03/29/2020 - In the ED, evaluation revealed no suspicion for incarceration of known ventral hernia. ED physician has low suspicion for COVID-19 given patient is afebrile and has no respiratory symptoms. patient's VS were WNL and stable. She was afebrile and nontoxic-appearing. Pain was mostly L-sided and midline pelvic pain, rated 10/10. Improved with IV narcotics. CBC showed leukopenia with WBC 3. UPT negative and UA WNL but for RBCs. CMP shows mild transaminitis not seen on prior labs. Plan is admission for pain control, continue IV antibiotics, and attempt at IR drain placement tomorrow if IR amenable.     03/30/2020 - Pain controlled, but requiring multiple PO agents as well as IV narcotics. VS remain stable. Patient well-appearing but with significant pain. Reviewed with the patient that she is very high risk for surgery given surgical history, large ventral hernia, morbid obesity, and medical comorbidities. Has been NPO overnight. IR consult placed this morning - will ask to attempt drainage and obtain new cultures.   ID consulted for antibiotic recommendations.     03/31/2020 - Patient now POD#1 s/p IR drain placement. 62 mL serosanguinous fluid aspirated, and 10 Romansh catheter left in place with drain. Aerobic and anaerobic cultures as well as yeast culture done. Patient reports some pain relief in the pelvis, with pain now being intermittent rather than constant, however she now has pain at the drain site as well. VSS and afebrile.     04/01/2020 - this morning patient reports pelvic pain is present but improved. Pain has been controlled with PO medication only for the past 24 hours. She is eating a regular diet and ambulating.   SW on board for discharge planning for antibiotic and drain care needs when discharged.   Patient's care was discussed with IR again yesterday, who recommended repeat imaging of pelvis to help in decision to leave or pull the drain. CT pelvis with contrast showed decrease in size  "of the fluid collection and no new collections as below:   "There has been interval placement of left-sided percutaneous drainage catheter within the previously visualized complex fluid collection in the left adnexal region.  Fluid continues to show multiple likely septations.  Collection now measures at 4.3 x 8.9 cm (series 2, image 54) and is notably decreased in size in comparison to prior.  No definite new collections are identified."  Her care was also discussed with ID, who recommends continuation of 6 week course of Unasyn and Doxy or until surgery.   Also discussed with colorectal surgery for surgical planning. CRS recommends fistulogram given patient's history of diverticulitis and large ventral hernia, to ensure no communication between pelvic fluid collections and bowel/hernia sac. This will be done this morning. Patient NPO since midnight.    5:04 PM - Patient has undergone fistulogram with drain check by IR. No communicating tract seen on injection of contrast through existing drain. Drain left in place. CMP checked today with stable Cr and resolution of mild elevation in LFTs seen on admission.   Patient now has home health for drain care as well as orders in place for home IV antibiotics. Appopriate follow up will be scheduled with infectious disease.   Patient counseled extensively by Dr. Millan, Gyn resident, SW, and dietician while inpatient on the importance of optimizing her health in preparation for surgery.   She will see or have a virtual visit with her PCP in the near future with plans to start monitoring BG and possibly starting insulin. She will continue healthier diet for weight loss.   She has been given precautions for when to call Gyn Onc clinic and when to present to the ED if needed. If pain worsens, will consider repeat drainage of pelvic fluid collections by IR on an outpatient basis until optimized for surgery.   "

## 2020-03-30 NOTE — PROCEDURES
Radiology Post-Procedure Note    Pre Op Diagnosis: TOA    Post Op Diagnosis: Cystic ovarian lesion     Procedure:right ovarian drainage    Procedure performed by: Brent Mcmillan MD    Written Informed Consent Obtained: Yes    Specimen Removed: YES 60 cc serous fluid, 2 cc bloody fluid    Estimated Blood Loss: Minimal    Findings: CT was used for localization of abnormal fluid collection. A needle was inserted into the fluid collection and serous and bloody fluid was aspirated.  A wire was inserted into the collection and the tract was dilated.  A 10 Arabic all-purpose drainage catheter was inserted and a pigtail loop of the distal end was formed.  The catheter was sutured into place and approximately  62 mL fluid was removed.     A specimen was sent to the lab for further analysis and culture.    The patient tolerated procedure well and there were no complications. Please see procedure report under Imaging for further details.    Brent Mcmillan M.D.  Diagnostic and Interventional Radiologist  Department of Radiology  Pager: [phone]

## 2020-03-30 NOTE — H&P
"[facility]  Gynecologic Oncology  H&P    Patient Name: Glenda Peña  MRN: [id number]  Admission Date: 3/29/2020  Primary Care Provider: Emiliano Dunbar MD   Principal Problem: Tubo-ovarian abscess    Subjective:     Chief Complaint/Reason for Admission: TOA    History of Present Illness:  Glenda Peña is a 29 y.o.  with PMH significant for morbid obesity, T2DM, HTN, and large complex midline and R-sided ventral who presented to [facility] [place] with CC of worsening pelvic pain in the setting of known TOA. Pain on presentation rated 10/10. Reports a few episodes of nausea,  diarrhea and dizziness today but denies fevers, chills, body aches, cough, chest pain, shortness of breath, urinary symptoms.     The patient was initially admitted to [facility], where she was admitted for IR drainage of TOA and IV antibiotics. IR drain placement was not successful in resolving the TOA, but cultures grew actinomyces. She was discharged after improvement in pain. She then presented to [facility] with pelvic pain on 3/6/2020. CT A/P on 3/10 showed a 3.5 x 11 cm fluid collection anterior the uterus.   She was given IV antibiotics and IR drainage was attempted again, unsuccessfully. She was discharged with Unasyn and followed by ID. On 3/27 patient reported worsening pain, and CT AP showed heterogeneous, complex fluid collection with multiple loculations and interval increase in size, now approximately 7 x 9 cm transversely and 9 cm craniocaudal. Doxycycline was added by ID and per ID note, patient was to follow up with IR on an outpatient basis for attempted drainage.     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   2011: Open appendectomy  2016: Repair of incisional hernia    Hospital Course:  2020 - In the ED, evaluation revealed no " suspicion for incarceration of known ventral hernia. ED physician has low suspicion for COVID-19 given patient is afebrile and has no respiratory symptoms. patient's VS were WNL and stable. She was afebrile and nontoxic-appearing. Pain was mostly L-sided and midline pelvic pain, rated 10/10. Improved with IV narcotics. CBC showed leukopenia with WBC 3. UPT negative and UA WNL but for RBCs. CMP shows mild transaminitis not seen on prior labs. Plan is admission for pain control, continue IV antibiotics, and attempt at IR drain placement tomorrow if IR amenable.       Past Medical History:   Diagnosis Date    Asthma childhood    Diabetes mellitus     GERD (gastroesophageal reflux disease)     Morbid obesity     PCOS (polycystic ovarian syndrome)      Past Surgical History:   Procedure Laterality Date    APPENDECTOMY  2011    HERNIA REPAIR      OOPHORECTOMY      salpingoopherectomy Right 2008     Family History     Problem Relation (Age of Onset)    Diabetes Mother    Heart attack Father, Mother    Hypertension Father        Tobacco Use    Smoking status: Never Smoker    Smokeless tobacco: Never Used   Substance and Sexual Activity    Alcohol use: Not Currently     Comment: occasionally apple cider    Drug use: No    Sexual activity: Yes     Partners: Female         (Not in a hospital admission)    Review of patient's allergies indicates:   Allergen Reactions    Latex, natural rubber Rash     Burning sensation       Review of Systems   Constitutional: Positive for fatigue. Negative for activity change, chills and fever.   HENT: Negative for nasal congestion.    Respiratory: Negative for cough and shortness of breath.    Cardiovascular: Negative for chest pain.   Gastrointestinal: Positive for abdominal pain, diarrhea and nausea. Negative for constipation and vomiting.   Endocrine: Positive for diabetes.   Genitourinary: Positive for pelvic pain. Negative for dysuria and vaginal bleeding.   Neurological:  Negative for headaches.      Objective:     Vital Signs (Most Recent):  Temp: 98.1 °F (36.7 °C) (03/29/20 2022)  Pulse: 67 (03/29/20 2226)  Resp: 16 (03/29/20 2226)  BP: 124/64 (03/29/20 2226)  SpO2: 96 % (03/29/20 2226) Vital Signs (24h Range):  Temp:  [98.1 °F (36.7 °C)] 98.1 °F (36.7 °C)  Pulse:  [] 67  Resp:  [16-18] 16  SpO2:  [96 %-98 %] 96 %  BP: (124-142)/(64-78) 124/64     Weight: 113.4 kg (250 lb)  Body mass index is 45.73 kg/m².    Per ED:  Physical Exam:   Constitutional: She is oriented to person, place, and time. She appears well-developed. No distress.   Morbidly obese    HENT:   Head: Normocephalic and atraumatic.      Cardiovascular: Normal rate and regular rhythm.     Pulmonary/Chest: Effort normal. No respiratory distress. She has no wheezes.        Abdominal: Soft. Bowel sounds are normal. She exhibits no distension (abnormal abdominal contour with hernia palpable through anterior abdomen at and to the right of the midline). There is tenderness (lower abdomen and suprapubic area bilaterally).                 Neurological: She is alert and oriented to person, place, and time.    Skin: Skin is warm and dry.    Psychiatric: Her behavior is normal.       Laboratory:  CBC:   Recent Labs   Lab 03/29/20 2105   WBC 3.46*   HGB 9.9*   HCT 33.2*      , CMP:   Recent Labs   Lab 03/29/20 2105      K 3.8      CO2 22*   *   BUN 7   CREATININE 0.7   CALCIUM 9.0   PROT 8.6*   ALBUMIN 3.4*   BILITOT 0.2   ALKPHOS 71   AST 52*   ALT 54*   ANIONGAP 12   EGFRNONAA >60.0    and All pertinent labs from the last 24 hours have been reviewed.    Assessment/Plan:     * Tubo-ovarian abscess  - Culture from OSH - Actinomyces. Followed by ID Dr. Mei  - 7cm x 9cm on CT 3/27 (previously 3cm x 11 cm on 3/10)   - Has been on extended course of IV Unasyn with Home Health  - ID added Doxycycline 3/27 as well  - Continue Unasyn IV and Doxycycline PO while inpatient   - Will consult IR tomorrow  for another attempt at drainage  - NPO overnight with maintenance IVF @ 125 mL/hr  - Pain control with scheduled and PRN PO medications and IV narcotic for BTP     Asthma  - Continue PRN albuterol (home med)    Type 2 diabetes mellitus with hyperglycemia  - SSI for NPO patients     GERD (gastroesophageal reflux disease)  - Continue home PPI  - TUMS PRN    Essential hypertension  - Continue home Lisinopril  - Hydralazine PRN SBP > 180 mmHg    Morbid obesity with BMI of 45.0-49.9, adult  - Encourage ambulation   - SCDs for DVT PPX        Ricky Whitney MD  Gynecologic Oncology  [facility]

## 2020-03-31 DIAGNOSIS — N70.93 TUBO-OVARIAN ABSCESS: ICD-10-CM

## 2020-03-31 DIAGNOSIS — A42.9 ACTINOMYCES INFECTION: Primary | ICD-10-CM

## 2020-03-31 LAB
BASOPHILS # BLD AUTO: 0.01 K/UL (ref 0–0.2)
BASOPHILS NFR BLD: 0.4 % (ref 0–1.9)
DIFFERENTIAL METHOD: ABNORMAL
EOSINOPHIL # BLD AUTO: 0 K/UL (ref 0–0.5)
EOSINOPHIL NFR BLD: 1.4 % (ref 0–8)
ERYTHROCYTE [DISTWIDTH] IN BLOOD BY AUTOMATED COUNT: 16 % (ref 11.5–14.5)
HCT VFR BLD AUTO: 31.1 % (ref 37–48.5)
HGB BLD-MCNC: 9 G/DL (ref 12–16)
IMM GRANULOCYTES # BLD AUTO: 0.01 K/UL (ref 0–0.04)
IMM GRANULOCYTES NFR BLD AUTO: 0.4 % (ref 0–0.5)
LYMPHOCYTES # BLD AUTO: 1.3 K/UL (ref 1–4.8)
LYMPHOCYTES NFR BLD: 45.4 % (ref 18–48)
MCH RBC QN AUTO: 25.4 PG (ref 27–31)
MCHC RBC AUTO-ENTMCNC: 28.9 G/DL (ref 32–36)
MCV RBC AUTO: 88 FL (ref 82–98)
MONOCYTES # BLD AUTO: 0.2 K/UL (ref 0.3–1)
MONOCYTES NFR BLD: 7.4 % (ref 4–15)
NEUTROPHILS # BLD AUTO: 1.3 K/UL (ref 1.8–7.7)
NEUTROPHILS NFR BLD: 45 % (ref 38–73)
NRBC BLD-RTO: 0 /100 WBC
PLATELET # BLD AUTO: 154 K/UL (ref 150–350)
PMV BLD AUTO: 10.7 FL (ref 9.2–12.9)
POCT GLUCOSE: 107 MG/DL (ref 70–110)
POCT GLUCOSE: 137 MG/DL (ref 70–110)
POCT GLUCOSE: 179 MG/DL (ref 70–110)
RBC # BLD AUTO: 3.54 M/UL (ref 4–5.4)
WBC # BLD AUTO: 2.82 K/UL (ref 3.9–12.7)

## 2020-03-31 PROCEDURE — 99233 PR SUBSEQUENT HOSPITAL CARE,LEVL III: ICD-10-PCS | Mod: ,,, | Performed by: OBSTETRICS & GYNECOLOGY

## 2020-03-31 PROCEDURE — 25500020 PHARM REV CODE 255: Performed by: OBSTETRICS & GYNECOLOGY

## 2020-03-31 PROCEDURE — 25000003 PHARM REV CODE 250: Performed by: STUDENT IN AN ORGANIZED HEALTH CARE EDUCATION/TRAINING PROGRAM

## 2020-03-31 PROCEDURE — 88112 PR  CYTOPATH, CELL ENHANCE TECH: ICD-10-PCS | Mod: 26,,, | Performed by: PATHOLOGY

## 2020-03-31 PROCEDURE — 63600175 PHARM REV CODE 636 W HCPCS: Performed by: STUDENT IN AN ORGANIZED HEALTH CARE EDUCATION/TRAINING PROGRAM

## 2020-03-31 PROCEDURE — 63700000 PHARM REV CODE 250 ALT 637 W/O HCPCS: Performed by: STUDENT IN AN ORGANIZED HEALTH CARE EDUCATION/TRAINING PROGRAM

## 2020-03-31 PROCEDURE — 99233 SBSQ HOSP IP/OBS HIGH 50: CPT | Mod: ,,, | Performed by: OBSTETRICS & GYNECOLOGY

## 2020-03-31 PROCEDURE — 63600175 PHARM REV CODE 636 W HCPCS: Performed by: PHYSICIAN ASSISTANT

## 2020-03-31 PROCEDURE — 99223 PR INITIAL HOSPITAL CARE,LEVL III: ICD-10-PCS | Mod: ,,, | Performed by: PHYSICIAN ASSISTANT

## 2020-03-31 PROCEDURE — 88112 CYTOPATH CELL ENHANCE TECH: CPT | Performed by: PATHOLOGY

## 2020-03-31 PROCEDURE — 20600001 HC STEP DOWN PRIVATE ROOM

## 2020-03-31 PROCEDURE — 25000003 PHARM REV CODE 250: Performed by: OBSTETRICS & GYNECOLOGY

## 2020-03-31 PROCEDURE — 63600175 PHARM REV CODE 636 W HCPCS: Performed by: OBSTETRICS & GYNECOLOGY

## 2020-03-31 PROCEDURE — 85025 COMPLETE CBC W/AUTO DIFF WBC: CPT

## 2020-03-31 PROCEDURE — 88112 CYTOPATH CELL ENHANCE TECH: CPT | Mod: 26,,, | Performed by: PATHOLOGY

## 2020-03-31 PROCEDURE — 99223 1ST HOSP IP/OBS HIGH 75: CPT | Mod: ,,, | Performed by: PHYSICIAN ASSISTANT

## 2020-03-31 RX ORDER — KETOROLAC TROMETHAMINE 15 MG/ML
30 INJECTION, SOLUTION INTRAMUSCULAR; INTRAVENOUS EVERY 6 HOURS
Status: COMPLETED | OUTPATIENT
Start: 2020-03-31 | End: 2020-04-01

## 2020-03-31 RX ORDER — SENNOSIDES 8.6 MG/1
8.6 TABLET ORAL 2 TIMES DAILY
Status: DISCONTINUED | OUTPATIENT
Start: 2020-03-31 | End: 2020-04-01 | Stop reason: HOSPADM

## 2020-03-31 RX ORDER — POLYETHYLENE GLYCOL 3350 17 G/17G
17 POWDER, FOR SOLUTION ORAL 2 TIMES DAILY PRN
Status: DISCONTINUED | OUTPATIENT
Start: 2020-03-31 | End: 2020-04-01 | Stop reason: HOSPADM

## 2020-03-31 RX ADMIN — ACETAMINOPHEN 650 MG: 325 TABLET ORAL at 05:03

## 2020-03-31 RX ADMIN — OXYCODONE HYDROCHLORIDE 10 MG: 10 TABLET ORAL at 01:03

## 2020-03-31 RX ADMIN — AMPICILLIN SODIUM AND SULBACTAM SODIUM 3 G: 2; 1 INJECTION, POWDER, FOR SOLUTION INTRAMUSCULAR; INTRAVENOUS at 12:03

## 2020-03-31 RX ADMIN — OXYCODONE HYDROCHLORIDE 10 MG: 10 TABLET ORAL at 03:03

## 2020-03-31 RX ADMIN — ACETAMINOPHEN 650 MG: 325 TABLET ORAL at 11:03

## 2020-03-31 RX ADMIN — SODIUM CHLORIDE, SODIUM LACTATE, POTASSIUM CHLORIDE, AND CALCIUM CHLORIDE: .6; .31; .03; .02 INJECTION, SOLUTION INTRAVENOUS at 05:03

## 2020-03-31 RX ADMIN — IBUPROFEN 600 MG: 600 TABLET, FILM COATED ORAL at 05:03

## 2020-03-31 RX ADMIN — IBUPROFEN 600 MG: 600 TABLET, FILM COATED ORAL at 12:03

## 2020-03-31 RX ADMIN — HYDROMORPHONE HYDROCHLORIDE 1 MG: 1 INJECTION, SOLUTION INTRAMUSCULAR; INTRAVENOUS; SUBCUTANEOUS at 08:03

## 2020-03-31 RX ADMIN — AMPICILLIN SODIUM AND SULBACTAM SODIUM 3 G: 2; 1 INJECTION, POWDER, FOR SOLUTION INTRAMUSCULAR; INTRAVENOUS at 11:03

## 2020-03-31 RX ADMIN — AMPICILLIN SODIUM AND SULBACTAM SODIUM 3 G: 2; 1 INJECTION, POWDER, FOR SOLUTION INTRAMUSCULAR; INTRAVENOUS at 04:03

## 2020-03-31 RX ADMIN — DOXYCYCLINE HYCLATE 100 MG: 100 TABLET, COATED ORAL at 09:03

## 2020-03-31 RX ADMIN — OXYCODONE HYDROCHLORIDE 10 MG: 10 TABLET ORAL at 06:03

## 2020-03-31 RX ADMIN — SENNOSIDES 8.6 MG: 8.6 TABLET, FILM COATED ORAL at 09:03

## 2020-03-31 RX ADMIN — KETOROLAC TROMETHAMINE 30 MG: 15 INJECTION, SOLUTION INTRAMUSCULAR; INTRAVENOUS at 11:03

## 2020-03-31 RX ADMIN — KETOROLAC TROMETHAMINE 30 MG: 15 INJECTION, SOLUTION INTRAMUSCULAR; INTRAVENOUS at 05:03

## 2020-03-31 RX ADMIN — PANTOPRAZOLE SODIUM 40 MG: 40 TABLET, DELAYED RELEASE ORAL at 05:03

## 2020-03-31 RX ADMIN — INSULIN ASPART 1 UNITS: 100 INJECTION, SOLUTION INTRAVENOUS; SUBCUTANEOUS at 09:03

## 2020-03-31 RX ADMIN — OXYCODONE HYDROCHLORIDE 10 MG: 10 TABLET ORAL at 11:03

## 2020-03-31 RX ADMIN — IBUPROFEN 600 MG: 600 TABLET, FILM COATED ORAL at 11:03

## 2020-03-31 RX ADMIN — ACETAMINOPHEN 650 MG: 325 TABLET ORAL at 12:03

## 2020-03-31 RX ADMIN — INSULIN ASPART 2 UNITS: 100 INJECTION, SOLUTION INTRAVENOUS; SUBCUTANEOUS at 12:03

## 2020-03-31 RX ADMIN — HYDROMORPHONE HYDROCHLORIDE 1 MG: 1 INJECTION, SOLUTION INTRAMUSCULAR; INTRAVENOUS; SUBCUTANEOUS at 02:03

## 2020-03-31 RX ADMIN — IOHEXOL 100 ML: 350 INJECTION, SOLUTION INTRAVENOUS at 07:03

## 2020-03-31 RX ADMIN — OXYCODONE HYDROCHLORIDE 10 MG: 10 TABLET ORAL at 09:03

## 2020-03-31 RX ADMIN — DOXYCYCLINE HYCLATE 100 MG: 100 TABLET, COATED ORAL at 08:03

## 2020-03-31 RX ADMIN — ENOXAPARIN SODIUM 40 MG: 100 INJECTION SUBCUTANEOUS at 04:03

## 2020-03-31 RX ADMIN — PSYLLIUM HUSK 1 PACKET: 3.4 POWDER ORAL at 08:03

## 2020-03-31 RX ADMIN — SENNOSIDES 8.6 MG: 8.6 TABLET, FILM COATED ORAL at 12:03

## 2020-03-31 NOTE — PROGRESS NOTES
Assisting my coworker ANTHONY DavisW, this PM and faxed patient's referral information to Elisha's attention at [facility] at [phone] for IV antibiotics and PICC line care supplies.

## 2020-03-31 NOTE — SUBJECTIVE & OBJECTIVE
Past Medical History:   Diagnosis Date    Asthma childhood    Diabetes mellitus     GERD (gastroesophageal reflux disease)     Morbid obesity     PCOS (polycystic ovarian syndrome)        Past Surgical History:   Procedure Laterality Date    APPENDECTOMY  2011    HERNIA REPAIR      OOPHORECTOMY      salpingoopherectomy Right 2008       Review of patient's allergies indicates:   Allergen Reactions    Latex, natural rubber Rash     Burning sensation       Medications:  Medications Prior to Admission   Medication Sig    doxycycline (VIBRA-TABS) 100 MG tablet Take 1 tablet (100 mg total) by mouth 2 (two) times daily. Take with food for 14 days    lisinopril (PRINIVIL,ZESTRIL) 2.5 MG tablet Take 1 tablet (2.5 mg total) by mouth once daily.    metFORMIN (GLUCOPHAGE) 1000 MG tablet Take 2,000 mg by mouth.    pantoprazole (PROTONIX) 40 MG tablet Take 1 tablet (40 mg total) by mouth before breakfast.    albuterol (VENTOLIN HFA) 90 mcg/actuation inhaler Inhale 2 puffs into the lungs every 6 (six) hours as needed for Wheezing. Rescue    ampicillin sodium/sulbactam Na (AMPICILLIN/SULBACTAM 3 G/100 ML NS, READY TO MIX,) Inject 100 mLs (3 g total) into the vein every 6 (six) hours.    dulaglutide (TRULICITY) 1.5 mg/0.5 mL PnIj Inject 1.5 mg into the skin every 7 days.    glipiZIDE (GLUCOTROL) 10 MG TR24 Take 1 tablet (10 mg total) by mouth daily with breakfast.    ondansetron (ZOFRAN) 4 MG tablet Take 1 tablet (4 mg total) by mouth every 6 (six) hours as needed for Nausea.    sodium chloride 0.9% (NORMAL SALINE FLUSH) injection Inject 10 mLs into the vein every 6 (six) hours.     Antibiotics (From admission, onward)    Start     Stop Route Frequency Ordered    03/30/20 1715  ampicillin-sulbactam 3 g in sodium chloride 0.9 % 100 mL IVPB (ready to mix system)      -- IV Every 6 hours (non-standard times) 03/30/20 1603    03/29/20 2315  doxycycline tablet 100 mg      -- Oral 2 times daily 03/29/20 1389         Antifungals (From admission, onward)    None        Antivirals (From admission, onward)    None           Immunization History   Administered Date(s) Administered    DTaP 1989, 04/16/1990, 07/08/1993, 04/15/1994    HIB 07/08/1993    Hepatitis B, Pediatric/Adolescent 09/23/2002, 02/02/2004    IPV 1989, 04/15/1990, 04/15/1994    Influenza 11/24/2019    Influenza - Quadrivalent - PF (6 months and older) 01/17/2017    MMR 07/08/1993, 04/15/1994    Td (ADULT) 09/23/2002    Td (Adult), Unspecified Formulation 09/23/2002       Family History     Problem Relation (Age of Onset)    Diabetes Mother    Heart attack Father, Mother    Hypertension Father        Social History     Socioeconomic History    Marital status:      Spouse name: Not on file    Number of children: Not on file    Years of education: Not on file    Highest education level: Not on file   Occupational History    Not on file   Social Needs    Financial resource strain: Not on file    Food insecurity:     Worry: Not on file     Inability: Not on file    Transportation needs:     Medical: Not on file     Non-medical: Not on file   Tobacco Use    Smoking status: Never Smoker    Smokeless tobacco: Never Used   Substance and Sexual Activity    Alcohol use: Not Currently     Comment: occasionally apple cider    Drug use: No    Sexual activity: Yes     Partners: Female   Lifestyle    Physical activity:     Days per week: Not on file     Minutes per session: Not on file    Stress: Not on file   Relationships    Social connections:     Talks on phone: Not on file     Gets together: Not on file     Attends Jainism service: Not on file     Active member of club or organization: Not on file     Attends meetings of clubs or organizations: Not on file     Relationship status: Not on file   Other Topics Concern    Not on file   Social History Narrative    Not on file     Review of Systems   Constitutional: Negative for  appetite change, chills, diaphoresis, fatigue, fever and unexpected weight change.   HENT: Negative for congestion, ear pain, hearing loss, sore throat and tinnitus.    Eyes: Negative for pain, redness and visual disturbance.   Respiratory: Negative for cough, chest tightness, shortness of breath and wheezing.    Cardiovascular: Negative for chest pain.   Gastrointestinal: Positive for abdominal distention and abdominal pain (LLQ). Negative for constipation, diarrhea, nausea and vomiting.        Midline and R hernia with mesh     Endocrine: Negative for cold intolerance and heat intolerance.   Genitourinary: Negative for decreased urine volume, difficulty urinating, dysuria, flank pain, frequency, hematuria and urgency.   Musculoskeletal: Negative for arthralgias, back pain, myalgias and neck pain.   Skin: Negative for rash and wound.   Allergic/Immunologic: Negative for environmental allergies, food allergies and immunocompromised state.   Neurological: Negative for dizziness, facial asymmetry, weakness, light-headedness, numbness and headaches.   Hematological: Negative for adenopathy. Does not bruise/bleed easily.   Psychiatric/Behavioral: Negative for agitation, behavioral problems and confusion.     Objective:     Vital Signs (Most Recent):  Temp: 97.9 °F (36.6 °C) (03/31/20 1104)  Pulse: 72 (03/31/20 1104)  Resp: 17 (03/31/20 1104)  BP: 134/61 (03/31/20 1104)  SpO2: 95 % (03/31/20 1104) Vital Signs (24h Range):  Temp:  [97.7 °F (36.5 °C)-98.1 °F (36.7 °C)] 97.9 °F (36.6 °C)  Pulse:  [72-88] 72  Resp:  [17-19] 17  SpO2:  [92 %-97 %] 95 %  BP: (108-137)/(57-76) 134/61     Weight: 116.7 kg (257 lb 4.4 oz)  Body mass index is 47.06 kg/m².    Estimated Creatinine Clearance: 142.3 mL/min (based on SCr of 0.7 mg/dL).    Physical Exam   Constitutional: She is oriented to person, place, and time. She appears well-developed and well-nourished. No distress.   Morbid obesity     HENT:   Head: Normocephalic and atraumatic.    Cardiovascular: Normal rate, regular rhythm and normal heart sounds. Exam reveals no gallop and no friction rub.   No murmur heard.  Pulmonary/Chest: Effort normal and breath sounds normal. No stridor. No respiratory distress. She has no wheezes. She has no rales.   Abdominal: Soft. Bowel sounds are normal. She exhibits no distension and no mass. There is no tenderness. There is no rebound and no guarding.       Musculoskeletal: She exhibits no edema.   Neurological: She is alert and oriented to person, place, and time.   Skin: Skin is warm and dry. She is not diaphoretic.   Psychiatric: She has a normal mood and affect. Her behavior is normal.       Significant Labs:   Blood Culture: No results for input(s): LABBLOO in the last 4320 hours.  CBC:   Recent Labs   Lab 03/29/20 2105 03/31/20  0651   WBC 3.46* 2.82*   HGB 9.9* 9.0*   HCT 33.2* 31.1*    154     CMP:   Recent Labs   Lab 03/29/20 2105      K 3.8      CO2 22*   *   BUN 7   CREATININE 0.7   CALCIUM 9.0   PROT 8.6*   ALBUMIN 3.4*   BILITOT 0.2   ALKPHOS 71   AST 52*   ALT 54*   ANIONGAP 12   EGFRNONAA >60.0     Wound Culture:   Recent Labs   Lab 03/06/20  1730 03/30/20  1448   LABAERO No growth No growth     All pertinent labs within the past 24 hours have been reviewed.    Significant Imaging: I have reviewed all pertinent imaging results/findings within the past 24 hours.   IR Ascess Drainage With Tube Placement [[id number]] Resulted: 03/30/20 1642   Order Status: Completed Updated: 03/30/20 1645   Narrative:     EXAMINATION:  Drainage catheter placement    Procedural Personnel    Attending physician(s): Brent Mcmillan MD    Fellow physician(s): None    Resident physician(s): None    Advanced practice provider(s): None    Pre-procedure diagnosis:   Tubo-ovarian abscess    Post-procedure diagnosis: Cystic ovarian lesion    Indication: Pain associated with fluid collection    Additional clinical history: None    Complications: No  immediate complications.    PROCEDURAL SUMMARY:  - Visceral drainage catheter placement under CT guidance    PROCEDURE:  Pre-procedure:    Consent: Informed consent for the procedure was obtained and time-out was performed prior to the procedure.    Preparation: The site was prepared and draped using maximal sterile barrier technique including cutaneous antisepsis.    Antibiotic administered: Prophylactic dose within 1 hour of procedure start time or 2 hours for vancomycin or fluoroquinolones.    Anesthesia/sedation:    Level of anesthesia/sedation: Moderate sedation (conscious sedation)    Anesthesia/sedation administered by: Independent trained observer under attending supervision with continuous monitoring of the patient's level of consciousness and physiologic status    Total intra-service sedation time (minutes): 35    Drainage catheter placement:    The patient was positioned supine. Initial imaging was performed. Local anesthesia was administered. The fluid collection was accessed using an access needle followed by wire insertion and serial dilation and a drainage catheter was placed. Position of the drainage catheter within the fluid collection was confirmed.  Given multi loculation, additionally 2 other loculations were drained using a 19 gauge needle and a total of approximately 60 cc of serous fluid were obtained.    - Initial imaging findings: Multiloculated cystic collection in the right pelvis    - Drainage catheter placed: All-purpose drainage catheter    - External catheter securement: Non-absorbable suture and adhesive anchoring device    - Post-drainage imaging findings: Partial drainage of the fluid collection    Contrast:    Contrast agent: None    Contrast volume (mL): 0    Radiation Dose:    CT dose length product ( mGy-cm): 1859.37    Fluoroscopy time ( minutes ):    Reference air kerma ( mGy ):    Kerma area product ( cGy-m2 ):    Additional Details:    Additional description of procedure:  None    Equipment details: None    Specimens removed: Aspirated fluid was sent for analysis.    Estimated blood loss (mL): Less than 10    Standardized report: [email or web address]_[email or web address]   Impression:       Percutaneous placement of a 10 Burundian drainage catheter into left ovary, yielding 2 mL of bloody fluid.    Aspiration of 2 other loculations within the left ovary yielding a total of 60 cc serous fluid as detailed above.  Fluid was sent for cultures and cytology.    Plan:    Monitor drain output and follow-up cultures.  If output decreases to less than 10 cc in 24 hour, drain removal could be considered.    Attestation:    Signer name: Brent Mcmillan MD    I attest that I was present for the entire procedure. I reviewed the stored images and agree with the report as written.      Electronically signed by: Brent Mcmillan MD  Date: 03/30/2020  Time: 16:42   Imaging History     2020  Date Procedure Name PACS Link Status Accession Number Location   03/30/20 03:19 PM IR Ascess Drainage With Tube Placement  Images Final [id number] [facility]   03/27/20 07:25 AM CT Abdomen Pelvis With Contrast  Images Final [id number] [facility]   03/11/20 02:17 PM X-Ray Chest 1 View  Images Final [id number] [facility]   03/11/20 02:08 PM X-Ray Chest 1 View for PICC_Central line  Images Final [id number] [facility]   03/10/20 09:16 AM CT Abdomen Pelvis With Contrast  Images Final [id number] [facility]   03/06/20 06:00 PM CT Guided Needle Placement  Images Final [id number] [facility]   03/06/20 05:59 PM IR Abscess Aspiration  Images Final [id number] [facility]   03/05/20 11:58 PM CT Abdomen Pelvis With Contrast  Images Final [id number] [facility]

## 2020-03-31 NOTE — ASSESSMENT & PLAN NOTE
- known suspected Tubo Ovarian abscess that grew Actinomyces Neuii in past (2/8) on prior aspiration  - now with enlarging complex fluid collection in same location - suspec this is due to lack of source control and surgical intervention is recommended from ID [facility] (communicated to primary team) - sx reportedly deferred at this time due to need for extensive surgery and COVID 19 situation.  - s/p IR aspiration with drain placement and no growth on Cx  - on unasyn and doxy  - order placed to have fluid sent for cytology  - stable non septic  - PICC in place  - will sign off if being DC'd  - need repeat CT abx in 2-3 weeks (ordered) and is to stay on unasyn and doxycyline until abscess resolved whether through surgery or on imaging    Discharge antibiotics:   1. Unasyn 3g IV q 6 hours - rec 12g IV continuous infusion over 24 through infusion company at DC  2. Doxycycline 100mg po bid      End date of IV antibiotics: tenative 6 weeks at this point but will need to continue until abscess resolved    Weekly outpatient laboratory on Monday or Tuesday while on IV antibiotics.    CBC   CMP or BMP   ESR and CRP      If vancomycin trough is not at target (15-20) prior to discharge, the please perform vancomycin trough before their fourth outpatient dose.    Fax laboratory results to [facility] at [phone] with attn: Shantel Mei MD     Outpatient Infectious Diseases clinic follow up will be arranged and found in patient calendar.    Prior to discharge, please ensure the patient's follow-up has been scheduled.  If there is still no follow-up scheduled in Infectious Diseases clinic, please send an EPIC message to Nikkie Faust MA in Infectious Diseases.

## 2020-03-31 NOTE — PROGRESS NOTES
"[facility]  Gynecologic Oncology  Progress Note      Patient Name: Glenda Peña  MRN: [id number]  Admission Date: 3/29/2020  Hospital Length of Stay: 2 days  Attending Provider: Ritchie Millan MD  Primary Care Provider: Emiliano Dunbar MD  Principal Problem: Tubo-ovarian abscess    Follow-up For: * No surgery found *  Post-Operative Day:    Subjective:      History of Present Illness:  Glenda Peña is a 29 y.o.  with PMH significant for morbid obesity, T2DM, HTN, and large complex midline and R-sided ventral who presented to [facility] ED with CC of worsening pelvic pain in the setting of known TOA. Pain on presentation rated 10/10. Reports a few episodes of nausea,  diarrhea and dizziness today but denies fevers, chills, body aches, cough, chest pain, shortness of breath, urinary symptoms.     The patient was initially admitted to [facility], where she was admitted for IR drainage of TOA and IV antibiotics. IR drain placement was not successful in resolving the TOA, but cultures grew actinomyces. She was discharged after improvement in pain. She then presented to [facility] with pelvic pain on 3/6/2020. CT A/P on 3/10 showed a 3.5 x 11 cm fluid collection anterior the uterus.   She was given IV antibiotics and IR drainage was attempted again, unsuccessfully. She was discharged with Unasyn and followed by ID. On 3/27 patient reported worsening pain, and CT AP showed heterogeneous, complex fluid collection with multiple loculations and interval increase in size, now approximately 7 x 9 cm transversely and 9 cm craniocaudal. Doxycycline was added by ID and per ID note, patient was to follow up with IR on an outpatient basis for attempted drainage.     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   2011: Open appendectomy  2016: Repair " of incisional hernia    Hospital Course:  03/29/2020 - In the ED, evaluation revealed no suspicion for incarceration of known ventral hernia. ED physician has low suspicion for COVID-19 given patient is afebrile and has no respiratory symptoms. patient's VS were WNL and stable. She was afebrile and nontoxic-appearing. Pain was mostly L-sided and midline pelvic pain, rated 10/10. Improved with IV narcotics. CBC showed leukopenia with WBC 3. UPT negative and UA WNL but for RBCs. CMP shows mild transaminitis not seen on prior labs. Plan is admission for pain control, continue IV antibiotics, and attempt at IR drain placement tomorrow if IR amenable.     03/30/2020 - Pain controlled, but requiring multiple PO agents as well as IV narcotics. VS remain stable. Patient well-appearing but with significant pain. Reviewed with the patient that she is very high risk for surgery given surgical history, large ventral hernia, morbid obesity, and medical comorbidities. Has been NPO overnight. IR consult placed this morning - will ask to attempt drainage and obtain new cultures.   ID consulted for antibiotic recommendations.     03/31/2020 - Patient now POD#1 s/p IR drain placement. 62 mL serosanguinous fluid aspirated, and 10 Pashto catheter left in place with drain. Aerobic and anaerobic cultures as well as yeast culture done. Patient reports some pain relief in the pelvis, with pain now being intermittent rather than constant, however she now has pain at the drain site as well. VSS and afebrile.       Past Medical History:   Diagnosis Date    Asthma childhood    Diabetes mellitus     GERD (gastroesophageal reflux disease)     Morbid obesity     PCOS (polycystic ovarian syndrome)      Past Surgical History:   Procedure Laterality Date    APPENDECTOMY  2011    HERNIA REPAIR      OOPHORECTOMY      salpingoopherectomy Right 2008     Family History     Problem Relation (Age of Onset)    Diabetes Mother    Heart attack  Father, Mother    Hypertension Father        Tobacco Use    Smoking status: Never Smoker    Smokeless tobacco: Never Used   Substance and Sexual Activity    Alcohol use: Not Currently     Comment: occasionally apple cider    Drug use: No    Sexual activity: Yes     Partners: Female       PTA Medications   Medication Sig    doxycycline (VIBRA-TABS) 100 MG tablet Take 1 tablet (100 mg total) by mouth 2 (two) times daily. Take with food for 14 days    lisinopril (PRINIVIL,ZESTRIL) 2.5 MG tablet Take 1 tablet (2.5 mg total) by mouth once daily.    metFORMIN (GLUCOPHAGE) 1000 MG tablet Take 2,000 mg by mouth.    pantoprazole (PROTONIX) 40 MG tablet Take 1 tablet (40 mg total) by mouth before breakfast.    albuterol (VENTOLIN HFA) 90 mcg/actuation inhaler Inhale 2 puffs into the lungs every 6 (six) hours as needed for Wheezing. Rescue    ampicillin sodium/sulbactam Na (AMPICILLIN/SULBACTAM 3 G/100 ML NS, READY TO MIX,) Inject 100 mLs (3 g total) into the vein every 6 (six) hours.    dulaglutide (TRULICITY) 1.5 mg/0.5 mL PnIj Inject 1.5 mg into the skin every 7 days.    glipiZIDE (GLUCOTROL) 10 MG TR24 Take 1 tablet (10 mg total) by mouth daily with breakfast.    ondansetron (ZOFRAN) 4 MG tablet Take 1 tablet (4 mg total) by mouth every 6 (six) hours as needed for Nausea.    sodium chloride 0.9% (NORMAL SALINE FLUSH) injection Inject 10 mLs into the vein every 6 (six) hours.       Review of patient's allergies indicates:   Allergen Reactions    Latex, natural rubber Rash     Burning sensation      Objective:     Vital Signs (Most Recent):  Temp: 97.9 °F (36.6 °C) (03/31/20 1104)  Pulse: 72 (03/31/20 1104)  Resp: 17 (03/31/20 1104)  BP: 134/61 (03/31/20 1104)  SpO2: 95 % (03/31/20 1104) Vital Signs (24h Range):  Temp:  [97.3 °F (36.3 °C)-98.1 °F (36.7 °C)] 97.9 °F (36.6 °C)  Pulse:  [68-89] 72  Resp:  [16-20] 17  SpO2:  [92 %-100 %] 95 %  BP: (108-167)/(57-83) 134/61     Weight: 116.7 kg (257 lb 4.4  oz)  Body mass index is 47.06 kg/m².    Physical Exam:   Constitutional: She is oriented to person, place, and time. She appears well-developed. No distress.   Morbidly obese    HENT:   Head: Normocephalic and atraumatic.      Cardiovascular: Normal rate and regular rhythm.     Pulmonary/Chest: Effort normal. No respiratory distress. She has no wheezes.        Abdominal: Soft. Bowel sounds are normal. She exhibits abdominal incision (there is a 10 Faroese drain sutured in place exiting the LLQ. Dressing is clean and dry. ). She exhibits no distension (abnormal abdominal contour with hernia palpable through anterior abdomen at and to the right of the midline). There is tenderness (lower abdomen and suprapubic area bilaterally).                 Neurological: She is alert and oriented to person, place, and time.    Skin: Skin is warm and dry.    Psychiatric: Her behavior is normal.     Laboratory:  Recent Labs   Lab 20  0651   WBC 2.82*   RBC 3.54*   HGB 9.0*   HCT 31.1*      MCV 88   MCH 25.4*   MCHC 28.9*        Assessment/Plan:     29 y.o.  with known TOA admittd for worsening pain, now POD#1 s/p IR drain placement.     * Tubo-ovarian abscess  - Culture from OSH - Actinomyces. Followed by ID Dr. Mei  - 7cm x 9cm on CT 3/27 (previously 3cm x 11 cm on 3/10)   - Has been on extended course of IV Unasyn with Home Health  - ID added Doxycycline 3/27 as well    POD#1 s/p IR drain placement   - IR placed 10 Faroese drain yesterday with 62 mL serosanguinous fluid out  - Cultures pending  - Continue Unasyn IV and Doxycycline PO on admission. ID consulted yesterday; awaiting recommendations  - Pain control with scheduled and PRN PO medications. Discontinued IV narcotics today.   - Now on Lovenox prophylaxis due to high risk of DVT    Asthma  - Continue PRN albuterol (home med)    Actinomyces infection  - Positive culture from TOA drainage attempt on  at OSH    Type 2 diabetes mellitus with  hyperglycemia  - SSI for eating patients   - Diabetic diet    GERD (gastroesophageal reflux disease)  - Continue home PPI  - TUMS PRN    Essential hypertension  - Continue home Lisinopril, held this AM 2/2 BP low-normal  - Hydralazine PRN SBP > 180 mmHg    Morbid obesity with BMI of 45.0-49.9, adult  - Encourage ambulation   - Lovenox and SCDs for DVT PPX      VTE Risk Mitigation (From admission, onward)         Ordered     enoxaparin injection 40 mg  Daily      03/30/20 1630     Place sequential compression device  Until discontinued      03/29/20 2213     IP VTE HIGH RISK PATIENT  Once      03/29/20 2213              Ricky Whitney MD  Gynecologic Oncology  [facility]

## 2020-03-31 NOTE — PROGRESS NOTES
Consult received to arrange for home IVABX. Pt. Currently followed by [facility] ([phone]). Contacted [facility] and spoke with Elisha, all information given and orders faxed to the office ([phone]). Pt. Will be seen for services the day following dc. Pt. States that she is unsure if she wants HH as she is planning to possibly go to work as she does not get paid if she does not work. Pt. States that she will speak with MD sal: the above. Will f/u in the am and assist as dc needs identified.

## 2020-03-31 NOTE — SUBJECTIVE & OBJECTIVE
Past Medical History:   Diagnosis Date    Asthma childhood    Diabetes mellitus     GERD (gastroesophageal reflux disease)     Morbid obesity     PCOS (polycystic ovarian syndrome)      Past Surgical History:   Procedure Laterality Date    APPENDECTOMY  2011    HERNIA REPAIR      OOPHORECTOMY      salpingoopherectomy Right 2008     Family History     Problem Relation (Age of Onset)    Diabetes Mother    Heart attack Father, Mother    Hypertension Father        Tobacco Use    Smoking status: Never Smoker    Smokeless tobacco: Never Used   Substance and Sexual Activity    Alcohol use: Not Currently     Comment: occasionally apple cider    Drug use: No    Sexual activity: Yes     Partners: Female       PTA Medications   Medication Sig    doxycycline (VIBRA-TABS) 100 MG tablet Take 1 tablet (100 mg total) by mouth 2 (two) times daily. Take with food for 14 days    lisinopril (PRINIVIL,ZESTRIL) 2.5 MG tablet Take 1 tablet (2.5 mg total) by mouth once daily.    metFORMIN (GLUCOPHAGE) 1000 MG tablet Take 2,000 mg by mouth.    pantoprazole (PROTONIX) 40 MG tablet Take 1 tablet (40 mg total) by mouth before breakfast.    albuterol (VENTOLIN HFA) 90 mcg/actuation inhaler Inhale 2 puffs into the lungs every 6 (six) hours as needed for Wheezing. Rescue    ampicillin sodium/sulbactam Na (AMPICILLIN/SULBACTAM 3 G/100 ML NS, READY TO MIX,) Inject 100 mLs (3 g total) into the vein every 6 (six) hours.    dulaglutide (TRULICITY) 1.5 mg/0.5 mL PnIj Inject 1.5 mg into the skin every 7 days.    glipiZIDE (GLUCOTROL) 10 MG TR24 Take 1 tablet (10 mg total) by mouth daily with breakfast.    ondansetron (ZOFRAN) 4 MG tablet Take 1 tablet (4 mg total) by mouth every 6 (six) hours as needed for Nausea.    sodium chloride 0.9% (NORMAL SALINE FLUSH) injection Inject 10 mLs into the vein every 6 (six) hours.       Review of patient's allergies indicates:   Allergen Reactions    Latex, natural rubber Rash     Burning  sensation      Objective:     Vital Signs (Most Recent):  Temp: 97.9 °F (36.6 °C) (03/31/20 1104)  Pulse: 72 (03/31/20 1104)  Resp: 17 (03/31/20 1104)  BP: 134/61 (03/31/20 1104)  SpO2: 95 % (03/31/20 1104) Vital Signs (24h Range):  Temp:  [97.3 °F (36.3 °C)-98.1 °F (36.7 °C)] 97.9 °F (36.6 °C)  Pulse:  [68-89] 72  Resp:  [16-20] 17  SpO2:  [92 %-100 %] 95 %  BP: (108-167)/(57-83) 134/61     Weight: 116.7 kg (257 lb 4.4 oz)  Body mass index is 47.06 kg/m².    Physical Exam:   Constitutional: She is oriented to person, place, and time. She appears well-developed. No distress.   Morbidly obese    HENT:   Head: Normocephalic and atraumatic.      Cardiovascular: Normal rate and regular rhythm.     Pulmonary/Chest: Effort normal. No respiratory distress. She has no wheezes.        Abdominal: Soft. Bowel sounds are normal. She exhibits abdominal incision (there is a 10 Israeli drain sutured in place exiting the LLQ. Dressing is clean and dry. ). She exhibits no distension (abnormal abdominal contour with hernia palpable through anterior abdomen at and to the right of the midline). There is tenderness (lower abdomen and suprapubic area bilaterally).                 Neurological: She is alert and oriented to person, place, and time.    Skin: Skin is warm and dry.    Psychiatric: Her behavior is normal.     Laboratory:  Recent Labs   Lab 03/31/20  0651   WBC 2.82*   RBC 3.54*   HGB 9.0*   HCT 31.1*      MCV 88   MCH 25.4*   MCHC 28.9*

## 2020-03-31 NOTE — ASSESSMENT & PLAN NOTE
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 Followed by ID Dr. Mei  - 7cm x 9cm on CT 3/27 (previously 3cm x 11 cm on 3/10)   - Has been on extended course of IV Unasyn with Home Health  - ID added Doxycycline 3/27 as well    POD#1 s/p IR drain placement   - IR placed 10 [place] drain yesterday with 62 mL serosanguinous fluid out  - Cultures pending  - Continue Unasyn IV and Doxycycline PO on admission. ID consulted yesterday; awaiting recommendations  - Pain control with scheduled and PRN PO medications. Discontinued IV narcotics today.   - Now on Lovenox prophylaxis due to high risk of DVT

## 2020-03-31 NOTE — PLAN OF CARE
Recommendations    Recommendation:   1. Continue 2000 kcal DM diet, encourage healthy options, provide pt with correct meal orders    -If appetite remains fair, suggest liberalizing diet to aid in intake and healhty wt loss   2. Diet education completed   RD to monitor and follow up     Goals: pt to tolerate >85% of EEN/EPN by RD follow up   Nutrition Goal Status: new  Communication of RD Recs: other (comment)(POC)

## 2020-03-31 NOTE — PLAN OF CARE
No acute events or changes overnight. Patient continues to received multiple analgesics for abdominal pain with minimal relief. Patient continues on IVF and IV antibiotics without issue. Drain placed yesterday with 0 mL output.

## 2020-03-31 NOTE — PLAN OF CARE
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       Future Appointments   Date Time Provider Department Center   6/19/2020  9:40 AM Emiliano Dunbar MD [facility]     I have seen and examined this patient face to face today. My clinical findings that support the need for the home health skilled services and home bound status are the following:  Medical restrictions requiring assistance of another human to leave home due to Wound care needs, Morbid Obesity and IV infusion Needs.    Allergies:  Review of patient's allergies indicates:   Allergen Reactions    Latex, natural rubber Rash     Burning sensation     Diet: diabetic diet: 1800 calorie    Activities: ambulate in house and no driving while on analgesics    Nursing:   SN to complete comprehensive assessment including routine vital signs. Instruct on disease process and s/s of complications to report to MD. Review/verify medication list sent home with the patient at time of discharge   and instruct patient/caregiver as needed. Frequency may be adjusted depending on start of care date. If patient has enteral feeding tube (NG, PEG, J-tube, G-tube), flush tube before and after feeding and/or medication administration with 20-30 mL of water.    Notify MD if SBP > 160 or < 90; DBP > 90 or < 50; HR > 120 or < 50; Temp > 101;   Other: Erythema at drain site, purulence at drain site, drain output 0 mL over a 24 hour period.     CONSULTS:    None    MISCELLANEOUS CARE:  HOME INFUSION THERAPY:   SN to perform Infusion Therapy/Central Line Care.  Review Central Line Care & Central Line Flush with patient.    Administer (drug and dose): Unasyn (Ampicillin-Sulbactam) 3 g every 6 hours  Last dose given: 4/1/2020  Home dose due: 6 hours after discharge  DURATION: 6 weeks from hospital discharge    Scrub the Hub: Prior to accessing the line, perform hand hygiene and always perform a 15-30 second chlor-hexadine scrub  Each lumen of the central line is to be flushed at least daily with 10 mL Normal Saline and 3 mL Heparin flush (100 units/mL)  Skilled Nurse (SN) may draw blood from IV access  Blood Draw Procedure:   - Aspirate at least 10 mL of blood   - Discard   - Obtain specimen   - Change posiflow cap   - Flush with 10 mL Normal Saline followed by a                 3-5 mL Heparin flush (100 units/mL)  Central :   - Sterile dressing changes are done weekly and as needed.   - Use chlor-hexadine scrub to cleanse site, apply Biopatch to insertion site,       apply securement device dressing   - Posi-flow caps are changed weekly and after EVERY lab draw.   - If sterile gauze is under dressing to control oozing,                 dressing change must be performed every 24 hours until gauze is not needed.    WOUND CARE ORDERS  yes:  If drain exists assess drain site for signs and symptoms of infection. Empty drain and record output daily and PRN. Monitor for signs and symptoms of infection. Report as  necessary. For peripheral wounds, wound spray or saline for wound(s) cleaning with all dressing changes. SN to assess wound(s) with each visit.  Instruct/teach patient/caregiver on wound care protocol.  Pelvic fluid collection s/p IR Drain placement    Location:    10 french drain leaving left lower abdomen    Medications: Review discharge medications with patient and family and provide education.      Current Discharge Medication List      CONTINUE these medications which have NOT CHANGED    Details   doxycycline (VIBRA-TABS) 100 MG tablet Take 1 tablet (100 mg total) by mouth 2 (two) times daily. Take with food for 14 days  Qty: 28 tablet, Refills: 0      lisinopril (PRINIVIL,ZESTRIL) 2.5 MG tablet Take 1 tablet (2.5 mg total) by mouth once daily.  Qty: 90 tablet, Refills: 1    Associated Diagnoses: Type 2 diabetes mellitus without complication, without long-term current use of insulin      metFORMIN (GLUCOPHAGE) 1000 MG tablet Take 2,000 mg by mouth.      pantoprazole (PROTONIX) 40 MG tablet Take 1 tablet (40 mg total) by mouth before breakfast.  Qty: 60 tablet, Refills: 2    Associated Diagnoses: GERD with esophagitis      albuterol (VENTOLIN HFA) 90 mcg/actuation inhaler Inhale 2 puffs into the lungs every 6 (six) hours as needed for Wheezing. Rescue  Qty: 18 g, Refills: 0    Associated Diagnoses: Acute bacterial bronchitis      ampicillin sodium/sulbactam Na (AMPICILLIN/SULBACTAM 3 G/100 ML NS, READY TO MIX,) Inject 100 mLs (3 g total) into the vein every 6 (six) hours.      dulaglutide (TRULICITY) 1.5 mg/0.5 mL PnIj Inject 1.5 mg into the skin every 7 days.  Qty: 2 mL, Refills: 3    Associated Diagnoses: Type 2 diabetes mellitus without complication, without long-term current use of insulin      glipiZIDE (GLUCOTROL) 10 MG TR24 Take 1 tablet (10 mg total) by mouth daily with breakfast.  Qty: 30 tablet, Refills: 11    Associated Diagnoses: Type 2 diabetes mellitus with hyperglycemia, without long-term current  use of insulin      ondansetron (ZOFRAN) 4 MG tablet Take 1 tablet (4 mg total) by mouth every 6 (six) hours as needed for Nausea.  Qty: 18 tablet, Refills: 0    Associated Diagnoses: Viral gastroenteritis      sodium chloride 0.9% (NORMAL SALINE FLUSH) injection Inject 10 mLs into the vein every 6 (six) hours.           Patient has been working until this admission, however now with drain in place she will not be returning to work. I certify that this patient is confined to her home and needs intermittent skilled nursing care. .    Ricky Whitney M.D.  Obstetrics & Gynecology  PGY-3

## 2020-03-31 NOTE — CONSULTS
"  [facility] [address]  Adult Nutrition  Consult Note    SUMMARY     Recommendations    Recommendation:   1. Continue 2000 kcal DM diet, encourage healthy options, provide pt with correct meal orders    -If appetite remains fair, suggest liberalizing diet to aid in intake and healhty wt loss   2. Diet education completed   RD to monitor and follow up     Goals: pt to tolerate >85% of EEN/EPN by RD follow up   Nutrition Goal Status: new  Communication of RD Recs: other (comment)(POC)    Reason for Assessment    Reason For Assessment: consult  Diagnosis: (tubo-ovarian abscess)  Relevant Medical History: DM; GERD; PCOS: obesity; asthma  Interdisciplinary Rounds: did not attend  General Information Comments: Spoke with pt over phone. Reported fair intake at this time PO ~50% of meals. States PTA intake decreased for ~2 months due to hospitilazion and stress. Resulted in wt loss of ~25 lbs over past 2 month. UBW ~271 and pt states current wt ~245-250 lbs. Discussed healthy diet and healthy wt loss encouraged with pt. Pt following healthy diet at home and is motivated to continue in hospital. No indications of malnutrition at this time.   Nutrition Discharge Planning: adequate PO intake ; nutrition optimization     Nutrition Risk Screen    Nutrition Risk Screen: no indicators present    Nutrition/Diet History    Spiritual, Cultural Beliefs, Sikh Practices, Values that Affect Care: no  Food Allergies: NKFA  Factors Affecting Nutritional Intake: decreased appetite    Anthropometrics    Temp: 97.9 °F (36.6 °C)  Height Method: Stated  Height: 5' 2" (157.5 cm)  Height (inches): 62 in  Weight Method: Standard Scale  Weight: 116.7 kg (257 lb 4.4 oz)  Weight (lb): 257.28 lb  Ideal Body Weight (IBW), Female: 110 lb  % Ideal Body Weight, Female (lb): 233.89 %  BMI (Calculated): 47  BMI Grade: greater than 40 - morbid obesity       Lab/Procedures/Meds    Pertinent Labs Reviewed: reviewed  Pertinent Labs Comments: " Glucose 167  Pertinent Medications Reviewed: reviewed  Pertinent Medications Comments: enoxaparin; lisinopril; pantoprazole; psyllium husk; senna    Estimated/Assessed Needs    Weight Used For Calorie Calculations: 116.7 kg (257 lb 4.4 oz)  Energy Calorie Requirements (kcal): 1840 kcal  Protein Requirements: 93 g/day   Weight Used For Protein Calculations: 116.7 kg (257 lb 4.4 oz)  Fluid Requirements (mL): 1 mL/kcal or per MD  RDA Method (mL): 1840  CHO Requirement: 230       Nutrition Prescription Ordered    Current Diet Order: 2000 kcal DM diet     Evaluation of Received Nutrient/Fluid Intake    Energy Calories Required: meeting needs  Protein Required: not meeting needs  Fluid Required: meeting needs  Comments: LBM 3/28  Tolerance: tolerating  % Intake of Estimated Energy Needs: 50 - 75 %  % Meal Intake: 50 - 75 %    Nutrition Risk    Level of Risk/Frequency of Follow-up: low     Assessment and Plan    Nutrition Problem  obesity    Related to (etiology):   Excessive oral intake    Signs and Symptoms (as evidenced by):   BMI of 47     Interventions (treatment strategy):  Collaboration of care with other providers  Modified diet- DM  Diet education    Nutrition Diagnosis Status:   New      Monitor and Evaluation    Food and Nutrient Intake: energy intake, food and beverage intake  Food and Nutrient Adminstration: diet order  Anthropometric Measurements: weight, weight change  Biochemical Data, Medical Tests and Procedures: electrolyte and renal panel, lipid profile, gastrointestinal profile, glucose/endocrine profile, inflammatory profile     Nutrition Follow-Up    RD Follow-up?: Yes

## 2020-03-31 NOTE — PLAN OF CARE
Pt AAO; independent. Vital signs stable and pt remained afebrile throughout shift. Receiving  oral pain mdication for pain level of 8 and will start toradol this shift. .  Pt receiving antibiotic Ampicillin IVPB .  JONAH drain addressed by surgery this shift and IR asked to come see pt regarding flushing of drain and min. Amt of drainage coming from drain .   IV  fluids dcd this shift . KVO fluids to PICC line .   Patient has CT of Pelvis with contrast planned for 5 pm this shift. IR order for fistula gram / Sinogram ordered as well.  Accuchecks performed ACHS; and SSI given as ordered.  Pt remained free from falls during shift and independent of care .  Bed lowest position and locked.  Call light in reach.  TM

## 2020-03-31 NOTE — CONSULTS
[facility]  Infectious Disease  Consult Note    Patient Name: Glenda Peña  MRN: [id number]  Admission Date: 3/29/2020  Hospital Length of Stay: 2 days  Attending Physician: Ritchie Millan MD  Primary Care Provider: Emiliano Dunbar MD     Isolation Status: No active isolations    Patient information was obtained from patient and recrods.      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 in past (2/8) on prior aspiration  - now with enlarging complex fluid collection in same location - suspec this is due to lack of source control and surgical intervention is recommended from ID [facility] (communicated to primary team) - sx reportedly deferred at this time due to need for extensive surgery and COVID 19 situation - lack of drainage places patient at risk of sepsis so possible patient could decompensate in the interim  - s/p IR aspiration with drain placement and no growth on Cx  - on unasyn and doxy  - order placed to have fluid sent for cytology  - stable non septic  - PICC in place  - will sign off if being DC'd  - need repeat CT abx in 2-3 weeks (ordered) and is to stay on unasyn and doxycyline until abscess resolved whether through surgery or on imaging    Discharge antibiotics:   1. Unasyn 3g IV q 6 hours - rec 12g IV continuous infusion over 24 through infusion company at [place]  2. Doxycycline 100mg po bid      End date of IV antibiotics: tenative 6 weeks at this point but will need to continue until abscess resolved    Weekly outpatient laboratory on Monday or Tuesday while on IV antibiotics.    CBC   CMP or BMP   ESR and CRP      If vancomycin trough is not at target (15-20) prior to discharge, the please perform vancomycin trough before their fourth outpatient dose.    Fax laboratory results to [facility] at [phone] with attn: Shantel Mei MD     Outpatient Infectious Diseases clinic follow up will be  "arranged and found in patient calendar.    Prior to discharge, please ensure the patient's follow-up has been scheduled.  If there is still no follow-up scheduled in Infectious Diseases clinic, please send an EPIC message to Nikkie Faust MA in Infectious Diseases.                  Thank you for your consult. I will sign off. Please contact us if you have any additional questions.    ARLETH Ortiz  Infectious Disease  [facility]    Subjective:     Principal Problem: Tubo-ovarian abscess    HPI: Glenda Peña is a 29 y.o.  with PMH significant for morbid obesity, T2DM, HTN, and large complex midline and R-sided ventral who presented to [facility] ED with CC of worsening pelvic pain in the setting of known TOA. Pain on presentation rated 10/10. Reported a few episodes of nausea,  diarrhea and dizziness  but denied fevers, chills, body aches, cough, chest pain, shortness of breath, urinary symptoms.      The patient was initially admitted to [facility], where she was admitted for IR drainage of TOA and IV antibiotics. IR drain placement was not successful in resolving the TOA, but cultures grew actinomyces. She was discharged after improvement in pain. She then presented to [facility] with pelvic pain on 3/6/2020. CT A/P on 3/10 showed a 3.5 x 11 cm fluid collection anterior the uterus.   She was given IV antibiotics and IR drainage was attempted again, unsuccessfully. She was discharged with Unasyn and followed by ID. On 3/27 patient reported worsening pain, and CT AP showed heterogeneous, complex fluid collection with multiple loculations and interval increase in size, now approximately 7 x 9 cm transversely and 9 cm craniocaudal. Doxycycline was added by ID and per ID note, patient was to follow up with IR on an outpatient basis for attempted drainage.      Surgical history significant for:   : Per patient, "four pound ovary tumor" removed via " Pfannenstiel with T'ed midline vertical incision from the umbilicus to pubic symphysis.   2011: Open appendectomy  2016: Repair of incisional hernia     03/29/2020 - In the ED, evaluation revealed no suspicion for incarceration of known ventral hernia. ED physician has low suspicion for COVID-19 given patient is afebrile and has no respiratory symptoms. patient's VS were WNL and stable. She was afebrile and nontoxic-appearing. Pain was mostly L-sided and midline pelvic pain, rated 10/10. Improved with IV narcotics. CBC showed leukopenia with WBC 3. UPT negative and UA WNL but for RBCs. CMP shows mild transaminitis not seen on prior labs. Plan was admission for pain control, continue IV antibiotics, and IR drain placement and aspiration.     Interval Hx:   Patient went to IR and drain placed in large fluid collection and 2 other fluid collections were aspirated.  Cultures sent and gram stain of moderate WBC, no organism with cultures NGTD.  She reports drain with minimal output.  Per prior notes no surgical intervention will be pursued at this admission given need for extensive sx and COVID19 complications.  She complains of LLQ abd pain but The patient denies any recent fever, chills, or sweats.  No fever or leukocytosis since admit.      Past Medical History:   Diagnosis Date    Asthma childhood    Diabetes mellitus     GERD (gastroesophageal reflux disease)     Morbid obesity     PCOS (polycystic ovarian syndrome)        Past Surgical History:   Procedure Laterality Date    APPENDECTOMY  2011    HERNIA REPAIR      OOPHORECTOMY      salpingoopherectomy Right 2008       Review of patient's allergies indicates:   Allergen Reactions    Latex, natural rubber Rash     Burning sensation       Medications:  Medications Prior to Admission   Medication Sig    doxycycline (VIBRA-TABS) 100 MG tablet Take 1 tablet (100 mg total) by mouth 2 (two) times daily. Take with food for 14 days    lisinopril  (PRINIVIL,ZESTRIL) 2.5 MG tablet Take 1 tablet (2.5 mg total) by mouth once daily.    metFORMIN (GLUCOPHAGE) 1000 MG tablet Take 2,000 mg by mouth.    pantoprazole (PROTONIX) 40 MG tablet Take 1 tablet (40 mg total) by mouth before breakfast.    albuterol (VENTOLIN HFA) 90 mcg/actuation inhaler Inhale 2 puffs into the lungs every 6 (six) hours as needed for Wheezing. Rescue    ampicillin sodium/sulbactam Na (AMPICILLIN/SULBACTAM 3 G/100 ML NS, READY TO MIX,) Inject 100 mLs (3 g total) into the vein every 6 (six) hours.    dulaglutide (TRULICITY) 1.5 mg/0.5 mL PnIj Inject 1.5 mg into the skin every 7 days.    glipiZIDE (GLUCOTROL) 10 MG TR24 Take 1 tablet (10 mg total) by mouth daily with breakfast.    ondansetron (ZOFRAN) 4 MG tablet Take 1 tablet (4 mg total) by mouth every 6 (six) hours as needed for Nausea.    sodium chloride 0.9% (NORMAL SALINE FLUSH) injection Inject 10 mLs into the vein every 6 (six) hours.     Antibiotics (From admission, onward)    Start     Stop Route Frequency Ordered    03/30/20 1715  ampicillin-sulbactam 3 g in sodium chloride 0.9 % 100 mL IVPB (ready to mix system)      -- IV Every 6 hours (non-standard times) 03/30/20 1603    03/29/20 2315  doxycycline tablet 100 mg      -- Oral 2 times daily 03/29/20 2213        Antifungals (From admission, onward)    None        Antivirals (From admission, onward)    None           Immunization History   Administered Date(s) Administered    DTaP 1989, 04/16/1990, 07/08/1993, 04/15/1994    HIB 07/08/1993    Hepatitis B, Pediatric/Adolescent 09/23/2002, 02/02/2004    IPV 1989, 04/15/1990, 04/15/1994    Influenza 11/24/2019    Influenza - Quadrivalent - PF (6 months and older) 01/17/2017    MMR 07/08/1993, 04/15/1994    Td (ADULT) 09/23/2002    Td (Adult), Unspecified Formulation 09/23/2002       Family History     Problem Relation (Age of Onset)    Diabetes Mother    Heart attack Father, Mother    Hypertension Father         Social History     Socioeconomic History    Marital status:      Spouse name: Not on file    Number of children: Not on file    Years of education: Not on file    Highest education level: Not on file   Occupational History    Not on file   Social Needs    Financial resource strain: Not on file    Food insecurity:     Worry: Not on file     Inability: Not on file    Transportation needs:     Medical: Not on file     Non-medical: Not on file   Tobacco Use    Smoking status: Never Smoker    Smokeless tobacco: Never Used   Substance and Sexual Activity    Alcohol use: Not Currently     Comment: occasionally apple cider    Drug use: No    Sexual activity: Yes     Partners: Female   Lifestyle    Physical activity:     Days per week: Not on file     Minutes per session: Not on file    Stress: Not on file   Relationships    Social connections:     Talks on phone: Not on file     Gets together: Not on file     Attends Restorationism service: Not on file     Active member of club or organization: Not on file     Attends meetings of clubs or organizations: Not on file     Relationship status: Not on file   Other Topics Concern    Not on file   Social History Narrative    Not on file     Review of Systems   Constitutional: Negative for appetite change, chills, diaphoresis, fatigue, fever and unexpected weight change.   HENT: Negative for congestion, ear pain, hearing loss, sore throat and tinnitus.    Eyes: Negative for pain, redness and visual disturbance.   Respiratory: Negative for cough, chest tightness, shortness of breath and wheezing.    Cardiovascular: Negative for chest pain.   Gastrointestinal: Positive for abdominal distention and abdominal pain (LLQ). Negative for constipation, diarrhea, nausea and vomiting.        Midline and R hernia with mesh     Endocrine: Negative for cold intolerance and heat intolerance.   Genitourinary: Negative for decreased urine volume, difficulty urinating,  dysuria, flank pain, frequency, hematuria and urgency.   Musculoskeletal: Negative for arthralgias, back pain, myalgias and neck pain.   Skin: Negative for rash and wound.   Allergic/Immunologic: Negative for environmental allergies, food allergies and immunocompromised state.   Neurological: Negative for dizziness, facial asymmetry, weakness, light-headedness, numbness and headaches.   Hematological: Negative for adenopathy. Does not bruise/bleed easily.   Psychiatric/Behavioral: Negative for agitation, behavioral problems and confusion.     Objective:     Vital Signs (Most Recent):  Temp: 97.9 °F (36.6 °C) (03/31/20 1104)  Pulse: 72 (03/31/20 1104)  Resp: 17 (03/31/20 1104)  BP: 134/61 (03/31/20 1104)  SpO2: 95 % (03/31/20 1104) Vital Signs (24h Range):  Temp:  [97.7 °F (36.5 °C)-98.1 °F (36.7 °C)] 97.9 °F (36.6 °C)  Pulse:  [72-88] 72  Resp:  [17-19] 17  SpO2:  [92 %-97 %] 95 %  BP: (108-137)/(57-76) 134/61     Weight: 116.7 kg (257 lb 4.4 oz)  Body mass index is 47.06 kg/m².    Estimated Creatinine Clearance: 142.3 mL/min (based on SCr of 0.7 mg/dL).    Physical Exam   Constitutional: She is oriented to person, place, and time. She appears well-developed and well-nourished. No distress.   Morbid obesity     HENT:   Head: Normocephalic and atraumatic.   Cardiovascular: Normal rate, regular rhythm and normal heart sounds. Exam reveals no gallop and no friction rub.   No murmur heard.  Pulmonary/Chest: Effort normal and breath sounds normal. No stridor. No respiratory distress. She has no wheezes. She has no rales.   Abdominal: Soft. Bowel sounds are normal. She exhibits no distension and no mass. There is no tenderness. There is no rebound and no guarding.       Musculoskeletal: She exhibits no edema.   Neurological: She is alert and oriented to person, place, and time.   Skin: Skin is warm and dry. She is not diaphoretic.   Psychiatric: She has a normal mood and affect. Her behavior is normal.       Significant  Labs:   Blood Culture: No results for input(s): LABBLOO in the last 4320 hours.  CBC:   Recent Labs   Lab 03/29/20  2105 03/31/20  0651   WBC 3.46* 2.82*   HGB 9.9* 9.0*   HCT 33.2* 31.1*    154     CMP:   Recent Labs   Lab 03/29/20  2105      K 3.8      CO2 22*   *   BUN 7   CREATININE 0.7   CALCIUM 9.0   PROT 8.6*   ALBUMIN 3.4*   BILITOT 0.2   ALKPHOS 71   AST 52*   ALT 54*   ANIONGAP 12   EGFRNONAA >60.0     Wound Culture:   Recent Labs   Lab 03/06/20  1730 03/30/20  1448   LABAERO No growth No growth     All pertinent labs within the past 24 hours have been reviewed.    Significant Imaging: I have reviewed all pertinent imaging results/findings within the past 24 hours.   IR Ascess Drainage With Tube Placement [[id number]] Resulted: 03/30/20 1642   Order Status: Completed Updated: 03/30/20 1645   Narrative:     EXAMINATION:  Drainage catheter placement    Procedural Personnel    Attending physician(s): Brent Mcmillan MD    Fellow physician(s): None    Resident physician(s): None    Advanced practice provider(s): None    Pre-procedure diagnosis:   Tubo-ovarian abscess    Post-procedure diagnosis: Cystic ovarian lesion    Indication: Pain associated with fluid collection    Additional clinical history: None    Complications: No immediate complications.    PROCEDURAL SUMMARY:  - Visceral drainage catheter placement under CT guidance    PROCEDURE:  Pre-procedure:    Consent: Informed consent for the procedure was obtained and time-out was performed prior to the procedure.    Preparation: The site was prepared and draped using maximal sterile barrier technique including cutaneous antisepsis.    Antibiotic administered: Prophylactic dose within 1 hour of procedure start time or 2 hours for vancomycin or fluoroquinolones.    Anesthesia/sedation:    Level of anesthesia/sedation: Moderate sedation (conscious sedation)    Anesthesia/sedation administered by: Independent trained observer under  attending supervision with continuous monitoring of the patient's level of consciousness and physiologic status    Total intra-service sedation time (minutes): 35    Drainage catheter placement:    The patient was positioned supine. Initial imaging was performed. Local anesthesia was administered. The fluid collection was accessed using an access needle followed by wire insertion and serial dilation and a drainage catheter was placed. Position of the drainage catheter within the fluid collection was confirmed.  Given multi loculation, additionally 2 other loculations were drained using a 19 gauge needle and a total of approximately 60 cc of serous fluid were obtained.    - Initial imaging findings: Multiloculated cystic collection in the right pelvis    - Drainage catheter placed: All-purpose drainage catheter    - External catheter securement: Non-absorbable suture and adhesive anchoring device    - Post-drainage imaging findings: Partial drainage of the fluid collection    Contrast:    Contrast agent: None    Contrast volume (mL): 0    Radiation Dose:    CT dose length product ( mGy-cm): 1859.37    Fluoroscopy time ( minutes ):    Reference air kerma ( mGy ):    Kerma area product ( cGy-m2 ):    Additional Details:    Additional description of procedure: None    Equipment details: None    Specimens removed: Aspirated fluid was sent for analysis.    Estimated blood loss (mL): Less than 10    Standardized report: SIR_DrainPlacement_[email or web address]   Impression:       Percutaneous placement of a 10 Polish drainage catheter into left ovary, yielding 2 mL of bloody fluid.    Aspiration of 2 other loculations within the left ovary yielding a total of 60 cc serous fluid as detailed above.  Fluid was sent for cultures and cytology.    Plan:    Monitor drain output and follow-up cultures.  If output decreases to less than 10 cc in 24 hour, drain removal could be considered.    Attestation:    Signer name: MD CHAYO Dalal attest  that I was present for the entire procedure. I reviewed the stored images and agree with the report as written.      Electronically signed by: Brent Mcmillan MD  Date: 03/30/2020  Time: 16:42   Imaging History     2020  Date Procedure Name PACS Link Status Accession Number Location   03/30/20 03:19 PM IR Ascess Drainage With Tube Placement  Images Final [id number] [id number]   03/27/20 07:25 AM CT Abdomen Pelvis With Contrast  Images Final [id number] [facility]   03/11/20 02:17 PM X-Ray Chest 1 View  Images Final [id number] [facility]   03/11/20 02:08 PM X-Ray Chest 1 View for PICC_Central line  Images Final [id number] [facility]   03/10/20 09:16 AM CT Abdomen Pelvis With Contrast  Images Final [id number] [facility]   03/06/20 06:00 PM CT Guided Needle Placement  Images Final [id number] [facility]   03/06/20 05:59 PM IR Abscess Aspiration  Images Final [id number] [facility]   03/05/20 11:58 PM CT Abdomen Pelvis With Contrast  Images Final [id number] [facility]

## 2020-04-01 ENCOUNTER — TELEPHONE (OUTPATIENT)
Dept: SURGERY | Facility: CLINIC | Age: 30
End: 2020-04-01

## 2020-04-01 ENCOUNTER — HOSPITAL ENCOUNTER (INPATIENT)
Facility: HOSPITAL | Age: 30
LOS: 4 days | Discharge: HOME HEALTH | DRG: 177 | End: 2020-04-05
Attending: EMERGENCY MEDICINE | Admitting: OBSTETRICS & GYNECOLOGY
Payer: COMMERCIAL

## 2020-04-01 ENCOUNTER — PATIENT MESSAGE (OUTPATIENT)
Dept: FAMILY MEDICINE | Facility: CLINIC | Age: 30
End: 2020-04-01

## 2020-04-01 ENCOUNTER — PATIENT MESSAGE (OUTPATIENT)
Dept: SURGERY | Facility: CLINIC | Age: 30
End: 2020-04-01

## 2020-04-01 VITALS
HEART RATE: 106 BPM | OXYGEN SATURATION: 95 % | RESPIRATION RATE: 18 BRPM | TEMPERATURE: 99 F | WEIGHT: 257.25 LBS | HEIGHT: 62 IN | DIASTOLIC BLOOD PRESSURE: 70 MMHG | SYSTOLIC BLOOD PRESSURE: 124 MMHG | BODY MASS INDEX: 47.34 KG/M2

## 2020-04-01 DIAGNOSIS — N70.93 TOA (TUBO-OVARIAN ABSCESS): Primary | ICD-10-CM

## 2020-04-01 DIAGNOSIS — J12.82 PNEUMONIA DUE TO COVID-19 VIRUS: ICD-10-CM

## 2020-04-01 DIAGNOSIS — J20.8 ACUTE BACTERIAL BRONCHITIS: ICD-10-CM

## 2020-04-01 DIAGNOSIS — B96.89 ACUTE BACTERIAL BRONCHITIS: ICD-10-CM

## 2020-04-01 DIAGNOSIS — R50.9 FEBRILE: ICD-10-CM

## 2020-04-01 DIAGNOSIS — U07.1 PNEUMONIA DUE TO COVID-19 VIRUS: ICD-10-CM

## 2020-04-01 DIAGNOSIS — N70.93 TUBO-OVARIAN ABSCESS: ICD-10-CM

## 2020-04-01 PROBLEM — R93.89 ABNORMAL CXR: Status: ACTIVE | Noted: 2020-04-01

## 2020-04-01 PROBLEM — B37.31 CANDIDAL VULVITIS: Status: RESOLVED | Noted: 2018-06-21 | Resolved: 2020-04-01

## 2020-04-01 PROBLEM — D72.819 LEUKOPENIA: Status: ACTIVE | Noted: 2020-04-01

## 2020-04-01 LAB
ALBUMIN SERPL BCP-MCNC: 3.1 G/DL (ref 3.5–5.2)
ALBUMIN SERPL BCP-MCNC: 3.2 G/DL (ref 3.5–5.2)
ALP SERPL-CCNC: 64 U/L (ref 55–135)
ALP SERPL-CCNC: 71 U/L (ref 55–135)
ALT SERPL W/O P-5'-P-CCNC: 41 U/L (ref 10–44)
ALT SERPL W/O P-5'-P-CCNC: 48 U/L (ref 10–44)
ANION GAP SERPL CALC-SCNC: 11 MMOL/L (ref 8–16)
ANION GAP SERPL CALC-SCNC: 7 MMOL/L (ref 8–16)
AST SERPL-CCNC: 32 U/L (ref 10–40)
AST SERPL-CCNC: 46 U/L (ref 10–40)
BASOPHILS # BLD AUTO: 0.01 K/UL (ref 0–0.2)
BASOPHILS NFR BLD: 0.3 % (ref 0–1.9)
BILIRUB SERPL-MCNC: 0.2 MG/DL (ref 0.1–1)
BILIRUB SERPL-MCNC: 0.3 MG/DL (ref 0.1–1)
BUN SERPL-MCNC: 6 MG/DL (ref 6–20)
BUN SERPL-MCNC: 8 MG/DL (ref 6–20)
CALCIUM SERPL-MCNC: 8.3 MG/DL (ref 8.7–10.5)
CALCIUM SERPL-MCNC: 8.7 MG/DL (ref 8.7–10.5)
CHLORIDE SERPL-SCNC: 103 MMOL/L (ref 95–110)
CHLORIDE SERPL-SCNC: 104 MMOL/L (ref 95–110)
CO2 SERPL-SCNC: 21 MMOL/L (ref 23–29)
CO2 SERPL-SCNC: 24 MMOL/L (ref 23–29)
CREAT SERPL-MCNC: 0.7 MG/DL (ref 0.5–1.4)
CREAT SERPL-MCNC: 0.8 MG/DL (ref 0.5–1.4)
DIFFERENTIAL METHOD: ABNORMAL
EOSINOPHIL # BLD AUTO: 0 K/UL (ref 0–0.5)
EOSINOPHIL NFR BLD: 0.3 % (ref 0–8)
ERYTHROCYTE [DISTWIDTH] IN BLOOD BY AUTOMATED COUNT: 15.9 % (ref 11.5–14.5)
EST. GFR  (AFRICAN AMERICAN): >60 ML/MIN/1.73 M^2
EST. GFR  (AFRICAN AMERICAN): >60 ML/MIN/1.73 M^2
EST. GFR  (NON AFRICAN AMERICAN): >60 ML/MIN/1.73 M^2
EST. GFR  (NON AFRICAN AMERICAN): >60 ML/MIN/1.73 M^2
FINAL PATHOLOGIC DIAGNOSIS: NORMAL
GLUCOSE SERPL-MCNC: 127 MG/DL (ref 70–110)
GLUCOSE SERPL-MCNC: 174 MG/DL (ref 70–110)
HCT VFR BLD AUTO: 31.5 % (ref 37–48.5)
HGB BLD-MCNC: 9.3 G/DL (ref 12–16)
IMM GRANULOCYTES # BLD AUTO: 0.01 K/UL (ref 0–0.04)
IMM GRANULOCYTES NFR BLD AUTO: 0.3 % (ref 0–0.5)
LACTATE SERPL-SCNC: 1.8 MMOL/L (ref 0.5–2.2)
LYMPHOCYTES # BLD AUTO: 0.9 K/UL (ref 1–4.8)
LYMPHOCYTES NFR BLD: 28 % (ref 18–48)
MCH RBC QN AUTO: 26.1 PG (ref 27–31)
MCHC RBC AUTO-ENTMCNC: 29.5 G/DL (ref 32–36)
MCV RBC AUTO: 89 FL (ref 82–98)
MONOCYTES # BLD AUTO: 0.2 K/UL (ref 0.3–1)
MONOCYTES NFR BLD: 6.5 % (ref 4–15)
NEUTROPHILS # BLD AUTO: 2.2 K/UL (ref 1.8–7.7)
NEUTROPHILS NFR BLD: 64.6 % (ref 38–73)
NRBC BLD-RTO: 0 /100 WBC
PLATELET # BLD AUTO: 142 K/UL (ref 150–350)
PMV BLD AUTO: 10.2 FL (ref 9.2–12.9)
POCT GLUCOSE: 109 MG/DL (ref 70–110)
POCT GLUCOSE: 158 MG/DL (ref 70–110)
POCT GLUCOSE: 85 MG/DL (ref 70–110)
POCT GLUCOSE: 93 MG/DL (ref 70–110)
POTASSIUM SERPL-SCNC: 3.5 MMOL/L (ref 3.5–5.1)
POTASSIUM SERPL-SCNC: 3.8 MMOL/L (ref 3.5–5.1)
PROT SERPL-MCNC: 7.7 G/DL (ref 6–8.4)
PROT SERPL-MCNC: 7.9 G/DL (ref 6–8.4)
RBC # BLD AUTO: 3.56 M/UL (ref 4–5.4)
SODIUM SERPL-SCNC: 135 MMOL/L (ref 136–145)
SODIUM SERPL-SCNC: 135 MMOL/L (ref 136–145)
WBC # BLD AUTO: 3.36 K/UL (ref 3.9–12.7)

## 2020-04-01 PROCEDURE — 99285 EMERGENCY DEPT VISIT HI MDM: CPT | Mod: 25

## 2020-04-01 PROCEDURE — 25000003 PHARM REV CODE 250: Performed by: OBSTETRICS & GYNECOLOGY

## 2020-04-01 PROCEDURE — 87040 BLOOD CULTURE FOR BACTERIA: CPT | Mod: 59

## 2020-04-01 PROCEDURE — 83605 ASSAY OF LACTIC ACID: CPT

## 2020-04-01 PROCEDURE — 99239 HOSP IP/OBS DSCHRG MGMT >30: CPT | Mod: ,,, | Performed by: OBSTETRICS & GYNECOLOGY

## 2020-04-01 PROCEDURE — 63600175 PHARM REV CODE 636 W HCPCS: Performed by: EMERGENCY MEDICINE

## 2020-04-01 PROCEDURE — 85025 COMPLETE CBC W/AUTO DIFF WBC: CPT

## 2020-04-01 PROCEDURE — 80053 COMPREHEN METABOLIC PANEL: CPT | Mod: 91

## 2020-04-01 PROCEDURE — 63600175 PHARM REV CODE 636 W HCPCS: Performed by: PHYSICIAN ASSISTANT

## 2020-04-01 PROCEDURE — 99285 PR EMERGENCY DEPT VISIT,LEVEL V: ICD-10-PCS | Mod: ,,, | Performed by: EMERGENCY MEDICINE

## 2020-04-01 PROCEDURE — 81025 URINE PREGNANCY TEST: CPT | Performed by: STUDENT IN AN ORGANIZED HEALTH CARE EDUCATION/TRAINING PROGRAM

## 2020-04-01 PROCEDURE — 99239 PR HOSPITAL DISCHARGE DAY,>30 MIN: ICD-10-PCS | Mod: ,,, | Performed by: OBSTETRICS & GYNECOLOGY

## 2020-04-01 PROCEDURE — 63600175 PHARM REV CODE 636 W HCPCS: Performed by: STUDENT IN AN ORGANIZED HEALTH CARE EDUCATION/TRAINING PROGRAM

## 2020-04-01 PROCEDURE — 96366 THER/PROPH/DIAG IV INF ADDON: CPT

## 2020-04-01 PROCEDURE — 80053 COMPREHEN METABOLIC PANEL: CPT

## 2020-04-01 PROCEDURE — 96375 TX/PRO/DX INJ NEW DRUG ADDON: CPT

## 2020-04-01 PROCEDURE — 25000003 PHARM REV CODE 250: Performed by: STUDENT IN AN ORGANIZED HEALTH CARE EDUCATION/TRAINING PROGRAM

## 2020-04-01 PROCEDURE — U0002 COVID-19 LAB TEST NON-CDC: HCPCS

## 2020-04-01 PROCEDURE — 99285 EMERGENCY DEPT VISIT HI MDM: CPT | Mod: ,,, | Performed by: EMERGENCY MEDICINE

## 2020-04-01 PROCEDURE — 12000002 HC ACUTE/MED SURGE SEMI-PRIVATE ROOM

## 2020-04-01 PROCEDURE — 96372 THER/PROPH/DIAG INJ SC/IM: CPT

## 2020-04-01 PROCEDURE — 63600175 PHARM REV CODE 636 W HCPCS

## 2020-04-01 PROCEDURE — 96365 THER/PROPH/DIAG IV INF INIT: CPT

## 2020-04-01 RX ORDER — CEFEPIME HYDROCHLORIDE 2 G/1
2 INJECTION, POWDER, FOR SOLUTION INTRAVENOUS
Status: COMPLETED | OUTPATIENT
Start: 2020-04-01 | End: 2020-04-01

## 2020-04-01 RX ORDER — OXYCODONE HYDROCHLORIDE 5 MG/1
5 TABLET ORAL EVERY 4 HOURS PRN
Status: DISCONTINUED | OUTPATIENT
Start: 2020-04-01 | End: 2020-04-05 | Stop reason: HOSPADM

## 2020-04-01 RX ORDER — DOXYCYCLINE HYCLATE 100 MG
100 TABLET ORAL 2 TIMES DAILY
Qty: 84 TABLET | Refills: 0 | Status: SHIPPED | OUTPATIENT
Start: 2020-04-01 | End: 2020-05-13

## 2020-04-01 RX ORDER — ONDANSETRON 2 MG/ML
4 INJECTION INTRAMUSCULAR; INTRAVENOUS
Status: COMPLETED | OUTPATIENT
Start: 2020-04-01 | End: 2020-04-01

## 2020-04-01 RX ORDER — GLUCAGON 1 MG
1 KIT INJECTION
Status: DISCONTINUED | OUTPATIENT
Start: 2020-04-02 | End: 2020-04-05 | Stop reason: HOSPADM

## 2020-04-01 RX ORDER — SODIUM CHLORIDE 0.9 % (FLUSH) 0.9 %
10 SYRINGE (ML) INJECTION
Status: DISCONTINUED | OUTPATIENT
Start: 2020-04-02 | End: 2020-04-05 | Stop reason: HOSPADM

## 2020-04-01 RX ORDER — OXYCODONE HYDROCHLORIDE 10 MG/1
10 TABLET ORAL EVERY 4 HOURS PRN
Status: DISCONTINUED | OUTPATIENT
Start: 2020-04-01 | End: 2020-04-05 | Stop reason: HOSPADM

## 2020-04-01 RX ORDER — LISINOPRIL 2.5 MG/1
2.5 TABLET ORAL DAILY
Status: DISCONTINUED | OUTPATIENT
Start: 2020-04-02 | End: 2020-04-05 | Stop reason: HOSPADM

## 2020-04-01 RX ORDER — ONDANSETRON 2 MG/ML
8 INJECTION INTRAMUSCULAR; INTRAVENOUS
Status: DISCONTINUED | OUTPATIENT
Start: 2020-04-01 | End: 2020-04-01

## 2020-04-01 RX ORDER — SODIUM CHLORIDE, SODIUM LACTATE, POTASSIUM CHLORIDE, CALCIUM CHLORIDE 600; 310; 30; 20 MG/100ML; MG/100ML; MG/100ML; MG/100ML
INJECTION, SOLUTION INTRAVENOUS CONTINUOUS
Status: DISCONTINUED | OUTPATIENT
Start: 2020-04-02 | End: 2020-04-03

## 2020-04-01 RX ORDER — SENNOSIDES 8.6 MG/1
8.6 TABLET ORAL 2 TIMES DAILY
Status: DISCONTINUED | OUTPATIENT
Start: 2020-04-02 | End: 2020-04-05 | Stop reason: HOSPADM

## 2020-04-01 RX ORDER — INSULIN ASPART 100 [IU]/ML
0-5 INJECTION, SOLUTION INTRAVENOUS; SUBCUTANEOUS
Status: DISCONTINUED | OUTPATIENT
Start: 2020-04-02 | End: 2020-04-02

## 2020-04-01 RX ORDER — PANTOPRAZOLE SODIUM 40 MG/1
40 TABLET, DELAYED RELEASE ORAL
Status: DISCONTINUED | OUTPATIENT
Start: 2020-04-02 | End: 2020-04-03

## 2020-04-01 RX ORDER — IBUPROFEN 600 MG/1
600 TABLET ORAL EVERY 6 HOURS PRN
Qty: 120 TABLET | Refills: 11 | Status: ON HOLD | OUTPATIENT
Start: 2020-04-01 | End: 2020-04-05 | Stop reason: HOSPADM

## 2020-04-01 RX ORDER — VANCOMYCIN 2 GRAM/500 ML IN 0.9 % SODIUM CHLORIDE INTRAVENOUS
2000
Status: DISCONTINUED | OUTPATIENT
Start: 2020-04-02 | End: 2020-04-02

## 2020-04-01 RX ORDER — ACETAMINOPHEN 325 MG/1
650 TABLET ORAL EVERY 6 HOURS
Status: DISCONTINUED | OUTPATIENT
Start: 2020-04-02 | End: 2020-04-03

## 2020-04-01 RX ORDER — VANCOMYCIN 2 GRAM/500 ML IN 0.9 % SODIUM CHLORIDE INTRAVENOUS
2000
Status: COMPLETED | OUTPATIENT
Start: 2020-04-01 | End: 2020-04-02

## 2020-04-01 RX ORDER — ONDANSETRON 8 MG/1
8 TABLET, ORALLY DISINTEGRATING ORAL EVERY 8 HOURS PRN
Status: DISCONTINUED | OUTPATIENT
Start: 2020-04-01 | End: 2020-04-05 | Stop reason: HOSPADM

## 2020-04-01 RX ORDER — ONDANSETRON 2 MG/ML
INJECTION INTRAMUSCULAR; INTRAVENOUS
Status: COMPLETED
Start: 2020-04-01 | End: 2020-04-01

## 2020-04-01 RX ORDER — ACETAMINOPHEN 325 MG/1
650 TABLET ORAL EVERY 6 HOURS
Qty: 180 TABLET | Refills: 0 | Status: SHIPPED | OUTPATIENT
Start: 2020-04-01 | End: 2022-12-22

## 2020-04-01 RX ORDER — CALCIUM CARBONATE 200(500)MG
500 TABLET,CHEWABLE ORAL 2 TIMES DAILY PRN
Status: DISCONTINUED | OUTPATIENT
Start: 2020-04-01 | End: 2020-04-05 | Stop reason: HOSPADM

## 2020-04-01 RX ORDER — SENNOSIDES 8.6 MG/1
1 TABLET ORAL 2 TIMES DAILY
COMMUNITY
Start: 2020-04-01 | End: 2020-06-23 | Stop reason: CLARIF

## 2020-04-01 RX ORDER — POLYETHYLENE GLYCOL 3350 17 G/17G
17 POWDER, FOR SOLUTION ORAL 2 TIMES DAILY PRN
Status: DISCONTINUED | OUTPATIENT
Start: 2020-04-01 | End: 2020-04-05 | Stop reason: HOSPADM

## 2020-04-01 RX ORDER — ENOXAPARIN SODIUM 100 MG/ML
40 INJECTION SUBCUTANEOUS EVERY 24 HOURS
Status: DISCONTINUED | OUTPATIENT
Start: 2020-04-02 | End: 2020-04-05 | Stop reason: HOSPADM

## 2020-04-01 RX ORDER — POLYETHYLENE GLYCOL 3350 17 G/17G
17 POWDER, FOR SOLUTION ORAL 2 TIMES DAILY PRN
Qty: 60 PACKET | Refills: 0 | Status: SHIPPED | OUTPATIENT
Start: 2020-04-01 | End: 2020-06-23 | Stop reason: CLARIF

## 2020-04-01 RX ORDER — HYDROMORPHONE HYDROCHLORIDE 1 MG/ML
0.5 INJECTION, SOLUTION INTRAMUSCULAR; INTRAVENOUS; SUBCUTANEOUS
Status: COMPLETED | OUTPATIENT
Start: 2020-04-01 | End: 2020-04-01

## 2020-04-01 RX ORDER — CEFEPIME HYDROCHLORIDE 2 G/1
2 INJECTION, POWDER, FOR SOLUTION INTRAVENOUS
Status: DISCONTINUED | OUTPATIENT
Start: 2020-04-02 | End: 2020-04-03

## 2020-04-01 RX ORDER — PROMETHAZINE HYDROCHLORIDE 25 MG/1
25 TABLET ORAL EVERY 4 HOURS
Qty: 30 TABLET | Refills: 1 | Status: SHIPPED | OUTPATIENT
Start: 2020-04-01 | End: 2020-06-23 | Stop reason: CLARIF

## 2020-04-01 RX ORDER — ALBUTEROL SULFATE 90 UG/1
2 AEROSOL, METERED RESPIRATORY (INHALATION) EVERY 6 HOURS PRN
Status: DISCONTINUED | OUTPATIENT
Start: 2020-04-01 | End: 2020-04-02

## 2020-04-01 RX ORDER — OXYCODONE HYDROCHLORIDE 5 MG/1
5 TABLET ORAL EVERY 4 HOURS PRN
Qty: 30 TABLET | Refills: 0 | Status: SHIPPED | OUTPATIENT
Start: 2020-04-01 | End: 2020-06-23 | Stop reason: CLARIF

## 2020-04-01 RX ADMIN — AMPICILLIN SODIUM AND SULBACTAM SODIUM 3 G: 2; 1 INJECTION, POWDER, FOR SOLUTION INTRAMUSCULAR; INTRAVENOUS at 05:04

## 2020-04-01 RX ADMIN — KETOROLAC TROMETHAMINE 30 MG: 15 INJECTION, SOLUTION INTRAMUSCULAR; INTRAVENOUS at 11:04

## 2020-04-01 RX ADMIN — OXYCODONE HYDROCHLORIDE 10 MG: 10 TABLET ORAL at 04:04

## 2020-04-01 RX ADMIN — ONDANSETRON 8 MG: 8 TABLET, ORALLY DISINTEGRATING ORAL at 09:04

## 2020-04-01 RX ADMIN — LISINOPRIL 2.5 MG: 2.5 TABLET ORAL at 08:04

## 2020-04-01 RX ADMIN — PANTOPRAZOLE SODIUM 40 MG: 40 TABLET, DELAYED RELEASE ORAL at 06:04

## 2020-04-01 RX ADMIN — ACETAMINOPHEN 650 MG: 325 TABLET ORAL at 05:04

## 2020-04-01 RX ADMIN — SODIUM CHLORIDE 1000 ML: 0.9 INJECTION, SOLUTION INTRAVENOUS at 10:04

## 2020-04-01 RX ADMIN — ONDANSETRON 4 MG: 2 INJECTION INTRAMUSCULAR; INTRAVENOUS at 10:04

## 2020-04-01 RX ADMIN — ACETAMINOPHEN 650 MG: 325 TABLET ORAL at 06:04

## 2020-04-01 RX ADMIN — AMPICILLIN SODIUM AND SULBACTAM SODIUM 3 G: 2; 1 INJECTION, POWDER, FOR SOLUTION INTRAMUSCULAR; INTRAVENOUS at 11:04

## 2020-04-01 RX ADMIN — ONDANSETRON 4 MG: 2 INJECTION, SOLUTION INTRAMUSCULAR; INTRAVENOUS at 10:04

## 2020-04-01 RX ADMIN — CEFEPIME 2 G: 2 INJECTION, POWDER, FOR SOLUTION INTRAVENOUS at 10:04

## 2020-04-01 RX ADMIN — DOXYCYCLINE HYCLATE 100 MG: 100 TABLET, COATED ORAL at 08:04

## 2020-04-01 RX ADMIN — SENNOSIDES 8.6 MG: 8.6 TABLET, FILM COATED ORAL at 08:04

## 2020-04-01 RX ADMIN — OXYCODONE HYDROCHLORIDE 10 MG: 10 TABLET ORAL at 08:04

## 2020-04-01 RX ADMIN — ACETAMINOPHEN 650 MG: 325 TABLET ORAL at 11:04

## 2020-04-01 RX ADMIN — VANCOMYCIN HYDROCHLORIDE 2000 MG: 100 INJECTION, POWDER, LYOPHILIZED, FOR SOLUTION INTRAVENOUS at 10:04

## 2020-04-01 RX ADMIN — ENOXAPARIN SODIUM 40 MG: 100 INJECTION SUBCUTANEOUS at 04:04

## 2020-04-01 RX ADMIN — HYDROMORPHONE HYDROCHLORIDE 0.5 MG: 1 INJECTION, SOLUTION INTRAMUSCULAR; INTRAVENOUS; SUBCUTANEOUS at 10:04

## 2020-04-01 RX ADMIN — AMPICILLIN SODIUM AND SULBACTAM SODIUM 3 G: 2; 1 INJECTION, POWDER, FOR SOLUTION INTRAMUSCULAR; INTRAVENOUS at 06:04

## 2020-04-01 RX ADMIN — KETOROLAC TROMETHAMINE 30 MG: 15 INJECTION, SOLUTION INTRAMUSCULAR; INTRAVENOUS at 06:04

## 2020-04-01 RX ADMIN — PROMETHAZINE HYDROCHLORIDE 12.5 MG: 25 INJECTION INTRAMUSCULAR; INTRAVENOUS at 11:04

## 2020-04-01 NOTE — SUBJECTIVE & OBJECTIVE
Past Medical History:   Diagnosis Date    Asthma childhood    Diabetes mellitus     GERD (gastroesophageal reflux disease)     Morbid obesity     PCOS (polycystic ovarian syndrome)      Past Surgical History:   Procedure Laterality Date    APPENDECTOMY  2011    HERNIA REPAIR      OOPHORECTOMY      salpingoopherectomy Right 2008     Family History     Problem Relation (Age of Onset)    Diabetes Mother    Heart attack Father, Mother    Hypertension Father        Tobacco Use    Smoking status: Never Smoker    Smokeless tobacco: Never Used   Substance and Sexual Activity    Alcohol use: Not Currently     Comment: occasionally apple cider    Drug use: No    Sexual activity: Yes     Partners: Female       PTA Medications   Medication Sig    doxycycline (VIBRA-TABS) 100 MG tablet Take 1 tablet (100 mg total) by mouth 2 (two) times daily. Take with food for 14 days    lisinopril (PRINIVIL,ZESTRIL) 2.5 MG tablet Take 1 tablet (2.5 mg total) by mouth once daily.    metFORMIN (GLUCOPHAGE) 1000 MG tablet Take 2,000 mg by mouth.    pantoprazole (PROTONIX) 40 MG tablet Take 1 tablet (40 mg total) by mouth before breakfast.    albuterol (VENTOLIN HFA) 90 mcg/actuation inhaler Inhale 2 puffs into the lungs every 6 (six) hours as needed for Wheezing. Rescue    ampicillin sodium/sulbactam Na (AMPICILLIN/SULBACTAM 3 G/100 ML NS, READY TO MIX,) Inject 100 mLs (3 g total) into the vein every 6 (six) hours.    dulaglutide (TRULICITY) 1.5 mg/0.5 mL PnIj Inject 1.5 mg into the skin every 7 days.    glipiZIDE (GLUCOTROL) 10 MG TR24 Take 1 tablet (10 mg total) by mouth daily with breakfast.    ondansetron (ZOFRAN) 4 MG tablet Take 1 tablet (4 mg total) by mouth every 6 (six) hours as needed for Nausea.    sodium chloride 0.9% (NORMAL SALINE FLUSH) injection Inject 10 mLs into the vein every 6 (six) hours.       Review of patient's allergies indicates:   Allergen Reactions    Latex, natural rubber Rash     Burning  sensation      Objective:     Vital Signs (Most Recent):  Temp: 99 °F (37.2 °C) (04/01/20 0727)  Pulse: 95 (04/01/20 0727)  Resp: 18 (04/01/20 0727)  BP: 107/68 (04/01/20 0727)  SpO2: 96 % (04/01/20 0727) Vital Signs (24h Range):  Temp:  [97.9 °F (36.6 °C)-99 °F (37.2 °C)] 99 °F (37.2 °C)  Pulse:  [72-95] 95  Resp:  [17-18] 18  SpO2:  [90 %-98 %] 96 %  BP: (103-134)/(58-73) 107/68     Weight: 116.7 kg (257 lb 4.4 oz)  Body mass index is 47.06 kg/m².    Physical Exam:   Constitutional: She is oriented to person, place, and time. She appears well-developed. No distress.   Morbidly obese    HENT:   Head: Normocephalic and atraumatic.      Cardiovascular: Normal rate and regular rhythm.     Pulmonary/Chest: Effort normal. No respiratory distress. She has no wheezes.        Abdominal: Soft. Bowel sounds are normal. She exhibits abdominal incision (there is a 10 Scottish drain sutured in place exiting the LLQ. Dressing is clean and dry. ). She exhibits no distension (abnormal abdominal contour with hernia palpable through anterior abdomen at and to the right of the midline). There is tenderness (mild, lower abdomen and suprapubic area bilaterally).                 Neurological: She is alert and oriented to person, place, and time.    Skin: Skin is warm and dry.    Psychiatric: Her behavior is normal.     Laboratory:  No new labs this morning.

## 2020-04-01 NOTE — PLAN OF CARE
Problem: Adult Inpatient Plan of Care  Goal: Plan of Care Review  Outcome: Ongoing, Progressing  Flowsheets (Taken 4/1/2020 1007)  Plan of Care Reviewed With: patient  Patient remains free from falls and injury this shift. Bed in low, locked position with call light in reach. Plan of care reviewed with pt.  VSS.  Abd pain relieved with PRNs.  X1 episode of emesis.   2000 ADA diet in place with blood sugar monitoring. LLQ abd JONAH drain to bulb suction, no output.  Tube placement checked in IR today. Patient encouraged to call for assistance when getting out of bed.  Patient verbalized understanding. All belongings within reach.  Will continue to monitor.

## 2020-04-01 NOTE — PLAN OF CARE
No acute events or changes overnight. Patient continues on IV antibiotics and multiple analgesics without issue. Scant drainage from abdominal drain.

## 2020-04-01 NOTE — PROGRESS NOTES
All arrangements have been made for pt. To dc home with IVABx. [facility] will provide medication and supplies and will coordinate delivery of supplies to pts. Home this afternoon. HH has been arranged they [facility] ([phone]). Pt. Will be seen for services beginning tomorrow. Pt. Aware of the above and states that her  will provide transportation to home. No other needs identified and pt. OK for dc from SW standpoint.

## 2020-04-01 NOTE — PROGRESS NOTES
"[facility]  Gynecologic Oncology  Progress Note    Patient Name: Glenda Peña  MRN: [id number]  Admission Date: 3/29/2020  Hospital Length of Stay: 3 days  Attending Provider: Ritchie Millan MD  Primary Care Provider: Emiliano Dunbar MD  Principal Problem: Tubo-ovarian abscess    Subjective:      History of Present Illness:  Glenda Peña is a 29 y.o.  with PMH significant for morbid obesity, T2DM, HTN, and large complex midline and R-sided ventral who [facility] with CC of worsening pelvic pain in the setting of known TOA. Pain on presentation rated 10/10. Reports a few episodes of nausea,  diarrhea and dizziness today but denies fevers, chills, body aches, cough, chest pain, shortness of breath, urinary symptoms.     The patient was initially admitted to [facility], where she was admitted for IR drainage of TOA and IV antibiotics. IR drain placement was not successful in resolving the TOA, but cultures grew actinomyces. She was discharged after improvement in pain. She then presented to [facility] with pelvic pain on 3/6/2020. CT A/P on 3/10 showed a 3.5 x 11 cm fluid collection anterior the uterus.   She was given IV antibiotics and IR drainage was attempted again, unsuccessfully. She was discharged with Unasyn and followed by ID. On 3/27 patient reported worsening pain, and CT AP showed heterogeneous, complex fluid collection with multiple loculations and interval increase in size, now approximately 7 x 9 cm transversely and 9 cm craniocaudal. Doxycycline was added by ID and per ID note, patient was to follow up with IR on an outpatient basis for attempted drainage.     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   2011: Open appendectomy  2016: Repair of incisional hernia    Hospital Course:  2020 - In the " ED, evaluation revealed no suspicion for incarceration of known ventral hernia. ED physician has low suspicion for COVID-19 given patient is afebrile and has no respiratory symptoms. patient's VS were WNL and stable. She was afebrile and nontoxic-appearing. Pain was mostly L-sided and midline pelvic pain, rated 10/10. Improved with IV narcotics. CBC showed leukopenia with WBC 3. UPT negative and UA WNL but for RBCs. CMP shows mild transaminitis not seen on prior labs. Plan is admission for pain control, continue IV antibiotics, and attempt at IR drain placement tomorrow if IR amenable.     03/30/2020 - Pain controlled, but requiring multiple PO agents as well as IV narcotics. VS remain stable. Patient well-appearing but with significant pain. Reviewed with the patient that she is very high risk for surgery given surgical history, large ventral hernia, morbid obesity, and medical comorbidities. Has been NPO overnight. IR consult placed this morning - will ask to attempt drainage and obtain new cultures.   ID consulted for antibiotic recommendations.     03/31/2020 - Patient now POD#1 s/p IR drain placement. 62 mL serosanguinous fluid aspirated, and 10 Kazakh catheter left in place with drain. Aerobic and anaerobic cultures as well as yeast culture done. Patient reports some pain relief in the pelvis, with pain now being intermittent rather than constant, however she now has pain at the drain site as well. VSS and afebrile.     04/01/2020 - this morning patient reports pelvic pain is present but improved. Pain has been controlled with PO medication only for the past 24 hours. She is eating a regular diet and ambulating.   SW on board for discharge planning for antibiotic and drain care needs when discharged.   Patient's care was discussed with IR again yesterday, who recommended repeat imaging of pelvis to help in decision to leave or pull the drain. CT pelvis with contrast showed decrease in size of the fluid  "collection and no new collections as below:   "There has been interval placement of left-sided percutaneous drainage catheter within the previously visualized complex fluid collection in the left adnexal region.  Fluid continues to show multiple likely septations.  Collection now measures at 4.3 x 8.9 cm (series 2, image 54) and is notably decreased in size in comparison to prior.  No definite new collections are identified."  Her care was also discussed with ID, who recommends continuation of 6 week course of Unasyn and Doxy or until surgery.   Also discussed with colorectal surgery for surgical planning. CRS recommends fistulogram given patient's history of diverticulitis and large ventral hernia, to ensure no communication between pelvic fluid collections and bowel/hernia sac. This will be done this morning. Patient NPO since midnight.      Past Medical History:   Diagnosis Date    Asthma childhood    Diabetes mellitus     GERD (gastroesophageal reflux disease)     Morbid obesity     PCOS (polycystic ovarian syndrome)      Past Surgical History:   Procedure Laterality Date    APPENDECTOMY  2011    HERNIA REPAIR      OOPHORECTOMY      salpingoopherectomy Right 2008     Family History     Problem Relation (Age of Onset)    Diabetes Mother    Heart attack Father, Mother    Hypertension Father        Tobacco Use    Smoking status: Never Smoker    Smokeless tobacco: Never Used   Substance and Sexual Activity    Alcohol use: Not Currently     Comment: occasionally apple cider    Drug use: No    Sexual activity: Yes     Partners: Female       PTA Medications   Medication Sig    doxycycline (VIBRA-TABS) 100 MG tablet Take 1 tablet (100 mg total) by mouth 2 (two) times daily. Take with food for 14 days    lisinopril (PRINIVIL,ZESTRIL) 2.5 MG tablet Take 1 tablet (2.5 mg total) by mouth once daily.    metFORMIN (GLUCOPHAGE) 1000 MG tablet Take 2,000 mg by mouth.    pantoprazole (PROTONIX) 40 MG tablet " Take 1 tablet (40 mg total) by mouth before breakfast.    albuterol (VENTOLIN HFA) 90 mcg/actuation inhaler Inhale 2 puffs into the lungs every 6 (six) hours as needed for Wheezing. Rescue    ampicillin sodium/sulbactam Na (AMPICILLIN/SULBACTAM 3 G/100 ML NS, READY TO MIX,) Inject 100 mLs (3 g total) into the vein every 6 (six) hours.    dulaglutide (TRULICITY) 1.5 mg/0.5 mL PnIj Inject 1.5 mg into the skin every 7 days.    glipiZIDE (GLUCOTROL) 10 MG TR24 Take 1 tablet (10 mg total) by mouth daily with breakfast.    ondansetron (ZOFRAN) 4 MG tablet Take 1 tablet (4 mg total) by mouth every 6 (six) hours as needed for Nausea.    sodium chloride 0.9% (NORMAL SALINE FLUSH) injection Inject 10 mLs into the vein every 6 (six) hours.       Review of patient's allergies indicates:   Allergen Reactions    Latex, natural rubber Rash     Burning sensation      Objective:     Vital Signs (Most Recent):  Temp: 99 °F (37.2 °C) (04/01/20 0727)  Pulse: 95 (04/01/20 0727)  Resp: 18 (04/01/20 0727)  BP: 107/68 (04/01/20 0727)  SpO2: 96 % (04/01/20 0727) Vital Signs (24h Range):  Temp:  [97.9 °F (36.6 °C)-99 °F (37.2 °C)] 99 °F (37.2 °C)  Pulse:  [72-95] 95  Resp:  [17-18] 18  SpO2:  [90 %-98 %] 96 %  BP: (103-134)/(58-73) 107/68     Weight: 116.7 kg (257 lb 4.4 oz)  Body mass index is 47.06 kg/m².    Physical Exam:   Constitutional: She is oriented to person, place, and time. She appears well-developed. No distress.   Morbidly obese    HENT:   Head: Normocephalic and atraumatic.      Cardiovascular: Normal rate and regular rhythm.     Pulmonary/Chest: Effort normal. No respiratory distress. She has no wheezes.        Abdominal: Soft. Bowel sounds are normal. She exhibits abdominal incision (there is a 10 french drain sutured in place exiting the LLQ. Dressing is clean and dry. ). She exhibits no distension (abnormal abdominal contour with hernia palpable through anterior abdomen at and to the right of the midline). There is  tenderness (mild, lower abdomen and suprapubic area bilaterally).                 Neurological: She is alert and oriented to person, place, and time.    Skin: Skin is warm and dry.    Psychiatric: Her behavior is normal.     Laboratory:  No new labs this morning.     Assessment/Plan:     * Tubo-ovarian abscess  - Culture from OSH - Actinomyces. Followed by ID Dr. Mei  - 7cm x 9cm on CT 3/27 (previously 3cm x 11 cm on 3/10)   - Has been on extended course of IV Unasyn with Home Health  - ID added Doxycycline 3/27 as well    POD#2 s/p IR drain placement   - IR placed 10 Solomon Islander drain 3/30 with 62 mL serosanguinous fluid out. Approx. 3 mL total output since.   - Cultures pending; prelim anaerobic culture negative  - Continue Unasyn IV and Doxycycline PO on admission. ID recommends 6 week course of these abx and surgery for source control  - Per pharmacy with check Cr this morning  - SW on board for discharge planning  - Pain control with scheduled and PRN PO medications.   - While inpatient, lovenox prophylaxis due to high risk of DVT    Case discussed with IR - recommended repeat pelvic CT - fluid colleciton smaller, no new pockets 3/31/20  Discussed with CRS - IR Fistulogram ordered for today for diagnostic/surgical planning - to be done this AM     Asthma  - Continue PRN albuterol (home med)    Actinomyces infection  - Positive culture from TOA drainage attempt on 2/18 at OSH  - Negative cultures on 3/6  - Repeat cultures from drain placement pending now  - ID recommends 6 week course of Unasyn, patient now in week 3 of treatment    Type 2 diabetes mellitus with hyperglycemia  - SSI for eating patients   - Diabetic diet    GERD (gastroesophageal reflux disease)  - Continue home PPI  - TUMS PRN    Essential hypertension  - Continue home Lisinopril  - Hydralazine PRN SBP > 180 mmHg    Morbid obesity with BMI of 45.0-49.9, adult  - Encourage ambulation   - Lovenox and SCDs for DVT PPX      Ricky Whitney,  MD  Gynecologic Oncology  [facility]

## 2020-04-01 NOTE — ASSESSMENT & PLAN NOTE
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 Followed by ID Dr. Mei  - 7cm x 9cm on CT 3/27 (previously 3cm x 11 cm on 3/10)   - Has been on extended course of IV Unasyn with Home Health  - ID added Doxycycline 3/27 as well    POD#2 s/p IR drain placement   - IR placed 10 Emirati drain 3/30 with 62 mL serosanguinous fluid out. Approx. 3 mL total output since.   - Cultures pending; prelim anaerobic culture negative  - Continue Unasyn IV and Doxycycline PO on admission. ID recommends 6 week course of these abx and surgery for source control  - Per pharmacy with check Cr this morning  - SW on board for discharge planning  - Pain control with scheduled and PRN PO medications.   - While inpatient, lovenox prophylaxis due to high risk of DVT    Case discussed with IR - recommended repeat pelvic CT - fluid colleciton smaller, no new pockets 3/31/20  Discussed with CRS - IR Fistulogram ordered for today for diagnostic/surgical planning - to be done this AM

## 2020-04-01 NOTE — DISCHARGE SUMMARY
"[facility]  Gynecologic Oncology  Discharge Summary    Patient Name: Glenda Peña  MRN: [id number]  Admission Date: 3/29/2020  Hospital Length of Stay: 3 days  Discharge Date and Time:  2020 5:29 PM  Attending Physician: Ritchie Millan MD   Discharging Provider: Ricky Whitney MD  Primary Care Provider: Emiliano Dunbar MD    HPI:   Glenda Peña is a 29 y.o.  with PMH significant for morbid obesity, T2DM, HTN, and large complex midline and R-sided ventral who [facility] with CC of worsening pelvic pain in the setting of known TOA. Pain on presentation rated 10/10. Reports a few episodes of nausea,  diarrhea and dizziness today but denies fevers, chills, body aches, cough, chest pain, shortness of breath, urinary symptoms.     The patient was initially admitted to [facility], where she was admitted for IR drainage of TOA and IV antibiotics. IR drain placement was not successful in resolving the TOA, but cultures grew actinomyces. She was discharged after improvement in pain. She then presented to [facility] with pelvic pain on 3/6/2020. CT A/P on 3/10 showed a 3.5 x 11 cm fluid collection anterior the uterus.   She was given IV antibiotics and IR drainage was attempted again, unsuccessfully. She was discharged with Unasyn and followed by ID. On 3/27 patient reported worsening pain, and CT AP showed heterogeneous, complex fluid collection with multiple loculations and interval increase in size, now approximately 7 x 9 cm transversely and 9 cm craniocaudal. Doxycycline was added by ID and per ID note, patient was to follow up with IR on an outpatient basis for attempted drainage.     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   2011: Open appendectomy  2016: Repair of incisional hernia    Hospital " Course:  03/29/2020 - In the ED, evaluation revealed no suspicion for incarceration of known ventral hernia. ED physician has low suspicion for COVID-19 given patient is afebrile and has no respiratory symptoms. patient's VS were WNL and stable. She was afebrile and nontoxic-appearing. Pain was mostly L-sided and midline pelvic pain, rated 10/10. Improved with IV narcotics. CBC showed leukopenia with WBC 3. UPT negative and UA WNL but for RBCs. CMP shows mild transaminitis not seen on prior labs. Plan is admission for pain control, continue IV antibiotics, and attempt at IR drain placement tomorrow if IR amenable.     03/30/2020 - Pain controlled, but requiring multiple PO agents as well as IV narcotics. VS remain stable. Patient well-appearing but with significant pain. Reviewed with the patient that she is very high risk for surgery given surgical history, large ventral hernia, morbid obesity, and medical comorbidities. Has been NPO overnight. IR consult placed this morning - will ask to attempt drainage and obtain new cultures.   ID consulted for antibiotic recommendations.     03/31/2020 - Patient now POD#1 s/p IR drain placement. 62 mL serosanguinous fluid aspirated, and 10 Colombian catheter left in place with drain. Aerobic and anaerobic cultures as well as yeast culture done. Patient reports some pain relief in the pelvis, with pain now being intermittent rather than constant, however she now has pain at the drain site as well. VSS and afebrile.     04/01/2020 - this morning patient reports pelvic pain is present but improved. Pain has been controlled with PO medication only for the past 24 hours. She is eating a regular diet and ambulating.   SW on board for discharge planning for antibiotic and drain care needs when discharged.   Patient's care was discussed with IR again yesterday, who recommended repeat imaging of pelvis to help in decision to leave or pull the drain. CT pelvis with contrast showed decrease  "in size of the fluid collection and no new collections as below:   "There has been interval placement of left-sided percutaneous drainage catheter within the previously visualized complex fluid collection in the left adnexal region.  Fluid continues to show multiple likely septations.  Collection now measures at 4.3 x 8.9 cm (series 2, image 54) and is notably decreased in size in comparison to prior.  No definite new collections are identified."  Her care was also discussed with ID, who recommends continuation of 6 week course of Unasyn and Doxy or until surgery.   Also discussed with colorectal surgery for surgical planning. CRS recommends fistulogram given patient's history of diverticulitis and large ventral hernia, to ensure no communication between pelvic fluid collections and bowel/hernia sac. This will be done this morning. Patient NPO since midnight.    5:04 PM - Patient has undergone fistulogram with drain check by IR. No communicating tract seen on injection of contrast through existing drain. Drain left in place. CMP checked today with stable Cr and resolution of mild elevation in LFTs seen on admission.   Patient now has home health for drain care as well as orders in place for home IV antibiotics. Appopriate follow up will be scheduled with infectious disease.   Patient counseled extensively by Dr. Millan, Gyn resident, SW, and dietician while inpatient on the importance of optimizing her health in preparation for surgery.   She will see or have a virtual visit with her PCP in the near future with plans to start monitoring BG and possibly starting insulin. She will continue healthier diet for weight loss.   She has been given precautions for when to call Gyn Onc clinic and when to present to the ED if needed. If pain worsens, will consider repeat drainage of pelvic fluid collections by IR on an outpatient basis until optimized for surgery.     * No surgery found *     Consults (From admission, onward) "        Status Ordering Provider     Inpatient consult to Gynecologic Oncology  Once     Provider:  (Not yet assigned)    VIVIAN Gayle     Inpatient consult to Infectious Diseases  Once     Provider:  (Not yet assigned)    VIVIAN Gayle     Inpatient consult to Interventional Radiology  Once     Provider:  (Not yet assigned)    VIVIAN Gayle     Inpatient consult to Registered Dietitian/Nutritionist  Once     Provider:  (Not yet assigned)    VIVIAN Gayle          Significant Diagnostic Studies: Labs:   CMP   Recent Labs   Lab 04/01/20  0923   *   K 3.5      CO2 24   *   BUN 8   CREATININE 0.7   CALCIUM 8.3*   PROT 7.7   ALBUMIN 3.1*   BILITOT 0.3   ALKPHOS 64   AST 32   ALT 41   ANIONGAP 7*   ESTGFRAFRICA >60.0   EGFRNONAA >60.0    and All labs within the past 24 hours have been reviewed    Pending Diagnostic Studies:     Procedure Component Value Units Date/Time    IR Abscess Tube Check (xpd) [[id number]] Resulted:  04/01/20 1408    Order Status:  Sent Lab Status:  In process Updated:  04/01/20 1444    IR Fistulagram/Sinogram [[id number]]     Order Status:  Sent Lab Status:  No result         Final Active Diagnoses:    Diagnosis Date Noted POA    PRINCIPAL PROBLEM:  Tubo-ovarian abscess [N70.93] 02/17/2020 Yes    Asthma [J45.909] 03/29/2020 Unknown    Type 2 diabetes mellitus with hyperglycemia [E11.65] 03/06/2020 Yes    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] 02/20/2020 Yes    Hyperlipidemia associated with type 2 diabetes mellitus [E11.69, E78.5] 08/19/2019 Yes    GERD (gastroesophageal reflux disease) [K21.9] 06/26/2018 Unknown    Ventral hernia without obstruction or gangrene [K43.9] 06/21/2018 Yes    Pelvic pain in female [R10.2] 06/21/2018 Yes    Essential hypertension [I10] 04/23/2018 Yes    Left lower quadrant pain [R10.32] 04/23/2018 Yes    Morbid obesity with BMI of 45.0-49.9, adult [E66.01, Z68.42] 04/16/2018 Not Applicable      Problems  Resolved During this Admission:        Does this patient meet criteria for extended DVT prophylaxis? No, because patient oes not have a known malignancy and is mobile with good functional status    Discharged Condition: stable    Disposition: Home-Health Care [facility]    Follow Up:  Follow-up Information     Call Ritchie Millan MD.    Specialty:  Gynecologic Oncology  Why:  when drain output is zero for two consecutive days  Contact information:  [address]  [address]  [place] [address]  [phone]                 Patient Instructions:      Pelvic Rest     No driving until:   Scheduling Instructions: Pain is well controlled and you are not taking narcotic medications     Notify your health care provider if you experience any of the following:  temperature >100.4     Notify your health care provider if you experience any of the following:  persistent nausea and vomiting or diarrhea     Notify your health care provider if you experience any of the following:  severe uncontrolled pain     Notify your health care provider if you experience any of the following:  redness, tenderness, or signs of infection (pain, swelling, redness, odor or green/yellow discharge around incision site)   Scheduling Instructions: At drain exit site     Notify your health care provider if you experience any of the following:  difficulty breathing or increased cough     Notify your health care provider if you experience any of the following:  persistent dizziness, light-headedness, or visual disturbances     Notify your health care provider if you experience any of the following:  increased confusion or weakness     Notify your health care provider if you experience any of the following:   Scheduling Instructions: Heavy vaginal bleeding, soaking though more than 1 heavy pad per hour.     Leave dressing on - Keep it clean, dry, and intact until clinic visit   Scheduling Instructions: Keep midline and drain dressing clean and dry - will be  replaced by home health.     Activity as tolerated   Scheduling Instructions: When drain output has been ZERO for two consecutive days, call the gyn oncology clinic to report to Dr. Millan and schedule drain removal in clinic.     Medications:  Reconciled Home Medications:      Medication List      START taking these medications    acetaminophen 325 MG tablet  Commonly known as:  TYLENOL  Take 2 tablets (650 mg total) by mouth every 6 (six) hours.     ibuprofen 600 MG tablet  Commonly known as:  ADVIL,MOTRIN  Take 1 tablet (600 mg total) by mouth every 6 (six) hours as needed for Pain.     oxyCODONE 5 MG immediate release tablet  Commonly known as:  ROXICODONE  Take 1 tablet (5 mg total) by mouth every 4 (four) hours as needed for Pain.     polyethylene glycol 17 gram Pwpk  Commonly known as:  GLYCOLAX  Take 17 g by mouth 2 (two) times daily as needed.     promethazine 25 MG tablet  Commonly known as:  PHENERGAN  Take 1 tablet (25 mg total) by mouth every 4 (four) hours.     senna 8.6 mg tablet  Commonly known as:  SENOKOT  Take 1 tablet by mouth 2 (two) times daily.        CONTINUE taking these medications    albuterol 90 mcg/actuation inhaler  Commonly known as:  VENTOLIN HFA  Inhale 2 puffs into the lungs every 6 (six) hours as needed for Wheezing. Rescue     AMPICILLIN/SULBACTAM 3 G/100 ML NS (READY TO MIX)  Inject 100 mLs (3 g total) into the vein every 6 (six) hours.     doxycycline 100 MG tablet  Commonly known as:  VIBRA-TABS  Take 1 tablet (100 mg total) by mouth 2 (two) times daily. Take with food     dulaglutide 1.5 mg/0.5 mL Pnij  Commonly known as:  TRULICITY  Inject 1.5 mg into the skin every 7 days.     glipiZIDE 10 MG Tr24  Commonly known as:  GLUCOTROL  Take 1 tablet (10 mg total) by mouth daily with breakfast.     lisinopriL 2.5 MG tablet  Commonly known as:  PRINIVIL,ZESTRIL  Take 1 tablet (2.5 mg total) by mouth once daily.     metFORMIN 1000 MG tablet  Commonly known as:  GLUCOPHAGE  Take 2,000  mg by mouth.     ondansetron 4 MG tablet  Commonly known as:  ZOFRAN  Take 1 tablet (4 mg total) by mouth every 6 (six) hours as needed for Nausea.     pantoprazole 40 MG tablet  Commonly known as:  PROTONIX  Take 1 tablet (40 mg total) by mouth before breakfast.     sodium chloride 0.9% injection  Commonly known as:  NORMAL SALINE FLUSH  Inject 10 mLs into the vein every 6 (six) hours.            Ricky Whitney MD  Gynecologic Oncology  [facility]

## 2020-04-01 NOTE — PHYSICIAN QUERY
PT Name: Glenda Peña  MR #: [id number]     PHYSICIAN QUERY -  ACUITY OF CONDITION CLARIFICATION      CDS/: TASHA Lane,RNC-MNN       Contact information:[email or web address]  This form is a permanent document in the medical record.     Query Date: April 1, 2020    By submitting this query, we are merely seeking further clarification of documentation to reflect the severity of illness of your patient. Please utilize your independent clinical judgment when addressing the question(s) below.    The Medical record reflects the following:     Indicators   Supporting Clinical Findings Location in Medical Record   X Documentation of condition Tubo-ovarian abscess Gyn Progress note 4/1@903am    Lab Value(s)     X Radiology Findings  7cm x 9cm on CT 3/27 (previously 3cm x 11 cm on 3/10)  Gyn Progress note 4/1@903am   X Treatment                                 Medication Has been on extended course of IV Unasyn with Home Health  - ID added Doxycycline 3/27 as well   IR placed 10 Burkinan drain 3/30 with 62 mL serosanguinous fluid out. Approx. 3 mL total output since Gyn Progress note 4/1@903am   X Other The patient was initially admitted to [facility], where she was admitted for IR drainage of TOA and IV antibiotics. IR drain placement was not successful in resolving the TOA, but cultures grew actinomyces. She was discharged after improvement in pain. She then presented to [facility] with pelvic pain on 3/6/2020. CT A/P on 3/10 showed a 3.5 x 11 cm fluid collection anterior the uterus.   She was given IV antibiotics and IR drainage was attempted again, unsuccessfully. She was discharged with Unasyn and followed by ID. On 3/27 patient reported worsening pain, and CT AP showed heterogeneous, complex fluid collection with multiple loculations and interval increase in size, now approximately 7 x 9 cm transversely and 9 cm craniocaudal. Doxycycline was added by ID and per ID note,  patient was to follow up with IR on an outpatient basis for attempted drainage Gyn Progress note 4/1@903am     Provider, please specify the acuity/chronicity of Tubo-ovarian abscess:    [   ] Chronic   [   ] Subacute   [  X ] Acute and/on chronic   [   ] Ruled Out   [   ]  Clinically Undetermined       Please document in your progress notes daily for the duration of treatment until resolved, and include in your discharge summary.

## 2020-04-01 NOTE — H&P
Inpatient Radiology Pre-procedure Note    History of Present Illness:  Glenda Peña is a 29 y.o. female who presents for abdominal percutaneous tube check with possible removal.    Admission H&P reviewed.  Past Medical History:   Diagnosis Date    Asthma childhood    Diabetes mellitus     GERD (gastroesophageal reflux disease)     Morbid obesity     PCOS (polycystic ovarian syndrome)      Past Surgical History:   Procedure Laterality Date    APPENDECTOMY  2011    HERNIA REPAIR      OOPHORECTOMY      salpingoopherectomy Right 2008       Review of Systems:   As documented in primary team H&P    Home Meds:   Prior to Admission medications    Medication Sig Start Date End Date Taking? Authorizing Provider   doxycycline (VIBRA-TABS) 100 MG tablet Take 1 tablet (100 mg total) by mouth 2 (two) times daily. Take with food for 14 days 3/27/20 4/10/20 Yes Shantel Mei MD   lisinopril (PRINIVIL,ZESTRIL) 2.5 MG tablet Take 1 tablet (2.5 mg total) by mouth once daily. 12/4/19 12/3/20 Yes Emiliano Dunbar MD   metFORMIN (GLUCOPHAGE) 1000 MG tablet Take 2,000 mg by mouth.   Yes Historical Provider, MD   pantoprazole (PROTONIX) 40 MG tablet Take 1 tablet (40 mg total) by mouth before breakfast. 3/17/20 3/17/21 Yes Emiliano Dunbar MD   albuterol (VENTOLIN HFA) 90 mcg/actuation inhaler Inhale 2 puffs into the lungs every 6 (six) hours as needed for Wheezing. Rescue 8/28/19 8/27/20  Emiliano uDnbar MD   ampicillin sodium/sulbactam Na (AMPICILLIN/SULBACTAM 3 G/100 ML NS, READY TO MIX,) Inject 100 mLs (3 g total) into the vein every 6 (six) hours. 3/11/20   Aleena Churchill MD   dulaglutide (TRULICITY) 1.5 mg/0.5 mL PnIj Inject 1.5 mg into the skin every 7 days. 3/17/20   Emiliano Dunbar MD   glipiZIDE (GLUCOTROL) 10 MG TR24 Take 1 tablet (10 mg total) by mouth daily with breakfast. 3/19/20 3/19/21  Emiliano Dunbar MD   ondansetron (ZOFRAN) 4 MG tablet Take 1 tablet (4 mg total) by mouth every 6 (six)  hours as needed for Nausea. 3/17/20   Emiliano Dunbar MD   sodium chloride 0.9% (NORMAL SALINE FLUSH) injection Inject 10 mLs into the vein every 6 (six) hours. 3/11/20   Aleena Churchill MD     Scheduled Meds:    acetaminophen  650 mg Oral Q6H    ampicillin-sulbactim (UNASYN) IVPB  3 g Intravenous Q6H    doxycycline  100 mg Oral BID    enoxaparin  40 mg Subcutaneous Daily    lisinopriL  2.5 mg Oral Daily    pantoprazole  40 mg Oral Before breakfast    psyllium husk (aspartame)  1 packet Oral Daily    senna  8.6 mg Oral BID     Continuous Infusions:   PRN Meds:albuterol, calcium carbonate, Dextrose 10% Bolus, diphenhydrAMINE, glucagon (human recombinant), hydrALAZINE, insulin aspart U-100, ondansetron, oxyCODONE, oxyCODONE, polyethylene glycol, promethazine (PHENERGAN) IVPB  Anticoagulants/Antiplatelets: Enoxaparin    Allergies:   Review of patient's allergies indicates:   Allergen Reactions    Latex, natural rubber Rash     Burning sensation     Sedation Hx: have not been any systemic reactions    Labs:  No results for input(s): INR in the last 168 hours.    Invalid input(s):  PT,  PTT    Recent Labs   Lab 03/31/20  0651   WBC 2.82*   HGB 9.0*   HCT 31.1*   MCV 88         Recent Labs   Lab 04/01/20  0923   *   *   K 3.5      CO2 24   BUN 8   CREATININE 0.7   CALCIUM 8.3*   ALT 41   AST 32   ALBUMIN 3.1*   BILITOT 0.3         Vitals:  Temp: 98.3 °F (36.8 °C) (04/01/20 1128)  Pulse: 88 (04/01/20 1128)  Resp: 17 (04/01/20 1128)  BP: 113/60 (04/01/20 1128)  SpO2: (!) 91 % (04/01/20 1128)     Physical Exam:  ASA: 3  Mallampati: 3    General: no acute distress  Mental Status: alert and oriented to person, place and time  HEENT: normocephalic, atraumatic  Chest: unlabored breathing  Heart: regular heart rate  Abdomen: nondistended  Extremity: moves all extremities    Plan:     Image guided abdominal drain check with possible removal.     No sedation.     Jovani Desai MD   PGY-3  Radiology

## 2020-04-01 NOTE — PHYSICIAN QUERY
PT Name: Glenda Peña  MR #: [id number]     Physician Query Form - Documentation Clarification      CDS/: TASHA Lane,RNC-MNN        Contact information:[email or web address]    This form is a permanent document in the medical record.     Query Date: April 1, 2020    By submitting this query, we are merely seeking further clarification of documentation. Please utilize your independent clinical judgment when addressing the question(s) below.    The Medical record reflects the following:    Supporting Clinical Findings Location in Medical Record   Percutaneous placement of a 10 Chinese drainage catheter into left ovary, yielding 2 mL of bloody fluid.    Aspiration of 2 other loculations within the left ovary yielding a total of 60 cc serous fluid as detailed above.  Fluid was sent for cultures and cytology.    Procedure:right ovarian drainage ID Consult note 3/31@436pm                  IR Procedure note 3/30                                                                                      Doctor, Please specify diagnosis or diagnoses associated with above clinical findings.    Please clarify conflicting documentation of ovarian drainage site    Provider Use Only      [  ] Left ovarian drainage    [  ] Right ovarian drainage     [  X] Other, please specify:________Left tubo-ovarian abscess_____________________________                                                                                                                   [  ] Clinically Undetermined

## 2020-04-01 NOTE — ASSESSMENT & PLAN NOTE
- Positive culture from TOA drainage attempt on 2/18 at OSH  - Negative cultures on 3/6  - Repeat cultures from drain placement pending now  - ID recommends 6 week course of Unasyn, patient now in week 3 of treatment

## 2020-04-02 LAB
ANION GAP SERPL CALC-SCNC: 7 MMOL/L (ref 8–16)
B-HCG UR QL: NEGATIVE
BACTERIA #/AREA URNS AUTO: NORMAL /HPF
BACTERIA SPEC AEROBE CULT: NO GROWTH
BASOPHILS # BLD AUTO: 0.01 K/UL (ref 0–0.2)
BASOPHILS NFR BLD: 0.4 % (ref 0–1.9)
BILIRUB UR QL STRIP: NEGATIVE
BUN SERPL-MCNC: 6 MG/DL (ref 6–20)
CALCIUM SERPL-MCNC: 8.1 MG/DL (ref 8.7–10.5)
CHLORIDE SERPL-SCNC: 102 MMOL/L (ref 95–110)
CLARITY UR REFRACT.AUTO: CLEAR
CO2 SERPL-SCNC: 25 MMOL/L (ref 23–29)
COLOR UR AUTO: YELLOW
CREAT SERPL-MCNC: 0.7 MG/DL (ref 0.5–1.4)
CTP QC/QA: YES
DIFFERENTIAL METHOD: ABNORMAL
EOSINOPHIL # BLD AUTO: 0 K/UL (ref 0–0.5)
EOSINOPHIL NFR BLD: 0.7 % (ref 0–8)
ERYTHROCYTE [DISTWIDTH] IN BLOOD BY AUTOMATED COUNT: 16 % (ref 11.5–14.5)
EST. GFR  (AFRICAN AMERICAN): >60 ML/MIN/1.73 M^2
EST. GFR  (NON AFRICAN AMERICAN): >60 ML/MIN/1.73 M^2
GLUCOSE SERPL-MCNC: 139 MG/DL (ref 70–110)
GLUCOSE UR QL STRIP: NEGATIVE
HCT VFR BLD AUTO: 29.7 % (ref 37–48.5)
HGB BLD-MCNC: 8.7 G/DL (ref 12–16)
HGB UR QL STRIP: ABNORMAL
IMM GRANULOCYTES # BLD AUTO: 0.01 K/UL (ref 0–0.04)
IMM GRANULOCYTES NFR BLD AUTO: 0.4 % (ref 0–0.5)
KETONES UR QL STRIP: NEGATIVE
LEUKOCYTE ESTERASE UR QL STRIP: NEGATIVE
LYMPHOCYTES # BLD AUTO: 1.1 K/UL (ref 1–4.8)
LYMPHOCYTES NFR BLD: 41.9 % (ref 18–48)
MCH RBC QN AUTO: 25.8 PG (ref 27–31)
MCHC RBC AUTO-ENTMCNC: 29.3 G/DL (ref 32–36)
MCV RBC AUTO: 88 FL (ref 82–98)
MICROSCOPIC COMMENT: NORMAL
MONOCYTES # BLD AUTO: 0.2 K/UL (ref 0.3–1)
MONOCYTES NFR BLD: 8.1 % (ref 4–15)
NEUTROPHILS # BLD AUTO: 1.3 K/UL (ref 1.8–7.7)
NEUTROPHILS NFR BLD: 48.5 % (ref 38–73)
NITRITE UR QL STRIP: NEGATIVE
NRBC BLD-RTO: 0 /100 WBC
PH UR STRIP: 5 [PH] (ref 5–8)
PLATELET # BLD AUTO: 135 K/UL (ref 150–350)
PMV BLD AUTO: 10.6 FL (ref 9.2–12.9)
POCT GLUCOSE: 105 MG/DL (ref 70–110)
POCT GLUCOSE: 116 MG/DL (ref 70–110)
POCT GLUCOSE: 125 MG/DL (ref 70–110)
POCT GLUCOSE: 138 MG/DL (ref 70–110)
POCT GLUCOSE: 145 MG/DL (ref 70–110)
POCT GLUCOSE: 145 MG/DL (ref 70–110)
POCT GLUCOSE: 147 MG/DL (ref 70–110)
POTASSIUM SERPL-SCNC: 3.7 MMOL/L (ref 3.5–5.1)
PROT UR QL STRIP: NEGATIVE
RBC # BLD AUTO: 3.37 M/UL (ref 4–5.4)
RBC #/AREA URNS AUTO: 3 /HPF (ref 0–4)
SARS-COV-2 RNA RESP QL NAA+PROBE: DETECTED
SODIUM SERPL-SCNC: 134 MMOL/L (ref 136–145)
SP GR UR STRIP: 1.02 (ref 1–1.03)
SQUAMOUS #/AREA URNS AUTO: 0 /HPF
URN SPEC COLLECT METH UR: ABNORMAL
WBC # BLD AUTO: 2.7 K/UL (ref 3.9–12.7)
WBC #/AREA URNS AUTO: 1 /HPF (ref 0–5)

## 2020-04-02 PROCEDURE — 63600175 PHARM REV CODE 636 W HCPCS: Performed by: OBSTETRICS & GYNECOLOGY

## 2020-04-02 PROCEDURE — 63700000 PHARM REV CODE 250 ALT 637 W/O HCPCS: Performed by: STUDENT IN AN ORGANIZED HEALTH CARE EDUCATION/TRAINING PROGRAM

## 2020-04-02 PROCEDURE — 80048 BASIC METABOLIC PNL TOTAL CA: CPT

## 2020-04-02 PROCEDURE — 63600175 PHARM REV CODE 636 W HCPCS: Performed by: STUDENT IN AN ORGANIZED HEALTH CARE EDUCATION/TRAINING PROGRAM

## 2020-04-02 PROCEDURE — 25000003 PHARM REV CODE 250: Performed by: STUDENT IN AN ORGANIZED HEALTH CARE EDUCATION/TRAINING PROGRAM

## 2020-04-02 PROCEDURE — 94640 AIRWAY INHALATION TREATMENT: CPT

## 2020-04-02 PROCEDURE — 20600001 HC STEP DOWN PRIVATE ROOM

## 2020-04-02 PROCEDURE — 25500020 PHARM REV CODE 255: Performed by: OBSTETRICS & GYNECOLOGY

## 2020-04-02 PROCEDURE — 99223 1ST HOSP IP/OBS HIGH 75: CPT | Mod: ,,, | Performed by: INTERNAL MEDICINE

## 2020-04-02 PROCEDURE — 25000242 PHARM REV CODE 250 ALT 637 W/ HCPCS: Performed by: OBSTETRICS & GYNECOLOGY

## 2020-04-02 PROCEDURE — 94150 VITAL CAPACITY TEST: CPT

## 2020-04-02 PROCEDURE — 81001 URINALYSIS AUTO W/SCOPE: CPT

## 2020-04-02 PROCEDURE — S0030 INJECTION, METRONIDAZOLE: HCPCS | Performed by: STUDENT IN AN ORGANIZED HEALTH CARE EDUCATION/TRAINING PROGRAM

## 2020-04-02 PROCEDURE — 94761 N-INVAS EAR/PLS OXIMETRY MLT: CPT

## 2020-04-02 PROCEDURE — 94010 BREATHING CAPACITY TEST: CPT

## 2020-04-02 PROCEDURE — 85025 COMPLETE CBC W/AUTO DIFF WBC: CPT

## 2020-04-02 PROCEDURE — 87899 AGENT NOS ASSAY W/OPTIC: CPT

## 2020-04-02 PROCEDURE — 87449 NOS EACH ORGANISM AG IA: CPT

## 2020-04-02 PROCEDURE — 99233 SBSQ HOSP IP/OBS HIGH 50: CPT | Mod: ,,, | Performed by: OBSTETRICS & GYNECOLOGY

## 2020-04-02 PROCEDURE — 99233 PR SUBSEQUENT HOSPITAL CARE,LEVL III: ICD-10-PCS | Mod: ,,, | Performed by: OBSTETRICS & GYNECOLOGY

## 2020-04-02 PROCEDURE — 99223 PR INITIAL HOSPITAL CARE,LEVL III: ICD-10-PCS | Mod: ,,, | Performed by: INTERNAL MEDICINE

## 2020-04-02 RX ORDER — GLIPIZIDE 10 MG/1
10 TABLET, FILM COATED, EXTENDED RELEASE ORAL
Status: DISCONTINUED | OUTPATIENT
Start: 2020-04-02 | End: 2020-04-02

## 2020-04-02 RX ORDER — VANCOMYCIN 2 GRAM/500 ML IN 0.9 % SODIUM CHLORIDE INTRAVENOUS
2000
Status: DISCONTINUED | OUTPATIENT
Start: 2020-04-02 | End: 2020-04-02

## 2020-04-02 RX ORDER — METFORMIN HYDROCHLORIDE 500 MG/1
2000 TABLET ORAL
Status: DISCONTINUED | OUTPATIENT
Start: 2020-04-02 | End: 2020-04-02

## 2020-04-02 RX ORDER — ALBUTEROL SULFATE 90 UG/1
2 AEROSOL, METERED RESPIRATORY (INHALATION) EVERY 6 HOURS
Status: DISCONTINUED | OUTPATIENT
Start: 2020-04-02 | End: 2020-04-02

## 2020-04-02 RX ORDER — INSULIN ASPART 100 [IU]/ML
0-5 INJECTION, SOLUTION INTRAVENOUS; SUBCUTANEOUS EVERY 6 HOURS PRN
Status: DISCONTINUED | OUTPATIENT
Start: 2020-04-02 | End: 2020-04-05 | Stop reason: HOSPADM

## 2020-04-02 RX ORDER — ALBUTEROL SULFATE 90 UG/1
2 AEROSOL, METERED RESPIRATORY (INHALATION)
Status: DISCONTINUED | OUTPATIENT
Start: 2020-04-02 | End: 2020-04-05 | Stop reason: HOSPADM

## 2020-04-02 RX ORDER — METRONIDAZOLE 500 MG/100ML
500 INJECTION, SOLUTION INTRAVENOUS
Status: DISCONTINUED | OUTPATIENT
Start: 2020-04-02 | End: 2020-04-03

## 2020-04-02 RX ADMIN — CEFEPIME 2 G: 2 INJECTION, POWDER, FOR SOLUTION INTRAVENOUS at 10:04

## 2020-04-02 RX ADMIN — AZITHROMYCIN MONOHYDRATE 500 MG: 500 INJECTION, POWDER, LYOPHILIZED, FOR SOLUTION INTRAVENOUS at 11:04

## 2020-04-02 RX ADMIN — PSYLLIUM HUSK 1 PACKET: 3.4 POWDER ORAL at 09:04

## 2020-04-02 RX ADMIN — ALBUTEROL SULFATE 2 PUFF: 90 AEROSOL, METERED RESPIRATORY (INHALATION) at 09:04

## 2020-04-02 RX ADMIN — AZITHROMYCIN MONOHYDRATE 500 MG: 500 INJECTION, POWDER, LYOPHILIZED, FOR SOLUTION INTRAVENOUS at 12:04

## 2020-04-02 RX ADMIN — VANCOMYCIN HYDROCHLORIDE 2000 MG: 1 INJECTION, POWDER, LYOPHILIZED, FOR SOLUTION INTRAVENOUS at 09:04

## 2020-04-02 RX ADMIN — ACETAMINOPHEN 650 MG: 325 TABLET ORAL at 12:04

## 2020-04-02 RX ADMIN — PROMETHAZINE HYDROCHLORIDE 12.5 MG: 25 INJECTION INTRAMUSCULAR; INTRAVENOUS at 11:04

## 2020-04-02 RX ADMIN — IOHEXOL 100 ML: 350 INJECTION, SOLUTION INTRAVENOUS at 05:04

## 2020-04-02 RX ADMIN — CEFEPIME 2 G: 2 INJECTION, POWDER, FOR SOLUTION INTRAVENOUS at 09:04

## 2020-04-02 RX ADMIN — ACETAMINOPHEN 650 MG: 325 TABLET ORAL at 11:04

## 2020-04-02 RX ADMIN — ACETAMINOPHEN 650 MG: 325 TABLET ORAL at 06:04

## 2020-04-02 RX ADMIN — ONDANSETRON 8 MG: 8 TABLET, ORALLY DISINTEGRATING ORAL at 09:04

## 2020-04-02 RX ADMIN — SENNOSIDES 8.6 MG: 8.6 TABLET, FILM COATED ORAL at 10:04

## 2020-04-02 RX ADMIN — ACETAMINOPHEN 650 MG: 325 TABLET ORAL at 01:04

## 2020-04-02 RX ADMIN — OXYCODONE HYDROCHLORIDE 10 MG: 10 TABLET ORAL at 12:04

## 2020-04-02 RX ADMIN — SENNOSIDES 8.6 MG: 8.6 TABLET, FILM COATED ORAL at 12:04

## 2020-04-02 RX ADMIN — OXYCODONE HYDROCHLORIDE 10 MG: 10 TABLET ORAL at 10:04

## 2020-04-02 RX ADMIN — OXYCODONE HYDROCHLORIDE 10 MG: 10 TABLET ORAL at 09:04

## 2020-04-02 RX ADMIN — ALBUTEROL SULFATE 2 PUFF: 90 AEROSOL, METERED RESPIRATORY (INHALATION) at 03:04

## 2020-04-02 RX ADMIN — SODIUM CHLORIDE, SODIUM LACTATE, POTASSIUM CHLORIDE, AND CALCIUM CHLORIDE: .6; .31; .03; .02 INJECTION, SOLUTION INTRAVENOUS at 01:04

## 2020-04-02 RX ADMIN — SENNOSIDES 8.6 MG: 8.6 TABLET, FILM COATED ORAL at 09:04

## 2020-04-02 RX ADMIN — METRONIDAZOLE 500 MG: 500 INJECTION, SOLUTION INTRAVENOUS at 10:04

## 2020-04-02 RX ADMIN — PANTOPRAZOLE SODIUM 40 MG: 40 TABLET, DELAYED RELEASE ORAL at 06:04

## 2020-04-02 RX ADMIN — ENOXAPARIN SODIUM 40 MG: 100 INJECTION SUBCUTANEOUS at 10:04

## 2020-04-02 NOTE — NURSING
Pt arrived via stretcher from ED to room 858. VSS. Oriented to room. IVF and antibiotics running. JONAH drain noted on assessment. Will continue to monitor

## 2020-04-02 NOTE — ASSESSMENT & PLAN NOTE
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 Followed by ID Dr. Mei  - 7cm x 9cm on CT 3/27 (previously 3cm x 11 cm on 3/10)   - Was on extended course of IV Unasyn with Home Health  - ID added Doxycycline 3/27 as well   - Readmitted 3/29  - IR placed drain 3/30, f/u CT pelvis 3/31 with fluid collection now  4.3 x 8.9 cm  - Cultures 3/30 NGTD  - Discharged with drain in place 4/1  - Drain in place on readmission with 2 mL serosanguinous fluid in bulb  - Antibiotics broadened in ED to vanc/cefepime and azithromycin 2/2 CXR findings  - Pain in pelvis overall improved - ibuprofen and Tylenol scheduled for pain, with prn narcotics

## 2020-04-02 NOTE — ASSESSMENT & PLAN NOTE
- Childhood asthma. Does not use inhaler at baseline  - Albuterol now scheduled (see abnormal CXR)

## 2020-04-02 NOTE — H&P
[facility]  Gynecologic Oncology  H&P    Patient Name: Glenda Peña  MRN: [id number]  Admission Date: 2020  Primary Care Provider: Emiliano Dunbar MD   Principal Problem: Fever    Subjective:     Chief Complaint/Reason for Admission: Fever and leukopenia    History of Present Illness:  Glenda Peña is a 29 y.o.  with PMH significant for morbid obesity, asthma, T2DM, HTN, and large complex midline and R-sided ventral hernia who presented to [facility] ED tonight with CC of fever as high as 102 at home. She was discharged from the hospital this early this evening after repeat admission for management of pelvic pain with known loculated pelvic fluid collection suspicious for TOA. On presentation patient reports nausea on-and-off all day, then felt subjective fever and chills at home. Partner took temperature and reports fever of 102. Denies SOB, CP, body aches, cough, urinary symptoms. Denies pain at PICC site or increasing pain at drain site.      Tonight in the ED she had a T 100.3, , RR 2, SaO2 94%. Hb 9.3 and just finished her menses. WBC 3.36. ANC 2.2 which is an upward trend from her WBC and ANC. No electrolyte abnormalities. Cr WNL. LA = 1.8.  She had a sepsis work-up which included UA which was negative for nitrites and leukocytes, urine culture, peripheral culture x 2, PICC culture, and a CXR which showed bilateral pulmonary infiltrates (new compared with CXR on 3/11). COVID-19 test ordered and azithromycin added to vanc/cefepime started in the ED.    The patient was initially admitted to [facility], where she was admitted for IR drainage of TOA and IV antibiotics on 20, cultures of fluid from pelvic fluid collection grew actinomyces. IR drain placement was not successful in resolving the TOA. She was discharged after improvement in pain. She then presented to [facility] with pelvic pain on 3/5/2020. CT A/P on 3/10 showed a 3.5 x 11 cm fluid  "collection anterior the uterus. Cultures after repeat attempt at IR drainage showed no growth, but given prior culture result she was started on IV Unasyn. She was discharged with plan for repeat CT with follow up with ID. On 3/27 patient reported worsening pain, and CT AP showed heterogeneous, complex fluid collection with multiple loculations and interval increase in size, now approximately 7 x 9 cm transversely and 9 cm craniocaudal. Doxycycline was added by ID and per ID note, patient was to follow up with IR on an outpatient basis for attempted drainage.     Due to increasing pain, she presented to [facility] 3/30 and was admitted for pain control and another attempt at drainage. She was evaluated by ID and continued on Unasyn and Doxy. She had drain placed by IR with 62mL fluid removed and drain left in place. Cultures from drain fluid show NGTD. Repeat CT pelvis showed decrease in size of largest loculation to 4.3 x 8.9 cm. She was discharged with IV Unasyn and Doxy, with PICC and drain in LLQ in place. Was to f/u with Gyn Onc and ID.      Surgical history significant for:   1. 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  She didn't require staging or adjuvant CT and is still alive.  2. 1/27/12: Perforated appendicitis.  Operative laparoscopy converted to an Ex-lap and appendectomy.  3. 8/21/16: Lap ventral hernia repair with a 25 x 15 cm Strattice mesh  4. 4/23/18: Evaluated by General Surgery during an admission and thought to have sigmoid colitis.  Discussed a sigmoid resection with an end colostomy once the patient lost weight.    Hospital Course:  4/1/2020 - Presentation to the ED after T102 and subjective fever/chills at home. T 100.3, , RR 2, SaO2 94%. Hb 9.3 and just finished her menses. WBC 3.36. ANC 2.2 which is an upward trend from her WBC and ANC. No electrolyte abnormalities. Cr WNL. LA = 1.8.  She had a sepsis " work-up which included UA which was negative for nitrites and leukocytes, urine culture, peripheral culture x 2, PICC culture, and a CXR which showed bilateral pulmonary infiltrates (new compared with CXR on 3/11). COVID-19 test ordered and azithromycin added to vanc/cefepime started in the ED.    Review of Systems   Constitutional: Positive for chills, fatigue and fever. Negative for activity change and appetite change.   HENT: Negative for nasal congestion.    Respiratory: Positive for shortness of breath. Negative for cough and wheezing.    Cardiovascular: Negative for chest pain and leg swelling.   Gastrointestinal: Positive for constipation and nausea. Negative for abdominal pain, blood in stool, diarrhea and vomiting.   Endocrine: Positive for diabetes.   Genitourinary: Positive for pelvic pain. Negative for flank pain, hematuria and vaginal bleeding.   Musculoskeletal: Negative for leg pain.   Neurological: Negative for syncope and headaches.     Past Medical History:   Diagnosis Date    Asthma childhood    Diabetes mellitus     GERD (gastroesophageal reflux disease)     Morbid obesity     PCOS (polycystic ovarian syndrome)      Past Surgical History:   Procedure Laterality Date    APPENDECTOMY  2011    HERNIA REPAIR      OOPHORECTOMY      salpingoopherectomy Right 2008     Family History     Problem Relation (Age of Onset)    Diabetes Mother    Heart attack Father, Mother    Hypertension Father        Tobacco Use    Smoking status: Never Smoker    Smokeless tobacco: Never Used   Substance and Sexual Activity    Alcohol use: Not Currently     Comment: occasionally apple cider    Drug use: No    Sexual activity: Yes     Partners: Female       PTA Medications   Medication Sig    acetaminophen (TYLENOL) 325 MG tablet Take 2 tablets (650 mg total) by mouth every 6 (six) hours.    ampicillin sodium/sulbactam Na (AMPICILLIN/SULBACTAM 3 G/100 ML NS, READY TO MIX,) Inject 100 mLs (3 g total) into the  vein every 6 (six) hours.    doxycycline (VIBRA-TABS) 100 MG tablet Take 1 tablet (100 mg total) by mouth 2 (two) times daily. Take with food    dulaglutide (TRULICITY) 1.5 mg/0.5 mL PnIj Inject 1.5 mg into the skin every 7 days.    glipiZIDE (GLUCOTROL) 10 MG TR24 Take 1 tablet (10 mg total) by mouth daily with breakfast.    ibuprofen (ADVIL,MOTRIN) 600 MG tablet Take 1 tablet (600 mg total) by mouth every 6 (six) hours as needed for Pain.    lisinopril (PRINIVIL,ZESTRIL) 2.5 MG tablet Take 1 tablet (2.5 mg total) by mouth once daily.    metFORMIN (GLUCOPHAGE) 1000 MG tablet Take 2,000 mg by mouth.    ondansetron (ZOFRAN) 4 MG tablet Take 1 tablet (4 mg total) by mouth every 6 (six) hours as needed for Nausea.    oxyCODONE (ROXICODONE) 5 MG immediate release tablet Take 1 tablet (5 mg total) by mouth every 4 (four) hours as needed for Pain.    pantoprazole (PROTONIX) 40 MG tablet Take 1 tablet (40 mg total) by mouth before breakfast.    promethazine (PHENERGAN) 25 MG tablet Take 1 tablet (25 mg total) by mouth every 4 (four) hours.    senna (SENOKOT) 8.6 mg tablet Take 1 tablet by mouth 2 (two) times daily.    albuterol (VENTOLIN HFA) 90 mcg/actuation inhaler Inhale 2 puffs into the lungs every 6 (six) hours as needed for Wheezing. Rescue    polyethylene glycol (GLYCOLAX) 17 gram PwPk Take 17 g by mouth 2 (two) times daily as needed.    sodium chloride 0.9% (NORMAL SALINE FLUSH) injection Inject 10 mLs into the vein every 6 (six) hours.       Review of patient's allergies indicates:   Allergen Reactions    Latex, natural rubber Rash     Burning sensation      Objective:     Vital Signs (Most Recent):  Temp: 98.4 °F (36.9 °C) (04/02/20 0502)  Pulse: 104 (04/02/20 0502)  Resp: 17 (04/02/20 0502)  BP: (!) 116/59 (04/02/20 0502)  SpO2: (!) 90 % (04/02/20 0502) Vital Signs (24h Range):  Temp:  [98.2 °F (36.8 °C)-100.3 °F (37.9 °C)] 98.4 °F (36.9 °C)  Pulse:  [] 104  Resp:  [17-22] 17  SpO2:  [90  %-97 %] 90 %  BP: (103-131)/(51-70) 116/59     Weight: 116.7 kg (257 lb 4.4 oz)  Body mass index is 47.06 kg/m².    Physical Exam:   Constitutional: She is oriented to person, place, and time. She appears well-developed. No distress.   Morbidly obese    HENT:   Head: Normocephalic and atraumatic.      Cardiovascular: Normal rate and regular rhythm.     Pulmonary/Chest: Effort normal. No respiratory distress. She has no wheezes.        Abdominal: Soft. Bowel sounds are normal. She exhibits abdominal incision (there is a 10 Maori drain sutured in place exiting the LLQ. Dressing is clean and dry. ). She exhibits no distension (abnormal abdominal contour with hernia palpable through anterior abdomen at and to the right of the midline). There is tenderness (mild, lower abdomen and suprapubic area bilaterally).                 Neurological: She is alert and oriented to person, place, and time.    Skin: Skin is warm and dry.    Psychiatric: Her behavior is normal.     Laboratory:  Recent Labs   Lab 04/02/20  0509   WBC 2.70*   RBC 3.37*   HGB 8.7*   HCT 29.7*   *   MCV 88   MCH 25.8*   MCHC 29.3*     CMP  Sodium   Date Value Ref Range Status   04/02/2020 134 (L) 136 - 145 mmol/L Final     Potassium   Date Value Ref Range Status   04/02/2020 3.7 3.5 - 5.1 mmol/L Final     Chloride   Date Value Ref Range Status   04/02/2020 102 95 - 110 mmol/L Final     CO2   Date Value Ref Range Status   04/02/2020 25 23 - 29 mmol/L Final     Glucose   Date Value Ref Range Status   04/02/2020 139 (H) 70 - 110 mg/dL Final     BUN, Bld   Date Value Ref Range Status   04/02/2020 6 6 - 20 mg/dL Final     Creatinine   Date Value Ref Range Status   04/02/2020 0.7 0.5 - 1.4 mg/dL Final     Calcium   Date Value Ref Range Status   04/02/2020 8.1 (L) 8.7 - 10.5 mg/dL Final     Total Protein   Date Value Ref Range Status   04/01/2020 7.9 6.0 - 8.4 g/dL Final     Albumin   Date Value Ref Range Status   04/01/2020 3.2 (L) 3.5 - 5.2 g/dL Final      Total Bilirubin   Date Value Ref Range Status   04/01/2020 0.2 0.1 - 1.0 mg/dL Final     Comment:     For infants and newborns, interpretation of results should be based  on gestational age, weight and in agreement with clinical  observations.  Premature Infant recommended reference ranges:  Up to 24 hours.............<8.0 mg/dL  Up to 48 hours............<12.0 mg/dL  3-5 days..................<15.0 mg/dL  6-29 days.................<15.0 mg/dL       Alkaline Phosphatase   Date Value Ref Range Status   04/01/2020 71 55 - 135 U/L Final     AST   Date Value Ref Range Status   04/01/2020 46 (H) 10 - 40 U/L Final     ALT   Date Value Ref Range Status   04/01/2020 48 (H) 10 - 44 U/L Final     Anion Gap   Date Value Ref Range Status   04/02/2020 7 (L) 8 - 16 mmol/L Final     eGFR if    Date Value Ref Range Status   04/02/2020 >60.0 >60 mL/min/1.73 m^2 Final     eGFR if non    Date Value Ref Range Status   04/02/2020 >60.0 >60 mL/min/1.73 m^2 Final     Comment:     Calculation used to obtain the estimated glomerular filtration  rate (eGFR) is the CKD-EPI equation.        X-ray Chest Ap Portable  Result Date: 4/1/2020  New bibasilar airspace disease, right side greater than left.  Findings are worrisome for pneumonia or aspiration in the setting of fever. Electronically signed by: Naun Woodall MD Date:    04/01/2020 Time: 22:32          Assessment/Plan:     TOA (tubo-ovarian abscess)  - Culture from OSH - Actinomyces. Followed by ID Dr. Mei  - 7cm x 9cm on CT 3/27 (previously 3cm x 11 cm on 3/10)   - Was on extended course of IV Unasyn with Home Health  - ID added Doxycycline 3/27 as well   - Readmitted 3/29  - IR placed drain 3/30, f/u CT pelvis 3/31 with fluid collection now  4.3 x 8.9 cm  - Cultures 3/30 NGTD  - Discharged with drain in place 4/1  - Drain in place on readmission with 2 mL serosanguinous fluid in bulb  - Antibiotics broadened in ED to vanc/cefepime and  azithromycin 2/2 CXR findings  - Pain in pelvis overall improved - ibuprofen and Tylenol scheduled for pain, with prn narcotics    Abnormal CXR  - Not present on CXR 3/11  - COVID-19 pending  - On broad spectrum abx cefepime and vanc, with azithromycin added 2/2 CXR finding      Asthma  - Childhood asthma. Does not use inhaler at baseline  - Continue PRN albuterol (home med)    Actinomyces infection  - Positive culture from TOA drainage attempt on 2/18 at OSH  - Negative cultures on 3/6  - Repeat cultures from drain placement pending now, NGTD  - ID recommended 6 week course of Unasyn, patient now s/p week 3 of treatment  - Broadened to vanc/cefepime and azithromycin in ED  - ID consult in AM    GERD (gastroesophageal reflux disease)  - Continue home PPI  - TUMS PRN    Essential hypertension  - Continue home Lisinopril  - Hydralazine PRN SBP > 180 mmHg    Morbid obesity with BMI of 45.0-49.9, adult  - Encourage ambulation   - Lovenox and SCDs for DVT PPX    Type 2 diabetes mellitus with hyperglycemia, without long-term current use of insulin  - SSI  - Glucose check fasting, 2 h PP, and bedtime   - Diabetic diet        Ricky Whitney MD  Gynecologic Oncology  [facility]

## 2020-04-02 NOTE — ASSESSMENT & PLAN NOTE
- Not present on CXR 3/11  - COVID-19 pending  - On broad spectrum abx cefepime and vanc, with azithromycin added 2/2 CXR finding

## 2020-04-02 NOTE — PLAN OF CARE
POC reviewed with patient; understanding verbalized. COVID precautions initiated. Pt admitted last night from the ED. LR infusing at 50ml/hr. JONAH drain to stomach from previous drainage of abscess. Pt empties herself. Accuchecks ACHS. Pt with nonskid footwear on, bed in lowest position, and locked with bed rails up x2. Pt instructed to call prior to getting OOB. Pt has call light and personal items within reach. Patient ambulates in room independently. VSS and afebrile this shift. All questions and concerns addressed at this time. Will continue to monitor.

## 2020-04-02 NOTE — ED PROVIDER NOTES
Encounter Date: 4/1/2020       History     Chief Complaint   Patient presents with    Fever     Pt discharged this afternoon from hosp admission for abdominal abscess. Pt now reports fever of TMAX 102.      HPI   Ms Peña is a 30 yo female patient with PMHx of DM, GERD, asthma, PCOS, morbid obesity, currently on IV unasyn for treatment of TOA that presents to the ED for fevers. Patient recently had IR drain and is being followed by GynONC, ID, and GYN for TOA.  Patient one hour after was sent home took her temperature and was 103, she has also felt clammy with cold chills but no body aches. They will not do surgery unless she becomes septic due to her comorbidities. Patient states abdominal pain at her baseline. Rates pain 8/10. Localizes pain to bilateral lower abdomen. Reports a few episodes of nausea,  today but denies fevers, chills, body aches, cough, chest pain, shortness of breath, or urinary symptoms.     Review of patient's allergies indicates:   Allergen Reactions    Latex, natural rubber Rash     Burning sensation     Past Medical History:   Diagnosis Date    Asthma childhood    Diabetes mellitus     GERD (gastroesophageal reflux disease)     Morbid obesity     PCOS (polycystic ovarian syndrome)      Past Surgical History:   Procedure Laterality Date    APPENDECTOMY  2011    HERNIA REPAIR      OOPHORECTOMY      salpingoopherectomy Right 2008     Family History   Problem Relation Age of Onset    Heart attack Father     Hypertension Father     Diabetes Mother     Heart attack Mother     Breast cancer Neg Hx     Colon cancer Neg Hx     Ovarian cancer Neg Hx      Social History     Tobacco Use    Smoking status: Never Smoker    Smokeless tobacco: Never Used   Substance Use Topics    Alcohol use: Not Currently     Comment: occasionally apple cider    Drug use: No     Review of Systems   Constitutional: Positive for chills and fever. Negative for activity change and appetite change.    HENT: Negative for congestion, facial swelling, rhinorrhea and sore throat.    Eyes: Negative for photophobia, pain, discharge and visual disturbance.   Respiratory: Negative for cough, choking, shortness of breath and wheezing.    Cardiovascular: Negative for chest pain, palpitations and leg swelling.   Gastrointestinal: Positive for abdominal pain and nausea. Negative for abdominal distention, constipation, diarrhea and vomiting.   Endocrine: Negative.    Genitourinary: Negative for decreased urine volume, dysuria, genital sores and hematuria.   Musculoskeletal: Negative for arthralgias, back pain, myalgias, neck pain and neck stiffness.   Allergic/Immunologic: Negative.    Neurological: Negative for dizziness, seizures, syncope, weakness, light-headedness, numbness and headaches.   Psychiatric/Behavioral: Negative for agitation, decreased concentration and hallucinations.     Physical Exam     Initial Vitals [04/01/20 2046]   BP Pulse Resp Temp SpO2   131/65 (!) 117 20 100.3 °F (37.9 °C) (!) 92 %      MAP       --         Physical Exam    Nursing note and vitals reviewed.  Constitutional: She appears well-nourished. She is not diaphoretic. No distress.   Comfortable well appearing 30 yo female, tachypneic 20's, satting 94% on RA.    HENT:   Head: Atraumatic.   Nose: Nose normal.   Mouth/Throat: No oropharyngeal exudate.   Eyes: Conjunctivae and EOM are normal. Pupils are equal, round, and reactive to light.   Neck: Normal range of motion. Neck supple.   Cardiovascular: Normal rate, regular rhythm, normal heart sounds and intact distal pulses. Exam reveals no gallop and no friction rub.    No murmur heard.  Pulmonary/Chest: Breath sounds normal. No respiratory distress. She has no wheezes. She has no rales.   Abdominal: Soft. She exhibits no distension. There is tenderness. There is no rebound and no guarding.   Bilateral lower abdominal tenderness on exam. JONAH drain appreciated, no output.    Musculoskeletal:  "Normal range of motion. She exhibits no edema or tenderness.   Neurological: She is alert and oriented to person, place, and time. She displays normal reflexes. No cranial nerve deficit or sensory deficit.   Skin: Skin is warm and dry. Capillary refill takes less than 2 seconds. No erythema. No pallor.   Psychiatric: She has a normal mood and affect.       ED Course   Procedures  Labs Reviewed   CBC W/ AUTO DIFFERENTIAL - Abnormal; Notable for the following components:       Result Value    WBC 3.36 (*)     RBC 3.56 (*)     Hemoglobin 9.3 (*)     Hematocrit 31.5 (*)     Mean Corpuscular Hemoglobin 26.1 (*)     Mean Corpuscular Hemoglobin Conc 29.5 (*)     RDW 15.9 (*)     Platelets 142 (*)     Lymph # 0.9 (*)     Mono # 0.2 (*)     All other components within normal limits   COMPREHENSIVE METABOLIC PANEL - Abnormal; Notable for the following components:    Sodium 135 (*)     CO2 21 (*)     Glucose 174 (*)     Albumin 3.2 (*)     AST 46 (*)     ALT 48 (*)     All other components within normal limits   CULTURE, BLOOD   CULTURE, BLOOD   CULTURE, BLOOD   LACTIC ACID, PLASMA   URINALYSIS, REFLEX TO URINE CULTURE   SARS-COV-2 (COVID-19) QUALITATIVE PCR   POCT URINE PREGNANCY          Imaging Results          X-Ray Chest AP Portable (Final result)  Result time 04/01/20 22:32:24    Final result by Nanu Woodall MD (04/01/20 22:32:24)                 Impression:      New bibasilar airspace disease, right side greater than left.  Findings are worrisome for pneumonia or aspiration in the setting of fever.      Electronically signed by: Naun Woodall MD  Date:    04/01/2020  Time:    22:32             Narrative:    EXAMINATION:  XR CHEST AP PORTABLE    CLINICAL HISTORY:  Provided history is "  Fever, unspecified".    TECHNIQUE:  One view of the chest.    COMPARISON:  03/11/2020.    FINDINGS:  Patient rotation and exaggerated kyphosis limits evaluation.  Lung volumes are low.  Cardiomediastinal silhouette is magnified by " portable technique but not felt to be enlarged.  There is new bibasilar airspace disease, right side greater than left.  No large pleural effusion.  No pneumothorax.                                 Medical Decision Making:   Initial Assessment:   Ms Peña is a 28 yo female patient with PMHx of DM, GERD, asthma, PCOS, morbid obesity, currently on IV unasyn for treatment of TOA that presents to the ED for fevers. Patient currently afebrile, but reports 102-103 temps at home with chills. Patient currently tachycardic, and tahcypneic otherwise HDS. Blood cultures X2, with culture from PICC. IV vancomycin and cefepime ordered in the setting of neutropenic fever. Differential includes TOA rupture, PICC line, Urosepsis, HPAP. GY[facility] aware of patient.                    ED Course as of Apr 01 2324   Wed Apr 01, 2020 2316 Bilateral infiltrates on chest Xray concerning COVID in setting of prolonged hospital stay. although patient otherwise asymptomatic at this time . UA pending. Added Azithromycin to the patient to cover for pneumonia although patient already broadened to Vancomycin and Cefepime. Spoke with [facility]yn[facility] after speaking with charge about patient being PUI up on the floor which is allowed. Patient will be admitted to [facility].     [JV]   2316 Spoke with patient to update in regards to plan. She is aware of her admission and that the gynONC will see her in the morning.     [JV]      ED Course User Index  [JV] Zoran Carlin MD          Clinical Impression:       ICD-10-CM ICD-9-CM   1. TOA (tubo-ovarian abscess) N70.93 614.2   2. Febrile R50.9 780.60         ED Disposition Condition    Admit                     Zoran Carlin MD  Resident  04/01/20 0483

## 2020-04-02 NOTE — HOSPITAL COURSE
4/1/2020 - Presentation to the ED after T102 and subjective fever/chills at home. T 100.3, , RR 2, SaO2 94%. Hb 9.3 and just finished her menses. WBC 3.36. ANC 2.2 which is an upward trend from her WBC and ANC. No electrolyte abnormalities. Cr WNL. LA = 1.8.  She had a sepsis work-up which included UA which was negative for nitrites and leukocytes, urine culture, peripheral culture x 2, PICC culture, and a CXR which showed bilateral pulmonary infiltrates (new compared with CXR on 3/11). COVID-19 test ordered and azithromycin added to vanc/cefepime started in the ED.    04/02/2020 - Patient appears febrile and reports SOB with movement which is new.   O2 sat as low as 90% on RA. Continuous pulse ox and O2 by NC to maintain O2sat > 92% ordered. PRN albuterol changed to scheduled. COVID-19 result pending. CTA to eval for PE ordered. Patient on PPX Lovenox already. Resp panel/flu A/B ordered.   Lab review shoes persistence of leukopenia with WBC 2.7 and ANC down to 1.3. Hgb with further decrease from 9.3>8.7. Cr stable.  Urine culture for legionella and strep pneumo added as well.  ID reconsulted. Continued on vanc/cefepime/azithromycin until further recs given.   For BG management patient is on SSI fasting, 2h PP, and bedtime.   CBC and CMP ordered for the morning.

## 2020-04-02 NOTE — ASSESSMENT & PLAN NOTE
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 Followed by ID Dr. Mei  - 7cm x 9cm on CT 3/27 (previously 3cm x 11 cm on 3/10)   - Was on extended course of IV Unasyn with Home Health  - ID added Doxycycline 3/27 as well   - Readmitted 3/29  - IR placed drain 3/30, f/u CT pelvis 3/31 with fluid collection now  4.3 x 8.9 cm  - Cultures 3/30 NGTD  - Discharged with drain in place 4/1  - Drain in place on readmission with 2 mL serosanguinous fluid in bulb  - Antibiotics broadened in ED to vanc/cefepime and azithromycin 2/2 CXR findings  - Pain in pelvis overall improved - ibuprofen and Tylenol scheduled for pain, with prn narcotics  - Will remove pelvic drain this morning as no new output x 24 hours

## 2020-04-02 NOTE — PROGRESS NOTES
Assisting my coworker ANTHONY DavisW, this afternoon and faxed the revised home health and infusion orders to Elisha's attention at [facility] at [phone].

## 2020-04-02 NOTE — PROGRESS NOTES
Pharmacokinetic Initial Assessment & Plan: IV Vancomycin    Initiate intravenous vancomycin 2000 mg every 12 hours.   Draw vancomycin trough level 30 min prior to fourth dose on 04/03 at approximately 2100   Desired empiric serum trough concentration is 15 to 20 mcg/mL    Pharmacy will continue to follow and monitor vancomycin.    Please contact pharmacy at extension [phone] with any questions regarding this assessment.     Thank you for the consult,   Ankur Gore       Patient brief summary:  Glenda Peña is a 29 y.o. female initiated on antimicrobial therapy with IV Vancomycin for treatment of suspected  Intra-abdominal Comment: leukopenia, fever    Drug Allergies:   Review of patient's allergies indicates:   Allergen Reactions    Latex, natural rubber Rash     Burning sensation       Actual Body Weight:   116.7 kg    Renal Function:   Estimated Creatinine Clearance: 124.5 mL/min (based on SCr of 0.8 mg/dL).,     CBC (last 72 hours):  Recent Labs   Lab Result Units 03/31/20  0651 04/01/20  2137   WBC K/uL 2.82* 3.36*   Hemoglobin g/dL 9.0* 9.3*   Hematocrit % 31.1* 31.5*   Platelets K/uL 154 142*   Gran% % 45.0 64.6   Lymph% % 45.4 28.0   Mono% % 7.4 6.5   Eosinophil% % 1.4 0.3   Basophil% % 0.4 0.3   Differential Method  Automated Automated       Metabolic Panel (last 72 hours):  Recent Labs   Lab Result Units 04/01/20  0923 04/01/20  2137   Sodium mmol/L 135* 135*   Potassium mmol/L 3.5 3.8   Chloride mmol/L 104 103   CO2 mmol/L 24 21*   Glucose mg/dL 127* 174*   BUN, Bld mg/dL 8 6   Creatinine mg/dL 0.7 0.8   Albumin g/dL 3.1* 3.2*   Total Bilirubin mg/dL 0.3 0.2   Alkaline Phosphatase U/L 64 71   AST U/L 32 46*   ALT U/L 41 48*       Drug levels (last 3 results):  No results for input(s): VANCOMYCINRA, VANCOMYCINPE, VANCOMYCINTR in the last 72 hours.    Microbiologic Results:  Microbiology Results (last 7 days)     Procedure Component Value Units Date/Time    Blood culture #1 **CANNOT BE ORDERED STAT**  [[id number]] Collected:  04/01/20 2210    Order Status:  Sent Specimen:  Blood from Line, PICC Right Basilic Updated:  04/01/20 2219    Blood culture #1 **CANNOT BE ORDERED STAT** [[id number]] Collected:  04/01/20 2137    Order Status:  Sent Specimen:  Blood from Peripheral, Antecubital, Right Updated:  04/01/20 2146    Blood culture #2 **CANNOT BE ORDERED STAT** [[id number]] Collected:  04/01/20 2137    Order Status:  Sent Specimen:  Blood from Peripheral, Antecubital, Left Updated:  04/01/20 2145

## 2020-04-02 NOTE — ASSESSMENT & PLAN NOTE
- Afebrile throughout last admission  - Fever at home reported 102-103  - In ED had CXR (see below), LA 1.8, WBC 3.3 with ANC 2.2, blood and PICC cx pending, urine culture pending, COVID-19 pending  - PICC with no evidence of infection   - Pelvic drain with no evidence of infection

## 2020-04-02 NOTE — HPI
Glenda Peña is a 29 y.o.  with PMH significant for morbid obesity, asthma, T2DM, HTN, and large complex midline and R-sided ventral hernia who presented to [facility] ED tonight with CC of fever as high as 102 at home. She was discharged from the hospital this early this evening after repeat admission for management of pelvic pain with known loculated pelvic fluid collection suspicious for TOA. On presentation patient reports nausea on-and-off all day, then felt subjective fever and chills at home. Partner took temperature and reports fever of 102. Denies SOB, CP, body aches, cough, urinary symptoms. Denies pain at PICC site or increasing pain at drain site.      Tonight in the ED she had a T 100.3, , RR 2, SaO2 94%. Hb 9.3 and just finished her menses. WBC 3.36. ANC 2.2 which is an upward trend from her WBC and ANC. No electrolyte abnormalities. Cr WNL. LA = 1.8.  She had a sepsis work-up which included UA which was negative for nitrites and leukocytes, urine culture, peripheral culture x 2, PICC culture, and a CXR which showed bilateral pulmonary infiltrates (new compared with CXR on 3/11). COVID-19 test ordered and azithromycin added to vanc/cefepime started in the ED.    The patient was initially admitted to , where she was admitted for IR drainage of TOA and IV antibiotics on 20, cultures of fluid from pelvic fluid collection grew actinomyces. IR drain placement was not successful in resolving the TOA. She was discharged after improvement in pain. She then presented to [facility] with pelvic pain on 3/5/2020. CT A/P on 3/10 showed a 3.5 x 11 cm fluid collection anterior the uterus. Cultures after repeat attempt at IR drainage showed no growth, but given prior culture result she was started on IV Unasyn. She was discharged with plan for repeat CT with follow up with ID. On 3/27 patient reported worsening pain, and CT AP showed heterogeneous, complex fluid  "collection with multiple loculations and interval increase in size, now approximately 7 x 9 cm transversely and 9 cm craniocaudal. Doxycycline was added by ID and per ID note, patient was to follow up with IR on an outpatient basis for attempted drainage.     Due to increasing pain, she presented [place] [facility] [place] and was admitted for pain control and another attempt at drainage. She was evaluated by ID and continued on Unasyn and Doxy. She had drain placed by IR with 62mL fluid removed and drain left in place. Cultures from drain fluid show NGTD. Repeat CT pelvis showed decrease in size of largest loculation to 4.3 x 8.9 cm. She was discharged with IV Unasyn and Doxy, with PICC and drain in LLQ in place. Was to f/u with Gyn Onc and ID.      Surgical history significant for:   1. 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  She didn't require staging or adjuvant CT and is still alive.  2. 1/27/12: Perforated appendicitis.  Operative laparoscopy converted to an Ex-lap and appendectomy.  3. 8/21/16: Lap ventral hernia repair with a 25 x 15 cm Strattice mesh  4. 4/23/18: Evaluated by General Surgery during an admission and thought to have sigmoid colitis.  Discussed a sigmoid resection with an end colostomy once the patient lost weight.  "

## 2020-04-02 NOTE — ASSESSMENT & PLAN NOTE
- Not present on CXR 3/11  - COVID-19 pending  - On broad spectrum abx cefepime and vanc, with azithromycin added 2/2 CXR finding  - ID consulted - awaiting recs  - Respiratory panel pending  - Flu A/B pending  - CTA to r/o PE ordered, discussed with radiology  - Will order sputum culture if patient develops cough  - Continuous pulse ox ordered  - O2 by NC to keep O2Sat > 92%  - Albuterol scheduled  - IS and peak flow to bedside

## 2020-04-02 NOTE — PROGRESS NOTES
Called patient to confirm cancellation for vasectomy on 5/18 no answer on phone will attempt to call at a later time [facility]  Gynecologic Oncology  Progress Note      Patient Name: Glenda Peña  MRN: [id number]  Admission Date: 2020  Hospital Length of Stay: 1 days  Attending Provider: Ritchie Millan MD  Primary Care Provider: Emiliano Dunbar MD  Principal Problem: Fever     Subjective:      History of Present Illness:  Glenda Peña is a 29 y.o.  with PMH significant for morbid obesity, asthma, T2DM, HTN, and large complex midline and R-sided ventral hernia who presented to [facility] ED tonight with CC of fever as high as 102 at home. She was discharged from the hospital this early this evening after repeat admission for management of pelvic pain with known loculated pelvic fluid collection suspicious for TOA. On presentation patient reports nausea on-and-off all day, then felt subjective fever and chills at home. Partner took temperature and reports fever of 102. Denies SOB, CP, body aches, cough, urinary symptoms. Denies pain at PICC site or increasing pain at drain site.      Tonight in the ED she had a T 100.3, , RR 2, SaO2 94%. Hb 9.3 and just finished her menses. WBC 3.36. ANC 2.2 which is an upward trend from her WBC and ANC. No electrolyte abnormalities. Cr WNL. LA = 1.8.  She had a sepsis work-up which included UA which was negative for nitrites and leukocytes, urine culture, peripheral culture x 2, PICC culture, and a CXR which showed bilateral pulmonary infiltrates (new compared with CXR on 3/11). COVID-19 test ordered and azithromycin added to vanc/cefepime started in the ED.    The patient was [place], where she was admitted for IR drainage of TOA and IV antibiotics on 20, cultures of fluid from pelvic fluid collection grew actinomyces. IR drain placement was not successful in resolving the TOA. She was discharged after improvement in pain. She then presented to [facility] with pelvic pain on 3/5/2020. CT A/P on 3/10 showed  "a 3.5 x 11 cm fluid collection anterior the uterus. Cultures after repeat attempt at IR drainage showed no growth, but given prior culture result she was started on IV Unasyn. She was discharged with plan for repeat CT with follow up with ID. On 3/27 patient reported worsening pain, and CT AP showed heterogeneous, complex fluid collection with multiple loculations and interval increase in size, now approximately 7 x 9 cm transversely and 9 cm craniocaudal. Doxycycline was added by ID and per ID note, patient was to follow up with IR on an outpatient basis for attempted drainage.     Due to increasing pain, she presented to [facility] 3/30 and was admitted for pain control and another attempt at drainage. She was evaluated by ID and continued on Unasyn and Doxy. She had drain placed by IR with 62mL fluid removed and drain left in place. Cultures from drain fluid show NGTD. Repeat CT pelvis showed decrease in size of largest loculation to 4.3 x 8.9 cm. She was discharged with IV Unasyn and Doxy, with PICC and drain in LLQ in place. Was to f/u with Gyn Onc and ID.      Surgical history significant for:   1. 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  She didn't require staging or adjuvant CT and is still alive.  2. 1/27/12: Perforated appendicitis.  Operative laparoscopy converted to an Ex-lap and appendectomy.  3. 8/21/16: Lap ventral hernia repair with a 25 x 15 cm Strattice mesh  4. 4/23/18: Evaluated by General Surgery during an admission and thought to have sigmoid colitis.  Discussed a sigmoid resection with an end colostomy once the patient lost weight.    Hospital Course:  4/1/2020 - Presentation to the ED after T102 and subjective fever/chills at home. T 100.3, , RR 2, SaO2 94%. Hb 9.3 and just finished her menses. WBC 3.36. ANC 2.2 which is an upward trend from her WBC and ANC. No electrolyte abnormalities. Cr WNL. LA = 1.8.  She " had a sepsis work-up which included UA which was negative for nitrites and leukocytes, urine culture, peripheral culture x 2, PICC culture, and a CXR which showed bilateral pulmonary infiltrates (new compared with CXR on 3/11). COVID-19 test ordered and azithromycin added to vanc/cefepime started in the ED.    04/02/2020 - Patient appears febrile and reports SOB with movement which is new.   O2 sat as low as 90% on RA. Continuous pulse ox and O2 by NC to maintain O2sat > 92% ordered. PRN albuterol changed to scheduled. COVID-19 result pending. CTA to eval for PE ordered. Patient on PPX Lovenox already. Resp panel/flu A/B ordered.   Lab review shoes persistence of leukopenia with WBC 2.7 and ANC down to 1.3. Hgb with further decrease from 9.3>8.7. Cr stable.  Urine culture for legionella and strep pneumo added as well.  ID reconsulted. Continued on vanc/cefepime/azithromycin until further recs given.   For BG management patient is on SSI fasting, 2h PP, and bedtime.   CBC and CMP ordered for the morning.     Objective:      Temp:  [98.2 °F (36.8 °C)-100.3 °F (37.9 °C)] 98.4 °F (36.9 °C)  Pulse:  [] 104  Resp:  [17-22] 17  SpO2:  [90 %-97 %] 90 %  BP: (103-131)/(51-70) 116/59      Weight: 116.7 kg (257 lb 4.4 oz)  Body mass index is 47.06 kg/m².     Physical Exam:   Constitutional: She is oriented to person, place, and time. She appears well-developed. No distress but appears febrile with flushed cheeks.  Morbidly obese    HENT:   Head: Normocephalic and atraumatic.      Cardiovascular: Normal rate and regular rhythm.     Pulmonary/Chest: Effort normal. No respiratory distress. No crackles bilaterally. She has no wheezes.      Abdominal: Soft. Bowel sounds are normal. She exhibits abdominal incision (there is a 10 Chadian drain sutured in place exiting the LLQ. Dressing is clean and dry. ). She exhibits no distension (abnormal abdominal contour with hernia palpable through anterior abdomen at and to the right of  the midline). There is tenderness (mild, lower abdomen and suprapubic area bilaterally).                 Neurological: She is alert and oriented to person, place, and time.    Skin: Skin is warm and very slightly diaphoretic.    Psychiatric: Her behavior is normal.     Assessment/Plan:     * Fever  - Afebrile throughout last admission  - Fever at home reported 102-103  - In ED had CXR (see below), LA 1.8, WBC 3.3 with ANC 2.2, blood and PICC cx pending, urine culture pending, COVID-19 pending  - PICC with no evidence of infection   - Pelvic drain with no evidence of infection     TOA (tubo-ovarian abscess)  - Culture from OSH - Actinomyces. Followed by ID Dr. Mei  - 7cm x 9cm on CT 3/27 (previously 3cm x 11 cm on 3/10)   - Was on extended course of IV Unasyn with Home Health  - ID added Doxycycline 3/27 as well   - Readmitted 3/29  - IR placed drain 3/30, f/u CT pelvis 3/31 with fluid collection now  4.3 x 8.9 cm  - Cultures 3/30 NGTD  - Discharged with drain in place 4/1  - Drain in place on readmission with 2 mL serosanguinous fluid in bulb  - Antibiotics broadened in ED to vanc/cefepime and azithromycin 2/2 CXR findings  - Pain in pelvis overall improved - ibuprofen and Tylenol scheduled for pain, with prn narcotics  - Will remove pelvic drain this morning as no new output x 24 hours    Abnormal CXR  - Not present on CXR 3/11  - COVID-19 pending  - On broad spectrum abx cefepime and vanc, with azithromycin added 2/2 CXR finding  - ID consulted - awaiting recs  - Respiratory panel pending  - Flu A/B pending  - CTA to r/o PE ordered, discussed with radiology  - Will order sputum culture if patient develops cough  - Continuous pulse ox ordered  - O2 by NC to keep O2Sat > 92%  - Albuterol scheduled  - IS and peak flow to bedside      Asthma  - Childhood asthma. Does not use inhaler at baseline  - Albuterol now scheduled (see abnormal CXR)    Actinomyces infection  - Positive culture from TOA drainage attempt on  2/18 at OSH  - Negative cultures on 3/6  - Repeat cultures from drain placement pending now, NGTD  - ID recommended 6 week course of Unasyn, patient now s/p week 3 of treatment  - Broadened to vanc/cefepime and azithromycin in ED  - ID consulted    GERD (gastroesophageal reflux disease)  - Continue home PPI  - TUMS PRN    Essential hypertension  - Continue home Lisinopril  - Hydralazine PRN SBP > 180 mmHg    Morbid obesity with BMI of 45.0-49.9, adult  - Encourage ambulation   - Lovenox and SCDs for DVT PPX    Type 2 diabetes mellitus with hyperglycemia, without long-term current use of insulin  - SSI  - Glucose check fasting, 2 h PP, and bedtime   - Diabetic diet      VTE Risk Mitigation (From admission, onward)         Ordered     enoxaparin injection 40 mg  Daily      04/01/20 2344     Place sequential compression device  Until discontinued      04/01/20 2344     IP VTE HIGH RISK PATIENT  Once      04/01/20 2344              Ricky Whitney MD  Gynecologic Oncology  [facility]

## 2020-04-02 NOTE — PROCEDURES
Radiology Post-Procedure Note    Pre Op Diagnosis: TOA    Post Op Diagnosis: Same    Procedure: Tube check    Procedure performed by: Brent Mcmillan MD    Written Informed Consent Obtained: Yes  Specimen Removed: NO  Estimated Blood Loss: Minimal    Findings:   Tube check did not show communication with bowel. No significant residual cavity.    Patient tolerated procedure well.    Brent Mcmillan M.D.  Diagnostic and Interventional Radiologist  Department of Radiology  Pager: [phone]

## 2020-04-02 NOTE — PROGRESS NOTES
"Patient was discharged from the hospital on her ride home felt chills and had a T = 102.  It was associated with nausea.  -CP, SOA, cough, emesis. +Flatus. She was receiving Tylenol scheduled prior to discharge and had been afebrile for 3 days and has no evidence of fevers on previous admissions.  JONAH drain in place. PICC in place. -Erythema or tenderness.  On presentation to the ED she had a T 100.3, , RR 2, SaO2 94%.  Hb 9.3 and just finished her menses.  WBC 3.36. ANC 2.2.  This is an upward trend from her WBC and ANC.  No electrolyte abnormalities. Cr WNL. LA = 1.8.  She had a sepsis work-up which included UA which was negative for nitrites and leukocytes, peripheral and PICC cultures, and a CXR which showed bilateral pulmonary infiltrates.      This morning the patient looks much worse to me than yesterday.  She is a slightly dyspneic and does report some SOA.  She also "feels warm."  -CP, N/V.  Pain is controlled.  Afebrile, VSS. SaO2 = 90. RR 17. . UO not measured.  Distressed with some use of accessory muscles but difficult to  due to her morbid obesity. Crackles bilaterally in the lung bases.  Abdomen is soft, NT/ND.  PICC line in place without signs of infection.  JONAH drain with minimal output.  No signs of infection.  No SCDs.    1. CNS/Pain: Scheduled tylenol, ibuprofen, and oxycodone PRN.  We will discuss scheduled Tyelnol in the background of her clinical picture.  2. CV/Resp: h/o CHTN, asthma.  Continuous pulse oximetry to maintain  SaO2>90%.  Albuterol inhaler scheduled.  IS. ID consult.  F/U COVID, urine strep pneumo and legionella, respiratory panel.  Sputum cultures ordered. CT Chest PE to rule out a PE.  3. FEN/GI: h/o GERD. Albumin < 3.5. SLIVF once patient is tolerating PO. Recheck CMP tomorrow due to repeated contrast exposure, antibiotics, and NSAIDs. Diabetic diet, SSI.  4. : STRICT I/OS  5. Heme/ID: See above.  Repeat CBC in AM.  Day 2 of Vanc/Zosyn/Azithrmocyin.  Cultures " from 3/30 have NGTD.  F/U ID.  6. Endo: No activue issues.  7. Prophylaxis: Lovenox, ambulation

## 2020-04-02 NOTE — SUBJECTIVE & OBJECTIVE
Review of Systems   Constitutional: Positive for chills, fatigue and fever. Negative for activity change and appetite change.   HENT: Negative for nasal congestion.    Respiratory: Positive for shortness of breath. Negative for cough and wheezing.    Cardiovascular: Negative for chest pain and leg swelling.   Gastrointestinal: Positive for constipation and nausea. Negative for abdominal pain, blood in stool, diarrhea and vomiting.   Endocrine: Positive for diabetes.   Genitourinary: Positive for pelvic pain. Negative for flank pain, hematuria and vaginal bleeding.   Musculoskeletal: Negative for leg pain.   Neurological: Negative for syncope and headaches.     Past Medical History:   Diagnosis Date    Asthma childhood    Diabetes mellitus     GERD (gastroesophageal reflux disease)     Morbid obesity     PCOS (polycystic ovarian syndrome)      Past Surgical History:   Procedure Laterality Date    APPENDECTOMY  2011    HERNIA REPAIR      OOPHORECTOMY      salpingoopherectomy Right 2008     Family History     Problem Relation (Age of Onset)    Diabetes Mother    Heart attack Father, Mother    Hypertension Father        Tobacco Use    Smoking status: Never Smoker    Smokeless tobacco: Never Used   Substance and Sexual Activity    Alcohol use: Not Currently     Comment: occasionally apple cider    Drug use: No    Sexual activity: Yes     Partners: Female       PTA Medications   Medication Sig    acetaminophen (TYLENOL) 325 MG tablet Take 2 tablets (650 mg total) by mouth every 6 (six) hours.    ampicillin sodium/sulbactam Na (AMPICILLIN/SULBACTAM 3 G/100 ML NS, READY TO MIX,) Inject 100 mLs (3 g total) into the vein every 6 (six) hours.    doxycycline (VIBRA-TABS) 100 MG tablet Take 1 tablet (100 mg total) by mouth 2 (two) times daily. Take with food    dulaglutide (TRULICITY) 1.5 mg/0.5 mL PnIj Inject 1.5 mg into the skin every 7 days.    glipiZIDE (GLUCOTROL) 10 MG TR24 Take 1 tablet (10 mg total) by  mouth daily with breakfast.    ibuprofen (ADVIL,MOTRIN) 600 MG tablet Take 1 tablet (600 mg total) by mouth every 6 (six) hours as needed for Pain.    lisinopril (PRINIVIL,ZESTRIL) 2.5 MG tablet Take 1 tablet (2.5 mg total) by mouth once daily.    metFORMIN (GLUCOPHAGE) 1000 MG tablet Take 2,000 mg by mouth.    ondansetron (ZOFRAN) 4 MG tablet Take 1 tablet (4 mg total) by mouth every 6 (six) hours as needed for Nausea.    oxyCODONE (ROXICODONE) 5 MG immediate release tablet Take 1 tablet (5 mg total) by mouth every 4 (four) hours as needed for Pain.    pantoprazole (PROTONIX) 40 MG tablet Take 1 tablet (40 mg total) by mouth before breakfast.    promethazine (PHENERGAN) 25 MG tablet Take 1 tablet (25 mg total) by mouth every 4 (four) hours.    senna (SENOKOT) 8.6 mg tablet Take 1 tablet by mouth 2 (two) times daily.    albuterol (VENTOLIN HFA) 90 mcg/actuation inhaler Inhale 2 puffs into the lungs every 6 (six) hours as needed for Wheezing. Rescue    polyethylene glycol (GLYCOLAX) 17 gram PwPk Take 17 g by mouth 2 (two) times daily as needed.    sodium chloride 0.9% (NORMAL SALINE FLUSH) injection Inject 10 mLs into the vein every 6 (six) hours.       Review of patient's allergies indicates:   Allergen Reactions    Latex, natural rubber Rash     Burning sensation      Objective:     Vital Signs (Most Recent):  Temp: 98.4 °F (36.9 °C) (04/02/20 0502)  Pulse: 104 (04/02/20 0502)  Resp: 17 (04/02/20 0502)  BP: (!) 116/59 (04/02/20 0502)  SpO2: (!) 90 % (04/02/20 0502) Vital Signs (24h Range):  Temp:  [98.2 °F (36.8 °C)-100.3 °F (37.9 °C)] 98.4 °F (36.9 °C)  Pulse:  [] 104  Resp:  [17-22] 17  SpO2:  [90 %-97 %] 90 %  BP: (103-131)/(51-70) 116/59     Weight: 116.7 kg (257 lb 4.4 oz)  Body mass index is 47.06 kg/m².    Physical Exam:   Constitutional: She is oriented to person, place, and time. She appears well-developed. No distress.   Morbidly obese    HENT:   Head: Normocephalic and atraumatic.       Cardiovascular: Normal rate and regular rhythm.     Pulmonary/Chest: Effort normal. No respiratory distress. She has no wheezes.        Abdominal: Soft. Bowel sounds are normal. She exhibits abdominal incision (there is a 10 Vietnamese drain sutured in place exiting the LLQ. Dressing is clean and dry. ). She exhibits no distension (abnormal abdominal contour with hernia palpable through anterior abdomen at and to the right of the midline). There is tenderness (mild, lower abdomen and suprapubic area bilaterally).                 Neurological: She is alert and oriented to person, place, and time.    Skin: Skin is warm and dry.    Psychiatric: Her behavior is normal.     Laboratory:  Recent Labs   Lab 04/02/20  0509   WBC 2.70*   RBC 3.37*   HGB 8.7*   HCT 29.7*   *   MCV 88   MCH 25.8*   MCHC 29.3*     CMP  Sodium   Date Value Ref Range Status   04/02/2020 134 (L) 136 - 145 mmol/L Final     Potassium   Date Value Ref Range Status   04/02/2020 3.7 3.5 - 5.1 mmol/L Final     Chloride   Date Value Ref Range Status   04/02/2020 102 95 - 110 mmol/L Final     CO2   Date Value Ref Range Status   04/02/2020 25 23 - 29 mmol/L Final     Glucose   Date Value Ref Range Status   04/02/2020 139 (H) 70 - 110 mg/dL Final     BUN, Bld   Date Value Ref Range Status   04/02/2020 6 6 - 20 mg/dL Final     Creatinine   Date Value Ref Range Status   04/02/2020 0.7 0.5 - 1.4 mg/dL Final     Calcium   Date Value Ref Range Status   04/02/2020 8.1 (L) 8.7 - 10.5 mg/dL Final     Total Protein   Date Value Ref Range Status   04/01/2020 7.9 6.0 - 8.4 g/dL Final     Albumin   Date Value Ref Range Status   04/01/2020 3.2 (L) 3.5 - 5.2 g/dL Final     Total Bilirubin   Date Value Ref Range Status   04/01/2020 0.2 0.1 - 1.0 mg/dL Final     Comment:     For infants and newborns, interpretation of results should be based  on gestational age, weight and in agreement with clinical  observations.  Premature Infant recommended reference ranges:  Up to  24 hours.............<8.0 mg/dL  Up to 48 hours............<12.0 mg/dL  3-5 days..................<15.0 mg/dL  6-29 days.................<15.0 mg/dL       Alkaline Phosphatase   Date Value Ref Range Status   04/01/2020 71 55 - 135 U/L Final     AST   Date Value Ref Range Status   04/01/2020 46 (H) 10 - 40 U/L Final     ALT   Date Value Ref Range Status   04/01/2020 48 (H) 10 - 44 U/L Final     Anion Gap   Date Value Ref Range Status   04/02/2020 7 (L) 8 - 16 mmol/L Final     eGFR if    Date Value Ref Range Status   04/02/2020 >60.0 >60 mL/min/1.73 m^2 Final     eGFR if non    Date Value Ref Range Status   04/02/2020 >60.0 >60 mL/min/1.73 m^2 Final     Comment:     Calculation used to obtain the estimated glomerular filtration  rate (eGFR) is the CKD-EPI equation.        X-ray Chest Ap Portable  Result Date: 4/1/2020  New bibasilar airspace disease, right side greater than left.  Findings are worrisome for pneumonia or aspiration in the setting of fever. Electronically signed by: Naun Woodall MD Date:    04/01/2020 Time: 22:32

## 2020-04-02 NOTE — ASSESSMENT & PLAN NOTE
- Positive culture from TOA drainage attempt on 2/18 at OSH  - Negative cultures on 3/6  - Repeat cultures from drain placement pending now, NGTD  - ID recommended 6 week course of Unasyn, patient now s/p week 3 of treatment  - Broadened to vanc/cefepime and azithromycin in ED  - ID consult in AM

## 2020-04-02 NOTE — ASSESSMENT & PLAN NOTE
- Positive culture from TOA drainage attempt on 2/18 at OSH  - Negative cultures on 3/6  - Repeat cultures from drain placement pending now, NGTD  - ID recommended 6 week course of Unasyn, patient now s/p week 3 of treatment  - Broadened to vanc/cefepime and azithromycin in ED  - ID consulted

## 2020-04-02 NOTE — ED TRIAGE NOTES
29 year old female presents to the ED c/o fever. Recently discharged after hospital stay for TOA.

## 2020-04-03 ENCOUNTER — PATIENT OUTREACH (OUTPATIENT)
Dept: ADMINISTRATIVE | Facility: CLINIC | Age: 30
End: 2020-04-03

## 2020-04-03 PROBLEM — U07.1 COVID-19 VIRUS INFECTION: Status: ACTIVE | Noted: 2020-04-03

## 2020-04-03 LAB
ALBUMIN SERPL BCP-MCNC: 3 G/DL (ref 3.5–5.2)
ALP SERPL-CCNC: 78 U/L (ref 55–135)
ALT SERPL W/O P-5'-P-CCNC: 56 U/L (ref 10–44)
ANION GAP SERPL CALC-SCNC: 10 MMOL/L (ref 8–16)
AST SERPL-CCNC: 54 U/L (ref 10–40)
BASOPHILS # BLD AUTO: 0.01 K/UL (ref 0–0.2)
BASOPHILS # BLD AUTO: 0.01 K/UL (ref 0–0.2)
BASOPHILS NFR BLD: 0.4 % (ref 0–1.9)
BASOPHILS NFR BLD: 0.4 % (ref 0–1.9)
BILIRUB SERPL-MCNC: 0.3 MG/DL (ref 0.1–1)
BUN SERPL-MCNC: 5 MG/DL (ref 6–20)
CALCIUM SERPL-MCNC: 9 MG/DL (ref 8.7–10.5)
CHLORIDE SERPL-SCNC: 104 MMOL/L (ref 95–110)
CK SERPL-CCNC: 20 U/L (ref 20–180)
CO2 SERPL-SCNC: 26 MMOL/L (ref 23–29)
CREAT SERPL-MCNC: 0.7 MG/DL (ref 0.5–1.4)
CRP SERPL-MCNC: 46.5 MG/L (ref 0–8.2)
DIFFERENTIAL METHOD: ABNORMAL
DIFFERENTIAL METHOD: ABNORMAL
EOSINOPHIL # BLD AUTO: 0 K/UL (ref 0–0.5)
EOSINOPHIL # BLD AUTO: 0 K/UL (ref 0–0.5)
EOSINOPHIL NFR BLD: 0.7 % (ref 0–8)
EOSINOPHIL NFR BLD: 0.8 % (ref 0–8)
ERYTHROCYTE [DISTWIDTH] IN BLOOD BY AUTOMATED COUNT: 16 % (ref 11.5–14.5)
ERYTHROCYTE [DISTWIDTH] IN BLOOD BY AUTOMATED COUNT: 16.1 % (ref 11.5–14.5)
EST. GFR  (AFRICAN AMERICAN): >60 ML/MIN/1.73 M^2
EST. GFR  (NON AFRICAN AMERICAN): >60 ML/MIN/1.73 M^2
FERRITIN SERPL-MCNC: 358 NG/ML (ref 20–300)
GLUCOSE SERPL-MCNC: 140 MG/DL (ref 70–110)
HCT VFR BLD AUTO: 30.6 % (ref 37–48.5)
HCT VFR BLD AUTO: 31.3 % (ref 37–48.5)
HGB BLD-MCNC: 9 G/DL (ref 12–16)
HGB BLD-MCNC: 9.2 G/DL (ref 12–16)
IMM GRANULOCYTES # BLD AUTO: 0 K/UL (ref 0–0.04)
IMM GRANULOCYTES # BLD AUTO: 0.01 K/UL (ref 0–0.04)
IMM GRANULOCYTES NFR BLD AUTO: 0 % (ref 0–0.5)
IMM GRANULOCYTES NFR BLD AUTO: 0.4 % (ref 0–0.5)
LYMPHOCYTES # BLD AUTO: 1.2 K/UL (ref 1–4.8)
LYMPHOCYTES # BLD AUTO: 1.2 K/UL (ref 1–4.8)
LYMPHOCYTES NFR BLD: 42.8 % (ref 18–48)
LYMPHOCYTES NFR BLD: 48.6 % (ref 18–48)
MAGNESIUM SERPL-MCNC: 1.9 MG/DL (ref 1.6–2.6)
MCH RBC QN AUTO: 25.6 PG (ref 27–31)
MCH RBC QN AUTO: 25.8 PG (ref 27–31)
MCHC RBC AUTO-ENTMCNC: 29.4 G/DL (ref 32–36)
MCHC RBC AUTO-ENTMCNC: 29.4 G/DL (ref 32–36)
MCV RBC AUTO: 87 FL (ref 82–98)
MCV RBC AUTO: 88 FL (ref 82–98)
MONOCYTES # BLD AUTO: 0.2 K/UL (ref 0.3–1)
MONOCYTES # BLD AUTO: 0.2 K/UL (ref 0.3–1)
MONOCYTES NFR BLD: 6.1 % (ref 4–15)
MONOCYTES NFR BLD: 8 % (ref 4–15)
NEUTROPHILS # BLD AUTO: 1.1 K/UL (ref 1.8–7.7)
NEUTROPHILS # BLD AUTO: 1.4 K/UL (ref 1.8–7.7)
NEUTROPHILS NFR BLD: 42.2 % (ref 38–73)
NEUTROPHILS NFR BLD: 49.6 % (ref 38–73)
NRBC BLD-RTO: 0 /100 WBC
NRBC BLD-RTO: 0 /100 WBC
PHOSPHATE SERPL-MCNC: 3.5 MG/DL (ref 2.7–4.5)
PLATELET # BLD AUTO: 138 K/UL (ref 150–350)
PLATELET # BLD AUTO: 141 K/UL (ref 150–350)
PMV BLD AUTO: 10.7 FL (ref 9.2–12.9)
PMV BLD AUTO: 10.8 FL (ref 9.2–12.9)
POCT GLUCOSE: 142 MG/DL (ref 70–110)
POCT GLUCOSE: 150 MG/DL (ref 70–110)
POCT GLUCOSE: 154 MG/DL (ref 70–110)
POCT GLUCOSE: 163 MG/DL (ref 70–110)
POTASSIUM SERPL-SCNC: 3.5 MMOL/L (ref 3.5–5.1)
PROT SERPL-MCNC: 7.7 G/DL (ref 6–8.4)
RBC # BLD AUTO: 3.51 M/UL (ref 4–5.4)
RBC # BLD AUTO: 3.56 M/UL (ref 4–5.4)
SODIUM SERPL-SCNC: 140 MMOL/L (ref 136–145)
TROPONIN I SERPL DL<=0.01 NG/ML-MCNC: 0.01 NG/ML (ref 0–0.03)
WBC # BLD AUTO: 2.49 K/UL (ref 3.9–12.7)
WBC # BLD AUTO: 2.78 K/UL (ref 3.9–12.7)

## 2020-04-03 PROCEDURE — 63600175 PHARM REV CODE 636 W HCPCS: Performed by: INTERNAL MEDICINE

## 2020-04-03 PROCEDURE — 25000003 PHARM REV CODE 250: Performed by: INTERNAL MEDICINE

## 2020-04-03 PROCEDURE — 99232 PR SUBSEQUENT HOSPITAL CARE,LEVL II: ICD-10-PCS | Mod: ,,, | Performed by: INTERNAL MEDICINE

## 2020-04-03 PROCEDURE — 80053 COMPREHEN METABOLIC PANEL: CPT

## 2020-04-03 PROCEDURE — 86140 C-REACTIVE PROTEIN: CPT

## 2020-04-03 PROCEDURE — 94640 AIRWAY INHALATION TREATMENT: CPT

## 2020-04-03 PROCEDURE — 25000003 PHARM REV CODE 250: Performed by: STUDENT IN AN ORGANIZED HEALTH CARE EDUCATION/TRAINING PROGRAM

## 2020-04-03 PROCEDURE — S0030 INJECTION, METRONIDAZOLE: HCPCS | Performed by: STUDENT IN AN ORGANIZED HEALTH CARE EDUCATION/TRAINING PROGRAM

## 2020-04-03 PROCEDURE — 99233 SBSQ HOSP IP/OBS HIGH 50: CPT | Mod: ,,, | Performed by: INTERNAL MEDICINE

## 2020-04-03 PROCEDURE — 84100 ASSAY OF PHOSPHORUS: CPT

## 2020-04-03 PROCEDURE — 99900035 HC TECH TIME PER 15 MIN (STAT)

## 2020-04-03 PROCEDURE — 82728 ASSAY OF FERRITIN: CPT

## 2020-04-03 PROCEDURE — 27000221 HC OXYGEN, UP TO 24 HOURS

## 2020-04-03 PROCEDURE — 93010 EKG 12-LEAD: ICD-10-PCS | Mod: ,,, | Performed by: INTERNAL MEDICINE

## 2020-04-03 PROCEDURE — 99233 PR SUBSEQUENT HOSPITAL CARE,LEVL III: ICD-10-PCS | Mod: ,,, | Performed by: INTERNAL MEDICINE

## 2020-04-03 PROCEDURE — 27100108

## 2020-04-03 PROCEDURE — 99233 PR SUBSEQUENT HOSPITAL CARE,LEVL III: ICD-10-PCS | Mod: ,,, | Performed by: OBSTETRICS & GYNECOLOGY

## 2020-04-03 PROCEDURE — 82550 ASSAY OF CK (CPK): CPT

## 2020-04-03 PROCEDURE — 83735 ASSAY OF MAGNESIUM: CPT

## 2020-04-03 PROCEDURE — 99232 SBSQ HOSP IP/OBS MODERATE 35: CPT | Mod: ,,, | Performed by: INTERNAL MEDICINE

## 2020-04-03 PROCEDURE — 85025 COMPLETE CBC W/AUTO DIFF WBC: CPT | Mod: 91

## 2020-04-03 PROCEDURE — C9399 UNCLASSIFIED DRUGS OR BIOLOG: HCPCS | Performed by: INTERNAL MEDICINE

## 2020-04-03 PROCEDURE — 20600001 HC STEP DOWN PRIVATE ROOM

## 2020-04-03 PROCEDURE — 63600175 PHARM REV CODE 636 W HCPCS: Performed by: STUDENT IN AN ORGANIZED HEALTH CARE EDUCATION/TRAINING PROGRAM

## 2020-04-03 PROCEDURE — 94761 N-INVAS EAR/PLS OXIMETRY MLT: CPT

## 2020-04-03 PROCEDURE — 93010 ELECTROCARDIOGRAM REPORT: CPT | Mod: ,,, | Performed by: INTERNAL MEDICINE

## 2020-04-03 PROCEDURE — 99233 SBSQ HOSP IP/OBS HIGH 50: CPT | Mod: ,,, | Performed by: OBSTETRICS & GYNECOLOGY

## 2020-04-03 PROCEDURE — 63700000 PHARM REV CODE 250 ALT 637 W/O HCPCS: Performed by: INTERNAL MEDICINE

## 2020-04-03 PROCEDURE — 84484 ASSAY OF TROPONIN QUANT: CPT

## 2020-04-03 PROCEDURE — 93005 ELECTROCARDIOGRAM TRACING: CPT

## 2020-04-03 PROCEDURE — 36415 COLL VENOUS BLD VENIPUNCTURE: CPT

## 2020-04-03 RX ORDER — DOXYCYCLINE HYCLATE 100 MG
100 TABLET ORAL EVERY 12 HOURS
Status: DISCONTINUED | OUTPATIENT
Start: 2020-04-03 | End: 2020-04-05 | Stop reason: HOSPADM

## 2020-04-03 RX ORDER — AZITHROMYCIN 250 MG/1
250 TABLET, FILM COATED ORAL DAILY
Status: DISCONTINUED | OUTPATIENT
Start: 2020-04-03 | End: 2020-04-03

## 2020-04-03 RX ORDER — ACETAMINOPHEN 325 MG/1
650 TABLET ORAL EVERY 6 HOURS PRN
Status: DISCONTINUED | OUTPATIENT
Start: 2020-04-03 | End: 2020-04-05 | Stop reason: HOSPADM

## 2020-04-03 RX ORDER — IBUPROFEN 200 MG
24 TABLET ORAL
Status: DISCONTINUED | OUTPATIENT
Start: 2020-04-03 | End: 2020-04-05 | Stop reason: HOSPADM

## 2020-04-03 RX ORDER — ALBUTEROL SULFATE 90 UG/1
2 AEROSOL, METERED RESPIRATORY (INHALATION) EVERY 4 HOURS PRN
Status: DISCONTINUED | OUTPATIENT
Start: 2020-04-03 | End: 2020-04-05 | Stop reason: HOSPADM

## 2020-04-03 RX ORDER — AZITHROMYCIN 250 MG/1
500 TABLET, FILM COATED ORAL ONCE
Status: COMPLETED | OUTPATIENT
Start: 2020-04-03 | End: 2020-04-03

## 2020-04-03 RX ORDER — IBUPROFEN 200 MG
16 TABLET ORAL
Status: DISCONTINUED | OUTPATIENT
Start: 2020-04-03 | End: 2020-04-05 | Stop reason: HOSPADM

## 2020-04-03 RX ORDER — PANTOPRAZOLE SODIUM 40 MG/1
40 TABLET, DELAYED RELEASE ORAL DAILY
Status: DISCONTINUED | OUTPATIENT
Start: 2020-04-04 | End: 2020-04-05 | Stop reason: HOSPADM

## 2020-04-03 RX ORDER — GUAIFENESIN/DEXTROMETHORPHAN 100-10MG/5
10 SYRUP ORAL EVERY 4 HOURS PRN
Status: DISCONTINUED | OUTPATIENT
Start: 2020-04-03 | End: 2020-04-05 | Stop reason: HOSPADM

## 2020-04-03 RX ADMIN — LISINOPRIL 2.5 MG: 2.5 TABLET ORAL at 08:04

## 2020-04-03 RX ADMIN — PROMETHAZINE HYDROCHLORIDE 12.5 MG: 25 INJECTION INTRAMUSCULAR; INTRAVENOUS at 09:04

## 2020-04-03 RX ADMIN — OXYCODONE HYDROCHLORIDE 10 MG: 10 TABLET ORAL at 10:04

## 2020-04-03 RX ADMIN — ENOXAPARIN SODIUM 40 MG: 100 INJECTION SUBCUTANEOUS at 09:04

## 2020-04-03 RX ADMIN — AMPICILLIN SODIUM AND SULBACTAM SODIUM 3 G: 2; 1 INJECTION, POWDER, FOR SOLUTION INTRAMUSCULAR; INTRAVENOUS at 10:04

## 2020-04-03 RX ADMIN — CEFEPIME 2 G: 2 INJECTION, POWDER, FOR SOLUTION INTRAVENOUS at 08:04

## 2020-04-03 RX ADMIN — SENNOSIDES 8.6 MG: 8.6 TABLET, FILM COATED ORAL at 09:04

## 2020-04-03 RX ADMIN — PROMETHAZINE HYDROCHLORIDE 12.5 MG: 25 INJECTION INTRAMUSCULAR; INTRAVENOUS at 02:04

## 2020-04-03 RX ADMIN — SENNOSIDES 8.6 MG: 8.6 TABLET, FILM COATED ORAL at 08:04

## 2020-04-03 RX ADMIN — AZITHROMYCIN MONOHYDRATE 500 MG: 250 TABLET ORAL at 09:04

## 2020-04-03 RX ADMIN — PANTOPRAZOLE SODIUM 40 MG: 40 TABLET, DELAYED RELEASE ORAL at 06:04

## 2020-04-03 RX ADMIN — INSULIN DETEMIR 5 UNITS: 100 INJECTION, SOLUTION SUBCUTANEOUS at 10:04

## 2020-04-03 RX ADMIN — AMPICILLIN SODIUM AND SULBACTAM SODIUM 3 G: 2; 1 INJECTION, POWDER, FOR SOLUTION INTRAMUSCULAR; INTRAVENOUS at 05:04

## 2020-04-03 RX ADMIN — METRONIDAZOLE 500 MG: 500 INJECTION, SOLUTION INTRAVENOUS at 02:04

## 2020-04-03 RX ADMIN — ALBUTEROL SULFATE 2 PUFF: 90 AEROSOL, METERED RESPIRATORY (INHALATION) at 07:04

## 2020-04-03 RX ADMIN — ALBUTEROL SULFATE 2 PUFF: 90 AEROSOL, METERED RESPIRATORY (INHALATION) at 09:04

## 2020-04-03 RX ADMIN — ACETAMINOPHEN 650 MG: 325 TABLET ORAL at 06:04

## 2020-04-03 RX ADMIN — ALBUTEROL SULFATE 2 PUFF: 90 AEROSOL, METERED RESPIRATORY (INHALATION) at 01:04

## 2020-04-03 RX ADMIN — DOXYCYCLINE HYCLATE 100 MG: 100 TABLET, COATED ORAL at 09:04

## 2020-04-03 NOTE — PLAN OF CARE
Problem: Diabetes Comorbidity  Goal: Blood Glucose Level Within Desired Range  Outcome: Ongoing, Progressing  Intervention: Maintain Glycemic Control  Flowsheets (Taken 4/3/2020 0450)  Glycemic Management: blood glucose monitoring     Problem: Infection  Goal: Infection Symptom Resolution  Outcome: Ongoing, Progressing  Intervention: Prevent or Manage Infection  Flowsheets (Taken 4/3/2020 0450)  Fever Reduction/Comfort Measures: lightweight bedding; lightweight clothing; medication administered  Infection Management: aseptic technique maintained  Isolation Precautions: airborne precautions maintained; contact precautions maintained; droplet precautions maintained

## 2020-04-03 NOTE — PROGRESS NOTES
Hospital Medicine  Progress Note  [facility]      Patient Name: Glenda Peña  MRN:  [id number]  Hospital Medicine Team: [facility]OSP MED J Tee Peters MD  Date of Admission:  4/1/2020     Length of Stay:  LOS: 2 days       Principal Problem:  COVID-19 virus infection      HPI:  28 y/o F PMhx obesity, asthma, DM II, HTN, and midline and R-sided ventral hernia who presented w/ fever. Recent s/p IR drainage of chronic pelvic fluid. Workup on return ER visit showed new pulm BL consolidations.    Hospital Course:  Patient admitted for evaluation of possible COVID-19.  Covid +    Interval History:     Afebrile overnight. Reports some cough, but no SOB. She is ambulating around room without difficulty. Reports having some ongoing abd pain (chronic but improved after drainage)    Review of Systems:  Respiratory: +cough, -SOB  Cardiovascular: -CP  GI:+ abd pain, - diarrhea    Inpatient Medications:    Current Facility-Administered Medications:     acetaminophen tablet 650 mg, 650 mg, Oral, Q6H PRN, Ritchie Millan MD    albuterol inhaler 2 puff, 2 puff, Inhalation, Q6H WAKE, Ritchie Millan MD, 2 puff at 04/03/20 1343    albuterol inhaler 2 puff, 2 puff, Inhalation, Q4H PRN **AND** MDI Q4H PRN, , , Q4H PRN, Tee Peters MD    alteplase injection 2 mg, 2 mg, Intra-Catheter, Daily PRN, Tee Peters MD    azithromycin 500 mg in dextrose 5 % 250 mL IVPB (ready to mix system), 500 mg, Intravenous, Q24H, Ritchie Millan MD, Last Rate: 250 mL/hr at 04/02/20 2342, 500 mg at 04/02/20 2342    calcium carbonate 200 mg calcium (500 mg) chewable tablet 500 mg, 500 mg, Oral, BID PRN, Ricky Whitney MD    ceFEPIme injection 2 g, 2 g, Intravenous, Q12H, Ricky Whitney MD, 2 g at 04/03/20 0841    dextromethorphan-guaifenesin  mg/5 ml liquid 10 mL, 10 mL, Oral, Q4H PRN, Tee Peters MD    dextrose 10% (D10W) Bolus, 12.5 g, Intravenous, PRN, Ricky Whitney MD    dextrose 10% (D10W) Bolus,  12.5 g, Intravenous, PRN, Tee Peters MD    enoxaparin injection 40 mg, 40 mg, Subcutaneous, Daily, Ricky Whitney MD, 40 mg at 04/02/20 2210    glucagon (human recombinant) injection 1 mg, 1 mg, Intramuscular, PRN, Ricky Whitney MD    glucose chewable tablet 16 g, 16 g, Oral, PRN, Tee Peters MD    glucose chewable tablet 24 g, 24 g, Oral, PRN, Tee Peters MD    insulin aspart U-100 pen 0-5 Units, 0-5 Units, Subcutaneous, Q6H PRN, Ricky Whitney MD    lisinopriL tablet 2.5 mg, 2.5 mg, Oral, Daily, Ricky Whitney MD, 2.5 mg at 04/03/20 0841    metronidazole IVPB 500 mg, 500 mg, Intravenous, Q8H, Ashutosh Broussard MD, Last Rate: 100 mL/hr at 04/03/20 0228, 500 mg at 04/03/20 0228    ondansetron disintegrating tablet 8 mg, 8 mg, Oral, Q8H PRN, Ricky Whitney MD, 8 mg at 04/02/20 2140    oxyCODONE immediate release tablet 10 mg, 10 mg, Oral, Q4H PRN, Ricky Whitney MD, 10 mg at 04/02/20 2141    oxyCODONE immediate release tablet 5 mg, 5 mg, Oral, Q4H PRN, Ricky Whitney MD    [START ON 4/4/2020] pantoprazole EC tablet 40 mg, 40 mg, Oral, Daily, Ritchie Millan MD    polyethylene glycol packet 17 g, 17 g, Oral, BID PRN, Ricky Whitney MD    promethazine (PHENERGAN) 12.5 mg in dextrose 5 % 50 mL IVPB, 12.5 mg, Intravenous, Q6H PRN, Ricky Whitney MD, Last Rate: 150 mL/hr at 04/02/20 2341, 12.5 mg at 04/02/20 2341    psyllium husk (aspartame) 3.4 gram packet 1 packet, 1 packet, Oral, Daily, Ricky Whitney MD, 1 packet at 04/02/20 0958    senna tablet 8.6 mg, 8.6 mg, Oral, BID, Ricky Whitney MD, 8.6 mg at 04/03/20 0841    sodium chloride 0.9% flush 10 mL, 10 mL, Intravenous, PRN, Ricky Whitney MD      Physical Exam:      Intake/Output Summary (Last 24 hours) at 4/3/2020 1435  Last data filed at 4/2/2020 2140  Gross per 24 hour   Intake --   Output 0 ml   Net 0 ml     Wt Readings from Last 3 Encounters:   04/02/20 116.7 kg (257 lb 4.4 oz)   03/29/20 116.7 kg (257 lb 4.4 oz)   03/19/20 113.2  "kg (249 lb 9 oz)       BP (!) 111/57 (BP Location: Left arm, Patient Position: Lying)   Pulse 92   Temp 98.5 °F (36.9 °C) (Oral)   Resp 20   Ht 5' 2" (1.575 m)   Wt 116.7 kg (257 lb 4.4 oz)   SpO2 100%   Breastfeeding? No   BMI 47.06 kg/m²     GEN: NAD, conversant  Resp: coarse bilateral breath sounds, no wheezes or rales, normal work of breathing   CV: RRR, no m/r/g, no edema  GI: soft, NTND  Skin: no rash    Laboratory:  Lab Results   Component Value Date    [id number] Detected (A) 04/01/2020       Recent Labs   Lab 04/02/20  0509 04/03/20  0527 04/03/20  1222   WBC 2.70* 2.49* 2.78*   LYMPH 41.9  1.1 48.6*  1.2 42.8  1.2   HGB 8.7* 9.0* 9.2*   HCT 29.7* 30.6* 31.3*   * 138* 141*     Recent Labs   Lab 04/01/20 2137 04/02/20 0509 04/03/20  0527   * 134* 140   K 3.8 3.7 3.5    102 104   CO2 21* 25 26   BUN 6 6 5*   CREATININE 0.8 0.7 0.7   * 139* 140*   CALCIUM 8.7 8.1* 9.0   MG  --   --  1.9   PHOS  --   --  3.5     Recent Labs   Lab 04/01/20  0923 04/01/20 2137 04/03/20  0527   ALKPHOS 64 71 78   ALT 41 48* 56*   AST 32 46* 54*   ALBUMIN 3.1* 3.2* 3.0*   PROT 7.7 7.9 7.7   BILITOT 0.3 0.2 0.3        Recent Labs     04/03/20  0527   FERRITIN 358*   CRP 46.5*   TROPONINI 0.008   CPK 20       All labs within the last 24 hours were reviewed.     Microbiology:  Microbiology Results (last 7 days)     Procedure Component Value Units Date/Time    Blood culture #1 **CANNOT BE ORDERED STAT** [[id number]] Collected:  04/01/20 2210    Order Status:  Completed Specimen:  Blood from Line, PICC Right Basilic Updated:  04/02/20 2312     Blood Culture, Routine No Growth to date      No Growth to date    Narrative:       From PICC line    Blood culture #2 **CANNOT BE ORDERED STAT** [[id number]] Collected:  04/01/20 2137    Order Status:  Completed Specimen:  Blood from Peripheral, Antecubital, Left Updated:  04/02/20 2213     Blood Culture, Routine No Growth to date      No Growth to date    " Blood culture #1 **CANNOT BE ORDERED STAT** [[id number]] Collected:  04/01/20 2137    Order Status:  Completed Specimen:  Blood from Peripheral, Antecubital, Right Updated:  04/02/20 2213     Blood Culture, Routine No Growth to date      No Growth to date    Strep A culture, throat [[id number]]     Order Status:  No result Specimen:  Throat     Influenza A & B by Molecular [[id number]]     Order Status:  No result Specimen:  Nasopharyngeal Swab             Imaging  ECG Results    None         No results found for this or any previous visit.    CTA Chest Non Coronary  Narrative: EXAMINATION:  CTA CHEST NON CORONARY    CLINICAL HISTORY:  Rule out PE - Gyn Onc residen # [phone];    TECHNIQUE:  Low dose axial images, sagittal and coronal reformations were obtained from the thoracic inlet to the lung bases following the IV administration of 100 mL of Omnipaque 350.  Contrast timing was optimized to evaluate the pulmonary arteries.  MIP images were performed.    COMPARISON:  None    FINDINGS:  The structures at the base of the neck are unremarkable.  Lymphadenopathy.  The heart is not enlarged.  The thoracic aorta tapers normally.  The visualized portions of the kidneys, adrenal glands, spleen, and pancreas are unremarkable.  The liver is hypoattenuating, possibly reflecting steatosis, correlation recommended.    The airways are patent.  There are multifocal patchy consolidative opacities throughout the lungs bilaterally, most significantly within the left upper lobe and right lower lobe.  There are developing air bronchograms within a few of these foci.  Findings are most concerning for multifocal infection however follow-up is recommended to ensure resolution to exclude malignancy.    Bolus timing is suboptimal for evaluation of pulmonary thromboembolism.  Additionally, artifact from respiratory motion and habitus also limits evaluation.  Allowing for these factors, no convincing pulmonary arterial filling defect to  the level of the distal lobar arteries and a few segmental arteries bilaterally to convincingly suggest pulmonary thromboembolism.  Distal embolism however cannot be excluded on the basis of this exam.    Degenerative changes are noted of the spine.  No significant axillary lymphadenopathy.  Right central venous catheter tip terminates in the region of the right atrium, obscured by artifact.  Impression: This report was flagged in Epic as abnormal.    1. Examination is suboptimal for evaluation of pulmonary thromboembolism, allowing for this, no convincing pulmonary arterial filling defect to the level of the distal lobar arteries and several segmental arteries to suggest pulmonary thromboembolism.  Please see above for further details.  2. Multifocal patchy consolidative opacity throughout the pulmonary parenchyma, concerning for multifocal infection.  Follow-up is advised to ensure resolution and to exclude malignancy.  3. Findings suggesting hepatic steatosis, correlation with LFTs advised.  4. Additional findings above.    Electronically signed by: Robert Fam MD  Date:    04/02/2020  Time:    18:02      All imaging within the last 24 hours was reviewed.     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       Covid-19 Virus Infection  PNA  Covering with cefepime and azithro.   Can transition to unasyn and doxycyline on discharge.  HCQ not indicated at this point in time.  - COVID-19 testing: Collection Date: 4/1/2020 Collection Time:  11:32 PM  - Infection Control notified    - Isolation:   - Airborne and Droplet Precautions  - Surgical mask on patient   - N95 masks must be fit tested, wear eye protection  - 20 second hand hygiene   - Limit visitors per hospital policy   - Consolidate lab draws, nursing care, and interventions    - Diagnostics: (Lymphopenia, hyponatremia, hyperferritinemia, elevated troponin, elevated d-dimer, age, and comorbidities are significant predictors of poor clinical outcome)   - CBC: WBC 2.78 ANC 1.4, lymph 1.2  trend Q48hrs  - CMP:      AST/ALT 54-56 trend Q48hrs  - Procalcitonin:  - D-dimer:  repeat prior to discharge  - Ferritin: 358 repeat prior to discharge   - CRP:       46.5 trend Q48hrs  - LDH:   repeat prior to discharge  - BNP:  - Troponin:    - ECG:    - rapid Flu:   - RIP only if BMT/solid transplant:   - Legionella antigen:   - Blood culture x2: NGTD   - Sputum culture:   - CXR: bibasilar airspace dz   - UA and culture:   - CPK: 20    - Management:   Bundle care as able to maintain isolation & minimize in/out of room   - Supplemental O2 to maintain SpO2 92%-96%   if requiring 6L NC or higher, place on nonrebreather and discuss case with MICU   - Telemetry & continuous pulse oximetry    - If wheezing   - albuterol inhaler 2-4 puff Q6hr PRN    - ipratropium daily    - acetaminophen 650mg PO Q6hr PRN fever   - loperamide PRN for viral diarrhea  - Empiric antibiotics per likely source & patient allergies    - CAP: x 5 day course (d/c early if low concern for bacterial co-infection)  Ceftriaxone 1g IV Q24hrs            Azithromycin 500mg IV day #1, then 250mg PO daily x4 days     If azithromycin is not available, start doxycycline                 If MRSA risk  "factors, add Vancomycin IV (PharmD consult)     - Investigational Treatment Protocol: (if patient meets criteria)   [email or web address]     - statin: atorvastatin 40mg po daily (if CPK WNL)    - start HCQ 400mg PO BID x1 day, then 400mg PO daily x 4 days   (check glucose 6 phosphate dehydrogenase (NOT G6PD Quant), ECG on start & @48hrs, and start Qshift POCT glucose)    Safety notes:   - Avoid NIPPV (CPAP/BiPAP) to prevent aerosolization, use on a case-by-case basis if in neg pressure room   - Cautious use of NSAIDS for fever per WHO recommendations (3/16/2020)   - No new ACEi/ARB start or discontinuation of chronic med unless hypotensive (Esler et al. Journal of Hypertension 2020, 38:000-000)   - Careful use of steroids in the absence of other indications (shock, ARDS)   - Fluid sparing resuscitation, avoid maintenance fluids       If patient transitions to Comfort-Focused Care, please place "Nurse Communication: End of Life Care, family members allowed to visit, including spouse/partner and adult children [please list names]. Please ask family to visit as a group and leave as a group.       VTE High Risk Prophylaxis: enoxaparin 40mg sq QHS @ 2100 (bundled care) if GFR >30    Patient's chronic/stable medical conditions noted in the problem list above will be managed with the patient's home medications as tolerated.     Chronic TOA  Covering with Cefepime and flagyl  Can transition to unasyn and doxycycline on discharge  Has pain regimen of tylenol and PRN opioid for abd Pain    HTN  Continue lisinopril 2.5mg daily    DM  On trulicity, metformin and glipizide at home.  Will start detemir 5 units HS and correction SSI      Subsequent Inpatient Hospital Care   Level 3 33534 Total visit time was 35 minutes or greater with greater than 50% of time spent in counseling and coordination of care.     Diet: DM  Code: full  Dispo: " home      Tee Peters MD  Hospital Medicine  Pager: [phone]  Spectra; [phone]

## 2020-04-03 NOTE — PHYSICIAN QUERY
"PT Name: Glenda Peña  MR #: [id number]    Physician Query Form - Pneumonia Clarification     CDS/: TASHA Lane,RNC-MNN        Contact information:[email or web address]  This form is a permanent document in the medical record.    Query Date:  April 3, 2020    By submitting this query, we are merely seeking further clarification of documentation. Please utilize your independent clinical judgment when addressing the question(s) below.    The Medical record contains the following:   Indicators   Supporting Clinical Findings Location in Medical Record   X "Pneumonia" documented Findings are worrisome for pneumonia or aspiration in the setting of fever H&P 4/2   X Chest X-Ray: CXR which showed bilateral pulmonary infiltrates    CTA demonstrated multifocal patchy consolidative opacity throughout the pulmonary parenchyma, concerning for multifocal infection ID Consult note 4/2    PaO2    PaCO2     O2 sat      Cultures      X Treatment  She was started on azithromycin, vanc and cefepime ID Consult note 4/2    Supplemental O2     X Other COVID-19 PCR was positive. ID is consulted for antibiotic recommendations due to concern for PNA.  ID Consult note 4/2       Provider, please specify type of pneumonia.    [ X  ] Viral Pneumonia (Specify virus): COVID   [   ] Aspiration Pneumonia   [   ] Other type of pneumonia (please specify):   [  ] Clinically undetermined         Please document in your progress notes daily for the duration of treatment, until resolved, and include in your discharge summary.    .                                                                                    "

## 2020-04-03 NOTE — SUBJECTIVE & OBJECTIVE
Interval History: Afebrile. COVID PCR test resulted positive. Was transferred to COVID unit. CTA w/ multifocal patchy consolidative opacities b/l.    Review of Systems  Objective:     Vital Signs (Most Recent):  Temp: 98.5 °F (36.9 °C) (04/03/20 1224)  Pulse: 98 (04/03/20 1535)  Resp: 20 (04/03/20 1343)  BP: (!) 111/57 (04/03/20 1224)  SpO2: 100 % (04/03/20 1343) Vital Signs (24h Range):  Temp:  [98.2 °F (36.8 °C)-98.8 °F (37.1 °C)] 98.5 °F (36.9 °C)  Pulse:  [] 98  Resp:  [16-28] 20  SpO2:  [90 %-100 %] 100 %  BP: (111-125)/(57-73) 111/57     Weight: 116.7 kg (257 lb 4.4 oz)  Body mass index is 47.06 kg/m².    Estimated Creatinine Clearance: 142.3 mL/min (based on SCr of 0.7 mg/dL).    Physical Exam   Constitutional: She is oriented to person, place, and time. She appears well-developed. No distress.   HENT:   Head: Normocephalic and atraumatic.   Eyes: Conjunctivae are normal. Right eye exhibits no discharge. Left eye exhibits no discharge. No scleral icterus.   Cardiovascular: Normal rate and regular rhythm.   PICC line w/o any surrounding erythema or tenderness   Pulmonary/Chest: Effort normal. No respiratory distress.   Abdominal: Soft. She exhibits no distension. There is tenderness. There is no guarding.   abd drain in LLQ w/ minimal serosanguinous drainage   Musculoskeletal: She exhibits no edema or tenderness.   Neurological: She is alert and oriented to person, place, and time.   Skin: Skin is warm. No rash noted. She is not diaphoretic.   Psychiatric: She has a normal mood and affect. Her behavior is normal.       Significant Labs:   Blood Culture:   Recent Labs   Lab 04/01/20  2137 04/01/20  2210   LABBLOO No Growth to date  No Growth to date  No Growth to date  No Growth to date No Growth to date  No Growth to date     CBC:   Recent Labs   Lab 04/02/20  0509 04/03/20  0527 04/03/20  1222   WBC 2.70* 2.49* 2.78*   HGB 8.7* 9.0* 9.2*   HCT 29.7* 30.6* 31.3*   * 138* 141*     CMP:   Recent  Labs   Lab 04/01/20 2137 04/02/20  0509 04/03/20  0527   * 134* 140   K 3.8 3.7 3.5    102 104   CO2 21* 25 26   * 139* 140*   BUN 6 6 5*   CREATININE 0.8 0.7 0.7   CALCIUM 8.7 8.1* 9.0   PROT 7.9  --  7.7   ALBUMIN 3.2*  --  3.0*   BILITOT 0.2  --  0.3   ALKPHOS 71  --  78   AST 46*  --  54*   ALT 48*  --  56*   ANIONGAP 11 7* 10   EGFRNONAA >60.0 >60.0 >60.0     Microbiology Results (last 7 days)     Procedure Component Value Units Date/Time    Blood culture #1 **CANNOT BE ORDERED STAT** [[id number]] Collected:  04/01/20 2210    Order Status:  Completed Specimen:  Blood from Line, PICC Right Basilic Updated:  04/02/20 2312     Blood Culture, Routine No Growth to date      No Growth to date    Narrative:       From PICC line    Blood culture #2 **CANNOT BE ORDERED STAT** [[id number]] Collected:  04/01/20 2137    Order Status:  Completed Specimen:  Blood from Peripheral, Antecubital, Left Updated:  04/02/20 2213     Blood Culture, Routine No Growth to date      No Growth to date    Blood culture #1 **CANNOT BE ORDERED STAT** [[id number]] Collected:  04/01/20 2137    Order Status:  Completed Specimen:  Blood from Peripheral, Antecubital, Right Updated:  04/02/20 2213     Blood Culture, Routine No Growth to date      No Growth to date    Strep A culture, throat [[id number]]     Order Status:  No result Specimen:  Throat     Influenza A & B by Molecular [[id number]]     Order Status:  No result Specimen:  Nasopharyngeal Swab           Significant Imaging: I have reviewed all pertinent imaging results/findings within the past 24 hours.

## 2020-04-03 NOTE — HPI
28 y/o F PMhx obesity, asthma, DM II, HTN, and midline and R-sided ventral hernia who presented w/ fever. She was discharged the day prior after repeat admission for management of pelvic pain with known loculated pelvic fluid collection suspicious for TOA. Symptoms included nausea, subjective fever and chills. In ED T 100.3, , WBC 3.36. CXR which showed bilateral pulmonary infiltrates. She was started on azithromycin, vanc and cefepime. CTA demonstrated multifocal patchy consolidative opacity throughout the pulmonary parenchyma, concerning for multifocal infection. COVID-19 PCR was positive. ID is consulted for antibiotic recommendations due to concern for PNA.      Of note patient was initially admitted to [facility], where she was admitted for IR drainage of TOA and IV antibiotics on 2/18/20, cultures of fluid from pelvic fluid collection grew actinomyces. IR drain placement was not successful in resolving the TOA. She was discharged after improvement in pain. She then presented to [facility] with pelvic pain on 3/5/2020. CT A/P on 3/10 showed a 3.5 x 11 cm fluid collection anterior the uterus. Cultures after repeat attempt at IR drainage showed no growth, but given prior culture result she was started on IV Unasyn. She was discharged with plan for repeat CT with follow up with ID. On 3/27 patient reported worsening pain, and CT AP showed heterogeneous, complex fluid collection with multiple loculations and interval increase in size, now approximately 7 x 9 cm transversely and 9 cm craniocaudal. Doxycycline was added by ID and per ID note, patient was to follow up with IR on an outpatient basis for attempted drainage.      Due to increasing pain, she presented to [facility] [place] and was admitted for pain control and another attempt at drainage. She was evaluated by ID and continued on Unasyn and Doxy. She had drain placed by IR with 62mL fluid removed and drain left in place. Cultures from  drain fluid show NGTD. Repeat CT pelvis showed decrease in size of largest loculation to 4.3 x 8.9 cm. She was discharged with IV Unasyn and Doxy, with PICC and drain in LLQ in place. Was to f/u with Gyn Onc and ID.

## 2020-04-03 NOTE — CONSULTS
[facility]  Infectious Disease  Consult Note    Patient Name: Glenda Peña  MRN: [id number]  Admission Date: 4/1/2020  Hospital Length of Stay: 1 days  Attending Physician: Ritchie Millan MD  Primary Care Provider: Emiliano Dunbar MD     Isolation Status: Airborne and Contact and Droplet    Patient information was obtained from patient, past medical records and ER records.      Inpatient consult to Gynecologic Oncology  Consult performed by: Ashutosh Broussard MD  Consult ordered by: Ricky Whitney MD        Assessment/Plan:     Tubo-ovarian abscess  30 y/o F PMhx obesity, asthma, DM II, HTN, and midline and R-sided ventral hernia who presented w/ fever. She was discharged the day prior after repeat admission for management of pelvic pain with known loculated pelvic fluid collection suspicious for TOA. Symptoms included nausea, subjective fever and chills. In ED T 100.3, , WBC 3.36. CXR which showed bilateral pulmonary infiltrates. She was started on azithromycin, vanc and cefepime. CTA demonstrated multifocal patchy consolidative opacity throughout the pulmonary parenchyma, concerning for multifocal infection. COVID-19 PCR was positive. ID is consulted for antibiotic recommendations due to concern for PNA.      Recommendations  Would continue cefepime + flagyl while inpatient and discontinue vanc  On discharge can resume unasyn and doxycycline  Will need ID (has appt on 5/5 w/ Dr. Mei) and gyn onc f/u as well as repeat abd imaging in about 2-3 weeks  Patient does not meet criteria for hydroxychloroquine at this time  Wear a mask on discharge  Supportive care for COVID, isolate at home for 14 days and until afebrile x at least 72 hrs w/o antipyretics  Provide return precautions including hypoxia, worsening SOB    Discharge antibiotics:   Unasyn IV and doxycycline    End date of IV antibiotics: no end date set, pending abd repeat imaging    Weekly outpatient laboratory on Monday or  Tuesday while on IV antibiotics.    CBC   CMP or BMP    Continue to fax laboratory results to [facility] at [phone].        Thank you for your consult. I will follow-up with patient. Please contact us if you have any additional questions.    Ashutosh Broussard MD  Infectious Disease  [facility]    Subjective:     Principal Problem: Fever    HPI: 30 y/o F PMhx obesity, asthma, DM II, HTN, and midline and R-sided ventral hernia who presented w/ fever. She was discharged the day prior after repeat admission for management of pelvic pain with known loculated pelvic fluid collection suspicious for TOA. Symptoms included nausea, subjective fever and chills. In ED T 100.3, , WBC 3.36. CXR which showed bilateral pulmonary infiltrates. She was started on azithromycin, vanc and cefepime. CTA demonstrated multifocal patchy consolidative opacity throughout the pulmonary parenchyma, concerning for multifocal infection. COVID-19 PCR was positive. ID is consulted for antibiotic recommendations due to concern for PNA.      Of note patient was [place], where she was admitted for IR drainage of TOA and IV antibiotics on 2/18/20, cultures of fluid from pelvic fluid collection grew actinomyces. IR drain placement was not successful in resolving the TOA. She was discharged after improvement in pain. She then presented to [facility] with pelvic pain on 3/5/2020. CT A/P on 3/10 showed a 3.5 x 11 cm fluid collection anterior the uterus. Cultures after repeat attempt at IR drainage showed no growth, but given prior culture result she was started on IV Unasyn. She was discharged with plan for repeat CT with follow up with ID. On 3/27 patient reported worsening pain, and CT AP showed heterogeneous, complex fluid collection with multiple loculations and interval increase in size, now approximately 7 x 9 cm transversely and 9 cm craniocaudal. Doxycycline was  added by ID and per ID note, patient was to follow up with IR on an outpatient basis for attempted drainage.      Due to increasing pain, she presented to [facility] 3/30 and was admitted for pain control and another attempt at drainage. She was evaluated by ID and continued on Unasyn and Doxy. She had drain placed by IR with 62mL fluid removed and drain left in place. Cultures from drain fluid show NGTD. Repeat CT pelvis showed decrease in size of largest loculation to 4.3 x 8.9 cm. She was discharged with IV Unasyn and Doxy, with PICC and drain in LLQ in place. Was to f/u with Gyn Onc and ID.     Past Medical History:   Diagnosis Date    Asthma childhood    Diabetes mellitus     GERD (gastroesophageal reflux disease)     Morbid obesity     PCOS (polycystic ovarian syndrome)        Past Surgical History:   Procedure Laterality Date    APPENDECTOMY  2011    HERNIA REPAIR      OOPHORECTOMY      salpingoopherectomy Right 2008       Review of patient's allergies indicates:   Allergen Reactions    Latex, natural rubber Rash     Burning sensation       Medications:  Medications Prior to Admission   Medication Sig    acetaminophen (TYLENOL) 325 MG tablet Take 2 tablets (650 mg total) by mouth every 6 (six) hours.    ampicillin sodium/sulbactam Na (AMPICILLIN/SULBACTAM 3 G/100 ML NS, READY TO MIX,) Inject 100 mLs (3 g total) into the vein every 6 (six) hours.    doxycycline (VIBRA-TABS) 100 MG tablet Take 1 tablet (100 mg total) by mouth 2 (two) times daily. Take with food    dulaglutide (TRULICITY) 1.5 mg/0.5 mL PnIj Inject 1.5 mg into the skin every 7 days.    glipiZIDE (GLUCOTROL) 10 MG TR24 Take 1 tablet (10 mg total) by mouth daily with breakfast.    ibuprofen (ADVIL,MOTRIN) 600 MG tablet Take 1 tablet (600 mg total) by mouth every 6 (six) hours as needed for Pain.    lisinopril (PRINIVIL,ZESTRIL) 2.5 MG tablet Take 1 tablet (2.5 mg total) by mouth once daily.    metFORMIN (GLUCOPHAGE) 1000 MG tablet Take  2,000 mg by mouth.    ondansetron (ZOFRAN) 4 MG tablet Take 1 tablet (4 mg total) by mouth every 6 (six) hours as needed for Nausea.    oxyCODONE (ROXICODONE) 5 MG immediate release tablet Take 1 tablet (5 mg total) by mouth every 4 (four) hours as needed for Pain.    pantoprazole (PROTONIX) 40 MG tablet Take 1 tablet (40 mg total) by mouth before breakfast.    promethazine (PHENERGAN) 25 MG tablet Take 1 tablet (25 mg total) by mouth every 4 (four) hours.    senna (SENOKOT) 8.6 mg tablet Take 1 tablet by mouth 2 (two) times daily.    albuterol (VENTOLIN HFA) 90 mcg/actuation inhaler Inhale 2 puffs into the lungs every 6 (six) hours as needed for Wheezing. Rescue    polyethylene glycol (GLYCOLAX) 17 gram PwPk Take 17 g by mouth 2 (two) times daily as needed.    sodium chloride 0.9% (NORMAL SALINE FLUSH) injection Inject 10 mLs into the vein every 6 (six) hours.     Antibiotics (From admission, onward)    Start     Stop Route Frequency Ordered    04/02/20 2100  azithromycin 500 mg in dextrose 5 % 250 mL IVPB (ready to mix system)     Question:  Is the patient competent?  Answer:  Yes    -- IV Every 24 hours (non-standard times) 04/02/20 0805    04/02/20 1815  metronidazole IVPB 500 mg      -- IV Every 8 hours (non-standard times) 04/02/20 1715    04/02/20 0900  ceFEPIme injection 2 g      -- IV Every 12 hours (non-standard times) 04/01/20 2344        Antifungals (From admission, onward)    None        Antivirals (From admission, onward)    None           Immunization History   Administered Date(s) Administered    DTaP 1989, 04/16/1990, 07/08/1993, 04/15/1994    HIB 07/08/1993    Hepatitis B, Pediatric/Adolescent 09/23/2002, 02/02/2004    IPV 1989, 04/15/1990, 04/15/1994    Influenza 11/24/2019    Influenza - Quadrivalent - PF (6 months and older) 01/17/2017    MMR 07/08/1993, 04/15/1994    Td (ADULT) 09/23/2002    Td (Adult), Unspecified Formulation 09/23/2002       Family History      Problem Relation (Age of Onset)    Diabetes Mother    Heart attack Father, Mother    Hypertension Father        Social History     Socioeconomic History    Marital status:      Spouse name: Not on file    Number of children: Not on file    Years of education: Not on file    Highest education level: Not on file   Occupational History    Not on file   Social Needs    Financial resource strain: Not on file    Food insecurity:     Worry: Not on file     Inability: Not on file    Transportation needs:     Medical: Not on file     Non-medical: Not on file   Tobacco Use    Smoking status: Never Smoker    Smokeless tobacco: Never Used   Substance and Sexual Activity    Alcohol use: Not Currently     Comment: occasionally apple cider    Drug use: No    Sexual activity: Yes     Partners: Female   Lifestyle    Physical activity:     Days per week: Not on file     Minutes per session: Not on file    Stress: Not on file   Relationships    Social connections:     Talks on phone: Not on file     Gets together: Not on file     Attends [employer] service: Not on file     Active member of club or organization: Not on file     Attends meetings of clubs or organizations: Not on file     Relationship status: Not on file   Other Topics Concern    Not on file   Social History Narrative    Not on file     Review of Systems   Constitutional: Positive for fever.   Eyes: Negative for visual disturbance.   Respiratory: Positive for shortness of breath.    Cardiovascular: Negative for leg swelling.   Gastrointestinal: Positive for abdominal pain and nausea. Negative for diarrhea.   Skin: Negative for rash.   Psychiatric/Behavioral: Negative for agitation and confusion.     Objective:     Vital Signs (Most Recent):  Temp: 98.4 °F (36.9 °C) (04/02/20 1400)  Pulse: 87 (04/02/20 1949)  Resp: 20 (04/02/20 1558)  BP: 107/70 (04/02/20 1400)  SpO2: 97 % (04/02/20 1558) Vital Signs (24h Range):  Temp:  [98.2 °F (36.8 °C)-100.3 °F  (37.9 °C)] 98.4 °F (36.9 °C)  Pulse:  [] 87  Resp:  [17-22] 20  SpO2:  [90 %-97 %] 97 %  BP: (103-131)/(51-70) 107/70     Weight: 116.7 kg (257 lb 4.4 oz)  Body mass index is 47.06 kg/m².    Estimated Creatinine Clearance: 142.3 mL/min (based on SCr of 0.7 mg/dL).    Physical Exam   Constitutional: She is oriented to person, place, and time. She appears well-developed. No distress.   HENT:   Head: Normocephalic and atraumatic.   Eyes: Conjunctivae are normal. Right eye exhibits no discharge. Left eye exhibits no discharge. No scleral icterus.   Cardiovascular: Normal rate and regular rhythm.   PICC line w/o any surrounding erythema or tenderness   Pulmonary/Chest: Effort normal. No respiratory distress.   Abdominal: Soft. She exhibits no distension. There is tenderness (lower abd). There is no guarding.   abd drain in LLQ w/ minimal serosanguinous drainage   Musculoskeletal: She exhibits no edema or tenderness.   Neurological: She is alert and oriented to person, place, and time.   Skin: Skin is warm. No rash noted. She is not diaphoretic.   Psychiatric: She has a normal mood and affect. Her behavior is normal.       Significant Labs:   Blood Culture:   Recent Labs   Lab 04/01/20 2137 04/01/20  2210   LABBLOO No Growth to date  No Growth to date No Growth to date     CBC:   Recent Labs   Lab 04/01/20 2137 04/02/20  0509   WBC 3.36* 2.70*   HGB 9.3* 8.7*   HCT 31.5* 29.7*   * 135*     CMP:   Recent Labs   Lab 04/01/20  0923 04/01/20 2137 04/02/20  0509   * 135* 134*   K 3.5 3.8 3.7    103 102   CO2 24 21* 25   * 174* 139*   BUN 8 6 6   CREATININE 0.7 0.8 0.7   CALCIUM 8.3* 8.7 8.1*   PROT 7.7 7.9  --    ALBUMIN 3.1* 3.2*  --    BILITOT 0.3 0.2  --    ALKPHOS 64 71  --    AST 32 46*  --    ALT 41 48*  --    ANIONGAP 7* 11 7*   EGFRNONAA >60.0 >60.0 >60.0     Microbiology Results (last 7 days)     Procedure Component Value Units Date/Time    Strep A culture, throat [[id number]]      Order Status:  No result Specimen:  Throat     Blood culture #2 **CANNOT BE ORDERED STAT** [[id number]] Collected:  04/01/20 2137    Order Status:  Completed Specimen:  Blood from Peripheral, Antecubital, Left Updated:  04/02/20 1115     Blood Culture, Routine No Growth to date    Blood culture #1 **CANNOT BE ORDERED STAT** [[id number]] Collected:  04/01/20 2210    Order Status:  Completed Specimen:  Blood from Line, PICC Right Basilic Updated:  04/02/20 1115     Blood Culture, Routine No Growth to date    Narrative:       From PICC line    Blood culture #1 **CANNOT BE ORDERED STAT** [[id number]] Collected:  04/01/20 2137    Order Status:  Completed Specimen:  Blood from Peripheral, Antecubital, Right Updated:  04/02/20 1115     Blood Culture, Routine No Growth to date    Influenza A & B by Molecular [[id number]]     Order Status:  No result Specimen:  Nasopharyngeal Swab           Significant Imaging: I have reviewed all pertinent imaging results/findings within the past 24 hours.

## 2020-04-03 NOTE — ASSESSMENT & PLAN NOTE
30 y/o F PMhx obesity, asthma, DM II, HTN, and midline and R-sided ventral hernia who presented w/ fever. She was discharged the day prior after repeat admission for management of pelvic pain with known loculated pelvic fluid collection suspicious for TOA. Symptoms included nausea, subjective fever and chills. In ED T 100.3, , WBC 3.36. CXR which showed bilateral pulmonary infiltrates. She was started on azithromycin, vanc and cefepime. CTA demonstrated multifocal patchy consolidative opacity throughout the pulmonary parenchyma, concerning for multifocal infection. COVID-19 PCR was positive. ID is consulted for antibiotic recommendations due to concern for PNA.      Recommendations  Stop cefepime and flagyl   Resume unasyn and doxycycline  Will need ID (has appt on 5/5 w/ Dr. Mei) and gyn onc f/u as well as repeat abd imaging in about 2-3 weeks  Can give azithromycin 500mg for 1 more day to complete treatment course  Patient does not meet criteria for hydroxychloroquine at this time  Have patient wear a mask on discharge  Provide return precautions including hypoxia, worsening SOB      Discharge antibiotics:   Unasyn IV and doxycycline    End date of IV antibiotics: no end date set, pending abd repeat imaging    Weekly outpatient laboratory on Monday or Tuesday while on IV antibiotics.    CBC   CMP or BMP    Continue to fax laboratory results to [facility] at [phone].

## 2020-04-03 NOTE — PROGRESS NOTES
[facility]  Infectious Disease  Progress Note    Patient Name: Glenda Peña  MRN: [id number]  Admission Date: 4/1/2020  Length of Stay: 2 days  Attending Physician: Ritchie Millan MD  Primary Care Provider: Emiliano Dunbar MD    Isolation Status: Airborne and Contact and Droplet  Assessment/Plan:      Tubo-ovarian abscess  28 y/o F PMhx obesity, asthma, DM II, HTN, and midline and R-sided ventral hernia who presented w/ fever. She was discharged the day prior after repeat admission for management of pelvic pain with known loculated pelvic fluid collection suspicious for TOA. Symptoms included nausea, subjective fever and chills. In ED T 100.3, , WBC 3.36. CXR which showed bilateral pulmonary infiltrates. She was started on azithromycin, vanc and cefepime. CTA demonstrated multifocal patchy consolidative opacity throughout the pulmonary parenchyma, concerning for multifocal infection. COVID-19 PCR was positive. ID is consulted for antibiotic recommendations due to concern for PNA.      Recommendations  Stop cefepime and flagyl   Resume unasyn and doxycycline  Will need ID (has appt on 5/5 w/ Dr. Mei) and gyn onc f/u as well as repeat abd imaging in about 2-3 weeks  Can give azithromycin 500mg for 1 more day to complete treatment course  Patient does not meet criteria for hydroxychloroquine at this time  Have patient wear a mask on discharge  Provide return precautions including hypoxia, worsening SOB      Discharge antibiotics:   Unasyn IV and doxycycline    End date of IV antibiotics: no end date set, pending abd repeat imaging    Weekly outpatient laboratory on Monday or Tuesday while on IV antibiotics.    CBC   CMP or BMP    Continue to fax laboratory results to [facility] at [phone].      Thank you for your consult. I will sign off. Please contact us if you have any additional questions.    Ashutosh Broussard MD  Infectious Disease  [facility]    Subjective:     Principal Problem:COVID-19 virus infection    HPI: 30 y/o F PMhx obesity, asthma, DM II, HTN, and midline and R-sided ventral hernia who presented w/ fever. She was discharged the day prior after repeat admission for management of pelvic pain with known loculated pelvic fluid collection suspicious for TOA. Symptoms included nausea, subjective fever and chills. In ED T 100.3, , WBC 3.36. CXR which showed bilateral pulmonary infiltrates. She was started on azithromycin, vanc and cefepime. CTA demonstrated multifocal patchy consolidative opacity throughout the pulmonary parenchyma, concerning for multifocal infection. COVID-19 PCR was positive. ID is consulted for antibiotic recommendations due to concern for PNA.      Of note patient was initially admitted to [facility], where she was admitted for IR drainage of TOA and IV antibiotics on 2/18/20, cultures of fluid from pelvic fluid collection grew actinomyces. IR drain placement was not successful in resolving the TOA. She was discharged after improvement in pain. She then presented to [facility] with pelvic pain on 3/5/2020. CT A/P on 3/10 showed a 3.5 x 11 cm fluid collection anterior the uterus. Cultures after repeat attempt at IR drainage showed no growth, but given prior culture result she was started on IV Unasyn. She was discharged with plan for repeat CT with follow up with ID. On 3/27 patient reported worsening pain, and CT AP showed heterogeneous, complex fluid collection with multiple loculations and interval increase in size, now approximately 7 x 9 cm transversely and 9 cm craniocaudal. Doxycycline was added by ID and per ID note, patient was to follow up with IR on an outpatient basis for attempted drainage.      Due to increasing pain, she presented to [facility] 3/30 and was admitted for pain control and another attempt at drainage. She was evaluated by ID and continued on Unasyn and Doxy. She  had drain placed by IR with 62mL fluid removed and drain left in place. Cultures from drain fluid show NGTD. Repeat CT pelvis showed decrease in size of largest loculation to 4.3 x 8.9 cm. She was discharged with IV Unasyn and Doxy, with PICC and drain in LLQ in place. Was to f/u with Gyn Onc and ID.   Interval History: Afebrile. COVID PCR test resulted positive. Was transferred to COVID unit. CTA w/ multifocal patchy consolidative opacities b/l.    Review of Systems  Objective:     Vital Signs (Most Recent):  Temp: 98.5 °F (36.9 °C) (04/03/20 1224)  Pulse: 98 (04/03/20 1535)  Resp: 20 (04/03/20 1343)  BP: (!) 111/57 (04/03/20 1224)  SpO2: 100 % (04/03/20 1343) Vital Signs (24h Range):  Temp:  [98.2 °F (36.8 °C)-98.8 °F (37.1 °C)] 98.5 °F (36.9 °C)  Pulse:  [] 98  Resp:  [16-28] 20  SpO2:  [90 %-100 %] 100 %  BP: (111-125)/(57-73) 111/57     Weight: 116.7 kg (257 lb 4.4 oz)  Body mass index is 47.06 kg/m².    Estimated Creatinine Clearance: 142.3 mL/min (based on SCr of 0.7 mg/dL).    Physical Exam   Constitutional: She is oriented to person, place, and time. She appears well-developed. No distress.   HENT:   Head: Normocephalic and atraumatic.   Eyes: Conjunctivae are normal. Right eye exhibits no discharge. Left eye exhibits no discharge. No scleral icterus.   Cardiovascular: Normal rate and regular rhythm.   PICC line w/o any surrounding erythema or tenderness   Pulmonary/Chest: Effort normal. No respiratory distress.   Abdominal: Soft. She exhibits no distension. There is tenderness. There is no guarding.   abd drain in LLQ w/ minimal serosanguinous drainage   Musculoskeletal: She exhibits no edema or tenderness.   Neurological: She is alert and oriented to person, place, and time.   Skin: Skin is warm. No rash noted. She is not diaphoretic.   Psychiatric: She has a normal mood and affect. Her behavior is normal.       Significant Labs:   Blood Culture:   Recent Labs   Lab 04/01/20 2137 04/01/20  2210    LABBLOO No Growth to date  No Growth to date  No Growth to date  No Growth to date No Growth to date  No Growth to date     CBC:   Recent Labs   Lab 04/02/20  0509 04/03/20  0527 04/03/20  1222   WBC 2.70* 2.49* 2.78*   HGB 8.7* 9.0* 9.2*   HCT 29.7* 30.6* 31.3*   * 138* 141*     CMP:   Recent Labs   Lab 04/01/20 2137 04/02/20 0509 04/03/20 0527   * 134* 140   K 3.8 3.7 3.5    102 104   CO2 21* 25 26   * 139* 140*   BUN 6 6 5*   CREATININE 0.8 0.7 0.7   CALCIUM 8.7 8.1* 9.0   PROT 7.9  --  7.7   ALBUMIN 3.2*  --  3.0*   BILITOT 0.2  --  0.3   ALKPHOS 71  --  78   AST 46*  --  54*   ALT 48*  --  56*   ANIONGAP 11 7* 10   EGFRNONAA >60.0 >60.0 >60.0     Microbiology Results (last 7 days)     Procedure Component Value Units Date/Time    Blood culture #1 **CANNOT BE ORDERED STAT** [[id number]] Collected:  04/01/20 2210    Order Status:  Completed Specimen:  Blood from Line, PICC Right Basilic Updated:  04/02/20 2312     Blood Culture, Routine No Growth to date      No Growth to date    Narrative:       From PICC line    Blood culture #2 **CANNOT BE ORDERED STAT** [[id number]] Collected:  04/01/20 2137    Order Status:  Completed Specimen:  Blood from Peripheral, Antecubital, Left Updated:  04/02/20 2213     Blood Culture, Routine No Growth to date      No Growth to date    Blood culture #1 **CANNOT BE ORDERED STAT** [[id number]] Collected:  04/01/20 2137    Order Status:  Completed Specimen:  Blood from Peripheral, Antecubital, Right Updated:  04/02/20 2213     Blood Culture, Routine No Growth to date      No Growth to date    Strep A culture, throat [[id number]]     Order Status:  No result Specimen:  Throat     Influenza A & B by Molecular [[id number]]     Order Status:  No result Specimen:  Nasopharyngeal Swab           Significant Imaging: I have reviewed all pertinent imaging results/findings within the past 24 hours.

## 2020-04-03 NOTE — SUBJECTIVE & OBJECTIVE
Past Medical History:   Diagnosis Date    Asthma childhood    Diabetes mellitus     GERD (gastroesophageal reflux disease)     Morbid obesity     PCOS (polycystic ovarian syndrome)        Past Surgical History:   Procedure Laterality Date    APPENDECTOMY  2011    HERNIA REPAIR      OOPHORECTOMY      salpingoopherectomy Right 2008       Review of patient's allergies indicates:   Allergen Reactions    Latex, natural rubber Rash     Burning sensation       Medications:  Medications Prior to Admission   Medication Sig    acetaminophen (TYLENOL) 325 MG tablet Take 2 tablets (650 mg total) by mouth every 6 (six) hours.    ampicillin sodium/sulbactam Na (AMPICILLIN/SULBACTAM 3 G/100 ML NS, READY TO MIX,) Inject 100 mLs (3 g total) into the vein every 6 (six) hours.    doxycycline (VIBRA-TABS) 100 MG tablet Take 1 tablet (100 mg total) by mouth 2 (two) times daily. Take with food    dulaglutide (TRULICITY) 1.5 mg/0.5 mL PnIj Inject 1.5 mg into the skin every 7 days.    glipiZIDE (GLUCOTROL) 10 MG TR24 Take 1 tablet (10 mg total) by mouth daily with breakfast.    ibuprofen (ADVIL,MOTRIN) 600 MG tablet Take 1 tablet (600 mg total) by mouth every 6 (six) hours as needed for Pain.    lisinopril (PRINIVIL,ZESTRIL) 2.5 MG tablet Take 1 tablet (2.5 mg total) by mouth once daily.    metFORMIN (GLUCOPHAGE) 1000 MG tablet Take 2,000 mg by mouth.    ondansetron (ZOFRAN) 4 MG tablet Take 1 tablet (4 mg total) by mouth every 6 (six) hours as needed for Nausea.    oxyCODONE (ROXICODONE) 5 MG immediate release tablet Take 1 tablet (5 mg total) by mouth every 4 (four) hours as needed for Pain.    pantoprazole (PROTONIX) 40 MG tablet Take 1 tablet (40 mg total) by mouth before breakfast.    promethazine (PHENERGAN) 25 MG tablet Take 1 tablet (25 mg total) by mouth every 4 (four) hours.    senna (SENOKOT) 8.6 mg tablet Take 1 tablet by mouth 2 (two) times daily.    albuterol (VENTOLIN HFA) 90 mcg/actuation inhaler  Inhale 2 puffs into the lungs every 6 (six) hours as needed for Wheezing. Rescue    polyethylene glycol (GLYCOLAX) 17 gram PwPk Take 17 g by mouth 2 (two) times daily as needed.    sodium chloride 0.9% (NORMAL SALINE FLUSH) injection Inject 10 mLs into the vein every 6 (six) hours.     Antibiotics (From admission, onward)    Start     Stop Route Frequency Ordered    04/02/20 2100  azithromycin 500 mg in dextrose 5 % 250 mL IVPB (ready to mix system)     Question:  Is the patient competent?  Answer:  Yes    -- IV Every 24 hours (non-standard times) 04/02/20 0805    04/02/20 1815  metronidazole IVPB 500 mg      -- IV Every 8 hours (non-standard times) 04/02/20 1715    04/02/20 0900  ceFEPIme injection 2 g      -- IV Every 12 hours (non-standard times) 04/01/20 2344        Antifungals (From admission, onward)    None        Antivirals (From admission, onward)    None           Immunization History   Administered Date(s) Administered    DTaP 1989, 04/16/1990, 07/08/1993, 04/15/1994    HIB 07/08/1993    Hepatitis B, Pediatric/Adolescent 09/23/2002, 02/02/2004    IPV 1989, 04/15/1990, 04/15/1994    Influenza 11/24/2019    Influenza - Quadrivalent - PF (6 months and older) 01/17/2017    MMR 07/08/1993, 04/15/1994    Td (ADULT) 09/23/2002    Td (Adult), Unspecified Formulation 09/23/2002       Family History     Problem Relation (Age of Onset)    Diabetes Mother    Heart attack Father, Mother    Hypertension Father        Social History     Socioeconomic History    Marital status:      Spouse name: Not on file    Number of children: Not on file    Years of education: Not on file    Highest education level: Not on file   Occupational History    Not on file   Social Needs    Financial resource strain: Not on file    Food insecurity:     Worry: Not on file     Inability: Not on file    Transportation needs:     Medical: Not on file     Non-medical: Not on file   Tobacco Use    Smoking  status: Never Smoker    Smokeless tobacco: Never Used   Substance and Sexual Activity    Alcohol use: Not Currently     Comment: occasionally apple cider    Drug use: No    Sexual activity: Yes     Partners: Female   Lifestyle    Physical activity:     Days per week: Not on file     Minutes per session: Not on file    Stress: Not on file   Relationships    Social connections:     Talks on phone: Not on file     Gets together: Not on file     Attends Buddhism service: Not on file     Active member of club or organization: Not on file     Attends meetings of clubs or organizations: Not on file     Relationship status: Not on file   Other Topics Concern    Not on file   Social History Narrative    Not on file     Review of Systems   Constitutional: Positive for fever.   Eyes: Negative for visual disturbance.   Respiratory: Positive for shortness of breath.    Cardiovascular: Negative for leg swelling.   Gastrointestinal: Positive for abdominal pain and nausea. Negative for diarrhea.   Skin: Negative for rash.   Psychiatric/Behavioral: Negative for agitation and confusion.     Objective:     Vital Signs (Most Recent):  Temp: 98.4 °F (36.9 °C) (04/02/20 1400)  Pulse: 87 (04/02/20 1949)  Resp: 20 (04/02/20 1558)  BP: 107/70 (04/02/20 1400)  SpO2: 97 % (04/02/20 1558) Vital Signs (24h Range):  Temp:  [98.2 °F (36.8 °C)-100.3 °F (37.9 °C)] 98.4 °F (36.9 °C)  Pulse:  [] 87  Resp:  [17-22] 20  SpO2:  [90 %-97 %] 97 %  BP: (103-131)/(51-70) 107/70     Weight: 116.7 kg (257 lb 4.4 oz)  Body mass index is 47.06 kg/m².    Estimated Creatinine Clearance: 142.3 mL/min (based on SCr of 0.7 mg/dL).    Physical Exam   Constitutional: She is oriented to person, place, and time. She appears well-developed. No distress.   HENT:   Head: Normocephalic and atraumatic.   Eyes: Conjunctivae are normal. Right eye exhibits no discharge. Left eye exhibits no discharge. No scleral icterus.   Cardiovascular: Normal rate and regular  rhythm.   PICC line w/o any surrounding erythema or tenderness   Pulmonary/Chest: Effort normal. No respiratory distress.   Abdominal: Soft. She exhibits no distension. There is tenderness (lower abd). There is no guarding.   abd drain in LLQ w/ minimal serosanguinous drainage   Musculoskeletal: She exhibits no edema or tenderness.   Neurological: She is alert and oriented to person, place, and time.   Skin: Skin is warm. No rash noted. She is not diaphoretic.   Psychiatric: She has a normal mood and affect. Her behavior is normal.       Significant Labs:   Blood Culture:   Recent Labs   Lab 04/01/20 2137 04/01/20 2210   LABBLOO No Growth to date  No Growth to date No Growth to date     CBC:   Recent Labs   Lab 04/01/20 2137 04/02/20  0509   WBC 3.36* 2.70*   HGB 9.3* 8.7*   HCT 31.5* 29.7*   * 135*     CMP:   Recent Labs   Lab 04/01/20  0923 04/01/20 2137 04/02/20  0509   * 135* 134*   K 3.5 3.8 3.7    103 102   CO2 24 21* 25   * 174* 139*   BUN 8 6 6   CREATININE 0.7 0.8 0.7   CALCIUM 8.3* 8.7 8.1*   PROT 7.7 7.9  --    ALBUMIN 3.1* 3.2*  --    BILITOT 0.3 0.2  --    ALKPHOS 64 71  --    AST 32 46*  --    ALT 41 48*  --    ANIONGAP 7* 11 7*   EGFRNONAA >60.0 >60.0 >60.0     Microbiology Results (last 7 days)     Procedure Component Value Units Date/Time    Strep A culture, throat [[id number]]     Order Status:  No result Specimen:  Throat     Blood culture #2 **CANNOT BE ORDERED STAT** [[id number]] Collected:  04/01/20 2137    Order Status:  Completed Specimen:  Blood from Peripheral, Antecubital, Left Updated:  04/02/20 1115     Blood Culture, Routine No Growth to date    Blood culture #1 **CANNOT BE ORDERED STAT** [[id number]] Collected:  04/01/20 2210    Order Status:  Completed Specimen:  Blood from Line, PICC Right Basilic Updated:  04/02/20 1115     Blood Culture, Routine No Growth to date    Narrative:       From PICC line    Blood culture #1 **CANNOT BE ORDERED STAT**  [[id number]] Collected:  04/01/20 2137    Order Status:  Completed Specimen:  Blood from Peripheral, Antecubital, Right Updated:  04/02/20 1115     Blood Culture, Routine No Growth to date    Influenza A & B by Molecular [[id number]]     Order Status:  No result Specimen:  Nasopharyngeal Swab           Significant Imaging: I have reviewed all pertinent imaging results/findings within the past 24 hours.

## 2020-04-03 NOTE — ASSESSMENT & PLAN NOTE
30 y/o F PMhx obesity, asthma, DM II, HTN, and midline and R-sided ventral hernia who presented w/ fever. She was discharged the day prior after repeat admission for management of pelvic pain with known loculated pelvic fluid collection suspicious for TOA. Symptoms included nausea, subjective fever and chills. In ED T 100.3, , WBC 3.36. CXR which showed bilateral pulmonary infiltrates. She was started on azithromycin, vanc and cefepime. CTA demonstrated multifocal patchy consolidative opacity throughout the pulmonary parenchyma, concerning for multifocal infection. COVID-19 PCR was positive. ID is consulted for antibiotic recommendations due to concern for PNA.      Recommendations  Would continue cefepime + flagyl while inpatient and discontinue vanc  On discharge can resume unasyn and doxycycline  Will need ID (has appt on 5/5 w/ Dr. Mei) and gyn onc f/u as well as repeat abd imaging in about 2-3 weeks  Patient does not meet criteria for hydroxychloroquine at this time  Wear a mask on discharge  Supportive care for COVID, isolate at home for 14 days and until afebrile x at least 72 hrs w/o antipyretics  Provide return precautions including hypoxia, worsening SOB    Discharge antibiotics:   Unasyn IV and doxycycline    End date of IV antibiotics: no end date set, pending abd repeat imaging    Weekly outpatient laboratory on Monday or Tuesday while on IV antibiotics.    CBC   CMP or BMP    Continue to fax laboratory results to [facility] at [phone].

## 2020-04-03 NOTE — PROGRESS NOTES
"Feels slightly better than yesterday.  Still complaining of some SOA and now has a productive cough with blood.  Denies chest pain, F/C/NS. NPO. +Nausea but thinks it may be due to all the medications, IV antibiotics, and iodine.  Voiding. +BM. Not ambulating.  Afebrile, VSS. 2L NC. PICC without signs of infection. JONAH drain with minimal sanguinous output.  No signs of infection.     WBC trended downwards and ANC 1.1. Hb stable. No electrolyte abnormalities.      3/30: Aerobic culture: NGTD           Anaerobic culture: pending           Fungal culture: pending  4/1:   Blood culture x3: NGTD  4/2:   Strep pneumo: pending           Legionella: pending           CT Chest PE: negative for PE, + consolidations  4/3:   All pending           ESR           CRP           CK           LDH           Ferritin      Scheduled tylenol and ibuprofen D/Cd.  Oxy 10 mg x3.  Zofran 8 mg x2.    4/1 - 4/1 - Vanc   4/2 - Current: Azithromycin, cefepime, flagyl      JONAH drain D/Cd and she may have increased pain in that area from the drain removal.  She had a sinogram on 4/1 which didn't track or fill a cavity and a CT pelvis on 3/30 which showed decreased size in the pelvic fluid collection.  She is not a surgical candidate as surgery is reserved for patients who are septic.  She would also need reconstruction of her abdominal wall and General Surgery wants her co morbidities optimized before surgery.  The "chronic TOA" will be managed conservatively with outpatient IR procedures as needed.  At discharge, ID made recommendations about her antibiotic regimen.  I will see the patient in 2 weeks after her repeat imaging.  Her pain should be controlled with multi-modal analgesia and I'd consider a combination of tylenol, tramadol, oxycodone, and heating pads.    We weill continue to follow.  Thanks.                     "

## 2020-04-03 NOTE — CONSULTS
Consult received. Full note to Follow. Please call with any questions or concerns.    Ashutosh Broussard  Infectious Disease Fellow  Pager [phone]

## 2020-04-03 NOTE — PLAN OF CARE
Patient remained awake and alert throughout shift. Pain well controlled with oral medication. Vital signs remained stable on room air. All schedules/PRN medications administered as ordered. Activity done independently.Ambulates to bathroom;adequate output noted. MRI completed. Patient transferred to [facility]. Report given to Aleena ibarra receiving nurse.

## 2020-04-04 LAB
POCT GLUCOSE: 167 MG/DL (ref 70–110)
POCT GLUCOSE: 184 MG/DL (ref 70–110)

## 2020-04-04 PROCEDURE — 27000221 HC OXYGEN, UP TO 24 HOURS

## 2020-04-04 PROCEDURE — 94640 AIRWAY INHALATION TREATMENT: CPT

## 2020-04-04 PROCEDURE — 25000003 PHARM REV CODE 250: Performed by: INTERNAL MEDICINE

## 2020-04-04 PROCEDURE — 99231 SBSQ HOSP IP/OBS SF/LOW 25: CPT | Mod: GT,,, | Performed by: INTERNAL MEDICINE

## 2020-04-04 PROCEDURE — 99231 PR SUBSEQUENT HOSPITAL CARE,LEVL I: ICD-10-PCS | Mod: GT,,, | Performed by: INTERNAL MEDICINE

## 2020-04-04 PROCEDURE — 25000003 PHARM REV CODE 250: Performed by: STUDENT IN AN ORGANIZED HEALTH CARE EDUCATION/TRAINING PROGRAM

## 2020-04-04 PROCEDURE — 94761 N-INVAS EAR/PLS OXIMETRY MLT: CPT

## 2020-04-04 PROCEDURE — 99900035 HC TECH TIME PER 15 MIN (STAT)

## 2020-04-04 PROCEDURE — 63600175 PHARM REV CODE 636 W HCPCS: Performed by: STUDENT IN AN ORGANIZED HEALTH CARE EDUCATION/TRAINING PROGRAM

## 2020-04-04 PROCEDURE — 94799 UNLISTED PULMONARY SVC/PX: CPT

## 2020-04-04 PROCEDURE — 20600001 HC STEP DOWN PRIVATE ROOM

## 2020-04-04 PROCEDURE — 25000003 PHARM REV CODE 250: Performed by: OBSTETRICS & GYNECOLOGY

## 2020-04-04 PROCEDURE — 63600175 PHARM REV CODE 636 W HCPCS: Mod: JG | Performed by: INTERNAL MEDICINE

## 2020-04-04 RX ADMIN — OXYCODONE HYDROCHLORIDE 10 MG: 10 TABLET ORAL at 09:04

## 2020-04-04 RX ADMIN — ALBUTEROL SULFATE 2 PUFF: 90 AEROSOL, METERED RESPIRATORY (INHALATION) at 07:04

## 2020-04-04 RX ADMIN — ENOXAPARIN SODIUM 40 MG: 100 INJECTION SUBCUTANEOUS at 09:04

## 2020-04-04 RX ADMIN — LISINOPRIL 2.5 MG: 2.5 TABLET ORAL at 10:04

## 2020-04-04 RX ADMIN — DOXYCYCLINE HYCLATE 100 MG: 100 TABLET, COATED ORAL at 10:04

## 2020-04-04 RX ADMIN — INSULIN DETEMIR 5 UNITS: 100 INJECTION, SOLUTION SUBCUTANEOUS at 09:04

## 2020-04-04 RX ADMIN — DOXYCYCLINE HYCLATE 100 MG: 100 TABLET, COATED ORAL at 09:04

## 2020-04-04 RX ADMIN — AMPICILLIN SODIUM AND SULBACTAM SODIUM 3 G: 2; 1 INJECTION, POWDER, FOR SOLUTION INTRAMUSCULAR; INTRAVENOUS at 05:04

## 2020-04-04 RX ADMIN — SENNOSIDES 8.6 MG: 8.6 TABLET, FILM COATED ORAL at 10:04

## 2020-04-04 RX ADMIN — ALBUTEROL SULFATE 2 PUFF: 90 AEROSOL, METERED RESPIRATORY (INHALATION) at 12:04

## 2020-04-04 RX ADMIN — AMPICILLIN SODIUM AND SULBACTAM SODIUM 3 G: 2; 1 INJECTION, POWDER, FOR SOLUTION INTRAMUSCULAR; INTRAVENOUS at 08:04

## 2020-04-04 RX ADMIN — ALBUTEROL SULFATE 2 PUFF: 90 AEROSOL, METERED RESPIRATORY (INHALATION) at 09:04

## 2020-04-04 RX ADMIN — ALTEPLASE 2 MG: 2.2 INJECTION, POWDER, LYOPHILIZED, FOR SOLUTION INTRAVENOUS at 01:04

## 2020-04-04 RX ADMIN — PROMETHAZINE HYDROCHLORIDE 12.5 MG: 25 INJECTION INTRAMUSCULAR; INTRAVENOUS at 08:04

## 2020-04-04 RX ADMIN — AMPICILLIN SODIUM AND SULBACTAM SODIUM 3 G: 2; 1 INJECTION, POWDER, FOR SOLUTION INTRAMUSCULAR; INTRAVENOUS at 02:04

## 2020-04-04 RX ADMIN — PANTOPRAZOLE SODIUM 40 MG: 40 TABLET, DELAYED RELEASE ORAL at 10:04

## 2020-04-04 NOTE — CONSULTS
Mrs. Glenda Peña  has been accepted for transfer to [facility] and will be followed through telemedicine services beginning 04/04/20  at 7am.    Britney Infante

## 2020-04-04 NOTE — PROGRESS NOTES
Hospital Medicine / Tele Medicine  Progress Note  [facility]      Patient Name: Glenda Peña  MRN:  [id number]  Hospital Medicine Team: VIRTUAL HOSPITAL MEDICINE TEAM 8 Presley Killian MD  Date of Admission:  4/1/2020     Length of Stay:  LOS: 3 days     VIRTUAL TELENOTE    Start time: 4 PM  Chief complaint: Suspect COVID-19  End time:  4:15PM  Total time spent with patient: 15mins    The attending portion of this evaluation, treatment, and documentation was performed per Presley Killian MD via audio only.    Principal Problem:  COVID-19 virus infection      Hospital Course:  30 y/o F PMhx obesity, asthma, DM II, HTN, and midline and R-sided ventral hernia who presented w/ fever. Recent s/p IR drainage of chronic pelvic fluid. Workup on return ER visit showed new pulm BL consolidations. Patient admitted for COVID-19.    Interval History:     Patient transferred to telemedicine 4/4 AM. Patient noted to be hemodynamically stable.  No acute events overnight. Nauseated this AM given phenergan. Attempted to call few times today, and patient was sleeping post phenergan. She continues to have dyspnea, mild dry cough, nausea but all mildly improving. Appetite has been low.  Encourage use of incentive spirometer, albuterol inhaler, p.r.n. antitussives.  Attempt to get out of bed, sit up in chair and walk around the room. 6 min walk test ordered.    Review of Systems:  ROS (Positive in Bold, otherwise negative)  Constitutional: fever, chills, night sweats, malaise, weakness  CV: chest pain, edema, palpitations  Resp: SOB, cough, sputum production  GI: changes in appetite, nausea, no emesis, pain, melena, hematochezia, GERD, hematemesis  : Dysuria, hematuria, urinary urgency, frequency  MSK: arthralgia/myalgia, joint swelling    All other systems were reviewed & are negative.     Inpatient Medications:    Current Facility-Administered Medications:     acetaminophen tablet 650 mg, 650 mg, Oral,  Q6H PRN, Ritchie Millan MD    albuterol inhaler 2 puff, 2 puff, Inhalation, Q6H WAKE, Ritchie Millan MD, 2 puff at 04/04/20 1247    albuterol inhaler 2 puff, 2 puff, Inhalation, Q4H PRN **AND** MDI Q4H PRN, , , Q4H PRN, Tee Peters MD    alteplase injection 2 mg, 2 mg, Intra-Catheter, Daily PRN, Tee Peters MD, 2 mg at 04/04/20 0137    ampicillin-sulbactam 3 g in sodium chloride 0.9 % 100 mL IVPB (ready to mix system), 3 g, Intravenous, Q6H, Tee Peters MD, Last Rate: 100 mL/hr at 04/04/20 1115, 3 g at 04/04/20 1115    calcium carbonate 200 mg calcium (500 mg) chewable tablet 500 mg, 500 mg, Oral, BID PRN, Ricky Whitney MD    dextromethorphan-guaifenesin  mg/5 ml liquid 10 mL, 10 mL, Oral, Q4H PRN, Tee Peters MD    dextrose 10% (D10W) Bolus, 12.5 g, Intravenous, PRN, Ricky Whitney MD    dextrose 10% (D10W) Bolus, 12.5 g, Intravenous, PRN, Tee Peters MD    doxycycline tablet 100 mg, 100 mg, Oral, Q12H, Tee Peters MD, 100 mg at 04/04/20 1000    enoxaparin injection 40 mg, 40 mg, Subcutaneous, Daily, Ricky Whitney MD, 40 mg at 04/03/20 2100    glucagon (human recombinant) injection 1 mg, 1 mg, Intramuscular, PRN, Ricky Whitney MD    glucose chewable tablet 16 g, 16 g, Oral, PRN, Tee Peters MD    glucose chewable tablet 24 g, 24 g, Oral, PRN, Tee Peters MD    insulin aspart U-100 pen 0-5 Units, 0-5 Units, Subcutaneous, Q6H PRN, Ricky Whitney MD    insulin detemir U-100 pen 5 Units, 5 Units, Subcutaneous, QHS, Tee Peters MD, 5 Units at 04/03/20 2228    lisinopriL tablet 2.5 mg, 2.5 mg, Oral, Daily, Ricky Whitney MD, 2.5 mg at 04/04/20 1000    ondansetron disintegrating tablet 8 mg, 8 mg, Oral, Q8H PRN, Ricky Whitney MD, 8 mg at 04/02/20 2140    oxyCODONE immediate release tablet 10 mg, 10 mg, Oral, Q4H PRN, Ricky Whitney MD, 10 mg at 04/03/20 2209    oxyCODONE immediate release tablet 5 mg, 5 mg, Oral, Q4H PRN, Ricky Whitney MD     "pantoprazole EC tablet 40 mg, 40 mg, Oral, Daily, Ritchie Millan MD, 40 mg at 04/04/20 1000    polyethylene glycol packet 17 g, 17 g, Oral, BID PRN, Ricky Whitney MD    promethazine (PHENERGAN) 12.5 mg in dextrose 5 % 50 mL IVPB, 12.5 mg, Intravenous, Q6H PRN, Ricky Whitney MD, Last Rate: 150 mL/hr at 04/03/20 2159, 12.5 mg at 04/03/20 2159    psyllium husk (aspartame) 3.4 gram packet 1 packet, 1 packet, Oral, Daily, Ricky Whitney MD, 1 packet at 04/02/20 0958    senna tablet 8.6 mg, 8.6 mg, Oral, BID, Ricky Whitney MD, 8.6 mg at 04/04/20 1000    sodium chloride 0.9% flush 10 mL, 10 mL, Intravenous, PRN, Ricky Whitney MD      Physical Exam:      Intake/Output Summary (Last 24 hours) at 4/4/2020 1623  Last data filed at 4/3/2020 1800  Gross per 24 hour   Intake 580 ml   Output --   Net 580 ml     Wt Readings from Last 3 Encounters:   04/02/20 116.7 kg (257 lb 4.4 oz)   03/29/20 116.7 kg (257 lb 4.4 oz)   03/19/20 113.2 kg (249 lb 9 oz)       /74   Pulse 87   Temp 98.9 °F (37.2 °C) (Axillary)   Resp 18   Ht 5' 2" (1.575 m)   Wt 116.7 kg (257 lb 4.4 oz)   SpO2 98%   Breastfeeding? No   BMI 47.06 kg/m²     Done by RN:  GEN: NAD  HEENT: EOMI  CV: RRR  Resp: CTAB  GI: soft, NTND  Neuro: alert, oriented  Skin: no rash  MSK: no edema  Psych: normal affect    Laboratory:  Lab Results   Component Value Date    TGT64AZIEDCO Detected (A) 04/01/2020       Recent Labs   Lab 04/02/20  0509 04/03/20  0527 04/03/20  1222   WBC 2.70* 2.49* 2.78*   LYMPH 41.9  1.1 48.6*  1.2 42.8  1.2   HGB 8.7* 9.0* 9.2*   HCT 29.7* 30.6* 31.3*   * 138* 141*     Recent Labs   Lab 04/01/20 2137 04/02/20 0509 04/03/20  0527   * 134* 140   K 3.8 3.7 3.5    102 104   CO2 21* 25 26   BUN 6 6 5*   CREATININE 0.8 0.7 0.7   * 139* 140*   CALCIUM 8.7 8.1* 9.0   MG  --   --  1.9   PHOS  --   --  3.5     Recent Labs   Lab 04/01/20 0923 04/01/20 2137 04/03/20  0527   ALKPHOS 64 71 78   ALT 41 48* 56*   AST " 32 46* 54*   ALBUMIN 3.1* 3.2* 3.0*   PROT 7.7 7.9 7.7   BILITOT 0.3 0.2 0.3        Recent Labs     04/03/20  0527   FERRITIN 358*   CRP 46.5*   TROPONINI 0.008       All labs within the last 24 hours were reviewed.     Microbiology:  Microbiology Results (last 7 days)     Procedure Component Value Units Date/Time    Blood culture #1 **CANNOT BE ORDERED STAT** [[id number]] Collected:  04/01/20 2210    Order Status:  Completed Specimen:  Blood from Line, PICC Right Basilic Updated:  04/03/20 2312     Blood Culture, Routine No Growth to date      No Growth to date      No Growth to date    Narrative:       From PICC line    Blood culture #2 **CANNOT BE ORDERED STAT** [[id number]] Collected:  04/01/20 2137    Order Status:  Completed Specimen:  Blood from Peripheral, Antecubital, Left Updated:  04/03/20 2213     Blood Culture, Routine No Growth to date      No Growth to date      No Growth to date    Blood culture #1 **CANNOT BE ORDERED STAT** [[id number]] Collected:  04/01/20 2137    Order Status:  Completed Specimen:  Blood from Peripheral, Antecubital, Right Updated:  04/03/20 2213     Blood Culture, Routine No Growth to date      No Growth to date      No Growth to date    Strep A culture, throat [[id number]]     Order Status:  No result Specimen:  Throat     Influenza A & B by Molecular [[id number]]     Order Status:  No result Specimen:  Nasopharyngeal Swab             Imaging  ECG Results    None         No results found for this or any previous visit.    CTA Chest Non Coronary  Narrative: EXAMINATION:  CTA CHEST NON CORONARY    CLINICAL HISTORY:  Rule out PE - Gyn Onc residen # [phone];    TECHNIQUE:  Low dose axial images, sagittal and coronal reformations were obtained from the thoracic inlet to the lung bases following the IV administration of 100 mL of Omnipaque 350.  Contrast timing was optimized to evaluate the pulmonary arteries.  MIP images were performed.    COMPARISON:  None    FINDINGS:  The  structures at the base of the neck are unremarkable.  Lymphadenopathy.  The heart is not enlarged.  The thoracic aorta tapers normally.  The visualized portions of the kidneys, adrenal glands, spleen, and pancreas are unremarkable.  The liver is hypoattenuating, possibly reflecting steatosis, correlation recommended.    The airways are patent.  There are multifocal patchy consolidative opacities throughout the lungs bilaterally, most significantly within the left upper lobe and right lower lobe.  There are developing air bronchograms within a few of these foci.  Findings are most concerning for multifocal infection however follow-up is recommended to ensure resolution to exclude malignancy.    Bolus timing is suboptimal for evaluation of pulmonary thromboembolism.  Additionally, artifact from respiratory motion and habitus also limits evaluation.  Allowing for these factors, no convincing pulmonary arterial filling defect to the level of the distal lobar arteries and a few segmental arteries bilaterally to convincingly suggest pulmonary thromboembolism.  Distal embolism however cannot be excluded on the basis of this exam.    Degenerative changes are noted of the spine.  No significant axillary lymphadenopathy.  Right central venous catheter tip terminates in the region of the right atrium, obscured by artifact.  Impression: This report was flagged in Epic as abnormal.    1. Examination is suboptimal for evaluation of pulmonary thromboembolism, allowing for this, no convincing pulmonary arterial filling defect to the level of the distal lobar arteries and several segmental arteries to suggest pulmonary thromboembolism.  Please see above for further details.  2. Multifocal patchy consolidative opacity throughout the pulmonary parenchyma, concerning for multifocal infection.  Follow-up is advised to ensure resolution and to exclude malignancy.  3. Findings suggesting hepatic steatosis, correlation with LFTs  advised.  4. Additional findings above.    Electronically signed by: Robert Fam MD  Date:    04/02/2020  Time:    18:02      All imaging within the last 24 hours was reviewed.     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       Covid-19 Virus Infection  PNA  Covered initially with cefepime and azithro.   Now transitioned to unasyn and doxycyline as per ID  HCQ not indicated at this point in time  Pending 6 min walk    - COVID-19 testing: Collection Date: 4/1/2020 Collection Time:  11:32 PM  - Infection Control notified     - Isolation:   - Airborne and Droplet Precautions  - Surgical mask on patient   - N95 masks must be fit tested, wear eye protection  - 20 second hand hygiene              - Limit visitors per hospital policy              - Consolidate lab draws, nursing care, and interventions     - Diagnostics: (Lymphopenia, hyponatremia, hyperferritinemia, elevated troponin, elevated d-dimer, age, and comorbidities are significant predictors of poor clinical outcome)              - CBC: WBC 2.78 ANC 1.4, lymph 1.2                       trend Q48hrs  - CMP:      AST/ALT 54-56      trend Q48hrs  - Procalcitonin:  - D-dimer:                    repeat prior to discharge  - Ferritin:           358      repeat prior to discharge   - CRP:             46.5     trend Q48hrs  - LDH:                          repeat prior to discharge  - BNP:  - Troponin:                     - ECG:               - rapid Flu:              - RIP only if BMT/solid transplant:              - Legionella antigen:              - Blood culture x2: NGTD              - Sputum culture:              - CXR: bibasilar airspace dz              - UA and culture:              - CPK: 20     - Management:   Bundle care as able to maintain isolation & minimize in/out of room   - Supplemental O2 to maintain SpO2 92%-96%   if requiring 6L NC or higher, place on nonrebreather and discuss case with MICU              - Telemetry & continuous pulse oximetry               - If wheezing   - albuterol inhaler 2-4 puff Q6hr PRN    - ipratropium daily               - acetaminophen 650mg PO Q6hr PRN fever              - loperamide PRN for viral diarrhea  - Empiric antibiotics per likely source & patient allergies                          - CAP: x 5 day course (d/c early if low concern for bacterial co-infection)  Ceftriaxone 1g IV Q24hrs                                      Azithromycin 500mg IV day #1, then 250mg PO daily x4 days                                      If azithromycin is not available, start doxycycline                                      If MRSA risk factors, add Vancomycin IV (PharmD consult)                 - Investigational Treatment Protocol: (if patient meets criteria)   [email or web address]                           - statin: atorvastatin 40mg po daily (if CPK WNL)                          - start HCQ 400mg PO BID x1 day, then 400mg PO daily x 4 days   (check glucose 6 phosphate dehydrogenase (NOT G6PD Quant), ECG on start & @48hrs, and start Qshift POCT glucose)     Safety notes:              - Avoid NIPPV (CPAP/BiPAP) to prevent  "aerosolization, use on a case-by-case basis if in neg pressure room              - Cautious use of NSAIDS for fever per WHO recommendations (3/16/2020)              - No new ACEi/ARB start or discontinuation of chronic med unless hypotensive (Esler et al. Journal of Hypertension 2020, 38:000-000)              - Careful use of steroids in the absence of other indications (shock, ARDS)              - Fluid sparing resuscitation, avoid maintenance fluids                   If patient transitions to Comfort-Focused Care, please place "Nurse Communication: End of Life Care, family members allowed to visit, including spouse/partner and adult children [please list names]. Please ask family to visit as a group and leave as a group.         VTE High Risk Prophylaxis: enoxaparin 40mg sq QHS @ 2100 (bundled care) if GFR >30     Patient's chronic/stable medical conditions noted in the problem list above will be managed with the patient's home medications as tolerated.       Chronic TOA  Coverrf with Cefepime and flagyl  Now transition to unasyn and doxycycline as per ID  No end date for now, will continue until next ID visit  Has pain regimen of tylenol and PRN opioid for abd Pain  Needs home health for IV abx     HTN  Continue lisinopril 2.5mg daily     DM  On trulicity, metformin and glipizide at home.  Started on detemir 5 units HS and correction SSI          Diet: DM  Code: full  Dispo: home with IV abx when stable, 6 min walk pending     Presley Killian MD  4/4/2020 4:23 PM  Department of Hospital medicine     "

## 2020-04-04 NOTE — NURSING
AAO x 4 . VSS. C/O pain to LLQ , oxycodone x 1 with positive effect.  Pt continues on room air. Pt complains of SOB upon exertion at times. Call bell placed within reach and use encouraged.

## 2020-04-05 VITALS
BODY MASS INDEX: 47.34 KG/M2 | RESPIRATION RATE: 18 BRPM | HEIGHT: 62 IN | OXYGEN SATURATION: 95 % | TEMPERATURE: 98 F | HEART RATE: 84 BPM | DIASTOLIC BLOOD PRESSURE: 75 MMHG | SYSTOLIC BLOOD PRESSURE: 122 MMHG | WEIGHT: 257.25 LBS

## 2020-04-05 LAB
ALBUMIN SERPL BCP-MCNC: 3.1 G/DL (ref 3.5–5.2)
ALP SERPL-CCNC: 83 U/L (ref 55–135)
ALT SERPL W/O P-5'-P-CCNC: 42 U/L (ref 10–44)
ANION GAP SERPL CALC-SCNC: 12 MMOL/L (ref 8–16)
AST SERPL-CCNC: 46 U/L (ref 10–40)
BACTERIA BLD CULT: NORMAL
BASOPHILS # BLD AUTO: 0.01 K/UL (ref 0–0.2)
BASOPHILS NFR BLD: 0.3 % (ref 0–1.9)
BILIRUB SERPL-MCNC: 0.3 MG/DL (ref 0.1–1)
BUN SERPL-MCNC: 5 MG/DL (ref 6–20)
CALCIUM SERPL-MCNC: 9.2 MG/DL (ref 8.7–10.5)
CHLORIDE SERPL-SCNC: 103 MMOL/L (ref 95–110)
CO2 SERPL-SCNC: 24 MMOL/L (ref 23–29)
CREAT SERPL-MCNC: 0.7 MG/DL (ref 0.5–1.4)
CRP SERPL-MCNC: 40.5 MG/L (ref 0–8.2)
DIFFERENTIAL METHOD: ABNORMAL
EOSINOPHIL # BLD AUTO: 0 K/UL (ref 0–0.5)
EOSINOPHIL NFR BLD: 0.6 % (ref 0–8)
ERYTHROCYTE [DISTWIDTH] IN BLOOD BY AUTOMATED COUNT: 15.9 % (ref 11.5–14.5)
EST. GFR  (AFRICAN AMERICAN): >60 ML/MIN/1.73 M^2
EST. GFR  (NON AFRICAN AMERICAN): >60 ML/MIN/1.73 M^2
GLUCOSE SERPL-MCNC: 234 MG/DL (ref 70–110)
HCT VFR BLD AUTO: 33.4 % (ref 37–48.5)
HGB BLD-MCNC: 9.7 G/DL (ref 12–16)
IMM GRANULOCYTES # BLD AUTO: 0.01 K/UL (ref 0–0.04)
IMM GRANULOCYTES NFR BLD AUTO: 0.3 % (ref 0–0.5)
LDH SERPL L TO P-CCNC: 245 U/L (ref 110–260)
LYMPHOCYTES # BLD AUTO: 1.3 K/UL (ref 1–4.8)
LYMPHOCYTES NFR BLD: 36.5 % (ref 18–48)
MCH RBC QN AUTO: 25.5 PG (ref 27–31)
MCHC RBC AUTO-ENTMCNC: 29 G/DL (ref 32–36)
MCV RBC AUTO: 88 FL (ref 82–98)
MONOCYTES # BLD AUTO: 0.2 K/UL (ref 0.3–1)
MONOCYTES NFR BLD: 6.7 % (ref 4–15)
NEUTROPHILS # BLD AUTO: 1.9 K/UL (ref 1.8–7.7)
NEUTROPHILS NFR BLD: 55.6 % (ref 38–73)
NRBC BLD-RTO: 0 /100 WBC
PLATELET # BLD AUTO: 165 K/UL (ref 150–350)
PMV BLD AUTO: 10.8 FL (ref 9.2–12.9)
POTASSIUM SERPL-SCNC: 3.6 MMOL/L (ref 3.5–5.1)
PROT SERPL-MCNC: 8.2 G/DL (ref 6–8.4)
RBC # BLD AUTO: 3.81 M/UL (ref 4–5.4)
SODIUM SERPL-SCNC: 139 MMOL/L (ref 136–145)
WBC # BLD AUTO: 3.45 K/UL (ref 3.9–12.7)

## 2020-04-05 PROCEDURE — 27000221 HC OXYGEN, UP TO 24 HOURS

## 2020-04-05 PROCEDURE — 94761 N-INVAS EAR/PLS OXIMETRY MLT: CPT

## 2020-04-05 PROCEDURE — 25000003 PHARM REV CODE 250: Performed by: OBSTETRICS & GYNECOLOGY

## 2020-04-05 PROCEDURE — 25000003 PHARM REV CODE 250: Performed by: INTERNAL MEDICINE

## 2020-04-05 PROCEDURE — 94799 UNLISTED PULMONARY SVC/PX: CPT

## 2020-04-05 PROCEDURE — 99239 PR HOSPITAL DISCHARGE DAY,>30 MIN: ICD-10-PCS | Mod: ,,, | Performed by: INTERNAL MEDICINE

## 2020-04-05 PROCEDURE — 85025 COMPLETE CBC W/AUTO DIFF WBC: CPT

## 2020-04-05 PROCEDURE — 80053 COMPREHEN METABOLIC PANEL: CPT

## 2020-04-05 PROCEDURE — 25000003 PHARM REV CODE 250: Performed by: STUDENT IN AN ORGANIZED HEALTH CARE EDUCATION/TRAINING PROGRAM

## 2020-04-05 PROCEDURE — 94640 AIRWAY INHALATION TREATMENT: CPT

## 2020-04-05 PROCEDURE — 63600175 PHARM REV CODE 636 W HCPCS: Performed by: INTERNAL MEDICINE

## 2020-04-05 PROCEDURE — 99239 HOSP IP/OBS DSCHRG MGMT >30: CPT | Mod: ,,, | Performed by: INTERNAL MEDICINE

## 2020-04-05 PROCEDURE — 99900035 HC TECH TIME PER 15 MIN (STAT)

## 2020-04-05 PROCEDURE — 83615 LACTATE (LD) (LDH) ENZYME: CPT

## 2020-04-05 PROCEDURE — 86140 C-REACTIVE PROTEIN: CPT

## 2020-04-05 RX ORDER — GUAIFENESIN/DEXTROMETHORPHAN 100-10MG/5
10 SYRUP ORAL EVERY 4 HOURS PRN
Refills: 0 | COMMUNITY
Start: 2020-04-05 | End: 2020-04-15

## 2020-04-05 RX ORDER — ALBUTEROL SULFATE 90 UG/1
2 AEROSOL, METERED RESPIRATORY (INHALATION) EVERY 6 HOURS PRN
Qty: 18 G | Refills: 3 | Status: SHIPPED | OUTPATIENT
Start: 2020-04-05 | End: 2022-03-30

## 2020-04-05 RX ADMIN — AMPICILLIN SODIUM AND SULBACTAM SODIUM 3 G: 2; 1 INJECTION, POWDER, FOR SOLUTION INTRAMUSCULAR; INTRAVENOUS at 01:04

## 2020-04-05 RX ADMIN — ALBUTEROL SULFATE 2 PUFF: 90 AEROSOL, METERED RESPIRATORY (INHALATION) at 09:04

## 2020-04-05 RX ADMIN — DOXYCYCLINE HYCLATE 100 MG: 100 TABLET, COATED ORAL at 09:04

## 2020-04-05 RX ADMIN — AMPICILLIN SODIUM AND SULBACTAM SODIUM 3 G: 2; 1 INJECTION, POWDER, FOR SOLUTION INTRAMUSCULAR; INTRAVENOUS at 08:04

## 2020-04-05 RX ADMIN — PANTOPRAZOLE SODIUM 40 MG: 40 TABLET, DELAYED RELEASE ORAL at 09:04

## 2020-04-05 RX ADMIN — SENNOSIDES 8.6 MG: 8.6 TABLET, FILM COATED ORAL at 09:04

## 2020-04-05 RX ADMIN — ALBUTEROL SULFATE 2 PUFF: 90 AEROSOL, METERED RESPIRATORY (INHALATION) at 01:04

## 2020-04-05 RX ADMIN — LISINOPRIL 2.5 MG: 2.5 TABLET ORAL at 09:04

## 2020-04-05 NOTE — NURSING
AAO x 4 . VSS. C/O pain to LLQ , oxycodone x 1 with positive effect.  Pt complains of SOB upon exertion at times. Pt on 1 LPM via NC throughout shift, SPO2 93-97 %. Call bell placed within reach and use encouraged.

## 2020-04-05 NOTE — PLAN OF CARE
BRENT updated by MD that pt will d/c and needs HH, home IV abx infusion and home o2. CM noted in chart that HH and infusion was initiated at recent d/c and pt readmitted 4/1. [facility]. BRENT spoke w/ on call for [facility] - pt had just received IV abx delivery prior to readmit and he will contact pt to discuss as the meds are still good. Pt will not need to wait at hospital for IV med delivery as they are at her home. CM forwarded home o3 order to [facility] and pt's portable tank will be delivered to nurse's station.     Update: BRENT received call that pt was actually accepted by [facility] and d/t delayed PPE delivery, they cannot see pt until Wednesday. BRENT contacted Dr Chiu and he ok'ed a Wed visit which is reflected on  orders. BRENT spoke to on call w/ [facility] to relayed MD approval.

## 2020-04-05 NOTE — NURSING
Home Oxygen Evaluation    Date Performed: 2020    1) Patient's Home O2 Sat on room air, while at rest: 90%.        If O2 sats on room air at rest are 88% or below, patient qualifies. No additional testing needed. Document N/A in steps 2 and 3. If 89% or above, complete steps 2.      2) Patient's O2 Sat on room air while exercisin        If O2 sats on room air while exercising remain 89% or above patient does not qualify, no further testing needed Document N/A in step 3. If O2 sats on room air while exercising are 88% or below, continue to step 3.      3) Patient's O2 Sat while exercising on O2: 97 at 2 LPM         (Must show improvement from #2 for patients to qualify)    If O2 sats improve on oxygen, patient qualifies for portable oxygen. If not, the patient does not qualify.

## 2020-04-05 NOTE — PLAN OF CARE
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       Future Appointments   Date Time Provider Department Center   4/7/2020 10:40 AM Aleena Gonzalez MD [facility] COLON [address]   4/21/2020  1:00 PM [facility] CT1 LIMIT 400 LBS WB CT SCAN [facility] [place], [place]   5/5/2020  3:30 PM Shantel Mei MD [facility] ID [address]   6/19/2020  9:40 AM Emiliano Dunbar MD [facility]           I have seen and examined this patient face to face today. My clinical findings that support the need for the home health skilled services and home bound status are the following:  Weakness/numbness causing balance and gait disturbance due to Weakness/Debility making it taxing to leave home.    Allergies:  Review of patient's allergies indicates:   Allergen Reactions    Latex, natural rubber Rash     Burning sensation       Diet: diabetic diet: 2000 calorie    Activities: activity as tolerated    Nursing:   SN to  complete comprehensive assessment including routine vital signs. Instruct on disease process and s/s of complications to report to MD. Review/verify medication list sent home with the patient at time of discharge  and instruct patient/caregiver as needed. Frequency may be adjusted depending on start of care date.    Notify MD if SBP > 160 or < 90; DBP > 90 or < 50; HR > 120 or < 50; Temp > 101; Other:         MISCELLANEOUS CARE:  Home Infusion Therapy:   SN to perform Infusion Therapy/Central Line Care.  Review Central Line Care & Central Line Flush with patient.    Administer (drug and dose): ampicillin-sulbactam 3 g in sodium chloride 0.9 % 100 mL IVPB 3 g every 6 hours    Last dose given: 4/5/20                         Home dose due: 6 hrs after discharge    DURATION: End date 5/13/20    Scrub the Hub: Prior to accessing the line, always perform a 30 second alcohol scrub  Each lumen of the central line is to be flushed at least daily with 10 mL Normal Saline and 3 mL Heparin flush (10 units/mL)  Skilled Nurse (SN) may draw blood from IV access  Blood Draw Procedure:   - Aspirate at least 5 mL of blood   - Discard   - Obtain specimen   - Change injection cap   - Flush with 20 mL Normal Saline followed by a                 3-5 mL Heparin flush (10 units/mL)  Central :   - Sterile dressing changes are done weekly and as needed.   - Use chlor-hexadine scrub to cleanse site, apply Biopatch to insertion site,       apply securement device dressing   - Injection caps are changed weekly and after EVERY lab draw.   - If sterile gauze is under dressing to control oozing,                 dressing change must be performed every 24 hours until gauze is not needed.    Home Health to draw weekly labs first lab draw on first visit and then weekly labs while on abx.  If seen Wed 4/8 then would get first set of labs on wed then after that weekly labs either Monday or Tuesday  · CBC  · CMP or BMP  Continue to fax  laboratory results to [facility] at [phone].      Medications: Review discharge medications with patient and family and provide education.      Current Discharge Medication List      START taking these medications    Details   dextromethorphan-guaifenesin  mg/5 ml (ROBITUSSIN-DM)  mg/5 mL liquid Take 10 mLs by mouth every 4 (four) hours as needed.  Refills: 0         CONTINUE these medications which have CHANGED    Details   albuterol (VENTOLIN HFA) 90 mcg/actuation inhaler Inhale 2 puffs into the lungs every 6 (six) hours as needed for Wheezing. Rescue  Qty: 18 g, Refills: 3    Associated Diagnoses: Acute bacterial bronchitis         CONTINUE these medications which have NOT CHANGED    Details   acetaminophen (TYLENOL) 325 MG tablet Take 2 tablets (650 mg total) by mouth every 6 (six) hours.  Qty: 180 tablet, Refills: 0      ampicillin sodium/sulbactam Na (AMPICILLIN/SULBACTAM 3 G/100 ML NS, READY TO MIX,) Inject 100 mLs (3 g total) into the vein every 6 (six) hours.      doxycycline (VIBRA-TABS) 100 MG tablet Take 1 tablet (100 mg total) by mouth 2 (two) times daily. Take with food  Qty: 84 tablet, Refills: 0      dulaglutide (TRULICITY) 1.5 mg/0.5 mL PnIj Inject 1.5 mg into the skin every 7 days.  Qty: 2 mL, Refills: 3    Associated Diagnoses: Type 2 diabetes mellitus without complication, without long-term current use of insulin      glipiZIDE (GLUCOTROL) 10 MG TR24 Take 1 tablet (10 mg total) by mouth daily with breakfast.  Qty: 30 tablet, Refills: 11    Associated Diagnoses: Type 2 diabetes mellitus with hyperglycemia, without long-term current use of insulin      lisinopril (PRINIVIL,ZESTRIL) 2.5 MG tablet Take 1 tablet (2.5 mg total) by mouth once daily.  Qty: 90 tablet, Refills: 1    Associated Diagnoses: Type 2 diabetes mellitus without complication, without long-term current use of insulin      metFORMIN (GLUCOPHAGE) 1000 MG tablet Take 2,000 mg by mouth.      ondansetron (ZOFRAN) 4  MG tablet Take 1 tablet (4 mg total) by mouth every 6 (six) hours as needed for Nausea.  Qty: 18 tablet, Refills: 0    Associated Diagnoses: Viral gastroenteritis      oxyCODONE (ROXICODONE) 5 MG immediate release tablet Take 1 tablet (5 mg total) by mouth every 4 (four) hours as needed for Pain.  Qty: 30 tablet, Refills: 0    Comments: Quantity prescribed more than 7 day supply? No      pantoprazole (PROTONIX) 40 MG tablet Take 1 tablet (40 mg total) by mouth before breakfast.  Qty: 60 tablet, Refills: 2    Associated Diagnoses: GERD with esophagitis      promethazine (PHENERGAN) 25 MG tablet Take 1 tablet (25 mg total) by mouth every 4 (four) hours.  Qty: 30 tablet, Refills: 1      senna (SENOKOT) 8.6 mg tablet Take 1 tablet by mouth 2 (two) times daily.      polyethylene glycol (GLYCOLAX) 17 gram PwPk Take 17 g by mouth 2 (two) times daily as needed.  Qty: 60 packet, Refills: 0      sodium chloride 0.9% (NORMAL SALINE FLUSH) injection Inject 10 mLs into the vein every 6 (six) hours.         STOP taking these medications       ibuprofen (ADVIL,MOTRIN) 600 MG tablet Comments:   Reason for Stopping:               I certify that this patient is confined to her home and needs intermittent skilled nursing care.

## 2020-04-06 ENCOUNTER — PATIENT OUTREACH (OUTPATIENT)
Dept: ADMINISTRATIVE | Facility: OTHER | Age: 30
End: 2020-04-06

## 2020-04-06 ENCOUNTER — TELEPHONE (OUTPATIENT)
Dept: GYNECOLOGIC ONCOLOGY | Facility: CLINIC | Age: 30
End: 2020-04-06

## 2020-04-06 LAB
BACTERIA SPEC ANAEROBE CULT: NORMAL
L PNEUMO AG UR QL IA: NOT DETECTED

## 2020-04-06 NOTE — TELEPHONE ENCOUNTER
----- Message from Ritchie Millan MD sent at 4/6/2020  2:34 PM CDT -----  Yes, please.  ----- Message -----  From: Linden Kerns  Sent: 4/6/2020   2:26 PM CDT  To: Nicolasa Aguilar RN, Ritchie Millan MD    Sure, but do you want her to keep that virtual visit on the 22nd also? Thanks Linden   ----- Message -----  From: Ritchie Millan MD  Sent: 4/6/2020   1:45 PM CDT  To: Nicolasa Bolden RN Patrice, can you also schedule a telemedine visit for her this Wednesday morning, after my other ones?  I am going to check in on her and also discuss the plan moving forward.  Thanks.  ----- Message -----  From: Linden Kerns  Sent: 4/6/2020   1:40 PM CDT  To: Nicolasa Aguilar RN, Ritchie Millan MD    Done, thanks Linden   ----- Message -----  From: Ritchie Millan MD  Sent: 4/6/2020  12:27 PM CDT  To: Nicolasa Bolden RN    Hi,    Can we please schedule a telemedicine for this patient on 4/22?  Thank you.

## 2020-04-06 NOTE — DISCHARGE SUMMARY
"  Discharge Summary  Hospital Medicine       Name: Glenda Peña  YOB: 1990 (Age: 29 y.o.)  Date of Admission: 2020  Date of Discharge: 2020  Attending Provider on Discharge: Presley Killian MD  [facility] Medicine Team: VIRTUAL [facility] MEDICINE TEAM 8  Code status: Full Code    Primary Care Provider: Emiliano Dunbar MD    Discharge Diagnosis:  Active Hospital Problems    Diagnosis  POA    *COVID-19 virus infection [U07.1]  Yes    Abnormal CXR [R93.89]  Unknown    Leukopenia [D72.819]  Unknown    TOA (tubo-ovarian abscess) [N70.93]  Yes    Asthma [J45.909]  Yes    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       HPI:  As per Dr. Whitney "Glenda Peña is a 29 y.o.  with PMH significant for morbid obesity, asthma, T2DM, HTN, and large complex midline and R-sided ventral hernia who presented to [facility] ED tonight with CC of fever as high as 102 at home. She was discharged from the hospital this early this evening after repeat admission for management of pelvic pain with known loculated pelvic fluid collection suspicious for TOA. On presentation patient reports nausea on-and-off all day, then felt subjective fever and chills at home. Partner took temperature and reports fever of 102. Denies SOB, CP, body aches, cough, urinary symptoms. Denies pain at PICC site or increasing pain at drain site.      In the ED she had a T 100.3, , RR 2, SaO2 94%. Hb 9.3 and just finished her menses. WBC 3.36. ANC 2.2 which " "is an upward trend from her WBC and ANC. No electrolyte abnormalities. Cr WNL. LA = 1.8.  She had a sepsis work-up which included UA which was negative for nitrites and leukocytes, urine culture, peripheral culture x 2, PICC culture, and a CXR which showed bilateral pulmonary infiltrates (new compared with CXR on 3/11). COVID-19 test ordered and azithromycin added to vanc/cefepime started in the ED.     The patient was initially admitted to [facility], where she was admitted for IR drainage of TOA and IV antibiotics on 2/18/20, cultures of fluid from pelvic fluid collection grew actinomyces. IR drain placement was not successful in resolving the TOA. She was discharged after improvement in pain. She then presented to [facility] with pelvic pain on 3/5/2020. CT A/P on 3/10 showed a 3.5 x 11 cm fluid collection anterior the uterus. Cultures after repeat attempt at IR drainage showed no growth, but given prior culture result she was started on IV Unasyn. She was discharged with plan for repeat CT with follow up with ID. On 3/27 patient reported worsening pain, and CT AP showed heterogeneous, complex fluid collection with multiple loculations and interval increase in size, now approximately 7 x 9 cm transversely and 9 cm craniocaudal. Doxycycline was added by ID and per ID note, patient was to follow up with IR on an outpatient basis for attempted drainage.      Due to increasing pain, she presented to [facility] 3/30 and was admitted for pain control and another attempt at drainage. She was evaluated by ID and continued on Unasyn and Doxy. She had drain placed by IR with 62mL fluid removed and drain left in place. Cultures from drain fluid show NGTD. Repeat CT pelvis showed decrease in size of largest loculation to 4.3 x 8.9 cm. She was discharged with IV Unasyn and Doxy, with PICC and drain in LLQ in place. Was to f/u with Gyn Onc and ID."       Hospital Course:  4/1/2020 - Presentation to the ED " after T102 and subjective fever/chills at home. T 100.3, , RR 2, SaO2 94%. Hb 9.3 and just finished her menses. WBC 3.36. ANC 2.2 which is an upward trend from her WBC and ANC. No electrolyte abnormalities. Cr WNL. LA = 1.8.  She had a sepsis work-up which included UA which was negative for nitrites and leukocytes, urine culture, peripheral culture x 2, PICC culture, and a CXR which showed bilateral pulmonary infiltrates (new compared with CXR on 3/11). COVID-19 test ordered and azithromycin added to vanc/cefepime started in the ED. ID consulted for further mgmt. IR drain was removed by gyn. ID transitioned her back to unasyn and doxycycline. SARS-CoV2 (COVID-19) infection stable. No indication at time of evaluation for hydroxychloroquine. She improved with supportive care and was found to be suitable for discharge home with 2L oxygen.     On the day of discharge, isolation precautions were reviewed at length verbally. The patient was also provided with written isolation guidelines modified from the [place] Department of Health and Hospitals as well as the CDC, as part of discharge paperwork. An updated phone number was obtained, which will be used to contact the patient when results are available, and positive patients will be enrolled in both the COVID-19 Home Symptom Monitoring program.    HH set up for IV abx. Will need ID and gyn fu outpatient.    Labs:  Recent Labs   Lab 04/03/20 0527 04/03/20  1222 04/05/20  1041   WBC 2.49* 2.78* 3.45*   HGB 9.0* 9.2* 9.7*   HCT 30.6* 31.3* 33.4*   * 141* 165     Recent Labs   Lab 04/02/20  0509 04/03/20 0527 04/05/20  1041   * 140 139   K 3.7 3.5 3.6    104 103   CO2 25 26 24   BUN 6 5* 5*   CREATININE 0.7 0.7 0.7   * 140* 234*   CALCIUM 8.1* 9.0 9.2   MG  --  1.9  --    PHOS  --  3.5  --      Recent Labs   Lab 04/01/20  2137 04/03/20 0527 04/05/20  1041   ALKPHOS 71 78 83   ALT 48* 56* 42   AST 46* 54* 46*   ALBUMIN 3.2* 3.0* 3.1*    PROT 7.9 7.7 8.2   BILITOT 0.2 0.3 0.3      Recent Labs   Lab 04/03/20  0853 04/03/20  1214 04/03/20  1704 04/03/20  2217 04/04/20  1020 04/04/20  2105   POCTGLUCOSE 163* 142* 150* 154* 184* 167*     No results for input(s): CPK, CPKMB, MB, TROPONINI in the last 72 hours.    ROS (Positive in Bold, otherwise negative)  Constitutional: fever, chills, night sweats  CV: chest pain, edema, palpitations  Resp: SOB, cough, sputum production  GI: changes in appetite, NVDC, pain, melena, hematochezia, GERD, hematemesis  : Dysuria, hematuria, urinary urgency, frequency  MSK: arthralgia/myalgia, joint swelling  Neuro/Psych: anxiety, depression    Discharge Exam: Patient was seen and examined on the date of discharge and determined to be suitable for discharge.    Procedures: CXR    Consultants: IR, ID, gyn, HM    Discharge Medication List as of 4/5/2020  4:36 PM      START taking these medications    Details   dextromethorphan-guaifenesin  mg/5 ml (ROBITUSSIN-DM)  mg/5 mL liquid Take 10 mLs by mouth every 4 (four) hours as needed., Starting Sun 4/5/2020, Until Wed 4/15/2020, OTC         CONTINUE these medications which have CHANGED    Details   albuterol (VENTOLIN HFA) 90 mcg/actuation inhaler Inhale 2 puffs into the lungs every 6 (six) hours as needed for Wheezing. Rescue, Starting Sun 4/5/2020, Until Mon 4/5/2021, Normal         CONTINUE these medications which have NOT CHANGED    Details   acetaminophen (TYLENOL) 325 MG tablet Take 2 tablets (650 mg total) by mouth every 6 (six) hours., Starting Wed 4/1/2020, Normal      ampicillin sodium/sulbactam Na (AMPICILLIN/SULBACTAM 3 G/100 ML NS, READY TO MIX,) Inject 100 mLs (3 g total) into the vein every 6 (six) hours., Starting Wed 4/1/2020, Until Wed 5/13/2020, No Print      doxycycline (VIBRA-TABS) 100 MG tablet Take 1 tablet (100 mg total) by mouth 2 (two) times daily. Take with food, Starting Wed 4/1/2020, Until Wed 5/13/2020, Normal      dulaglutide  (TRULICITY) 1.5 mg/0.5 mL PnIj Inject 1.5 mg into the skin every 7 days., Starting Tue 3/17/2020, Normal      glipiZIDE (GLUCOTROL) 10 MG TR24 Take 1 tablet (10 mg total) by mouth daily with breakfast., Starting Thu 3/19/2020, Until Fri 3/19/2021, Normal      lisinopril (PRINIVIL,ZESTRIL) 2.5 MG tablet Take 1 tablet (2.5 mg total) by mouth once daily., Starting Wed 12/4/2019, Until Thu 12/3/2020, Normal      metFORMIN (GLUCOPHAGE) 1000 MG tablet Take 2,000 mg by mouth., Historical Med      ondansetron (ZOFRAN) 4 MG tablet Take 1 tablet (4 mg total) by mouth every 6 (six) hours as needed for Nausea., Starting Tue 3/17/2020, Normal      oxyCODONE (ROXICODONE) 5 MG immediate release tablet Take 1 tablet (5 mg total) by mouth every 4 (four) hours as needed for Pain., Starting Wed 4/1/2020, Normal      pantoprazole (PROTONIX) 40 MG tablet Take 1 tablet (40 mg total) by mouth before breakfast., Starting Tue 3/17/2020, Until Wed 3/17/2021, Normal      promethazine (PHENERGAN) 25 MG tablet Take 1 tablet (25 mg total) by mouth every 4 (four) hours., Starting Wed 4/1/2020, Normal      senna (SENOKOT) 8.6 mg tablet Take 1 tablet by mouth 2 (two) times daily., Starting Wed 4/1/2020, OTC      polyethylene glycol (GLYCOLAX) 17 gram PwPk Take 17 g by mouth 2 (two) times daily as needed., Starting Wed 4/1/2020, Normal      sodium chloride 0.9% (NORMAL SALINE FLUSH) injection Inject 10 mLs into the vein every 6 (six) hours., Starting Wed 3/11/2020, No Print         STOP taking these medications       ibuprofen (ADVIL,MOTRIN) 600 MG tablet Comments:   Reason for Stopping:               The relevant and important risks, side effects, and benefits of their medications were reviewed with patient during hospitalization and at discharge. The patient was given the opportunity to discuss and ask questions about their medications, including target symptoms, potential risks, side effects and benefits of their medications, as well as their  expected prognosis if non-medication treatment options were chosen.  The patient expresses understanding of all these options and information and voluntarily consents to treatment.    Discharge Diet:diabetic diet: 2000 calorie     Activity: activity as tolerated    Discharge Condition: Stable    Disposition: Home or Self Care     Time spent  on the discharge of the patient including review of hospital course with the patient. reviewing discharge medications and arranging follow-up care: 39 mins    Discharge examination Patient was seen and examined on the date of discharge and determined to be suitable for discharge.    Discharge plan and follow up:  It is critical that you make your follow-up appointment(s). If you are discharged on the weekend or after business hours, or if we are unable to schedule these appointments for you for any reason, you or a family member need to call during the next business day to schedule your appointment(s).    -Follow up with your PCP, Emiliano Dunbar MD within 1-2 weeks as arranged with  and treatment team prior to discharge  -Follow up with [facility] home monitoring program   -Take all medications as prescribed and listed above.     - Please return to ED or call your physician if you have:         1. Fevers > 101.5 unresponsive to tylenol.       2. Abdominal pain and/or distention       3. Intractable nausea, vomiting or diarrhea       4. Inability to tolerate adequate oral intake of food       5. Neurologic changes, chest pain or shortness of breath     6. Worsening shortness of breath        Future Appointments   Date Time Provider Department Center   4/7/2020 10:40 AM Aleena Gonzalez MD [facility] COLON [address]   4/8/2020 11:15 AM Ritchie Millan MD [facility] GYN ONC [facility]   4/21/2020  1:00 PM [facility] CT1 LIMIT 400 LBS [facility] CT SCAN [facility]   4/22/2020  8:30 AM Ritchie Millan MD [facility] GYN ONC [facility]   5/5/2020  3:30 PM Shantel Mei MD [facility] ID [address]   6/19/2020  9:40  MARYCRUZ Dunbar MD [facility]         Presley Killian MD  Hospital Medicine Staff  [phone] pager

## 2020-04-07 ENCOUNTER — OFFICE VISIT (OUTPATIENT)
Dept: SURGERY | Facility: CLINIC | Age: 30
End: 2020-04-07
Attending: COLON & RECTAL SURGERY
Payer: COMMERCIAL

## 2020-04-07 ENCOUNTER — TELEPHONE (OUTPATIENT)
Dept: SURGERY | Facility: CLINIC | Age: 30
End: 2020-04-07

## 2020-04-07 ENCOUNTER — PATIENT MESSAGE (OUTPATIENT)
Dept: SURGERY | Facility: CLINIC | Age: 30
End: 2020-04-07

## 2020-04-07 DIAGNOSIS — K65.1 INTRA-ABDOMINAL ABSCESS: Primary | ICD-10-CM

## 2020-04-07 PROCEDURE — 99203 OFFICE O/P NEW LOW 30 MIN: CPT | Mod: 95,,, | Performed by: COLON & RECTAL SURGERY

## 2020-04-07 PROCEDURE — 99203 PR OFFICE/OUTPT VISIT, NEW, LEVL III, 30-44 MIN: ICD-10-PCS | Mod: 95,,, | Performed by: COLON & RECTAL SURGERY

## 2020-04-07 NOTE — TELEPHONE ENCOUNTER
----- Message from Aleena Gonzalez MD sent at 4/7/2020 11:13 AM CDT -----  Regarding: Colonoscopy report  Can you please get her colonoscopy report from [facility], it was done 2 years ago.  Go ahead and leave it on my desk when it arrives.  Thanks!

## 2020-04-07 NOTE — TELEPHONE ENCOUNTER
Call placed to [facility] ([phone]) for copy of colonoscopy report 2018-per Dr. Gonzalez's request.

## 2020-04-07 NOTE — PROGRESS NOTES
CRS Office Visit History and Physical    The patient location is: Home  The chief complaint leading to consultation is: Pelvic abscess  Visit type: Virtual visit with synchronous audio and video  Total time spent with patient: 30min  Each patient to whom he or she provides medical services by telemedicine is:  (1) informed of the relationship between the physician and patient and the respective role of any other health care provider with respect to management of the patient; and (2) notified that he or she may decline to receive medical services by telemedicine and may withdraw from such care at any time.    SUBJECTIVE:     Chief Complaint: Pelvic abscess    History of Present Illness:  The patient is new patient to this practice.   Course is as follows:  Patient is a 29 y.o. female w/ obesity, asthma, DM II, HTN, and midline and R-sided ventral hernia presents for assistance with management of pelvic abscess.  Initially presented in February 17th, 2020 with tubo-ovarian abscess, which was treated with IR drainage x 10 days and 2 weeks ofUnasyn--> Amoxicillin. Culture grew Actinomyces.  Symptoms at this time were a squeezing/throbbing pain that started in her mid abdomen, and radiated to the left side.  She denies any vaginal discharge at that time.  Denies any new sexual partners.  No prior history of STDs.  No change in bowel habits at this time.  No blood in her stool.  No family history of colon or rectal cancer.  No family history of inflammatory bowel disease.  She states that she had a colonoscopy 2 years ago with [facility], and was told that that is normal.  I do not have those records at this time.  She does have a history of PCOS, and had a right oophorectomy in 2008.    She underwent repeat CT scan on March 5th which demonstrated multiple enhancing fluid collections within the pelvis.  She underwent repeat drainage of these, which demonstrated straw-colored fluid (March 6).  These initially improved, the  drain was removed, then she had an additional CT scan on March 27th which demonstrated recurrence of the left adnexal collection.  An IR drain was again placed on March 30th, which yielded some straw-colored fluid.  Fistulogram was performed through this drain on April 1st, which did not demonstrate any connection with the bowel.  Cultures from most recent drain placement so far with no growth.  She was referred to  from [facility] for definitive management of this abscess.  He has requested a colorectal consult for evaluation for possible diverticular abscess.  Of note, she was admitted to the hospital on April 1st with test positive for COVID.  She was discharged on April 6th on Unasyn and doxycycline.  IR drain was removed during that admission Gyn Onc team.  Of note, she has seen Dr. Brooke in the past regarding her large ventral hernia.    Currently she is feeling well.  Needs home oxygen when sleeping and intermittently through the day.  Currently quarantine from her  and 3 children.  No fevers or chills.  She states that she feels well since removal of the drain.    Prior surgical history includes an open appendectomy (2011), right oophorectomy (2008), and laparoscopic ventral hernia repair with Strattice mesh (2016).    Review of patient's allergies indicates:   Allergen Reactions    Latex, natural rubber Rash     Burning sensation       Past Medical History:   Diagnosis Date    Asthma childhood    Diabetes mellitus     GERD (gastroesophageal reflux disease)     Morbid obesity     PCOS (polycystic ovarian syndrome)      Past Surgical History:   Procedure Laterality Date    APPENDECTOMY  2011    HERNIA REPAIR      OOPHORECTOMY      salpingoopherectomy Right 2008     Family History   Problem Relation Age of Onset    Heart attack Father     Hypertension Father     Diabetes Mother     Heart attack Mother     Breast cancer Neg Hx     Colon cancer Neg Hx     Ovarian cancer Neg Hx       Social History     Tobacco Use    Smoking status: Never Smoker    Smokeless tobacco: Never Used   Substance Use Topics    Alcohol use: Not Currently     Comment: occasionally apple cider    Drug use: No        Review of Systems:  Review of Systems   Constitutional: Negative for chills and fever.   Respiratory: Positive for cough and shortness of breath.    Gastrointestinal: Negative for abdominal pain, blood in stool, constipation, diarrhea, nausea and vomiting.       OBJECTIVE:     Physical Exam:  General: White female in no distress   Neuro: Alert and oriented x 4.      Respiratory: Respirations are even and unlabored      Imaging:   US Pelvis Complete 02/17/20  Complex cystic mass just superior to the uterine fundus measuring 8 x 7.1 x 6 cm. No significant vascularity is seen within in or around the mass. The left ovary is not conclusively identified. This mass may emanate from the left ovary however, based on CT images, for which the primary considerations would be a hemorrhagic cyst or endometrioma based on appearances. This does not have the typical appearance of an abscess which be considered less likely. Ovarian torsion is considered less likely as well but is difficult to entirely exclude. The right ovary is reportedly surgically absent per the patient. Recommend OB/GYN consultation for follow-up and treatment recommendations. Given the size of the mass, consider MRI of the pelvis for further assessment.     CT Abdomen Pelvis with Contrast 02/17/20  1. Complex multicystic mass in the lower pelvis measuring 10.4 x 7.9 x 6.8 cm associated with marked inflammation is favored to be ovarian or tubo-ovarian in origin. The primary considerations include ovarian torsion, complex cystic neoplasm, and tubo-ovarian abscess. Ectopic pregnancy is unlikely given a negative pregnancy test.  2. Large ventral right of midline abdominal wall hernias containing loops of bowel without evidence for intestinal  obstruction or strangulation at this time.  3. Several small nodular densities in the lung bases are probably postinfectious or inflammatory in nature and measure up to 3-4 mm. If the patient is absent of risk factors, no follow-up needed for these nodules. If the patient has increased risk factors for pulmonary neoplasm, recommend outpatient dedicated CT chest or correlation with any previous imaging of this area to document stability.  4. Hepatosplenomegaly. Hepatic steatosis.  5. Wall thickening along the superior bladder wall may be reactive as the ovarian mass abuts the top of the urinary bladder.    CT abd/pel (3/5/2020)  1.  Two apparent fluid collections within the pelvis adjacent to the uterus extending into the left adnexa which may relate to residual fluid collection/abscess.  There is an additional third complex peripherally enhancing fluid collection within the right lower quadrant adjacent to the cecum within the patient's large ventral hernia sac concerning for additional intra-abdominal abscess.  There is associated adjacent mesenteric fat stranding and peritoneal enhancement.    2.  Subcutaneous fat stranding and skin thickening of the patient's pannus, increased in extent from prior CT exam which may relate to component of soft tissue infection/cellulitis.  3.  Redemonstration of multiple complex ventral hernias within the right lower quadrant containing small and large bowel.  No evidence to suggest high-grade obstruction.  4.  Hepatosplenomegaly.  Hepatic steatosis.  5.  Mild urinary bladder wall thickening which may be reactive secondary to underlying pelvic inflammatory change.    CT abd.pel (3/10/2020):  Decreased size in the loculated right lower quadrant fluid collection which does abut the cecum.  Appendix is not definitely visualized..  Right anterolateral hernias associated.  The pelvic collection is relatively similar and somewhat heterogeneous.  Collection posterior to the uterus  decreased in volume.  Continued extensive subcutaneous soft tissue stranding/anasarca.  Multiple scattered borderline enlarged retroperitoneal nodes and mesenteric nodes.  Hepatosplenomegaly.  Trace left pleural effusion.  Degenerative and dysraphic changes in the spine as above.  Several right lower lobe lung nodules less than 5 mm.    CT abd/pel (3/27/2020):  1. Interval resolution left pleural effusion and lower lobe consolidation.  New small focus of consolidation in the right lower lobe.  2. Hepatomegaly and steatosis.  3. Interval increase in size of complex, multilocular fluid collection in the left adnexal region likely representing tubo-ovarian abscess.  4. Interval resolution fluid collection adjacent to cecum.  5. Large ventral hernia containing small and large bowel.  No intestinal obstruction.    CT abd/pel (3/31/2020):  Interval placement of drainage catheter within the previously noted left adnexal complex collection possibly representing tubo-ovarian abscess.  Collection is overall smaller in size in comparison to recent examination post drainage catheter placement.  No definite new collections.  Follow-up to resolution from early with ultrasound is suggested.  Stable large ventral hernia containing small large bowel, as above.    IR fistulogram (4/1/2020):  Impression     Drainage catheter check demonstrates near-complete evacuation of the loculated pocket within the left ovary.  No fistula was identified.         ASSESSMENT/PLAN:     29-year-old female with history of PCOS, obesity, asthma, DM II, HTN, ventral hernia, and recent diagnosis of COVID referred for assistance with management of pelvic abscess.  No evidence of connection with colon on recent fistulogram.  She does have a history of ovarian pathology in the right side, which was removed in 2008.  I will obtain her colonoscopy records from 2 years ago.      Her understanding is that Dr. Millan is planning for surgery in approximately 6  weeks, she has a video visit with him tomorrow.  We discussed that I will be available at the time of surgery in the event that there appears to be involvement of her sigmoid colon, although this is not obvious on my review of her imaging.  We discussed that in the event that this appears to be a diverticular abscess, or that it is involved with the sigmoid colon at the time of surgery, we would proceed with a sigmoid colectomy.  We discussed risks of this including wound infection, anastomotic leak, venous thromboembolism, recurrent hernia, ileus.    She will need a full bowel prep prior to surgery.    Aleena Gonzalez MD  Staff Surgeon  Colon & Rectal Surgery

## 2020-04-08 ENCOUNTER — TELEPHONE (OUTPATIENT)
Dept: PULMONOLOGY | Facility: CLINIC | Age: 30
End: 2020-04-08

## 2020-04-08 ENCOUNTER — PATIENT MESSAGE (OUTPATIENT)
Dept: GYNECOLOGIC ONCOLOGY | Facility: CLINIC | Age: 30
End: 2020-04-08

## 2020-04-08 ENCOUNTER — PATIENT MESSAGE (OUTPATIENT)
Dept: PULMONOLOGY | Facility: CLINIC | Age: 30
End: 2020-04-08

## 2020-04-08 ENCOUNTER — OFFICE VISIT (OUTPATIENT)
Dept: GYNECOLOGIC ONCOLOGY | Facility: CLINIC | Age: 30
End: 2020-04-08
Payer: COMMERCIAL

## 2020-04-08 DIAGNOSIS — E11.65 TYPE 2 DIABETES MELLITUS WITH HYPERGLYCEMIA, WITHOUT LONG-TERM CURRENT USE OF INSULIN: ICD-10-CM

## 2020-04-08 DIAGNOSIS — N70.93 TOA (TUBO-OVARIAN ABSCESS): Primary | ICD-10-CM

## 2020-04-08 DIAGNOSIS — U07.1 COVID-19: ICD-10-CM

## 2020-04-08 DIAGNOSIS — E66.01 MORBID OBESITY: ICD-10-CM

## 2020-04-08 DIAGNOSIS — Z87.42 HISTORY OF PCOS: ICD-10-CM

## 2020-04-08 DIAGNOSIS — K43.2 VENTRAL INCISIONAL HERNIA WITHOUT OBSTRUCTION OR GANGRENE: ICD-10-CM

## 2020-04-08 LAB — S PNEUM AG UR QL: NOT DETECTED

## 2020-04-08 PROCEDURE — 99214 PR OFFICE/OUTPT VISIT, EST, LEVL IV, 30-39 MIN: ICD-10-PCS | Mod: 95,,, | Performed by: OBSTETRICS & GYNECOLOGY

## 2020-04-08 PROCEDURE — 99214 OFFICE O/P EST MOD 30 MIN: CPT | Mod: 95,,, | Performed by: OBSTETRICS & GYNECOLOGY

## 2020-04-08 PROCEDURE — G0180 MD CERTIFICATION HHA PATIENT: HCPCS | Mod: ,,, | Performed by: OBSTETRICS & GYNECOLOGY

## 2020-04-08 PROCEDURE — G0180 PR HOME HEALTH MD CERTIFICATION: ICD-10-PCS | Mod: ,,, | Performed by: OBSTETRICS & GYNECOLOGY

## 2020-04-08 PROCEDURE — 3046F PR MOST RECENT HEMOGLOBIN A1C LEVEL > 9.0%: ICD-10-PCS | Mod: CPTII,,, | Performed by: OBSTETRICS & GYNECOLOGY

## 2020-04-08 PROCEDURE — 3046F HEMOGLOBIN A1C LEVEL >9.0%: CPT | Mod: CPTII,,, | Performed by: OBSTETRICS & GYNECOLOGY

## 2020-04-08 RX ORDER — NORETHINDRONE ACETATE AND ETHINYL ESTRADIOL .02; 1 MG/1; MG/1
1 TABLET ORAL DAILY
Qty: 30 TABLET | Refills: 11 | Status: SHIPPED | OUTPATIENT
Start: 2020-04-08 | End: 2021-04-06

## 2020-04-08 NOTE — Clinical Note
Dr. Dunbar, she was readmitted for [facility] and is now at home doing better.  She said she'll start a BS diary once she's no longer quarantined.  Would you mind following up with her before June?  Thank you. Linden, I placed a consult to see Dr. Brooke and our dietician.  Thank you.

## 2020-04-08 NOTE — PROGRESS NOTES
"The patient location is: home  The chief complaint leading to consultation is: TOA  Visit type: Virtual visit with synchronous audio and video  Total time spent with patient: 25  Each patient to whom he or she provides medical services by telemedicine is:  (1) informed of the relationship between the physician and patient and the respective role of any other health care provider with respect to management of the patient; and (2) notified that he or she may decline to receive medical services by telemedicine and may withdraw from such care at any time.    REFERRING PROVIDER  No ref. provider found     HISTORY OF PRESENT CONDITION  CC: chronic TOA  Glenda Peña is a 29 y.o. G0 who presents for follow-up regarding chronic TOA.  Below is a summary of her history.  The interval history is as follows: Tolerating PO although decreased appetite.  Usually isn't eating breakfast.  -N/V. Regular BMs.  Pain is controlled.  Has not required any analgesics since 4/6.  Ambulating but quarantined to a room.  Voiding spontaneously.  -VB, VD, abdominal pain.    1. 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  She didn't require staging or adjuvant CT and is still alive.  2. 1/27/12: Perforated appendicitis.  Operative laparoscopy converted to an Ex-lap and appendectomy.  3. 8/21/16: Lap ventral hernia repair with a 25 x 15 cm Strattice mesh  4. 4/23/18: Evaluated by General Surgery during an admission and thought to have sigmoid colitis.  Discussed a sigmoid resection with an end colostomy once the patient lost weight.  5.  2/17-2/29: Admitted to [facility] for abdominal pain and suspicion for a L TOA.  She had it IR drained and was discharged from the hospital with PO Amoxicillin.  6. 2/17/20: CT A/P: 10.4 x 7.9 x 6.8 cm  7. 2/18/20: IR guided drainage. Gm + bacilli resembling diphteroids. Actinomyces without sensitivities  8. 2/19/20: A1c = " 8.9  9. 2/26/20: CT A/P: Improved TOA but new fluid collections in the hernia sac.  10. 3/5-12/20: Admitted to [facility] for abdominal pain and persistent L TOA.  IR attempted to drain it on 3/6/20 but was unsuccesful. She was discharged on IV unasyn.   11. 3/27/20: CT A/P with increased size in TOA.  12. 3/29-4/2/20: Admitted for RLQ pain.  13. 3/30: IR guided drainage of 65 cc.  Aerobic and anaerobic cultures were negative.  14. 4/2-4/6: Admitted for COVID-19  15. 4/3: Pigtail drain removed    REVIEW OF SYSTEMS  All systems reviewed and negative except as noted in HPI.    OBJECTIVE   There were no vitals filed for this visit.   There is no height or weight on file to calculate BMI.      1. General: Well appearing, no apparent distress, alert and oriented.  2. Lymph: Neck symmetric without cervical or supraclavicular adenopathy or mass.  3. Lungs: Normal respiratory rate, no accessory muscle use.  4. Cardiac: Normal rate  5. Psych: Normal affect.    ECOG status: 1    LABORATORY DATA  Lab data reviewed.    RADIOLOGICAL DATA  Radiology data reviewed.    PATHOLOGY DATA  Pathology data reviewed.    ASSESSMENT / PLAN     1. TOA (tubo-ovarian abscess)    2. Ventral incisional hernia without obstruction or gangrene    3. Type 2 diabetes mellitus with hyperglycemia, without long-term current use of insulin    4. History of PCOS    5. COVID-19    6. Morbid obesity       I think that overall this is a very complex picture.  It doesn't make sense to me how her cultures from 3/30 are negative (65 cc was aspirated).  The output was mostly sanguinous.  Cytology was negative for cancer.  I am suspicious that her PCOS is contributing to the clinical picture.  We cannot disregard her cultures from 2/18 which were positive for Actinomyces but this is not the typical picture for a TOA.  I spoke to Dr. Mei and we will continue with antibiotics x 6 months.  I will also start the patient on OCPs as there aren't any  contra-indications.  A repeat CT scan in 2 weeks will give us a better idea of the clinical picture.      The COVID 19 will set us back. She needs to optimize her BS and weight before proceeding with surgery.  I would like to avoid surgery, but I think she will ultimately require surgery.  Surgery would be with a midline incision, and I will get into her hernia sac.  I will need help closing her, and have reached out to Dr. Reyna.  Dr. Longoria has seen the patient due to her history of sigmoid colitis and mentioned that the patient had previously seen Dr. Victor.  I will schedule a consultation with him.  The goal is to avoid surgery but in the case it is inevitable, we will need a plan for a closure.  Dr. Longoria wants a bowel prep before surgery: Miralax/Dulcolax with Nicki.  She said to contact her nurse, and they will provide instructions to the patient.    I will clarify length of quarantine with Medicine.  She is mal nourished with an albumin of 3.1.  We discussed eating habits, and I think she would benefit from a Dietician consult.  We also discussed a BS diet.  She is currently not checking her BS.    I will revisit with the patient in 2 weeks.  All questions were answered, and she voiced understanding.       PATIENT EDUCATION  Ready to learn, no apparent learning barriers were identified; learning preferences include listening.  Explained diagnosis and treatment plan; patient expressed understanding of the content.    INFORMED CONSENT  Discussed the risks, benefits, and alternatives of the procedure and of possible blood transfusion.  Discussed the necessity of other members of the healthcare team participating in the procedure.  All questions answered and consent given.    Ritchie Millan

## 2020-04-08 NOTE — TELEPHONE ENCOUNTER
I spoke to patient to see how she was feeling and to offer a virtual visit with one of our providers. Patient stated she is doing ok. She stated she is sob first thing in the morning and at night. Ms Peña is using her oxygen at night and on 2 liters. She stated she will reached out to the office if she feels she needs a virtual visit, but for now she is fine. I will send her a portal message as requested per patient with the office number. Patient verbalized understanding.

## 2020-04-09 ENCOUNTER — TELEPHONE (OUTPATIENT)
Dept: GYNECOLOGIC ONCOLOGY | Facility: CLINIC | Age: 30
End: 2020-04-09

## 2020-04-09 ENCOUNTER — TELEPHONE (OUTPATIENT)
Dept: FAMILY MEDICINE | Facility: CLINIC | Age: 30
End: 2020-04-09

## 2020-04-09 NOTE — TELEPHONE ENCOUNTER
I spoke to the pt and she has an appointment 06/19/2020 to see PCP. She is inquiring if she needs to be on insulin? She reports that PCP sent her a message on 04/01/2020 regarding insulin and when she picked up her prescriptions it was not included. She believes she is supposed to be on insulin until her surgery to keep her BS in normal range. Please advise.

## 2020-04-09 NOTE — TELEPHONE ENCOUNTER
----- Message from Domi Clark sent at 4/9/2020 10:21 AM CDT -----  Contact: Patient   Type: Patient Call Back    Who called: Patient     What is the request in detail: Pt is returning a call from Ms. Campos     Can the clinic reply by [facility]?    Would the patient rather a call back or a response via [facility]? Call back     Best call back number: [phone]

## 2020-04-09 NOTE — TELEPHONE ENCOUNTER
----- Message from Faye Blackman RD sent at 4/9/2020  2:58 PM CDT -----  Sure,  I am doing virtual visits only. They are Tues-Friday each week (Monday I am in the hospital in radiation oncology). I prefer 60 minutes for initial visits. You can take any appointment slot that is open. If you need help scheduling, please let me know. I think I have some availability left next Thursday and Friday.     Thanks  Faye    ----- Message -----  From: Linden Kerns  Sent: 4/9/2020   2:00 PM CDT  To: Linden Kerns, Faye Blackman RD    Hello, can you please tell me the process to get our patient schedule with you for Morbid obesity ? Dr. Millan has a referral in. Thanks

## 2020-04-09 NOTE — TELEPHONE ENCOUNTER
Spoke with our patient about her virtual schedule appointment in gyn oncology she agreed she voiced understanding of the date, time and location. All questions answered  MA/PAR /Preceptor Linden HOFFMAN

## 2020-04-09 NOTE — TELEPHONE ENCOUNTER
She only needs to be on insulin if her blood sugars are not controlled with oral medication.  If she needs it to control her blood sugars then I will refill it    Thanks  Dr. Dunbar

## 2020-04-14 ENCOUNTER — CLINICAL SUPPORT (OUTPATIENT)
Dept: HEMATOLOGY/ONCOLOGY | Facility: CLINIC | Age: 30
End: 2020-04-14
Payer: COMMERCIAL

## 2020-04-14 VITALS — WEIGHT: 250 LBS | HEIGHT: 62 IN | BODY MASS INDEX: 46.01 KG/M2

## 2020-04-14 DIAGNOSIS — Z71.3 NUTRITIONAL COUNSELING: ICD-10-CM

## 2020-04-14 DIAGNOSIS — E66.01 MORBID OBESITY WITH BMI OF 45.0-49.9, ADULT: ICD-10-CM

## 2020-04-14 DIAGNOSIS — E11.65 TYPE 2 DIABETES MELLITUS WITH HYPERGLYCEMIA, WITHOUT LONG-TERM CURRENT USE OF INSULIN: ICD-10-CM

## 2020-04-14 DIAGNOSIS — K21.9 GASTROESOPHAGEAL REFLUX DISEASE, ESOPHAGITIS PRESENCE NOT SPECIFIED: ICD-10-CM

## 2020-04-14 PROCEDURE — 97802 PR MED NUTR THER, 1ST, INDIV, EA 15 MIN: ICD-10-PCS | Mod: 95,,, | Performed by: DIETITIAN, REGISTERED

## 2020-04-14 PROCEDURE — 97802 MEDICAL NUTRITION INDIV IN: CPT | Mod: 95,,, | Performed by: DIETITIAN, REGISTERED

## 2020-04-14 NOTE — PROGRESS NOTES
Oncology Nutrition Assessment for Medical Nutrition Therapy  Initial Visit    Glenda Peña   1990    Referring Provider:  Ritchie Millan MD    Reason for Visit: Pt in for education and nutrition counseling     PMHx:   Past Medical History:   Diagnosis Date    Asthma childhood    Diabetes mellitus     GERD (gastroesophageal reflux disease)     Morbid obesity     PCOS (polycystic ovarian syndrome)        Nutrition Assessment    The patient location is: home  The chief complaint leading to consultation is: obesity, diabetes   Visit type: audiovisual  Total time spent with patient: 35 minutes   Each patient to whom he or she provides medical services by telemedicine is:  (1) informed of the relationship between the physician and patient and the respective role of any other health care provider with respect to management of the patient; and (2) notified that he or she may decline to receive medical services by telemedicine and may withdraw from such care at any time.    This is a 29 y.o.female with a complicated medical history. Referred by gynecological oncology to optimize diet and assist with weight loss and blood glucose control prior to surgery.     Feeling ok today. Yesterday had a lot of nausea/vomiting. Reports this happens intermittently. Zofran not helping. Also has problems with GERD. Appetite up and down. Reports issues with weight since 3 years old.   A few years ago went from highest of 368lb to 210lb by meal prepping and walking/exercise. Now has new job and is very sedentary.   Got diagnosed with diabetes about a year and a half ago. Hasn't been checking blood sugars since being quarantined (14 days due to COVID-19) but will resume. Previous to COVID diagnosis, blood sugars were running 220-240mg/dL about 1-2 hours after meals. Most recent A1c 9.3  Recently got a treadmill, kettlebells, and play station move. She did CrossFit about a year ago until a knee injury.     Diet recall:  2-3 meals  "per day (usually skips breakfast)   Lunch- leftovers from dinner   Dinner- lot of crock pot meals (meat, starch, vegetable)   Snacks- pickles      Previously could not see a dietitian due to cost ($500 out of pocket due to insurance)     Weight:113.4 kg (250 lb)  Height:5' 2" (1.575 m)  BMI:Body mass index is 45.73 kg/m².   IBW: Ideal body weight: 50.1 kg (110 lb 7.2 oz)  Adjusted ideal body weight: 75.4 kg (166 lb 4.3 oz)    Usual BW: 260-280lb recently   Weight Change: approx 20lb weight loss     Nutrition focused physical findings: morbidly obese     Allergies: Latex, natural rubber    Current Medications:    Current Outpatient Medications:     acetaminophen (TYLENOL) 325 MG tablet, Take 2 tablets (650 mg total) by mouth every 6 (six) hours., Disp: 180 tablet, Rfl: 0    albuterol (VENTOLIN HFA) 90 mcg/actuation inhaler, Inhale 2 puffs into the lungs every 6 (six) hours as needed for Wheezing. Rescue, Disp: 18 g, Rfl: 3    ampicillin sodium/sulbactam Na (AMPICILLIN/SULBACTAM 3 G/100 ML NS, READY TO MIX,), Inject 100 mLs (3 g total) into the vein every 6 (six) hours., Disp: , Rfl:     dextromethorphan-guaifenesin  mg/5 ml (ROBITUSSIN-DM)  mg/5 mL liquid, Take 10 mLs by mouth every 4 (four) hours as needed., Disp: , Rfl: 0    doxycycline (VIBRA-TABS) 100 MG tablet, Take 1 tablet (100 mg total) by mouth 2 (two) times daily. Take with food, Disp: 84 tablet, Rfl: 0    dulaglutide (TRULICITY) 1.5 mg/0.5 mL PnIj, Inject 1.5 mg into the skin every 7 days., Disp: 2 mL, Rfl: 3    glipiZIDE (GLUCOTROL) 10 MG TR24, Take 1 tablet (10 mg total) by mouth daily with breakfast., Disp: 30 tablet, Rfl: 11    lisinopril (PRINIVIL,ZESTRIL) 2.5 MG tablet, Take 1 tablet (2.5 mg total) by mouth once daily., Disp: 90 tablet, Rfl: 1    metFORMIN (GLUCOPHAGE) 1000 MG tablet, Take 2,000 mg by mouth., Disp: , Rfl:     norethindrone-ethinyl estradiol (MICROGESTIN 1/20) 1-20 mg-mcg per tablet, Take 1 tablet by mouth once " daily., Disp: 30 tablet, Rfl: 11    ondansetron (ZOFRAN) 4 MG tablet, Take 1 tablet (4 mg total) by mouth every 6 (six) hours as needed for Nausea., Disp: 18 tablet, Rfl: 0    oxyCODONE (ROXICODONE) 5 MG immediate release tablet, Take 1 tablet (5 mg total) by mouth every 4 (four) hours as needed for Pain., Disp: 30 tablet, Rfl: 0    pantoprazole (PROTONIX) 40 MG tablet, Take 1 tablet (40 mg total) by mouth before breakfast., Disp: 60 tablet, Rfl: 2    polyethylene glycol (GLYCOLAX) 17 gram PwPk, Take 17 g by mouth 2 (two) times daily as needed., Disp: 60 packet, Rfl: 0    promethazine (PHENERGAN) 25 MG tablet, Take 1 tablet (25 mg total) by mouth every 4 (four) hours., Disp: 30 tablet, Rfl: 1    senna (SENOKOT) 8.6 mg tablet, Take 1 tablet by mouth 2 (two) times daily., Disp: , Rfl:     sodium chloride 0.9% (NORMAL SALINE FLUSH) injection, Inject 10 mLs into the vein every 6 (six) hours., Disp: , Rfl:     Food/medication interactions noted: none    Vitamins/Supplements: none reported     Labs: Reviewed -glucose 234mg/dL, Albumin 3.1g/dL (c-reactive protein noted)    Nutrition Diagnosis    Problem: obese   Etiology (related to): excess calorie intake and lack of physical activity   Signs/Symptoms (as evidenced by): BMI =46    Nutrition Intervention    Nutrition Prescription   8159-7667 Kcals (15-20kcal/kg)   g protein (0.8-1g/kg)   2260 mL fluid (20mL/kg)    Recommendations:  Ginger as needed for nausea   Iconic protein for breakfast (tumeric ginger flavor) OR Premier Protein   Plate method for portioning foods   Exercise- start with 20-30 minutes daily and work up to 60 minutes per day   Resume self-monitoring of blood glucose   Monitor weight twice weekly     Materials Provided/Reviewed Portion Plate ([employer]), Food Portions     Nutrition Monitoring and Evaluation    Monitor: labs glucose, weight     Goals: weight loss of at least 2lb per week. Initial goal weight is 210lb (her previous stable  weight)    Motivation to change/anticipated barriers: patient is motivated and has had success in the past; she is realistic about her limitations     Follow up In 3 weeks     Communication to referring provider/care team: note available in chart     Counseling time: 30 Minutes    Faye Blackman, MPH, RD, , LDN, FAND   [phone]      Answers for HPI/ROS submitted by the patient on 4/13/2020   appetite change : No  unexpected weight change: No  visual disturbance: No  cough: Yes  shortness of breath: Yes  chest pain: No  abdominal pain: Yes  diarrhea: Yes  frequency: No  back pain: No  rash: Yes  headaches: Yes  adenopathy: No  nervous/ anxious: No

## 2020-04-14 NOTE — PATIENT INSTRUCTIONS
Ginger as needed for nausea   Iconic protein for breakfast (tumeric ginger flavor) OR Premier Protein   Plate method for portioning foods   Exercise- start with 20-30 minutes daily and work up to 60 minutes per day   Resume self-monitoring of blood glucose   Monitor weight twice weekly

## 2020-04-15 ENCOUNTER — TELEPHONE (OUTPATIENT)
Dept: GYNECOLOGIC ONCOLOGY | Facility: CLINIC | Age: 30
End: 2020-04-15

## 2020-04-15 ENCOUNTER — EXTERNAL HOME HEALTH (OUTPATIENT)
Dept: HOME HEALTH SERVICES | Facility: HOSPITAL | Age: 30
End: 2020-04-15
Payer: COMMERCIAL

## 2020-04-15 NOTE — TELEPHONE ENCOUNTER
Left message returning call. Office number left.   ----- Message from Tammy Hernandez sent at 4/15/2020  1:32 PM CDT -----  Contact: Destiny ([facility])  Name of Who is Calling: Destiny ([facility])    What is the request in detail: Would like to inform provider that the patient declined treatment today. She would like to to have the pick line and dressing changed tomorrow. Please contact to further discuss and advise      What [facility] Back [facility]: [phone]

## 2020-04-16 ENCOUNTER — LAB VISIT (OUTPATIENT)
Dept: LAB | Facility: HOSPITAL | Age: 30
End: 2020-04-16
Attending: STUDENT IN AN ORGANIZED HEALTH CARE EDUCATION/TRAINING PROGRAM
Payer: COMMERCIAL

## 2020-04-16 DIAGNOSIS — U07.1 INFECTION DUE TO 2019-NCOV: ICD-10-CM

## 2020-04-16 DIAGNOSIS — N70.93 PYOSALPINGITIS: Primary | ICD-10-CM

## 2020-04-16 LAB
ALBUMIN SERPL BCP-MCNC: 3.3 G/DL (ref 3.5–5.2)
ALP SERPL-CCNC: 65 U/L (ref 55–135)
ALT SERPL W/O P-5'-P-CCNC: 30 U/L (ref 10–44)
ANION GAP SERPL CALC-SCNC: 12 MMOL/L (ref 8–16)
AST SERPL-CCNC: 23 U/L (ref 10–40)
BASOPHILS # BLD AUTO: 0.03 K/UL (ref 0–0.2)
BASOPHILS NFR BLD: 0.5 % (ref 0–1.9)
BILIRUB SERPL-MCNC: 0.3 MG/DL (ref 0.1–1)
BUN SERPL-MCNC: 6 MG/DL (ref 6–20)
CALCIUM SERPL-MCNC: 8.8 MG/DL (ref 8.7–10.5)
CHLORIDE SERPL-SCNC: 104 MMOL/L (ref 95–110)
CO2 SERPL-SCNC: 26 MMOL/L (ref 23–29)
CREAT SERPL-MCNC: 0.7 MG/DL (ref 0.5–1.4)
DIFFERENTIAL METHOD: ABNORMAL
EOSINOPHIL # BLD AUTO: 0.1 K/UL (ref 0–0.5)
EOSINOPHIL NFR BLD: 1.2 % (ref 0–8)
ERYTHROCYTE [DISTWIDTH] IN BLOOD BY AUTOMATED COUNT: 15.8 % (ref 11.5–14.5)
EST. GFR  (AFRICAN AMERICAN): >60 ML/MIN/1.73 M^2
EST. GFR  (NON AFRICAN AMERICAN): >60 ML/MIN/1.73 M^2
GLUCOSE SERPL-MCNC: 141 MG/DL (ref 70–110)
HCT VFR BLD AUTO: 31.1 % (ref 37–48.5)
HGB BLD-MCNC: 9.4 G/DL (ref 12–16)
IMM GRANULOCYTES # BLD AUTO: 0.01 K/UL (ref 0–0.04)
IMM GRANULOCYTES NFR BLD AUTO: 0.2 % (ref 0–0.5)
LYMPHOCYTES # BLD AUTO: 1.6 K/UL (ref 1–4.8)
LYMPHOCYTES NFR BLD: 26.5 % (ref 18–48)
MCH RBC QN AUTO: 25.5 PG (ref 27–31)
MCHC RBC AUTO-ENTMCNC: 30.2 G/DL (ref 32–36)
MCV RBC AUTO: 85 FL (ref 82–98)
MONOCYTES # BLD AUTO: 0.3 K/UL (ref 0.3–1)
MONOCYTES NFR BLD: 5.5 % (ref 4–15)
NEUTROPHILS # BLD AUTO: 3.9 K/UL (ref 1.8–7.7)
NEUTROPHILS NFR BLD: 66.1 % (ref 38–73)
NRBC BLD-RTO: 0 /100 WBC
PLATELET # BLD AUTO: 235 K/UL (ref 150–350)
PMV BLD AUTO: 10.8 FL (ref 9.2–12.9)
POTASSIUM SERPL-SCNC: 3.8 MMOL/L (ref 3.5–5.1)
PROT SERPL-MCNC: 7.3 G/DL (ref 6–8.4)
RBC # BLD AUTO: 3.68 M/UL (ref 4–5.4)
SODIUM SERPL-SCNC: 142 MMOL/L (ref 136–145)
WBC # BLD AUTO: 5.84 K/UL (ref 3.9–12.7)

## 2020-04-16 PROCEDURE — 80053 COMPREHEN METABOLIC PANEL: CPT

## 2020-04-16 PROCEDURE — 36415 COLL VENOUS BLD VENIPUNCTURE: CPT

## 2020-04-16 PROCEDURE — 85025 COMPLETE CBC W/AUTO DIFF WBC: CPT

## 2020-04-21 ENCOUNTER — HOSPITAL ENCOUNTER (OUTPATIENT)
Dept: RADIOLOGY | Facility: HOSPITAL | Age: 30
Discharge: HOME | End: 2020-04-21
Attending: FAMILY MEDICINE
Payer: COMMERCIAL

## 2020-04-21 DIAGNOSIS — A42.9 ACTINOMYCES INFECTION: ICD-10-CM

## 2020-04-21 DIAGNOSIS — N70.93 TUBO-OVARIAN ABSCESS: ICD-10-CM

## 2020-04-21 DIAGNOSIS — N70.93 TUBO-OVARIAN ABSCESS: Primary | ICD-10-CM

## 2020-04-21 PROCEDURE — 74177 CT ABD & PELVIS W/CONTRAST: CPT | Mod: 26,,, | Performed by: RADIOLOGY

## 2020-04-21 PROCEDURE — 74177 CT ABDOMEN PELVIS WITH CONTRAST: ICD-10-PCS | Mod: 26,,, | Performed by: RADIOLOGY

## 2020-04-21 PROCEDURE — 74177 CT ABD & PELVIS W/CONTRAST: CPT | Mod: TC

## 2020-04-21 PROCEDURE — 25500020 PHARM REV CODE 255: Performed by: FAMILY MEDICINE

## 2020-04-21 RX ADMIN — IOHEXOL 15 ML: 300 INJECTION, SOLUTION INTRAVENOUS at 01:04

## 2020-04-21 RX ADMIN — IOHEXOL 100 ML: 350 INJECTION, SOLUTION INTRAVENOUS at 01:04

## 2020-04-22 ENCOUNTER — OFFICE VISIT (OUTPATIENT)
Dept: GYNECOLOGIC ONCOLOGY | Facility: CLINIC | Age: 30
End: 2020-04-22
Payer: COMMERCIAL

## 2020-04-22 ENCOUNTER — PATIENT MESSAGE (OUTPATIENT)
Dept: SURGERY | Facility: CLINIC | Age: 30
End: 2020-04-22

## 2020-04-22 DIAGNOSIS — Z87.42 HISTORY OF PCOS: ICD-10-CM

## 2020-04-22 DIAGNOSIS — E66.01 MORBID OBESITY WITH BMI OF 45.0-49.9, ADULT: ICD-10-CM

## 2020-04-22 DIAGNOSIS — N70.93 TOA (TUBO-OVARIAN ABSCESS): Primary | ICD-10-CM

## 2020-04-22 DIAGNOSIS — E11.65 TYPE 2 DIABETES MELLITUS WITH HYPERGLYCEMIA, WITHOUT LONG-TERM CURRENT USE OF INSULIN: ICD-10-CM

## 2020-04-22 DIAGNOSIS — K43.2 VENTRAL INCISIONAL HERNIA WITHOUT OBSTRUCTION OR GANGRENE: ICD-10-CM

## 2020-04-22 PROCEDURE — 99215 PR OFFICE/OUTPT VISIT, EST, LEVL V, 40-54 MIN: ICD-10-PCS | Mod: 95,,, | Performed by: OBSTETRICS & GYNECOLOGY

## 2020-04-22 PROCEDURE — 99215 OFFICE O/P EST HI 40 MIN: CPT | Mod: 95,,, | Performed by: OBSTETRICS & GYNECOLOGY

## 2020-04-22 PROCEDURE — 2024F 7 FLD RTA PHOTO EVC RTNOPTHY: CPT | Mod: ,,, | Performed by: OBSTETRICS & GYNECOLOGY

## 2020-04-22 PROCEDURE — 3046F HEMOGLOBIN A1C LEVEL >9.0%: CPT | Mod: CPTII,,, | Performed by: OBSTETRICS & GYNECOLOGY

## 2020-04-22 PROCEDURE — 3046F PR MOST RECENT HEMOGLOBIN A1C LEVEL > 9.0%: ICD-10-PCS | Mod: CPTII,,, | Performed by: OBSTETRICS & GYNECOLOGY

## 2020-04-22 PROCEDURE — 2024F PR 7 FIELD PHOTOS WITH INTERP/ REVIEW: ICD-10-PCS | Mod: ,,, | Performed by: OBSTETRICS & GYNECOLOGY

## 2020-04-22 NOTE — PROGRESS NOTES
"The patient location is: home  The chief complaint leading to consultation is: TOA  Visit type: Virtual visit with synchronous audio and video  Total time spent with patient: 25  Each patient to whom he or she provides medical services by telemedicine is:  (1) informed of the relationship between the physician and patient and the respective role of any other health care provider with respect to management of the patient; and (2) notified that he or she may decline to receive medical services by telemedicine and may withdraw from such care at any time.    REFERRING PROVIDER  No ref. provider found     HISTORY OF PRESENT CONDITION  CC: chronic TOA  Glenda Peña is a 29 y.o. G0 who presents for follow-up regarding chronic TOA.  Below is a summary of her history.  The interval history is as follows: No longer requiring oxygen.  Intermittent SOA. -CP, dyspnea. Tolerating PO with increased appetite although it waxes and wanes. +Flatus. +BM.  Increased hernia size which she attributes to 20 lb weight loss since her admission to [facility] on 3/5.  Intermittent nausea which is only present after CT study.  She does not think it's associated with abdominal pain or antibiotics. -Abdominal pain. -F/C/NS.  Still receiving IV Unasyn and taking PO doxycycline.  Compliant with OCPs.    1. 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  She didn't require staging or adjuvant CT and is still alive.  2. 1/27/12: Perforated appendicitis.  Operative laparoscopy converted to an Ex-lap and appendectomy.  3. 8/21/16: Lap ventral hernia repair with a 25 x 15 cm Strattice mesh  4. 4/23/18: Evaluated by General Surgery during an admission and thought to have sigmoid colitis.  Discussed a sigmoid resection with an end colostomy once the patient lost weight.  5.  2/17-2/29: Admitted to [facility] for abdominal pain and suspicion for a L TOA.  She had it IR " drained and was discharged from the hospital with PO Amoxicillin.  6. 2/17/20: CT A/P: 10.4 x 7.9 x 6.8 cm  7. 2/18/20: IR guided drainage. Gm + bacilli resembling diphteroids. Actinomyces without sensitivities  8. 2/19/20: A1c = 8.9  9. 2/26/20: CT A/P: Improved TOA but new fluid collections in the hernia sac.  10. 3/5-12/20: Admitted to [place] for abdominal pain and persistent L TOA.  IR attempted to drain it on 3/6/20 but was unsuccesful. She was discharged on IV unasyn.   11. 3/27/20: CT A/P with increased size in TOA.  12. 3/29-4/2/20: Admitted for RLQ pain.  13. 3/30: IR guided drainage of 65 cc.  Aerobic, anaerobic, and fungal cultures were negative.  14. 4/2-4/6: COVID-19 admission  15. 4/3: Pigtail drain removed  16. 4/21: CT A/P: Complex fluid collection adjacent to the uterus measuring 9.8 by 5.3 cm, previously measuring 8.9 x 4.3 cm    REVIEW OF SYSTEMS  All systems reviewed and negative except as noted in HPI.    OBJECTIVE   There were no vitals filed for this visit.   There is no height or weight on file to calculate BMI.      1. General: Well appearing, no apparent distress, alert and oriented.  2. Lymph: Neck symmetric without cervical or supraclavicular adenopathy or mass.  3. Lungs: Normal respiratory rate, no accessory muscle use.  4. Cardiac: Normal rate  5. Psych: Normal affect.    ECOG status: 1    LABORATORY DATA  Lab data reviewed.    RADIOLOGICAL DATA  Radiology data reviewed.    PATHOLOGY DATA  Pathology data reviewed.    ASSESSMENT / PLAN     1. TOA (tubo-ovarian abscess)    2. Ventral incisional hernia without obstruction or gangrene    3. Type 2 diabetes mellitus with hyperglycemia, without long-term current use of insulin    4. History of PCOS    5. Morbid obesity with BMI of 45.0-49.9, adult       -Appreciate CRS recommendations.  Dr. Gonzalez saw the patient and will follow-up on her OSH colonoscopy to rule out diverticulitis.  She will only repeat a colonoscopy if the findings  were abnormal.  She has been consented for a bowel resection and we will bowel prep her before surgery: Miralax/Dulcolax with Nicki.  She said to contact her nurse, and they will provide instructions to the patient.  -Spoke to Dr. Brooke about the case, but will reach out to him again.  Obviously, this is not an ideal situation, but I think surgery is inevitable.  The wound will have to be closed by a General Surgeon as a closure of a complex hernia is beyond my scope of practice.  -F/U Faye Blackman about weight loss.  Goal is 2 lbs/day.  Patient is compliant with diet.  -F/U ID.  Continue antibiotics.  -F/U PCP about DM.  BS in AM are 170 and in .  I encouraged her to check her blood sugar 2h PP during the day and keep a log.  -Continue with OCPs as I do think there is a component of a PCOS.    There has been an interval increase in the size of the mass over 3 weeks but it is minimal.  She is clinically better.  I think this is a convoluted picture, and I am not entirely sure this is a TOA, but it is a persistent complex mass that needs to be removed.  Her cytology was negative but it doesn't completley rule out a cancer, although I think it is extremely unlikely.  Tumor markers are of minimal value given her age and background inflammation.  I think the goal moving forward should be to continue antibiotics and OCPs.  We need to optimize her weight and blood sugars in the next 2 months and plan for a surgery in June.  I would recommend repeating a CT scan in 1 week and if it is bigger, consider draining it to prevent a rupture or admission.  IR has agreed to drain it as needed as an outpatient.  Surgery may need to be done sooner if she develops sepsis or is even re-admitted, so I think it's better for her to be seen by General Surgery sooner rather than later so they are familiar with her.      F/U 1 month.  Will discuss imaging with ID.    PATIENT EDUCATION  Ready to learn, no apparent learning barriers  were identified; learning preferences include listening.  Explained diagnosis and treatment plan; patient expressed understanding of the content.    INFORMED CONSENT  Discussed the risks, benefits, and alternatives of the procedure and of possible blood transfusion.  Discussed the necessity of other members of the healthcare team participating in the procedure.  All questions answered and consent given.    Ritchie Millan

## 2020-04-22 NOTE — Clinical Note
Hi everyone, the pelvic mass has gotten a little bit bigger but she is clinically fine.  We should continue to aggressively optimize her weight and BS and plan for a surgery in Juneish.  Dr. Mei, I would recommend repeating a CT in 4 weeks to see if it needs to be drained one more time to prevent it from rupturing and avoiding an admission.

## 2020-04-22 NOTE — Clinical Note
Dr. Brooke, I just revisited with Ms. Peña.  She thinks that her hernia is bothering her more.  She attributes it to the 20-30 lbs she has lost since February from being admitted in and out of the hospital.  She is seeing a dietician and also her PCP.  The mass is going to have to come out at some point and we can try to optimize her weight and blood sugars for another 2 months.  CRS has seen her and will be available if she needs a bowel resection.I think we will have to coordinate a day when you are available.  It may have to go sooner than June if her clinical picture worsens.Repairing the hernia even if it's just a skin-to-skin closure is outside my scope of practice, so I would want you or one of your colleagues to do that.Let me know your thoughts.  Thank you.

## 2020-04-27 LAB — FUNGUS SPEC CULT: NORMAL

## 2020-04-28 ENCOUNTER — TELEPHONE (OUTPATIENT)
Dept: GYNECOLOGIC ONCOLOGY | Facility: CLINIC | Age: 30
End: 2020-04-28

## 2020-04-28 NOTE — TELEPHONE ENCOUNTER
Spoke with cara. Fax number given for discharge from home health. Denies any other needs at this time.   ----- Message from Tammy Hernandez sent at 4/28/2020  2:02 PM CDT -----  Contact: Cara ([facility])  Name of Who is Calling: Cara ([facility])    What is the request in detail: Patient is requesting discharge orders to discharge the patient from home health. The patient has returned to work. Please contact to further discuss and advise     What Number [facility] Back [facility]: [phone] ext. [phone]

## 2020-04-30 ENCOUNTER — PATIENT MESSAGE (OUTPATIENT)
Dept: GYNECOLOGIC ONCOLOGY | Facility: CLINIC | Age: 30
End: 2020-04-30

## 2020-04-30 DIAGNOSIS — E11.65 TYPE 2 DIABETES MELLITUS WITH HYPERGLYCEMIA, WITHOUT LONG-TERM CURRENT USE OF INSULIN: ICD-10-CM

## 2020-04-30 RX ORDER — GLIPIZIDE 10 MG/1
10 TABLET, FILM COATED, EXTENDED RELEASE ORAL
Qty: 30 TABLET | Refills: 11 | Status: SHIPPED | OUTPATIENT
Start: 2020-04-30 | End: 2020-08-12

## 2020-05-04 ENCOUNTER — PATIENT OUTREACH (OUTPATIENT)
Dept: ADMINISTRATIVE | Facility: OTHER | Age: 30
End: 2020-05-04

## 2020-05-05 ENCOUNTER — TELEPHONE (OUTPATIENT)
Dept: SURGERY | Facility: CLINIC | Age: 30
End: 2020-05-05

## 2020-05-05 ENCOUNTER — OFFICE VISIT (OUTPATIENT)
Dept: INFECTIOUS DISEASES | Facility: CLINIC | Age: 30
End: 2020-05-05
Payer: COMMERCIAL

## 2020-05-05 ENCOUNTER — TELEPHONE (OUTPATIENT)
Dept: INFECTIOUS DISEASES | Facility: CLINIC | Age: 30
End: 2020-05-05

## 2020-05-05 DIAGNOSIS — Z79.2 RECEIVING INTRAVENOUS ANTIBIOTIC TREATMENT AS OUTPATIENT: ICD-10-CM

## 2020-05-05 DIAGNOSIS — Z51.81 THERAPEUTIC DRUG MONITORING: ICD-10-CM

## 2020-05-05 DIAGNOSIS — A42.9 ACTINOMYCES INFECTION: Primary | ICD-10-CM

## 2020-05-05 DIAGNOSIS — Z78.9 HEALTH CARE HOME, ACTIVE CARE COORDINATION: ICD-10-CM

## 2020-05-05 PROCEDURE — 99213 OFFICE O/P EST LOW 20 MIN: CPT | Mod: 95,,, | Performed by: INTERNAL MEDICINE

## 2020-05-05 PROCEDURE — 99213 PR OFFICE/OUTPT VISIT, EST, LEVL III, 20-29 MIN: ICD-10-PCS | Mod: 95,,, | Performed by: INTERNAL MEDICINE

## 2020-05-05 NOTE — PROGRESS NOTES
The patient location is: home  The chief complaint leading to consultation is: hospital follow up  Visit type: audiovisual  Total time spent with patient: 21  Each patient to whom he or she provides medical services by telemedicine is:  (1) informed of the relationship between the physician and patient and the respective role of any other health care provider with respect to management of the patient; and (2) notified that he or she may decline to receive medical services by telemedicine and may withdraw from such care at any time.      [facility]EASE [facility]  05/05/2020 3:32 PM    Subjective:      Chief Complaint: hospital follow up     History of Present Illness:    Patient Glenda Peña is a 29 y.o. female who presents today for hospital follow up. H/o obesity, asthma, DM II, HTN, and midline and R-sided ventral hernia readmitted several weeks ago with fever. Had been on IV unasyn and po doxycycline for TOA with a positive culture for actinomyces. Repeat CT with enlarging loculated TOA [id number]. Was found to have infiltrate and covid+. Reports minimal improvement in abdominal symptoms. Denies issues with picc line. Has been on iv abx for about 8 weeks now.         Review of Symptoms:  Constitutional: Denies fevers, chills, or weakness.  ENT: Denies dysphagia, nasal discharge, ear pain or discharge.  Cardiovascular: Denies chest pain, palpitations, orthopnea, or claudication.  Respiratory: Denies shortness of breath, cough, hemoptysis, or wheezing.  GI: Denies nausea/vomitting, hematochezia, melena, abd pain, or changes in appetite.  : Denies dysuria, incontinence, or hematuria.  Musculoskeletal: Denies joint pain or myalgias.  Skin/breast: Denies rashes, lumps, lesions, or discharge.  Neurologic: Denies headache, dizziness, vertigo, or paresthesias.    Past Medical History:   Diagnosis Date    Asthma childhood    Diabetes mellitus     GERD (gastroesophageal reflux disease)     Morbid obesity      PCOS (polycystic ovarian syndrome)        Past Surgical History:   Procedure Laterality Date    APPENDECTOMY  2011    HERNIA REPAIR      OOPHORECTOMY      salpingoopherectomy Right 2008       Family History   Problem Relation Age of Onset    Heart attack Father     Hypertension Father     Diabetes Mother     Heart attack Mother     Breast cancer Neg Hx     Colon cancer Neg Hx     Ovarian cancer Neg Hx        Social History     Socioeconomic History    Marital status:      Spouse name: Not on file    Number of children: Not on file    Years of education: Not on file    Highest education level: Not on file   Occupational History    Not on file   Social Needs    Financial resource strain: Not on file    Food insecurity:     Worry: Not on file     Inability: Not on file    Transportation needs:     Medical: Not on file     Non-medical: Not on file   Tobacco Use    Smoking status: Never Smoker    Smokeless tobacco: Never Used   Substance and Sexual Activity    Alcohol use: Not Currently     Comment: occasionally apple cider    Drug use: No    Sexual activity: Yes     Partners: Female   Lifestyle    Physical activity:     Days per week: Not on file     Minutes per session: Not on file    Stress: Not on file   Relationships    Social connections:     Talks on phone: Not on file     Gets together: Not on file     Attends Rastafari service: Not on file     Active member of club or organization: Not on file     Attends meetings of clubs or organizations: Not on file     Relationship status: Not on file   Other Topics Concern    Not on file   Social History Narrative    Not on file       Review of patient's allergies indicates:   Allergen Reactions    Latex, natural rubber Rash     Burning sensation         Objective:   VS (24h): There were no vitals filed for this visit.  [unfilled]  General: Afebrile, alert, comfortable, no acute distress.   Neurological:  Alert and oriented x 4.    Exam limited 2/2 virtual visit    Labs:  Glucose   Date Value Ref Range Status   04/16/2020 141 (H) 70 - 110 mg/dL Final   04/05/2020 234 (H) 70 - 110 mg/dL Final   04/03/2020 140 (H) 70 - 110 mg/dL Final     Calcium   Date Value Ref Range Status   04/16/2020 8.8 8.7 - 10.5 mg/dL Final   04/05/2020 9.2 8.7 - 10.5 mg/dL Final   04/03/2020 9.0 8.7 - 10.5 mg/dL Final     Albumin   Date Value Ref Range Status   04/16/2020 3.3 (L) 3.5 - 5.2 g/dL Final   04/05/2020 3.1 (L) 3.5 - 5.2 g/dL Final   04/03/2020 3.0 (L) 3.5 - 5.2 g/dL Final     Total Protein   Date Value Ref Range Status   04/16/2020 7.3 6.0 - 8.4 g/dL Final   04/05/2020 8.2 6.0 - 8.4 g/dL Final   04/03/2020 7.7 6.0 - 8.4 g/dL Final     Sodium   Date Value Ref Range Status   04/16/2020 142 136 - 145 mmol/L Final   04/05/2020 139 136 - 145 mmol/L Final   04/03/2020 140 136 - 145 mmol/L Final     Potassium   Date Value Ref Range Status   04/16/2020 3.8 3.5 - 5.1 mmol/L Final   04/05/2020 3.6 3.5 - 5.1 mmol/L Final   04/03/2020 3.5 3.5 - 5.1 mmol/L Final     CO2   Date Value Ref Range Status   04/16/2020 26 23 - 29 mmol/L Final   04/05/2020 24 23 - 29 mmol/L Final   04/03/2020 26 23 - 29 mmol/L Final     Chloride   Date Value Ref Range Status   04/16/2020 104 95 - 110 mmol/L Final   04/05/2020 103 95 - 110 mmol/L Final   04/03/2020 104 95 - 110 mmol/L Final     BUN, Bld   Date Value Ref Range Status   04/16/2020 6 6 - 20 mg/dL Final   04/05/2020 5 (L) 6 - 20 mg/dL Final   04/03/2020 5 (L) 6 - 20 mg/dL Final     Creatinine   Date Value Ref Range Status   04/16/2020 0.7 0.5 - 1.4 mg/dL Final   04/05/2020 0.7 0.5 - 1.4 mg/dL Final   04/03/2020 0.7 0.5 - 1.4 mg/dL Final     Alkaline Phosphatase   Date Value Ref Range Status   04/16/2020 65 55 - 135 U/L Final   04/05/2020 83 55 - 135 U/L Final   04/03/2020 78 55 - 135 U/L Final     ALT   Date Value Ref Range Status   04/16/2020 30 10 - 44 U/L Final   04/05/2020 42 10 - 44 U/L Final   04/03/2020 56 (H) 10 - 44 U/L  Final     AST   Date Value Ref Range Status   04/16/2020 23 10 - 40 U/L Final   04/05/2020 46 (H) 10 - 40 U/L Final   04/03/2020 54 (H) 10 - 40 U/L Final     Total Bilirubin   Date Value Ref Range Status   04/16/2020 0.3 0.1 - 1.0 mg/dL Final     Comment:     For infants and newborns, interpretation of results should be based  on gestational age, weight and in agreement with clinical  observations.  Premature Infant recommended reference ranges:  Up to 24 hours.............<8.0 mg/dL  Up to 48 hours............<12.0 mg/dL  3-5 days..................<15.0 mg/dL  6-29 days.................<15.0 mg/dL     04/05/2020 0.3 0.1 - 1.0 mg/dL Final     Comment:     For infants and newborns, interpretation of results should be based  on gestational age, weight and in agreement with clinical  observations.  Premature Infant recommended reference ranges:  Up to 24 hours.............<8.0 mg/dL  Up to 48 hours............<12.0 mg/dL  3-5 days..................<15.0 mg/dL  6-29 days.................<15.0 mg/dL     04/03/2020 0.3 0.1 - 1.0 mg/dL Final     Comment:     For infants and newborns, interpretation of results should be based  on gestational age, weight and in agreement with clinical  observations.  Premature Infant recommended reference ranges:  Up to 24 hours.............<8.0 mg/dL  Up to 48 hours............<12.0 mg/dL  3-5 days..................<15.0 mg/dL  6-29 days.................<15.0 mg/dL       WBC   Date Value Ref Range Status   04/16/2020 5.84 3.90 - 12.70 K/uL Final   04/05/2020 3.45 (L) 3.90 - 12.70 K/uL Final   04/03/2020 2.78 (L) 3.90 - 12.70 K/uL Final     Hemoglobin   Date Value Ref Range Status   04/16/2020 9.4 (L) 12.0 - 16.0 g/dL Final   04/05/2020 9.7 (L) 12.0 - 16.0 g/dL Final   04/03/2020 9.2 (L) 12.0 - 16.0 g/dL Final     Hematocrit   Date Value Ref Range Status   04/16/2020 31.1 (L) 37.0 - 48.5 % Final   04/05/2020 33.4 (L) 37.0 - 48.5 % Final   04/03/2020 31.3 (L) 37.0 - 48.5 % Final     Mean  Corpuscular Volume   Date Value Ref Range Status   04/16/2020 85 82 - 98 fL Final   04/05/2020 88 82 - 98 fL Final   04/03/2020 88 82 - 98 fL Final     Platelets   Date Value Ref Range Status   04/16/2020 235 150 - 350 K/uL Final   04/05/2020 165 150 - 350 K/uL Final   04/03/2020 141 (L) 150 - 350 K/uL Final     Lab Results   Component Value Date    CHOL 226 (H) 08/07/2019    CHOL 213 (H) 03/24/2017    CHOL 118 (L) 01/27/2012     Lab Results   Component Value Date    HDL 29 (L) 08/07/2019    HDL 29 (L) 03/24/2017    HDL 14 (L) 01/27/2012     Lab Results   Component Value Date    LDLCALC Invalid, Trig>400.0 08/07/2019    LDLCALC Invalid, Trig>400.0 03/24/2017    LDLCALC 52.2 (L) 01/27/2012     Lab Results   Component Value Date    TRIG 731 (H) 08/07/2019    TRIG 424 (H) 03/24/2017    TRIG 259 (H) 01/27/2012     Lab Results   Component Value Date    CHOLHDL 12.8 (L) 08/07/2019    CHOLHDL 13.6 (L) 03/24/2017    CHOLHDL 11.9 (L) 01/27/2012     No results found for: RPR  No results found for: QUANTIFERON    Medications:  Current Outpatient Medications on File Prior to Visit   Medication Sig Dispense Refill    acetaminophen (TYLENOL) 325 MG tablet Take 2 tablets (650 mg total) by mouth every 6 (six) hours. 180 tablet 0    albuterol (VENTOLIN HFA) 90 mcg/actuation inhaler Inhale 2 puffs into the lungs every 6 (six) hours as needed for Wheezing. Rescue 18 g 3    ampicillin sodium/sulbactam Na (AMPICILLIN/SULBACTAM 3 G/100 ML NS, READY TO MIX,) Inject 100 mLs (3 g total) into the vein every 6 (six) hours.      doxycycline (VIBRA-TABS) 100 MG tablet Take 1 tablet (100 mg total) by mouth 2 (two) times daily. Take with food 84 tablet 0    dulaglutide (TRULICITY) 1.5 mg/0.5 mL PnIj Inject 1.5 mg into the skin every 7 days. 2 mL 3    glipiZIDE (GLUCOTROL) 10 MG TR24 Take 1 tablet (10 mg total) by mouth daily with breakfast. 30 tablet 11    lisinopril (PRINIVIL,ZESTRIL) 2.5 MG tablet Take 1 tablet (2.5 mg total) by mouth  once daily. 90 tablet 1    metFORMIN (GLUCOPHAGE) 1000 MG tablet Take 2,000 mg by mouth.      norethindrone-ethinyl estradiol (MICROGESTIN 1/20) 1-20 mg-mcg per tablet Take 1 tablet by mouth once daily. 30 tablet 11    ondansetron (ZOFRAN) 4 MG tablet Take 1 tablet (4 mg total) by mouth every 6 (six) hours as needed for Nausea. 18 tablet 0    oxyCODONE (ROXICODONE) 5 MG immediate release tablet Take 1 tablet (5 mg total) by mouth every 4 (four) hours as needed for Pain. 30 tablet 0    pantoprazole (PROTONIX) 40 MG tablet Take 1 tablet (40 mg total) by mouth before breakfast. 60 tablet 2    polyethylene glycol (GLYCOLAX) 17 gram PwPk Take 17 g by mouth 2 (two) times daily as needed. 60 packet 0    promethazine (PHENERGAN) 25 MG tablet Take 1 tablet (25 mg total) by mouth every 4 (four) hours. 30 tablet 1    senna (SENOKOT) 8.6 mg tablet Take 1 tablet by mouth 2 (two) times daily.      sodium chloride 0.9% (NORMAL SALINE FLUSH) injection Inject 10 mLs into the vein every 6 (six) hours.       No current facility-administered medications on file prior to visit.        Antibiotics:   Antibiotics (From admission, onward)    None          HIV: No components found for: HIV 1/2 AG/AB  Hepatitis C IgG: No components found for: HEPATITIS C  Syphilis: No results found for: RPR    Hepatitis A IgG: No components found for: HEPATITIS A IGG  Hepatitis Bc IgG: No components found for: HEPATITIS B CORE IGG  Hepatitis Bs IgG:  Quantiferon: No results found for: QUANTIFERON  VZV IgG: No components found for: VARICELLA IGG    No components found for: SEDIMENTATION RATE  No components found for: C-REACTIVE PROTEIN      Microbiology x 7d:   Microbiology Results (last 7 days)     ** No results found for the last 168 hours. **          Immunization History   Administered Date(s) Administered    DTaP 1989, 04/16/1990, 07/08/1993, 04/15/1994    HIB 07/08/1993    Hepatitis B, Pediatric/Adolescent 09/23/2002, 02/02/2004     IPV 1989, 04/15/1990, 04/15/1994    Influenza 11/24/2019    Influenza - Quadrivalent - PF (6 months and older) 01/17/2017    MMR 07/08/1993, 04/15/1994    Td (ADULT) 09/23/2002    Td (Adult), Unspecified Formulation 09/23/2002         Assessment:     Actinomyces infection  TOA  Home health active care coordination  IV antibiotics needed at home    Plan:     Remove picc line, stop unasyn and doxycycline. Spoke with hasmukh at infusion center and she will remove picc. Start amoxicillin. Plan for about 6 months total for actinomyces. Counseled on side effects. Further definitive management of abdominal mass per gyn-onc and it's my understanding they think it could be from ruptured cysts. Pt working on loosing weight as per their recommendation should she need surgery.Trend esr/crp    rtc 6 weeks    Shantel Mei MD, MPH  Infectious Disease

## 2020-05-05 NOTE — TELEPHONE ENCOUNTER
Left message on patient's voicemail for her to call and schedule an appt with Dr. Brooke.  Direct phone number given.

## 2020-05-05 NOTE — TELEPHONE ENCOUNTER
Spoke with Charisse at [facility] and gave the verbal order to remove pt PICC line per Dr. Mei

## 2020-05-05 NOTE — TELEPHONE ENCOUNTER
----- Message from Linden Kerns sent at 5/4/2020 10:19 AM CDT -----  Good morning I am not sure if you received this message already and working on it. Thanks   ----- Message -----  From: Linden Kerns  Sent: 4/9/2020   1:57 PM CDT  To: Edwardo Boldenlo, can you please schedule this patient to see Dr. Fowler for Ventral incisional hernia without obstruction or gangrene Dr. Millan has a referral in. Thanks

## 2020-05-07 ENCOUNTER — PATIENT MESSAGE (OUTPATIENT)
Dept: INFECTIOUS DISEASES | Facility: CLINIC | Age: 30
End: 2020-05-07

## 2020-05-11 ENCOUNTER — PATIENT OUTREACH (OUTPATIENT)
Dept: ADMINISTRATIVE | Facility: OTHER | Age: 30
End: 2020-05-11

## 2020-05-11 RX ORDER — AMOXICILLIN 500 MG/1
500 TABLET, FILM COATED ORAL EVERY 8 HOURS
Qty: 90 TABLET | Refills: 5 | Status: SHIPPED | OUTPATIENT
Start: 2020-05-11 | End: 2020-06-10

## 2020-05-12 ENCOUNTER — OFFICE VISIT (OUTPATIENT)
Dept: SURGERY | Facility: CLINIC | Age: 30
End: 2020-05-12
Payer: COMMERCIAL

## 2020-05-12 ENCOUNTER — TELEPHONE (OUTPATIENT)
Dept: BARIATRICS | Facility: CLINIC | Age: 30
End: 2020-05-12

## 2020-05-12 VITALS
HEIGHT: 62 IN | WEIGHT: 249.31 LBS | DIASTOLIC BLOOD PRESSURE: 72 MMHG | HEART RATE: 84 BPM | SYSTOLIC BLOOD PRESSURE: 124 MMHG | BODY MASS INDEX: 45.88 KG/M2 | TEMPERATURE: 98 F | OXYGEN SATURATION: 97 %

## 2020-05-12 DIAGNOSIS — K43.2 VENTRAL INCISIONAL HERNIA WITHOUT OBSTRUCTION OR GANGRENE: ICD-10-CM

## 2020-05-12 PROCEDURE — 3074F SYST BP LT 130 MM HG: CPT | Mod: CPTII,S$GLB,, | Performed by: SURGERY

## 2020-05-12 PROCEDURE — 3078F DIAST BP <80 MM HG: CPT | Mod: CPTII,S$GLB,, | Performed by: SURGERY

## 2020-05-12 PROCEDURE — 99213 PR OFFICE/OUTPT VISIT, EST, LEVL III, 20-29 MIN: ICD-10-PCS | Mod: S$GLB,,, | Performed by: SURGERY

## 2020-05-12 PROCEDURE — 99999 PR PBB SHADOW E&M-EST. PATIENT-LVL V: CPT | Mod: PBBFAC,,, | Performed by: SURGERY

## 2020-05-12 PROCEDURE — 3078F PR MOST RECENT DIASTOLIC BLOOD PRESSURE < 80 MM HG: ICD-10-PCS | Mod: CPTII,S$GLB,, | Performed by: SURGERY

## 2020-05-12 PROCEDURE — 3008F PR BODY MASS INDEX (BMI) DOCUMENTED: ICD-10-PCS | Mod: CPTII,S$GLB,, | Performed by: SURGERY

## 2020-05-12 PROCEDURE — 99213 OFFICE O/P EST LOW 20 MIN: CPT | Mod: S$GLB,,, | Performed by: SURGERY

## 2020-05-12 PROCEDURE — 99999 PR PBB SHADOW E&M-EST. PATIENT-LVL V: ICD-10-PCS | Mod: PBBFAC,,, | Performed by: SURGERY

## 2020-05-12 PROCEDURE — 3008F BODY MASS INDEX DOCD: CPT | Mod: CPTII,S$GLB,, | Performed by: SURGERY

## 2020-05-12 PROCEDURE — 3074F PR MOST RECENT SYSTOLIC BLOOD PRESSURE < 130 MM HG: ICD-10-PCS | Mod: CPTII,S$GLB,, | Performed by: SURGERY

## 2020-05-12 NOTE — PROGRESS NOTES
History & Physical    SUBJECTIVE:     History of Present Illness:  Patient is a 29 y.o. female presents with large ventral hernia. Onset of symptoms was almost immediately after her last hernia repair with gradually worsening course since that time. She has previously had partial hysterectomy and open appendectomy. Last surgery was 2016 for incisional hernia repair. She has chronic constipation and diarrhea. She is a diabetic, and she is morbidly obese.     Most significant symptoms are related to the lower midline hernia, has chronic nausea, sometimes related to GERD. Denies vomiting. No redness overlying the hernia. Was referred to Bariatric clinic but ended up having insurance coverage issues.   BMI currently 45, 250# from 280# in Feb    Was here recently for intra-abdominal infection then readmitted with COVID PNA for a week. She has been home for the past month and has recovered well. No current symptoms of cough, fever, chills, fatigue, malaise, myalgias, or SOB    History of TOA, chronic intra-abdominal actinomycosis, currently being followed by ID (Shantel Mei) and on chronic abx   Stopped home IV Abx 1 week ago, still on PO    DM2: under better control 120-140  Not taking anticoagulation     No chief complaint on file.      Review of patient's allergies indicates:   Allergen Reactions    Latex, natural rubber Rash     Burning sensation       Current Outpatient Medications   Medication Sig Dispense Refill    acetaminophen (TYLENOL) 325 MG tablet Take 2 tablets (650 mg total) by mouth every 6 (six) hours. 180 tablet 0    albuterol (VENTOLIN HFA) 90 mcg/actuation inhaler Inhale 2 puffs into the lungs every 6 (six) hours as needed for Wheezing. Rescue 18 g 3    amoxicillin (AMOXIL) 500 MG Tab Take 1 tablet (500 mg total) by mouth every 8 (eight) hours. 90 tablet 5    doxycycline (VIBRA-TABS) 100 MG tablet Take 1 tablet (100 mg total) by mouth 2 (two) times daily. Take with food 84 tablet 0     dulaglutide (TRULICITY) 1.5 mg/0.5 mL PnIj Inject 1.5 mg into the skin every 7 days. 2 mL 3    glipiZIDE (GLUCOTROL) 10 MG TR24 Take 1 tablet (10 mg total) by mouth daily with breakfast. 30 tablet 11    lisinopril (PRINIVIL,ZESTRIL) 2.5 MG tablet Take 1 tablet (2.5 mg total) by mouth once daily. 90 tablet 1    metFORMIN (GLUCOPHAGE) 1000 MG tablet Take 2,000 mg by mouth.      norethindrone-ethinyl estradiol (MICROGESTIN 1/20) 1-20 mg-mcg per tablet Take 1 tablet by mouth once daily. 30 tablet 11    ondansetron (ZOFRAN) 4 MG tablet Take 1 tablet (4 mg total) by mouth every 6 (six) hours as needed for Nausea. 18 tablet 0    pantoprazole (PROTONIX) 40 MG tablet Take 1 tablet (40 mg total) by mouth before breakfast. 60 tablet 2    promethazine (PHENERGAN) 25 MG tablet Take 1 tablet (25 mg total) by mouth every 4 (four) hours. 30 tablet 1    ampicillin sodium/sulbactam Na (AMPICILLIN/SULBACTAM 3 G/100 ML NS, READY TO MIX,) Inject 100 mLs (3 g total) into the vein every 6 (six) hours. (Patient not taking: Reported on 5/12/2020)      oxyCODONE (ROXICODONE) 5 MG immediate release tablet Take 1 tablet (5 mg total) by mouth every 4 (four) hours as needed for Pain. (Patient not taking: Reported on 5/12/2020) 30 tablet 0    polyethylene glycol (GLYCOLAX) 17 gram PwPk Take 17 g by mouth 2 (two) times daily as needed. (Patient not taking: Reported on 5/12/2020) 60 packet 0    senna (SENOKOT) 8.6 mg tablet Take 1 tablet by mouth 2 (two) times daily. (Patient not taking: Reported on 5/12/2020)      sodium chloride 0.9% (NORMAL SALINE FLUSH) injection Inject 10 mLs into the vein every 6 (six) hours. (Patient not taking: Reported on 5/12/2020)       No current facility-administered medications for this visit.        Past Medical History:   Diagnosis Date    Asthma childhood    Diabetes mellitus     GERD (gastroesophageal reflux disease)     Morbid obesity     PCOS (polycystic ovarian syndrome)      Past Surgical  "History:   Procedure Laterality Date    APPENDECTOMY  2011    HERNIA REPAIR      OOPHORECTOMY      salpingoopherectomy Right 2008     Family History   Problem Relation Age of Onset    Heart attack Father     Hypertension Father     Diabetes Mother     Heart attack Mother     Breast cancer Neg Hx     Colon cancer Neg Hx     Ovarian cancer Neg Hx      Social History     Tobacco Use    Smoking status: Never Smoker    Smokeless tobacco: Never Used   Substance Use Topics    Alcohol use: Not Currently     Comment: occasionally apple cider    Drug use: No        Review of Systems:  Review of Systems   Constitutional: Negative for chills and fever.   HENT: Negative for congestion and rhinorrhea.    Eyes: Negative for photophobia and visual disturbance.   Respiratory: Negative for cough and shortness of breath.    Cardiovascular: Negative for chest pain and palpitations.   Gastrointestinal: Positive for abdominal pain, constipation, diarrhea and nausea. Negative for vomiting.   Endocrine: Negative for cold intolerance and heat intolerance.   Musculoskeletal: Negative for arthralgias and myalgias.   Skin: Negative for rash and wound.   Allergic/Immunologic: Negative for immunocompromised state.   Neurological: Negative for tremors and weakness.   Hematological: Negative for adenopathy.   Psychiatric/Behavioral: Negative for agitation.       OBJECTIVE:     Vital Signs (Most Recent)  Temp: 98.1 °F (36.7 °C) (05/12/20 0821)  Pulse: 84 (05/12/20 0821)  BP: 124/72 (05/12/20 0821)  SpO2: 97 % (05/12/20 0821)  5' 2" (1.575 m)  113.1 kg (249 lb 5.4 oz)     Physical Exam:  Physical Exam   Constitutional: She is oriented to person, place, and time. She appears well-developed and well-nourished. No distress.   HENT:   Head: Normocephalic and atraumatic.   Eyes: EOM are normal. No scleral icterus.   Neck: Normal range of motion. Neck supple.   Cardiovascular: Normal rate and regular rhythm.   Pulmonary/Chest: Effort " normal. No respiratory distress.   Abdominal:   Soft, obese  Large recurrent ventral hernia, well healed scar overlying hernia   Musculoskeletal: Normal range of motion. She exhibits no edema or tenderness.   Neurological: She is alert and oriented to person, place, and time.   Skin: Skin is warm and dry.   Psychiatric: She has a normal mood and affect.       CT Reviewed:  Abdominal wall:  There is a large ventral hernia containing loops of small and large bowel.  Redemonstration of fat stranding of the subcutaneous tissue and mild edema of the mesentery.  No other discrete fluid collection is visualized.  Reproductive organs: Redemonstration of complex cystic structure adjacent to the uterus with interval removal of left-sided abdominal pigtail catheter.  This collection measures 9.8 by 5.3 cm, previously measuring 8.9 x 4.3 cm (series 2, image 115).    ASSESSMENT/PLAN:   28 yo female w large recurrent ventral hernia. We have discussed this particularly difficult hernia at length with the patient. Unfortunately this hernia repair would have to be done under extremely ideal circumstances, and even then would have a prohibitively high rate of failure due to the lack of good anchoring options for repair with mesh. Complicating this are her chronic intra-abdominal infection, her DM, and her weight. Dr. Brooke has been discussing her case with GYN who are planning surgery for her abdominal fluid collection, if this takes place we will remain available for entry and closure, but would be very limited with what fascial closure we can offer     - Will obtain consent for our portion of the GYN case, SHELLEY, possible hernia repair, possible fascial closure  - will continue to discuss with GYN    Raza Ingram MD   Pager: [phone]  General Surgery PGY-III  [facility]

## 2020-05-12 NOTE — TELEPHONE ENCOUNTER
----- Message from Virgilio Mcconnell sent at 5/12/2020 10:16 AM CDT -----  Regarding: RE: check bariatric benefits  Hi there,     Patient has [place] which does not cover bariatric services regardless of medical necessity.     Thanks  ----- Message -----  From: Britni Marshall RN  Sent: 5/12/2020   9:59 AM CDT  To: Virgilio Mcconnell  Subject: check bariatric benefits                         Hi Ms Magallanes,  Can you please check this patient for bariatric surgery benefits.  See Dr Hernandes's message below.  Thanks,  Tara  ----- Message -----  From: Felipe Hernandes MD  Sent: 5/11/2020   8:48 AM CDT  To: Britni Marshall, RUDY, Sandra Delaney RN, #    Medicare covers it, medicaid covers but the reimbursement is too low for us to accept.  For all other insurance plans in [place] it is a rider so it is decided by the company that the patient works for not the insurance plan.  Overall, few patients in [place] have coverage.  ----- Message -----  From: Ritchie Millan MD  Sent: 5/11/2020   8:13 AM CDT  To: Felipe Hernandes MD, Britni Marshall RN, #    Thanks for the reply.  So I can also keep my eye open, what kind of insurance do they need?    ----- Message -----  From: Felipe Hernandes MD  Sent: 5/6/2020   1:54 PM CDT  To: Britni Marshall RN, #    Ritchie,    We would be happy to look at it but have not had the opportunity to do the combo.  I expect a sleeve but not a bypass.  We have few patients in this state that have insurance for weight loss surgery but can look into that on a case by case basis.  I have copied our bariatric coordinators on this note and you could send anyone interested to them or us so we can evaluate them.  ----- Message -----  From: Juanpablo Brooke MD  Sent: 5/6/2020  12:34 PM CDT  To: Felipe Hernandes MD, #        ----- Message -----  From: Ritchie Millan MD  Sent: 5/4/2020   8:18 AM CDT  To: Juanpablo Brooke MD    Thank you.  Another thing I'd like to talk to you about is if you  have any interest in doing bariatric surgery on some of our endometrial cancer patients at the time of hysterectomy.  We did a case in fellowship, and I'd be interested in exploring it here if you or your colleagues are interested.  See below.     Https://www.ncbi.nlm.nih.gov/pubmed/76065879      ----- Message -----  From: Juanpablo Brooke MD  Sent: 5/3/2020   3:31 AM CDT  To: Ritchie Millan MD    I'm available most days. Have clinic on Tuesdays and OR on Wednesdays, but will likely be slow for the next few weeks on Wednesdays. I will be out the first week in June.  Edwardo  ----- Message -----  From: Ritchie Millan MD  Sent: 4/22/2020   9:35 AM CDT  To: MD Dr. Edwardo Zaldivar, I just revisited with Ms. Peña.  She thinks that her hernia is bothering her more.  She attributes it to the 20-30 lbs she has lost since February from being admitted in and out of the hospital.  She is seeing a dietician and also her PCP.      The mass is going to have to come out at some point and we can try to optimize her weight and blood sugars for another 2 months.  CRS has seen her and will be available if she needs a bowel resection.    I think we will have to coordinate a day when you are available.  It may have to go sooner than June if her clinical picture worsens.    Repairing the hernia even if it's just a skin-to-skin closure is outside my scope of practice, so I would want you or one of your colleagues to do that.    Let me know your thoughts.  Thank you.

## 2020-05-12 NOTE — LETTER
May 12, 2020      Ritchie Millan MD  [address]  [address]  [facility] [address]           [facility] - General Surgery  [address]  [place] [address]  Phone: [phone]          Patient: Glenda Peña   MR Number: [id number]   YOB: 1990   Date of Visit: 5/12/2020       Dear Dr. Ritchie Millan:    Thank you for referring Glenda Peña to me for evaluation. Attached you will find relevant portions of my assessment and plan of care.    If you have questions, please do not hesitate to call me. I look forward to following Glenda Peña along with you.    Sincerely,    Juanpablo Brooke MD    Enclosure  CC:  No Recipients    If you would like to receive this communication electronically, please contact [email or web address] or [phone] to request more information on [employer] access.    For providers and/or their staff who would like to refer a patient to [facility], please contact us through our one-stop-shop provider referral line, [facility], at [phone].    If you feel you have received this communication in error or would no longer like to receive these types of communications, please e-mail [email or web address]

## 2020-05-22 ENCOUNTER — PATIENT MESSAGE (OUTPATIENT)
Dept: INFECTIOUS DISEASES | Facility: CLINIC | Age: 30
End: 2020-05-22

## 2020-05-22 DIAGNOSIS — B37.31 CANDIDAL VAGINITIS: ICD-10-CM

## 2020-05-22 RX ORDER — FLUCONAZOLE 150 MG/1
150 TABLET ORAL EVERY OTHER DAY
Qty: 2 TABLET | Refills: 5 | Status: SHIPPED | OUTPATIENT
Start: 2020-05-22 | End: 2020-05-25

## 2020-05-29 ENCOUNTER — PATIENT MESSAGE (OUTPATIENT)
Dept: ADMINISTRATIVE | Facility: HOSPITAL | Age: 30
End: 2020-05-29

## 2020-06-01 NOTE — TELEPHONE ENCOUNTER
Attempted to reach patient to notify her that she does not have bariatric benefits.  No answer.  Left VM with 's call back number if she was interested in discussing self-pay options.  Message sent to referring and bariatric surgeon chain.

## 2020-06-02 ENCOUNTER — TELEPHONE (OUTPATIENT)
Dept: GYNECOLOGIC ONCOLOGY | Facility: CLINIC | Age: 30
End: 2020-06-02

## 2020-06-02 DIAGNOSIS — N70.93 TOA (TUBO-OVARIAN ABSCESS): Primary | ICD-10-CM

## 2020-06-02 DIAGNOSIS — N94.89 ADNEXAL MASS: ICD-10-CM

## 2020-06-02 LAB
ACID FAST MOD KINY STN SPEC: NORMAL
MYCOBACTERIUM SPEC QL CULT: NORMAL

## 2020-06-02 NOTE — TELEPHONE ENCOUNTER
----- Message from Judy Graham sent at 6/2/2020  2:51 PM CDT -----  Contact: pt  Name of Who is Calling: pt    What is the request in detail: is returning a call. Please contact to further discuss and advise      Can the clinic reply by [facility]: no    What Number to [facility] Back [facility]: [phone]

## 2020-06-02 NOTE — TELEPHONE ENCOUNTER
I spoke with the MRI department and the only days that they had open at [facility] was  Sat.the 6th and Sunday the 7th. The [facility] is not scheduling MRI's.   I schedule the patient for the 6th however she is requesting a later time I gave her the number to call MRI  directly because they had to schedule her the schedule is not  open for me to schedule as requested .  ----- Message from Ritchie Millan MD sent at 6/2/2020  6:59 AM CDT -----  Good morning [place],    Can we get this patient scheduled for an MRI either this Friday or next Monday?  I am seeing her Monday AM.  Thank you.

## 2020-06-04 ENCOUNTER — TELEPHONE (OUTPATIENT)
Dept: BARIATRICS | Facility: CLINIC | Age: 30
End: 2020-06-04

## 2020-06-04 NOTE — TELEPHONE ENCOUNTER
Attempted to reach patient again to discuss self pay options for medical wt loss- no answer; left VM with call back number to Virgilio Mcconnell, , if she wants self pay options.

## 2020-06-04 NOTE — TELEPHONE ENCOUNTER
----- Message from Felipe Hernandes MD sent at 6/1/2020  1:52 PM CDT -----  Would she be interested in bariatric medicine?  ----- Message -----  From: Sandra Delaney RN  Sent: 6/1/2020   9:23 AM CDT  To: Felipe Hernandes MD, Britni Marshall, RN, #     Good morning,    Ms. Peña does not have bariatric benefits under her insurance plan.  I attempted to reach patient to discuss.  I left a voicemail with the 's call back number if the patient wanted information on self-pay pricing.    Thanks!      Sandra Delaney RN  Bariatric Clinical Coordinator    ----- Message -----  From: Yifan Reyna Jr., MD  Sent: 5/13/2020  10:32 AM CDT  To: Felipe Hernandes MD, Britni Marshall, RN, #    Unfortunately the insurance question is a tricky one.  We dont take Medicaid, We do take Medicare and the rest are a case by case basis.  We can certainly have our  look into for you if you are interested in us checking you can just send us a message and we can get it done.  Whatever is easiest for you.  We are happy to deal with this end as it is too difficult to know just from the insurance.  Please let us know if you have any questions. Thanks again!    Freedom    ----- Message -----  From: Ritchie Millan MD  Sent: 5/11/2020   8:13 AM CDT  To: Felipe Hernandes MD, Britni Marshall, RN, #    Thanks for the reply.  So I can also keep my eye open, what kind of insurance do they need?    ----- Message -----  From: Felipe Hernandes MD  Sent: 5/6/2020   1:54 PM CDT  To: Britni Marshall, RUDY, #    Ritchie,    We would be happy to look at it but have not had the opportunity to do the combo.  I expect a sleeve but not a bypass.  We have few patients in this state that have insurance for weight loss surgery but can look into that on a case by case basis.  I have copied our bariatric coordinators on this note and you could send anyone interested to them or us so we can evaluate  them.  ----- Message -----  From: Juanpablo Brooke MD  Sent: 5/6/2020  12:34 PM CDT  To: Felipe Hernandes MD, #        ----- Message -----  From: Ritchie Millan MD  Sent: 5/4/2020   8:18 AM CDT  To: Juanpablo Brooke MD    Thank you.  Another thing I'd like to talk to you about is if you have any interest in doing bariatric surgery on some of our endometrial cancer patients at the time of hysterectomy.  We did a case in fellowship, and I'd be interested in exploring it here if you or your colleagues are interested.  See below.     [email or web address].ncbi.nlm.nih.gov/pubmed/18687556      ----- Message -----  From: Juanpablo Brooke MD  Sent: 5/3/2020   3:31 AM CDT  To: Ritchie Millan MD    I'm available most days. Have clinic on Tuesdays and OR on Wednesdays, but will likely be slow for the next few weeks on Wednesdays. I will be out the first week in June.  Edwardo  ----- Message -----  From: Ritchie Millan MD  Sent: 4/22/2020   9:35 AM CDT  To: MD Dr. Edwardo Zaldivar, I just revisited with Ms. Peña.  She thinks that her hernia is bothering her more.  She attributes it to the 20-30 lbs she has lost since February from being admitted in and out of the hospital.  She is seeing a dietician and also her PCP.      The mass is going to have to come out at some point and we can try to optimize her weight and blood sugars for another 2 months.  CRS has seen her and will be available if she needs a bowel resection.    I think we will have to coordinate a day when you are available.  It may have to go sooner than June if her clinical picture worsens.    Repairing the hernia even if it's just a skin-to-skin closure is outside my scope of practice, so I would want you or one of your colleagues to do that.    Let me know your thoughts.  Thank you.

## 2020-06-05 ENCOUNTER — PATIENT OUTREACH (OUTPATIENT)
Dept: ADMINISTRATIVE | Facility: HOSPITAL | Age: 30
End: 2020-06-05

## 2020-06-05 DIAGNOSIS — E11.65 TYPE 2 DIABETES MELLITUS WITH HYPERGLYCEMIA, WITHOUT LONG-TERM CURRENT USE OF INSULIN: Primary | ICD-10-CM

## 2020-06-06 ENCOUNTER — HOSPITAL ENCOUNTER (OUTPATIENT)
Dept: RADIOLOGY | Facility: HOSPITAL | Age: 30
Discharge: HOME | End: 2020-06-06
Attending: OBSTETRICS & GYNECOLOGY
Payer: COMMERCIAL

## 2020-06-06 DIAGNOSIS — N94.89 ADNEXAL MASS: ICD-10-CM

## 2020-06-06 DIAGNOSIS — N70.93 TOA (TUBO-OVARIAN ABSCESS): ICD-10-CM

## 2020-06-06 PROCEDURE — 72197 MRI PELVIS W/O & W/DYE: CPT | Mod: 26,,, | Performed by: RADIOLOGY

## 2020-06-06 PROCEDURE — A9585 GADOBUTROL INJECTION: HCPCS | Performed by: OBSTETRICS & GYNECOLOGY

## 2020-06-06 PROCEDURE — 72197 MRI PELVIS W/O & W/DYE: CPT | Mod: TC

## 2020-06-06 PROCEDURE — 72197 MRI PELVIS W WO CONTRAST: ICD-10-PCS | Mod: 26,,, | Performed by: RADIOLOGY

## 2020-06-06 PROCEDURE — 25500020 PHARM REV CODE 255: Performed by: OBSTETRICS & GYNECOLOGY

## 2020-06-06 RX ORDER — GADOBUTROL 604.72 MG/ML
10 INJECTION INTRAVENOUS
Status: COMPLETED | OUTPATIENT
Start: 2020-06-06 | End: 2020-06-06

## 2020-06-06 RX ADMIN — GADOBUTROL 10 ML: 604.72 INJECTION INTRAVENOUS at 10:06

## 2020-06-08 ENCOUNTER — TELEPHONE (OUTPATIENT)
Dept: GYNECOLOGIC ONCOLOGY | Facility: CLINIC | Age: 30
End: 2020-06-08

## 2020-06-08 ENCOUNTER — OFFICE VISIT (OUTPATIENT)
Dept: GYNECOLOGIC ONCOLOGY | Facility: CLINIC | Age: 30
End: 2020-06-08
Payer: COMMERCIAL

## 2020-06-08 VITALS
SYSTOLIC BLOOD PRESSURE: 121 MMHG | HEART RATE: 97 BPM | HEIGHT: 62 IN | BODY MASS INDEX: 46.9 KG/M2 | WEIGHT: 254.88 LBS | DIASTOLIC BLOOD PRESSURE: 62 MMHG

## 2020-06-08 DIAGNOSIS — Z87.42 HISTORY OF PCOS: ICD-10-CM

## 2020-06-08 DIAGNOSIS — E11.65 TYPE 2 DIABETES MELLITUS WITH HYPERGLYCEMIA, WITHOUT LONG-TERM CURRENT USE OF INSULIN: ICD-10-CM

## 2020-06-08 DIAGNOSIS — K43.2 VENTRAL INCISIONAL HERNIA WITHOUT OBSTRUCTION OR GANGRENE: ICD-10-CM

## 2020-06-08 DIAGNOSIS — N94.89 ADNEXAL MASS: Primary | ICD-10-CM

## 2020-06-08 DIAGNOSIS — E66.01 MORBID OBESITY WITH BMI OF 45.0-49.9, ADULT: ICD-10-CM

## 2020-06-08 PROCEDURE — 3074F PR MOST RECENT SYSTOLIC BLOOD PRESSURE < 130 MM HG: ICD-10-PCS | Mod: CPTII,S$GLB,, | Performed by: OBSTETRICS & GYNECOLOGY

## 2020-06-08 PROCEDURE — 3074F SYST BP LT 130 MM HG: CPT | Mod: CPTII,S$GLB,, | Performed by: OBSTETRICS & GYNECOLOGY

## 2020-06-08 PROCEDURE — 2024F PR 7 FIELD PHOTOS WITH INTERP/ REVIEW: ICD-10-PCS | Mod: S$GLB,,, | Performed by: OBSTETRICS & GYNECOLOGY

## 2020-06-08 PROCEDURE — 99215 OFFICE O/P EST HI 40 MIN: CPT | Mod: S$GLB,,, | Performed by: OBSTETRICS & GYNECOLOGY

## 2020-06-08 PROCEDURE — 99999 PR PBB SHADOW E&M-EST. PATIENT-LVL III: ICD-10-PCS | Mod: PBBFAC,,, | Performed by: OBSTETRICS & GYNECOLOGY

## 2020-06-08 PROCEDURE — 2024F 7 FLD RTA PHOTO EVC RTNOPTHY: CPT | Mod: S$GLB,,, | Performed by: OBSTETRICS & GYNECOLOGY

## 2020-06-08 PROCEDURE — 3008F PR BODY MASS INDEX (BMI) DOCUMENTED: ICD-10-PCS | Mod: CPTII,S$GLB,, | Performed by: OBSTETRICS & GYNECOLOGY

## 2020-06-08 PROCEDURE — 99215 PR OFFICE/OUTPT VISIT, EST, LEVL V, 40-54 MIN: ICD-10-PCS | Mod: S$GLB,,, | Performed by: OBSTETRICS & GYNECOLOGY

## 2020-06-08 PROCEDURE — 3046F HEMOGLOBIN A1C LEVEL >9.0%: CPT | Mod: CPTII,S$GLB,, | Performed by: OBSTETRICS & GYNECOLOGY

## 2020-06-08 PROCEDURE — 3078F PR MOST RECENT DIASTOLIC BLOOD PRESSURE < 80 MM HG: ICD-10-PCS | Mod: CPTII,S$GLB,, | Performed by: OBSTETRICS & GYNECOLOGY

## 2020-06-08 PROCEDURE — 3046F PR MOST RECENT HEMOGLOBIN A1C LEVEL > 9.0%: ICD-10-PCS | Mod: CPTII,S$GLB,, | Performed by: OBSTETRICS & GYNECOLOGY

## 2020-06-08 PROCEDURE — 99999 PR PBB SHADOW E&M-EST. PATIENT-LVL III: CPT | Mod: PBBFAC,,, | Performed by: OBSTETRICS & GYNECOLOGY

## 2020-06-08 PROCEDURE — 3078F DIAST BP <80 MM HG: CPT | Mod: CPTII,S$GLB,, | Performed by: OBSTETRICS & GYNECOLOGY

## 2020-06-08 PROCEDURE — 3008F BODY MASS INDEX DOCD: CPT | Mod: CPTII,S$GLB,, | Performed by: OBSTETRICS & GYNECOLOGY

## 2020-06-08 RX ORDER — DOXYCYCLINE HYCLATE 100 MG
100 TABLET ORAL DAILY
Status: ON HOLD | COMMUNITY
Start: 2020-05-21 | End: 2020-06-24 | Stop reason: HOSPADM

## 2020-06-08 NOTE — TELEPHONE ENCOUNTER
----- Message from Andry Dominguez sent at 6/8/2020  2:15 PM CDT -----  Contact: Patient  Patient called in and and wanted to speak with the nurse at the office regarding a personal matter. She would like a call back from the office and can be reached at    [phone]

## 2020-06-08 NOTE — PROGRESS NOTES
"REFERRING PROVIDER  No ref. provider found     HISTORY OF PRESENT CONDITION  CC: chronic TOA  Glenda Peña is a 29 y.o. G0 who presents for follow-up regarding "chronic TOA."  Below is a summary of her history.  The interval history is as follows: Tolerating PO. -N/V. Ambulating. +Flatus. +BM.  Has pain which is localized to RLQ. Once every 2 weeks.  Denies alleviating or aggravating factors.  -LLQ pain.    1. 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  She didn't require staging or adjuvant CT and is still alive.  2. 1/27/12: Perforated appendicitis.  Operative laparoscopy converted to an Ex-lap and appendectomy.  3. 8/21/16: Lap ventral hernia repair with a 25 x 15 cm Strattice mesh  4. 4/23/18: Evaluated by General Surgery during an admission and thought to have sigmoid colitis.  Discussed a sigmoid resection with an end colostomy once the patient lost weight.  5.  2/17-2/29: Admitted to [facility] for abdominal pain and suspicion for a L TOA.  She had it IR drained and was discharged from the hospital with PO Amoxicillin.  6. 2/17/20: CT A/P: 10.4 x 7.9 x 6.8 cm  7. 2/18/20: IR guided drainage. Gm + bacilli resembling diphteroids. Actinomyces without sensitivities  8. 2/19/20: A1c = 8.9  9. 2/26/20: CT A/P: Improved TOA but new fluid collections in the hernia sac.  10. 3/5-12/20: Admitted to [facility] for abdominal pain and persistent L TOA.  IR attempted to drain it on 3/6/20 but was unsuccesful. She was discharged on IV unasyn.   11. 3/27/20: CT A/P with increased size in TOA.  12. 3/29-4/2/20: Admitted for RLQ pain.  13. 3/30: IR guided drainage of 65 cc.  Aerobic, anaerobic, and fungal cultures were negative.  Negative cytology.  14. 4/2-4/6: COVID-19 admission  15. 4/3: Pigtail drain removed  16. 4/21: CT A/P: Complex fluid collection adjacent to the uterus measuring 9.8 by 5.3 cm, previously measuring 8.9 x " 4.3 cm  17. 5/5: Completed course of IV antibiotics  18. 5/5-: Amoxicillin 500mg PO q8h  19. 6/6: MRI Pelvis w/ w/o: 11 cm complex R adnexal mass.  No evidence of lymphadenopathy.       REVIEW OF SYSTEMS  All systems reviewed and negative except as noted in HPI.    OBJECTIVE   Vitals:    06/08/20 0853   BP: 121/62   Pulse: 97      Body mass index is 46.61 kg/m².      1. General: Well appearing, no apparent distress, alert and oriented.  2. Lymph: Neck symmetric without cervical or supraclavicular adenopathy or mass.  3. Lungs: Normal respiratory rate, no accessory muscle use.  4. Cardiac: Normal rate  5. GI: soft, NT/ND, no guarding or rebound tenderness large ventral hernia  6. : limited by body habitus; no CMT  7. Extremities: No erythema or tenderness  8. Psych: Normal affect.    ECOG status: 1    LABORATORY DATA  Lab data reviewed.    RADIOLOGICAL DATA  Radiology data reviewed.    PATHOLOGY DATA  Pathology data reviewed.    ASSESSMENT / PLAN     1. Adnexal mass    2. Ventral incisional hernia without obstruction or gangrene    3. Type 2 diabetes mellitus with hyperglycemia, without long-term current use of insulin    4. History of PCOS    5. Morbid obesity with BMI of 45.0-49.9, adult      F/U operative and pathology report from 2008  F/U ID  F/U partners about surgery vs observation  F/U A1c  Encouraged further weight loss (she said she was longer benefiting from Faye's visits, and I told her to reconsider)  Continue with OCPs  Continued with PO antibiotics    My biggest concern moving forward is ruling out a malignancy.  I will review her operative and pathology report from 2008.  This is been present since at least February of 2020.  A malignancy would have interval growth.  We personally reviewed her MRI pelvis with and without contrast.  She has 3-4 cyst.  Has the same echogenicity as her bladder.  There is some septations.  It does not involve the sigmoid colon.  There is no pelvic lymphadenopathy.   Cytology from March was negative.  This is a very complex picture.  She is clinically better.  I will discuss the case with my partners.  A planned surgery is better than a emergent surgery, however I do not think that surgery is clinically indicated at this time.  This picture to me seems most consistent with PCOS.  I think her pain is from follicular rupture with superimposed infection.  I gave her strong precautions Patient voiced understanding.  All questions were answered.    I will touch base with Dr. Gonzalez and Edwardo after discussing the case with my partners.     CRS:  Dr. Gonzalez saw the patient and will follow-up on her OSH colonoscopy to rule out diverticulitis.  She didn't have a repeat colonoscopy so I assume that her outside colonoscopy was normal.  She has been consented for a bowel resection and we will bowel prep her before surgery: Miralax/Dulcolax with Nicki.  She said to contact her nurse, and they will provide instructions to the patient.  General Surgery: Will be available at the time of surgery to assess her hernia      PATIENT EDUCATION  Ready to learn, no apparent learning barriers were identified; learning preferences include listening.  Explained diagnosis and treatment plan; patient expressed understanding of the content.    INFORMED CONSENT  Discussed the risks, benefits, and alternatives of the procedure and of possible blood transfusion.  Discussed the necessity of other members of the healthcare team participating in the procedure.  All questions answered and consent given.    Ritchie Millan

## 2020-06-08 NOTE — Clinical Note
Things are stable.  Complex picture.  I am going to discuss her with my partners and come to a consensus opinion.  Will send updates.  Thanks.

## 2020-06-10 ENCOUNTER — DOCUMENTATION ONLY (OUTPATIENT)
Dept: GYNECOLOGIC ONCOLOGY | Facility: CLINIC | Age: 30
End: 2020-06-10

## 2020-06-10 DIAGNOSIS — N94.89 ADNEXAL MASS: Primary | ICD-10-CM

## 2020-06-10 NOTE — PROGRESS NOTES
Case was discussed with my partners.  We recommend observation until 1) she increases her chances of a successful abdominal closer, 2) the mass significantly increases in size, or 3) her pain significantly worsens.  She should F/U with ID for completion of antibiotics.  I will see her in 3 months for repeat MRI Pelvis W/.  I encouraged as much weight loss as possible and improved glycemic control.  This was communicated to General Surgery, CRS, and Dietician.  She voiced understanding.  All questions were answered.

## 2020-06-15 ENCOUNTER — LAB VISIT (OUTPATIENT)
Dept: LAB | Facility: HOSPITAL | Age: 30
End: 2020-06-15
Attending: FAMILY MEDICINE
Payer: COMMERCIAL

## 2020-06-15 DIAGNOSIS — E11.65 TYPE 2 DIABETES MELLITUS WITH HYPERGLYCEMIA, WITHOUT LONG-TERM CURRENT USE OF INSULIN: ICD-10-CM

## 2020-06-15 DIAGNOSIS — E78.5 HYPERLIPIDEMIA ASSOCIATED WITH TYPE 2 DIABETES MELLITUS: ICD-10-CM

## 2020-06-15 DIAGNOSIS — E11.69 HYPERLIPIDEMIA ASSOCIATED WITH TYPE 2 DIABETES MELLITUS: ICD-10-CM

## 2020-06-15 DIAGNOSIS — E11.9 TYPE 2 DIABETES MELLITUS WITHOUT COMPLICATION, WITHOUT LONG-TERM CURRENT USE OF INSULIN: ICD-10-CM

## 2020-06-15 DIAGNOSIS — R80.9 PROTEINURIA, UNSPECIFIED TYPE: ICD-10-CM

## 2020-06-15 LAB
CHOLEST SERPL-MCNC: 186 MG/DL (ref 120–199)
CHOLEST/HDLC SERPL: 5.2 {RATIO} (ref 2–5)
HDLC SERPL-MCNC: 36 MG/DL (ref 40–75)
HDLC SERPL: 19.4 % (ref 20–50)
LDLC SERPL CALC-MCNC: 70 MG/DL (ref 63–159)
NONHDLC SERPL-MCNC: 150 MG/DL
TRIGL SERPL-MCNC: 400 MG/DL (ref 30–150)

## 2020-06-15 PROCEDURE — 83036 HEMOGLOBIN GLYCOSYLATED A1C: CPT

## 2020-06-15 PROCEDURE — 82043 UR ALBUMIN QUANTITATIVE: CPT

## 2020-06-15 PROCEDURE — 80061 LIPID PANEL: CPT

## 2020-06-15 PROCEDURE — 36415 COLL VENOUS BLD VENIPUNCTURE: CPT | Mod: PN

## 2020-06-16 LAB
ALBUMIN/CREAT UR: 363.9 UG/MG (ref 0–30)
CREAT UR-MCNC: 122 MG/DL (ref 15–325)
ESTIMATED AVG GLUCOSE: 169 MG/DL (ref 68–131)
HBA1C MFR BLD HPLC: 7.5 % (ref 4–5.6)
MICROALBUMIN UR DL<=1MG/L-MCNC: 444 UG/ML

## 2020-06-19 ENCOUNTER — OFFICE VISIT (OUTPATIENT)
Dept: FAMILY MEDICINE | Facility: CLINIC | Age: 30
End: 2020-06-19
Payer: COMMERCIAL

## 2020-06-19 VITALS
OXYGEN SATURATION: 97 % | HEART RATE: 97 BPM | WEIGHT: 259.69 LBS | BODY MASS INDEX: 47.79 KG/M2 | RESPIRATION RATE: 17 BRPM | TEMPERATURE: 98 F | DIASTOLIC BLOOD PRESSURE: 91 MMHG | HEIGHT: 62 IN | SYSTOLIC BLOOD PRESSURE: 143 MMHG

## 2020-06-19 DIAGNOSIS — E66.01 MORBID OBESITY WITH BMI OF 45.0-49.9, ADULT: ICD-10-CM

## 2020-06-19 DIAGNOSIS — E11.65 TYPE 2 DIABETES MELLITUS WITH HYPERGLYCEMIA, WITHOUT LONG-TERM CURRENT USE OF INSULIN: ICD-10-CM

## 2020-06-19 DIAGNOSIS — I10 ESSENTIAL HYPERTENSION: ICD-10-CM

## 2020-06-19 DIAGNOSIS — E11.69 HYPERLIPIDEMIA ASSOCIATED WITH TYPE 2 DIABETES MELLITUS: Primary | ICD-10-CM

## 2020-06-19 DIAGNOSIS — E78.5 HYPERLIPIDEMIA ASSOCIATED WITH TYPE 2 DIABETES MELLITUS: Primary | ICD-10-CM

## 2020-06-19 DIAGNOSIS — E11.65 TYPE 2 DIABETES MELLITUS WITH HYPERGLYCEMIA, UNSPECIFIED WHETHER LONG TERM INSULIN USE: ICD-10-CM

## 2020-06-19 PROCEDURE — 3008F BODY MASS INDEX DOCD: CPT | Mod: CPTII,S$GLB,, | Performed by: FAMILY MEDICINE

## 2020-06-19 PROCEDURE — 3008F PR BODY MASS INDEX (BMI) DOCUMENTED: ICD-10-PCS | Mod: CPTII,S$GLB,, | Performed by: FAMILY MEDICINE

## 2020-06-19 PROCEDURE — 3077F PR MOST RECENT SYSTOLIC BLOOD PRESSURE >= 140 MM HG: ICD-10-PCS | Mod: CPTII,S$GLB,, | Performed by: FAMILY MEDICINE

## 2020-06-19 PROCEDURE — 2024F 7 FLD RTA PHOTO EVC RTNOPTHY: CPT | Mod: S$GLB,,, | Performed by: FAMILY MEDICINE

## 2020-06-19 PROCEDURE — 3051F HG A1C>EQUAL 7.0%<8.0%: CPT | Mod: CPTII,S$GLB,, | Performed by: FAMILY MEDICINE

## 2020-06-19 PROCEDURE — 2024F PR 7 FIELD PHOTOS WITH INTERP/ REVIEW: ICD-10-PCS | Mod: S$GLB,,, | Performed by: FAMILY MEDICINE

## 2020-06-19 PROCEDURE — 3080F PR MOST RECENT DIASTOLIC BLOOD PRESSURE >= 90 MM HG: ICD-10-PCS | Mod: CPTII,S$GLB,, | Performed by: FAMILY MEDICINE

## 2020-06-19 PROCEDURE — 3077F SYST BP >= 140 MM HG: CPT | Mod: CPTII,S$GLB,, | Performed by: FAMILY MEDICINE

## 2020-06-19 PROCEDURE — 99999 PR PBB SHADOW E&M-EST. PATIENT-LVL V: ICD-10-PCS | Mod: PBBFAC,,, | Performed by: FAMILY MEDICINE

## 2020-06-19 PROCEDURE — 3051F PR MOST RECENT HEMOGLOBIN A1C LEVEL 7.0 - < 8.0%: ICD-10-PCS | Mod: CPTII,S$GLB,, | Performed by: FAMILY MEDICINE

## 2020-06-19 PROCEDURE — 99214 OFFICE O/P EST MOD 30 MIN: CPT | Mod: S$GLB,,, | Performed by: FAMILY MEDICINE

## 2020-06-19 PROCEDURE — 99999 PR PBB SHADOW E&M-EST. PATIENT-LVL V: CPT | Mod: PBBFAC,,, | Performed by: FAMILY MEDICINE

## 2020-06-19 PROCEDURE — 3080F DIAST BP >= 90 MM HG: CPT | Mod: CPTII,S$GLB,, | Performed by: FAMILY MEDICINE

## 2020-06-19 PROCEDURE — 99214 PR OFFICE/OUTPT VISIT, EST, LEVL IV, 30-39 MIN: ICD-10-PCS | Mod: S$GLB,,, | Performed by: FAMILY MEDICINE

## 2020-06-19 NOTE — PROGRESS NOTES
Routine Office Visit    Patient Name: Glenda Peña    : 1990  MRN: [id number]    Subjective:  Glenda is a 29 y.o. female who presents today for:    1. Type 2 DM  Patient following up for type 2.  a1c is down to 7.5.  She states she has been taking medications as prescribed.  She states she is craving salty/spicy foods.  She is doing well with carbs, but not with portion control.  Her triglycerides are lower, but still elevated.  She has not been taking fish oil as instructed    Past Medical History  Past Medical History:   Diagnosis Date    Asthma childhood    Diabetes mellitus     GERD (gastroesophageal reflux disease)     Morbid obesity     PCOS (polycystic ovarian syndrome)        Past Surgical History  Past Surgical History:   Procedure Laterality Date    APPENDECTOMY  2011    HERNIA REPAIR      OOPHORECTOMY      salpingoopherectomy Right 2008       Family History  Family History   Problem Relation Age of Onset    Heart attack Father     Hypertension Father     Diabetes Mother     Heart attack Mother     Breast cancer Neg Hx     Colon cancer Neg Hx     Ovarian cancer Neg Hx        Social History  Social History     Socioeconomic History    Marital status:      Spouse name: Not on file    Number of children: Not on file    Years of education: Not on file    Highest education level: Not on file   Occupational History    Not on file   Social Needs    Financial resource strain: Not on file    Food insecurity     Worry: Not on file     Inability: Not on file    Transportation needs     Medical: Not on file     Non-medical: Not on file   Tobacco Use    Smoking status: Never Smoker    Smokeless tobacco: Never Used   Substance and Sexual Activity    Alcohol use: Not Currently     Comment: occasionally apple cider    Drug use: No    Sexual activity: Yes     Partners: Female   Lifestyle    Physical activity     Days per week: Not on file     Minutes per session: Not on file     Stress: Not on file   Relationships    Social connections     Talks on phone: Not on file     Gets together: Not on file     Attends [employer] service: Not on file     Active member of club or organization: Not on file     Attends meetings of clubs or organizations: Not on file     Relationship status: Not on file   Other Topics Concern    Not on file   Social History Narrative    Not on file       Current Medications  Current Outpatient Medications on File Prior to Visit   Medication Sig Dispense Refill    acetaminophen (TYLENOL) 325 MG tablet Take 2 tablets (650 mg total) by mouth every 6 (six) hours. 180 tablet 0    albuterol (VENTOLIN HFA) 90 mcg/actuation inhaler Inhale 2 puffs into the lungs every 6 (six) hours as needed for Wheezing. Rescue 18 g 3    doxycycline (VIBRA-TABS) 100 MG tablet Take 100 mg by mouth once daily.      dulaglutide (TRULICITY) 1.5 mg/0.5 mL PnIj Inject 1.5 mg into the skin every 7 days. 2 mL 3    glipiZIDE (GLUCOTROL) 10 MG TR24 Take 1 tablet (10 mg total) by mouth daily with breakfast. 30 tablet 11    lisinopril (PRINIVIL,ZESTRIL) 2.5 MG tablet Take 1 tablet (2.5 mg total) by mouth once daily. 90 tablet 1    metFORMIN (GLUCOPHAGE) 1000 MG tablet Take 2,000 mg by mouth.      norethindrone-ethinyl estradiol (MICROGESTIN 1/20) 1-20 mg-mcg per tablet Take 1 tablet by mouth once daily. 30 tablet 11    ondansetron (ZOFRAN) 4 MG tablet Take 1 tablet (4 mg total) by mouth every 6 (six) hours as needed for Nausea. 18 tablet 0    oxyCODONE (ROXICODONE) 5 MG immediate release tablet Take 1 tablet (5 mg total) by mouth every 4 (four) hours as needed for Pain. (Patient not taking: Reported on 6/8/2020) 30 tablet 0    pantoprazole (PROTONIX) 40 MG tablet Take 1 tablet (40 mg total) by mouth before breakfast. 60 tablet 2    polyethylene glycol (GLYCOLAX) 17 gram PwPk Take 17 g by mouth 2 (two) times daily as needed. (Patient not taking: Reported on 6/8/2020) 60 packet 0     "promethazine (PHENERGAN) 25 MG tablet Take 1 tablet (25 mg total) by mouth every 4 (four) hours. (Patient not taking: Reported on 6/8/2020) 30 tablet 1    senna (SENOKOT) 8.6 mg tablet Take 1 tablet by mouth 2 (two) times daily. (Patient not taking: Reported on 6/8/2020)      sodium chloride 0.9% (NORMAL SALINE FLUSH) injection Inject 10 mLs into the vein every 6 (six) hours. (Patient not taking: Reported on 6/8/2020)       No current facility-administered medications on file prior to visit.        Allergies   Review of patient's allergies indicates:   Allergen Reactions    Latex, natural rubber Rash     Burning sensation       Review of Systems (Pertinent positives)  Review of Systems   Constitutional: Negative for weight loss.   HENT: Negative.    Eyes: Negative for blurred vision.   Respiratory: Negative.    Cardiovascular: Negative for chest pain.   Genitourinary: Negative.    Musculoskeletal: Negative.    Skin: Negative.    Neurological: Negative for dizziness, tremors, seizures, weakness and headaches.   Endo/Heme/Allergies: Negative for polydipsia.   Psychiatric/Behavioral: The patient is not nervous/anxious.          BP (!) 143/91 (BP Location: Left arm, Patient Position: Sitting, BP Method: Medium (Automatic))   Pulse 97   Temp 98 °F (36.7 °C) (Temporal)   Resp 17   Ht 5' 2.01" (1.575 m)   Wt 117.8 kg (259 lb 11.2 oz)   SpO2 97%   BMI 47.49 kg/m²     GENERAL APPEARANCE: in no apparent distress and well developed and well nourished  HEENT: PERRL, EOMI, Sclera clear, anicteric, Oropharynx clear, no lesions, Neck supple with midline trachea  NECK: normal, supple, no adenopathy, thyroid normal in size  RESPIRATORY: appears well, vitals normal, no respiratory distress, acyanotic, normal RR, chest clear, no wheezing, crepitations  HEART: regular rate and rhythm, S1, S2 normal, no murmur, click, rub or gallop.    ABDOMEN: abdomen is soft without tenderness, no masses, no hernias, no organomegaly, no " rebound, no guarding.   NEUROLOGIC: normal without focal findings, CN II-XII are intact.   Extremities: warm/well perfused.  No abnormal hair patterns.  No clubbing, cyanosis or edema.  no muscular tenderness noted  SKIN: no rashes, no wounds, no other lesions  PSYCH: Alert, oriented x 3, thought content appropriate, speech normal, pleasant and cooperative, good eye contact, well groomed    Assessment/Plan:  Glenda Peña is a 29 y.o. female who presents today for :    Glenda was seen today for follow-up.    Diagnoses and all orders for this visit:    Hyperlipidemia associated with type 2 diabetes mellitus  -     Hemoglobin A1C; Future    Essential hypertension    Morbid obesity with BMI of 45.0-49.9, adult  -     Comprehensive metabolic panel; Future  -     CBC auto differential; Future    Type 2 diabetes mellitus with hyperglycemia, without long-term current use of insulin  -     Comprehensive metabolic panel; Future  -     Lipid Panel; Future  -     Hemoglobin A1C; Future    Type 2 diabetes mellitus with hyperglycemia, unspecified whether long term insulin use  -     Comprehensive metabolic panel; Future  -     Lipid Panel; Future  -     Hemoglobin A1C; Future        1.  Labs to be done in 3 months to monitor that she is still well controlled  2.  Encouraged continued weight loss  3.  Follow up with surgery and OBGYN as scheduled  4.  Call with any concerns  5.  Encouraged cutting back on salt as she has been eating a lot of salty food and that is likely causing her blood pressure to go up      Emiliano Dunbar MD

## 2020-06-23 ENCOUNTER — HOSPITAL ENCOUNTER (OUTPATIENT)
Facility: HOSPITAL | Age: 30
Discharge: HOME | End: 2020-06-24
Attending: EMERGENCY MEDICINE | Admitting: EMERGENCY MEDICINE
Payer: COMMERCIAL

## 2020-06-23 DIAGNOSIS — R07.9 CHEST PAIN: Primary | ICD-10-CM

## 2020-06-23 LAB
ALBUMIN SERPL BCP-MCNC: 3.7 G/DL (ref 3.5–5.2)
ALP SERPL-CCNC: 71 U/L (ref 55–135)
ALT SERPL W/O P-5'-P-CCNC: 50 U/L (ref 10–44)
ANION GAP SERPL CALC-SCNC: 14 MMOL/L (ref 8–16)
AST SERPL-CCNC: 33 U/L (ref 10–40)
BASOPHILS # BLD AUTO: 0.02 K/UL (ref 0–0.2)
BASOPHILS NFR BLD: 0.3 % (ref 0–1.9)
BILIRUB SERPL-MCNC: 0.2 MG/DL (ref 0.1–1)
BNP SERPL-MCNC: <10 PG/ML (ref 0–99)
BUN SERPL-MCNC: 12 MG/DL (ref 6–20)
CALCIUM SERPL-MCNC: 9.9 MG/DL (ref 8.7–10.5)
CHLORIDE SERPL-SCNC: 101 MMOL/L (ref 95–110)
CO2 SERPL-SCNC: 21 MMOL/L (ref 23–29)
CREAT SERPL-MCNC: 0.8 MG/DL (ref 0.5–1.4)
D DIMER PPP IA.FEU-MCNC: 0.37 MG/L FEU
DIFFERENTIAL METHOD: ABNORMAL
EOSINOPHIL # BLD AUTO: 0.1 K/UL (ref 0–0.5)
EOSINOPHIL NFR BLD: 1.1 % (ref 0–8)
ERYTHROCYTE [DISTWIDTH] IN BLOOD BY AUTOMATED COUNT: 14.3 % (ref 11.5–14.5)
EST. GFR  (AFRICAN AMERICAN): >60 ML/MIN/1.73 M^2
EST. GFR  (NON AFRICAN AMERICAN): >60 ML/MIN/1.73 M^2
GLUCOSE SERPL-MCNC: 128 MG/DL (ref 70–110)
GLUCOSE SERPL-MCNC: 162 MG/DL (ref 70–110)
HCT VFR BLD AUTO: 39.2 % (ref 37–48.5)
HGB BLD-MCNC: 12.3 G/DL (ref 12–16)
IMM GRANULOCYTES # BLD AUTO: 0.02 K/UL (ref 0–0.04)
IMM GRANULOCYTES NFR BLD AUTO: 0.3 % (ref 0–0.5)
LYMPHOCYTES # BLD AUTO: 2.6 K/UL (ref 1–4.8)
LYMPHOCYTES NFR BLD: 36.1 % (ref 18–48)
MCH RBC QN AUTO: 26.4 PG (ref 27–31)
MCHC RBC AUTO-ENTMCNC: 31.4 G/DL (ref 32–36)
MCV RBC AUTO: 84 FL (ref 82–98)
MONOCYTES # BLD AUTO: 0.4 K/UL (ref 0.3–1)
MONOCYTES NFR BLD: 5.3 % (ref 4–15)
NEUTROPHILS # BLD AUTO: 4.1 K/UL (ref 1.8–7.7)
NEUTROPHILS NFR BLD: 56.9 % (ref 38–73)
NRBC BLD-RTO: 0 /100 WBC
PLATELET # BLD AUTO: 248 K/UL (ref 150–350)
PMV BLD AUTO: 10.6 FL (ref 9.2–12.9)
POCT GLUCOSE: 139 MG/DL (ref 70–110)
POTASSIUM SERPL-SCNC: 4 MMOL/L (ref 3.5–5.1)
PROT SERPL-MCNC: 8.8 G/DL (ref 6–8.4)
RBC # BLD AUTO: 4.66 M/UL (ref 4–5.4)
SODIUM SERPL-SCNC: 136 MMOL/L (ref 136–145)
TROPONIN I SERPL DL<=0.01 NG/ML-MCNC: 0.02 NG/ML (ref 0–0.03)
TROPONIN I SERPL DL<=0.01 NG/ML-MCNC: <0.006 NG/ML (ref 0–0.03)
WBC # BLD AUTO: 7.22 K/UL (ref 3.9–12.7)

## 2020-06-23 PROCEDURE — 85379 FIBRIN DEGRADATION QUANT: CPT

## 2020-06-23 PROCEDURE — 63600175 PHARM REV CODE 636 W HCPCS: Performed by: EMERGENCY MEDICINE

## 2020-06-23 PROCEDURE — 96376 TX/PRO/DX INJ SAME DRUG ADON: CPT

## 2020-06-23 PROCEDURE — 83880 ASSAY OF NATRIURETIC PEPTIDE: CPT

## 2020-06-23 PROCEDURE — 84484 ASSAY OF TROPONIN QUANT: CPT

## 2020-06-23 PROCEDURE — 93010 ELECTROCARDIOGRAM REPORT: CPT | Mod: ,,, | Performed by: INTERNAL MEDICINE

## 2020-06-23 PROCEDURE — 93005 ELECTROCARDIOGRAM TRACING: CPT

## 2020-06-23 PROCEDURE — 99220 PR INITIAL OBSERVATION CARE,LEVL III: CPT | Mod: ,,, | Performed by: HOSPITALIST

## 2020-06-23 PROCEDURE — G0378 HOSPITAL OBSERVATION PER HR: HCPCS

## 2020-06-23 PROCEDURE — 63600175 PHARM REV CODE 636 W HCPCS: Performed by: PHYSICIAN ASSISTANT

## 2020-06-23 PROCEDURE — 25000003 PHARM REV CODE 250: Performed by: EMERGENCY MEDICINE

## 2020-06-23 PROCEDURE — 99220 PR INITIAL OBSERVATION CARE,LEVL III: ICD-10-PCS | Mod: ,,, | Performed by: HOSPITALIST

## 2020-06-23 PROCEDURE — 99285 PR EMERGENCY DEPT VISIT,LEVEL V: ICD-10-PCS | Mod: ,,, | Performed by: EMERGENCY MEDICINE

## 2020-06-23 PROCEDURE — 93010 EKG 12-LEAD: ICD-10-PCS | Mod: ,,, | Performed by: INTERNAL MEDICINE

## 2020-06-23 PROCEDURE — 99285 EMERGENCY DEPT VISIT HI MDM: CPT | Mod: ,,, | Performed by: EMERGENCY MEDICINE

## 2020-06-23 PROCEDURE — 85025 COMPLETE CBC W/AUTO DIFF WBC: CPT

## 2020-06-23 PROCEDURE — 25000242 PHARM REV CODE 250 ALT 637 W/ HCPCS: Performed by: EMERGENCY MEDICINE

## 2020-06-23 PROCEDURE — 96374 THER/PROPH/DIAG INJ IV PUSH: CPT

## 2020-06-23 PROCEDURE — 94761 N-INVAS EAR/PLS OXIMETRY MLT: CPT

## 2020-06-23 PROCEDURE — 80053 COMPREHEN METABOLIC PANEL: CPT

## 2020-06-23 PROCEDURE — 99285 EMERGENCY DEPT VISIT HI MDM: CPT | Mod: 25

## 2020-06-23 RX ORDER — PANTOPRAZOLE SODIUM 40 MG/1
40 TABLET, DELAYED RELEASE ORAL
Status: DISCONTINUED | OUTPATIENT
Start: 2020-06-24 | End: 2020-06-24 | Stop reason: HOSPADM

## 2020-06-23 RX ORDER — IBUPROFEN 200 MG
16 TABLET ORAL
Status: DISCONTINUED | OUTPATIENT
Start: 2020-06-23 | End: 2020-06-24 | Stop reason: HOSPADM

## 2020-06-23 RX ORDER — INSULIN ASPART 100 [IU]/ML
1-10 INJECTION, SOLUTION INTRAVENOUS; SUBCUTANEOUS
Status: DISCONTINUED | OUTPATIENT
Start: 2020-06-23 | End: 2020-06-24 | Stop reason: HOSPADM

## 2020-06-23 RX ORDER — IPRATROPIUM BROMIDE AND ALBUTEROL SULFATE 2.5; .5 MG/3ML; MG/3ML
3 SOLUTION RESPIRATORY (INHALATION) EVERY 6 HOURS PRN
Status: DISCONTINUED | OUTPATIENT
Start: 2020-06-23 | End: 2020-06-24 | Stop reason: HOSPADM

## 2020-06-23 RX ORDER — NITROGLYCERIN 0.4 MG/1
0.4 TABLET SUBLINGUAL
Status: COMPLETED | OUTPATIENT
Start: 2020-06-23 | End: 2020-06-23

## 2020-06-23 RX ORDER — LIDOCAINE 50 MG/G
1 PATCH TOPICAL
Status: DISCONTINUED | OUTPATIENT
Start: 2020-06-24 | End: 2020-06-24 | Stop reason: HOSPADM

## 2020-06-23 RX ORDER — IBUPROFEN 200 MG
24 TABLET ORAL
Status: DISCONTINUED | OUTPATIENT
Start: 2020-06-23 | End: 2020-06-24 | Stop reason: HOSPADM

## 2020-06-23 RX ORDER — ENOXAPARIN SODIUM 100 MG/ML
40 INJECTION SUBCUTANEOUS EVERY 12 HOURS
Status: DISCONTINUED | OUTPATIENT
Start: 2020-06-24 | End: 2020-06-24 | Stop reason: HOSPADM

## 2020-06-23 RX ORDER — ACETAMINOPHEN 325 MG/1
650 TABLET ORAL EVERY 4 HOURS PRN
Status: DISCONTINUED | OUTPATIENT
Start: 2020-06-23 | End: 2020-06-24 | Stop reason: HOSPADM

## 2020-06-23 RX ORDER — KETOROLAC TROMETHAMINE 30 MG/ML
15 INJECTION, SOLUTION INTRAMUSCULAR; INTRAVENOUS EVERY 6 HOURS PRN
Status: DISCONTINUED | OUTPATIENT
Start: 2020-06-23 | End: 2020-06-24 | Stop reason: HOSPADM

## 2020-06-23 RX ORDER — KETOROLAC TROMETHAMINE 30 MG/ML
15 INJECTION, SOLUTION INTRAMUSCULAR; INTRAVENOUS
Status: COMPLETED | OUTPATIENT
Start: 2020-06-23 | End: 2020-06-23

## 2020-06-23 RX ORDER — CYCLOBENZAPRINE HCL 5 MG
5 TABLET ORAL ONCE AS NEEDED
Status: DISCONTINUED | OUTPATIENT
Start: 2020-06-23 | End: 2020-06-23

## 2020-06-23 RX ORDER — METHOCARBAMOL 500 MG/1
500 TABLET, FILM COATED ORAL ONCE AS NEEDED
Status: DISCONTINUED | OUTPATIENT
Start: 2020-06-23 | End: 2020-06-24 | Stop reason: HOSPADM

## 2020-06-23 RX ORDER — PROCHLORPERAZINE EDISYLATE 5 MG/ML
5 INJECTION INTRAMUSCULAR; INTRAVENOUS EVERY 6 HOURS PRN
Status: DISCONTINUED | OUTPATIENT
Start: 2020-06-23 | End: 2020-06-24 | Stop reason: HOSPADM

## 2020-06-23 RX ORDER — ONDANSETRON 2 MG/ML
4 INJECTION INTRAMUSCULAR; INTRAVENOUS EVERY 8 HOURS PRN
Status: DISCONTINUED | OUTPATIENT
Start: 2020-06-23 | End: 2020-06-24 | Stop reason: HOSPADM

## 2020-06-23 RX ORDER — NITROGLYCERIN 0.4 MG/1
0.4 TABLET SUBLINGUAL
Status: DISCONTINUED | OUTPATIENT
Start: 2020-06-23 | End: 2020-06-23

## 2020-06-23 RX ORDER — SODIUM CHLORIDE 0.9 % (FLUSH) 0.9 %
10 SYRINGE (ML) INJECTION
Status: DISCONTINUED | OUTPATIENT
Start: 2020-06-23 | End: 2020-06-24 | Stop reason: HOSPADM

## 2020-06-23 RX ORDER — LISINOPRIL 2.5 MG/1
2.5 TABLET ORAL DAILY
Status: DISCONTINUED | OUTPATIENT
Start: 2020-06-24 | End: 2020-06-24 | Stop reason: HOSPADM

## 2020-06-23 RX ORDER — ASPIRIN 325 MG
325 TABLET ORAL
Status: COMPLETED | OUTPATIENT
Start: 2020-06-23 | End: 2020-06-23

## 2020-06-23 RX ORDER — TALC
6 POWDER (GRAM) TOPICAL NIGHTLY PRN
Status: DISCONTINUED | OUTPATIENT
Start: 2020-06-23 | End: 2020-06-24 | Stop reason: HOSPADM

## 2020-06-23 RX ORDER — GLUCAGON 1 MG
1 KIT INJECTION
Status: DISCONTINUED | OUTPATIENT
Start: 2020-06-23 | End: 2020-06-24 | Stop reason: HOSPADM

## 2020-06-23 RX ORDER — METHOCARBAMOL 500 MG/1
500 TABLET, FILM COATED ORAL ONCE AS NEEDED
Status: COMPLETED | OUTPATIENT
Start: 2020-06-24 | End: 2020-06-23

## 2020-06-23 RX ORDER — DOXYCYCLINE HYCLATE 100 MG
100 TABLET ORAL DAILY
Status: DISCONTINUED | OUTPATIENT
Start: 2020-06-24 | End: 2020-06-24

## 2020-06-23 RX ADMIN — ASPIRIN 325 MG ORAL TABLET 325 MG: 325 PILL ORAL at 03:06

## 2020-06-23 RX ADMIN — KETOROLAC TROMETHAMINE 15 MG: 30 INJECTION, SOLUTION INTRAMUSCULAR at 11:06

## 2020-06-23 RX ADMIN — LIDOCAINE 1 PATCH: 50 PATCH TOPICAL at 11:06

## 2020-06-23 RX ADMIN — METHOCARBAMOL TABLETS 500 MG: 500 TABLET, COATED ORAL at 11:06

## 2020-06-23 RX ADMIN — NITROGLYCERIN 0.4 MG: 0.4 TABLET SUBLINGUAL at 05:06

## 2020-06-23 RX ADMIN — KETOROLAC TROMETHAMINE 15 MG: 30 INJECTION, SOLUTION INTRAMUSCULAR at 04:06

## 2020-06-23 NOTE — ED PROVIDER NOTES
Encounter Date: 6/23/2020       History     Chief Complaint   Patient presents with    Chest Pain     started last night, pain to L arm , denies cardiac hx     Patient is a 29-year-old female with past medical history of asthma, diabetes, GERD, PCOS, morbid obesity who presents with chest pain that radiates to her left arm and jaw.  She states the pain started last night while at rest and describes the pain as a squeezing tightness in her chest.  She reports associated shortness of breath, lightheadedness.  She states at work today her pain persisted and she became clammy.  She reports nothing has made her feel better or worse.  She has never had a cardiac workup previously.  She is not a smoker.  She is on estrogen containing birth control pills. She states she has strong family history of heart attack at a young age including her father at age 41 and mother at age 51.  She denies history of clotting disorders in her family.        Review of patient's allergies indicates:   Allergen Reactions    Latex, natural rubber Rash     Burning sensation     Past Medical History:   Diagnosis Date    Asthma childhood    Diabetes mellitus     GERD (gastroesophageal reflux disease)     Morbid obesity     PCOS (polycystic ovarian syndrome)      Past Surgical History:   Procedure Laterality Date    APPENDECTOMY  2011    HERNIA REPAIR      OOPHORECTOMY      salpingoopherectomy Right 2008     Family History   Problem Relation Age of Onset    Heart attack Father     Hypertension Father     Diabetes Mother     Heart attack Mother     Breast cancer Neg Hx     Colon cancer Neg Hx     Ovarian cancer Neg Hx      Social History     Tobacco Use    Smoking status: Never Smoker    Smokeless tobacco: Never Used   Substance Use Topics    Alcohol use: Not Currently     Comment: occasionally apple cider    Drug use: No     Review of Systems  General: No fever.  No chills.  Eyes: No visual changes.  Head: No headache.     Integument: No rashes or lesions.  Chest: No cough  Cardiovascular: + chest pain.  Abdomen: No abdominal pain.  No nausea or vomiting.  Urinary: No abnormal urination.  Neurologic: No focal weakness.  No numbness.  Hematologic: No easy bruising.  Endocrine: No excessive thirst or urination.    Physical Exam     Initial Vitals [06/23/20 1407]   BP Pulse Resp Temp SpO2   (!) 153/93 108 18 98.6 °F (37 °C) 98 %      MAP       --         Physical Exam    Nursing note and vitals reviewed.  Constitutional: She appears well-developed and well-nourished. She is not diaphoretic. No distress.   HENT:   Head: Atraumatic.   Eyes: EOM are normal.   Neck: Normal range of motion. Neck supple.   Cardiovascular: Regular rhythm and intact distal pulses.   Mildly tachycardic   Pulmonary/Chest: No respiratory distress.   Abdominal: Soft.   Neurological: She is alert and oriented to person, place, and time. GCS score is 15. GCS eye subscore is 4. GCS verbal subscore is 5. GCS motor subscore is 6.   Skin: Skin is warm and dry.   Psychiatric: She has a normal mood and affect. Her behavior is normal. Judgment and thought content normal.         ED Course   Procedures  Labs Reviewed   CBC W/ AUTO DIFFERENTIAL - Abnormal; Notable for the following components:       Result Value    Mean Corpuscular Hemoglobin 26.4 (*)     Mean Corpuscular Hemoglobin Conc 31.4 (*)     All other components within normal limits   COMPREHENSIVE METABOLIC PANEL - Abnormal; Notable for the following components:    CO2 21 (*)     Glucose 162 (*)     Total Protein 8.8 (*)     ALT 50 (*)     All other components within normal limits   TROPONIN I   B-TYPE NATRIURETIC PEPTIDE   D DIMER, QUANTITATIVE   TROPONIN I   POCT GLUCOSE MONITORING CONTINUOUS     EKG Readings: (Independently Interpreted)   Initial Reading: No STEMI. Previous EKG: Compared with most recent EKG Previous EKG Date: 04/03/2020.   2:13 p.m. sinus tachycardia rate of 101 normal axis, mild ST elevation,  possibly early repolarization  Compared with most recent EKG no significant change     ECG Results          EKG 12-lead (Final result)  Result time 06/24/20 08:39:04    Final result by Addison, Lab [facility] (06/24/20 08:39:04)                 Narrative:    Test Reason : R07.9,    Vent. Rate : 101 BPM     Atrial Rate : 101 BPM     P-R Int : 130 ms          QRS Dur : 098 ms      QT Int : 344 ms       P-R-T Axes : 040 051 063 degrees     QTc Int : 446 ms    Sinus tachycardia  Otherwise normal ECG  When compared with ECG of 03-APR-2020 08:41,  No significant change was found  Confirmed by Tunde Carter MD ([id number]) on 6/24/2020 8:38:52 AM    Referred By: [email or web address]   SELF           Confirmed By:Tunde Carter MD                            Imaging Results          X-Ray Chest PA And Lateral (Final result)  Result time 06/23/20 15:34:04    Final result by Jayme Alvarado III, MD (06/23/20 15:34:04)                 Impression:      No acute process seen.      Electronically signed by: Jayme Alvarado MD  Date:    06/23/2020  Time:    15:34             Narrative:    EXAMINATION:  XR CHEST PA AND LATERAL    CLINICAL HISTORY:  Chest Pain;    FINDINGS:  Two views: Heart size is normal.  Lungs are clear.  The bones showed DJD.                                 Medical Decision Making:   History:   Old Medical Records: I decided to obtain old medical records.  Initial Assessment:   Chest pressure with risk factors for ACS  Is on estrogen BC will go ahead with ddimer  Differential Diagnosis:   ACS, PE, pneumonia, pneumothorax, chest wall pain, GERD  Clinical Tests:   Lab Tests: Ordered and Reviewed  Radiological Study: Ordered and Reviewed  Medical Tests: Ordered and Reviewed  ED Management:  Heart score of 4 with ongoing chest pressure-no improvement with toradol  Patient willing to try nitroglycerin   ddimer negative   Will admit for obs given strong family history and moderate risk score    Additional MDM:   Heart Score:     History:          Moderately suspicious.  ECG:             Nonspecific repolarisation disturbance  Age:               Less than 45 years  Risk factors: >= 3 risk factors or history of atherosclerotic disease  Troponin:       Less than or equal to normal limit  Final Score: 4                                  Clinical Impression:       ICD-10-CM ICD-9-CM   1. Chest pain  R07.9 786.50             ED Disposition Condition    Observation                           Bethany Pires MD  06/24/20 1011

## 2020-06-23 NOTE — PROVIDER PROGRESS NOTES - EMERGENCY DEPT.
Encounter Date: 6/23/2020    ED Physician Progress Notes           ED Attending Sign-out Progress Note:  Patient signed out to me at shift change by  *** to f/u ***      ED Clinical Impression:    ICD-10-CM ICD-9-CM   1. Chest pain  R07.9 786.50

## 2020-06-23 NOTE — H&P
"Hospital Medicine  History and Physical Exam    Team: Networked reference to record PCT  Cheng Nathan MD  Admit Date: 6/23/2020  Principal Problem:  Chest pain   Patient information was obtained from patient, past medical records and ER records.   Primary care Physician: Emiliano Dunbar MD  Code status: Full Code    HPI: 28 yo F with PMHx DM2, HTN, tuboovarian abscess on abx therapy, and morbid obesity who presents to the ED complaining of chest pain x1 day. The patient reports that she developed chest "fluttering" last night while sitting down that lasted about 5 minutes and was associated with SOB. She states that it was not a true pain but more like a feeling like "when you have butterflies in your stomach". The patient reports that the pain went away until this morning when around 1130 am when she again developed a similar feeling while sitting at work. She reports that the issue did not go away this time, however. She says that it changed into more of a pressure like pain in her chest with radiation to her neck and down her L shoulder & arm. She reports that the pain feels like "an elephant sitting on her chest". She states that the pain has been constant and has not subsided mch even by the time of my interview. She has never had pain like this before. She denies any associated nausea, lightheadedness, or diaphoresis. The patient reports a strong family hx of CAD with "massive" MIs in her mother at age 51 and in her father at age 42.    Hemoglobin A1C   Date Value Ref Range Status   06/15/2020 7.5 (H) 4.0 - 5.6 % Final     Comment:     ADA Screening Guidelines:  5.7-6.4%  Consistent with prediabetes  >or=6.5%  Consistent with diabetes  High levels of fetal hemoglobin interfere with the HbA1C  assay. Heterozygous hemoglobin variants (HbS, HgC, etc)do  not significantly interfere with this assay.   However, presence of multiple variants may affect accuracy.     03/05/2020 9.3 (H) 4.0 - 5.6 % Final     Comment: "     ADA Screening Guidelines:  5.7-6.4%  Consistent with prediabetes  >or=6.5%  Consistent with diabetes  High levels of fetal hemoglobin interfere with the HbA1C  assay. Heterozygous hemoglobin variants (HbS, HgC, etc)do  not significantly interfere with this assay.   However, presence of multiple variants may affect accuracy.     12/04/2019 8.5 (H) 4.0 - 5.6 % Final     Comment:     ADA Screening Guidelines:  5.7-6.4%  Consistent with prediabetes  >or=6.5%  Consistent with diabetes  High levels of fetal hemoglobin interfere with the HbA1C  assay. Heterozygous hemoglobin variants (HbS, HgC, etc)do  not significantly interfere with this assay.   However, presence of multiple variants may affect accuracy.         Past Medical History: Patient has a past medical history of Asthma (childhood), Diabetes mellitus, GERD (gastroesophageal reflux disease), Morbid obesity, and PCOS (polycystic ovarian syndrome).    Past Surgical History: Patient has a past surgical history that includes Appendectomy (2011); salpingoopherectomy (Right, 2008); Hernia repair; and Oophorectomy.    Social History: Patient reports that she has never smoked. She has never used smokeless tobacco. She reports previous alcohol use. She reports that she does not use drugs.    Family History: family history includes Diabetes in her mother; Heart attack in her father and mother; Hypertension in her father.    Medications: reviewed     Allergies: Patient is allergic to latex, natural rubber.    ROS  Pain Scale: 6 /10   Constitutional: no fever or chills  Respiratory: No cough, Positive for SOB  Cardiovascular: Positive for chest pain  Gastrointestinal: no nausea or vomiting, no abdominal pain or change in bowel habits  Genitourinary: no hematuria or dysuria  Integument/Breast: no rash or pruritis  Hematologic/Lymphatic: no easy bruising or lymphadenopathy  Musculoskeletal: no arthralgias or myalgias  Neurological: no seizures or tremors  Behavioral/Psych:  no depression or anxiety    PEx  Temp:  [98.6 °F (37 °C)]   Pulse:  [100-108]   Resp:  [18]   BP: (132-153)/(68-93)   SpO2:  [97 %-98 %]   Body mass index is 47.55 kg/m².   No intake or output data in the 24 hours ending 06/23/20 1815    General appearance: morbidly obese pt. In no acute distress  Mental status: Alert and oriented x 3  HEENT:  conjunctivae/corneas clear, PERRL  Neck: supple, thyroid not enlarged  Pulm:   normal respiratory effort, CTA B, no c/w/r  Card: RRR, S1, S2 normal, no murmur, click, rub or gallop  Abd: soft, NT, ND, BS present; no masses, no organomegaly  Ext: no c/c/e  Pulses: 2+, symmetric  Skin: color, texture, turgor normal. No rashes or lesions  Neuro: CN II-XII grossly intact, no focal numbness or weakness, normal strength and tone     Recent Results (from the past 24 hour(s))   CBC auto differential    Collection Time: 06/23/20  3:05 PM   Result Value Ref Range    WBC 7.22 3.90 - 12.70 K/uL    RBC 4.66 4.00 - 5.40 M/uL    Hemoglobin 12.3 12.0 - 16.0 g/dL    Hematocrit 39.2 37.0 - 48.5 %    Mean Corpuscular Volume 84 82 - 98 fL    Mean Corpuscular Hemoglobin 26.4 (L) 27.0 - 31.0 pg    Mean Corpuscular Hemoglobin Conc 31.4 (L) 32.0 - 36.0 g/dL    RDW 14.3 11.5 - 14.5 %    Platelets 248 150 - 350 K/uL    MPV 10.6 9.2 - 12.9 fL    Immature Granulocytes 0.3 0.0 - 0.5 %    Gran # (ANC) 4.1 1.8 - 7.7 K/uL    Immature Grans (Abs) 0.02 0.00 - 0.04 K/uL    Lymph # 2.6 1.0 - 4.8 K/uL    Mono # 0.4 0.3 - 1.0 K/uL    Eos # 0.1 0.0 - 0.5 K/uL    Baso # 0.02 0.00 - 0.20 K/uL    nRBC 0 0 /100 WBC    Gran% 56.9 38.0 - 73.0 %    Lymph% 36.1 18.0 - 48.0 %    Mono% 5.3 4.0 - 15.0 %    Eosinophil% 1.1 0.0 - 8.0 %    Basophil% 0.3 0.0 - 1.9 %    Differential Method Automated    Comprehensive metabolic panel    Collection Time: 06/23/20  3:05 PM   Result Value Ref Range    Sodium 136 136 - 145 mmol/L    Potassium 4.0 3.5 - 5.1 mmol/L    Chloride 101 95 - 110 mmol/L    CO2 21 (L) 23 - 29 mmol/L    Glucose  162 (H) 70 - 110 mg/dL    BUN, Bld 12 6 - 20 mg/dL    Creatinine 0.8 0.5 - 1.4 mg/dL    Calcium 9.9 8.7 - 10.5 mg/dL    Total Protein 8.8 (H) 6.0 - 8.4 g/dL    Albumin 3.7 3.5 - 5.2 g/dL    Total Bilirubin 0.2 0.1 - 1.0 mg/dL    Alkaline Phosphatase 71 55 - 135 U/L    AST 33 10 - 40 U/L    ALT 50 (H) 10 - 44 U/L    Anion Gap 14 8 - 16 mmol/L    eGFR if African American >60.0 >60 mL/min/1.73 m^2    eGFR if non African American >60.0 >60 mL/min/1.73 m^2   Troponin I #1    Collection Time: 06/23/20  3:05 PM   Result Value Ref Range    Troponin I 0.021 0.000 - 0.026 ng/mL   B-Type natriuretic peptide (BNP)    Collection Time: 06/23/20  3:05 PM   Result Value Ref Range    BNP <10 0 - 99 pg/mL   D dimer, quantitative    Collection Time: 06/23/20  3:05 PM   Result Value Ref Range    D-Dimer 0.37 <0.50 mg/L FEU       No results for input(s): POCTGLUCOSE in the last 168 hours.    Active Hospital Problems    Diagnosis  POA    Chest pain [R07.9]  Yes      Resolved Hospital Problems   No resolved problems to display.       Assessment and Plan:  Chest Pain  -Pt. Young and female but with significant risk factors for CAD such as strong family hx, DM2, and morbid obesity  -Troponin negative x2, EKG without new ST changes  -Admit to obs, Stress test ordered for am, f/u. Likely ok for discharge tomorrow if negative    Hx of Tuboovarian Abscess  -Pt. With hx of abscess, previously on IV abx as outpatient with PICC  -Pt. Reports she is still on amoxicillin and doxycyline. Unclear dosage (reports taking doxycycline once daily which is documented in med rec but end date unclear. Pt. Also reports being on unknown dose of amoxicillin 2-3 times daily)  -Last ID note 5/15 incomplete without plan outline. Discuss with ID prior to discharge to clarify antibiotic regimen and plans for end date. Will continue doxycycline daily for now    DM2  -Last A1c 7.5 6/2020  -Hold PO medications while inpatient  -SSI and diabetic diet    Asthma  -No  acute issues, duonebs ordered PRN    HTN  -Continue lisinopril    GERD  -Continue protonix    DVT PPx: Lovenox    Cheng Nathan MD  Hospital Medicine Staff  [phone] pager

## 2020-06-23 NOTE — ED NOTES
Patient identifiers for Glenda Peña 29 y.o. female checked and correct.  Chief Complaint   Patient presents with    Chest Pain     started last night, pain to L arm , denies cardiac hx     Past Medical History:   Diagnosis Date    Asthma childhood    Diabetes mellitus     GERD (gastroesophageal reflux disease)     Morbid obesity     PCOS (polycystic ovarian syndrome)      Allergies reported:   Review of patient's allergies indicates:   Allergen Reactions    Latex, natural rubber Rash     Burning sensation     Pt states the chest pain began 3 days ago, worse last night, pain radiates down back of left arm and across chest.     LOC: Patient is awake, alert, and aware of environment with an appropriate affect. Patient is oriented x 4 and speaking appropriately.  APPEARANCE: Patient resting comfortably and in no acute distress. Patient is well nourished, clean and well groomed, patient's clothing is properly fastened.  HEENT: Pt presented with surgical mask on  SKIN: The skin is warm and dry. Patient has normal skin turgor and moist mucus membranes.   MUSKULOSKELETAL: Patient is moving all extremities well, no obvious deformities noted. Pulses intact.   RESPIRATORY: Airway is open and patent. Respirations are spontaneous and non-labored with normal effort and rate.  CARDIAC: Patient has a normal rate and rhythm. No peripheral edema noted.   ABDOMEN: No distention noted. Soft and non-tender upon palpation.  NEUROLOGICAL: pupils 3mm, PERRL. Facial expression is symmetrical. Hand grasps are equal bilaterally. Normal sensation in all extremities when touched with finger.

## 2020-06-24 VITALS
RESPIRATION RATE: 18 BRPM | TEMPERATURE: 98 F | WEIGHT: 265 LBS | OXYGEN SATURATION: 97 % | SYSTOLIC BLOOD PRESSURE: 118 MMHG | HEART RATE: 95 BPM | HEIGHT: 62 IN | BODY MASS INDEX: 48.76 KG/M2 | DIASTOLIC BLOOD PRESSURE: 73 MMHG

## 2020-06-24 PROBLEM — R19.7 DIARRHEA: Status: RESOLVED | Noted: 2020-06-24 | Resolved: 2020-06-24

## 2020-06-24 PROBLEM — R19.7 DIARRHEA: Status: ACTIVE | Noted: 2020-06-24

## 2020-06-24 LAB
ANION GAP SERPL CALC-SCNC: 10 MMOL/L (ref 8–16)
ASCENDING AORTA: 3.01 CM
BASOPHILS # BLD AUTO: 0.03 K/UL (ref 0–0.2)
BASOPHILS NFR BLD: 0.4 % (ref 0–1.9)
BSA FOR ECHO PROCEDURE: 2.29 M2
BUN SERPL-MCNC: 15 MG/DL (ref 6–20)
CALCIUM SERPL-MCNC: 9.7 MG/DL (ref 8.7–10.5)
CHLORIDE SERPL-SCNC: 102 MMOL/L (ref 95–110)
CO2 SERPL-SCNC: 25 MMOL/L (ref 23–29)
CREAT SERPL-MCNC: 0.8 MG/DL (ref 0.5–1.4)
CV ECHO LV RWT: 0.44 CM
CV STRESS BASE HR: 79 BPM
DIASTOLIC BLOOD PRESSURE: 83 MMHG
DIFFERENTIAL METHOD: ABNORMAL
DOP CALC LVOT AREA: 4.1 CM2
DOP CALC LVOT DIAMETER: 2.28 CM
DOP CALC LVOT PEAK VEL: 1.2 M/S
DOP CALC LVOT STROKE VOLUME: 83.98 CM3
DOP CALCLVOT PEAK VEL VTI: 20.58 CM
E WAVE DECELERATION TIME: 223.57 MSEC
E/A RATIO: 0.98
E/E' RATIO: 7.71 M/S
ECHO LV POSTERIOR WALL: 1.02 CM (ref 0.6–1.1)
EOSINOPHIL # BLD AUTO: 0.1 K/UL (ref 0–0.5)
EOSINOPHIL NFR BLD: 1 % (ref 0–8)
ERYTHROCYTE [DISTWIDTH] IN BLOOD BY AUTOMATED COUNT: 14.2 % (ref 11.5–14.5)
EST. GFR  (AFRICAN AMERICAN): >60 ML/MIN/1.73 M^2
EST. GFR  (NON AFRICAN AMERICAN): >60 ML/MIN/1.73 M^2
FRACTIONAL SHORTENING: 31 % (ref 28–44)
GLUCOSE SERPL-MCNC: 160 MG/DL (ref 70–110)
HCT VFR BLD AUTO: 37.3 % (ref 37–48.5)
HGB BLD-MCNC: 11.5 G/DL (ref 12–16)
IMM GRANULOCYTES # BLD AUTO: 0.02 K/UL (ref 0–0.04)
IMM GRANULOCYTES NFR BLD AUTO: 0.3 % (ref 0–0.5)
INTERVENTRICULAR SEPTUM: 0.99 CM (ref 0.6–1.1)
IVRT: 85.63 MSEC
LA MAJOR: 4.47 CM
LA MINOR: 4.62 CM
LA WIDTH: 2.91 CM
LEFT ATRIUM SIZE: 4.22 CM
LEFT ATRIUM VOLUME INDEX: 22 ML/M2
LEFT ATRIUM VOLUME: 47.43 CM3
LEFT INTERNAL DIMENSION IN SYSTOLE: 3.22 CM (ref 2.1–4)
LEFT VENTRICLE DIASTOLIC VOLUME INDEX: 46.35 ML/M2
LEFT VENTRICLE DIASTOLIC VOLUME: 99.85 ML
LEFT VENTRICLE MASS INDEX: 76 G/M2
LEFT VENTRICLE SYSTOLIC VOLUME INDEX: 19.3 ML/M2
LEFT VENTRICLE SYSTOLIC VOLUME: 41.54 ML
LEFT VENTRICULAR INTERNAL DIMENSION IN DIASTOLE: 4.65 CM (ref 3.5–6)
LEFT VENTRICULAR MASS: 162.73 G
LV LATERAL E/E' RATIO: 8.1 M/S
LV SEPTAL E/E' RATIO: 7.36 M/S
LYMPHOCYTES # BLD AUTO: 2.4 K/UL (ref 1–4.8)
LYMPHOCYTES NFR BLD: 36.2 % (ref 18–48)
MAGNESIUM SERPL-MCNC: 1.8 MG/DL (ref 1.6–2.6)
MCH RBC QN AUTO: 26.3 PG (ref 27–31)
MCHC RBC AUTO-ENTMCNC: 30.8 G/DL (ref 32–36)
MCV RBC AUTO: 85 FL (ref 82–98)
MONOCYTES # BLD AUTO: 0.5 K/UL (ref 0.3–1)
MONOCYTES NFR BLD: 6.7 % (ref 4–15)
MV PEAK A VEL: 0.83 M/S
MV PEAK E VEL: 0.81 M/S
MV STENOSIS PRESSURE HALF TIME: 64.83 MS
MV VALVE AREA P 1/2 METHOD: 3.39 CM2
NEUTROPHILS # BLD AUTO: 3.7 K/UL (ref 1.8–7.7)
NEUTROPHILS NFR BLD: 55.4 % (ref 38–73)
NRBC BLD-RTO: 0 /100 WBC
OHS CV CPX 1 MINUTE RECOVERY HEART RATE: 134 BPM
OHS CV CPX 85 PERCENT MAX PREDICTED HEART RATE MALE: 153
OHS CV CPX ESTIMATED METS: 8
OHS CV CPX MAX PREDICTED HEART RATE: 180
OHS CV CPX PATIENT IS FEMALE: 1
OHS CV CPX PATIENT IS MALE: 0
OHS CV CPX PEAK DIASTOLIC BLOOD PRESSURE: 86 MMHG
OHS CV CPX PEAK HEAR RATE: 160 BPM
OHS CV CPX PEAK RATE PRESSURE PRODUCT: NORMAL
OHS CV CPX PEAK SYSTOLIC BLOOD PRESSURE: 154 MMHG
OHS CV CPX PERCENT MAX PREDICTED HEART RATE ACHIEVED: 89
OHS CV CPX RATE PRESSURE PRODUCT PRESENTING: NORMAL
PHOSPHATE SERPL-MCNC: 4.3 MG/DL (ref 2.7–4.5)
PISA TR MAX VEL: 2.08 M/S
PLATELET # BLD AUTO: 209 K/UL (ref 150–350)
PMV BLD AUTO: 10.7 FL (ref 9.2–12.9)
POCT GLUCOSE: 128 MG/DL (ref 70–110)
POCT GLUCOSE: 143 MG/DL (ref 70–110)
POCT GLUCOSE: 173 MG/DL (ref 70–110)
POCT GLUCOSE: 193 MG/DL (ref 70–110)
POTASSIUM SERPL-SCNC: 3.9 MMOL/L (ref 3.5–5.1)
PULM VEIN S/D RATIO: 0.9
PV PEAK D VEL: 0.42 M/S
PV PEAK S VEL: 0.38 M/S
RA MAJOR: 3.93 CM
RA WIDTH: 2.28 CM
RBC # BLD AUTO: 4.37 M/UL (ref 4–5.4)
RIGHT VENTRICULAR END-DIASTOLIC DIMENSION: 3.22 CM
RV TISSUE DOPPLER FREE WALL SYSTOLIC VELOCITY 1 (APICAL 4 CHAMBER VIEW): 14.72 CM/S
SINUS: 3.26 CM
SODIUM SERPL-SCNC: 137 MMOL/L (ref 136–145)
STJ: 2.85 CM
STRESS ECHO POST EXERCISE DUR MIN: 5 MINUTES
STRESS ECHO POST EXERCISE DUR SEC: 37 SECONDS
SYSTOLIC BLOOD PRESSURE: 129 MMHG
TDI LATERAL: 0.1 M/S
TDI SEPTAL: 0.11 M/S
TDI: 0.11 M/S
TR MAX PG: 17 MMHG
TRICUSPID ANNULAR PLANE SYSTOLIC EXCURSION: 2.27 CM
WBC # BLD AUTO: 6.68 K/UL (ref 3.9–12.7)

## 2020-06-24 PROCEDURE — 63600175 PHARM REV CODE 636 W HCPCS: Performed by: PHYSICIAN ASSISTANT

## 2020-06-24 PROCEDURE — 96372 THER/PROPH/DIAG INJ SC/IM: CPT

## 2020-06-24 PROCEDURE — 36415 COLL VENOUS BLD VENIPUNCTURE: CPT

## 2020-06-24 PROCEDURE — 83735 ASSAY OF MAGNESIUM: CPT

## 2020-06-24 PROCEDURE — 96376 TX/PRO/DX INJ SAME DRUG ADON: CPT | Mod: 59

## 2020-06-24 PROCEDURE — 80048 BASIC METABOLIC PNL TOTAL CA: CPT

## 2020-06-24 PROCEDURE — 99214 PR OFFICE/OUTPT VISIT, EST, LEVL IV, 30-39 MIN: ICD-10-PCS | Mod: ,,, | Performed by: INTERNAL MEDICINE

## 2020-06-24 PROCEDURE — 25000003 PHARM REV CODE 250: Performed by: STUDENT IN AN ORGANIZED HEALTH CARE EDUCATION/TRAINING PROGRAM

## 2020-06-24 PROCEDURE — 25000003 PHARM REV CODE 250: Performed by: HOSPITALIST

## 2020-06-24 PROCEDURE — 25000003 PHARM REV CODE 250: Performed by: PHYSICIAN ASSISTANT

## 2020-06-24 PROCEDURE — 85025 COMPLETE CBC W/AUTO DIFF WBC: CPT

## 2020-06-24 PROCEDURE — 84100 ASSAY OF PHOSPHORUS: CPT

## 2020-06-24 PROCEDURE — 63600175 PHARM REV CODE 636 W HCPCS: Performed by: HOSPITALIST

## 2020-06-24 PROCEDURE — 99217 PR OBSERVATION CARE DISCHARGE: CPT | Mod: ,,, | Performed by: PHYSICIAN ASSISTANT

## 2020-06-24 PROCEDURE — 96375 TX/PRO/DX INJ NEW DRUG ADDON: CPT

## 2020-06-24 PROCEDURE — G0378 HOSPITAL OBSERVATION PER HR: HCPCS

## 2020-06-24 PROCEDURE — 99217 PR OBSERVATION CARE DISCHARGE: ICD-10-PCS | Mod: ,,, | Performed by: PHYSICIAN ASSISTANT

## 2020-06-24 PROCEDURE — 99214 OFFICE O/P EST MOD 30 MIN: CPT | Mod: ,,, | Performed by: INTERNAL MEDICINE

## 2020-06-24 PROCEDURE — 63600175 PHARM REV CODE 636 W HCPCS: Performed by: INTERNAL MEDICINE

## 2020-06-24 RX ORDER — AMOXICILLIN 500 MG/1
500 CAPSULE ORAL EVERY 8 HOURS
Status: DISCONTINUED | OUTPATIENT
Start: 2020-06-24 | End: 2020-06-24

## 2020-06-24 RX ORDER — METHOCARBAMOL 500 MG/1
500 TABLET, FILM COATED ORAL ONCE AS NEEDED
Status: COMPLETED | OUTPATIENT
Start: 2020-06-24 | End: 2020-06-24

## 2020-06-24 RX ORDER — MAG HYDROX/ALUMINUM HYD/SIMETH 200-200-20
5 SUSPENSION, ORAL (FINAL DOSE FORM) ORAL ONCE
Status: DISCONTINUED | OUTPATIENT
Start: 2020-06-24 | End: 2020-06-24 | Stop reason: HOSPADM

## 2020-06-24 RX ADMIN — INSULIN ASPART 2 UNITS: 100 INJECTION, SOLUTION INTRAVENOUS; SUBCUTANEOUS at 11:06

## 2020-06-24 RX ADMIN — KETOROLAC TROMETHAMINE 15 MG: 30 INJECTION, SOLUTION INTRAMUSCULAR at 09:06

## 2020-06-24 RX ADMIN — PANTOPRAZOLE SODIUM 40 MG: 40 TABLET, DELAYED RELEASE ORAL at 06:06

## 2020-06-24 RX ADMIN — METHOCARBAMOL TABLETS 500 MG: 500 TABLET, COATED ORAL at 11:06

## 2020-06-24 RX ADMIN — AMOXICILLIN 500 MG: 500 CAPSULE ORAL at 04:06

## 2020-06-24 RX ADMIN — DOXYCYCLINE HYCLATE 100 MG: 100 TABLET, COATED ORAL at 09:06

## 2020-06-24 RX ADMIN — HUMAN ALBUMIN MICROSPHERES AND PERFLUTREN 0.66 MG: 10; .22 INJECTION, SOLUTION INTRAVENOUS at 09:06

## 2020-06-24 NOTE — SUBJECTIVE & OBJECTIVE
Past Medical History:   Diagnosis Date    Asthma childhood    Diabetes mellitus     GERD (gastroesophageal reflux disease)     Morbid obesity     PCOS (polycystic ovarian syndrome)        Past Surgical History:   Procedure Laterality Date    APPENDECTOMY  2011    HERNIA REPAIR      OOPHORECTOMY      salpingoopherectomy Right 2008       Review of patient's allergies indicates:   Allergen Reactions    Latex, natural rubber Rash     Burning sensation       Medications:  Medications Prior to Admission   Medication Sig    acetaminophen (TYLENOL) 325 MG tablet Take 2 tablets (650 mg total) by mouth every 6 (six) hours.    doxycycline (VIBRA-TABS) 100 MG tablet Take 100 mg by mouth once daily.    glipiZIDE (GLUCOTROL) 10 MG TR24 Take 1 tablet (10 mg total) by mouth daily with breakfast.    lisinopril (PRINIVIL,ZESTRIL) 2.5 MG tablet Take 1 tablet (2.5 mg total) by mouth once daily.    metFORMIN (GLUCOPHAGE) 1000 MG tablet Take 2,000 mg by mouth.    norethindrone-ethinyl estradiol (MICROGESTIN 1/20) 1-20 mg-mcg per tablet Take 1 tablet by mouth once daily.    pantoprazole (PROTONIX) 40 MG tablet Take 1 tablet (40 mg total) by mouth before breakfast.    albuterol (VENTOLIN HFA) 90 mcg/actuation inhaler Inhale 2 puffs into the lungs every 6 (six) hours as needed for Wheezing. Rescue    dulaglutide (TRULICITY) 1.5 mg/0.5 mL PnIj Inject 1.5 mg into the skin every 7 days.    ondansetron (ZOFRAN) 4 MG tablet Take 1 tablet (4 mg total) by mouth every 6 (six) hours as needed for Nausea.     Antibiotics (From admission, onward)    Start     Stop Route Frequency Ordered    06/24/20 0900  doxycycline tablet 100 mg      -- Oral Daily 06/23/20 1914        Antifungals (From admission, onward)    None        Antivirals (From admission, onward)    None           Immunization History   Administered Date(s) Administered    DTaP 1989, 04/16/1990, 07/08/1993, 04/15/1994    HIB 07/08/1993    Hepatitis B,  Pediatric/Adolescent 09/23/2002, 02/02/2004    IPV 1989, 04/15/1990, 04/15/1994    Influenza 11/24/2019    Influenza - Quadrivalent - PF (6 months and older) 01/17/2017    MMR 07/08/1993, 04/15/1994    Td (ADULT) 09/23/2002    Td (Adult), Unspecified Formulation 09/23/2002       Family History     Problem Relation (Age of Onset)    Diabetes Mother    Heart attack Father, Mother    Hypertension Father        Social History     Socioeconomic History    Marital status:      Spouse name: Not on file    Number of children: Not on file    Years of education: Not on file    Highest education level: Not on file   Occupational History    Not on file   Social Needs    Financial resource strain: Not on file    Food insecurity     Worry: Not on file     Inability: Not on file    Transportation needs     Medical: Not on file     Non-medical: Not on file   Tobacco Use    Smoking status: Never Smoker    Smokeless tobacco: Never Used   Substance and Sexual Activity    Alcohol use: Not Currently     Comment: occasionally apple cider    Drug use: No    Sexual activity: Yes     Partners: Female   Lifestyle    Physical activity     Days per week: Not on file     Minutes per session: Not on file    Stress: Not on file   Relationships    Social connections     Talks on phone: Not on file     Gets together: Not on file     Attends Latter day service: Not on file     Active member of club or organization: Not on file     Attends meetings of clubs or organizations: Not on file     Relationship status: Not on file   Other Topics Concern    Not on file   Social History Narrative    Not on file     Review of Systems   Constitutional: Negative for chills and fever.   HENT: Negative for sore throat and trouble swallowing.    Respiratory: Positive for chest tightness. Negative for cough and shortness of breath.    Cardiovascular: Positive for chest pain. Negative for leg swelling.   Gastrointestinal: Positive  for abdominal pain and diarrhea. Negative for nausea.   Genitourinary: Negative for dysuria and flank pain.   Skin: Negative for color change and pallor.   Neurological: Negative for dizziness and headaches.     Objective:     Vital Signs (Most Recent):  Temp: 97.1 °F (36.2 °C) (06/24/20 1122)  Pulse: 90 (06/24/20 1122)  Resp: 16 (06/24/20 1122)  BP: 114/63 (06/24/20 1122)  SpO2: 98 % (06/24/20 1122) Vital Signs (24h Range):  Temp:  [96.5 °F (35.8 °C)-98.6 °F (37 °C)] 97.1 °F (36.2 °C)  Pulse:  [] 90  Resp:  [14-18] 16  SpO2:  [95 %-99 %] 98 %  BP: ()/(53-93) 114/63     Weight: 120.2 kg (265 lb)  Body mass index is 48.47 kg/m².    Estimated Creatinine Clearance: 126.8 mL/min (based on SCr of 0.8 mg/dL).    Physical Exam  Vitals signs reviewed.   Constitutional:       Appearance: Normal appearance. She is obese.   HENT:      Head: Normocephalic and atraumatic.   Eyes:      General: No scleral icterus.        Right eye: No discharge.         Left eye: No discharge.   Neck:      Musculoskeletal: Normal range of motion.   Cardiovascular:      Rate and Rhythm: Normal rate.      Pulses: Normal pulses.   Pulmonary:      Effort: Pulmonary effort is normal. No respiratory distress.   Abdominal:      Palpations: Abdomen is soft. There is no mass.      Tenderness: There is abdominal tenderness (RLQ).   Musculoskeletal:         General: No swelling or tenderness.   Skin:     General: Skin is warm and dry.   Neurological:      General: No focal deficit present.      Mental Status: She is alert and oriented to person, place, and time.   Psychiatric:         Mood and Affect: Mood normal.         Behavior: Behavior normal.       Significant Labs:   CBC:   Recent Labs   Lab 06/23/20  1505 06/24/20  0411   WBC 7.22 6.68   HGB 12.3 11.5*   HCT 39.2 37.3    209     CMP:   Recent Labs   Lab 06/23/20  1505 06/24/20  0411    137   K 4.0 3.9    102   CO2 21* 25   * 160*   BUN 12 15   CREATININE 0.8 0.8    CALCIUM 9.9 9.7   PROT 8.8*  --    ALBUMIN 3.7  --    BILITOT 0.2  --    ALKPHOS 71  --    AST 33  --    ALT 50*  --    ANIONGAP 14 10   EGFRNONAA >60.0 >60.0     All pertinent labs within the past 24 hours have been reviewed.    Significant Imaging: I have reviewed all pertinent imaging results/findings within the past 24 hours.   CXR:  FINDINGS:  Two views: Heart size is normal.  Lungs are clear.  The bones showed DJD.     Impression:     No acute process seen.        Electronically signed by: Jayme Alvarado MD  Date:                                            06/23/2020  Time:                                           15:34

## 2020-06-24 NOTE — ASSESSMENT & PLAN NOTE
Reports watery BMs for the past 5 days.    Recommendations:  - please obtain fecal C. Diff testing  - oral vancomycin if possive

## 2020-06-24 NOTE — ASSESSMENT & PLAN NOTE
Cardiac stress test was negative.  Her chest pain could be 2/2 pill esophagitis from doxycycline.     Recommendations:  - stop doxycycline  - GI evaluation

## 2020-06-24 NOTE — ASSESSMENT & PLAN NOTE
Patient is 29 y.o. F w/ morbid obesity, PCOS, GERD, DM, abdominal hernia, and chronic pelvic abscess on chronic abx presented to ED on 6/23/20 w/ chest pain. Patient had cardiac stress test on 6/24/20 w/ normal results. Infectious Disease is consulted for abx management.     Diagnosed with tubo-ovarian abscess in February 2020, s/p drainage; culture was positive for actinomyces.   Second drainage one month later; she was discharged on IV Unasyn + doxycycline. Culture was NG; pathology negative for malignancy.     Abx was de-escalated to amoxicillin 500mg Q8H on 5/11/20.     Re-evaluated by Gyn Onc Dr. Millan 6/8/20 after repeat MRI w/ contrast; thinks the fluid collection is most consistent with PCOS instead of malignancy or abscess.     Recommendations:  - stop all antibiotics for now  - f/u with Dr. Mei and Dr. Millan

## 2020-06-24 NOTE — ED NOTES
Telemetry Verification   Patient placed on Telemetry Box  Verified with War Room  Box # [id number]   Monitor Tech    Rate 72   Rhythm NS

## 2020-06-24 NOTE — NURSING
Pt arrived to unit in wheelchair with hospital transport. Pt AAO x 4. Pt NAD; respirations 18, even, and unlabored. Pt skin warm, dry, and intact. Pt admits 9/10 left-sided chest pain radiating down the left upper arm. Pt admits pain is felt as tightness and hot spot to the back of her neck. Pt states Nitro did not relieved chest pain. Pt states the pain is constant. Pt denies SOB, Lightheadedness and diaphoresis. Pt ambulatory at the bedside. Pt IV access flushed without difficulty saline locked. Pt given call light, bed wheels locked, Sr up x 2, Bed in lowest position. Will continue to monitor.

## 2020-06-24 NOTE — DISCHARGE SUMMARY
"Hospital Medicine  Discharge Summary  [facility]      Patient Name: Glenda Peña  MRN:  [id number]  Hospital Medicine [place]: [facility] [place] Pablo Duncan PA-C  Date of Admission:  6/23/2020     Date of Discharge:  06/24/2020  Length of Stay:  LOS: 0 days   Principal Problem:  Chest pain     Active Hospital Problems    Diagnosis  POA    *Chest pain [R07.9]  Yes    TOA (tubo-ovarian abscess) [N70.93]  Yes    Asthma [J45.909]  Yes    Essential hypertension [I10]  Yes    Morbid obesity with BMI of 45.0-49.9, adult [E66.01, Z68.42]  Not Applicable    Type 2 diabetes mellitus with hyperglycemia, without long-term current use of insulin [E11.65]  Yes      Resolved Hospital Problems    Diagnosis Date Resolved POA    Diarrhea [R19.7] 06/24/2020 Yes          History of Present Illness:      28 yo F with PMHx DM2, HTN, tuboovarian abscess on abx therapy, and morbid obesity who presents to the ED complaining of chest pain x1 day. The patient reports that she developed chest "fluttering" last night while sitting down that lasted about 5 minutes and was associated with SOB. She states that it was not a true pain but more like a feeling like "when you have butterflies in your stomach". The patient reports that the pain went away until this morning when around 1130 am when she again developed a similar feeling while sitting at work. She reports that the issue did not go away this time, however. She says that it changed into more of a pressure like pain in her chest with radiation to her neck and down her L shoulder & arm. She reports that the pain feels like "an elephant sitting on her chest". She states that the pain has been constant and has not subsided mch even by the time of my interview. She has never had pain like this before. She denies any associated nausea, lightheadedness, or diaphoresis. The patient reports a strong family hx of CAD with "massive" MIs in her mother at age 51 and in " her father at age 42.    Hospital Course of Principle Problem Addressed:      Mrs. Peña was admitted for chest pain rule out.  Initial EKGs and troponin were non-ischemic.  Patient underwent an exercise stress test, which was stopped early due to patient fatigue.  Although limited, stress test showed no evidence of ischemia. Patient's sxs day 2 of admission and suspected 2/2 musculoskeletal vs pill esophagitis.  Referrals sent to Cardiology and GI.  Of note, ID was consulted and recommended holding abx for now and f/u with OB/GYN.  Patient and  acknowledged understanding and patient was discharged home with no needs.     Chest Pain  -Pt. Young and female but with significant risk factors for CAD such as strong family hx, DM2, and morbid obesity  -Troponin negative x2, EKG without new ST changes  - D dimer WNL; previously was COVID+. Do not suspect PE  -Admit to obs, Stress test ordered for am, f/u. Likely ok for discharge tomorrow if negative  - Stress test without evidence of ischemia    Other Medical Problems Addressed in the Hospital:      Hx of Tuboovarian Abscess  -Pt. With hx of abscess, previously on IV abx as outpatient with PICC  -Pt. Reports she is still on amoxicillin and doxycyline. Unclear dosage (reports taking doxycycline once daily which is documented in med rec but end date unclear. Pt. Also reports being on unknown dose of amoxicillin 2-3 times daily)  -Last ID note 5/15 incomplete without plan outline. Discuss with ID prior to discharge to clarify antibiotic regimen and plans for end date. Will continue doxycycline daily for now  DAY 2  - ID consulted and recommend no further abx at this time.  Last MRI concerning for PCOS rather than an abscess.  Doxy and amoxicillin discontinued  - Patient advised to f/u with her OB     DM2  -Last A1c 7.5 6/2020  -Hold PO medications while inpatient  -SSI and diabetic diet     Asthma  -No acute issues, duonebs ordered PRN     HTN  -Continue  lisinopril     GERD  -Continue protonix    Significant Diagnostic Tests/Imaging:    Recent Labs   Lab 06/23/20  1505 06/24/20  0411   WBC 7.22 6.68   HGB 12.3 11.5*   HCT 39.2 37.3    209     Recent Labs   Lab 06/23/20  1505 06/24/20  0411    137   K 4.0 3.9    102   CO2 21* 25   BUN 12 15   CREATININE 0.8 0.8   * 160*   CALCIUM 9.9 9.7   MG  --  1.8   PHOS  --  4.3     Recent Labs   Lab 06/23/20  1505   ALKPHOS 71   ALT 50*   AST 33   ALBUMIN 3.7   PROT 8.8*   BILITOT 0.2       X-ray Chest Pa And Lateral    Result Date: 6/23/2020  EXAMINATION: XR CHEST PA AND LATERAL CLINICAL HISTORY: Chest Pain; FINDINGS: Two views: Heart size is normal.  Lungs are clear.  The bones showed DJD.     No acute process seen.       Mri Pelvis W Wo Contrast    Result Date: 6/6/2020  EXAMINATION: MRI PELVIS W WO CONTRAST CLINICAL HISTORY: Characterize adnexal mass and compare to previous CT scans  Salpingitis and oophoritis, unspecified TECHNIQUE: Multiplanar, multisequence images were obtained of the pelvis before and after the administration of 10 cc of gadobutrol (Gadavist) intravenous contrast. COMPARISON: CT abdomen and pelvis 04/21/2020, IR abscess drainage 04/01/2020, 03/30/2020, CT abdomen pelvis 3/27/2020, CT abdomen and pelvis 11/22/2019. FINDINGS: Uterus is normal in size.  There is normal uterine enhancement. There is indeterminate pelvic lesion containing ovarian tissue with multiple small follicles.  There is a superimposed complex multilocular mass that is difficult to marginate given its complexity but overall measures maximum of 11.0 cm, similar to the recent CT but increased from the 11/22/2019 CT.  Several septations are identified and appear somewhat thick.  The lesions primary cystic with minimal enhancement.  There is no signal dropout on fat saturated sequences.  No diffusion restriction noted. No cervical masses.  Tiny T2 hyperintensities in the region of the cervix likely represent  nabothian cysts. The urinary bladder is unremarkable. Visualized loops of bowel and remaining intra-abdominal structures show no significant abnormality.  There is a large fat and bowel containing ventral hernia. Soft tissues are unremarkable.  Osseous marrow signal shows within normal limits.     Indeterminate 11 cm pelvic lesion, representing ovarian tissue with superimposed multiloculated complex cystic appearance, possibly neoplastic.  Postinflammatory scarring may be contributing to this finding.  The overall size has increased from the 11/22/2019 CT exam. This report was flagged in Epic as abnormal.     Significant Treatments/Procedures:    N/A    Consultants:    Infectious DIsease      Discharge Medications:    Current Discharge Medication List      CONTINUE these medications which have NOT CHANGED    Details   acetaminophen (TYLENOL) 325 MG tablet Take 2 tablets (650 mg total) by mouth every 6 (six) hours.  Qty: 180 tablet, Refills: 0      glipiZIDE (GLUCOTROL) 10 MG TR24 Take 1 tablet (10 mg total) by mouth daily with breakfast.  Qty: 30 tablet, Refills: 11    Associated Diagnoses: Type 2 diabetes mellitus with hyperglycemia, without long-term current use of insulin      lisinopril (PRINIVIL,ZESTRIL) 2.5 MG tablet Take 1 tablet (2.5 mg total) by mouth once daily.  Qty: 90 tablet, Refills: 1    Associated Diagnoses: Type 2 diabetes mellitus without complication, without long-term current use of insulin      metFORMIN (GLUCOPHAGE) 1000 MG tablet Take 2,000 mg by mouth.      norethindrone-ethinyl estradiol (MICROGESTIN 1/20) 1-20 mg-mcg per tablet Take 1 tablet by mouth once daily.  Qty: 30 tablet, Refills: 11    Associated Diagnoses: History of PCOS      pantoprazole (PROTONIX) 40 MG tablet Take 1 tablet (40 mg total) by mouth before breakfast.  Qty: 60 tablet, Refills: 2    Associated Diagnoses: GERD with esophagitis      albuterol (VENTOLIN HFA) 90 mcg/actuation inhaler Inhale 2 puffs into the lungs every 6  (six) hours as needed for Wheezing. Rescue  Qty: 18 g, Refills: 3    Associated Diagnoses: Acute bacterial bronchitis      dulaglutide (TRULICITY) 1.5 mg/0.5 mL PnIj Inject 1.5 mg into the skin every 7 days.  Qty: 2 mL, Refills: 3    Associated Diagnoses: Type 2 diabetes mellitus without complication, without long-term current use of insulin      ondansetron (ZOFRAN) 4 MG tablet Take 1 tablet (4 mg total) by mouth every 6 (six) hours as needed for Nausea.  Qty: 18 tablet, Refills: 0    Associated Diagnoses: Viral gastroenteritis         STOP taking these medications       doxycycline (VIBRA-TABS) 100 MG tablet Comments:   Reason for Stopping:               Discharge Diet:  diabetic diet: 2000 calorie     Activity: activity as tolerated    Discharge Condition: Good    Disposition: Home or Self Care     Time spent on the discharge of the patient including review of hospital course with the patient. reviewing discharge medications and arranging follow-up care 35 minutes.  Patient was seen and examined on the date of discharge and determined to be suitable for discharge.    Future Appointments   Date Time Provider Department Center   9/10/2020 11:30 AM [facility]   9/11/2020 10:45 AM Ritchie Millan MD [facility] GYN ON[facility]   9/18/2020 10:40 AM Emiliano Dunbar MD [facility]       Pablo Duncan PA-C  Department of [facility] Medicine  [facility] [id number]  Pager #[phone]

## 2020-06-24 NOTE — CONSULTS
[facility]  Infectious Disease  Consult Note    Patient Name: Glenda Peña  MRN: [id number]  Admission Date: 6/23/2020  Hospital Length of Stay: 0 days  Attending Physician: Jordan Holbrook MD  Primary Care Provider: Emiliano Dunbar MD     Isolation Status: Special Contact    Patient information was obtained from patient, past medical records and ER records.      Consults  Assessment/Plan:     * Chest pain  Cardiac stress test was negative.  Her chest pain could be 2/2 pill esophagitis from doxycycline.     Recommendations:  - stop doxycycline  - GI evaluation    Diarrhea  Reports watery BMs for the past 5 days.    Recommendations:  - please obtain fecal C. Diff testing  - oral vancomycin if possive    TOA (tubo-ovarian abscess)  Patient is 29 y.o. F w/ morbid obesity, PCOS, GERD, DM, abdominal hernia, and chronic pelvic abscess on chronic abx presented to ED on 6/23/20 w/ chest pain. Patient had cardiac stress test on 6/24/20 w/ normal results. Infectious Disease is consulted for abx management.     Diagnosed with tubo-ovarian abscess in February 2020, s/p drainage; culture was positive for actinomyces.   Second drainage one month later; she was discharged on IV Unasyn + doxycycline. Culture was NG; pathology negative for malignancy.     Abx was de-escalated to amoxicillin 500mg Q8H on 5/11/20.     Re-evaluated by Gyn Onc Dr. Millan 6/8/20 after repeat MRI w/ contrast; thinks the fluid collection is most consistent with PCOS instead of malignancy or abscess.     Recommendations:  - stop all antibiotics for now  - f/u with Dr. Mei and Dr. Millan    Thank you for your consult. I will sign off. Please contact us if you have any additional questions.    Mikala Luong MD  Infectious Disease  [facility]    Subjective:     Principal Problem: Chest pain    HPI: Patient is 29 y.o. F w/ morbid obesity, PCOS, GERD, DM, abdominal hernia, and chronic pelvic abscess on chronic abx  "presented to ED on 6/23/20 w/ chest pain. Patient had cardiac stress test on 6/24/20 w/ normal results. Infectious Disease is consulted for clarification on abx regimen.     Patient was found to have tubo-ovarian abscess in February 2020, s/p drainage; culture was positive for actinomyces. She was discharged with amoxicillin, and one month later had recurrent disease requiring drainage; she was discharged on IV Unasyn + doxycycline. There was concern for uncontrolled source as repeat imaging showed persistent fluid collection. Gyn Onc is following for definitive surgical management in the future.     Patient sees ID Dr. Mei. Her IV Unasyn was recently stopped, and is currently finishing up 8-week course of doxycycline. Patient was started on amoxicillin 500mg Q8H on 5/11/20 and reports currently taking it twice daily. Patient reports having "watery stools" for the past 5 days. She has chronic abdominal pain, which has not gotten worse.     H/o COVID in 3/2020 and required home O2. Currently denies any cough, fevers, SOB, myalgia.     Past Medical History:   Diagnosis Date    Asthma childhood    Diabetes mellitus     GERD (gastroesophageal reflux disease)     Morbid obesity     PCOS (polycystic ovarian syndrome)        Past Surgical History:   Procedure Laterality Date    APPENDECTOMY  2011    HERNIA REPAIR      OOPHORECTOMY      salpingoopherectomy Right 2008       Review of patient's allergies indicates:   Allergen Reactions    Latex, natural rubber Rash     Burning sensation       Medications:  Medications Prior to Admission   Medication Sig    acetaminophen (TYLENOL) 325 MG tablet Take 2 tablets (650 mg total) by mouth every 6 (six) hours.    doxycycline (VIBRA-TABS) 100 MG tablet Take 100 mg by mouth once daily.    glipiZIDE (GLUCOTROL) 10 MG TR24 Take 1 tablet (10 mg total) by mouth daily with breakfast.    lisinopril (PRINIVIL,ZESTRIL) 2.5 MG tablet Take 1 tablet (2.5 mg total) by mouth once " daily.    metFORMIN (GLUCOPHAGE) 1000 MG tablet Take 2,000 mg by mouth.    norethindrone-ethinyl estradiol (MICROGESTIN 1/20) 1-20 mg-mcg per tablet Take 1 tablet by mouth once daily.    pantoprazole (PROTONIX) 40 MG tablet Take 1 tablet (40 mg total) by mouth before breakfast.    albuterol (VENTOLIN HFA) 90 mcg/actuation inhaler Inhale 2 puffs into the lungs every 6 (six) hours as needed for Wheezing. Rescue    dulaglutide (TRULICITY) 1.5 mg/0.5 mL PnIj Inject 1.5 mg into the skin every 7 days.    ondansetron (ZOFRAN) 4 MG tablet Take 1 tablet (4 mg total) by mouth every 6 (six) hours as needed for Nausea.     Antibiotics (From admission, onward)    Start     Stop Route Frequency Ordered    06/24/20 0900  doxycycline tablet 100 mg      -- Oral Daily 06/23/20 1914        Antifungals (From admission, onward)    None        Antivirals (From admission, onward)    None           Immunization History   Administered Date(s) Administered    DTaP 1989, 04/16/1990, 07/08/1993, 04/15/1994    HIB 07/08/1993    Hepatitis B, Pediatric/Adolescent 09/23/2002, 02/02/2004    IPV 1989, 04/15/1990, 04/15/1994    Influenza 11/24/2019    Influenza - Quadrivalent - PF (6 months and older) 01/17/2017    MMR 07/08/1993, 04/15/1994    Td (ADULT) 09/23/2002    Td (Adult), Unspecified Formulation 09/23/2002       Family History     Problem Relation (Age of Onset)    Diabetes Mother    Heart attack Father, Mother    Hypertension Father        Social History     Socioeconomic History    Marital status:      Spouse name: Not on file    Number of children: Not on file    Years of education: Not on file    Highest education level: Not on file   Occupational History    Not on file   Social Needs    Financial resource strain: Not on file    Food insecurity     Worry: Not on file     Inability: Not on file    Transportation needs     Medical: Not on file     Non-medical: Not on file   Tobacco Use    Smoking  status: Never Smoker    Smokeless tobacco: Never Used   Substance and Sexual Activity    Alcohol use: Not Currently     Comment: occasionally apple cider    Drug use: No    Sexual activity: Yes     Partners: Female   Lifestyle    Physical activity     Days per week: Not on file     Minutes per session: Not on file    Stress: Not on file   Relationships    Social connections     Talks on phone: Not on file     Gets together: Not on file     Attends [employer] service: Not on file     Active member of club or organization: Not on file     Attends meetings of clubs or organizations: Not on file     Relationship status: Not on file   Other Topics Concern    Not on file   Social History Narrative    Not on file     Review of Systems   Constitutional: Negative for chills and fever.   HENT: Negative for sore throat and trouble swallowing.    Respiratory: Positive for chest tightness. Negative for cough and shortness of breath.    Cardiovascular: Positive for chest pain. Negative for leg swelling.   Gastrointestinal: Positive for abdominal pain and diarrhea. Negative for nausea.   Genitourinary: Negative for dysuria and flank pain.   Skin: Negative for color change and pallor.   Neurological: Negative for dizziness and headaches.     Objective:     Vital Signs (Most Recent):  Temp: 97.1 °F (36.2 °C) (06/24/20 1122)  Pulse: 90 (06/24/20 1122)  Resp: 16 (06/24/20 1122)  BP: 114/63 (06/24/20 1122)  SpO2: 98 % (06/24/20 1122) Vital Signs (24h Range):  Temp:  [96.5 °F (35.8 °C)-98.6 °F (37 °C)] 97.1 °F (36.2 °C)  Pulse:  [] 90  Resp:  [14-18] 16  SpO2:  [95 %-99 %] 98 %  BP: ()/(53-93) 114/63     Weight: 120.2 kg (265 lb)  Body mass index is 48.47 kg/m².    Estimated Creatinine Clearance: 126.8 mL/min (based on SCr of 0.8 mg/dL).    Physical Exam  Vitals signs reviewed.   Constitutional:       Appearance: Normal appearance. She is obese.   HENT:      Head: Normocephalic and atraumatic.   Eyes:      General:  No scleral icterus.        Right eye: No discharge.         Left eye: No discharge.   Neck:      Musculoskeletal: Normal range of motion.   Cardiovascular:      Rate and Rhythm: Normal rate.      Pulses: Normal pulses.   Pulmonary:      Effort: Pulmonary effort is normal. No respiratory distress.   Abdominal:      Palpations: Abdomen is soft. There is no mass.      Tenderness: There is abdominal tenderness (RLQ).   Musculoskeletal:         General: No swelling or tenderness.   Skin:     General: Skin is warm and dry.   Neurological:      General: No focal deficit present.      Mental Status: She is alert and oriented to person, place, and time.   Psychiatric:         Mood and Affect: Mood normal.         Behavior: Behavior normal.       Significant Labs:   CBC:   Recent Labs   Lab 06/23/20  1505 06/24/20  0411   WBC 7.22 6.68   HGB 12.3 11.5*   HCT 39.2 37.3    209     CMP:   Recent Labs   Lab 06/23/20  1505 06/24/20  0411    137   K 4.0 3.9    102   CO2 21* 25   * 160*   BUN 12 15   CREATININE 0.8 0.8   CALCIUM 9.9 9.7   PROT 8.8*  --    ALBUMIN 3.7  --    BILITOT 0.2  --    ALKPHOS 71  --    AST 33  --    ALT 50*  --    ANIONGAP 14 10   EGFRNONAA >60.0 >60.0     All pertinent labs within the past 24 hours have been reviewed.    Significant Imaging: I have reviewed all pertinent imaging results/findings within the past 24 hours.   CXR:  FINDINGS:  Two views: Heart size is normal.  Lungs are clear.  The bones showed DJD.     Impression:     No acute process seen.        Electronically signed by: Jayme Alvarado MD  Date:                                            06/23/2020  Time:                                           15:34

## 2020-06-24 NOTE — PLAN OF CARE
CM met with patient at the bedside to discuss discharge planning assessment. Pt lives with spouse and has transportation home at discharge. Pt verified PCP and Pharmacy. CM will continue to follow for discharge needs.         06/24/20 1436   Discharge Assessment   Assessment Type Discharge Planning Assessment   Confirmed/corrected address and phone number on facesheet? Yes   Assessment information obtained from? Patient   Expected Length of Stay (days) 2   Communicated expected length of stay with patient/caregiver yes   Prior to hospitilization cognitive status: Alert/Oriented   Prior to hospitalization functional status: Independent   Current cognitive status: Alert/Oriented   Current Functional Status: Independent   Lives With spouse   Able to Return to Prior Arrangements yes   Is patient able to care for self after discharge? Yes   Who are your caregiver(s) and their phone number(s)? Aaron Izquierdoonzo- [phone]   Patient's perception of discharge disposition home or selfcare   Readmission Within the Last 30 Days no previous admission in last 30 days   Patient currently being followed by outpatient case management? No   Patient currently receives any other outside agency services? No   Equipment Currently Used at Home none   Do you have any problems affording any of your prescribed medications? No   Is the patient taking medications as prescribed? yes   Does the patient have transportation home? Yes   Transportation Anticipated family or friend will provide   Does the patient receive services at the Coumadin Clinic? No   Discharge Plan A Home with family   Discharge Plan B Home   DME Needed Upon Discharge  none   Patient/Family in Agreement with Plan yes

## 2020-06-24 NOTE — HPI
"Patient is 29 y.o. F w/ morbid obesity, PCOS, GERD, DM, abdominal hernia, and chronic pelvic abscess on chronic abx presented to ED on 6/23/20 w/ chest pain. Patient had cardiac stress test on 6/24/20 w/ normal results. Infectious Disease is consulted for clarification on abx regimen.     Patient was found to have tubo-ovarian abscess in February 2020, s/p drainage; culture was positive for actinomyces. She was discharged with amoxicillin, and one month later had recurrent disease requiring drainage; she was discharged on IV Unasyn + doxycycline. There was concern for uncontrolled source as repeat imaging showed persistent fluid collection. Gyn Onc is following for definitive surgical management in the future.     Patient sees ID Dr. Mei. Her IV Unasyn was recently stopped, and is currently finishing up 8-week course of doxycycline. Patient was started on amoxicillin 500mg Q8H on 5/11/20 and reports currently taking it twice daily. Patient reports having "watery stools" for the past 5 days. She has chronic abdominal pain, which has not gotten worse.     H/o COVID in 3/2020 and required home O2. Currently denies any cough, fevers, SOB, myalgia.   "

## 2020-06-24 NOTE — NURSING
Pt discharge to home with . Pt is AOx4 and states verbal understanding of discharge instructions. No new meds at this time and pt states that she will stop taking her antibiotic per order. IV access removed with tip intact and cardiac monitors removed as well.  Out patient consult for GI and Cardiology.  Pt denies chest pain. Pt left with all personal belonging in wheelchair and nursing. Took pt to ED entrance. Pt parked in lot there. Pt safe.

## 2020-06-24 NOTE — PLAN OF CARE
Pt Aaox 4. Pt NAD; respirations easy and unlabored. Pt skin warm, dry, and intact. Pt admits chest pain upon arrival to unit. Pt denies any pain after PRN medication given. Pt VSS. Pt ambulatory within the room. Pt remains free of any falls or injury. Pt awaiting a stress/ echo in the Am. Pt maintained NPO after midnight. Pt call light within reach, bed in lowest position, bed wheels locked and SR up x2. Pt rested well throughout the night; no signs of distress. Will continue to monitor.

## 2020-06-28 ENCOUNTER — PATIENT MESSAGE (OUTPATIENT)
Dept: FAMILY MEDICINE | Facility: CLINIC | Age: 30
End: 2020-06-28

## 2020-06-29 RX ORDER — METFORMIN HYDROCHLORIDE 1000 MG/1
1000 TABLET ORAL 2 TIMES DAILY WITH MEALS
Qty: 60 TABLET | Refills: 3 | Status: SHIPPED | OUTPATIENT
Start: 2020-06-29 | End: 2020-11-11

## 2020-06-30 ENCOUNTER — PATIENT OUTREACH (OUTPATIENT)
Dept: ADMINISTRATIVE | Facility: OTHER | Age: 30
End: 2020-06-30

## 2020-06-30 RX ORDER — AMOXICILLIN 500 MG/1
500 CAPSULE ORAL EVERY 8 HOURS
Qty: 90 CAPSULE | Refills: 5 | Status: SHIPPED | OUTPATIENT
Start: 2020-06-30 | End: 2020-08-12 | Stop reason: ALTCHOICE

## 2020-06-30 NOTE — PROGRESS NOTES
Requested updates within Care Everywhere.  Patient's chart was reviewed for overdue ZHEN topics.  Immunizations reconciled.

## 2020-07-01 ENCOUNTER — OFFICE VISIT (OUTPATIENT)
Dept: GASTROENTEROLOGY | Facility: CLINIC | Age: 30
End: 2020-07-01
Payer: COMMERCIAL

## 2020-07-01 DIAGNOSIS — K21.9 GASTROESOPHAGEAL REFLUX DISEASE, ESOPHAGITIS PRESENCE NOT SPECIFIED: Primary | ICD-10-CM

## 2020-07-01 DIAGNOSIS — R07.9 CHEST PAIN: ICD-10-CM

## 2020-07-01 PROCEDURE — 99204 OFFICE O/P NEW MOD 45 MIN: CPT | Mod: 95,,, | Performed by: INTERNAL MEDICINE

## 2020-07-01 PROCEDURE — 99204 PR OFFICE/OUTPT VISIT, NEW, LEVL IV, 45-59 MIN: ICD-10-PCS | Mod: 95,,, | Performed by: INTERNAL MEDICINE

## 2020-07-01 NOTE — PROGRESS NOTES
"[facility]  Gastroenterology    Patient evaluated at the request of Pablo Duncan PA-C  [address] [place] [address]    PCP: Emiliano Dunbar MD    7/1/20       The patient location is: Home  The chief complaint leading to consultation is: Hospital Follow-Up    Visit type: audiovisual    Face to Face time with patient: 10 minutes of total time spent on the encounter, which includes face to face time and non-face to face time preparing to see the patient (eg, review of tests), Obtaining and/or reviewing separately obtained history, Documenting clinical information in the electronic or other health record, Independently interpreting results (not separately reported) and communicating results to the patient/family/caregiver, or Care coordination (not separately reported).         Each patient to whom he or she provides medical services by telemedicine is:  (1) informed of the relationship between the physician and patient and the respective role of any other health care provider with respect to management of the patient; and (2) notified that he or she may decline to receive medical services by telemedicine and may withdraw from such care at any time.    Notes:       Reason for Visit: Hospital Follow-up for chest pain    Subjective:   Glenda Peña is a 29 y.o. female with PMH of PCOS, tuboovarian abscess, GERD, DMII who presents for hospital follow-up. Patient states she was admitted last week after developing heart "fluttering" and chest pain while resting at home. On admission, EKG and cardiac enzymes were unrevealing. Stress test negative. Cardiology did not feel this was cardiac in nature. Patient had been on doxycycline for tuboovarian abscess which was being followed by ID. Thoughts were that patient may have pill esophagitis from the doxycycline so the medication was stopped. Patient was referred to GI outpatient for evaluation. Patient denies jayden dysphagia or " odynophagia.She is no longer on the doxycycline or any other antibiotic at this time. She takes Protonix daily for GERD. She denies any further episodes of chest pain. She is not interested in pursuing endoscopy at this time.       Past Medical History:   Diagnosis Date    Asthma childhood    Diabetes mellitus     GERD (gastroesophageal reflux disease)     Morbid obesity     PCOS (polycystic ovarian syndrome)        Past Surgical History:   Procedure Laterality Date    APPENDECTOMY  2011    HERNIA REPAIR      OOPHORECTOMY      salpingoopherectomy Right 2008       Current Outpatient Medications on File Prior to Visit   Medication Sig Dispense Refill    acetaminophen (TYLENOL) 325 MG tablet Take 2 tablets (650 mg total) by mouth every 6 (six) hours. 180 tablet 0    albuterol (VENTOLIN HFA) 90 mcg/actuation inhaler Inhale 2 puffs into the lungs every 6 (six) hours as needed for Wheezing. Rescue 18 g 3    amoxicillin (AMOXIL) 500 MG capsule Take 1 capsule (500 mg total) by mouth every 8 (eight) hours. 90 capsule 5    dulaglutide (TRULICITY) 1.5 mg/0.5 mL PnIj Inject 1.5 mg into the skin every 7 days. 2 mL 3    glipiZIDE (GLUCOTROL) 10 MG TR24 Take 1 tablet (10 mg total) by mouth daily with breakfast. 30 tablet 11    lisinopril (PRINIVIL,ZESTRIL) 2.5 MG tablet Take 1 tablet (2.5 mg total) by mouth once daily. 90 tablet 1    metFORMIN (GLUCOPHAGE) 1000 MG tablet Take 1 tablet (1,000 mg total) by mouth 2 (two) times daily with meals. 60 tablet 3    norethindrone-ethinyl estradiol (MICROGESTIN 1/20) 1-20 mg-mcg per tablet Take 1 tablet by mouth once daily. 30 tablet 11    ondansetron (ZOFRAN) 4 MG tablet Take 1 tablet (4 mg total) by mouth every 6 (six) hours as needed for Nausea. 18 tablet 0    pantoprazole (PROTONIX) 40 MG tablet Take 1 tablet (40 mg total) by mouth before breakfast. 60 tablet 2     No current facility-administered medications on file prior to visit.        Review of patient's allergies  indicates:   Allergen Reactions    Latex, natural rubber Rash     Burning sensation       Social History     Socioeconomic History    Marital status:      Spouse name: Not on file    Number of children: Not on file    Years of education: Not on file    Highest education level: Not on file   Occupational History    Not on file   Social Needs    Financial resource strain: Not on file    Food insecurity     Worry: Not on file     Inability: Not on file    Transportation needs     Medical: Not on file     Non-medical: Not on file   Tobacco Use    Smoking status: Never Smoker    Smokeless tobacco: Never Used   Substance and Sexual Activity    Alcohol use: Not Currently     Comment: occasionally apple cider    Drug use: No    Sexual activity: Yes     Partners: Female   Lifestyle    Physical activity     Days per week: Not on file     Minutes per session: Not on file    Stress: Not on file   Relationships    Social connections     Talks on phone: Not on file     Gets together: Not on file     Attends [employer] service: Not on file     Active member of club or organization: Not on file     Attends meetings of clubs or organizations: Not on file     Relationship status: Not on file   Other Topics Concern    Not on file   Social History Narrative    Not on file       Family History   Problem Relation Age of Onset    Heart attack Father     Hypertension Father     Diabetes Mother     Heart attack Mother     Breast cancer Neg Hx     Colon cancer Neg Hx     Ovarian cancer Neg Hx        Review of Systems   Constitutional: Negative for appetite change, fever and unexpected weight change.   HENT: Negative for postnasal drip, rhinorrhea, sneezing, sore throat and trouble swallowing.    Eyes: Negative for visual disturbance.   Respiratory: Negative for cough, shortness of breath and wheezing.    Cardiovascular: Negative for chest pain, palpitations and leg swelling.   Gastrointestinal: Negative for  abdominal pain, blood in stool, constipation, diarrhea, nausea and vomiting.   Genitourinary: Negative for dysuria.   Musculoskeletal: Negative for arthralgias, joint swelling and myalgias.   Skin: Negative for color change, pallor and rash.   Neurological: Negative for weakness, light-headedness, numbness and headaches.   Hematological: Negative for adenopathy. Does not bruise/bleed easily.   Psychiatric/Behavioral: Negative for agitation.           Objective:   Vitals: There were no vitals filed for this visit.    Physical Exam    Unable to perform due to video visit      IMPRESSION     Problem List Items Addressed This Visit        GI    GERD (gastroesophageal reflux disease) - Primary       Other    Chest pain          PLANS:    - Patient without any more episodes of chest pain. No odynophagia or dysphagia  - GERD well controlled on Protonix. Would continue  - Antibiotics have been discontinued   - Offered EGD but patient is not interested in pursuing at this time, as she is off of antibiotics and asymptomatic in regards to GI system  - Discussed that if symptoms return and she decides to move forward with EGD, may notify GI clinic and can schedule at that time  - RTC PRN    Gastroesophageal reflux disease, esophagitis presence not specified    Chest pain  -     Ambulatory referral/consult to Gastroenterology        Ilana Dan MD  Gastroenterology and Hepatology

## 2020-07-01 NOTE — LETTER
July 1, 2020      Pablo Duncan PA-C  [address] [facility] [address]           [facility]enter[facility]  [address] [place] [id number]  Phone: [phone]  Fax: [phone]          Patient: Glenda Peña   MR Number: [id number]   YOB: 1990   Date of Visit: 7/1/2020       Dear Pablo Duncan:    Thank you for referring Glenda Peña to me for evaluation. Attached you will find relevant portions of my assessment and plan of care.    If you have questions, please do not hesitate to call me. I look forward to following Glenda Peña along with you.    Sincerely,    Ilana Dan MD    Enclosure  CC:  No Recipients    If you would like to receive this communication electronically, please contact [email or web address] or [phone] to request more information on [employer] Link access.    For providers and/or their staff who would like to refer a patient to [facility], please contact us through our one-stop-shop provider referral line, [facility], at [phone].    If you feel you have received this communication in error or would no longer like to receive these types of communications, please e-mail [email or web address]

## 2020-07-02 ENCOUNTER — PATIENT MESSAGE (OUTPATIENT)
Dept: OTHER | Facility: OTHER | Age: 30
End: 2020-07-02

## 2020-07-06 DIAGNOSIS — E11.9 TYPE 2 DIABETES MELLITUS: ICD-10-CM

## 2020-07-21 ENCOUNTER — PATIENT OUTREACH (OUTPATIENT)
Dept: OTHER | Facility: OTHER | Age: 30
End: 2020-07-21

## 2020-07-21 NOTE — LETTER
July 24, 2020     Glenda Peña  [address] [place] [address]       Dear Glenda,    Welcome to [facility]! Our goal is to make care effective, proactive and convenient by using data you send us from home to better treat your chronic conditions.              My name is Celeste Elkins, and I am your dedicated Digital Medicine clinician. As an expert in medication management, I will help ensure that the medications you are taking continue to provide the intended benefits and help you reach your goals. You can reach me directly at [phone] or by sending me a message directly through your [facility] account.      I am Juan J Saldana and I will be your health . My job is to help you identify lifestyle changes to improve your disease control. We will talk about nutrition, exercise, and other ways you may be able to adjust your current habits to better your health. Additionally, we will help ensure you are completing the tests and screenings that are necessary to help manage your conditions. You can reach me directly at [phone] or by sending me a message directly through your [facility] account.    Most importantly, YOU are at the center of this team. Together, we will work to improve your overall health and encourage you to meet your goals for a healthier lifestyle.     What we expect from YOU:  · Please take frequent home blood sugar measurements according to the frequency your physician and Digital Medicine care team specify. It is important that your team see both fasting and after meal readings.      Be available to receive phone calls or Qu[employer]lerhart messages, when appropriate, from your care team. Please let us know if there are any specific days or times that work best for us to reach you via phone.     Complete routine tests and screenings. Dont worry, we will help keep you on track!           What you should expect from your Digital Medicine Care Team:   We will work with you to  create a personalized plan of care and provide you with encouragement and education, including regarding lifestyle changes, that could help you manage your disease states.     We will adjust your current medications, if needed, and continue to monitor your long-term progress.     We will provide you and your physician with monthly progress reports after you have been in the program for more than 30 days.     We will send you reminders through Semantifyhart and text messages to help ensure you do not miss any testing deadlines to help manage your disease states.    You will be able to reach us by phone or through your [employer] account by clicking our names under Care Team on the right side of the home screen.    I look forward to working with you to achieve your blood pressure goals!    We look forward to working with you to help manage your health,    Sincerely,    Your Digital Medicine Team    Please visit our websites to learn more:   · Diabetes: [email or web address]      Remember, we are not available for emergencies. If you have an emergency, please contact your doctors office directly or call [facility] on-[facility] ([phone] or [phone]) or [phone].    Diabetes: We want help you get important tests and screenings done regularly to assure that your health needs are met. We have put a new system in place, called CareTouch that will help us improve how we monitor and reach out to you about the following lab tests that you will need to help manage your diabetes.  · Hemoglobin A1c testing (Frequency: Every 3 to 6 months, dependent on A1c goal)  · Nephropathy Assessment, generally urine micro albumin testing (Frequency: Yearly)  · Eye exam through a quick 30-minute Eye Photo Exam (Frequency: 1-2 Years, depending on result)    When necessary you can come in to one of the lab locations between 10:30 am and 4:00 pm to have your tests done prior to their due date. Tell the  you received a  CareTouch letter, or just look for the CareTouch sign.    For CareTouch lab locations, click here.

## 2020-07-24 NOTE — PROGRESS NOTES
Digital Medicine: Health  Introduction    Introduced Glenda Peña to Digital Medicine. Discussed health  role and recommended lifestyle modifications.    The history is provided by the patient.           Additional Enrollment Details: Patient is doing fasting readings, lunch time, and evening times. She uses an alarm to test her BG.     DIABETES  Explained the goal of the diabetes digital medicine program is to decrease A1c within patient-specific target levels. Reviewed benefits of A1c reduction including reducing risk for kidney, eye, and nerve disease.      Explained that we expect patient to submit blood sugar readings as prescribed. Instructed patient not to allow anyone else to use their glucometer and phone as data submitted is directly entered into their medical record. Reviewed and confirmed appropriate blood sugar testing technique.       Reviewed Device Techniques  Reviewed general Self-Monitoring of Blood Glucose (SMBG) goals:  · FP-130 mg/dL  · 2h PPG: < 180 mg/dL  · Bedtime: < 150 mg/dL    Explained to the patient that the Digital Medicine team is not available for emergencies. Advised patient call [facility] ([phone] or [phone]) or [phone] if needed.     Patient reported SMBG schedule: Three times daily  Reviewed signs and symptoms of hypoglycemia (weakness, dizziness, hunger, shakiness, nausea, headache, heart palpitations, sweating, fatigue, anxiety, etc.).  Reviewed treatment of hypoglycemia (15/15 rule).      Patient's A1C goal is less than or equal to 7.  Patient's most recent A1C result is above goal.  @RESUFAST(LABA1C,HGBA).          Lifestyle  PLAN  Reviewed Device Techniques  Patient verbalizes understanding. Patient did not express questions or concerns and patient has contact information if needed.    Explained the importance of self-monitoring and medication adherence. Encouraged the patient to communicate with their health  for lifestyle modifications to  help improve or maintain a healthy lifestyle.      Explained to the patient that the Digital Medicine team is not available for emergencies. Advised patient call [facility] ([phone] or [phone]) or [phone] if needed.           Topic    Eye Exam      Last 6 Patient Entered Readings                                          Most Recent A1c:      Recent Readings 7/24/2020 7/23/2020 7/23/2020 7/22/2020 7/22/2020    Blood Glucose (mg/dL) 362 [phone]

## 2020-07-27 ENCOUNTER — PATIENT OUTREACH (OUTPATIENT)
Dept: OTHER | Facility: OTHER | Age: 30
End: 2020-07-27

## 2020-07-27 DIAGNOSIS — E11.65 TYPE 2 DIABETES MELLITUS WITH HYPERGLYCEMIA, WITHOUT LONG-TERM CURRENT USE OF INSULIN: Primary | ICD-10-CM

## 2020-08-07 ENCOUNTER — PATIENT OUTREACH (OUTPATIENT)
Dept: OTHER | Facility: OTHER | Age: 30
End: 2020-08-07

## 2020-08-12 RX ORDER — LISINOPRIL 2.5 MG/1
2.5 TABLET ORAL DAILY
COMMUNITY
End: 2021-01-06

## 2020-08-12 RX ORDER — GLIPIZIDE 10 MG/1
10 TABLET, FILM COATED, EXTENDED RELEASE ORAL 2 TIMES DAILY WITH MEALS
Qty: 60 TABLET | Refills: 5 | Status: SHIPPED | OUTPATIENT
Start: 2020-08-12 | End: 2021-01-15 | Stop reason: SDUPTHER

## 2020-08-12 NOTE — PROGRESS NOTES
Digital Medicine: Clinician Introduction    Glenda Peña is a 30 y.o. female who is newly enrolled in the Digital Medicine Clinic.    HPI:  Called patient regarding the DDMP (Diabetes Digital Medicine Program).    Patient denies hypoglycemic s/sx (dizziness, extreme hunger, headaches, confusion, trouble concentrating, sweating, shaking, blurred vision, personality changes); patient denies hyperglycemic s/sx (increased thirst, increased urination, headaches, trouble concentrating, blurred vision, fatigue).    Patient states she does not follow a certain diet. She eats rice, pasta, and breads. She eats a lot of fruit. She will eat a watermelon in just a few days.     She cooks her meals for her  and 3 kids.     She does not wait 2 hours after a meal to check her BG. She checks her BG 1.5 hours after meals.    The history is provided by the patient.      Review of patient's allergies indicates:   -- Latex, natural rubber -- Rash    --  Burning sensation  Completed Medication Reconciliation  Verified pharmacy information.  Patient is on ACEI/ARB.   Patient is not on statin because     DIABETES  Explained the goal of the diabetes digital medicine program is to decrease A1c within patient-specific target levels. Reviewed benefits of A1c reduction including reducing risk for kidney, eye, and nerve disease.      Explained that we expect patient to submit blood sugar readings as prescribed. Instructed patient not to allow anyone else to use their glucometer and phone as data submitted is directly entered into their medical record. Reviewed and confirmed appropriate blood sugar testing technique.      Patient reported SMBG schedule: Three times Daily.   Reviewed general Self-Monitoring of Blood Glucose (SMBG) goals:  · FP-130 mg/dL  · 2h PPG: <180 mg/dL  · Bedtime: <150 mg/dL    Reviewed signs or symptoms of hyperglycemia (headache, increased thirst, increased urination, fatigue, blurred vision, etc.).       Reviewed signs and symptoms of hypoglycemia (weakness, dizziness, hunger, shakiness, nausea, headache, heart palpitations, sweating, fatigue, anxiety, etc.).  Reviewed treatment of hypoglycemia (15/15 rule).      Patient does not have history of hypoglycemia.          Last 6 Patient Entered Readings                                          Most Recent A1c: 7.5% on 6/15/2020  (Goal: 7%)     Recent Readings 8/7/2020 8/7/2020 8/5/2020 8/3/2020 8/3/2020    Blood Glucose (mg/dL) 397 386 [phone]            Depression Screening  Glenda Peña screened negative on the depression screening.     Sleep Apnea Screening  Patient not previously diagnosed with INCHOLE   she never completed the sleep study.     Medication Affordability Screening  Patient did not answer the medication affordability questionnaires. Patient is currently not having problems affording medications    Medication Adherence-Medication adherence was assessed.  Patient continue taking medication as prescribed.          She has a routine for the week days. Sundays are her difficult days.       ASSESSMENT(S)  Patient's A1C goal is less than or equal to 7 per 2020 ADA guidelines. Patient's most recent A1C result is above goal. Lab Results    Component                Value               Date                     LABA1C                   8.9 (H)             02/19/2020               HGBA1C                   7.5 (H)             06/15/2020          .          Diabetes Plan  Medication change. Increase glipizide to 10 mg BID and continue other medications  Additional monitoring needed.  Recommended adherence tool. Advised patient to set alarm on her phone to remind her to take medications on Sundays  Provided patient education. Counseled on MyPlate method and minimizing her carbohydrate and fruit intake  Will follow up in 2 weeks, sooner if needed     Addressed any questions or concerns and patient has my contact information if needed prior to next outreach.  Patient verbalizes understanding.      Explained the importance of self-monitoring and medication adherence. Encouraged the patient to communicate with their health  for lifestyle modifications to help improve or maintain a healthy lifestyle.        Sent link to [facility] webpages and my contact information via [employer] for future questions.        Explained to the patient that the Digital Medicine team is not available for emergencies. Advised patient [facility] ([phone] or [phone]) or [phone] if needed.             Topic    Eye Exam        Current Medication Regimen:    Diabetes Medications             dulaglutide (TRULICITY) 1.5 mg/0.5 mL PnIj Inject 1.5 mg into the skin every 7 days.    glipiZIDE (GLUCOTROL) 10 MG TR24 Take 1 tablet (10 mg total) by mouth daily with breakfast.    metFORMIN (GLUCOPHAGE) 1000 MG tablet Take 1 tablet (1,000 mg total) by mouth 2 (two) times daily with meals.        Celeste Elkins, PharmD  Digital Medicine Clinical Pharmacist  [phone]

## 2020-08-14 ENCOUNTER — PATIENT OUTREACH (OUTPATIENT)
Dept: ADMINISTRATIVE | Facility: HOSPITAL | Age: 30
End: 2020-08-14

## 2020-08-25 ENCOUNTER — PATIENT OUTREACH (OUTPATIENT)
Dept: OTHER | Facility: OTHER | Age: 30
End: 2020-08-25

## 2020-08-25 DIAGNOSIS — E11.65 TYPE 2 DIABETES MELLITUS WITH HYPERGLYCEMIA, WITHOUT LONG-TERM CURRENT USE OF INSULIN: Primary | ICD-10-CM

## 2020-09-04 ENCOUNTER — PATIENT OUTREACH (OUTPATIENT)
Dept: ADMINISTRATIVE | Facility: HOSPITAL | Age: 30
End: 2020-09-04

## 2020-09-11 ENCOUNTER — TELEPHONE (OUTPATIENT)
Dept: GYNECOLOGIC ONCOLOGY | Facility: CLINIC | Age: 30
End: 2020-09-11

## 2020-10-02 ENCOUNTER — TELEPHONE (OUTPATIENT)
Dept: GYNECOLOGIC ONCOLOGY | Facility: CLINIC | Age: 30
End: 2020-10-02

## 2020-12-11 NOTE — PROGRESS NOTES
Digital Medicine: Health  Follow-Up    The history is provided by the patient.             Reason for review: Blood glucose not at goal    Patient needs assistance troubleshooting: DME supplies.    Additional Follow-up details: Patient states she ran out of test strips. Patient is currently driving so I told her I would send her the number to E in her mychart. Advised patient that a key part of the program is maintaining regular contact with both members of care team. Patient states she is off of work at 4:30 and requested calls afterwards. I let patient know I made a note to call her after 4:30 so that we can continue activity in the program.             Diet-Not assessed          Physical Activity-Not assessed    Medication Adherence-Medication Adherence not addressed.      Substance, Sleep, Stress-Not assessed      Additional monitoring needed.       Addressed patient questions and patient has my contact information if needed prior to next outreach. Patient verbalizes understanding.                 Topic    Eye Exam     Hemoglobin A1C      Last 6 Patient Entered Readings                                          Most Recent A1c: 7.5% on 6/15/2020  (Goal: 7%)     Recent Readings 8/7/2020 8/7/2020 8/5/2020 8/3/2020 8/3/2020    Blood Glucose (mg/dL) 397 567 [phone]

## 2021-01-15 ENCOUNTER — TELEPHONE (OUTPATIENT)
Dept: GYNECOLOGIC ONCOLOGY | Facility: CLINIC | Age: 31
End: 2021-01-15

## 2021-01-15 ENCOUNTER — PATIENT MESSAGE (OUTPATIENT)
Dept: GYNECOLOGIC ONCOLOGY | Facility: CLINIC | Age: 31
End: 2021-01-15

## 2021-01-15 ENCOUNTER — LAB VISIT (OUTPATIENT)
Dept: LAB | Facility: HOSPITAL | Age: 31
End: 2021-01-15
Attending: FAMILY MEDICINE
Payer: COMMERCIAL

## 2021-01-15 ENCOUNTER — OFFICE VISIT (OUTPATIENT)
Dept: GYNECOLOGIC ONCOLOGY | Facility: CLINIC | Age: 31
End: 2021-01-15
Payer: COMMERCIAL

## 2021-01-15 ENCOUNTER — OFFICE VISIT (OUTPATIENT)
Dept: FAMILY MEDICINE | Facility: CLINIC | Age: 31
End: 2021-01-15
Payer: COMMERCIAL

## 2021-01-15 VITALS
RESPIRATION RATE: 17 BRPM | OXYGEN SATURATION: 97 % | TEMPERATURE: 99 F | BODY MASS INDEX: 47.06 KG/M2 | HEART RATE: 109 BPM | DIASTOLIC BLOOD PRESSURE: 76 MMHG | SYSTOLIC BLOOD PRESSURE: 128 MMHG | WEIGHT: 257.25 LBS

## 2021-01-15 VITALS
BODY MASS INDEX: 46.61 KG/M2 | SYSTOLIC BLOOD PRESSURE: 159 MMHG | HEART RATE: 115 BPM | DIASTOLIC BLOOD PRESSURE: 74 MMHG | WEIGHT: 254.88 LBS

## 2021-01-15 DIAGNOSIS — E11.69 HYPERLIPIDEMIA ASSOCIATED WITH TYPE 2 DIABETES MELLITUS: ICD-10-CM

## 2021-01-15 DIAGNOSIS — E11.65 TYPE 2 DIABETES MELLITUS WITH HYPERGLYCEMIA, WITHOUT LONG-TERM CURRENT USE OF INSULIN: ICD-10-CM

## 2021-01-15 DIAGNOSIS — E66.01 MORBID OBESITY WITH BMI OF 45.0-49.9, ADULT: ICD-10-CM

## 2021-01-15 DIAGNOSIS — E11.9 TYPE 2 DIABETES MELLITUS WITHOUT COMPLICATION, WITHOUT LONG-TERM CURRENT USE OF INSULIN: ICD-10-CM

## 2021-01-15 DIAGNOSIS — K43.2 VENTRAL INCISIONAL HERNIA WITHOUT OBSTRUCTION OR GANGRENE: ICD-10-CM

## 2021-01-15 DIAGNOSIS — N70.93 TOA (TUBO-OVARIAN ABSCESS): Primary | ICD-10-CM

## 2021-01-15 DIAGNOSIS — E78.5 HYPERLIPIDEMIA ASSOCIATED WITH TYPE 2 DIABETES MELLITUS: ICD-10-CM

## 2021-01-15 DIAGNOSIS — K21.9 GASTROESOPHAGEAL REFLUX DISEASE, UNSPECIFIED WHETHER ESOPHAGITIS PRESENT: ICD-10-CM

## 2021-01-15 DIAGNOSIS — I10 ESSENTIAL HYPERTENSION: ICD-10-CM

## 2021-01-15 DIAGNOSIS — Z00.00 WELL WOMAN EXAM (NO GYNECOLOGICAL EXAM): ICD-10-CM

## 2021-01-15 DIAGNOSIS — Z87.42 HISTORY OF PCOS: ICD-10-CM

## 2021-01-15 DIAGNOSIS — Z00.00 WELL WOMAN EXAM (NO GYNECOLOGICAL EXAM): Primary | ICD-10-CM

## 2021-01-15 DIAGNOSIS — L68.0 HIRSUTISM: ICD-10-CM

## 2021-01-15 LAB
ALBUMIN SERPL BCP-MCNC: 3.6 G/DL (ref 3.5–5.2)
ALP SERPL-CCNC: 81 U/L (ref 55–135)
ALT SERPL W/O P-5'-P-CCNC: 61 U/L (ref 10–44)
ANION GAP SERPL CALC-SCNC: 14 MMOL/L (ref 8–16)
AST SERPL-CCNC: 75 U/L (ref 10–40)
BACTERIA #/AREA URNS HPF: NORMAL /HPF
BASOPHILS # BLD AUTO: 0.03 K/UL (ref 0–0.2)
BASOPHILS NFR BLD: 0.5 % (ref 0–1.9)
BILIRUB SERPL-MCNC: 0.3 MG/DL (ref 0.1–1)
BILIRUB UR QL STRIP: NEGATIVE
BUN SERPL-MCNC: 12 MG/DL (ref 6–20)
CALCIUM SERPL-MCNC: 9.3 MG/DL (ref 8.7–10.5)
CHLORIDE SERPL-SCNC: 96 MMOL/L (ref 95–110)
CHOLEST SERPL-MCNC: 181 MG/DL (ref 120–199)
CHOLEST/HDLC SERPL: 6.2 {RATIO} (ref 2–5)
CLARITY UR: CLEAR
CO2 SERPL-SCNC: 25 MMOL/L (ref 23–29)
COLOR UR: YELLOW
CREAT SERPL-MCNC: 0.8 MG/DL (ref 0.5–1.4)
DIFFERENTIAL METHOD: ABNORMAL
EOSINOPHIL # BLD AUTO: 0.3 K/UL (ref 0–0.5)
EOSINOPHIL NFR BLD: 4.9 % (ref 0–8)
ERYTHROCYTE [DISTWIDTH] IN BLOOD BY AUTOMATED COUNT: 13.4 % (ref 11.5–14.5)
EST. GFR  (AFRICAN AMERICAN): >60 ML/MIN/1.73 M^2
EST. GFR  (NON AFRICAN AMERICAN): >60 ML/MIN/1.73 M^2
GLUCOSE SERPL-MCNC: 303 MG/DL (ref 70–110)
GLUCOSE UR QL STRIP: ABNORMAL
HCT VFR BLD AUTO: 40.5 % (ref 37–48.5)
HDLC SERPL-MCNC: 29 MG/DL (ref 40–75)
HDLC SERPL: 16 % (ref 20–50)
HGB BLD-MCNC: 12.7 G/DL (ref 12–16)
HGB UR QL STRIP: NEGATIVE
HYALINE CASTS #/AREA URNS LPF: 0 /LPF
IMM GRANULOCYTES # BLD AUTO: 0.04 K/UL (ref 0–0.04)
IMM GRANULOCYTES NFR BLD AUTO: 0.6 % (ref 0–0.5)
KETONES UR QL STRIP: ABNORMAL
LDLC SERPL CALC-MCNC: ABNORMAL MG/DL (ref 63–159)
LEUKOCYTE ESTERASE UR QL STRIP: NEGATIVE
LYMPHOCYTES # BLD AUTO: 1.4 K/UL (ref 1–4.8)
LYMPHOCYTES NFR BLD: 21.5 % (ref 18–48)
MCH RBC QN AUTO: 27.1 PG (ref 27–31)
MCHC RBC AUTO-ENTMCNC: 31.4 G/DL (ref 32–36)
MCV RBC AUTO: 86 FL (ref 82–98)
MICROSCOPIC COMMENT: NORMAL
MONOCYTES # BLD AUTO: 0.5 K/UL (ref 0.3–1)
MONOCYTES NFR BLD: 7.6 % (ref 4–15)
NEUTROPHILS # BLD AUTO: 4.2 K/UL (ref 1.8–7.7)
NEUTROPHILS NFR BLD: 64.9 % (ref 38–73)
NITRITE UR QL STRIP: NEGATIVE
NONHDLC SERPL-MCNC: 152 MG/DL
NRBC BLD-RTO: 0 /100 WBC
PH UR STRIP: 6 [PH] (ref 5–8)
PLATELET # BLD AUTO: 207 K/UL (ref 150–350)
PMV BLD AUTO: 11 FL (ref 9.2–12.9)
POTASSIUM SERPL-SCNC: 4.1 MMOL/L (ref 3.5–5.1)
PROT SERPL-MCNC: 8.6 G/DL (ref 6–8.4)
PROT UR QL STRIP: ABNORMAL
RBC # BLD AUTO: 4.69 M/UL (ref 4–5.4)
RBC #/AREA URNS HPF: 1 /HPF (ref 0–4)
SODIUM SERPL-SCNC: 135 MMOL/L (ref 136–145)
SP GR UR STRIP: >1.03 (ref 1–1.03)
SQUAMOUS #/AREA URNS HPF: 12 /HPF
TRIGL SERPL-MCNC: 536 MG/DL (ref 30–150)
URN SPEC COLLECT METH UR: ABNORMAL
UROBILINOGEN UR STRIP-ACNC: NEGATIVE EU/DL
WBC # BLD AUTO: 6.47 K/UL (ref 3.9–12.7)
WBC #/AREA URNS HPF: 2 /HPF (ref 0–5)
YEAST URNS QL MICRO: NORMAL

## 2021-01-15 PROCEDURE — 3051F PR MOST RECENT HEMOGLOBIN A1C LEVEL 7.0 - < 8.0%: ICD-10-PCS | Mod: CPTII,S$GLB,, | Performed by: OBSTETRICS & GYNECOLOGY

## 2021-01-15 PROCEDURE — 3008F PR BODY MASS INDEX (BMI) DOCUMENTED: ICD-10-PCS | Mod: CPTII,S$GLB,, | Performed by: OBSTETRICS & GYNECOLOGY

## 2021-01-15 PROCEDURE — 3008F BODY MASS INDEX DOCD: CPT | Mod: CPTII,S$GLB,, | Performed by: OBSTETRICS & GYNECOLOGY

## 2021-01-15 PROCEDURE — 99999 PR PBB SHADOW E&M-EST. PATIENT-LVL III: ICD-10-PCS | Mod: PBBFAC,,, | Performed by: OBSTETRICS & GYNECOLOGY

## 2021-01-15 PROCEDURE — 83036 HEMOGLOBIN GLYCOSYLATED A1C: CPT

## 2021-01-15 PROCEDURE — 80053 COMPREHEN METABOLIC PANEL: CPT

## 2021-01-15 PROCEDURE — 3051F PR MOST RECENT HEMOGLOBIN A1C LEVEL 7.0 - < 8.0%: ICD-10-PCS | Mod: CPTII,S$GLB,, | Performed by: FAMILY MEDICINE

## 2021-01-15 PROCEDURE — 99999 PR PBB SHADOW E&M-EST. PATIENT-LVL IV: CPT | Mod: PBBFAC,,, | Performed by: FAMILY MEDICINE

## 2021-01-15 PROCEDURE — 1126F PR PAIN SEVERITY QUANTIFIED, NO PAIN PRESENT: ICD-10-PCS | Mod: S$GLB,,, | Performed by: OBSTETRICS & GYNECOLOGY

## 2021-01-15 PROCEDURE — 3051F HG A1C>EQUAL 7.0%<8.0%: CPT | Mod: CPTII,S$GLB,, | Performed by: FAMILY MEDICINE

## 2021-01-15 PROCEDURE — 3074F PR MOST RECENT SYSTOLIC BLOOD PRESSURE < 130 MM HG: ICD-10-PCS | Mod: CPTII,S$GLB,, | Performed by: OBSTETRICS & GYNECOLOGY

## 2021-01-15 PROCEDURE — 90686 FLU VACCINE (QUAD) GREATER THAN OR EQUAL TO 3YO PRESERVATIVE FREE IM: ICD-10-PCS | Mod: S$GLB,,, | Performed by: FAMILY MEDICINE

## 2021-01-15 PROCEDURE — 99395 PR PREVENTIVE VISIT,EST,18-39: ICD-10-PCS | Mod: 25,S$GLB,, | Performed by: FAMILY MEDICINE

## 2021-01-15 PROCEDURE — 99999 PR PBB SHADOW E&M-EST. PATIENT-LVL IV: ICD-10-PCS | Mod: PBBFAC,,, | Performed by: FAMILY MEDICINE

## 2021-01-15 PROCEDURE — 3078F DIAST BP <80 MM HG: CPT | Mod: CPTII,S$GLB,, | Performed by: FAMILY MEDICINE

## 2021-01-15 PROCEDURE — 3078F PR MOST RECENT DIASTOLIC BLOOD PRESSURE < 80 MM HG: ICD-10-PCS | Mod: CPTII,S$GLB,, | Performed by: FAMILY MEDICINE

## 2021-01-15 PROCEDURE — 80061 LIPID PANEL: CPT

## 2021-01-15 PROCEDURE — 99999 PR PBB SHADOW E&M-EST. PATIENT-LVL III: CPT | Mod: PBBFAC,,, | Performed by: OBSTETRICS & GYNECOLOGY

## 2021-01-15 PROCEDURE — 99215 OFFICE O/P EST HI 40 MIN: CPT | Mod: S$GLB,,, | Performed by: OBSTETRICS & GYNECOLOGY

## 2021-01-15 PROCEDURE — 3051F HG A1C>EQUAL 7.0%<8.0%: CPT | Mod: CPTII,S$GLB,, | Performed by: OBSTETRICS & GYNECOLOGY

## 2021-01-15 PROCEDURE — 90471 FLU VACCINE (QUAD) GREATER THAN OR EQUAL TO 3YO PRESERVATIVE FREE IM: ICD-10-PCS | Mod: S$GLB,,, | Performed by: FAMILY MEDICINE

## 2021-01-15 PROCEDURE — 99395 PREV VISIT EST AGE 18-39: CPT | Mod: 25,S$GLB,, | Performed by: FAMILY MEDICINE

## 2021-01-15 PROCEDURE — 3008F PR BODY MASS INDEX (BMI) DOCUMENTED: ICD-10-PCS | Mod: CPTII,S$GLB,, | Performed by: FAMILY MEDICINE

## 2021-01-15 PROCEDURE — 90686 IIV4 VACC NO PRSV 0.5 ML IM: CPT | Mod: S$GLB,,, | Performed by: FAMILY MEDICINE

## 2021-01-15 PROCEDURE — 90471 IMMUNIZATION ADMIN: CPT | Mod: S$GLB,,, | Performed by: FAMILY MEDICINE

## 2021-01-15 PROCEDURE — 3078F PR MOST RECENT DIASTOLIC BLOOD PRESSURE < 80 MM HG: ICD-10-PCS | Mod: CPTII,S$GLB,, | Performed by: OBSTETRICS & GYNECOLOGY

## 2021-01-15 PROCEDURE — 3078F DIAST BP <80 MM HG: CPT | Mod: CPTII,S$GLB,, | Performed by: OBSTETRICS & GYNECOLOGY

## 2021-01-15 PROCEDURE — 36415 COLL VENOUS BLD VENIPUNCTURE: CPT | Mod: PN

## 2021-01-15 PROCEDURE — 81000 URINALYSIS NONAUTO W/SCOPE: CPT

## 2021-01-15 PROCEDURE — 3074F SYST BP LT 130 MM HG: CPT | Mod: CPTII,S$GLB,, | Performed by: FAMILY MEDICINE

## 2021-01-15 PROCEDURE — 3008F BODY MASS INDEX DOCD: CPT | Mod: CPTII,S$GLB,, | Performed by: FAMILY MEDICINE

## 2021-01-15 PROCEDURE — 3074F SYST BP LT 130 MM HG: CPT | Mod: CPTII,S$GLB,, | Performed by: OBSTETRICS & GYNECOLOGY

## 2021-01-15 PROCEDURE — 99215 PR OFFICE/OUTPT VISIT, EST, LEVL V, 40-54 MIN: ICD-10-PCS | Mod: S$GLB,,, | Performed by: OBSTETRICS & GYNECOLOGY

## 2021-01-15 PROCEDURE — 1126F AMNT PAIN NOTED NONE PRSNT: CPT | Mod: S$GLB,,, | Performed by: OBSTETRICS & GYNECOLOGY

## 2021-01-15 PROCEDURE — 3074F PR MOST RECENT SYSTOLIC BLOOD PRESSURE < 130 MM HG: ICD-10-PCS | Mod: CPTII,S$GLB,, | Performed by: FAMILY MEDICINE

## 2021-01-15 PROCEDURE — 85025 COMPLETE CBC W/AUTO DIFF WBC: CPT

## 2021-01-15 RX ORDER — DULAGLUTIDE 1.5 MG/.5ML
1.5 INJECTION, SOLUTION SUBCUTANEOUS
Qty: 2 ML | Refills: 3 | Status: SHIPPED | OUTPATIENT
Start: 2021-01-15 | End: 2021-12-01

## 2021-01-15 RX ORDER — GLIPIZIDE 10 MG/1
10 TABLET, FILM COATED, EXTENDED RELEASE ORAL 2 TIMES DAILY WITH MEALS
Qty: 60 TABLET | Refills: 5 | Status: SHIPPED | OUTPATIENT
Start: 2021-01-15 | End: 2021-12-01 | Stop reason: SDUPTHER

## 2021-01-16 LAB
ESTIMATED AVG GLUCOSE: 260 MG/DL (ref 68–131)
HBA1C MFR BLD HPLC: 10.7 % (ref 4–5.6)

## 2021-01-19 ENCOUNTER — PATIENT MESSAGE (OUTPATIENT)
Dept: FAMILY MEDICINE | Facility: CLINIC | Age: 31
End: 2021-01-19

## 2021-01-19 DIAGNOSIS — E11.65 TYPE 2 DIABETES MELLITUS WITH HYPERGLYCEMIA, WITHOUT LONG-TERM CURRENT USE OF INSULIN: Primary | ICD-10-CM

## 2021-01-20 ENCOUNTER — PATIENT MESSAGE (OUTPATIENT)
Dept: FAMILY MEDICINE | Facility: CLINIC | Age: 31
End: 2021-01-20

## 2021-01-20 DIAGNOSIS — E11.65 TYPE 2 DIABETES MELLITUS WITH HYPERGLYCEMIA, WITHOUT LONG-TERM CURRENT USE OF INSULIN: Primary | ICD-10-CM

## 2021-01-20 RX ORDER — METFORMIN HYDROCHLORIDE 500 MG/1
500 TABLET, EXTENDED RELEASE ORAL 2 TIMES DAILY WITH MEALS
Qty: 60 TABLET | Refills: 5 | Status: SHIPPED | OUTPATIENT
Start: 2021-01-20 | End: 2021-12-01 | Stop reason: SDUPTHER

## 2021-01-25 ENCOUNTER — TELEPHONE (OUTPATIENT)
Dept: FAMILY MEDICINE | Facility: CLINIC | Age: 31
End: 2021-01-25

## 2021-01-27 ENCOUNTER — HOSPITAL ENCOUNTER (OUTPATIENT)
Dept: RADIOLOGY | Facility: OTHER | Age: 31
Discharge: HOME | End: 2021-01-27
Attending: OBSTETRICS & GYNECOLOGY
Payer: COMMERCIAL

## 2021-01-27 DIAGNOSIS — N70.93 TOA (TUBO-OVARIAN ABSCESS): ICD-10-CM

## 2021-01-27 PROCEDURE — A9585 GADOBUTROL INJECTION: HCPCS | Performed by: OBSTETRICS & GYNECOLOGY

## 2021-01-27 PROCEDURE — 25500020 PHARM REV CODE 255: Performed by: OBSTETRICS & GYNECOLOGY

## 2021-01-27 PROCEDURE — 74183 MRI ABD W/O CNTR FLWD CNTR: CPT | Mod: 26,,, | Performed by: RADIOLOGY

## 2021-01-27 PROCEDURE — 72197 MRI ABDOMEN-PELVIS W W/O CONTRAST (XPD): ICD-10-PCS | Mod: 26,,, | Performed by: RADIOLOGY

## 2021-01-27 PROCEDURE — 72197 MRI PELVIS W/O & W/DYE: CPT | Mod: TC

## 2021-01-27 PROCEDURE — 72197 MRI PELVIS W/O & W/DYE: CPT | Mod: 26,,, | Performed by: RADIOLOGY

## 2021-01-27 PROCEDURE — 74183 MRI ABDOMEN-PELVIS W W/O CONTRAST (XPD): ICD-10-PCS | Mod: 26,,, | Performed by: RADIOLOGY

## 2021-01-27 RX ORDER — GADOBUTROL 604.72 MG/ML
10 INJECTION INTRAVENOUS
Status: COMPLETED | OUTPATIENT
Start: 2021-01-27 | End: 2021-01-27

## 2021-01-27 RX ADMIN — GADOBUTROL 10 ML: 604.72 INJECTION INTRAVENOUS at 09:01

## 2021-01-29 ENCOUNTER — PATIENT OUTREACH (OUTPATIENT)
Dept: ADMINISTRATIVE | Facility: HOSPITAL | Age: 31
End: 2021-01-29

## 2021-02-01 ENCOUNTER — PATIENT OUTREACH (OUTPATIENT)
Dept: ADMINISTRATIVE | Facility: OTHER | Age: 31
End: 2021-02-01

## 2021-03-23 ENCOUNTER — PATIENT MESSAGE (OUTPATIENT)
Dept: FAMILY MEDICINE | Facility: CLINIC | Age: 31
End: 2021-03-23

## 2021-03-25 ENCOUNTER — HOSPITAL ENCOUNTER (EMERGENCY)
Facility: HOSPITAL | Age: 31
Discharge: HOME | End: 2021-03-25
Attending: EMERGENCY MEDICINE
Payer: COMMERCIAL

## 2021-03-25 VITALS
RESPIRATION RATE: 18 BRPM | SYSTOLIC BLOOD PRESSURE: 133 MMHG | WEIGHT: 250 LBS | OXYGEN SATURATION: 98 % | DIASTOLIC BLOOD PRESSURE: 76 MMHG | HEART RATE: 100 BPM | HEIGHT: 62 IN | BODY MASS INDEX: 46.01 KG/M2 | TEMPERATURE: 98 F

## 2021-03-25 DIAGNOSIS — R00.0 TACHYCARDIA: ICD-10-CM

## 2021-03-25 DIAGNOSIS — M51.36 DEGENERATIVE DISC DISEASE, LUMBAR: ICD-10-CM

## 2021-03-25 DIAGNOSIS — K43.9 VENTRAL HERNIA WITHOUT OBSTRUCTION OR GANGRENE: Primary | ICD-10-CM

## 2021-03-25 DIAGNOSIS — R73.9 HYPERGLYCEMIA: ICD-10-CM

## 2021-03-25 DIAGNOSIS — N30.00 ACUTE CYSTITIS WITHOUT HEMATURIA: ICD-10-CM

## 2021-03-25 DIAGNOSIS — M51.34 DEGENERATIVE DISC DISEASE, THORACIC: ICD-10-CM

## 2021-03-25 DIAGNOSIS — N83.201 RIGHT OVARIAN CYST: ICD-10-CM

## 2021-03-25 DIAGNOSIS — E66.01 MORBID OBESITY: ICD-10-CM

## 2021-03-25 DIAGNOSIS — R91.1 PULMONARY NODULE: ICD-10-CM

## 2021-03-25 LAB
ALBUMIN SERPL BCP-MCNC: 3.6 G/DL (ref 3.5–5.2)
ALP SERPL-CCNC: 71 U/L (ref 55–135)
ALT SERPL W/O P-5'-P-CCNC: 28 U/L (ref 10–44)
ANION GAP SERPL CALC-SCNC: 15 MMOL/L (ref 8–16)
AST SERPL-CCNC: 15 U/L (ref 10–40)
B-HCG UR QL: NEGATIVE
BACTERIA #/AREA URNS HPF: ABNORMAL /HPF
BASOPHILS # BLD AUTO: 0.05 K/UL (ref 0–0.2)
BASOPHILS NFR BLD: 0.4 % (ref 0–1.9)
BILIRUB SERPL-MCNC: 0.3 MG/DL (ref 0.1–1)
BILIRUB UR QL STRIP: NEGATIVE
BUN SERPL-MCNC: 8 MG/DL (ref 6–20)
CALCIUM SERPL-MCNC: 9.9 MG/DL (ref 8.7–10.5)
CHLORIDE SERPL-SCNC: 99 MMOL/L (ref 95–110)
CLARITY UR: ABNORMAL
CO2 SERPL-SCNC: 23 MMOL/L (ref 23–29)
COLOR UR: YELLOW
CREAT SERPL-MCNC: 0.8 MG/DL (ref 0.5–1.4)
CTP QC/QA: YES
DIFFERENTIAL METHOD: ABNORMAL
EOSINOPHIL # BLD AUTO: 0.1 K/UL (ref 0–0.5)
EOSINOPHIL NFR BLD: 0.4 % (ref 0–8)
ERYTHROCYTE [DISTWIDTH] IN BLOOD BY AUTOMATED COUNT: 13.6 % (ref 11.5–14.5)
EST. GFR  (AFRICAN AMERICAN): >60 ML/MIN/1.73 M^2
EST. GFR  (NON AFRICAN AMERICAN): >60 ML/MIN/1.73 M^2
GLUCOSE SERPL-MCNC: 265 MG/DL (ref 70–110)
GLUCOSE UR QL STRIP: ABNORMAL
HCT VFR BLD AUTO: 36.6 % (ref 37–48.5)
HGB BLD-MCNC: 11.9 G/DL (ref 12–16)
HGB UR QL STRIP: ABNORMAL
HYALINE CASTS #/AREA URNS LPF: 22 /LPF
IMM GRANULOCYTES # BLD AUTO: 0.06 K/UL (ref 0–0.04)
IMM GRANULOCYTES NFR BLD AUTO: 0.5 % (ref 0–0.5)
KETONES UR QL STRIP: ABNORMAL
LACTATE SERPL-SCNC: 2.1 MMOL/L (ref 0.5–2.2)
LEUKOCYTE ESTERASE UR QL STRIP: ABNORMAL
LIPASE SERPL-CCNC: 50 U/L (ref 4–60)
LYMPHOCYTES # BLD AUTO: 2.7 K/UL (ref 1–4.8)
LYMPHOCYTES NFR BLD: 20.8 % (ref 18–48)
MCH RBC QN AUTO: 27.7 PG (ref 27–31)
MCHC RBC AUTO-ENTMCNC: 32.5 G/DL (ref 32–36)
MCV RBC AUTO: 85 FL (ref 82–98)
MICROSCOPIC COMMENT: ABNORMAL
MONOCYTES # BLD AUTO: 0.6 K/UL (ref 0.3–1)
MONOCYTES NFR BLD: 4.4 % (ref 4–15)
NEUTROPHILS # BLD AUTO: 9.7 K/UL (ref 1.8–7.7)
NEUTROPHILS NFR BLD: 73.5 % (ref 38–73)
NITRITE UR QL STRIP: NEGATIVE
NRBC BLD-RTO: 0 /100 WBC
PH UR STRIP: 6 [PH] (ref 5–8)
PLATELET # BLD AUTO: 241 K/UL (ref 150–350)
PMV BLD AUTO: 10.5 FL (ref 9.2–12.9)
POTASSIUM SERPL-SCNC: 4 MMOL/L (ref 3.5–5.1)
PROT SERPL-MCNC: 8.3 G/DL (ref 6–8.4)
PROT UR QL STRIP: ABNORMAL
RBC # BLD AUTO: 4.3 M/UL (ref 4–5.4)
RBC #/AREA URNS HPF: 4 /HPF (ref 0–4)
SODIUM SERPL-SCNC: 137 MMOL/L (ref 136–145)
SP GR UR STRIP: >1.03 (ref 1–1.03)
SQUAMOUS #/AREA URNS HPF: 10 /HPF
URN SPEC COLLECT METH UR: ABNORMAL
UROBILINOGEN UR STRIP-ACNC: NEGATIVE EU/DL
WBC # BLD AUTO: 13.16 K/UL (ref 3.9–12.7)
WBC #/AREA URNS HPF: 21 /HPF (ref 0–5)

## 2021-03-25 PROCEDURE — 83605 ASSAY OF LACTIC ACID: CPT | Performed by: NURSE PRACTITIONER

## 2021-03-25 PROCEDURE — 93010 EKG 12-LEAD: ICD-10-PCS | Mod: ,,, | Performed by: INTERNAL MEDICINE

## 2021-03-25 PROCEDURE — 63600175 PHARM REV CODE 636 W HCPCS: Performed by: NURSE PRACTITIONER

## 2021-03-25 PROCEDURE — 85025 COMPLETE CBC W/AUTO DIFF WBC: CPT | Performed by: NURSE PRACTITIONER

## 2021-03-25 PROCEDURE — 83690 ASSAY OF LIPASE: CPT | Performed by: NURSE PRACTITIONER

## 2021-03-25 PROCEDURE — 81000 URINALYSIS NONAUTO W/SCOPE: CPT | Performed by: NURSE PRACTITIONER

## 2021-03-25 PROCEDURE — 81025 URINE PREGNANCY TEST: CPT | Performed by: PHYSICIAN ASSISTANT

## 2021-03-25 PROCEDURE — 99285 EMERGENCY DEPT VISIT HI MDM: CPT | Mod: 25

## 2021-03-25 PROCEDURE — 93010 ELECTROCARDIOGRAM REPORT: CPT | Mod: ,,, | Performed by: INTERNAL MEDICINE

## 2021-03-25 PROCEDURE — 93005 ELECTROCARDIOGRAM TRACING: CPT

## 2021-03-25 PROCEDURE — 87040 BLOOD CULTURE FOR BACTERIA: CPT | Mod: 59 | Performed by: NURSE PRACTITIONER

## 2021-03-25 PROCEDURE — 80053 COMPREHEN METABOLIC PANEL: CPT | Performed by: NURSE PRACTITIONER

## 2021-03-25 PROCEDURE — 96376 TX/PRO/DX INJ SAME DRUG ADON: CPT

## 2021-03-25 PROCEDURE — 25000003 PHARM REV CODE 250: Performed by: NURSE PRACTITIONER

## 2021-03-25 PROCEDURE — 96375 TX/PRO/DX INJ NEW DRUG ADDON: CPT

## 2021-03-25 PROCEDURE — 96365 THER/PROPH/DIAG IV INF INIT: CPT | Mod: 59

## 2021-03-25 PROCEDURE — 87086 URINE CULTURE/COLONY COUNT: CPT | Performed by: NURSE PRACTITIONER

## 2021-03-25 PROCEDURE — 25500020 PHARM REV CODE 255: Performed by: EMERGENCY MEDICINE

## 2021-03-25 RX ORDER — HYDROMORPHONE HYDROCHLORIDE 2 MG/ML
1 INJECTION, SOLUTION INTRAMUSCULAR; INTRAVENOUS; SUBCUTANEOUS
Status: COMPLETED | OUTPATIENT
Start: 2021-03-25 | End: 2021-03-25

## 2021-03-25 RX ORDER — CEPHALEXIN 500 MG/1
500 CAPSULE ORAL EVERY 6 HOURS
Qty: 28 CAPSULE | Refills: 0 | Status: SHIPPED | OUTPATIENT
Start: 2021-03-25 | End: 2021-04-01

## 2021-03-25 RX ORDER — OXYCODONE AND ACETAMINOPHEN 5; 325 MG/1; MG/1
1 TABLET ORAL EVERY 4 HOURS PRN
Qty: 18 TABLET | Refills: 0 | Status: SHIPPED | OUTPATIENT
Start: 2021-03-25 | End: 2021-12-01

## 2021-03-25 RX ADMIN — SODIUM CHLORIDE 2402 ML: 0.9 INJECTION, SOLUTION INTRAVENOUS at 08:03

## 2021-03-25 RX ADMIN — PIPERACILLIN AND TAZOBACTAM 4.5 G: 4; .5 INJECTION, POWDER, LYOPHILIZED, FOR SOLUTION INTRAVENOUS; PARENTERAL at 08:03

## 2021-03-25 RX ADMIN — HYDROMORPHONE HYDROCHLORIDE 1 MG: 2 INJECTION INTRAMUSCULAR; INTRAVENOUS; SUBCUTANEOUS at 09:03

## 2021-03-25 RX ADMIN — HYDROMORPHONE HYDROCHLORIDE 1 MG: 2 INJECTION INTRAMUSCULAR; INTRAVENOUS; SUBCUTANEOUS at 07:03

## 2021-03-25 RX ADMIN — SODIUM CHLORIDE 1000 ML: 0.9 INJECTION, SOLUTION INTRAVENOUS at 07:03

## 2021-03-25 RX ADMIN — IOHEXOL 85 ML: 350 INJECTION, SOLUTION INTRAVENOUS at 08:03

## 2021-03-27 LAB — BACTERIA UR CULT: NORMAL

## 2021-03-30 LAB
BACTERIA BLD CULT: NORMAL
BACTERIA BLD CULT: NORMAL

## 2021-04-07 ENCOUNTER — PATIENT OUTREACH (OUTPATIENT)
Dept: ADMINISTRATIVE | Facility: OTHER | Age: 31
End: 2021-04-07

## 2021-04-09 DIAGNOSIS — M65.311 TRIGGER FINGER OF RIGHT THUMB: Primary | ICD-10-CM

## 2021-04-12 ENCOUNTER — OFFICE VISIT (OUTPATIENT)
Dept: ORTHOPEDICS | Facility: CLINIC | Age: 31
End: 2021-04-12
Attending: ORTHOPAEDIC SURGERY
Payer: COMMERCIAL

## 2021-04-12 VITALS
HEART RATE: 84 BPM | RESPIRATION RATE: 18 BRPM | HEIGHT: 62 IN | BODY MASS INDEX: 47.18 KG/M2 | WEIGHT: 256.38 LBS | SYSTOLIC BLOOD PRESSURE: 118 MMHG | TEMPERATURE: 98 F | OXYGEN SATURATION: 97 % | DIASTOLIC BLOOD PRESSURE: 84 MMHG

## 2021-04-12 DIAGNOSIS — G56.03 BILATERAL CARPAL TUNNEL SYNDROME: ICD-10-CM

## 2021-04-12 DIAGNOSIS — M65.311 TRIGGER FINGER OF RIGHT THUMB: Primary | ICD-10-CM

## 2021-04-12 PROCEDURE — 99999 PR PBB SHADOW E&M-EST. PATIENT-LVL III: ICD-10-PCS | Mod: PBBFAC,,, | Performed by: ORTHOPAEDIC SURGERY

## 2021-04-12 PROCEDURE — 1125F PR PAIN SEVERITY QUANTIFIED, PAIN PRESENT: ICD-10-PCS | Mod: S$GLB,,, | Performed by: ORTHOPAEDIC SURGERY

## 2021-04-12 PROCEDURE — 3008F PR BODY MASS INDEX (BMI) DOCUMENTED: ICD-10-PCS | Mod: CPTII,S$GLB,, | Performed by: ORTHOPAEDIC SURGERY

## 2021-04-12 PROCEDURE — 1125F AMNT PAIN NOTED PAIN PRSNT: CPT | Mod: S$GLB,,, | Performed by: ORTHOPAEDIC SURGERY

## 2021-04-12 PROCEDURE — 3008F BODY MASS INDEX DOCD: CPT | Mod: CPTII,S$GLB,, | Performed by: ORTHOPAEDIC SURGERY

## 2021-04-12 PROCEDURE — 99999 PR PBB SHADOW E&M-EST. PATIENT-LVL III: CPT | Mod: PBBFAC,,, | Performed by: ORTHOPAEDIC SURGERY

## 2021-04-12 PROCEDURE — 99204 PR OFFICE/OUTPT VISIT, NEW, LEVL IV, 45-59 MIN: ICD-10-PCS | Mod: S$GLB,,, | Performed by: ORTHOPAEDIC SURGERY

## 2021-04-12 PROCEDURE — 99204 OFFICE O/P NEW MOD 45 MIN: CPT | Mod: S$GLB,,, | Performed by: ORTHOPAEDIC SURGERY

## 2021-04-16 ENCOUNTER — PATIENT MESSAGE (OUTPATIENT)
Dept: RESEARCH | Facility: HOSPITAL | Age: 31
End: 2021-04-16

## 2021-05-17 DIAGNOSIS — Z87.42 HISTORY OF PCOS: ICD-10-CM

## 2021-05-17 RX ORDER — NORETHINDRONE ACETATE AND ETHINYL ESTRADIOL .02; 1 MG/1; MG/1
1 TABLET ORAL DAILY
Qty: 21 TABLET | Refills: 0 | Status: SHIPPED | OUTPATIENT
Start: 2021-05-17 | End: 2022-03-30

## 2021-07-07 ENCOUNTER — PATIENT MESSAGE (OUTPATIENT)
Dept: ADMINISTRATIVE | Facility: HOSPITAL | Age: 31
End: 2021-07-07

## 2021-08-04 ENCOUNTER — PATIENT MESSAGE (OUTPATIENT)
Dept: ADMINISTRATIVE | Facility: HOSPITAL | Age: 31
End: 2021-08-04

## 2021-10-04 ENCOUNTER — PATIENT MESSAGE (OUTPATIENT)
Dept: ADMINISTRATIVE | Facility: HOSPITAL | Age: 31
End: 2021-10-04

## 2021-10-06 ENCOUNTER — PATIENT MESSAGE (OUTPATIENT)
Dept: ADMINISTRATIVE | Facility: HOSPITAL | Age: 31
End: 2021-10-06

## 2021-10-18 ENCOUNTER — PATIENT MESSAGE (OUTPATIENT)
Dept: ADMINISTRATIVE | Facility: HOSPITAL | Age: 31
End: 2021-10-18
Payer: COMMERCIAL

## 2021-12-01 ENCOUNTER — LAB VISIT (OUTPATIENT)
Dept: LAB | Facility: HOSPITAL | Age: 31
End: 2021-12-01
Attending: FAMILY MEDICINE
Payer: COMMERCIAL

## 2021-12-01 ENCOUNTER — OFFICE VISIT (OUTPATIENT)
Dept: FAMILY MEDICINE | Facility: CLINIC | Age: 31
End: 2021-12-01
Payer: COMMERCIAL

## 2021-12-01 VITALS
HEIGHT: 62 IN | OXYGEN SATURATION: 97 % | RESPIRATION RATE: 16 BRPM | DIASTOLIC BLOOD PRESSURE: 88 MMHG | BODY MASS INDEX: 44.34 KG/M2 | HEART RATE: 76 BPM | SYSTOLIC BLOOD PRESSURE: 126 MMHG | WEIGHT: 240.94 LBS

## 2021-12-01 DIAGNOSIS — K21.00 GERD WITH ESOPHAGITIS: ICD-10-CM

## 2021-12-01 DIAGNOSIS — E78.5 HYPERLIPIDEMIA ASSOCIATED WITH TYPE 2 DIABETES MELLITUS: ICD-10-CM

## 2021-12-01 DIAGNOSIS — Z87.42 HISTORY OF PCOS: ICD-10-CM

## 2021-12-01 DIAGNOSIS — I10 ESSENTIAL HYPERTENSION: ICD-10-CM

## 2021-12-01 DIAGNOSIS — E66.01 MORBID OBESITY WITH BMI OF 45.0-49.9, ADULT: ICD-10-CM

## 2021-12-01 DIAGNOSIS — E11.65 TYPE 2 DIABETES MELLITUS WITH HYPERGLYCEMIA, UNSPECIFIED WHETHER LONG TERM INSULIN USE: ICD-10-CM

## 2021-12-01 DIAGNOSIS — J45.909 ASTHMA, UNSPECIFIED ASTHMA SEVERITY, UNSPECIFIED WHETHER COMPLICATED, UNSPECIFIED WHETHER PERSISTENT: ICD-10-CM

## 2021-12-01 DIAGNOSIS — E11.65 TYPE 2 DIABETES MELLITUS WITH HYPERGLYCEMIA, WITHOUT LONG-TERM CURRENT USE OF INSULIN: ICD-10-CM

## 2021-12-01 DIAGNOSIS — E11.65 TYPE 2 DIABETES MELLITUS WITH HYPERGLYCEMIA, WITHOUT LONG-TERM CURRENT USE OF INSULIN: Primary | ICD-10-CM

## 2021-12-01 DIAGNOSIS — E11.9 TYPE 2 DIABETES MELLITUS WITHOUT COMPLICATION, WITHOUT LONG-TERM CURRENT USE OF INSULIN: ICD-10-CM

## 2021-12-01 DIAGNOSIS — E11.69 HYPERLIPIDEMIA ASSOCIATED WITH TYPE 2 DIABETES MELLITUS: ICD-10-CM

## 2021-12-01 DIAGNOSIS — N76.3 CHRONIC VULVITIS: ICD-10-CM

## 2021-12-01 DIAGNOSIS — L40.9 PSORIASIS: ICD-10-CM

## 2021-12-01 DIAGNOSIS — L68.0 HIRSUTISM: ICD-10-CM

## 2021-12-01 PROBLEM — U07.1 COVID-19 VIRUS INFECTION: Status: RESOLVED | Noted: 2020-04-03 | Resolved: 2021-12-01

## 2021-12-01 PROBLEM — N70.93 TOA (TUBO-OVARIAN ABSCESS): Status: RESOLVED | Noted: 2020-04-01 | Resolved: 2021-12-01

## 2021-12-01 PROBLEM — K65.1 INTRA-ABDOMINAL ABSCESS: Status: RESOLVED | Noted: 2020-03-06 | Resolved: 2021-12-01

## 2021-12-01 PROBLEM — R07.9 CHEST PAIN: Status: RESOLVED | Noted: 2020-06-23 | Resolved: 2021-12-01

## 2021-12-01 PROBLEM — N70.93 TUBO-OVARIAN ABSCESS: Status: RESOLVED | Noted: 2020-02-17 | Resolved: 2021-12-01

## 2021-12-01 PROBLEM — A42.9 ACTINOMYCES INFECTION: Status: RESOLVED | Noted: 2020-02-20 | Resolved: 2021-12-01

## 2021-12-01 PROBLEM — N76.1 CHRONIC VAGINITIS: Status: RESOLVED | Noted: 2018-02-28 | Resolved: 2021-12-01

## 2021-12-01 PROBLEM — D72.819 LEUKOPENIA: Status: RESOLVED | Noted: 2020-04-01 | Resolved: 2021-12-01

## 2021-12-01 LAB
ALBUMIN SERPL BCP-MCNC: 3.7 G/DL (ref 3.5–5.2)
ALP SERPL-CCNC: 77 U/L (ref 55–135)
ALT SERPL W/O P-5'-P-CCNC: 33 U/L (ref 10–44)
ANION GAP SERPL CALC-SCNC: 11 MMOL/L (ref 8–16)
AST SERPL-CCNC: 23 U/L (ref 10–40)
BASOPHILS # BLD AUTO: 0.03 K/UL (ref 0–0.2)
BASOPHILS NFR BLD: 0.5 % (ref 0–1.9)
BILIRUB SERPL-MCNC: 0.3 MG/DL (ref 0.1–1)
BUN SERPL-MCNC: 10 MG/DL (ref 6–20)
CALCIUM SERPL-MCNC: 9.4 MG/DL (ref 8.7–10.5)
CHLORIDE SERPL-SCNC: 101 MMOL/L (ref 95–110)
CHOLEST SERPL-MCNC: 222 MG/DL (ref 120–199)
CHOLEST/HDLC SERPL: 7.7 {RATIO} (ref 2–5)
CO2 SERPL-SCNC: 24 MMOL/L (ref 23–29)
CREAT SERPL-MCNC: 0.8 MG/DL (ref 0.5–1.4)
DIFFERENTIAL METHOD: ABNORMAL
EOSINOPHIL # BLD AUTO: 0.1 K/UL (ref 0–0.5)
EOSINOPHIL NFR BLD: 0.9 % (ref 0–8)
ERYTHROCYTE [DISTWIDTH] IN BLOOD BY AUTOMATED COUNT: 13.3 % (ref 11.5–14.5)
EST. GFR  (AFRICAN AMERICAN): >60 ML/MIN/1.73 M^2
EST. GFR  (NON AFRICAN AMERICAN): >60 ML/MIN/1.73 M^2
GLUCOSE SERPL-MCNC: 336 MG/DL (ref 70–110)
HCT VFR BLD AUTO: 41.3 % (ref 37–48.5)
HDLC SERPL-MCNC: 29 MG/DL (ref 40–75)
HDLC SERPL: 13.1 % (ref 20–50)
HGB BLD-MCNC: 13.1 G/DL (ref 12–16)
IMM GRANULOCYTES # BLD AUTO: 0.02 K/UL (ref 0–0.04)
IMM GRANULOCYTES NFR BLD AUTO: 0.3 % (ref 0–0.5)
LDLC SERPL CALC-MCNC: ABNORMAL MG/DL (ref 63–159)
LYMPHOCYTES # BLD AUTO: 2.2 K/UL (ref 1–4.8)
LYMPHOCYTES NFR BLD: 33 % (ref 18–48)
MCH RBC QN AUTO: 27.6 PG (ref 27–31)
MCHC RBC AUTO-ENTMCNC: 31.7 G/DL (ref 32–36)
MCV RBC AUTO: 87 FL (ref 82–98)
MONOCYTES # BLD AUTO: 0.3 K/UL (ref 0.3–1)
MONOCYTES NFR BLD: 4.4 % (ref 4–15)
NEUTROPHILS # BLD AUTO: 4 K/UL (ref 1.8–7.7)
NEUTROPHILS NFR BLD: 60.9 % (ref 38–73)
NONHDLC SERPL-MCNC: 193 MG/DL
NRBC BLD-RTO: 0 /100 WBC
PLATELET # BLD AUTO: 199 K/UL (ref 150–450)
PMV BLD AUTO: 11.2 FL (ref 9.2–12.9)
POTASSIUM SERPL-SCNC: 4.4 MMOL/L (ref 3.5–5.1)
PROT SERPL-MCNC: 8.3 G/DL (ref 6–8.4)
RBC # BLD AUTO: 4.75 M/UL (ref 4–5.4)
SODIUM SERPL-SCNC: 136 MMOL/L (ref 136–145)
TRIGL SERPL-MCNC: 644 MG/DL (ref 30–150)
TSH SERPL DL<=0.005 MIU/L-ACNC: 1.98 UIU/ML (ref 0.4–4)
WBC # BLD AUTO: 6.63 K/UL (ref 3.9–12.7)

## 2021-12-01 PROCEDURE — 84443 ASSAY THYROID STIM HORMONE: CPT | Performed by: FAMILY MEDICINE

## 2021-12-01 PROCEDURE — 4010F PR ACE/ARB THEARPY RXD/TAKEN: ICD-10-PCS | Mod: CPTII,S$GLB,, | Performed by: FAMILY MEDICINE

## 2021-12-01 PROCEDURE — 4010F ACE/ARB THERAPY RXD/TAKEN: CPT | Mod: CPTII,S$GLB,, | Performed by: FAMILY MEDICINE

## 2021-12-01 PROCEDURE — 83036 HEMOGLOBIN GLYCOSYLATED A1C: CPT | Performed by: FAMILY MEDICINE

## 2021-12-01 PROCEDURE — 36415 COLL VENOUS BLD VENIPUNCTURE: CPT | Mod: PN | Performed by: FAMILY MEDICINE

## 2021-12-01 PROCEDURE — 80061 LIPID PANEL: CPT | Performed by: FAMILY MEDICINE

## 2021-12-01 PROCEDURE — 99214 PR OFFICE/OUTPT VISIT, EST, LEVL IV, 30-39 MIN: ICD-10-PCS | Mod: S$GLB,,, | Performed by: FAMILY MEDICINE

## 2021-12-01 PROCEDURE — 99214 OFFICE O/P EST MOD 30 MIN: CPT | Mod: S$GLB,,, | Performed by: FAMILY MEDICINE

## 2021-12-01 PROCEDURE — 99999 PR PBB SHADOW E&M-EST. PATIENT-LVL IV: ICD-10-PCS | Mod: PBBFAC,,, | Performed by: FAMILY MEDICINE

## 2021-12-01 PROCEDURE — 99999 PR PBB SHADOW E&M-EST. PATIENT-LVL IV: CPT | Mod: PBBFAC,,, | Performed by: FAMILY MEDICINE

## 2021-12-01 PROCEDURE — 80053 COMPREHEN METABOLIC PANEL: CPT | Performed by: FAMILY MEDICINE

## 2021-12-01 PROCEDURE — 85025 COMPLETE CBC W/AUTO DIFF WBC: CPT | Performed by: FAMILY MEDICINE

## 2021-12-01 RX ORDER — DULAGLUTIDE 0.75 MG/.5ML
0.75 INJECTION, SOLUTION SUBCUTANEOUS
Qty: 4 PEN | Refills: 0 | Status: SHIPPED | OUTPATIENT
Start: 2021-12-01 | End: 2021-12-28

## 2021-12-01 RX ORDER — LISINOPRIL 2.5 MG/1
2.5 TABLET ORAL DAILY
Qty: 90 TABLET | Refills: 0 | Status: SHIPPED | OUTPATIENT
Start: 2021-12-01 | End: 2022-03-02 | Stop reason: SDUPTHER

## 2021-12-01 RX ORDER — METFORMIN HYDROCHLORIDE 500 MG/1
500 TABLET, EXTENDED RELEASE ORAL 2 TIMES DAILY WITH MEALS
Qty: 60 TABLET | Refills: 5 | Status: SHIPPED | OUTPATIENT
Start: 2021-12-01 | End: 2022-04-01

## 2021-12-01 RX ORDER — GLIPIZIDE 10 MG/1
10 TABLET, FILM COATED, EXTENDED RELEASE ORAL 2 TIMES DAILY WITH MEALS
Qty: 60 TABLET | Refills: 5 | Status: SHIPPED | OUTPATIENT
Start: 2021-12-01 | End: 2022-05-30 | Stop reason: SDUPTHER

## 2021-12-01 RX ORDER — PANTOPRAZOLE SODIUM 40 MG/1
40 TABLET, DELAYED RELEASE ORAL DAILY
Qty: 30 TABLET | Refills: 0 | Status: SHIPPED | OUTPATIENT
Start: 2021-12-01 | End: 2022-02-22 | Stop reason: SDUPTHER

## 2021-12-02 ENCOUNTER — PATIENT MESSAGE (OUTPATIENT)
Dept: FAMILY MEDICINE | Facility: CLINIC | Age: 31
End: 2021-12-02
Payer: COMMERCIAL

## 2021-12-02 DIAGNOSIS — E11.65 TYPE 2 DIABETES MELLITUS WITH HYPERGLYCEMIA, WITHOUT LONG-TERM CURRENT USE OF INSULIN: Primary | ICD-10-CM

## 2021-12-02 LAB
ESTIMATED AVG GLUCOSE: 283 MG/DL (ref 68–131)
HBA1C MFR BLD: 11.5 % (ref 4–5.6)

## 2021-12-02 RX ORDER — DULAGLUTIDE 1.5 MG/.5ML
1.5 INJECTION, SOLUTION SUBCUTANEOUS
Qty: 4 PEN | Refills: 0 | Status: SHIPPED | OUTPATIENT
Start: 2021-12-30 | End: 2021-12-28 | Stop reason: SDUPTHER

## 2021-12-28 ENCOUNTER — PATIENT MESSAGE (OUTPATIENT)
Dept: FAMILY MEDICINE | Facility: CLINIC | Age: 31
End: 2021-12-28
Payer: COMMERCIAL

## 2021-12-28 DIAGNOSIS — E11.65 TYPE 2 DIABETES MELLITUS WITH HYPERGLYCEMIA, WITHOUT LONG-TERM CURRENT USE OF INSULIN: Primary | ICD-10-CM

## 2021-12-28 DIAGNOSIS — E11.65 TYPE 2 DIABETES MELLITUS WITH HYPERGLYCEMIA, WITHOUT LONG-TERM CURRENT USE OF INSULIN: ICD-10-CM

## 2021-12-28 RX ORDER — DULAGLUTIDE 1.5 MG/.5ML
1.5 INJECTION, SOLUTION SUBCUTANEOUS
Qty: 4 PEN | Refills: 0 | Status: SHIPPED | OUTPATIENT
Start: 2021-12-30 | End: 2022-04-07 | Stop reason: SDUPTHER

## 2021-12-29 ENCOUNTER — PATIENT MESSAGE (OUTPATIENT)
Dept: FAMILY MEDICINE | Facility: CLINIC | Age: 31
End: 2021-12-29
Payer: COMMERCIAL

## 2021-12-29 RX ORDER — FLASH GLUCOSE SCANNING READER
EACH MISCELLANEOUS
Qty: 1 EACH | Refills: 0 | Status: SHIPPED | OUTPATIENT
Start: 2021-12-29 | End: 2022-04-08 | Stop reason: ALTCHOICE

## 2021-12-29 RX ORDER — FLASH GLUCOSE SENSOR
1 KIT MISCELLANEOUS
Qty: 1 KIT | Refills: 5 | Status: SHIPPED | OUTPATIENT
Start: 2021-12-29 | End: 2022-12-22

## 2022-01-21 ENCOUNTER — PATIENT MESSAGE (OUTPATIENT)
Dept: ADMINISTRATIVE | Facility: OTHER | Age: 32
End: 2022-01-21
Payer: COMMERCIAL

## 2022-02-02 DIAGNOSIS — E11.9 TYPE 2 DIABETES MELLITUS WITHOUT COMPLICATION, UNSPECIFIED WHETHER LONG TERM INSULIN USE: ICD-10-CM

## 2022-02-16 ENCOUNTER — PATIENT OUTREACH (OUTPATIENT)
Dept: ADMINISTRATIVE | Facility: OTHER | Age: 32
End: 2022-02-16
Payer: COMMERCIAL

## 2022-02-16 NOTE — PROGRESS NOTES
Care Everywhere: updated  Immunization: updated  Health Maintenance: updated  Media Review: review for outside eye exam report   Legacy Review:   DIS:  Order placed:   Upcoming appts:  EFAX:  Task Tickets:  Referrals:

## 2022-02-22 DIAGNOSIS — K21.00 GERD WITH ESOPHAGITIS: ICD-10-CM

## 2022-02-22 RX ORDER — PANTOPRAZOLE SODIUM 40 MG/1
40 TABLET, DELAYED RELEASE ORAL DAILY
Qty: 30 TABLET | Refills: 0 | Status: SHIPPED | OUTPATIENT
Start: 2022-02-22 | End: 2022-04-13 | Stop reason: ALTCHOICE

## 2022-02-22 NOTE — TELEPHONE ENCOUNTER
No new care gaps identified.  Powered by [employer]. Reference number: [id number].   2/22/2022 9:38:50 AM CST

## 2022-02-28 ENCOUNTER — TELEPHONE (OUTPATIENT)
Dept: FAMILY MEDICINE | Facility: CLINIC | Age: 32
End: 2022-02-28
Payer: COMMERCIAL

## 2022-03-02 ENCOUNTER — TELEPHONE (OUTPATIENT)
Dept: FAMILY MEDICINE | Facility: CLINIC | Age: 32
End: 2022-03-02
Payer: COMMERCIAL

## 2022-03-02 DIAGNOSIS — E11.9 TYPE 2 DIABETES MELLITUS WITHOUT COMPLICATION, WITHOUT LONG-TERM CURRENT USE OF INSULIN: ICD-10-CM

## 2022-03-02 DIAGNOSIS — E66.01 MORBID OBESITY WITH BMI OF 45.0-49.9, ADULT: ICD-10-CM

## 2022-03-02 DIAGNOSIS — E11.65 TYPE 2 DIABETES MELLITUS WITH HYPERGLYCEMIA, WITHOUT LONG-TERM CURRENT USE OF INSULIN: ICD-10-CM

## 2022-03-02 DIAGNOSIS — E78.5 HYPERLIPIDEMIA ASSOCIATED WITH TYPE 2 DIABETES MELLITUS: Primary | ICD-10-CM

## 2022-03-02 DIAGNOSIS — E11.69 HYPERLIPIDEMIA ASSOCIATED WITH TYPE 2 DIABETES MELLITUS: Primary | ICD-10-CM

## 2022-03-02 DIAGNOSIS — E11.65 TYPE 2 DIABETES MELLITUS WITH HYPERGLYCEMIA, UNSPECIFIED WHETHER LONG TERM INSULIN USE: ICD-10-CM

## 2022-03-02 RX ORDER — LISINOPRIL 2.5 MG/1
2.5 TABLET ORAL DAILY
Qty: 90 TABLET | Refills: 0 | Status: SHIPPED | OUTPATIENT
Start: 2022-03-02 | End: 2022-05-30 | Stop reason: SDUPTHER

## 2022-03-02 NOTE — TELEPHONE ENCOUNTER
Care Due:                  Date            Visit Type   Department     Provider  --------------------------------------------------------------------------------                                GIACOMO      [facility]                              FOLLOWUP/OF  MEDICINE/  Last Visit: 12-      FICE VISIT   INTERNAL MED   Emiliano A  Page  Next Visit: None Scheduled  None         None Found                                                            Last  Test          Frequency    Reason                     Performed    Due Date  --------------------------------------------------------------------------------    HBA1C.......  6 months...  dulaglutide, glipiZIDE,    12- 05-                             metFORMIN................    Powered by [employer] by [employer]. Reference number: [id number].   3/02/2022 1:40:06 PM CST

## 2022-03-02 NOTE — TELEPHONE ENCOUNTER
Phone pt to brandon her appt pt states that she will call us to brandon appt pt went to the wrong clinic but blood work was marcia for today .

## 2022-03-10 ENCOUNTER — PATIENT MESSAGE (OUTPATIENT)
Dept: GYNECOLOGIC ONCOLOGY | Facility: CLINIC | Age: 32
End: 2022-03-10
Payer: COMMERCIAL

## 2022-03-16 ENCOUNTER — PATIENT MESSAGE (OUTPATIENT)
Dept: ADMINISTRATIVE | Facility: OTHER | Age: 32
End: 2022-03-16
Payer: COMMERCIAL

## 2022-03-25 ENCOUNTER — TELEPHONE (OUTPATIENT)
Dept: FAMILY MEDICINE | Facility: CLINIC | Age: 32
End: 2022-03-25
Payer: COMMERCIAL

## 2022-03-29 ENCOUNTER — HOSPITAL ENCOUNTER (EMERGENCY)
Facility: HOSPITAL | Age: 32
Discharge: HOME | End: 2022-03-29
Attending: EMERGENCY MEDICINE
Payer: COMMERCIAL

## 2022-03-29 VITALS
TEMPERATURE: 97 F | SYSTOLIC BLOOD PRESSURE: 137 MMHG | OXYGEN SATURATION: 99 % | WEIGHT: 250 LBS | RESPIRATION RATE: 16 BRPM | HEART RATE: 85 BPM | HEIGHT: 62 IN | BODY MASS INDEX: 46.01 KG/M2 | DIASTOLIC BLOOD PRESSURE: 73 MMHG

## 2022-03-29 DIAGNOSIS — N94.89 ADNEXAL MASS: Primary | ICD-10-CM

## 2022-03-29 DIAGNOSIS — M62.830 LUMBAR PARASPINAL MUSCLE SPASM: ICD-10-CM

## 2022-03-29 DIAGNOSIS — S39.012A ACUTE MYOFASCIAL STRAIN OF LUMBAR REGION, INITIAL ENCOUNTER: ICD-10-CM

## 2022-03-29 LAB
ALBUMIN SERPL BCP-MCNC: 3.9 G/DL (ref 3.5–5.2)
ALP SERPL-CCNC: 74 U/L (ref 55–135)
ALT SERPL W/O P-5'-P-CCNC: 36 U/L (ref 10–44)
ANION GAP SERPL CALC-SCNC: 11 MMOL/L (ref 8–16)
AST SERPL-CCNC: 19 U/L (ref 10–40)
B-HCG UR QL: NEGATIVE
BACTERIA #/AREA URNS HPF: NORMAL /HPF
BASOPHILS # BLD AUTO: 0.03 K/UL (ref 0–0.2)
BASOPHILS NFR BLD: 0.4 % (ref 0–1.9)
BILIRUB SERPL-MCNC: 0.3 MG/DL (ref 0.1–1)
BILIRUB UR QL STRIP: NEGATIVE
BUN SERPL-MCNC: 12 MG/DL (ref 6–20)
CALCIUM SERPL-MCNC: 10.3 MG/DL (ref 8.7–10.5)
CHLORIDE SERPL-SCNC: 101 MMOL/L (ref 95–110)
CLARITY UR: CLEAR
CO2 SERPL-SCNC: 23 MMOL/L (ref 23–29)
COLOR UR: YELLOW
CREAT SERPL-MCNC: 0.8 MG/DL (ref 0.5–1.4)
CTP QC/QA: YES
DIFFERENTIAL METHOD: NORMAL
EOSINOPHIL # BLD AUTO: 0.1 K/UL (ref 0–0.5)
EOSINOPHIL NFR BLD: 1.1 % (ref 0–8)
ERYTHROCYTE [DISTWIDTH] IN BLOOD BY AUTOMATED COUNT: 13.5 % (ref 11.5–14.5)
EST. GFR  (AFRICAN AMERICAN): >60 ML/MIN/1.73 M^2
EST. GFR  (NON AFRICAN AMERICAN): >60 ML/MIN/1.73 M^2
GLUCOSE SERPL-MCNC: 301 MG/DL (ref 70–110)
GLUCOSE UR QL STRIP: ABNORMAL
HCT VFR BLD AUTO: 42.5 % (ref 37–48.5)
HGB BLD-MCNC: 13.7 G/DL (ref 12–16)
HGB UR QL STRIP: NEGATIVE
HYALINE CASTS #/AREA URNS LPF: 0 /LPF
IMM GRANULOCYTES # BLD AUTO: 0.02 K/UL (ref 0–0.04)
IMM GRANULOCYTES NFR BLD AUTO: 0.2 % (ref 0–0.5)
KETONES UR QL STRIP: ABNORMAL
LEUKOCYTE ESTERASE UR QL STRIP: NEGATIVE
LIPASE SERPL-CCNC: 52 U/L (ref 4–60)
LYMPHOCYTES # BLD AUTO: 2.5 K/UL (ref 1–4.8)
LYMPHOCYTES NFR BLD: 29.3 % (ref 18–48)
MCH RBC QN AUTO: 27.7 PG (ref 27–31)
MCHC RBC AUTO-ENTMCNC: 32.2 G/DL (ref 32–36)
MCV RBC AUTO: 86 FL (ref 82–98)
MICROSCOPIC COMMENT: NORMAL
MONOCYTES # BLD AUTO: 0.4 K/UL (ref 0.3–1)
MONOCYTES NFR BLD: 4.5 % (ref 4–15)
NEUTROPHILS # BLD AUTO: 5.5 K/UL (ref 1.8–7.7)
NEUTROPHILS NFR BLD: 64.5 % (ref 38–73)
NITRITE UR QL STRIP: NEGATIVE
NRBC BLD-RTO: 0 /100 WBC
PH UR STRIP: 5 [PH] (ref 5–8)
PLATELET # BLD AUTO: 214 K/UL (ref 150–450)
PMV BLD AUTO: 10.9 FL (ref 9.2–12.9)
POTASSIUM SERPL-SCNC: 4.4 MMOL/L (ref 3.5–5.1)
PROT SERPL-MCNC: 8.6 G/DL (ref 6–8.4)
PROT UR QL STRIP: ABNORMAL
RBC # BLD AUTO: 4.95 M/UL (ref 4–5.4)
RBC #/AREA URNS HPF: 0 /HPF (ref 0–4)
SODIUM SERPL-SCNC: 135 MMOL/L (ref 136–145)
SP GR UR STRIP: >1.03 (ref 1–1.03)
SQUAMOUS #/AREA URNS HPF: 4 /HPF
URN SPEC COLLECT METH UR: ABNORMAL
UROBILINOGEN UR STRIP-ACNC: NEGATIVE EU/DL
WBC # BLD AUTO: 8.44 K/UL (ref 3.9–12.7)
WBC #/AREA URNS HPF: 0 /HPF (ref 0–5)
YEAST URNS QL MICRO: NORMAL

## 2022-03-29 PROCEDURE — 96375 TX/PRO/DX INJ NEW DRUG ADDON: CPT

## 2022-03-29 PROCEDURE — 83690 ASSAY OF LIPASE: CPT | Performed by: NURSE PRACTITIONER

## 2022-03-29 PROCEDURE — 96365 THER/PROPH/DIAG IV INF INIT: CPT

## 2022-03-29 PROCEDURE — 85025 COMPLETE CBC W/AUTO DIFF WBC: CPT | Performed by: NURSE PRACTITIONER

## 2022-03-29 PROCEDURE — 96361 HYDRATE IV INFUSION ADD-ON: CPT

## 2022-03-29 PROCEDURE — 25000003 PHARM REV CODE 250: Performed by: NURSE PRACTITIONER

## 2022-03-29 PROCEDURE — 81000 URINALYSIS NONAUTO W/SCOPE: CPT | Performed by: NURSE PRACTITIONER

## 2022-03-29 PROCEDURE — 99285 EMERGENCY DEPT VISIT HI MDM: CPT | Mod: 25

## 2022-03-29 PROCEDURE — 96376 TX/PRO/DX INJ SAME DRUG ADON: CPT

## 2022-03-29 PROCEDURE — 81025 URINE PREGNANCY TEST: CPT | Performed by: EMERGENCY MEDICINE

## 2022-03-29 PROCEDURE — 63600175 PHARM REV CODE 636 W HCPCS: Performed by: NURSE PRACTITIONER

## 2022-03-29 PROCEDURE — 80053 COMPREHEN METABOLIC PANEL: CPT | Performed by: NURSE PRACTITIONER

## 2022-03-29 RX ORDER — TIZANIDINE 2 MG/1
2 TABLET ORAL EVERY 8 HOURS PRN
Qty: 15 TABLET | Refills: 0 | Status: SHIPPED | OUTPATIENT
Start: 2022-03-29 | End: 2022-08-03

## 2022-03-29 RX ORDER — ONDANSETRON 2 MG/ML
4 INJECTION INTRAMUSCULAR; INTRAVENOUS
Status: COMPLETED | OUTPATIENT
Start: 2022-03-29 | End: 2022-03-29

## 2022-03-29 RX ORDER — ONDANSETRON 4 MG/1
4 TABLET, ORALLY DISINTEGRATING ORAL EVERY 6 HOURS PRN
Qty: 20 TABLET | Refills: 0 | Status: SHIPPED | OUTPATIENT
Start: 2022-03-29 | End: 2022-04-13

## 2022-03-29 RX ORDER — NAPROXEN 500 MG/1
500 TABLET ORAL EVERY 12 HOURS PRN
Qty: 20 TABLET | Refills: 0 | Status: SHIPPED | OUTPATIENT
Start: 2022-03-29 | End: 2022-08-03

## 2022-03-29 RX ORDER — HYDROCODONE BITARTRATE AND ACETAMINOPHEN 5; 325 MG/1; MG/1
1 TABLET ORAL EVERY 4 HOURS PRN
Qty: 8 TABLET | Refills: 0 | Status: SHIPPED | OUTPATIENT
Start: 2022-03-29 | End: 2022-04-13

## 2022-03-29 RX ORDER — HYDROMORPHONE HYDROCHLORIDE 2 MG/ML
1 INJECTION, SOLUTION INTRAMUSCULAR; INTRAVENOUS; SUBCUTANEOUS
Status: COMPLETED | OUTPATIENT
Start: 2022-03-29 | End: 2022-03-29

## 2022-03-29 RX ORDER — KETOROLAC TROMETHAMINE 30 MG/ML
15 INJECTION, SOLUTION INTRAMUSCULAR; INTRAVENOUS
Status: COMPLETED | OUTPATIENT
Start: 2022-03-29 | End: 2022-03-29

## 2022-03-29 RX ADMIN — PROMETHAZINE HYDROCHLORIDE 25 MG: 25 INJECTION INTRAMUSCULAR; INTRAVENOUS at 02:03

## 2022-03-29 RX ADMIN — KETOROLAC TROMETHAMINE 15 MG: 30 INJECTION, SOLUTION INTRAMUSCULAR at 11:03

## 2022-03-29 RX ADMIN — SODIUM CHLORIDE, SODIUM LACTATE, POTASSIUM CHLORIDE, AND CALCIUM CHLORIDE 1000 ML: .6; .31; .03; .02 INJECTION, SOLUTION INTRAVENOUS at 11:03

## 2022-03-29 RX ADMIN — ONDANSETRON 4 MG: 2 INJECTION INTRAMUSCULAR; INTRAVENOUS at 11:03

## 2022-03-29 RX ADMIN — HYDROMORPHONE HYDROCHLORIDE 1 MG: 2 INJECTION INTRAMUSCULAR; INTRAVENOUS; SUBCUTANEOUS at 03:03

## 2022-03-29 RX ADMIN — HYDROMORPHONE HYDROCHLORIDE 1 MG: 2 INJECTION INTRAMUSCULAR; INTRAVENOUS; SUBCUTANEOUS at 11:03

## 2022-03-29 NOTE — ED PROVIDER NOTES
Encounter Date: 3/29/2022       History     Chief Complaint   Patient presents with    Back Pain     Pt c/o lower back pain since yesterday and LLQ abd pain x 2 weeks. Denies dysuria.      31-year-old female with a past medical history of recurrent ventral hernia, PCOS, type 2 diabetes, hypertension, hyperlipidemia, chronic left lower quadrant abdominal pain and a past surgical history of ventral hernia repair, appendectomy, splenectomy, right salpingoopherectomy.  Presents for evaluation today of left lumbar pain that began yesterday.  Also reports left lower quadrant abdominal pain that began about 1 month ago.  She has an appointment with her OBGYN for the left lower quadrant abdominal pain scheduled for tomorrow.  Left lumbar pain is exacerbated with lumbar flexion, extension, and other range of motion.  States that pain does not go away but has been waxing and waning.  She took ibuprofen for the pain without relief of symptoms.  She denies dysuria, hematuria, fever, nausea, vomiting, diarrhea, constipation, vaginal bleeding, vaginal discharge, or any additional symptoms.        Review of patient's allergies indicates:   Allergen Reactions    Latex, natural rubber Rash     Burning sensation     Past Medical History:   Diagnosis Date    Asthma childhood    Colon polyp     Diabetes mellitus     GERD (gastroesophageal reflux disease)     Morbid obesity     PCOS (polycystic ovarian syndrome)      Past Surgical History:   Procedure Laterality Date    APPENDECTOMY  2011    HERNIA REPAIR      OOPHORECTOMY      salpingoopherectomy Right 2008     Family History   Problem Relation Age of Onset    Heart attack Father     Hypertension Father     Diabetes Mother     Heart attack Mother     Breast cancer Neg Hx     Colon cancer Neg Hx     Ovarian cancer Neg Hx      Social History     Tobacco Use    Smoking status: Never Smoker    Smokeless tobacco: Never Used   Substance Use Topics    Alcohol use: Not  Currently     Comment: occasionally apple cider    Drug use: No     Review of Systems   Constitutional: Negative for chills, fatigue and fever.   HENT: Negative for congestion, ear pain, nosebleeds, postnasal drip, rhinorrhea, sinus pressure and sore throat.    Eyes: Negative for photophobia and pain.   Respiratory: Negative for apnea, cough, choking, chest tightness, shortness of breath, wheezing and stridor.    Cardiovascular: Negative for chest pain, palpitations and leg swelling.   Gastrointestinal: Positive for abdominal pain ( left lower quadrant). Negative for constipation, diarrhea, nausea and vomiting.   Genitourinary: Negative for dysuria, flank pain, hematuria, pelvic pain and vaginal discharge.   Musculoskeletal: Positive for back pain (Left lumbar). Negative for arthralgias, gait problem and neck pain.   Skin: Negative for color change, pallor, rash and wound.   Neurological: Negative for dizziness, seizures, syncope, facial asymmetry, light-headedness and headaches.   Hematological: Negative for adenopathy.   Psychiatric/Behavioral: Negative for confusion. The patient is not nervous/anxious.        Physical Exam     Initial Vitals [03/29/22 1012]   BP Pulse Resp Temp SpO2   (!) 176/98 82 18 98.7 °F (37.1 °C) 100 %      MAP       --         Physical Exam    Nursing note and vitals reviewed.  Constitutional: She appears well-developed and well-nourished. She is not diaphoretic. No distress.   HENT:   Head: Normocephalic and atraumatic.   Right Ear: External ear normal.   Left Ear: External ear normal.   Nose: Nose normal.   Eyes: Conjunctivae and EOM are normal. Right eye exhibits no discharge. Left eye exhibits no discharge.   Neck: Neck supple. No tracheal deviation present.   Normal range of motion.  Cardiovascular: Normal rate.   Pulmonary/Chest: No stridor. No respiratory distress.   Abdominal: Abdomen is soft. She exhibits no distension. There is no abdominal tenderness.   Musculoskeletal:          General: No tenderness. Normal range of motion.      Cervical back: Normal range of motion and neck supple.     Neurological: She is alert and oriented to person, place, and time. She has normal strength. No cranial nerve deficit or sensory deficit.   Skin: Skin is warm and dry.   Psychiatric: She has a normal mood and affect. Her behavior is normal. Judgment and thought content normal.         ED Course   Procedures  Labs Reviewed   COMPREHENSIVE METABOLIC PANEL - Abnormal; Notable for the following components:       Result Value    Sodium 135 (*)     Glucose 301 (*)     Total Protein 8.6 (*)     All other components within normal limits   URINALYSIS, REFLEX TO URINE CULTURE - Abnormal; Notable for the following components:    Specific Gravity, UA >1.030 (*)     Protein, UA 1+ (*)     Glucose, UA 4+ (*)     Ketones, UA Trace (*)     All other components within normal limits    Narrative:     Specimen Source->Urine   CBC W/ AUTO DIFFERENTIAL   LIPASE   URINALYSIS MICROSCOPIC    Narrative:     Specimen Source->Urine   POCT URINE PREGNANCY          Imaging Results           US Pelvis Comp with Transvag NON-OB (xpd (Final result)  Result time 03/29/22 15:01:23    Final result by Andrea Bejarano MD (03/29/22 15:01:23)                 Impression:      No acute sonographic abnormality.    Left ovarian large multiloculated complex/hemorrhagic cyst versus adjacent smaller complex/hemorrhagic cysts, correlating with findings on recent CT abdomen and pelvis.  Neoplastic process not excluded on the basis of this examination.  Further evaluation with elective/nonemergent MRI pelvis can be obtained for further characterization given the overall increased in size from previous cross-sectional imaging prior to today's studies.  Alternatively, short-term follow-up with pelvic ultrasound in 6-8 weeks can be obtained to ensure stability/resolution, as warranted.    Slightly heterogeneous and upper limits of normal  endometrium.    This report was flagged in Epic as abnormal.      Electronically signed by: Andrea Bejarano MD  Date:    03/29/2022  Time:    15:01             Narrative:    EXAMINATION:  US PELVIS COMP WITH TRANSVAG NON-OB (XPD)    CLINICAL HISTORY:  left adnexal pain;    TECHNIQUE:  Transabdominal sonography of the pelvis was performed, followed by transvaginal sonography to better evaluate the uterus and ovaries.    COMPARISON:  CT abdomen and pelvis same day, MRI abdomen and pelvis 01/27/2021, pelvic ultrasound 04/24/2018    FINDINGS:  Uterus:    Measures 6.9 x 3.8 x 4.2 cm in dimensions.  No discrete uterine fibroids identified.  The endometrium is slightly heterogeneous and upper limits of normal thickness measuring 11 mm.  Few small nabothian cysts noted.    Ovaries:    Right ovary not identified consistent with reported history of previous remote right-sided salpingo-oophorectomy.  No right adnexal mass seen.  The left ovary measures 9.4 x 4.4 x 10.2 cm. Within the left ovary there are multiple well-circumscribed hypoechoic masses with low level internal echoes but no significant internal vascularity posterior acoustic enhancement or shadowing.  This likely corresponds to left adnexal mass on recent CT.  Doppler flow demonstrated to the left ovary.    Free Fluid:    None.                                CT Abdomen Pelvis  Without Contrast (Final result)  Result time 03/29/22 12:31:10    Final result by Robert Fam MD (03/29/22 12:31:10)                 Impression:      This report was flagged in Epic as abnormal.    1. Slight left perinephric fat stranding, finding is nonspecific however correlation with urinalysis is recommended as differential would potentially include sequela of recently passed calculus or infection.  2. Hepatomegaly noting diffuse steatosis, correlation with LFTs recommended.  3. Multilobulated cystic focus within the midline pelvis, anterior to the uterus.  Finding may reflect  multiple ovarian or paraovarian cysts noting limited evaluation given noncontrast technique.  The cystic components have increased since previous examination or are new.  Ultrasound could be performed for further evaluation as warranted.  4. Large anterior abdominal wall hernia containing several loops of bowel, without obstruction.  5. Scattered pleural based pulmonary nodules versus atelectasis.  For multiple solid nodules all <6 mm, Fleischner Society 2017 guidelines recommend no routine follow up for a low risk patient, or follow up with non-contrast chest CT at 12 months after discovery in a high risk patient.  6. Several additional findings above.      Electronically signed by: Robert Fam MD  Date:    03/29/2022  Time:    12:31             Narrative:    EXAMINATION:  CT ABDOMEN PELVIS WITHOUT CONTRAST    CLINICAL HISTORY:  Flank pain, kidney stone suspected;    TECHNIQUE:  Low dose axial images, sagittal and coronal reformations were obtained from the lung bases to the pubic symphysis.  Oral contrast was not administered.    COMPARISON:  CT 03/25/2021    FINDINGS:  Images of the lower thorax are remarkable for bilateral dependent atelectasis.  There are a few scattered pleural based pulmonary nodules along the periphery of the right and left lower lobes, suggesting atelectasis, measuring up to 4 mm.    The liver is hypoattenuating suggesting steatosis, correlation with LFTs recommended.  The liver is enlarged.  Scattered regions of focal fatty sparing noted throughout the parenchyma.  The spleen, pancreas, adrenal glands and gallbladder are grossly unremarkable for noncontrast technique.  There is a small hiatal hernia.  The pancreatic duct is not dilated.  There is no biliary dilation or ascites.  There are several scattered nonenlarged abdominal lymph nodes.    There is slight left perinephric fat stranding without hydronephrosis or nephrolithiasis.  No right hydronephrosis or nephrolithiasis.  The  bilateral ureters are unremarkable without calculi seen.  The urinary bladder is decompressed without wall thickening.  The uterus is unremarkable.  There is a multi cystic structure along the anterior aspect of the uterus, in conglomerate, measures approximately 9.4 x 6.6 cm.  In comparison to examination 03/25/2021, the cystic components have increased in size since that time or are new.  The adnexa is otherwise grossly unremarkable.    The distal large bowel is decompressed noting a few scattered colonic diverticula without inflammation.  The terminal ileum and appendix are unremarkable.  There is an anterior abdominal wall hernia containing several loops of nonobstructed small bowel.  Several scattered shotty periaortic and paracaval lymph nodes are noted.    Surgical changes versus traumatic change noted of the right iliac wing.  Degenerative changes are noted of the spine.  No significant inguinal lymphadenopathy.                                 Medications   lactated ringers bolus 1,000 mL (0 mLs Intravenous Stopped 3/29/22 1215)   ketorolac injection 15 mg (15 mg Intravenous Given 3/29/22 1110)   HYDROmorphone (PF) injection 1 mg (1 mg Intravenous Given 3/29/22 1111)   ondansetron injection 4 mg (4 mg Intravenous Given 3/29/22 1111)   promethazine (PHENERGAN) 25 mg in dextrose 5 % 50 mL IVPB (0 mg Intravenous Stopped 3/29/22 1506)   HYDROmorphone (PF) injection 1 mg (1 mg Intravenous Given 3/29/22 1521)     Medical Decision Making:   Differential Diagnosis:   Pyelonephritis, ureterolithiasis, diverticulitis, adnexal pathology, musculoskeletal pain, others  Clinical Tests:   Lab Tests: Ordered and Reviewed  Radiological Study: Ordered and Reviewed  ED Management:  Imaging without evidence of emergent pathology.  There is a large complex cystic mass in the left ovarian area.  This is known to the patient and she has a follow-up appointment with Ob gyn Oncology tomorrow.  Uncertain etiology of acute flank pain,  however there is no evidence of pyelonephritis, ureterolithiasis, diverticulitis, or other acute abnormality.  Will treat symptomatically.  Findings discussed with patient.  ED return precautions given. All questions regarding diagnosis and plan were answered to the patient's fullest possible satisfaction. Patient expressed understanding of diagnosis, discharge instructions, and return precautions.            Patient note was created using [employer] voice dictation software.  Any errors in syntax or information may not have been identified and edited prior to signing this note.                        Clinical Impression:   Final diagnoses:  [S39.012A] Acute myofascial strain of lumbar region, initial encounter  [N94.89] Adnexal mass (Primary)  [M62.830] Lumbar paraspinal muscle spasm          ED Disposition Condition    Discharge Stable        ED Prescriptions     Medication Sig Dispense Start Date End Date Auth. Provider    tiZANidine (ZANAFLEX) 2 MG tablet Take 1 tablet (2 mg total) by mouth every 8 (eight) hours as needed (Muscle Spasms). 15 tablet 3/29/2022  Dave Sanders NP    naproxen (NAPROSYN) 500 MG tablet Take 1 tablet (500 mg total) by mouth every 12 (twelve) hours as needed (Pain). 20 tablet 3/29/2022  Dave Sanders NP    HYDROcodone-acetaminophen (NORCO) 5-325 mg per tablet Take 1 tablet by mouth every 4 (four) hours as needed for Pain. 8 tablet 3/29/2022  Dave Sanders NP    ondansetron (ZOFRAN-ODT) 4 MG TbDL Take 1 tablet (4 mg total) by mouth every 6 (six) hours as needed (Nausea). 20 tablet 3/29/2022  Dave Sanders NP        Follow-up Information     Follow up With Specialties Details Why Contact Info    Ritchie Millan MD Gynecologic Oncology Schedule an appointment as soon as possible for a visit in 1 day For further evaluation [address]  [facility] [address]  [phone]      [facility] Emergency [facility] Go to  If symptoms worsen, As needed [address]  [address]  [facility] [address]  [phone]           Dave Sanders, JUAN  03/30/22 1054

## 2022-03-29 NOTE — DISCHARGE INSTRUCTIONS
Follow-up with Dr. Millan tomorrow as scheduled.    Do not drive or drink alcohol when taking pain medications because they will make you drowsy.    Return to the emergency department immediately for any new or worsening symptoms.    Thank you for coming to our Emergency Department today. It is important to remember that some problems are difficult to diagnose and may not be found during your first visit. Be sure to follow up with your primary care doctor.  If you do not have one, you may contact the one listed on your discharge paperwork or you may also call the [facility] Appointment Desk at [phone] to schedule an appointment with one.     Return to the ER with any questions/concerns, new/concerning symptoms, worsening or failure to improve. Do not drive or make any important decisions for 24 hours if you have received any pain medications, sedatives or mood altering drugs during your ER visit.

## 2022-03-29 NOTE — PROGRESS NOTES
"HISTORY OF PRESENT CONDITION  CC: pelvic pain  Glenda Peña is a 31 y.o. G0 who presents for follow-up regarding pelvic pain.  Below is a summary of her history.  The interval history is as follows:     Pelvic pain x 2 months.  Localized to LLQ.  Acute onset.  Constant and intermittent. 4/10.  Characterized as sharp.  Alleviated by Tylenol and Percocet 5/325 mg.  Associated with change in menses.  LMP 2 months ago.  Prior was normal without AUB or dysmenorrhea.  -Dysuria, hematuria, palpitations, or CP. +PO. -N/V. +Flatus. +BM.       1. 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  She didn't require staging or adjuvant CT and is still alive.  2. 1/27/12: Perforated appendicitis.  Operative laparoscopy converted to an Ex-lap and appendectomy.  3. 8/21/16: Lap ventral hernia repair with a 25 x 15 cm Strattice mesh  4. 4/23/18: Evaluated by General Surgery during an admission and thought to have sigmoid colitis.  Discussed a sigmoid resection with an end colostomy once the patient lost weight.  5.  2/17-2/29: Admitted to [facility] for abdominal pain and suspicion for a L TOA.  She had it IR drained and was discharged from the hospital with PO Amoxicillin.  6. 2/17/20: CT A/P: 10.4 x 7.9 x 6.8 cm  7. 2/18/20: IR guided drainage. Gm + bacilli resembling diphteroids. Actinomyces without sensitivities  8. 2/19/20: A1c = 8.9  9. 2/26/20: CT A/P: Improved TOA but new fluid collections in the hernia sac.  10. 3/5-12/20: Admitted to [facility] for abdominal pain and persistent L TOA.  IR attempted to drain it on 3/6/20 but was unsuccesful. She was discharged on IV unasyn.   11. 3/27/20: CT A/P with increased size in TOA.  12. 3/29-4/2/20: Admitted for RLQ pain.  13. 3/30: IR guided drainage of 65 cc.  Aerobic, anaerobic, and fungal cultures were negative.  Negative cytology.  14. 4/2-4/6: COVID-19 admission  15. 4/3: Pigtail drain " removed  16. 4/21: CT A/P: Complex fluid collection adjacent to the uterus measuring 9.8 by 5.3 cm, previously measuring 8.9 x 4.3 cm  17. 5/5: Completed course of IV antibiotics  18. 5/5-: Amoxicillin 500mg PO q8h (notes say she was supposed to take it for 6 months but she didn't)  19. 6/6: MRI Pelvis w/ w/o: 11 cm complex R adnexal mass.  No evidence of lymphadenopathy.     20. 3/2/22: Pelvic US: R complex adnexal mass 6 cm                      CT A/P w/:     REVIEW OF SYSTEMS  All systems reviewed and negative except as noted in HPI.    OBJECTIVE   Vitals:    03/30/22 0838   BP: 132/76   Pulse: 91      Body mass index is 44.56 kg/m².      1. General: Well appearing, no apparent distress, alert and oriented.  2. Lymph: Neck symmetric without cervical or supraclavicular adenopathy or mass.  3. Lungs: Normal respiratory rate, no accessory muscle use.  4. Psych: Normal affect.  5. Abdomen:  non-distended, soft, non-tender, RLQ hernia that crosses the midline and RUQ and is approximately 25 x 30 cm, no evidence of strangulation or incarceration, no ascites, no hepatosplenomegaly.  6. Skin: Warm, dry, no rashes or lesions.   7. Extremities: Bilateral lower extremities without edema or tenderness.  8. Genitourinary               Pelvic Examination including:                a. External genitalia are normal in appearance. No lesions noted.               b. Urethral meatus is normal size, location, and appearance.               c. Urethra is negative.               d. Bladder is nontender. No masses noted.               e. Vagina has normal mucosa with physiologic discharge. No lesions noted.              f. Uterus limited by body habitus              g. Adnexa limited by body haibuts    ECOG status: 1    LABORATORY DATA  Lab data reviewed.    RADIOLOGICAL DATA  Radiology data reviewed.    PATHOLOGY DATA  Pathology data reviewed.    ASSESSMENT / PLAN     1. Adnexal mass    2. Ventral incisional hernia without obstruction  or gangrene    3. Type 2 diabetes mellitus with hyperglycemia, without long-term current use of insulin    4. Morbid obesity with BMI of 45.0-49.9, adult      The patient represents to me after presenting to [facility] ED on 3/2/22 with a cc of pelvic pain, very similar to the pain she had in the past.  Pelvic US and CT A/P is consistent with a complex adnexal mass.  The reassuring thing is that this clinical picture is not consistent with a malignancy.  I also don't think it's consistent with a TOA, although in the past she had positive cultures and treated with a long course of antibiotics.  Her WBC was most recently 7.8.  I think that this clinical picture is most consistent with PCOS and/or hemorrhagic cysts.  Management of this is not really in my scope of practice and she ultimately needs to see an OBGYN for management of her PCOS.  At this time I recommend starting OCPs and RONC in 4 weeks.  We will consider re-imaging and drain placement if her pain worsens.  I will refer her to OBGYN now to establish care and to assist in transitioning her care once this acute episode resolves.  I also performed a Pap smear as she hasn't been seen by OBGYN in years.  I will also refer her to Orthopedics, but she may have to discuss that referral with her PCP.  All questions were answered.    PATIENT EDUCATION  Ready to learn, no apparent learning barriers were identified; learning preferences include listening.  Explained diagnosis and treatment plan; patient expressed understanding of the content.    INFORMED CONSENT  Discussed the risks, benefits, and alternatives of the procedure and of possible blood transfusion.  Discussed the necessity of other members of the healthcare team participating in the procedure.  All questions answered and consent given.    Ritchie Millan

## 2022-03-30 ENCOUNTER — OFFICE VISIT (OUTPATIENT)
Dept: GYNECOLOGIC ONCOLOGY | Facility: CLINIC | Age: 32
End: 2022-03-30
Payer: COMMERCIAL

## 2022-03-30 VITALS
WEIGHT: 243.63 LBS | DIASTOLIC BLOOD PRESSURE: 76 MMHG | BODY MASS INDEX: 44.56 KG/M2 | SYSTOLIC BLOOD PRESSURE: 132 MMHG | HEART RATE: 91 BPM

## 2022-03-30 DIAGNOSIS — N94.89 ADNEXAL MASS: Primary | ICD-10-CM

## 2022-03-30 DIAGNOSIS — E11.65 TYPE 2 DIABETES MELLITUS WITH HYPERGLYCEMIA, WITHOUT LONG-TERM CURRENT USE OF INSULIN: ICD-10-CM

## 2022-03-30 DIAGNOSIS — K43.2 VENTRAL INCISIONAL HERNIA WITHOUT OBSTRUCTION OR GANGRENE: ICD-10-CM

## 2022-03-30 DIAGNOSIS — E66.01 MORBID OBESITY WITH BMI OF 45.0-49.9, ADULT: ICD-10-CM

## 2022-03-30 PROCEDURE — 3008F BODY MASS INDEX DOCD: CPT | Mod: CPTII,S$GLB,, | Performed by: OBSTETRICS & GYNECOLOGY

## 2022-03-30 PROCEDURE — 3075F PR MOST RECENT SYSTOLIC BLOOD PRESS GE 130-139MM HG: ICD-10-PCS | Mod: CPTII,S$GLB,, | Performed by: OBSTETRICS & GYNECOLOGY

## 2022-03-30 PROCEDURE — 4010F ACE/ARB THERAPY RXD/TAKEN: CPT | Mod: CPTII,S$GLB,, | Performed by: OBSTETRICS & GYNECOLOGY

## 2022-03-30 PROCEDURE — 3008F PR BODY MASS INDEX (BMI) DOCUMENTED: ICD-10-PCS | Mod: CPTII,S$GLB,, | Performed by: OBSTETRICS & GYNECOLOGY

## 2022-03-30 PROCEDURE — 3078F DIAST BP <80 MM HG: CPT | Mod: CPTII,S$GLB,, | Performed by: OBSTETRICS & GYNECOLOGY

## 2022-03-30 PROCEDURE — 99215 OFFICE O/P EST HI 40 MIN: CPT | Mod: S$GLB,,, | Performed by: OBSTETRICS & GYNECOLOGY

## 2022-03-30 PROCEDURE — 99999 PR PBB SHADOW E&M-EST. PATIENT-LVL IV: ICD-10-PCS | Mod: PBBFAC,,, | Performed by: OBSTETRICS & GYNECOLOGY

## 2022-03-30 PROCEDURE — 4010F PR ACE/ARB THEARPY RXD/TAKEN: ICD-10-PCS | Mod: CPTII,S$GLB,, | Performed by: OBSTETRICS & GYNECOLOGY

## 2022-03-30 PROCEDURE — 3078F PR MOST RECENT DIASTOLIC BLOOD PRESSURE < 80 MM HG: ICD-10-PCS | Mod: CPTII,S$GLB,, | Performed by: OBSTETRICS & GYNECOLOGY

## 2022-03-30 PROCEDURE — 1159F MED LIST DOCD IN RCRD: CPT | Mod: CPTII,S$GLB,, | Performed by: OBSTETRICS & GYNECOLOGY

## 2022-03-30 PROCEDURE — 99215 PR OFFICE/OUTPT VISIT, EST, LEVL V, 40-54 MIN: ICD-10-PCS | Mod: S$GLB,,, | Performed by: OBSTETRICS & GYNECOLOGY

## 2022-03-30 PROCEDURE — 3075F SYST BP GE 130 - 139MM HG: CPT | Mod: CPTII,S$GLB,, | Performed by: OBSTETRICS & GYNECOLOGY

## 2022-03-30 PROCEDURE — 99999 PR PBB SHADOW E&M-EST. PATIENT-LVL IV: CPT | Mod: PBBFAC,,, | Performed by: OBSTETRICS & GYNECOLOGY

## 2022-03-30 PROCEDURE — 88175 CYTOPATH C/V AUTO FLUID REDO: CPT | Performed by: OBSTETRICS & GYNECOLOGY

## 2022-03-30 PROCEDURE — 1159F PR MEDICATION LIST DOCUMENTED IN MEDICAL RECORD: ICD-10-PCS | Mod: CPTII,S$GLB,, | Performed by: OBSTETRICS & GYNECOLOGY

## 2022-03-30 RX ORDER — OXYCODONE HYDROCHLORIDE 5 MG/1
5 TABLET ORAL EVERY 6 HOURS PRN
Qty: 40 TABLET | Refills: 0 | Status: SHIPPED | OUTPATIENT
Start: 2022-03-30 | End: 2022-04-13

## 2022-03-30 RX ORDER — NORETHINDRONE ACETATE AND ETHINYL ESTRADIOL .02; 1 MG/1; MG/1
1 TABLET ORAL DAILY
Qty: 30 TABLET | Refills: 11 | Status: SHIPPED | OUTPATIENT
Start: 2022-03-30 | End: 2022-06-06 | Stop reason: SDUPTHER

## 2022-03-30 RX ORDER — OXYCODONE AND ACETAMINOPHEN 5; 325 MG/1; MG/1
1 TABLET ORAL EVERY 6 HOURS PRN
Status: ON HOLD | COMMUNITY
Start: 2022-03-02 | End: 2022-09-08 | Stop reason: HOSPADM

## 2022-03-30 RX ORDER — PROMETHAZINE HYDROCHLORIDE 25 MG/1
25 TABLET ORAL EVERY 6 HOURS PRN
COMMUNITY
Start: 2022-03-02 | End: 2022-12-22

## 2022-04-04 ENCOUNTER — TELEPHONE (OUTPATIENT)
Dept: GYNECOLOGIC ONCOLOGY | Facility: CLINIC | Age: 32
End: 2022-04-04
Payer: COMMERCIAL

## 2022-04-04 NOTE — TELEPHONE ENCOUNTER
Spoke with our patient about her reschedule appointment in gyn oncology she agreed she voiced understanding of the date, time and location. All questions answered appointment mail. MA/IRVING /Preceptor Linden HOFFMAN

## 2022-04-06 ENCOUNTER — PATIENT MESSAGE (OUTPATIENT)
Dept: GYNECOLOGIC ONCOLOGY | Facility: CLINIC | Age: 32
End: 2022-04-06
Payer: COMMERCIAL

## 2022-04-06 ENCOUNTER — PATIENT MESSAGE (OUTPATIENT)
Dept: ORTHOPEDICS | Facility: CLINIC | Age: 32
End: 2022-04-06
Payer: COMMERCIAL

## 2022-04-07 DIAGNOSIS — E11.65 TYPE 2 DIABETES MELLITUS WITH HYPERGLYCEMIA, WITHOUT LONG-TERM CURRENT USE OF INSULIN: ICD-10-CM

## 2022-04-08 RX ORDER — DULAGLUTIDE 1.5 MG/.5ML
1.5 INJECTION, SOLUTION SUBCUTANEOUS
Qty: 4 PEN | Refills: 0 | Status: SHIPPED | OUTPATIENT
Start: 2022-04-08 | End: 2022-05-30

## 2022-04-08 NOTE — TELEPHONE ENCOUNTER
Refill Routing Note   Medication(s) are not appropriate for processing by [facility] for the following reason(s):      - Patient has been seen in the ED/Hospital since the last PCP visit    [facility] action(s):  Defer          Medication reconciliation completed: No     Appointments  past 12m or future 3m with PCP    Date Provider   Last Visit   12/1/2021 Emiliano Dunbar MD   Next Visit   4/11/2022 Emiliano Dunbar MD   ED visits in past 90 days: 1        Note composed:12:51 PM 04/08/2022

## 2022-04-08 NOTE — TELEPHONE ENCOUNTER
No new care gaps identified.  Powered by [employer]. Reference number: [id number].   4/07/2022 8:59:05 PM CDT

## 2022-04-13 ENCOUNTER — TELEPHONE (OUTPATIENT)
Dept: GYNECOLOGIC ONCOLOGY | Facility: CLINIC | Age: 32
End: 2022-04-13
Payer: COMMERCIAL

## 2022-04-13 ENCOUNTER — OFFICE VISIT (OUTPATIENT)
Dept: GASTROENTEROLOGY | Facility: CLINIC | Age: 32
End: 2022-04-13
Payer: COMMERCIAL

## 2022-04-13 ENCOUNTER — PATIENT MESSAGE (OUTPATIENT)
Dept: GASTROENTEROLOGY | Facility: CLINIC | Age: 32
End: 2022-04-13

## 2022-04-13 VITALS — BODY MASS INDEX: 44.72 KG/M2 | HEIGHT: 62 IN | WEIGHT: 243 LBS

## 2022-04-13 DIAGNOSIS — R11.2 NON-INTRACTABLE VOMITING WITH NAUSEA: ICD-10-CM

## 2022-04-13 DIAGNOSIS — K76.0 HEPATIC STEATOSIS: ICD-10-CM

## 2022-04-13 DIAGNOSIS — Z87.19 HISTORY OF DIVERTICULOSIS: ICD-10-CM

## 2022-04-13 DIAGNOSIS — K45.8 OTHER SPECIFIED ABDOMINAL HERNIA WITHOUT OBSTRUCTION OR GANGRENE: ICD-10-CM

## 2022-04-13 DIAGNOSIS — R12 HEARTBURN: Primary | ICD-10-CM

## 2022-04-13 DIAGNOSIS — R93.3 ABNORMAL CT SCAN, GASTROINTESTINAL TRACT: ICD-10-CM

## 2022-04-13 DIAGNOSIS — K44.9 HIATAL HERNIA: ICD-10-CM

## 2022-04-13 DIAGNOSIS — R16.0 HEPATOMEGALY: ICD-10-CM

## 2022-04-13 DIAGNOSIS — N83.9 LESION OF LEFT OVARY: ICD-10-CM

## 2022-04-13 DIAGNOSIS — R10.32 LLQ ABDOMINAL PAIN: ICD-10-CM

## 2022-04-13 DIAGNOSIS — R93.89 ABNORMAL FINDING ON CT SCAN: ICD-10-CM

## 2022-04-13 PROCEDURE — 99214 OFFICE O/P EST MOD 30 MIN: CPT | Mod: 95,,, | Performed by: NURSE PRACTITIONER

## 2022-04-13 PROCEDURE — 4010F PR ACE/ARB THEARPY RXD/TAKEN: ICD-10-PCS | Mod: CPTII,95,, | Performed by: NURSE PRACTITIONER

## 2022-04-13 PROCEDURE — 99214 PR OFFICE/OUTPT VISIT, EST, LEVL IV, 30-39 MIN: ICD-10-PCS | Mod: 95,,, | Performed by: NURSE PRACTITIONER

## 2022-04-13 PROCEDURE — 3008F BODY MASS INDEX DOCD: CPT | Mod: CPTII,95,, | Performed by: NURSE PRACTITIONER

## 2022-04-13 PROCEDURE — 3008F PR BODY MASS INDEX (BMI) DOCUMENTED: ICD-10-PCS | Mod: CPTII,95,, | Performed by: NURSE PRACTITIONER

## 2022-04-13 PROCEDURE — 1159F MED LIST DOCD IN RCRD: CPT | Mod: CPTII,95,, | Performed by: NURSE PRACTITIONER

## 2022-04-13 PROCEDURE — 1160F PR REVIEW ALL MEDS BY PRESCRIBER/CLIN PHARMACIST DOCUMENTED: ICD-10-PCS | Mod: CPTII,95,, | Performed by: NURSE PRACTITIONER

## 2022-04-13 PROCEDURE — 1159F PR MEDICATION LIST DOCUMENTED IN MEDICAL RECORD: ICD-10-PCS | Mod: CPTII,95,, | Performed by: NURSE PRACTITIONER

## 2022-04-13 PROCEDURE — 1160F RVW MEDS BY RX/DR IN RCRD: CPT | Mod: CPTII,95,, | Performed by: NURSE PRACTITIONER

## 2022-04-13 PROCEDURE — 4010F ACE/ARB THERAPY RXD/TAKEN: CPT | Mod: CPTII,95,, | Performed by: NURSE PRACTITIONER

## 2022-04-13 RX ORDER — OMEPRAZOLE 40 MG/1
40 CAPSULE, DELAYED RELEASE ORAL
Qty: 30 CAPSULE | Refills: 1 | Status: SHIPPED | OUTPATIENT
Start: 2022-04-13 | End: 2022-08-17 | Stop reason: SDUPTHER

## 2022-04-13 NOTE — PATIENT INSTRUCTIONS
Understanding Diverticulosis and Diverticulitis     Pouches or diverticula usually occur in the lower part of the colon called the sigmoid.     The colon (large intestine) is the last part of the digestive tract. It absorbs water from stool and changes it from a liquid to a solid. In certain cases, small pouches called diverticula can form in the colon wall. This condition is called diverticulosis. The pouches can become infected. If this happens, it becomes a more serious problem called diverticulitis. These problems can be painful. But they can be managed.  Managing your condition  Diet changes or medicines may be prescribed.   If you have diverticulosis  Recommendations include:  Diet changes are often enough to control symptoms. The main changes are adding fiber (roughage) and drinking more water. Fiber absorbs water as it travels through your colon. This helps your stool stay soft and move smoothly. Water helps this process.  If needed, you may be told to take over-the-counter stool softeners.  To help relieve pain, antispasmodic medicines may be prescribed.  Watch for changes in your bowel movements. Tell the healthcare provider if you notice any changes.  Begin an exercise program. Ask your healthcare provider how to get started.  Get plenty of rest and sleep.   If you have diverticulitis  Treatment depends on how bad your symptoms are.  For mild symptoms. You may be put on a liquid diet for a short time. Antibiotics are usually prescribed. If these two steps relieve your symptoms, you may then be prescribed a high-fiber diet. If you still have symptoms, your healthcare provider will discuss more treatment choices with you.  For severe symptoms. You may need to be admitted to the hospital. There, you can be given IV antibiotics and fluids. You will also be put on a low-fiber or liquid diet. Although not common, surgery is needed in some people with severe symptoms.  Enlow to colon health     Diverticulitis  occurs when the pouches become infected or inflamed.     Help keep your colon healthy with a diet that includes plenty of high-fiber fruits, vegetables, and whole grains. Drink plenty of liquids like water and juice. Maintain a healthy lifestyle including regular exercise, stress management, and adequate rest and sleep.   Date Last Reviewed: 7/1/2016  © 8828-8078 [employer]. [address] [place] [address]. All rights reserved. This information is not intended as a substitute for professional medical care. Always follow your healthcare professional's instructions.           Abdominal Pain    Abdominal pain is pain in the stomach or belly area. Everyone has this pain from time to time. In many cases it goes away on its own. But abdominal pain can sometimes be due to a serious problem, such as appendicitis. So its important to know when to seek help.  Causes of abdominal pain  There are many possible causes of abdominal pain. Common causes in adults include:  Constipation, diarrhea, or gas  Stomach acid flowing back up into the esophagus (acid reflux or heartburn)  Severe acid reflux, called GERD (gastroesophageal reflux disease)  A sore in the lining of the stomach or small intestine (peptic ulcer)  Inflammation of the gallbladder, liver, or pancreas  Gallstones or kidney stones  Appendicitis   Intestinal blockage   An internal organ pushing through a muscle or other tissue (hernia)  Urinary tract infections  In women, menstrual cramps, fibroids, or endometriosis  Inflammation or infection of the intestines  Diagnosing the cause of abdominal pain  Your healthcare provider will do a physical exam help find the cause of your pain. If needed, tests will be ordered. Belly pain has many possible causes. So it can be hard to find the reason for your pain. Giving details about your pain can help. Tell your provider where and when you feel the pain, and what makes it better or worse. Also let your  provider know if you have other symptoms such as:  Fever  Tiredness  Upset stomach (nausea)  Vomiting  Changes in bathroom habits  Treating abdominal pain  Some causes of pain need emergency medical treatment right away. These include appendicitis or a bowel blockage. Other problems can be treated with rest, fluids, or medicines. Your healthcare provider can give you specific instructions for treatment or self-care based on what is causing your pain.  If you have vomiting or diarrhea, sip water or other clear fluids. When you are ready to eat solid foods again, start with small amounts of easy-to-digest, low-fat foods. These include apple sauce, toast, or crackers.   When to seek medical care  Call 911 or go to the hospital right away if you:  Cant pass stool and are vomiting  Are vomiting blood or have bloody diarrhea or black, tarry diarrhea  Have chest, neck, or shoulder pain  Feel like you might pass out  Have pain in your shoulder blades with nausea  Have sudden, severe belly pain  Have new, severe pain unlike any you have felt before  Have a belly that is rigid, hard, and tender to touch  Call your healthcare provider if you have:  Pain for more than 5 days  Bloating for more than 2 days  Diarrhea for more than 5 days  A fever of 100.4°F (38.0°C) or higher, or as directed by your provider  Pain that gets worse  Weight loss for no reason  Continued lack of appetite  Blood in your stool  How to prevent abdominal pain  Here are some tips to help prevent abdominal pain:  Eat smaller amounts of food at one time.  Avoid greasy, fried, or other high-fat foods.  Avoid foods that give you gas.  Exercise regularly.  Drink plenty of fluids.  To help prevent GERD symptoms:  Quit smoking.  Reduce alcohol and certain foods that increase stomach acid.  Avoid aspirin and over-the-counter pain and fever medicines (NSAIDS or nonsteroidal anti-inflammatory drugs), if possible  Lose extra weight.  Finish eating at least 2 hours  before you go to bed or lie down.  Raise the head of your bed.  Date Last Reviewed: 7/1/2016 © 2000-2017 [employer]. [address] [place] [address], [place] [address]. All rights reserved. This information is not intended as a substitute for professional medical care. Always follow your healthcare professional's instructions.         GERD (Adult)    The esophagus is a tube that carries food from the mouth to the stomach. A valve at the lower end of the esophagus prevents stomach acid from flowing upward. When this valve doesn't work properly, stomach contents may repeatedly flow back up (reflux) into the esophagus. This is called gastroesophageal reflux disease (GERD). GERD can irritate the esophagus. It can cause problems with swallowing or breathing. In severe cases, GERD can cause recurrent pneumonia or other serious problems.  Symptoms of reflux include burning, pressure or sharp pain in the upper abdomen or mid to lower chest. The pain can spread to the neck, back, or shoulder. There may be belching, an acid taste in the back of the throat, chronic cough, or sore throat or hoarseness. GERD symptoms often occur during the day after a big meal. They can also occur at night when lying down.   Home care  Lifestyle changes can help reduce symptoms. If needed, medicines may be prescribed. Symptoms often improve with treatment, but if treatment is stopped, the symptoms often return after a few months. So most persons with GERD will need to continue treatment.  Lifestyle changes  Limit or avoid fatty, fried, and spicy foods, as well as coffee, chocolate, mint, and foods with high acid content such as tomatoes and citrus fruit and juices (orange, grapefruit, lemon).  Dont eat large meals, especially at night. Frequent, smaller meals are best. Do not lie down right after eating. And dont eat anything 3 hours before going to bed.  Avoid drinking alcohol and smoking. As much as possible, stay away from second  "hand smoke.  If you are overweight, losing weight will reduce symptoms.   Avoid wearing tight clothing around your stomach area.  If your symptoms occur during sleep, use a foam wedge to elevate your upper body (not just your head.) Or, place 4" blocks under the head of your bed.  Medicines  If needed, medicines can help relieve the symptoms of GERD and prevent damage to the esophagus. Discuss a medicine plan with your healthcare provider. This may include one or more of the following medicines:  Antacids to help neutralize the normal acids in your stomach.  Acid blockers (H2 blockers) to decrease acid production.  Acid inhibitors (PPIs) to decrease acid production in a different way than the blockers. They may work better, but can take a little longer to take effect.  Take an antacid 30-60 minutes after eating and at bedtime, but not at the same time as an acid blocker.  Try not to take medicines such as ibuprofen and aspirin. If you are taking aspirin for your heart or other medical reasons, talk to your healthcare provider about stopping it.  Follow-up care  Follow up with your healthcare provider or as advised by our staff.  When to seek medical advice  Call your healthcare provider if any of the following occur:  Stomach pain gets worse or moves to the lower right abdomen (appendix area)  Chest pain appears or gets worse, or spreads to the back, neck, shoulder, or arm  Frequent vomiting (cant keep down liquids)  Blood in the stool or vomit (red or black in color)  Feeling weak or dizzy  Fever of 100.4ºF (38ºC) or higher, or as directed by your healthcare provider  Date Last Reviewed: 6/23/2015  © 9318-7589 [employer]. [address] [place] [address], [place] [address]. All rights reserved. This information is not intended as a substitute for professional medical care. Always follow your healthcare professional's instructions.         Vomiting (Adult)  Vomiting is a common symptom that may be due to " "different causes. These include gastroenteritis ("stomach flu"), food poisoning and gastritis. There are other more serious causes of vomiting which may be hard to diagnose early in the illness. Therefore, it is important to watch for the warning signs listed below.  The main danger from repeated vomiting is dehydration. This is due to excess loss of water and minerals from the body. When this occurs, body fluids must be replaced.  Home care  If symptoms are severe, rest at home for the next 24 hours.  Because your symptoms may be from an infection, wash your hands frequently and well, and use alcohol-based  to avoid spreading the infection to others.  Wash your hands for at least 20 seconds. Hum the happy birthday song twice for the correct length of time.  Wash your hands after using the toilet, before and after preparing food, before eating food, after changing a diaper, cleaning a wound, caring for a sick person, and blowing your nose, coughing, or sneezing. You should also wash your hands after caring for someone who is sick, touching pet food, or treats, and touching an animal, or animal waste.  You may use acetaminophen or NSAID medicines like ibuprofen or naproxen to control fever, unless another medicine was prescribed. If you have chronic liver or kidney disease or ever had a stomach ulcer or GI bleeding, talk with your doctor before using these medicines. Aspirin should never be used in anyone under 18 years of age who is ill with a fever. It may cause severe liver damage. Don't use NSAID medicines if you are already taking one for another condition (like arthritis) or are on aspirin (such as for heart disease, or after a stroke)  Avoid tobacco and alcohol use, which may worsen your symptoms.  If medicines for vomiting were prescribed, take as directed.  Once vomiting stops, then follow these guidelines:  During the first 12 to 24 hours follow the diet below:  Fruit juices. Apple, grape juice, " clear fruit drinks, and electrolyte replacement drinks.  Beverages. Soft drinks without caffeine; mineral water (plain or flavored), decaffeinated tea and coffee.  Soups. Clear broth, consommé and bouillon  Desserts. Plain gelatin, popsicles and fruit juice bars. As you feel better, you may add 6-8 ounces of yogurt per day.  During the next 24 hours you may add the following to the above:  Hot cereal, plain toast, bread, rolls, crackers  Plain noodles, rice, mashed potatoes, chicken noodle or rice soup  Unsweetened canned fruit (avoid pineapple), bananas  Limit caffeine and chocolate. No spices or seasonings except salt.  During the next 24 hours:  Gradually resume a normal diet, as you feel better and your symptoms lessen.  Follow-up care  Follow up with your healthcare provider, or as advised.  When to seek medical advice  Call your healthcare provider right away if any of these occur:  Constant right-sided lower abdominal pain or increasing general abdominal pain  Continued vomiting (unable to keep liquids down) for 24 hours  Frequent diarrhea (more than 5 times a day); blood (red or black color) or mucus in diarrhea  Reduced urine output or extreme thirst  Weakness, dizziness or fainting  Unusually drowsy or confused  Fever of 100.4°F (38°C) oral or higher, or as directed  Yellow color of the eyes or skin  Date Last Reviewed: 11/16/2015  © 4047-9399 [employer]. [address] [place] [address], [place] [address]. All rights reserved. This information is not intended as a substitute for professional medical care. Always follow your healthcare professional's instructions.

## 2022-04-13 NOTE — TELEPHONE ENCOUNTER
----- Message from Linden Kerns MA sent at 4/13/2022  3:24 PM CDT -----  Contact: [phone]  Patient states that she is having pain. Please call back thanks

## 2022-04-13 NOTE — PROGRESS NOTES
Subjective:       Patient ID: Glenda Peña is a 31 y.o. female Body mass index is 44.45 kg/m².    Chief Complaint: Other (Review CT scan results)    This patient is new to me.  Established patient of Dr. Dan.     The patient location is: in [place]. I verified patient name and date of birth. Patient provided current height and weight measurements.  The chief complaint leading to consultation is: read her CT scan and read about diverticulosis    Visit type: audiovisual    Face to Face time with patient: 21 minutes  38 minutes of total time spent on the encounter, which includes face to face time and non-face to face time preparing to see the patient (eg, review of tests), Obtaining and/or reviewing separately obtained history, Documenting clinical information in the electronic or other health record, Independently interpreting results (not separately reported) and communicating results to the patient/family/caregiver, or Care coordination (not separately reported).     Each patient to whom he or she provides medical services by telemedicine is:  (1) informed of the relationship between the physician and patient and the respective role of any other health care provider with respect to management of the patient; and (2) notified that he or she may decline to receive medical services by telemedicine and may withdraw from such care at any time.    GI Problem  The primary symptoms include weight loss (trying to lose weight with dieting & excerising; lost ~22 lbs since 6/2020), abdominal pain, nausea (phenergan prn; PAST TREATMENT: zofran- no relief) and vomiting (this past weekend, lasted a whole day of food from the day before and then bile). Primary symptoms do not include fever, fatigue, diarrhea, melena, hematemesis, hematochezia or dysuria.   The abdominal pain began more than 2 days ago (started 2-3 weeks ago). The abdominal pain is located in the LLQ. The severity of the abdominal pain is 4/10  (currently).   The illness is also significant for bloating. The illness does not include chills, dysphagia, odynophagia or constipation. Significant associated medical issues include GERD (flare-ups occur 2-3 times a month; taking protonix 40 mg once daily & omeprazole OTC PRN for breakthrough).     Review of Systems   Constitutional: Positive for weight loss (trying to lose weight with dieting & excerising; lost ~22 lbs since 6/2020). Negative for appetite change, chills, fatigue and fever.        Taking percocet PRN about once a week; naproxen daily   HENT: Negative for sore throat and trouble swallowing.    Respiratory: Negative for cough, choking and shortness of breath.    Cardiovascular: Negative for chest pain.   Gastrointestinal: Positive for abdominal pain, bloating, nausea (phenergan prn; PAST TREATMENT: zofran- no relief) and vomiting (this past weekend, lasted a whole day of food from the day before and then bile). Negative for anal bleeding, blood in stool, constipation, diarrhea, dysphagia, hematemesis, hematochezia, melena and rectal pain.   Endocrine:        Been on trulicity for awhile   Genitourinary: Negative for difficulty urinating, dysuria and flank pain.        Seeing GYN for pelvic findings.   Neurological: Negative for weakness.       Patient's last menstrual period was 04/03/2022 (approximate).  Past Medical History:   Diagnosis Date    Asthma childhood    Colon polyp     Diabetes mellitus     GERD (gastroesophageal reflux disease)     Morbid obesity     PCOS (polycystic ovarian syndrome)      Past Surgical History:   Procedure Laterality Date    APPENDECTOMY  2011    COLONOSCOPY  06/11/2018    IN PROCEDURES: Grade 1 internal hemorrhoids.  Polyp 2mm in the descending colon; repeat in 5 years for surveillance    HERNIA REPAIR      OOPHORECTOMY      salpingoopherectomy Right 2008     Family History   Problem Relation Age of Onset    Heart attack Father     Hypertension Father      Diabetes Mother     Heart attack Mother     Breast cancer Neg Hx     Colon cancer Neg Hx     Ovarian cancer Neg Hx     Crohn's disease Neg Hx     Ulcerative colitis Neg Hx     Stomach cancer Neg Hx     Esophageal cancer Neg Hx      Social History     Tobacco Use    Smoking status: Never Smoker    Smokeless tobacco: Never Used   Substance Use Topics    Alcohol use: Yes     Comment: occasionally apple cider- once every 2 months    Drug use: No     Wt Readings from Last 10 Encounters:   04/13/22 110.2 kg (243 lb)   03/30/22 110.5 kg (243 lb 9.6 oz)   03/29/22 113.4 kg (250 lb)   12/01/21 109.3 kg (240 lb 15.4 oz)   04/12/21 116.3 kg (256 lb 6.3 oz)   03/25/21 113.4 kg (250 lb)   01/15/21 115.6 kg (254 lb 13.6 oz)   01/15/21 116.7 kg (257 lb 4.4 oz)   06/24/20 120.2 kg (265 lb)   06/19/20 117.8 kg (259 lb 11.2 oz)     Lab Results   Component Value Date    WBC 8.44 03/29/2022    HGB 13.7 03/29/2022    HCT 42.5 03/29/2022    MCV 86 03/29/2022     03/29/2022     CMP  Sodium   Date Value Ref Range Status   03/29/2022 135 (L) 136 - 145 mmol/L Final     Potassium   Date Value Ref Range Status   03/29/2022 4.4 3.5 - 5.1 mmol/L Final     Chloride   Date Value Ref Range Status   03/29/2022 101 95 - 110 mmol/L Final     CO2   Date Value Ref Range Status   03/29/2022 23 23 - 29 mmol/L Final     Glucose   Date Value Ref Range Status   03/29/2022 301 (H) 70 - 110 mg/dL Final     BUN   Date Value Ref Range Status   03/29/2022 12 6 - 20 mg/dL Final     Creatinine   Date Value Ref Range Status   03/29/2022 0.8 0.5 - 1.4 mg/dL Final     Calcium   Date Value Ref Range Status   03/29/2022 10.3 8.7 - 10.5 mg/dL Final     Total Protein   Date Value Ref Range Status   03/29/2022 8.6 (H) 6.0 - 8.4 g/dL Final     Albumin   Date Value Ref Range Status   03/29/2022 3.9 3.5 - 5.2 g/dL Final     Total Bilirubin   Date Value Ref Range Status   03/29/2022 0.3 0.1 - 1.0 mg/dL Final     Comment:     For infants and newborns,  interpretation of results should be based  on gestational age, weight and in agreement with clinical  observations.    Premature Infant recommended reference ranges:  Up to 24 hours.............<8.0 mg/dL  Up to 48 hours............<12.0 mg/dL  3-5 days..................<15.0 mg/dL  6-29 days.................<15.0 mg/dL       Alkaline Phosphatase   Date Value Ref Range Status   03/29/2022 74 55 - 135 U/L Final     AST   Date Value Ref Range Status   03/29/2022 19 10 - 40 U/L Final     ALT   Date Value Ref Range Status   03/29/2022 36 10 - 44 U/L Final     Anion Gap   Date Value Ref Range Status   03/29/2022 11 8 - 16 mmol/L Final     eGFR if    Date Value Ref Range Status   03/29/2022 >60 >60 mL/min/1.73 m^2 Final     eGFR if non    Date Value Ref Range Status   03/29/2022 >60 >60 mL/min/1.73 m^2 Final     Comment:     Calculation used to obtain the estimated glomerular filtration  rate (eGFR) is the CKD-EPI equation.        Lab Results   Component Value Date    AMYLASE 38 04/22/2018     Lab Results   Component Value Date    LIPASE 52 03/29/2022     Lab Results   Component Value Date    TSH 1.980 12/01/2021     Reviewed prior medical records including radiology report of 3/29/2022 PELVIC ULTRASOUND and CT ABDOMEN PELVIS; 1/27/2021 MRI abdomen pelvis; 7/1/2020 visit note with Dr. Dan; & endoscopy history (see surgical history).    Objective:      Physical Exam  Constitutional:       General: She is not in acute distress.     Appearance: She is well-developed.   Pulmonary:      Effort: Pulmonary effort is normal. No respiratory distress.   Skin:     Coloration: Skin is not pale.   Neurological:      Mental Status: She is alert and oriented to person, place, and time.   Psychiatric:         Speech: Speech normal.         Behavior: Behavior normal.         Thought Content: Thought content normal.         Judgment: Judgment normal.         Assessment:       1. Heartburn    2.  Non-intractable vomiting with nausea    3. LLQ abdominal pain    4. Abnormal finding on CT scan    5. Lesion of left ovary    6. Abnormal CT scan, gastrointestinal tract    7. History of diverticulosis    8. Hiatal hernia    9. Hepatomegaly    10. Hepatic steatosis    11. Other specified abdominal hernia without obstruction or gangrene        Plan:       Heartburn & Hiatal hernia  - schedule EGD, discussed procedure with patient, including risks and benefits, patient verbalized understanding  - DISCONTINUE PROTONIX DUE TO ALTERNATE THERAPY  - START OMEPRAZOLE 40 MG ONCE DAILY AS DIRECTED  - avoid/minimize use of NSAIDs- since they can cause GI upset, bleeding and/or ulcers. If NSAID must be taken, recommend take with food.    Non-intractable vomiting with nausea  - schedule EGD, discussed procedure with patient, including risks and benefits, patient verbalized understanding  - CONTINUE PHENERGAN PRN AS DIRECTED FOR NAUSEA    LLQ abdominal pain  - Recommend follow-up with GYN for continued evaluation and management.  - Possible colonoscopy pending results of testing and if symptoms persist    Abnormal finding on CT scan & Lesion of left ovary  Recommend follow-up with GYN for continued evaluation and management.    Abnormal CT scan, gastrointestinal tract  - reviewed results of CT scan with patient    History of diverticulosis (no diverticulitis noted on CT scan)  Recommend high fiber diet (20-30 grams of fiber daily)/OTC fiber supplements daily as directed.    Hepatomegaly & Hepatic steatosis  For fatty liver recommend: low fat, low cholesterol diet, maintain good control of blood sugars and cholesterol levels, exercise, weight loss (if overweight), minimize/avoid alcohol and tylenol products, & follow-up with PCP for continued evaluation and management; if specialist is needed, recommend seeing hepatology.    Other specified abdominal hernia without obstruction or gangrene  - discussed with patient about diagnosis,  and informed patient that if it becomes painful or if it does not reduce patient needs to see a general surgeon or go to the ER, patient verbalized understanding  - Recommend follow-up with general surgery, patient reports she sees Dr. Brooke for it, for continued evaluation and management.    Follow up if symptoms worsen or fail to improve, for follow-up with local GI provider for continued evaluation & management.      If no improvement in symptoms or symptoms worsen, call/follow-up at clinic or go to ER.

## 2022-04-14 ENCOUNTER — TELEPHONE (OUTPATIENT)
Dept: GASTROENTEROLOGY | Facility: CLINIC | Age: 32
End: 2022-04-14
Payer: COMMERCIAL

## 2022-04-14 NOTE — PROGRESS NOTES
Discussed plan of care with the patient.  She is now on OCPs and tolerating without adverse events.  Pain is the same.  Has not required opiates.  We discussed a repeat pelvic US +/- CT guided drainage and CBC but she declined.  Will assess at her next visit.

## 2022-04-25 NOTE — PROGRESS NOTES
"HISTORY OF PRESENT CONDITION  CC: pelvic pain  Glenda Peña is a 31 y.o. G0 who presents for follow-up regarding pelvic pain.  Below is a summary of her history.  The interval history is as follows:     Pelvic pain is unchanged and is now 3 months in duration.  Localized to LLQ.  Acute onset.  Constant and intermittent. 4/10.  Characterized as sharp.  Alleviated by Tylenol and Percocet 5/325 mg.  Infrequently takes narcotics.  LMP in March 2022  -Dysuria, hematuria, palpitations, or CP. +PO. -N/V. +Flatus. +BM.  Tolerating combined OCPs without side effects.      1. 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  She didn't require staging or adjuvant CT and is still alive.  2. 1/27/12: Perforated appendicitis.  Operative laparoscopy converted to an Ex-lap and appendectomy.  3. 8/21/16: Lap ventral hernia repair with a 25 x 15 cm Strattice mesh  4. 4/23/18: Evaluated by General Surgery during an admission and thought to have sigmoid colitis.  Discussed a sigmoid resection with an end colostomy once the patient lost weight.  5.  2/17-2/29: Admitted to [place] for abdominal pain and suspicion for a L TOA.  She had it IR drained and was discharged from the hospital with PO Amoxicillin.  6. 2/17/20: CT A/P: 10.4 x 7.9 x 6.8 cm  7. 2/18/20: IR guided drainage. Gm + bacilli resembling diphteroids. Actinomyces without sensitivities  8. 2/19/20: A1c = 8.9  9. 2/26/20: CT A/P: Improved TOA but new fluid collections in the hernia sac.  10. 3/5-12/20: Admitted to [place] for abdominal pain and persistent L TOA.  IR attempted to drain it on 3/6/20 but was unsuccesful. She was discharged on IV unasyn.   11. 3/27/20: CT A/P with increased size in TOA.  12. 3/29-4/2/20: Admitted for RLQ pain.  13. 3/30: IR guided drainage of 65 cc.  Aerobic, anaerobic, and fungal cultures were negative.  Negative cytology.  14. 4/2-4/6: COVID-19 " admission  15. 4/3: Pigtail drain removed  16. 4/21: CT A/P: Complex fluid collection adjacent to the uterus measuring 9.8 by 5.3 cm, previously measuring 8.9 x 4.3 cm  17. 5/5: Completed course of IV antibiotics  18. 5/5-: Amoxicillin 500mg PO q8h (notes say she was supposed to take it for 6 months but she didn't)  19. 6/6: MRI Pelvis w/ w/o: 11 cm complex R adnexal mass.  No evidence of lymphadenopathy.     20. 3/2/22: Pelvic US: R complex adnexal mass 6 cm                      CT A/P w/:   21. 3/29/22: LoEstrin qD    REVIEW OF SYSTEMS  All systems reviewed and negative except as noted in HPI.    OBJECTIVE   Vitals:    04/27/22 0924   BP: (!) 90/52   Pulse: 94      Body mass index is 44.08 kg/m².      1. General: Well appearing, no apparent distress, alert and oriented.  2. Lymph: Neck symmetric without cervical or supraclavicular adenopathy or mass.  3. Lungs: Normal respiratory rate, no accessory muscle use.  4. Psych: Normal affect.  5. Abdomen:  non-distended, soft, non-tender, RLQ hernia that crosses the midline and RUQ and is approximately 25 x 30 cm, no evidence of strangulation or incarceration, no ascites, no hepatosplenomegaly.  6. Skin: Warm, dry, no rashes or lesions.   7. Extremities: Bilateral lower extremities without edema or tenderness.  8. Genitourinary: Deferred               ECOG status: 1    LABORATORY DATA  Lab data reviewed.    RADIOLOGICAL DATA  Radiology data reviewed.    PATHOLOGY DATA  Pathology data reviewed.    ASSESSMENT / PLAN     1. Adnexal mass    2. Ventral incisional hernia without obstruction or gangrene    3. Type 2 diabetes mellitus with hyperglycemia, without long-term current use of insulin    4. Morbid obesity with BMI of 45.0-49.9, adult    5. History of PCOS    6. Essential hypertension      VV 3 months    The patient called the office in the beginning of the month with persistent pain.  I suggested repeat imaging and a Hb, but she declined.  Her pain is unchanged, and we  reviewed options which include 1) observation, 2) repeat imaging, 3) +/- CT guided placement of a drain.  After an extensive discussion with the patient she elects to proceed with observation.  She is scheduled to see OB/GYN to establish care.  We will manage this episode until it has resolved and then transition her care to OB/GYN.  She voiced understanding and all questions were answered.    PATIENT EDUCATION  Ready to learn, no apparent learning barriers were identified; learning preferences include listening.  Explained diagnosis and treatment plan; patient expressed understanding of the content.    INFORMED CONSENT  Discussed the risks, benefits, and alternatives of the procedure and of possible blood transfusion.  Discussed the necessity of other members of the healthcare team participating in the procedure.  All questions answered and consent given.    Ritchie Millan

## 2022-04-27 ENCOUNTER — OFFICE VISIT (OUTPATIENT)
Dept: GYNECOLOGIC ONCOLOGY | Facility: CLINIC | Age: 32
End: 2022-04-27
Payer: COMMERCIAL

## 2022-04-27 VITALS
SYSTOLIC BLOOD PRESSURE: 90 MMHG | BODY MASS INDEX: 44.08 KG/M2 | HEART RATE: 94 BPM | WEIGHT: 241 LBS | DIASTOLIC BLOOD PRESSURE: 52 MMHG

## 2022-04-27 DIAGNOSIS — K43.2 VENTRAL INCISIONAL HERNIA WITHOUT OBSTRUCTION OR GANGRENE: ICD-10-CM

## 2022-04-27 DIAGNOSIS — I10 ESSENTIAL HYPERTENSION: ICD-10-CM

## 2022-04-27 DIAGNOSIS — Z87.42 HISTORY OF PCOS: ICD-10-CM

## 2022-04-27 DIAGNOSIS — E66.01 MORBID OBESITY WITH BMI OF 45.0-49.9, ADULT: ICD-10-CM

## 2022-04-27 DIAGNOSIS — N94.89 ADNEXAL MASS: Primary | ICD-10-CM

## 2022-04-27 DIAGNOSIS — E11.65 TYPE 2 DIABETES MELLITUS WITH HYPERGLYCEMIA, WITHOUT LONG-TERM CURRENT USE OF INSULIN: ICD-10-CM

## 2022-04-27 PROCEDURE — 3074F PR MOST RECENT SYSTOLIC BLOOD PRESSURE < 130 MM HG: ICD-10-PCS | Mod: CPTII,S$GLB,, | Performed by: OBSTETRICS & GYNECOLOGY

## 2022-04-27 PROCEDURE — 3074F SYST BP LT 130 MM HG: CPT | Mod: CPTII,S$GLB,, | Performed by: OBSTETRICS & GYNECOLOGY

## 2022-04-27 PROCEDURE — 99999 PR PBB SHADOW E&M-EST. PATIENT-LVL III: ICD-10-PCS | Mod: PBBFAC,,, | Performed by: OBSTETRICS & GYNECOLOGY

## 2022-04-27 PROCEDURE — 3078F DIAST BP <80 MM HG: CPT | Mod: CPTII,S$GLB,, | Performed by: OBSTETRICS & GYNECOLOGY

## 2022-04-27 PROCEDURE — 99999 PR PBB SHADOW E&M-EST. PATIENT-LVL III: CPT | Mod: PBBFAC,,, | Performed by: OBSTETRICS & GYNECOLOGY

## 2022-04-27 PROCEDURE — 1159F MED LIST DOCD IN RCRD: CPT | Mod: CPTII,S$GLB,, | Performed by: OBSTETRICS & GYNECOLOGY

## 2022-04-27 PROCEDURE — 1159F PR MEDICATION LIST DOCUMENTED IN MEDICAL RECORD: ICD-10-PCS | Mod: CPTII,S$GLB,, | Performed by: OBSTETRICS & GYNECOLOGY

## 2022-04-27 PROCEDURE — 4010F PR ACE/ARB THEARPY RXD/TAKEN: ICD-10-PCS | Mod: CPTII,S$GLB,, | Performed by: OBSTETRICS & GYNECOLOGY

## 2022-04-27 PROCEDURE — 4010F ACE/ARB THERAPY RXD/TAKEN: CPT | Mod: CPTII,S$GLB,, | Performed by: OBSTETRICS & GYNECOLOGY

## 2022-04-27 PROCEDURE — 3008F BODY MASS INDEX DOCD: CPT | Mod: CPTII,S$GLB,, | Performed by: OBSTETRICS & GYNECOLOGY

## 2022-04-27 PROCEDURE — 3008F PR BODY MASS INDEX (BMI) DOCUMENTED: ICD-10-PCS | Mod: CPTII,S$GLB,, | Performed by: OBSTETRICS & GYNECOLOGY

## 2022-04-27 PROCEDURE — 99214 PR OFFICE/OUTPT VISIT, EST, LEVL IV, 30-39 MIN: ICD-10-PCS | Mod: S$GLB,,, | Performed by: OBSTETRICS & GYNECOLOGY

## 2022-04-27 PROCEDURE — 3078F PR MOST RECENT DIASTOLIC BLOOD PRESSURE < 80 MM HG: ICD-10-PCS | Mod: CPTII,S$GLB,, | Performed by: OBSTETRICS & GYNECOLOGY

## 2022-04-27 PROCEDURE — 99214 OFFICE O/P EST MOD 30 MIN: CPT | Mod: S$GLB,,, | Performed by: OBSTETRICS & GYNECOLOGY

## 2022-04-28 ENCOUNTER — PATIENT OUTREACH (OUTPATIENT)
Dept: ADMINISTRATIVE | Facility: OTHER | Age: 32
End: 2022-04-28
Payer: COMMERCIAL

## 2022-05-02 ENCOUNTER — HOSPITAL ENCOUNTER (OUTPATIENT)
Dept: RADIOLOGY | Facility: HOSPITAL | Age: 32
Discharge: HOME | End: 2022-05-02
Attending: ORTHOPAEDIC SURGERY
Payer: COMMERCIAL

## 2022-05-02 ENCOUNTER — OFFICE VISIT (OUTPATIENT)
Dept: ORTHOPEDICS | Facility: CLINIC | Age: 32
End: 2022-05-02
Payer: COMMERCIAL

## 2022-05-02 VITALS — HEIGHT: 62 IN | WEIGHT: 235.81 LBS | BODY MASS INDEX: 43.39 KG/M2

## 2022-05-02 DIAGNOSIS — E66.01 MORBID OBESITY WITH BMI OF 45.0-49.9, ADULT: ICD-10-CM

## 2022-05-02 DIAGNOSIS — N94.89 ADNEXAL MASS: ICD-10-CM

## 2022-05-02 DIAGNOSIS — M41.9 SCOLIOSIS, UNSPECIFIED SCOLIOSIS TYPE, UNSPECIFIED SPINAL REGION: ICD-10-CM

## 2022-05-02 PROCEDURE — 3008F BODY MASS INDEX DOCD: CPT | Mod: CPTII,S$GLB,, | Performed by: ORTHOPAEDIC SURGERY

## 2022-05-02 PROCEDURE — 72082 XR SPINE SURVEY AP AND LATERAL: ICD-10-PCS | Mod: 26,,, | Performed by: RADIOLOGY

## 2022-05-02 PROCEDURE — 99999 PR PBB SHADOW E&M-EST. PATIENT-LVL III: ICD-10-PCS | Mod: PBBFAC,,, | Performed by: ORTHOPAEDIC SURGERY

## 2022-05-02 PROCEDURE — 99214 OFFICE O/P EST MOD 30 MIN: CPT | Mod: S$GLB,,, | Performed by: ORTHOPAEDIC SURGERY

## 2022-05-02 PROCEDURE — 3008F PR BODY MASS INDEX (BMI) DOCUMENTED: ICD-10-PCS | Mod: CPTII,S$GLB,, | Performed by: ORTHOPAEDIC SURGERY

## 2022-05-02 PROCEDURE — 1159F MED LIST DOCD IN RCRD: CPT | Mod: CPTII,S$GLB,, | Performed by: ORTHOPAEDIC SURGERY

## 2022-05-02 PROCEDURE — 99999 PR PBB SHADOW E&M-EST. PATIENT-LVL III: CPT | Mod: PBBFAC,,, | Performed by: ORTHOPAEDIC SURGERY

## 2022-05-02 PROCEDURE — 4010F PR ACE/ARB THEARPY RXD/TAKEN: ICD-10-PCS | Mod: CPTII,S$GLB,, | Performed by: ORTHOPAEDIC SURGERY

## 2022-05-02 PROCEDURE — 72082 X-RAY EXAM ENTIRE SPI 2/3 VW: CPT | Mod: 26,,, | Performed by: RADIOLOGY

## 2022-05-02 PROCEDURE — 1159F PR MEDICATION LIST DOCUMENTED IN MEDICAL RECORD: ICD-10-PCS | Mod: CPTII,S$GLB,, | Performed by: ORTHOPAEDIC SURGERY

## 2022-05-02 PROCEDURE — 99214 PR OFFICE/OUTPT VISIT, EST, LEVL IV, 30-39 MIN: ICD-10-PCS | Mod: S$GLB,,, | Performed by: ORTHOPAEDIC SURGERY

## 2022-05-02 PROCEDURE — 4010F ACE/ARB THERAPY RXD/TAKEN: CPT | Mod: CPTII,S$GLB,, | Performed by: ORTHOPAEDIC SURGERY

## 2022-05-02 PROCEDURE — 72082 X-RAY EXAM ENTIRE SPI 2/3 VW: CPT | Mod: TC

## 2022-05-02 NOTE — PROGRESS NOTES
DATE: 5/2/2022  PATIENT: Glenda Peña    Supervising Physician: Avel Matthew M.D.    CHIEF COMPLAINT: abnormal CT    HISTORY:  Glenda Peña is a 31 y.o. female with a pmhx of DM II here for initial evaluation of curvature of the spine found in a recent abdominal CT. Pt denies being previously diagnosed with scoliosis and says she has not had her spine evaluated before. She reports occasional low back pain and left sided sciatica but says it is manageable. She denies hx of PT, ESIs, surgery.    The Patient denies myelopathic symptoms such as handwriting changes or difficulty with buttons/coins/keys. Denies perineal paresthesias, bowel/bladder dysfunction.    PAST MEDICAL/SURGICAL HISTORY:  Past Medical History:   Diagnosis Date    Asthma childhood    Colon polyp     Diabetes mellitus     GERD (gastroesophageal reflux disease)     Morbid obesity     PCOS (polycystic ovarian syndrome)      Past Surgical History:   Procedure Laterality Date    APPENDECTOMY  2011    COLONOSCOPY  06/11/2018    IN PROCEDURES: Grade 1 internal hemorrhoids.  Polyp 2mm in the descending colon; repeat in 5 years for surveillance    HERNIA REPAIR      OOPHORECTOMY      salpingoopherectomy Right 2008         Current Medications:   Current Outpatient Medications:     acetaminophen (TYLENOL) 325 MG tablet, Take 2 tablets (650 mg total) by mouth every 6 (six) hours., Disp: 180 tablet, Rfl: 0    dulaglutide (TRULICITY) 1.5 mg/0.5 mL pen injector, Inject 1.5 mg into the skin every 7 days., Disp: 4 pen, Rfl: 0    flash glucose scanning reader Misc, to be used with lakisha 2 sensors, Disp: 1 each, Rfl: 0    flash glucose sensor (FREESTYLE LAKISHA 2 SENSOR) Kit, 1 Device by Misc.(Non-Drug; Combo Route) route every 14 (fourteen) days., Disp: 1 kit, Rfl: 5    glipiZIDE (GLUCOTROL) 10 MG TR24, Take 1 tablet (10 mg total) by mouth 2 (two) times daily with meals., Disp: 60 tablet, Rfl: 5    lisinopriL (PRINIVIL,ZESTRIL) 2.5 MG  tablet, Take 1 tablet (2.5 mg total) by mouth once daily., Disp: 90 tablet, Rfl: 0    naproxen (NAPROSYN) 500 MG tablet, Take 1 tablet (500 mg total) by mouth every 12 (twelve) hours as needed (Pain)., Disp: 20 tablet, Rfl: 0    norethindrone-ethinyl estradiol (MICROGESTIN 1/20) 1-20 mg-mcg per tablet, Take 1 tablet by mouth once daily., Disp: 30 tablet, Rfl: 11    omeprazole (PRILOSEC) 40 MG capsule, Take 1 capsule (40 mg total) by mouth before breakfast., Disp: 30 capsule, Rfl: 1    oxyCODONE-acetaminophen (PERCOCET) 5-325 mg per tablet, Take 1 tablet by mouth every 6 (six) hours as needed., Disp: , Rfl:     promethazine (PHENERGAN) 25 MG tablet, Take 25 mg by mouth every 6 (six) hours as needed., Disp: , Rfl:     tiZANidine (ZANAFLEX) 2 MG tablet, Take 1 tablet (2 mg total) by mouth every 8 (eight) hours as needed (Muscle Spasms)., Disp: 15 tablet, Rfl: 0    Social History:   Social History     Socioeconomic History    Marital status:    Tobacco Use    Smoking status: Never Smoker    Smokeless tobacco: Never Used   Substance and Sexual Activity    Alcohol use: Yes     Comment: occasionally apple cider- once every 2 months    Drug use: No    Sexual activity: Yes     Partners: Female       REVIEW OF SYSTEMS:  Constitution: Negative. Negative for chills, fever and night sweats.   Cardiovascular: Negative for chest pain and syncope.   Respiratory: Negative for cough and shortness of breath.   Gastrointestinal: See HPI. Negative for nausea/vomiting. Negative for abdominal pain.  Genitourinary: See HPI. Negative for discoloration or dysuria.  Skin: Negative for dry skin, itching and rash.   Hematologic/Lymphatic: Negative for bleeding problem. Does not bruise/bleed easily.   Musculoskeletal: Negative for falls and muscle weakness.   Neurological: See HPI. No seizures.   Endocrine: Negative for polydipsia, polyphagia and polyuria.   Allergic/Immunologic: Negative for hives and persistent infections.       EXAM:  LMP 04/03/2022 (Approximate)     General: The patient is a very pleasant 31 y.o. female in no apparent distress, the patient is orientatied to person, place and time.  Psych: Normal mood and affect  HEENT: Vision grossly intact, hearing intact to the spoken word.  Lungs: Respirations unlabored.  Gait: Normal station and gait, no difficulty with toe or heel walk.   Skin: Skin on the neck, back, and axillae negative for rashes, lesions, cafe-au-lait spots, hairy patches and surgical scars.  Spinal Balance: balanced  Shoulder Balance: right shoulder lift  Pelvic Balance: balanced  Musculoskeletal: No pain with the range of motion of the bilateral hips. No trochanteric tenderness to palpation.  Vascular: Bilateral lower extremities warm and well perfused, Dorsalis pedis pulses 2+ bilaterally.  Neurological: Normal strength and tone in all major motor groups in the bilateral lower extremities. Normal ensation to light touch in the L2-S1 dermatomes bilaterally.  Deep tendon reflexes symmetric 2+ in the bilateral lower extremities.  Negative Babinski bilaterally. Straight leg raise negative bilaterally.    IMAGING:   Today I personally reviewed PA and Lat upright Scoliosis films that demonstrate dextroconvex curvature of the T-spine levoconvex curvature of the L-spine.  There is baseline moderate DJD.     ASSESSMENT/PLAN:    There are no diagnoses linked to this encounter.    Today we discussed at length all of the different treatment options including anti-inflammatories, acetaminophen, rest, ice, heat, physical therapy including strengthening and stretching exercises, home exercises, ROM, aerobic conditioning, aqua therapy, other modalities including ultrasound, massage, and dry needling, epidural steroid injections and finally surgical intervention.      Pt presents with mild scoliosis. I provided the patient with a home exercise program. It is the [facility] spine conditioning program. Exercises include head  rolls, kneeling back extension, sitting rotation stretch, modified seated side straddle, knee to chest, bird dog, plank, modified seated plank, hip bridges, abdominal bracing, and abdominal crunch. Pt will complete each exercise 5 times daily for 6-8 weeks. Pt will fu if pain persists.

## 2022-05-12 ENCOUNTER — PATIENT MESSAGE (OUTPATIENT)
Dept: ADMINISTRATIVE | Facility: HOSPITAL | Age: 32
End: 2022-05-12
Payer: COMMERCIAL

## 2022-05-12 ENCOUNTER — PATIENT OUTREACH (OUTPATIENT)
Dept: ADMINISTRATIVE | Facility: HOSPITAL | Age: 32
End: 2022-05-12
Payer: COMMERCIAL

## 2022-05-12 NOTE — PROGRESS NOTES
A1C Non-Compliant gap report - Left message for patient to call our office. Please schedule.    Overdue Diabetic Eye Exam - Left message for patient to call our office.

## 2022-05-30 DIAGNOSIS — E11.65 TYPE 2 DIABETES MELLITUS WITH HYPERGLYCEMIA, WITHOUT LONG-TERM CURRENT USE OF INSULIN: ICD-10-CM

## 2022-05-30 DIAGNOSIS — E11.9 TYPE 2 DIABETES MELLITUS WITHOUT COMPLICATION, WITHOUT LONG-TERM CURRENT USE OF INSULIN: ICD-10-CM

## 2022-05-30 RX ORDER — DULAGLUTIDE 3 MG/.5ML
3 INJECTION, SOLUTION SUBCUTANEOUS
Qty: 4 PEN | Refills: 1 | Status: SHIPPED | OUTPATIENT
Start: 2022-05-30 | End: 2023-01-19

## 2022-05-30 RX ORDER — GLIPIZIDE 10 MG/1
10 TABLET, FILM COATED, EXTENDED RELEASE ORAL 2 TIMES DAILY WITH MEALS
Qty: 60 TABLET | Refills: 2 | Status: SHIPPED | OUTPATIENT
Start: 2022-05-30 | End: 2023-06-19 | Stop reason: SDUPTHER

## 2022-05-30 RX ORDER — LISINOPRIL 2.5 MG/1
2.5 TABLET ORAL DAILY
Qty: 90 TABLET | Refills: 0 | Status: SHIPPED | OUTPATIENT
Start: 2022-05-30 | End: 2022-10-13 | Stop reason: SDUPTHER

## 2022-05-30 RX ORDER — DULAGLUTIDE 1.5 MG/.5ML
1.5 INJECTION, SOLUTION SUBCUTANEOUS
Qty: 4 PEN | Refills: 0 | Status: CANCELLED | OUTPATIENT
Start: 2022-05-30 | End: 2023-05-30

## 2022-05-30 NOTE — TELEPHONE ENCOUNTER
Care Due:                  Date            Visit Type   Department     Provider  --------------------------------------------------------------------------------                                GIACOMO      [facility]                              FOLLOWUP/OF  MEDICINE/  Last Visit: 12-      FICE VISIT   INTERNAL MED   Emiliano A  Page                              [facility]      [facility]                              FOLLOWUP/OF  MED/ INTERNAL  Next Visit: 06-      FICE VISIT   MED/ PEDS      Emiliano A  Page                                                            Last  Test          Frequency    Reason                     Performed    Due Date  --------------------------------------------------------------------------------    HBA1C.......  6 months...  dulaglutide, glipiZIDE...  12- 05-    [facility] Embedded Care Gaps. Reference number: [id number]. 5/30/2022   10:18:15 AM CDT

## 2022-06-06 DIAGNOSIS — E66.01 MORBID OBESITY WITH BMI OF 45.0-49.9, ADULT: ICD-10-CM

## 2022-06-06 DIAGNOSIS — N94.89 ADNEXAL MASS: ICD-10-CM

## 2022-06-07 ENCOUNTER — PATIENT MESSAGE (OUTPATIENT)
Dept: GYNECOLOGIC ONCOLOGY | Facility: CLINIC | Age: 32
End: 2022-06-07
Payer: COMMERCIAL

## 2022-06-07 DIAGNOSIS — Z87.42 HISTORY OF PCOS: Primary | ICD-10-CM

## 2022-06-07 RX ORDER — NORETHINDRONE ACETATE AND ETHINYL ESTRADIOL .02; 1 MG/1; MG/1
1 TABLET ORAL DAILY
Qty: 21 TABLET | Refills: 11 | Status: SHIPPED | OUTPATIENT
Start: 2022-06-07 | End: 2022-12-22

## 2022-06-14 ENCOUNTER — TELEPHONE (OUTPATIENT)
Dept: FAMILY MEDICINE | Facility: CLINIC | Age: 32
End: 2022-06-14
Payer: COMMERCIAL

## 2022-06-14 NOTE — TELEPHONE ENCOUNTER
phone pt to [facility] appt per   pt has been recsh 06/16/22to 08/10/22 @ 1:40 pm vs

## 2022-08-03 ENCOUNTER — OFFICE VISIT (OUTPATIENT)
Dept: GYNECOLOGIC ONCOLOGY | Facility: CLINIC | Age: 32
End: 2022-08-03
Payer: COMMERCIAL

## 2022-08-03 ENCOUNTER — TELEPHONE (OUTPATIENT)
Dept: UROLOGY | Facility: CLINIC | Age: 32
End: 2022-08-03

## 2022-08-03 DIAGNOSIS — N94.89 ADNEXAL MASS: ICD-10-CM

## 2022-08-03 DIAGNOSIS — Z87.42 HISTORY OF PCOS: ICD-10-CM

## 2022-08-03 DIAGNOSIS — E66.01 MORBID OBESITY WITH BMI OF 45.0-49.9, ADULT: ICD-10-CM

## 2022-08-03 PROCEDURE — 4010F PR ACE/ARB THEARPY RXD/TAKEN: ICD-10-PCS | Mod: CPTII,95,, | Performed by: OBSTETRICS & GYNECOLOGY

## 2022-08-03 PROCEDURE — 99214 OFFICE O/P EST MOD 30 MIN: CPT | Mod: 95,,, | Performed by: OBSTETRICS & GYNECOLOGY

## 2022-08-03 PROCEDURE — 99214 PR OFFICE/OUTPT VISIT, EST, LEVL IV, 30-39 MIN: ICD-10-PCS | Mod: 95,,, | Performed by: OBSTETRICS & GYNECOLOGY

## 2022-08-03 PROCEDURE — 4010F ACE/ARB THERAPY RXD/TAKEN: CPT | Mod: CPTII,95,, | Performed by: OBSTETRICS & GYNECOLOGY

## 2022-08-03 NOTE — PROGRESS NOTES
"Audio Only Telehealth Visit     The patient location is: home  The chief complaint leading to consultation is: PCOS  Visit type: Virtual visit with audio only (telephone)  Total time spent with patient: 20     The reason for the audio only service rather than synchronous audio and video virtual visit was related to technical difficulties or patient preference/necessity.     Each patient to whom I provide medical services by telemedicine is:  (1) informed of the relationship between the physician and patient and the respective role of any other health care provider with respect to management of the patient; and (2) notified that they may decline to receive medical services by telemedicine and may withdraw from such care at any time. Patient verbally consented to receive this service via voice-only telephone call.     This service was not originating from a related E/M service provided within the previous 7 days nor will  to an E/M service or procedure within the next 24 hours or my soonest available appointment.  Prevailing standard of care was able to be met in this audio-only visit.      HISTORY OF PRESENT CONDITION  CC: pelvic pain  Glenda Peña is a 31 y.o. G0 who presents for follow-up regarding pelvic pain.  Below is a summary of her history.  The interval history is as follows:     Pelvic pain is better.  Characterized as "twisting of intestines." Localized at hernia site.  Not interfering with work activities.  Alleviated by Ibuprofen.  Not requiring opiates.  Tolerating OCPs without difficulties.  LMP in 7/3/22.  Duration 4 days.  Normal x 3 months.  -Dysuria, hematuria, palpitations, or CP. +PO. -N/V. +Flatus. +BM.  Tolerating combined OCPs without side effects.    1. 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  She didn't require staging or adjuvant CT and is still alive.  2. 1/27/12: Perforated appendicitis.  " Operative laparoscopy converted to an Ex-lap and appendectomy.  3. 8/21/16: Lap ventral hernia repair with a 25 x 15 cm Strattice mesh  4. 4/23/18: Evaluated by General Surgery during an admission and thought to have sigmoid colitis.  Discussed a sigmoid resection with an end colostomy once the patient lost weight.  5.  2/17-2/29: Admitted to [place] for abdominal pain and suspicion for a L TOA.  She had it IR drained and was discharged from the hospital with PO Amoxicillin.  6. 2/17/20: CT A/P: 10.4 x 7.9 x 6.8 cm  7. 2/18/20: IR guided drainage. Gm + bacilli resembling diphteroids. Actinomyces without sensitivities  8. 2/19/20: A1c = 8.9  9. 2/26/20: CT A/P: Improved TOA but new fluid collections in the hernia sac.  10. 3/5-12/20: Admitted to [place] for abdominal pain and persistent L TOA.  IR attempted to drain it on 3/6/20 but was unsuccesful. She was discharged on IV unasyn.   11. 3/27/20: CT A/P with increased size in TOA.  12. 3/29-4/2/20: Admitted for RLQ pain.  13. 3/30: IR guided drainage of 65 cc.  Aerobic, anaerobic, and fungal cultures were negative.  Negative cytology.  14. 4/2-4/6: COVID-19 admission  15. 4/3: Pigtail drain removed  16. 4/21: CT A/P: Complex fluid collection adjacent to the uterus measuring 9.8 by 5.3 cm, previously measuring 8.9 x 4.3 cm  17. 5/5: Completed course of IV antibiotics  18. 5/5-: Amoxicillin 500mg PO q8h (notes say she was supposed to take it for 6 months but she didn't)  19. 6/6: MRI Pelvis w/ w/o: 11 cm complex R adnexal mass.  No evidence of lymphadenopathy.     20. 3/2/22: Pelvic US: R complex adnexal mass 6 cm                      CT A/P w/:   21. 3/29/22: LoEstrin qD    REVIEW OF SYSTEMS  All systems reviewed and negative except as noted in HPI.    OBJECTIVE   There were no vitals filed for this visit.   There is no height or weight on file to calculate BMI.               ECOG status: 1    LABORATORY DATA  Lab data reviewed.    RADIOLOGICAL  DATA  Radiology data reviewed.    PATHOLOGY DATA  Pathology data reviewed.    ASSESSMENT / PLAN     1. Adnexal mass    2. Morbid obesity with BMI of 45.0-49.9, adult    3. History of PCOS      Overall, the clinical course is improving and is most consistent with PCOS.  She is having pain at her hernia site but is tolerating a diet without signs of strangulation.  My recommendation at this time is to continue OCPs and establish care with OBGYN.  We will check LFTs and re-visit in 6 months.  From the standpoint of her PCOS, I would only recommend repeat imaging as clinically indicated.  She is scheduled to see General Surgery for her ventral hernia.    PATIENT EDUCATION  Ready to learn, no apparent learning barriers were identified; learning preferences include listening.  Explained diagnosis and treatment plan; patient expressed understanding of the content.    INFORMED CONSENT  Discussed the risks, benefits, and alternatives of the procedure and of possible blood transfusion.  Discussed the necessity of other members of the healthcare team participating in the procedure.  All questions answered and consent given.    Ritchie Millan

## 2022-08-08 ENCOUNTER — TELEPHONE (OUTPATIENT)
Dept: SURGERY | Facility: CLINIC | Age: 32
End: 2022-08-08

## 2022-08-08 ENCOUNTER — OFFICE VISIT (OUTPATIENT)
Dept: SURGERY | Facility: CLINIC | Age: 32
End: 2022-08-08
Attending: SPECIALIST
Payer: COMMERCIAL

## 2022-08-08 VITALS
HEART RATE: 81 BPM | BODY MASS INDEX: 45.08 KG/M2 | DIASTOLIC BLOOD PRESSURE: 76 MMHG | HEIGHT: 62 IN | SYSTOLIC BLOOD PRESSURE: 135 MMHG | OXYGEN SATURATION: 98 % | WEIGHT: 245 LBS

## 2022-08-08 DIAGNOSIS — K43.2 VENTRAL INCISIONAL HERNIA WITHOUT OBSTRUCTION OR GANGRENE: Primary | ICD-10-CM

## 2022-08-08 PROCEDURE — 99999 PR PBB SHADOW E&M-EST. PATIENT-LVL IV: ICD-10-PCS | Mod: PBBFAC,,, | Performed by: SPECIALIST

## 2022-08-08 PROCEDURE — 1159F PR MEDICATION LIST DOCUMENTED IN MEDICAL RECORD: ICD-10-PCS | Mod: CPTII,S$GLB,, | Performed by: SPECIALIST

## 2022-08-08 PROCEDURE — 1160F PR REVIEW ALL MEDS BY PRESCRIBER/CLIN PHARMACIST DOCUMENTED: ICD-10-PCS | Mod: CPTII,S$GLB,, | Performed by: SPECIALIST

## 2022-08-08 PROCEDURE — 3008F PR BODY MASS INDEX (BMI) DOCUMENTED: ICD-10-PCS | Mod: CPTII,S$GLB,, | Performed by: SPECIALIST

## 2022-08-08 PROCEDURE — 3078F DIAST BP <80 MM HG: CPT | Mod: CPTII,S$GLB,, | Performed by: SPECIALIST

## 2022-08-08 PROCEDURE — 1160F RVW MEDS BY RX/DR IN RCRD: CPT | Mod: CPTII,S$GLB,, | Performed by: SPECIALIST

## 2022-08-08 PROCEDURE — 99213 PR OFFICE/OUTPT VISIT, EST, LEVL III, 20-29 MIN: ICD-10-PCS | Mod: S$GLB,,, | Performed by: SPECIALIST

## 2022-08-08 PROCEDURE — 3008F BODY MASS INDEX DOCD: CPT | Mod: CPTII,S$GLB,, | Performed by: SPECIALIST

## 2022-08-08 PROCEDURE — 4010F PR ACE/ARB THEARPY RXD/TAKEN: ICD-10-PCS | Mod: CPTII,S$GLB,, | Performed by: SPECIALIST

## 2022-08-08 PROCEDURE — 3078F PR MOST RECENT DIASTOLIC BLOOD PRESSURE < 80 MM HG: ICD-10-PCS | Mod: CPTII,S$GLB,, | Performed by: SPECIALIST

## 2022-08-08 PROCEDURE — 1159F MED LIST DOCD IN RCRD: CPT | Mod: CPTII,S$GLB,, | Performed by: SPECIALIST

## 2022-08-08 PROCEDURE — 99999 PR PBB SHADOW E&M-EST. PATIENT-LVL IV: CPT | Mod: PBBFAC,,, | Performed by: SPECIALIST

## 2022-08-08 PROCEDURE — 99213 OFFICE O/P EST LOW 20 MIN: CPT | Mod: S$GLB,,, | Performed by: SPECIALIST

## 2022-08-08 PROCEDURE — 3075F PR MOST RECENT SYSTOLIC BLOOD PRESS GE 130-139MM HG: ICD-10-PCS | Mod: CPTII,S$GLB,, | Performed by: SPECIALIST

## 2022-08-08 PROCEDURE — 3075F SYST BP GE 130 - 139MM HG: CPT | Mod: CPTII,S$GLB,, | Performed by: SPECIALIST

## 2022-08-08 PROCEDURE — 4010F ACE/ARB THERAPY RXD/TAKEN: CPT | Mod: CPTII,S$GLB,, | Performed by: SPECIALIST

## 2022-08-08 NOTE — PROGRESS NOTES
History & Physical    SUBJECTIVE:     History of Present Illness:  Patient is a 32 y.o. female presents with history of multiple lower abdominal and pelvic procedures and recurrent ventral hernias.  Patient is status post previous laparoscopic repair of ventral hernia.  Previous right salpingo oophorectomy.  Previous appendectomy through lower transverse incision.  Hernias increasingly symptomatic  Chief Complaint   Patient presents with    Hernia       Review of patient's allergies indicates:   Allergen Reactions    Latex, natural rubber Rash     Burning sensation       Current Outpatient Medications   Medication Sig Dispense Refill    dulaglutide (TRULICITY) 3 mg/0.5 mL pen injector Inject 3 mg into the skin every 7 days. 4 pen 1    glipiZIDE (GLUCOTROL) 10 MG TR24 Take 1 tablet (10 mg total) by mouth 2 (two) times daily with meals. 60 tablet 2    lisinopriL (PRINIVIL,ZESTRIL) 2.5 MG tablet Take 1 tablet (2.5 mg total) by mouth once daily. 90 tablet 0    omeprazole (PRILOSEC) 40 MG capsule Take 1 capsule (40 mg total) by mouth before breakfast. 30 capsule 1    acetaminophen (TYLENOL) 325 MG tablet Take 2 tablets (650 mg total) by mouth every 6 (six) hours. (Patient not taking: Reported on 8/8/2022) 180 tablet 0    flash glucose scanning reader Misc to be used with lakisha 2 sensors 1 each 0    flash glucose sensor (FREESTYLE LAKISHA 2 SENSOR) Kit 1 Device by Misc.(Non-Drug; Combo Route) route every 14 (fourteen) days. 1 kit 5    norethindrone-ethinyl estradiol (MICROGESTIN 1/20) 1-20 mg-mcg per tablet Take 1 tablet by mouth once daily. 21 tablet 11    oxyCODONE-acetaminophen (PERCOCET) 5-325 mg per tablet Take 1 tablet by mouth every 6 (six) hours as needed.      promethazine (PHENERGAN) 25 MG tablet Take 25 mg by mouth every 6 (six) hours as needed.       No current facility-administered medications for this visit.       Past Medical History:   Diagnosis Date    Asthma childhood    Colon polyp      Diabetes mellitus     GERD (gastroesophageal reflux disease)     Morbid obesity     PCOS (polycystic ovarian syndrome)      Past Surgical History:   Procedure Laterality Date    APPENDECTOMY  2011    COLONOSCOPY  06/11/2018    IN PROCEDURES: Grade 1 internal hemorrhoids.  Polyp 2mm in the descending colon; repeat in 5 years for surveillance    HERNIA REPAIR      OOPHORECTOMY      salpingoopherectomy Right 2008     Family History   Problem Relation Age of Onset    Heart attack Father     Hypertension Father     Diabetes Mother     Heart attack Mother     Breast cancer Neg Hx     Colon cancer Neg Hx     Ovarian cancer Neg Hx     Crohn's disease Neg Hx     Ulcerative colitis Neg Hx     Stomach cancer Neg Hx     Esophageal cancer Neg Hx      Social History     Tobacco Use    Smoking status: Never Smoker    Smokeless tobacco: Never Used   Substance Use Topics    Alcohol use: Yes     Comment: occasionally apple cider- once every 2 months    Drug use: No        Review of Systems:  Review of Systems   Constitutional: Negative for activity change, appetite change, chills, diaphoresis, fatigue, fever and unexpected weight change.   HENT: Negative for congestion, dental problem, drooling, ear discharge, ear pain, facial swelling, hearing loss, mouth sores, nosebleeds, postnasal drip, rhinorrhea, sinus pressure, sinus pain, sneezing, sore throat, tinnitus, trouble swallowing and voice change.    Eyes: Negative for photophobia, pain, discharge, redness, itching and visual disturbance.   Respiratory: Negative for apnea, cough, choking, chest tightness, shortness of breath, wheezing and stridor.    Cardiovascular: Negative for chest pain, palpitations and leg swelling.   Gastrointestinal: Positive for abdominal distention and abdominal pain. Negative for anal bleeding, blood in stool, constipation, diarrhea, nausea, rectal pain and vomiting.   Endocrine: Negative for cold intolerance, heat intolerance,  "polydipsia, polyphagia and polyuria.   Genitourinary: Negative for decreased urine volume, difficulty urinating, dyspareunia, dysuria, enuresis, flank pain, frequency, genital sores, hematuria, menstrual problem, pelvic pain, urgency, vaginal bleeding, vaginal discharge and vaginal pain.   Musculoskeletal: Negative for arthralgias, back pain, gait problem, joint swelling, myalgias, neck pain and neck stiffness.   Skin: Negative for color change, pallor, rash and wound.   Allergic/Immunologic: Negative for environmental allergies, food allergies and immunocompromised state.   Neurological: Negative for dizziness, tremors, seizures, syncope, facial asymmetry, speech difficulty, weakness, light-headedness, numbness and headaches.   Hematological: Negative for adenopathy. Does not bruise/bleed easily.   Psychiatric/Behavioral: Negative for agitation, behavioral problems, confusion, decreased concentration, dysphoric mood, hallucinations, self-injury, sleep disturbance and suicidal ideas. The patient is not nervous/anxious and is not hyperactive.        OBJECTIVE:     Vital Signs (Most Recent)  Pulse: 81 (08/08/22 0955)  BP: 135/76 (08/08/22 0955)  SpO2: 98 % (08/08/22 0955)  5' 2" (1.575 m)  111.1 kg (245 lb)     Physical Exam:  Physical Exam  Constitutional:       General: She is not in acute distress.     Appearance: Normal appearance. She is normal weight. She is not ill-appearing, toxic-appearing or diaphoretic.   HENT:      Head: Normocephalic and atraumatic.   Eyes:      General: No scleral icterus.        Right eye: No discharge.         Left eye: No discharge.      Extraocular Movements: Extraocular movements intact.      Conjunctiva/sclera: Conjunctivae normal.   Neck:      Vascular: No carotid bruit.   Cardiovascular:      Rate and Rhythm: Normal rate and regular rhythm.      Pulses: Normal pulses.      Heart sounds: Normal heart sounds. No murmur heard.    No friction rub. No gallop.   Pulmonary:      Effort: " Pulmonary effort is normal. No respiratory distress.      Breath sounds: Normal breath sounds. No stridor. No wheezing or rales.   Chest:      Chest wall: No tenderness.   Abdominal:      General: Bowel sounds are normal. There is distension.      Palpations: Abdomen is soft. There is no mass.      Tenderness: There is no abdominal tenderness. There is no guarding or rebound.      Hernia: A hernia is present.      Comments: Large reducible lower abdominal hernia.  Previous upper midline incision.  Previous Pfannenstiel incision.  Previous transverse abdominal incision.  Large reducible ventral hernia.  Additional smaller reducible hernias in lower midline and right lower quadrant   Musculoskeletal:         General: No swelling, tenderness, deformity or signs of injury. Normal range of motion.      Cervical back: Normal range of motion and neck supple. No rigidity or tenderness.      Right lower leg: No edema.      Left lower leg: No edema.   Lymphadenopathy:      Cervical: No cervical adenopathy.   Skin:     General: Skin is warm and dry.      Coloration: Skin is not jaundiced or pale.      Findings: No bruising, erythema, lesion or rash.   Neurological:      General: No focal deficit present.      Mental Status: She is alert and oriented to person, place, and time.      Motor: No weakness.      Coordination: Coordination normal.   Psychiatric:         Mood and Affect: Mood normal.         Behavior: Behavior normal.         Thought Content: Thought content normal.         Judgment: Judgment normal.             Diagnostic Results:       Robert Fam MD  [phone] 3/29/2022 STAT     Narrative & Impression  EXAMINATION:  CT ABDOMEN PELVIS WITHOUT CONTRAST     CLINICAL HISTORY:  Flank pain, kidney stone suspected;     TECHNIQUE:  Low dose axial images, sagittal and coronal reformations were obtained from the lung bases to the pubic symphysis.  Oral contrast was not  administered.     COMPARISON:  CT 03/25/2021     FINDINGS:  Images of the lower thorax are remarkable for bilateral dependent atelectasis.  There are a few scattered pleural based pulmonary nodules along the periphery of the right and left lower lobes, suggesting atelectasis, measuring up to 4 mm.     The liver is hypoattenuating suggesting steatosis, correlation with LFTs recommended.  The liver is enlarged.  Scattered regions of focal fatty sparing noted throughout the parenchyma.  The spleen, pancreas, adrenal glands and gallbladder are grossly unremarkable for noncontrast technique.  There is a small hiatal hernia.  The pancreatic duct is not dilated.  There is no biliary dilation or ascites.  There are several scattered nonenlarged abdominal lymph nodes.     There is slight left perinephric fat stranding without hydronephrosis or nephrolithiasis.  No right hydronephrosis or nephrolithiasis.  The bilateral ureters are unremarkable without calculi seen.  The urinary bladder is decompressed without wall thickening.  The uterus is unremarkable.  There is a multi cystic structure along the anterior aspect of the uterus, in conglomerate, measures approximately 9.4 x 6.6 cm.  In comparison to examination 03/25/2021, the cystic components have increased in size since that time or are new.  The adnexa is otherwise grossly unremarkable.     The distal large bowel is decompressed noting a few scattered colonic diverticula without inflammation.  The terminal ileum and appendix are unremarkable.  There is an anterior abdominal wall hernia containing several loops of nonobstructed small bowel.  Several scattered shotty periaortic and paracaval lymph nodes are noted.     Surgical changes versus traumatic change noted of the right iliac wing.  Degenerative changes are noted of the spine.  No significant inguinal lymphadenopathy.     Impression:     This report was flagged in Epic as abnormal.     1. Slight left perinephric fat  stranding, finding is nonspecific however correlation with urinalysis is recommended as differential would potentially include sequela of recently passed calculus or infection.  2. Hepatomegaly noting diffuse steatosis, correlation with LFTs recommended.  3. Multilobulated cystic focus within the midline pelvis, anterior to the uterus.  Finding may reflect multiple ovarian or paraovarian cysts noting limited evaluation given noncontrast technique.  The cystic components have increased since previous examination or are new.  Ultrasound could be performed for further evaluation as warranted.  4. Large anterior abdominal wall hernia containing several loops of bowel, without obstruction.  5. Scattered pleural based pulmonary nodules versus atelectasis.  For multiple solid nodules all <6 mm, Fleischner Society 2017 guidelines recommend no routine follow up for a low risk patient, or follow up with non-contrast chest CT at 12 months after discovery in a high risk patient.  6. Several additional findings above.        Electronically signed by: Robert Fam MD  Date:                                            03/29/2022  Time:                                           12:31      ASSESSMENT/PLAN:     recurrent ventral hernias, type 2 diabetes  Patient to lose weight  Schedule for surgery post weight loss

## 2022-08-09 ENCOUNTER — PATIENT MESSAGE (OUTPATIENT)
Dept: SURGERY | Facility: OTHER | Age: 32
End: 2022-08-09
Payer: COMMERCIAL

## 2022-08-10 ENCOUNTER — PATIENT MESSAGE (OUTPATIENT)
Dept: GYNECOLOGIC ONCOLOGY | Facility: CLINIC | Age: 32
End: 2022-08-10
Payer: COMMERCIAL

## 2022-08-16 ENCOUNTER — TELEPHONE (OUTPATIENT)
Dept: FAMILY MEDICINE | Facility: CLINIC | Age: 32
End: 2022-08-16
Payer: COMMERCIAL

## 2022-08-17 ENCOUNTER — PATIENT MESSAGE (OUTPATIENT)
Dept: ADMINISTRATIVE | Facility: HOSPITAL | Age: 32
End: 2022-08-17
Payer: COMMERCIAL

## 2022-08-17 ENCOUNTER — TELEPHONE (OUTPATIENT)
Dept: GASTROENTEROLOGY | Facility: CLINIC | Age: 32
End: 2022-08-17
Payer: COMMERCIAL

## 2022-08-17 ENCOUNTER — PATIENT MESSAGE (OUTPATIENT)
Dept: ADMINISTRATIVE | Facility: OTHER | Age: 32
End: 2022-08-17
Payer: COMMERCIAL

## 2022-08-17 DIAGNOSIS — R12 HEARTBURN: ICD-10-CM

## 2022-08-17 DIAGNOSIS — R11.2 NON-INTRACTABLE VOMITING WITH NAUSEA: ICD-10-CM

## 2022-08-17 DIAGNOSIS — E11.9 TYPE 2 DIABETES MELLITUS WITHOUT COMPLICATION, UNSPECIFIED WHETHER LONG TERM INSULIN USE: ICD-10-CM

## 2022-08-18 ENCOUNTER — PATIENT MESSAGE (OUTPATIENT)
Dept: GASTROENTEROLOGY | Facility: CLINIC | Age: 32
End: 2022-08-18
Payer: COMMERCIAL

## 2022-08-18 RX ORDER — OMEPRAZOLE 40 MG/1
40 CAPSULE, DELAYED RELEASE ORAL
Qty: 30 CAPSULE | Refills: 0 | Status: SHIPPED | OUTPATIENT
Start: 2022-08-18 | End: 2022-12-22

## 2022-08-18 NOTE — TELEPHONE ENCOUNTER
Please inform the patient that I refilled the prescription for omeprazole and patient is due for a follow-up visit for continued evaluation and management.  Thanks  CHAPO

## 2022-08-25 ENCOUNTER — ANESTHESIA EVENT (OUTPATIENT)
Dept: SURGERY | Facility: OTHER | Age: 32
DRG: 354 | End: 2022-08-25
Payer: COMMERCIAL

## 2022-08-25 ENCOUNTER — HOSPITAL ENCOUNTER (OUTPATIENT)
Dept: PREADMISSION TESTING | Facility: OTHER | Age: 32
Discharge: HOME | End: 2022-08-25
Attending: SPECIALIST
Payer: COMMERCIAL

## 2022-08-25 VITALS
WEIGHT: 238.13 LBS | DIASTOLIC BLOOD PRESSURE: 71 MMHG | TEMPERATURE: 98 F | SYSTOLIC BLOOD PRESSURE: 139 MMHG | OXYGEN SATURATION: 99 % | HEART RATE: 78 BPM | BODY MASS INDEX: 43.82 KG/M2 | RESPIRATION RATE: 16 BRPM | HEIGHT: 62 IN

## 2022-08-25 RX ORDER — SODIUM CHLORIDE, SODIUM LACTATE, POTASSIUM CHLORIDE, CALCIUM CHLORIDE 600; 310; 30; 20 MG/100ML; MG/100ML; MG/100ML; MG/100ML
INJECTION, SOLUTION INTRAVENOUS CONTINUOUS
Status: CANCELLED | OUTPATIENT
Start: 2022-08-25

## 2022-08-25 NOTE — ANESTHESIA PREPROCEDURE EVALUATION
08/25/2022  Glenda Peña is a 32 y.o., female.      Pre-op Assessment    I have reviewed the Patient Summary Reports.     I have reviewed the Nursing Notes. I have reviewed the NPO Status.   I have reviewed the Medications.     Review of Systems  Anesthesia Hx:  No problems with previous Anesthesia  Denies Family Hx of Anesthesia complications.   Denies Personal Hx of Anesthesia complications.   Social:  Social Alcohol Use, Non-Smoker    Hematology/Oncology:  Hematology Normal   Oncology Normal     Cardiovascular:   Hypertension Denies MI.   hyperlipidemia (-) stress test 2020  EF>60%   Pulmonary:   Asthma asymptomatic    Renal/:  Renal/ Normal     Hepatic/GI:   GERD    Musculoskeletal:  Musculoskeletal Normal    OB/GYN/PEDS:  PCOS   Neurological:  Neurology Normal    Endocrine:   Diabetes  Obesity / BMI > 30  Psych:  Psychiatric Normal           Physical Exam  General: Well nourished and Cooperative    Airway:  Mallampati: I   Mouth Opening: Normal  TM Distance: Normal  Neck ROM: Normal ROM    Dental:  Intact        Anesthesia Plan  Type of Anesthesia, risks & benefits discussed:    Anesthesia Type: Gen ETT  Intra-op Monitoring Plan: Standard ASA Monitors  Post Op Pain Control Plan: multimodal analgesia and peripheral nerve block  Induction:  IV  Airway Plan: Video  Informed Consent: Informed consent signed with the Patient and all parties understand the risks and agree with anesthesia plan.  All questions answered.   ASA Score: 3  Day of Surgery Review of History & Physical: H&P Update referred to the surgeon/provider.  Anesthesia Plan Notes: Normal labs from 3/2022 except hyperglycemia  Dr Ortiz asking for TAP block      Ready For Surgery From Anesthesia Perspective.     .

## 2022-08-25 NOTE — DISCHARGE INSTRUCTIONS
Information to Prepare you for your Surgery    PRE-ADMIT TESTING -  [phone]    [address] [facility]          Your surgery has been scheduled at [facility]. We are pleased to have the opportunity to serve you. For Further Information please call [phone].    On the day of surgery please report to the Information Desk on the 1st floor.    CONTACT YOUR PHYSICIAN'S OFFICE THE DAY PRIOR TO YOUR SURGERY TO OBTAIN YOUR ARRIVAL TIME.     The evening before surgery do not eat anything after 9 p.m. ( this includes hard candy, chewing gum and mints).  You may only have GATORADE, POWERADE AND WATER  from 9 p.m. until you leave your home.   DO NOT DRINK ANY LIQUIDS ON THE WAY TO THE HOSPITAL.      Why does your anesthesiologist allow you to drink Gatorade/Powerade before surgery?  Gatorade/Powerade helps to increase your comfort before surgery and to decrease your nausea after surgery. The carbohydrates in Gatorade/Powerade help reduce your body's stress response to surgery.  If you are a diabetic-drink only water prior to surgery.      Current Visitor policy(12/27/2021) - Patients may have 2 visitors pre and post procedure. Only 2 visitors will be allowed in the Surgical building with the patient.     SPECIAL MEDICATION INSTRUCTIONS: TAKE medications checked off by the Anesthesiologist on your Medication List.    Surgery Patients:  If you take ASPIRIN - Your PHYSICIAN/SURGEON will need to inform you IF/OR when you need to stop taking aspirin prior to your surgery.     Do Not take any medications containing IBUPROFEN.    Do Not Wear any make-up (especially eye make-up) to surgery. Please remove any false eyelashes or eyelash extensions. If you arrive the day of surgery with makeup/eyelashes on you will be required to remove prior to surgery. (There is a risk of corneal abrasions if eye makeup/eyelash extensions are not removed)      Leave all valuables at home.    Do Not wear any jewelry or watches, including any metal in body piercings. Jewelry must be removed prior to coming to the hospital.  There is a possibility that rings that are unable to be removed may be cut off if they are on the surgical extremity.    Please remove all hair extensions, wigs, clips and any other metal accessories/ ornaments from your hair.  These items may pose a flammable/fire risk in Surgery and must be removed.    Do not shave your surgical area at least 5 days prior to your surgery. The surgical prep will be performed at the hospital according to Infection Control regulations.    Contact Lens must be removed before surgery. Either do not wear the contact lens or bring a case and solution for storage.  Please bring a container for eyeglasses or dentures as required.  Bring any paperwork your physician has provided, such as consent forms,  history and physicals, doctor's orders, etc.   Bring comfortable clothes that are loose fitting to wear upon discharge. Take into consideration the type of surgery being performed.  Maintain your diet as advised per your physician the day prior to surgery.      Adequate rest the night before surgery is advised.   Park in the Parking lot behind the hospital or in the [employer] [facility] across the street from the parking lot. Parking is complimentary.  If you will be discharged the same day as your procedure, please arrange for a responsible adult to drive you home or to accompany you if traveling by taxi.   YOU WILL NOT BE PERMITTED TO DRIVE OR TO LEAVE THE HOSPITAL ALONE AFTER SURGERY.   If you are being discharged the same day, it is strongly recommended that you arrange for someone to remain with you for the first 24 hrs following your surgery.    The Surgeon will speak to your family/visitor after your surgery regarding the outcome of your surgery and post op care.  The Surgeon may speak to you after your surgery, but there is a possibility you may  not remember the details.  Please check with your family members regarding the conversation with the Surgeon.    We strongly recommend whoever is bringing you home be present for discharge instructions.  This will ensure a thorough understanding for your post op home care.    ALL CHILDREN MUST ALWAYS BE ACCOMPANIED BY AN ADULT.    Visitors-Refer to current Visitor policy handouts.    Thank you for your cooperation.  The Staff of [facility].            Bathing Instructions with Hibiclens    Shower the evening before and morning of your procedure with Hibiclens:  Wash your face with water and your regular face wash/soap  Apply Hibiclens directly on your skin or on a wet washcloth and wash gently. When showering: Move away from the shower stream when applying Hibiclens to avoid rinsing off too soon.  Rinse thoroughly with warm water  Do not dilute Hibiclens        Dry off as usual, do not use any deodorant, powder, body lotions, perfume, after shave or cologne.

## 2022-09-01 ENCOUNTER — ANESTHESIA (OUTPATIENT)
Dept: SURGERY | Facility: OTHER | Age: 32
DRG: 354 | End: 2022-09-01
Payer: COMMERCIAL

## 2022-09-01 ENCOUNTER — HOSPITAL ENCOUNTER (INPATIENT)
Facility: OTHER | Age: 32
LOS: 6 days | Discharge: HOME | DRG: 354 | End: 2022-09-08
Attending: SPECIALIST | Admitting: SPECIALIST
Payer: COMMERCIAL

## 2022-09-01 DIAGNOSIS — E11.65 TYPE 2 DIABETES MELLITUS WITH HYPERGLYCEMIA, WITHOUT LONG-TERM CURRENT USE OF INSULIN: ICD-10-CM

## 2022-09-01 DIAGNOSIS — K43.2 RECURRENT VENTRAL HERNIA: ICD-10-CM

## 2022-09-01 DIAGNOSIS — K43.2 VENTRAL INCISIONAL HERNIA WITHOUT OBSTRUCTION OR GANGRENE: Primary | ICD-10-CM

## 2022-09-01 LAB
B-HCG UR QL: NEGATIVE
BASOPHILS # BLD AUTO: 0.01 K/UL (ref 0–0.2)
BASOPHILS NFR BLD: 0.1 % (ref 0–1.9)
CTP QC/QA: YES
DIFFERENTIAL METHOD: ABNORMAL
EOSINOPHIL # BLD AUTO: 0 K/UL (ref 0–0.5)
EOSINOPHIL NFR BLD: 0 % (ref 0–8)
ERYTHROCYTE [DISTWIDTH] IN BLOOD BY AUTOMATED COUNT: 13 % (ref 11.5–14.5)
ESTIMATED AVG GLUCOSE: 232 MG/DL (ref 68–131)
HBA1C MFR BLD: 9.7 % (ref 4–5.6)
HCT VFR BLD AUTO: 37 % (ref 37–48.5)
HGB BLD-MCNC: 12.2 G/DL (ref 12–16)
IMM GRANULOCYTES # BLD AUTO: 0.02 K/UL (ref 0–0.04)
IMM GRANULOCYTES NFR BLD AUTO: 0.2 % (ref 0–0.5)
LYMPHOCYTES # BLD AUTO: 0.6 K/UL (ref 1–4.8)
LYMPHOCYTES NFR BLD: 6.6 % (ref 18–48)
MCH RBC QN AUTO: 28.4 PG (ref 27–31)
MCHC RBC AUTO-ENTMCNC: 33 G/DL (ref 32–36)
MCV RBC AUTO: 86 FL (ref 82–98)
MONOCYTES # BLD AUTO: 0.2 K/UL (ref 0.3–1)
MONOCYTES NFR BLD: 2.3 % (ref 4–15)
NEUTROPHILS # BLD AUTO: 8.8 K/UL (ref 1.8–7.7)
NEUTROPHILS NFR BLD: 90.8 % (ref 38–73)
NRBC BLD-RTO: 0 /100 WBC
PLATELET # BLD AUTO: 189 K/UL (ref 150–450)
PMV BLD AUTO: 11.1 FL (ref 9.2–12.9)
POCT GLUCOSE: 154 MG/DL (ref 70–110)
POCT GLUCOSE: 262 MG/DL (ref 70–110)
POCT GLUCOSE: 270 MG/DL (ref 70–110)
POCT GLUCOSE: 274 MG/DL (ref 70–110)
RBC # BLD AUTO: 4.29 M/UL (ref 4–5.4)
WBC # BLD AUTO: 9.69 K/UL (ref 3.9–12.7)

## 2022-09-01 PROCEDURE — 49560 PR REPAIR INCISIONAL HERNIA,REDUCIBLE: ICD-10-PCS | Mod: ,,, | Performed by: SPECIALIST

## 2022-09-01 PROCEDURE — 63600175 PHARM REV CODE 636 W HCPCS: Performed by: NURSE ANESTHETIST, CERTIFIED REGISTERED

## 2022-09-01 PROCEDURE — 88302 PR  SURG PATH,LEVEL II: ICD-10-PCS | Mod: 26,,, | Performed by: PATHOLOGY

## 2022-09-01 PROCEDURE — 64488 TAP BLOCK BI INJECTION: CPT | Performed by: ANESTHESIOLOGY

## 2022-09-01 PROCEDURE — 25000003 PHARM REV CODE 250

## 2022-09-01 PROCEDURE — 63600175 PHARM REV CODE 636 W HCPCS: Performed by: ANESTHESIOLOGY

## 2022-09-01 PROCEDURE — 63600175 PHARM REV CODE 636 W HCPCS

## 2022-09-01 PROCEDURE — 94761 N-INVAS EAR/PLS OXIMETRY MLT: CPT

## 2022-09-01 PROCEDURE — 27000221 HC OXYGEN, UP TO 24 HOURS

## 2022-09-01 PROCEDURE — 49560 PR REPAIR INCISIONAL HERNIA,REDUCIBLE: CPT | Mod: ,,, | Performed by: SPECIALIST

## 2022-09-01 PROCEDURE — 25000003 PHARM REV CODE 250: Performed by: NURSE ANESTHETIST, CERTIFIED REGISTERED

## 2022-09-01 PROCEDURE — 36000706: Performed by: SPECIALIST

## 2022-09-01 PROCEDURE — 36415 COLL VENOUS BLD VENIPUNCTURE: CPT | Performed by: SPECIALIST

## 2022-09-01 PROCEDURE — C9290 INJ, BUPIVACAINE LIPOSOME: HCPCS | Performed by: ANESTHESIOLOGY

## 2022-09-01 PROCEDURE — 88302 TISSUE EXAM BY PATHOLOGIST: CPT | Performed by: PATHOLOGY

## 2022-09-01 PROCEDURE — 25000003 PHARM REV CODE 250: Performed by: ANESTHESIOLOGY

## 2022-09-01 PROCEDURE — 83036 HEMOGLOBIN GLYCOSYLATED A1C: CPT | Performed by: SPECIALIST

## 2022-09-01 PROCEDURE — 81025 URINE PREGNANCY TEST: CPT | Performed by: ANESTHESIOLOGY

## 2022-09-01 PROCEDURE — 37000009 HC ANESTHESIA EA ADD 15 MINS: Performed by: SPECIALIST

## 2022-09-01 PROCEDURE — 82962 GLUCOSE BLOOD TEST: CPT | Performed by: SPECIALIST

## 2022-09-01 PROCEDURE — 27201423 OPTIME MED/SURG SUP & DEVICES STERILE SUPPLY: Performed by: SPECIALIST

## 2022-09-01 PROCEDURE — 88302 TISSUE EXAM BY PATHOLOGIST: CPT | Mod: 26,,, | Performed by: PATHOLOGY

## 2022-09-01 PROCEDURE — 25000003 PHARM REV CODE 250: Performed by: SPECIALIST

## 2022-09-01 PROCEDURE — 37000008 HC ANESTHESIA 1ST 15 MINUTES: Performed by: SPECIALIST

## 2022-09-01 PROCEDURE — 85025 COMPLETE CBC W/AUTO DIFF WBC: CPT | Performed by: SPECIALIST

## 2022-09-01 PROCEDURE — 71000033 HC RECOVERY, INTIAL HOUR: Performed by: SPECIALIST

## 2022-09-01 PROCEDURE — 71000039 HC RECOVERY, EACH ADD'L HOUR: Performed by: SPECIALIST

## 2022-09-01 PROCEDURE — 36000707: Performed by: SPECIALIST

## 2022-09-01 RX ORDER — ACETAMINOPHEN 10 MG/ML
INJECTION, SOLUTION INTRAVENOUS
Status: DISCONTINUED | OUTPATIENT
Start: 2022-09-01 | End: 2022-09-01

## 2022-09-01 RX ORDER — LISINOPRIL 2.5 MG/1
2.5 TABLET ORAL DAILY
Status: DISCONTINUED | OUTPATIENT
Start: 2022-09-02 | End: 2022-09-08 | Stop reason: HOSPADM

## 2022-09-01 RX ORDER — BUPIVACAINE HYDROCHLORIDE 2.5 MG/ML
INJECTION, SOLUTION EPIDURAL; INFILTRATION; INTRACAUDAL
Status: COMPLETED | OUTPATIENT
Start: 2022-09-01 | End: 2022-09-01

## 2022-09-01 RX ORDER — PROMETHAZINE HYDROCHLORIDE 12.5 MG/1
25 TABLET ORAL EVERY 6 HOURS PRN
Status: DISCONTINUED | OUTPATIENT
Start: 2022-09-01 | End: 2022-09-08 | Stop reason: HOSPADM

## 2022-09-01 RX ORDER — DEXMEDETOMIDINE HYDROCHLORIDE 100 UG/ML
INJECTION, SOLUTION INTRAVENOUS
Status: DISCONTINUED | OUTPATIENT
Start: 2022-09-01 | End: 2022-09-01

## 2022-09-01 RX ORDER — ONDANSETRON 2 MG/ML
INJECTION INTRAMUSCULAR; INTRAVENOUS
Status: DISCONTINUED | OUTPATIENT
Start: 2022-09-01 | End: 2022-09-01

## 2022-09-01 RX ORDER — CEFAZOLIN SODIUM 2 G/50ML
2 SOLUTION INTRAVENOUS
Status: DISCONTINUED | OUTPATIENT
Start: 2022-09-01 | End: 2022-09-01 | Stop reason: HOSPADM

## 2022-09-01 RX ORDER — PROPOFOL 10 MG/ML
VIAL (ML) INTRAVENOUS
Status: DISCONTINUED | OUTPATIENT
Start: 2022-09-01 | End: 2022-09-01

## 2022-09-01 RX ORDER — KETOROLAC TROMETHAMINE 30 MG/ML
INJECTION, SOLUTION INTRAMUSCULAR; INTRAVENOUS
Status: DISCONTINUED | OUTPATIENT
Start: 2022-09-01 | End: 2022-09-01

## 2022-09-01 RX ORDER — LIDOCAINE HYDROCHLORIDE 10 MG/ML
1 INJECTION, SOLUTION EPIDURAL; INFILTRATION; INTRACAUDAL; PERINEURAL ONCE
Status: DISCONTINUED | OUTPATIENT
Start: 2022-09-01 | End: 2022-09-08 | Stop reason: HOSPADM

## 2022-09-01 RX ORDER — SODIUM CHLORIDE, SODIUM LACTATE, POTASSIUM CHLORIDE, CALCIUM CHLORIDE 600; 310; 30; 20 MG/100ML; MG/100ML; MG/100ML; MG/100ML
INJECTION, SOLUTION INTRAVENOUS CONTINUOUS
Status: DISCONTINUED | OUTPATIENT
Start: 2022-09-01 | End: 2022-09-01

## 2022-09-01 RX ORDER — SODIUM CHLORIDE, SODIUM LACTATE, POTASSIUM CHLORIDE, CALCIUM CHLORIDE 600; 310; 30; 20 MG/100ML; MG/100ML; MG/100ML; MG/100ML
INJECTION, SOLUTION INTRAVENOUS CONTINUOUS
Status: DISCONTINUED | OUTPATIENT
Start: 2022-09-01 | End: 2022-09-02

## 2022-09-01 RX ORDER — ACETAMINOPHEN 325 MG/1
650 TABLET ORAL EVERY 8 HOURS PRN
Status: DISCONTINUED | OUTPATIENT
Start: 2022-09-01 | End: 2022-09-08 | Stop reason: HOSPADM

## 2022-09-01 RX ORDER — CEFAZOLIN SODIUM 2 G/50ML
2 SOLUTION INTRAVENOUS
Status: CANCELLED | OUTPATIENT
Start: 2022-09-01

## 2022-09-01 RX ORDER — HYDROMORPHONE HYDROCHLORIDE 2 MG/ML
0.4 INJECTION, SOLUTION INTRAMUSCULAR; INTRAVENOUS; SUBCUTANEOUS EVERY 5 MIN PRN
Status: DISCONTINUED | OUTPATIENT
Start: 2022-09-01 | End: 2022-09-01 | Stop reason: HOSPADM

## 2022-09-01 RX ORDER — PROCHLORPERAZINE EDISYLATE 5 MG/ML
5 INJECTION INTRAMUSCULAR; INTRAVENOUS ONCE
Status: COMPLETED | OUTPATIENT
Start: 2022-09-01 | End: 2022-09-01

## 2022-09-01 RX ORDER — OXYCODONE HYDROCHLORIDE 5 MG/1
10 TABLET ORAL EVERY 4 HOURS PRN
Status: DISCONTINUED | OUTPATIENT
Start: 2022-09-01 | End: 2022-09-08 | Stop reason: HOSPADM

## 2022-09-01 RX ORDER — HYDROMORPHONE HYDROCHLORIDE 1 MG/ML
0.5 INJECTION, SOLUTION INTRAMUSCULAR; INTRAVENOUS; SUBCUTANEOUS
Status: DISCONTINUED | OUTPATIENT
Start: 2022-09-01 | End: 2022-09-08 | Stop reason: HOSPADM

## 2022-09-01 RX ORDER — KETAMINE HCL IN 0.9 % NACL 50 MG/5 ML
SYRINGE (ML) INTRAVENOUS
Status: DISCONTINUED | OUTPATIENT
Start: 2022-09-01 | End: 2022-09-01

## 2022-09-01 RX ORDER — PROCHLORPERAZINE EDISYLATE 5 MG/ML
2.5 INJECTION INTRAMUSCULAR; INTRAVENOUS EVERY 6 HOURS PRN
Status: DISCONTINUED | OUTPATIENT
Start: 2022-09-01 | End: 2022-09-01

## 2022-09-01 RX ORDER — LABETALOL HYDROCHLORIDE 5 MG/ML
INJECTION, SOLUTION INTRAVENOUS
Status: DISCONTINUED | OUTPATIENT
Start: 2022-09-01 | End: 2022-09-01

## 2022-09-01 RX ORDER — SODIUM CHLORIDE 0.9 % (FLUSH) 0.9 %
10 SYRINGE (ML) INJECTION
Status: DISCONTINUED | OUTPATIENT
Start: 2022-09-01 | End: 2022-09-08 | Stop reason: HOSPADM

## 2022-09-01 RX ORDER — SODIUM CHLORIDE 9 MG/ML
INJECTION, SOLUTION INTRAVENOUS CONTINUOUS
Status: DISCONTINUED | OUTPATIENT
Start: 2022-09-01 | End: 2022-09-01

## 2022-09-01 RX ORDER — FENTANYL CITRATE 50 UG/ML
INJECTION, SOLUTION INTRAMUSCULAR; INTRAVENOUS
Status: DISCONTINUED | OUTPATIENT
Start: 2022-09-01 | End: 2022-09-01

## 2022-09-01 RX ORDER — LIDOCAINE HCL/PF 100 MG/5ML
SYRINGE (ML) INTRAVENOUS
Status: DISCONTINUED | OUTPATIENT
Start: 2022-09-01 | End: 2022-09-01

## 2022-09-01 RX ORDER — SODIUM CHLORIDE 0.9 % (FLUSH) 0.9 %
3 SYRINGE (ML) INJECTION EVERY 8 HOURS
Status: DISCONTINUED | OUTPATIENT
Start: 2022-09-01 | End: 2022-09-08 | Stop reason: HOSPADM

## 2022-09-01 RX ORDER — DEXAMETHASONE SODIUM PHOSPHATE 4 MG/ML
INJECTION, SOLUTION INTRA-ARTICULAR; INTRALESIONAL; INTRAMUSCULAR; INTRAVENOUS; SOFT TISSUE
Status: DISCONTINUED | OUTPATIENT
Start: 2022-09-01 | End: 2022-09-01

## 2022-09-01 RX ORDER — ACETAMINOPHEN 325 MG/1
650 TABLET ORAL EVERY 4 HOURS PRN
Status: DISCONTINUED | OUTPATIENT
Start: 2022-09-01 | End: 2022-09-08 | Stop reason: HOSPADM

## 2022-09-01 RX ORDER — LIDOCAINE HYDROCHLORIDE 10 MG/ML
1 INJECTION, SOLUTION EPIDURAL; INFILTRATION; INTRACAUDAL; PERINEURAL ONCE
Status: DISCONTINUED | OUTPATIENT
Start: 2022-09-01 | End: 2022-09-01

## 2022-09-01 RX ORDER — MIDAZOLAM HYDROCHLORIDE 1 MG/ML
INJECTION INTRAMUSCULAR; INTRAVENOUS
Status: DISCONTINUED | OUTPATIENT
Start: 2022-09-01 | End: 2022-09-01

## 2022-09-01 RX ORDER — GLUCAGON 1 MG
1 KIT INJECTION
Status: DISCONTINUED | OUTPATIENT
Start: 2022-09-01 | End: 2022-09-08 | Stop reason: HOSPADM

## 2022-09-01 RX ORDER — ONDANSETRON 8 MG/1
8 TABLET, ORALLY DISINTEGRATING ORAL EVERY 8 HOURS PRN
Status: DISCONTINUED | OUTPATIENT
Start: 2022-09-01 | End: 2022-09-08 | Stop reason: HOSPADM

## 2022-09-01 RX ORDER — MELATONIN 1 MG/ML
8 LIQUID (ML) ORAL NIGHTLY PRN
Status: DISCONTINUED | OUTPATIENT
Start: 2022-09-01 | End: 2022-09-08 | Stop reason: HOSPADM

## 2022-09-01 RX ORDER — ROCURONIUM BROMIDE 10 MG/ML
INJECTION, SOLUTION INTRAVENOUS
Status: DISCONTINUED | OUTPATIENT
Start: 2022-09-01 | End: 2022-09-01

## 2022-09-01 RX ORDER — CEFAZOLIN SODIUM 1 G/3ML
INJECTION, POWDER, FOR SOLUTION INTRAMUSCULAR; INTRAVENOUS
Status: DISCONTINUED | OUTPATIENT
Start: 2022-09-01 | End: 2022-09-01

## 2022-09-01 RX ADMIN — ROCURONIUM BROMIDE 10 MG: 10 SOLUTION INTRAVENOUS at 10:09

## 2022-09-01 RX ADMIN — FENTANYL CITRATE 50 MCG: 50 INJECTION, SOLUTION INTRAMUSCULAR; INTRAVENOUS at 11:09

## 2022-09-01 RX ADMIN — SODIUM CHLORIDE, SODIUM LACTATE, POTASSIUM CHLORIDE, AND CALCIUM CHLORIDE: .6; .31; .03; .02 INJECTION, SOLUTION INTRAVENOUS at 08:09

## 2022-09-01 RX ADMIN — LABETALOL HYDROCHLORIDE 5 MG: 5 INJECTION INTRAVENOUS at 12:09

## 2022-09-01 RX ADMIN — HYDROMORPHONE HYDROCHLORIDE 0.4 MG: 2 INJECTION, SOLUTION INTRAMUSCULAR; INTRAVENOUS; SUBCUTANEOUS at 02:09

## 2022-09-01 RX ADMIN — DEXMEDETOMIDINE HYDROCHLORIDE 4 MCG: 100 INJECTION, SOLUTION INTRAVENOUS at 10:09

## 2022-09-01 RX ADMIN — Medication 10 MG: at 10:09

## 2022-09-01 RX ADMIN — PROPOFOL 200 MG: 10 INJECTION, EMULSION INTRAVENOUS at 09:09

## 2022-09-01 RX ADMIN — Medication 40 MG: at 09:09

## 2022-09-01 RX ADMIN — SODIUM CHLORIDE, SODIUM LACTATE, POTASSIUM CHLORIDE, AND CALCIUM CHLORIDE: .6; .31; .03; .02 INJECTION, SOLUTION INTRAVENOUS at 12:09

## 2022-09-01 RX ADMIN — SODIUM CHLORIDE, SODIUM LACTATE, POTASSIUM CHLORIDE, AND CALCIUM CHLORIDE: .6; .31; .03; .02 INJECTION, SOLUTION INTRAVENOUS at 10:09

## 2022-09-01 RX ADMIN — SUGAMMADEX 200 MG: 100 INJECTION, SOLUTION INTRAVENOUS at 12:09

## 2022-09-01 RX ADMIN — LABETALOL HYDROCHLORIDE 5 MG: 5 INJECTION INTRAVENOUS at 10:09

## 2022-09-01 RX ADMIN — LIDOCAINE HYDROCHLORIDE 100 MG: 20 INJECTION, SOLUTION INTRAVENOUS at 09:09

## 2022-09-01 RX ADMIN — CARBOXYMETHYLCELLULOSE SODIUM 2 DROP: 2.5 SOLUTION/ DROPS OPHTHALMIC at 09:09

## 2022-09-01 RX ADMIN — ONDANSETRON 4 MG: 2 INJECTION INTRAMUSCULAR; INTRAVENOUS at 11:09

## 2022-09-01 RX ADMIN — KETOROLAC TROMETHAMINE 30 MG: 30 INJECTION, SOLUTION INTRAMUSCULAR at 11:09

## 2022-09-01 RX ADMIN — ROCURONIUM BROMIDE 50 MG: 10 SOLUTION INTRAVENOUS at 09:09

## 2022-09-01 RX ADMIN — DEXAMETHASONE SODIUM PHOSPHATE 8 MG: 4 INJECTION INTRA-ARTICULAR; INTRALESIONAL; INTRAMUSCULAR; INTRAVENOUS; SOFT TISSUE at 09:09

## 2022-09-01 RX ADMIN — FENTANYL CITRATE 50 MCG: 50 INJECTION, SOLUTION INTRAMUSCULAR; INTRAVENOUS at 10:09

## 2022-09-01 RX ADMIN — ACETAMINOPHEN 1000 MG: 10 INJECTION, SOLUTION INTRAVENOUS at 11:09

## 2022-09-01 RX ADMIN — OXYCODONE 10 MG: 5 TABLET ORAL at 04:09

## 2022-09-01 RX ADMIN — LIDOCAINE HYDROCHLORIDE 100 MG: 20 INJECTION, SOLUTION INTRAVENOUS at 01:09

## 2022-09-01 RX ADMIN — FENTANYL CITRATE 50 MCG: 50 INJECTION, SOLUTION INTRAMUSCULAR; INTRAVENOUS at 12:09

## 2022-09-01 RX ADMIN — GLYCOPYRROLATE 0.1 MG: 0.2 INJECTION, SOLUTION INTRAMUSCULAR; INTRAVITREAL at 09:09

## 2022-09-01 RX ADMIN — DEXMEDETOMIDINE HYDROCHLORIDE 4 MCG: 100 INJECTION, SOLUTION INTRAVENOUS at 11:09

## 2022-09-01 RX ADMIN — MIDAZOLAM HYDROCHLORIDE 4 MG: 1 INJECTION, SOLUTION INTRAMUSCULAR; INTRAVENOUS at 08:09

## 2022-09-01 RX ADMIN — BUPIVACAINE HYDROCHLORIDE 40 ML: 2.5 INJECTION, SOLUTION EPIDURAL; INFILTRATION; INTRACAUDAL; PERINEURAL at 08:09

## 2022-09-01 RX ADMIN — CEFAZOLIN 2 G: 1 INJECTION, POWDER, FOR SOLUTION INTRAMUSCULAR; INTRAVENOUS at 09:09

## 2022-09-01 RX ADMIN — FENTANYL CITRATE 50 MCG: 50 INJECTION, SOLUTION INTRAMUSCULAR; INTRAVENOUS at 09:09

## 2022-09-01 RX ADMIN — PROCHLORPERAZINE EDISYLATE 5 MG: 5 INJECTION INTRAMUSCULAR; INTRAVENOUS at 01:09

## 2022-09-01 RX ADMIN — DEXMEDETOMIDINE HYDROCHLORIDE 4 MCG: 100 INJECTION, SOLUTION INTRAVENOUS at 12:09

## 2022-09-01 RX ADMIN — BUPIVACAINE 20 ML: 13.3 INJECTION, SUSPENSION, LIPOSOMAL INFILTRATION at 08:09

## 2022-09-01 NOTE — ANESTHESIA POSTPROCEDURE EVALUATION
Anesthesia Post Evaluation    Patient: Glenda Peña    Procedure(s) Performed: Procedure(s) (LRB):  REPAIR, HERNIA, VENTRAL (N/A)  LYSIS, ADHESIONS (N/A)  LAPAROTOMY, EXPLORATORY (N/A)    Final Anesthesia Type: general      Patient location during evaluation: PACU  Patient participation: Yes- Able to Participate  Level of consciousness: awake and alert  Post-procedure vital signs: reviewed and stable  Pain management: adequate  Airway patency: patent    PONV status at discharge: No PONV  Anesthetic complications: no      Cardiovascular status: blood pressure returned to baseline  Respiratory status: unassisted  Hydration status: euvolemic  Follow-up not needed.          Vitals Value Taken Time   BP 99/57 09/01/22 1448   Temp 36.2 °C (97.1 °F) 09/01/22 1316   Pulse 97 09/01/22 1501   Resp 18 09/01/22 1415   SpO2 96 % 09/01/22 1501   Vitals shown include unvalidated device data.      No case tracking events are documented in the log.      Pain/Jasvir Score: Pain Rating Prior to Med Admin: 7 (9/1/2022  2:10 PM)  Jasvir Score: 8 (9/1/2022  2:46 PM)

## 2022-09-01 NOTE — ANESTHESIA PROCEDURE NOTES
Intubation    Date/Time: 9/1/2022 9:21 AM  Performed by: Kellen Robles CRNA  Authorized by: Wesly Clifton MD     Intubation:     Induction:  Intravenous    Intubated:  Postinduction    Mask Ventilation:  Easy with oral airway    Attempts:  1    Attempted By:  CRNA    Method of Intubation:  Video laryngoscopy    Blade:  Ramirez 3    Laryngeal View Grade: Grade I - full view of cords      Difficult Airway Encountered?: No      Complications:  None    Airway Device:  Oral endotracheal tube    Airway Device Size:  7.0    Inflation Amount (mL):  5    Tube secured:  21    Secured at:  The lips    Placement Verified By:  Capnometry    Complicating Factors:  Obesity    Findings Post-Intubation:  Atraumatic/condition of teeth unchanged and BS equal bilateral

## 2022-09-01 NOTE — ANESTHESIA PROCEDURE NOTES
Bilateral TAP    Patient location during procedure: holding area   Block not for primary anesthetic.  Reason for block: at surgeon's request and post-op pain management   Post-op Pain Location: Lower abdomen   Timeout: 9/1/2022 8:10 AM   End time: 9/1/2022 8:18 AM    Staffing  Authorizing Provider: Romero Hilario MD  Performing Provider: Romero Hilario MD    Preanesthetic Checklist  Completed: patient identified, IV checked, site marked, risks and benefits discussed, surgical consent, monitors and equipment checked, pre-op evaluation and timeout performed  Peripheral Block  Patient position: supine  Prep: ChloraPrep and site prepped and draped  Patient monitoring: heart rate and continuous pulse ox  Block type: TAP  Laterality: bilateral  Injection technique: single shot  Needle  Needle type: Echogenic   Needle gauge: 21 G  Needle length: 4 in  Needle localization: anatomical landmarks and ultrasound guidance   -ultrasound image captured on disc.  Assessment  Injection assessment: negative aspiration, negative parasthesia and local visualized surrounding nerve  Paresthesia pain: none  Heart rate change: no  Slow fractionated injection: yes  Pain Tolerance: comfortable throughout block and no complaints  Medications:    Medications: bupivacaine (pf) (MARCAINE) injection 0.25% - Perineural, Bilateral Transabdominus Plane   40 mL - 9/1/2022 8:18:00 AM    Additional Notes  Local visualized to spread between internal oblique and transversus abdominis  Each side injected with 20 cc 0.25% Marcaine plus 10 cc Exparel

## 2022-09-01 NOTE — OP NOTE
[facility], [place] [facility]  Surgery Department  Operative Note    SUMMARY     Patient: Glenda Peña    Medical Record: [id number]    Date of Procedure: 9/1/2022     Surgeon: Surgeon(s) and Role:     * Danny Ortiz Jr., MD - Primary    Assisting Surgeon: None    Pre-Operative Diagnosis: Ventral hernia without obstruction or gangrene [K43.9]    Post-Operative Diagnosis: Post-Op Diagnosis Codes:     * Ventral hernia without obstruction or gangrene [K43.9]    Procedure: Procedure(s) (LRB):  REPAIR, HERNIA, VENTRAL (N/A), exploratory laparotomy, enterolysis of adhesions, skin subcutaneous tissue advancement flaps.    Procedure in Detail:  The patient was brought to the operating room and placed in supine position.  The abdomen prepped and draped in sterile fashion.  The lower abdominal transverse incision was opened using sharp dissection.  It was carried down through the subcutaneous tissues where multiple large fascial defects were encountered along the entire right side of the abdomen extending past the midline.  There were dense adhesions present requiring lysis.  Using sharp dissection the adhesions were lysed and the fascial edges delineated.  Hemostasis obtained with electrocautery Bovie and 3-0 Vicryl ties and ligatures.  The fascial defects were transversely oriented between the umbilicus and the pubis.  The defects involved almost the entire rectus space and space of the internal external obliques on the right side.  The defects extended across the midline onto the left.  The bowel was reduced into the peritoneal cavity and the fascia then closed in layers with interrupted 1. Ethibond followed by a running stitch of 1. Prolene.  Skin and subcutaneous tissue flaps were then developed superiorly and inferiorly and the redundant tissue excised.  Fifteen Edy drains were placed below the subcutaneous tissue flaps and brought through separate stab wounds in the upper abdomen.  The drains were secured with  2-0 silk.  The wounds sterilely dressed.  The subcutaneous tissue closed with interrupted 3-0 Vicryl.  The skin was closed with a stapling device.  The patient tolerated procedure well and left the operating room in good condition.  Estimated blood loss-200 cc

## 2022-09-01 NOTE — TRANSFER OF CARE
Anesthesia Transfer of Care Note    Patient: Glenda Peña    Procedure(s) Performed: Procedure(s) (LRB):  REPAIR, HERNIA, VENTRAL (N/A)    Patient location: PACU    Anesthesia Type: general    Transport from OR: Transported from OR on 6-10 L/min O2 by face mask with adequate spontaneous ventilation    Post pain: adequate analgesia    Post assessment: no apparent anesthetic complications and tolerated procedure well    Post vital signs: stable    Level of consciousness: awake and alert    Nausea/Vomiting: no nausea/vomiting    Complications: none    Transfer of care protocol was followed      Last vitals:   Visit Vitals  /64 (BP Location: Right arm, Patient Position: Lying)   Pulse 97   Temp 36.2 °C (97.1 °F) (Temporal)   Resp 17   Wt 108 kg (238 lb 1.6 oz)   LMP 08/13/2022 (Approximate)   SpO2 99%   Breastfeeding No   BMI 43.55 kg/m²

## 2022-09-01 NOTE — OR NURSING
"Pt., tolerated recovery without any distress noted, AAOx4, VSS, Afebrile, Sats % on 2L of O2, pain reassessed on a scale of 0-10, Pt stated "3", post PRN pain medication,  & mother @ bedside during transport to room 355, bedside handoff given to RUDY Dubon  "

## 2022-09-02 LAB
ANION GAP SERPL CALC-SCNC: 10 MMOL/L (ref 8–16)
BUN SERPL-MCNC: 8 MG/DL (ref 6–20)
CALCIUM SERPL-MCNC: 8.7 MG/DL (ref 8.7–10.5)
CHLORIDE SERPL-SCNC: 104 MMOL/L (ref 95–110)
CO2 SERPL-SCNC: 23 MMOL/L (ref 23–29)
CREAT SERPL-MCNC: 0.7 MG/DL (ref 0.5–1.4)
EST. GFR  (NO RACE VARIABLE): >60 ML/MIN/1.73 M^2
GLUCOSE SERPL-MCNC: 231 MG/DL (ref 70–110)
POCT GLUCOSE: 174 MG/DL (ref 70–110)
POCT GLUCOSE: 216 MG/DL (ref 70–110)
POCT GLUCOSE: 222 MG/DL (ref 70–110)
POCT GLUCOSE: 264 MG/DL (ref 70–110)
POTASSIUM SERPL-SCNC: 4.1 MMOL/L (ref 3.5–5.1)
SODIUM SERPL-SCNC: 137 MMOL/L (ref 136–145)

## 2022-09-02 PROCEDURE — 94761 N-INVAS EAR/PLS OXIMETRY MLT: CPT

## 2022-09-02 PROCEDURE — 11000001 HC ACUTE MED/SURG PRIVATE ROOM

## 2022-09-02 PROCEDURE — 80048 BASIC METABOLIC PNL TOTAL CA: CPT | Performed by: SPECIALIST

## 2022-09-02 PROCEDURE — 25000003 PHARM REV CODE 250: Performed by: SPECIALIST

## 2022-09-02 PROCEDURE — A4216 STERILE WATER/SALINE, 10 ML: HCPCS | Performed by: SPECIALIST

## 2022-09-02 PROCEDURE — 36415 COLL VENOUS BLD VENIPUNCTURE: CPT | Performed by: SPECIALIST

## 2022-09-02 PROCEDURE — 63600175 PHARM REV CODE 636 W HCPCS: Performed by: SPECIALIST

## 2022-09-02 RX ORDER — ENOXAPARIN SODIUM 100 MG/ML
40 INJECTION SUBCUTANEOUS EVERY 24 HOURS
Status: DISCONTINUED | OUTPATIENT
Start: 2022-09-02 | End: 2022-09-08 | Stop reason: HOSPADM

## 2022-09-02 RX ADMIN — ENOXAPARIN SODIUM 40 MG: 40 INJECTION SUBCUTANEOUS at 09:09

## 2022-09-02 RX ADMIN — ACETAMINOPHEN 650 MG: 325 TABLET, FILM COATED ORAL at 08:09

## 2022-09-02 RX ADMIN — OXYCODONE 10 MG: 5 TABLET ORAL at 11:09

## 2022-09-02 RX ADMIN — ACETAMINOPHEN 650 MG: 325 TABLET, FILM COATED ORAL at 06:09

## 2022-09-02 RX ADMIN — Medication 3 ML: at 02:09

## 2022-09-02 RX ADMIN — OXYCODONE 10 MG: 5 TABLET ORAL at 06:09

## 2022-09-02 RX ADMIN — Medication 3 ML: at 10:09

## 2022-09-02 RX ADMIN — SODIUM CHLORIDE, POTASSIUM CHLORIDE, SODIUM LACTATE AND CALCIUM CHLORIDE: 600; 310; 30; 20 INJECTION, SOLUTION INTRAVENOUS at 03:09

## 2022-09-02 RX ADMIN — OXYCODONE 10 MG: 5 TABLET ORAL at 03:09

## 2022-09-02 RX ADMIN — SODIUM CHLORIDE, POTASSIUM CHLORIDE, SODIUM LACTATE AND CALCIUM CHLORIDE: 600; 310; 30; 20 INJECTION, SOLUTION INTRAVENOUS at 12:09

## 2022-09-02 NOTE — PROGRESS NOTES
BS-222 @ 0130 and no prn sliding scale ordered.  Called general sx after hours and spoke with Bryan about needing orders for pt. Verbalized understanding and will notify Dr. Ramirez to return call. Will continue to monitor.

## 2022-09-02 NOTE — NURSING
Nurses Note -- 4 Eyes      9/2/2022   6:53 AM      Skin assessed during: Transfer      [x] No Pressure Injuries Present    []Prevention Measures Documented      [] Yes- Altered Skin Integrity Present or Discovered   [] LDA Added if Not in Epic (Describe Wound)   [] New Altered Skin Integrity was Present on Admit and Documented in LDA   [] Wound Image Taken    Wound Care Consulted? No    Attending Nurse:  Jagdish Dudley RN     Second RN/Staff Member:  ANA CRISTINA Dutta RN

## 2022-09-02 NOTE — PLAN OF CARE
PCP Emiliano Dunbar  Pharmacy 1)Bedside Delivery 2)[facility]     will provide transportation home & assist needed during recovery   Denied any anticipated discharge needs        09/02/22 6925   Discharge Assessment   Assessment Type Discharge Planning Assessment   Confirmed/corrected address, phone number and insurance Yes   Confirmed Demographics Correct on Facesheet   Source of Information patient   Does patient/caregiver understand observation status Yes   Reason For Admission hernia repair   Lives With spouse;child(harley), dependent   Do you expect to return to your current living situation? Yes   Do you have help at home or someone to help you manage your care at home? Yes   Prior to hospitilization cognitive status: Alert/Oriented   Current cognitive status: Alert/Oriented   Walking or Climbing Stairs Difficulty none   Dressing/Bathing Difficulty none   Equipment Currently Used at Home none   Do you take prescription medications? Yes   Do you have prescription coverage? Yes   Do you have any problems affording any of your prescribed medications? No   Is the patient taking medications as prescribed? yes   Who is going to help you get home at discharge?    How do you get to doctors appointments? car, drives self   Discharge Plan A Home with family   DME Needed Upon Discharge  none   Discharge Plan discussed with: Patient   Discharge Barriers Identified None   Physical Activity   On average, how many days per week do you engage in moderate to strenuous exercise (like a brisk walk)? 0 days   On average, how many minutes do you engage in exercise at this level? 0 min   Financial Resource Strain   How hard is it for you to pay for the very basics like food, housing, medical care, and heating? Not hard   Housing Stability   In the last 12 months, was there a time when you were not able to pay the mortgage or rent on time? N   In the last 12 months, was there a time when you did not have a steady  place to sleep or slept in a shelter (including now)? N   Transportation Needs   In the past 12 months, has lack of transportation kept you from medical appointments or from getting medications? no   In the past 12 months, has lack of transportation kept you from meetings, work, or from getting things needed for daily living? No   Food Insecurity   Within the past 12 months, you worried that your food would run out before you got the money to buy more. Never true   Within the past 12 months, the food you bought just didn't last and you didn't have money to get more. Never true   Stress   Do you feel stress - tense, restless, nervous, or anxious, or unable to sleep at night because your mind is troubled all the time - these days? Only a littl   Social Connections   In a typical week, how many times do you talk on the phone with family, friends, or neighbors? More than 3   How often do you get together with friends or relatives? Twice   How often do you attend Worship or Adventism services? Never   Do you belong to any clubs or organizations such as Worship groups, unions, fraternal or athletic groups, or school groups? No   How often do you attend meetings of the clubs or organizations you belong to? Never   Are you , , , , never , or living with a partner?    Alcohol Use   Q1: How often do you have a drink containing alcohol? Never   Q2: How many drinks containing alcohol do you have on a typical day when you are drinking? None   Q3: How often do you have six or more drinks on one occasion? Never

## 2022-09-03 LAB
POCT GLUCOSE: 182 MG/DL (ref 70–110)
POCT GLUCOSE: 207 MG/DL (ref 70–110)
POCT GLUCOSE: 232 MG/DL (ref 70–110)
POCT GLUCOSE: 246 MG/DL (ref 70–110)
POCT GLUCOSE: 265 MG/DL (ref 70–110)
POCT GLUCOSE: 272 MG/DL (ref 70–110)

## 2022-09-03 PROCEDURE — 99231 SBSQ HOSP IP/OBS SF/LOW 25: CPT | Mod: ,,, | Performed by: SURGERY

## 2022-09-03 PROCEDURE — 97116 GAIT TRAINING THERAPY: CPT

## 2022-09-03 PROCEDURE — 97530 THERAPEUTIC ACTIVITIES: CPT

## 2022-09-03 PROCEDURE — 97161 PT EVAL LOW COMPLEX 20 MIN: CPT

## 2022-09-03 PROCEDURE — 11000001 HC ACUTE MED/SURG PRIVATE ROOM

## 2022-09-03 PROCEDURE — 25000003 PHARM REV CODE 250: Performed by: SPECIALIST

## 2022-09-03 PROCEDURE — A4216 STERILE WATER/SALINE, 10 ML: HCPCS | Performed by: SPECIALIST

## 2022-09-03 PROCEDURE — 63600175 PHARM REV CODE 636 W HCPCS: Performed by: SPECIALIST

## 2022-09-03 PROCEDURE — 99231 PR SUBSEQUENT HOSPITAL CARE,LEVL I: ICD-10-PCS | Mod: ,,, | Performed by: SURGERY

## 2022-09-03 RX ORDER — IBUPROFEN 200 MG
24 TABLET ORAL
Status: DISCONTINUED | OUTPATIENT
Start: 2022-09-03 | End: 2022-09-08 | Stop reason: HOSPADM

## 2022-09-03 RX ORDER — GLUCAGON 1 MG
1 KIT INJECTION
Status: DISCONTINUED | OUTPATIENT
Start: 2022-09-03 | End: 2022-09-03

## 2022-09-03 RX ORDER — IBUPROFEN 200 MG
16 TABLET ORAL
Status: DISCONTINUED | OUTPATIENT
Start: 2022-09-03 | End: 2022-09-08 | Stop reason: HOSPADM

## 2022-09-03 RX ORDER — INSULIN ASPART 100 [IU]/ML
0-5 INJECTION, SOLUTION INTRAVENOUS; SUBCUTANEOUS
Status: DISCONTINUED | OUTPATIENT
Start: 2022-09-03 | End: 2022-09-03

## 2022-09-03 RX ORDER — INSULIN ASPART 100 [IU]/ML
0-5 INJECTION, SOLUTION INTRAVENOUS; SUBCUTANEOUS
Status: DISCONTINUED | OUTPATIENT
Start: 2022-09-03 | End: 2022-09-08 | Stop reason: HOSPADM

## 2022-09-03 RX ADMIN — Medication 3 ML: at 08:09

## 2022-09-03 RX ADMIN — Medication 3 ML: at 10:09

## 2022-09-03 RX ADMIN — INSULIN ASPART 2 UNITS: 100 INJECTION, SOLUTION INTRAVENOUS; SUBCUTANEOUS at 08:09

## 2022-09-03 RX ADMIN — LISINOPRIL 2.5 MG: 2.5 TABLET ORAL at 08:09

## 2022-09-03 RX ADMIN — ACETAMINOPHEN 650 MG: 325 TABLET, FILM COATED ORAL at 02:09

## 2022-09-03 RX ADMIN — ENOXAPARIN SODIUM 40 MG: 40 INJECTION SUBCUTANEOUS at 04:09

## 2022-09-03 RX ADMIN — INSULIN ASPART 1 UNITS: 100 INJECTION, SOLUTION INTRAVENOUS; SUBCUTANEOUS at 10:09

## 2022-09-03 RX ADMIN — INSULIN ASPART 3 UNITS: 100 INJECTION, SOLUTION INTRAVENOUS; SUBCUTANEOUS at 05:09

## 2022-09-03 RX ADMIN — OXYCODONE 10 MG: 5 TABLET ORAL at 06:09

## 2022-09-03 RX ADMIN — Medication 3 ML: at 01:09

## 2022-09-03 RX ADMIN — OXYCODONE 10 MG: 5 TABLET ORAL at 12:09

## 2022-09-03 RX ADMIN — INSULIN ASPART 2 UNITS: 100 INJECTION, SOLUTION INTRAVENOUS; SUBCUTANEOUS at 01:09

## 2022-09-03 NOTE — PT/OT/SLP EVAL
Physical Therapy Evaluation    Patient Name:  Glenda Peña   MRN:  [id number]    Recommendations:     Discharge Recommendations:  home   Discharge Equipment Recommendations: none   Barriers to discharge: None    Assessment:     Glenda Peña is a 32 y.o. female admitted with a medical diagnosis of <principal problem not specified>.  She presents with the following impairments/functional limitations:  weakness, impaired endurance, impaired functional mobility, gait instability, impaired balance, pain, decreased ROM. Patient was able to perform all bed mobility, transfers and gait with Min assist throughout. She had initial challenges assuming a fully upright standing position but was able to progress to a fully erect stand throughout her time ambulating. She is very motivated to progress and will be safe for DC home with excellent family support.     Rehab Prognosis: Good; patient would benefit from acute skilled PT services to address these deficits and reach maximum level of function.    Recent Surgery: Procedure(s) (LRB):  REPAIR, HERNIA, VENTRAL (N/A)  LYSIS, ADHESIONS (N/A)  LAPAROTOMY, EXPLORATORY (N/A) 2 Days Post-Op    Plan:     During this hospitalization, patient to be seen 5 x/week to address the identified rehab impairments via gait training, therapeutic activities, therapeutic exercises, neuromuscular re-education and progress toward the following goals:    Plan of Care Expires:  10/01/22    Subjective     Chief Complaint: abdominal pain  Patient/Family Comments/goals: wants to get to chair and try bathroom  Pain/Comfort:  Pain Rating 1: 2/10  Location - Side 1: Right  Location 1: abdomen  Pain Addressed 1: Nurse notified    Patients cultural, spiritual, Congregational conflicts given the current situation: no    Living Environment:  Lives in 1 story home with  and 2 children  Prior to admission, patients level of function was indep.  Equipment used at home: none.  DME owned (not currently  used): none.  Upon discharge, patient will have assistance from family and friends.    Objective:     Communicated with nsg prior to session.  Patient found with bed in chair position with peripheral IV, JONAH drain, reese catheter  upon PT entry to room.    General Precautions: Standard, fall   Orthopedic Precautions:N/A   Braces: N/A  Respiratory Status: Room air    Exams:  Postural Exam:  Patient presented with the following abnormalities:    -       No postural abnormalities identified  Skin Integrity/Edema:      -       Skin integrity: incision over abdomen covered and without drainage  RLE ROM: WFL except pain with hip flexion  RLE Strength: WFL  LLE ROM: WFL except pain with hip flexion  LLE Strength: WFL    Patient donned yellow socks and gait belt for OOB mobility. Patient donned mask for hallway ambulation.      Functional Mobility:  Bed Mobility:  Rolling Right: minimum assistance  Supine to Sit: minimum assistance  Transfers:  Sit to Stand:  minimum assistance with rolling walker  Gait: amb 150  with good step through gait pattern with 3-4 standing rest breaks taken with good control and progressive improvement in standing posture     Therapeutic Activities and Exercises:   Reviewed ankle pumps, glute sets and quad sets while supine, knee extensions while up in chair     PT educated patient:  PT plan of care/role of PT  Safety with OOB mobility  Use of RW for safety at this time   Energy conservation techniques   Discharge disposition    Pt  verbalized understanding      AM-PAC 6 CLICK MOBILITY  Total Score:17     Patient left up in chair with call button in reach, nsg notified, and  present.    GOALS:   Multidisciplinary Problems       Physical Therapy Goals          Problem: Physical Therapy    Goal Priority Disciplines Outcome Goal Variances Interventions   Physical Therapy Goal     PT, PT/OT Ongoing, Progressing     Description: Goals to be met by: 10/1/2022     Patient will increase functional  independence with mobility by performin. Supine to sit with [place]  2. Sit to supine with [place]  3. Sit to stand transfer with [place]  4. Gait  x 300 feet with [place] using No Assistive Device.   5. Stand for 20 minutes with [place] using No Assistive Device                         History:     Past Medical History:   Diagnosis Date    Asthma childhood    Colon polyp     COVID-19     Diabetes mellitus     GERD (gastroesophageal reflux disease)     GERD (gastroesophageal reflux disease)     Morbid obesity     PCOS (polycystic ovarian syndrome)        Past Surgical History:   Procedure Laterality Date    APPENDECTOMY      COLONOSCOPY  2018    IN PROCEDURES: Grade 1 internal hemorrhoids.  Polyp 2mm in the descending colon; repeat in 5 years for surveillance    HERNIA REPAIR      LYSIS OF ADHESIONS N/A 2022    Procedure: LYSIS, ADHESIONS;  Surgeon: Danny Ortiz Jr., MD;  Location: [facility];  Service: General;  Laterality: N/A;    OOPHORECTOMY      salpingoopherectomy Right        Time Tracking:     PT Received On: 22  PT Start Time: 15     PT Stop Time: 55  PT Total Time (min): 40 min     Billable Minutes: Evaluation 10, Gait Training 15, and Therapeutic Activity 15      2022

## 2022-09-03 NOTE — PROGRESS NOTES
Postop day 1.  Status post open primary repair incarcerated recurrent ventral hernia with enterolysis of adhesions    Subjective   Passing flatus   Tolerating liquids     PE  Afebrile   Vital signs stable   Abdomen-soft, dressings clean ,dry and intact, nondistended, positive bowel sounds  Extremities-no tenderness, edema    Lab   Hematocrit 37, white blood cell count 9.7    Impression/plan  Surgically stable, bowel function appears to be returning  Advance diet  Increase activity

## 2022-09-03 NOTE — PLAN OF CARE
Problem: Physical Therapy  Goal: Physical Therapy Goal  Description: Goals to be met by: 10/1/2022     Patient will increase functional independence with mobility by performin. Supine to sit with [place]  2. Sit to supine with [place]  3. Sit to stand transfer with [place]  4. Gait  x 300 feet with [place] using No Assistive Device.   5. Stand for 20 minutes with [place] using No Assistive Device    Outcome: Ongoing, Progressing     Patient ambulated 100 feet with RW with good control and step through gait pattern after evaluation. Performed bathroom transfer with BSC over commode with good control. Safe for bathroom transfers once catheter is removed with assist. Goals are to ambulate and perform all activity without walker per patient.

## 2022-09-03 NOTE — PLAN OF CARE
Problem: Infection  Goal: Absence of Infection Signs and Symptoms  Outcome: Ongoing, Progressing     Problem: Adult Inpatient Plan of Care  Goal: Plan of Care Review  Outcome: Ongoing, Progressing  Goal: Patient-Specific Goal (Individualized)  Outcome: Ongoing, Progressing  Goal: Absence of Hospital-Acquired Illness or Injury  Outcome: Ongoing, Progressing  Goal: Optimal Comfort and Wellbeing  Outcome: Ongoing, Progressing  Goal: Readiness for Transition of Care  Outcome: Ongoing, Progressing     Problem: Diabetes Comorbidity  Goal: Blood Glucose Level Within Targeted Range  Outcome: Ongoing, Progressing     Problem: Bariatric Environmental Safety  Goal: Safety Maintained with Care  Outcome: Ongoing, Progressing     Problem: Fall Injury Risk  Goal: Absence of Fall and Fall-Related Injury  Outcome: Ongoing, Progressing   Patient compliant with CP.

## 2022-09-03 NOTE — PROGRESS NOTES
Postop day 2  Status post open primary repair incarcerated recurrent ventral hernia with enterolysis of adhesions    Subjective   Passing flatus   Tolerating some reg diet     PE  Afebrile   Vital signs stable   Abdomen-soft, dressings clean ,dry and intact, nondistended, positive bowel sounds  Extremities-no tenderness, edema       On insulin sliding scale for glucose management     Impression/plan  Surgically stable, bowel function appears to be returning  Advance diet  Increase activity

## 2022-09-04 LAB
POCT GLUCOSE: 221 MG/DL (ref 70–110)
POCT GLUCOSE: 228 MG/DL (ref 70–110)
POCT GLUCOSE: 232 MG/DL (ref 70–110)
POCT GLUCOSE: 237 MG/DL (ref 70–110)
POCT GLUCOSE: 238 MG/DL (ref 70–110)
POCT GLUCOSE: 266 MG/DL (ref 70–110)

## 2022-09-04 PROCEDURE — 11000001 HC ACUTE MED/SURG PRIVATE ROOM

## 2022-09-04 PROCEDURE — 25000003 PHARM REV CODE 250: Performed by: SPECIALIST

## 2022-09-04 PROCEDURE — 63600175 PHARM REV CODE 636 W HCPCS: Performed by: SPECIALIST

## 2022-09-04 PROCEDURE — 25000003 PHARM REV CODE 250: Performed by: SURGERY

## 2022-09-04 PROCEDURE — A4216 STERILE WATER/SALINE, 10 ML: HCPCS | Performed by: SPECIALIST

## 2022-09-04 RX ORDER — DOCUSATE SODIUM 100 MG/1
100 CAPSULE, LIQUID FILLED ORAL 2 TIMES DAILY
Status: DISCONTINUED | OUTPATIENT
Start: 2022-09-04 | End: 2022-09-08 | Stop reason: HOSPADM

## 2022-09-04 RX ADMIN — INSULIN ASPART 2 UNITS: 100 INJECTION, SOLUTION INTRAVENOUS; SUBCUTANEOUS at 01:09

## 2022-09-04 RX ADMIN — LISINOPRIL 2.5 MG: 2.5 TABLET ORAL at 08:09

## 2022-09-04 RX ADMIN — Medication 3 ML: at 10:09

## 2022-09-04 RX ADMIN — ENOXAPARIN SODIUM 40 MG: 40 INJECTION SUBCUTANEOUS at 05:09

## 2022-09-04 RX ADMIN — OXYCODONE 10 MG: 5 TABLET ORAL at 03:09

## 2022-09-04 RX ADMIN — OXYCODONE 10 MG: 5 TABLET ORAL at 01:09

## 2022-09-04 RX ADMIN — OXYCODONE 10 MG: 5 TABLET ORAL at 10:09

## 2022-09-04 RX ADMIN — Medication 3 ML: at 01:09

## 2022-09-04 RX ADMIN — ACETAMINOPHEN 650 MG: 325 TABLET, FILM COATED ORAL at 08:09

## 2022-09-04 RX ADMIN — INSULIN ASPART 2 UNITS: 100 INJECTION, SOLUTION INTRAVENOUS; SUBCUTANEOUS at 06:09

## 2022-09-04 RX ADMIN — ACETAMINOPHEN 650 MG: 325 TABLET, FILM COATED ORAL at 03:09

## 2022-09-04 RX ADMIN — INSULIN ASPART 1 UNITS: 100 INJECTION, SOLUTION INTRAVENOUS; SUBCUTANEOUS at 10:09

## 2022-09-04 RX ADMIN — DOCUSATE SODIUM 100 MG: 100 CAPSULE, LIQUID FILLED ORAL at 08:09

## 2022-09-04 RX ADMIN — OXYCODONE 10 MG: 5 TABLET ORAL at 05:09

## 2022-09-04 RX ADMIN — HYDROMORPHONE HYDROCHLORIDE 0.5 MG: 1 INJECTION, SOLUTION INTRAMUSCULAR; INTRAVENOUS; SUBCUTANEOUS at 05:09

## 2022-09-04 RX ADMIN — INSULIN ASPART 2 UNITS: 100 INJECTION, SOLUTION INTRAVENOUS; SUBCUTANEOUS at 10:09

## 2022-09-04 RX ADMIN — DOCUSATE SODIUM 100 MG: 100 CAPSULE, LIQUID FILLED ORAL at 12:09

## 2022-09-04 RX ADMIN — OXYCODONE 10 MG: 5 TABLET ORAL at 08:09

## 2022-09-04 NOTE — PLAN OF CARE
AAOX4. VSS.Pt free of trauma, falls, injury and skin breakdown. Island dressing to transverse incision intact w/ small amount of dried drainage note. Abd binder on. JONAH x2;draining serosanguineous fluid.Pt pain controlled w/ Oxycodone 10mg q4h PRN.Pt has been eating and voiding adequately throughout shift. Blood glucose monitoring.Pt ambulates w/ assist x1 and repositions self independently. PT this shift(see notes).Purposeful hourly rounding. Pt has call light in reach, side rails up X2, bed in low position and nonskid socks on. Pt lying in bed in no distress w/ family at the bedside. Will continue to monitor.

## 2022-09-04 NOTE — PROGRESS NOTES
Postop day 2  Status post open primary repair incarcerated recurrent ventral hernia with enterolysis of adhesions    Subjective   Passing flatus   Still no bowel movement  Tolerating some reg diet     Had a significant pain episode last night requiring Dilaudid.    Feels better now.      PE  Afebrile   Vital signs stable   Abdomen-soft, dressings clean ,dry and intact, nondistended, positive bowel sounds  Extremities-no tenderness, edema       On insulin sliding scale for glucose management     Impression/plan  Surgically stable, bowel function appears to be returning  Advance diet  Increase activity  Okay for stool softeners

## 2022-09-05 LAB
POCT GLUCOSE: 205 MG/DL (ref 70–110)
POCT GLUCOSE: 214 MG/DL (ref 70–110)
POCT GLUCOSE: 249 MG/DL (ref 70–110)
POCT GLUCOSE: 253 MG/DL (ref 70–110)
POCT GLUCOSE: 270 MG/DL (ref 70–110)

## 2022-09-05 PROCEDURE — 63600175 PHARM REV CODE 636 W HCPCS: Performed by: SPECIALIST

## 2022-09-05 PROCEDURE — 94761 N-INVAS EAR/PLS OXIMETRY MLT: CPT

## 2022-09-05 PROCEDURE — 97530 THERAPEUTIC ACTIVITIES: CPT | Mod: CQ

## 2022-09-05 PROCEDURE — 25000003 PHARM REV CODE 250: Performed by: SURGERY

## 2022-09-05 PROCEDURE — A4216 STERILE WATER/SALINE, 10 ML: HCPCS | Performed by: SPECIALIST

## 2022-09-05 PROCEDURE — 11000001 HC ACUTE MED/SURG PRIVATE ROOM

## 2022-09-05 PROCEDURE — 27000221 HC OXYGEN, UP TO 24 HOURS

## 2022-09-05 PROCEDURE — 25000003 PHARM REV CODE 250: Performed by: SPECIALIST

## 2022-09-05 RX ADMIN — OXYCODONE 10 MG: 5 TABLET ORAL at 01:09

## 2022-09-05 RX ADMIN — DOCUSATE SODIUM 100 MG: 100 CAPSULE, LIQUID FILLED ORAL at 07:09

## 2022-09-05 RX ADMIN — OXYCODONE 10 MG: 5 TABLET ORAL at 07:09

## 2022-09-05 RX ADMIN — INSULIN ASPART 2 UNITS: 100 INJECTION, SOLUTION INTRAVENOUS; SUBCUTANEOUS at 02:09

## 2022-09-05 RX ADMIN — Medication 3 ML: at 10:09

## 2022-09-05 RX ADMIN — INSULIN ASPART 2 UNITS: 100 INJECTION, SOLUTION INTRAVENOUS; SUBCUTANEOUS at 09:09

## 2022-09-05 RX ADMIN — OXYCODONE 10 MG: 5 TABLET ORAL at 05:09

## 2022-09-05 RX ADMIN — Medication 3 ML: at 01:09

## 2022-09-05 RX ADMIN — LISINOPRIL 2.5 MG: 2.5 TABLET ORAL at 07:09

## 2022-09-05 RX ADMIN — INSULIN ASPART 2 UNITS: 100 INJECTION, SOLUTION INTRAVENOUS; SUBCUTANEOUS at 05:09

## 2022-09-05 RX ADMIN — ENOXAPARIN SODIUM 40 MG: 40 INJECTION SUBCUTANEOUS at 05:09

## 2022-09-05 RX ADMIN — OXYCODONE 10 MG: 5 TABLET ORAL at 10:09

## 2022-09-05 RX ADMIN — DOCUSATE SODIUM 100 MG: 100 CAPSULE, LIQUID FILLED ORAL at 08:09

## 2022-09-05 RX ADMIN — OXYCODONE 10 MG: 5 TABLET ORAL at 03:09

## 2022-09-05 RX ADMIN — INSULIN ASPART 1 UNITS: 100 INJECTION, SOLUTION INTRAVENOUS; SUBCUTANEOUS at 10:09

## 2022-09-05 NOTE — PROGRESS NOTES
Surgery Inpatient Progress Note    Date: 09/05/2022    Overnight events: Tolerating diet, passing flatus, no bowel movement.      O:   Vitals:    09/05/22 0745   BP:    Pulse:    Resp: 18   Temp:        Physical Exam   Gen: well developed female, NAD  HEENT: normocephalic, atraumatic, PERRL, EOMI   CV: RRR, no murmurs  Res: nonlabored, CTAB   Abd: soft, lower abdominal incision with dressing in place, JONAH drains with serosanguinous drainage   MSk: no gross deformities     JONAH LLQ 20   JONAH RLQ 50     Assessment and plan:   Glenda Peña is a 32 y.o. female who is POD 3 s/p ventral incisional hernia repair     - continue mobilization, OOB, ambulation   - daily stool softener, diet as tolerated  - anticipate DC in next 24-48 hours pending progress.         Darling Canela MD  Staff Surgeon   Colon & Rectal Surgery

## 2022-09-05 NOTE — PT/OT/SLP PROGRESS
Physical Therapy Treatment    Patient Name:  Glenda Peña   MRN:  [id number]    Recommendations:     Discharge Recommendations:  home   Discharge Equipment Recommendations: bedside commode   Barriers to discharge: None    Assessment:     Glenda Peña is a 32 y.o. female admitted with a medical diagnosis of <principal problem not specified>.  She presents with the following impairments/functional limitations:  weakness, pain, gait instability, impaired balance, impaired endurance, impaired functional mobility, decreased ROM.    Pt and spouse had just ambulated in hallway before PTA arrival (and they ambulate several times a day). Educated patient on use of RW for added stability for several more days and on the importance of daily activity, PT goals, and safety at home. No functional mobility performed this visit. Pt will likely be discharged from PT.    Rehab Prognosis: Good; patient would benefit from acute skilled PT services to address these deficits and reach maximum level of function.    Recent Surgery: Procedure(s) (LRB):  REPAIR, HERNIA, VENTRAL (N/A)  LYSIS, ADHESIONS (N/A)  LAPAROTOMY, EXPLORATORY (N/A) 4 Days Post-Op    Plan:     During this hospitalization, patient to be seen 5 x/week to address the identified rehab impairments via gait training, therapeutic activities, therapeutic exercises, neuromuscular re-education and progress toward the following goals:    Plan of Care Expires:  10/01/22    Subjective     Chief Complaint: didn't get good sleep last night 2/2 pain overnight  Patient/Family Comments/goals: Pt states she has been sitting up in chair a lot and ambulating around the unit with   Pain/Comfort:  Pain Rating 1: 0/10  Pain Rating Post-Intervention 1: 0/10      Objective:     Communicated with nurse Obrien prior to session.  Patient found HOB elevated with peripheral IV, JONAH drain upon PT entry to room.     General Precautions: Standard, fall   Orthopedic Precautions:N/A    Braces:    Respiratory Status: Room air     Functional Mobility:  No functional mobility performed this visit      AM-PAC 6 CLICK MOBILITY  Turning over in bed (including adjusting bedclothes, sheets and blankets)?: 3  Sitting down on and standing up from a chair with arms (e.g., wheelchair, bedside commode, etc.): 3  Moving from lying on back to sitting on the side of the bed?: 3  Moving to and from a bed to a chair (including a wheelchair)?: 3  Need to walk in hospital room?: 3  Climbing 3-5 steps with a railing?: 2  Basic Mobility Total Score: 17       Therapeutic Activities and Exercises:   Pt and spouse had just ambulated in hallway before PTA arrival (and they ambulate several times a day). Educated patient on use of RW for added stability for several more days and on the importance of daily activity, PT goals, and safety at home. No functional mobility performed this visit. Pt will likely be discharged from PT.    Patient left HOB elevated with all lines intact, call button in reach, and spouse present..    GOALS:   Multidisciplinary Problems       Physical Therapy Goals          Problem: Physical Therapy    Goal Priority Disciplines Outcome Goal Variances Interventions   Physical Therapy Goal     PT, PT/OT Ongoing, Progressing     Description: Goals to be met by: 10/1/2022     Patient will increase functional independence with mobility by performin. Supine to sit with [place]  2. Sit to supine with [place]  3. Sit to stand transfer with [place]  4. Gait  x 300 feet with [place] using No Assistive Device.   5. Stand for 20 minutes with [place] using No Assistive Device                         Time Tracking:     PT Received On: 22  PT Start Time: 1115     PT Stop Time: 1125  PT Total Time (min): 10 min     Billable Minutes: Therapeutic Activity 10    Treatment Type: Treatment  PT/PTA: PTA     PTA Visit Number: 1     2022

## 2022-09-05 NOTE — PLAN OF CARE
Met with patient to discuss therapy's rec for bedside commode - patient agrees with recommendation with plans to utilize it as a raised toilet seat - order placed - [facility] gave approval to pull from our supply - delivered to bedside - delivery ticket signed

## 2022-09-05 NOTE — PLAN OF CARE
Patient is AAOx4.  at bedside. Transverse incision to abdomen, CDI. Abdominal binder in place. JONAH drain care performed. Wound to right buttock, CDI. No acute events overnight. Patient ambulated in halls with walker and stand-by assistance. Patient reported pain during the night and medications were administered per orders. Patient resting comfortably at present. Bed locked in lowest position with side rails up x 2. Call light within reach of patient. Will continue purposeful rounding.

## 2022-09-06 LAB
POCT GLUCOSE: 273 MG/DL (ref 70–110)
POCT GLUCOSE: 290 MG/DL (ref 70–110)
POCT GLUCOSE: 295 MG/DL (ref 70–110)
POCT GLUCOSE: 308 MG/DL (ref 70–110)
POCT GLUCOSE: 346 MG/DL (ref 70–110)

## 2022-09-06 PROCEDURE — 63600175 PHARM REV CODE 636 W HCPCS: Performed by: SPECIALIST

## 2022-09-06 PROCEDURE — 11000001 HC ACUTE MED/SURG PRIVATE ROOM

## 2022-09-06 PROCEDURE — 25000003 PHARM REV CODE 250: Performed by: SURGERY

## 2022-09-06 PROCEDURE — 94761 N-INVAS EAR/PLS OXIMETRY MLT: CPT

## 2022-09-06 PROCEDURE — A4216 STERILE WATER/SALINE, 10 ML: HCPCS | Performed by: SPECIALIST

## 2022-09-06 PROCEDURE — 25000003 PHARM REV CODE 250: Performed by: SPECIALIST

## 2022-09-06 RX ORDER — BISACODYL 10 MG
10 SUPPOSITORY, RECTAL RECTAL DAILY PRN
Status: DISCONTINUED | OUTPATIENT
Start: 2022-09-06 | End: 2022-09-08 | Stop reason: HOSPADM

## 2022-09-06 RX ADMIN — LISINOPRIL 2.5 MG: 2.5 TABLET ORAL at 08:09

## 2022-09-06 RX ADMIN — OXYCODONE 10 MG: 5 TABLET ORAL at 05:09

## 2022-09-06 RX ADMIN — OXYCODONE 10 MG: 5 TABLET ORAL at 01:09

## 2022-09-06 RX ADMIN — Medication 3 ML: at 09:09

## 2022-09-06 RX ADMIN — DOCUSATE SODIUM 100 MG: 100 CAPSULE, LIQUID FILLED ORAL at 08:09

## 2022-09-06 RX ADMIN — OXYCODONE 10 MG: 5 TABLET ORAL at 09:09

## 2022-09-06 RX ADMIN — ACETAMINOPHEN 650 MG: 325 TABLET, FILM COATED ORAL at 05:09

## 2022-09-06 RX ADMIN — DOCUSATE SODIUM 100 MG: 100 CAPSULE, LIQUID FILLED ORAL at 09:09

## 2022-09-06 RX ADMIN — INSULIN ASPART 2 UNITS: 100 INJECTION, SOLUTION INTRAVENOUS; SUBCUTANEOUS at 09:09

## 2022-09-06 RX ADMIN — Medication 3 ML: at 06:09

## 2022-09-06 RX ADMIN — INSULIN ASPART 3 UNITS: 100 INJECTION, SOLUTION INTRAVENOUS; SUBCUTANEOUS at 01:09

## 2022-09-06 RX ADMIN — ENOXAPARIN SODIUM 40 MG: 40 INJECTION SUBCUTANEOUS at 05:09

## 2022-09-06 RX ADMIN — Medication 3 ML: at 01:09

## 2022-09-06 RX ADMIN — INSULIN ASPART 3 UNITS: 100 INJECTION, SOLUTION INTRAVENOUS; SUBCUTANEOUS at 05:09

## 2022-09-06 RX ADMIN — OXYCODONE 10 MG: 5 TABLET ORAL at 08:09

## 2022-09-06 RX ADMIN — OXYCODONE 10 MG: 5 TABLET ORAL at 03:09

## 2022-09-06 NOTE — PLAN OF CARE
Patient is AAOx4. Ambulates independently. Patient reported pain during the night. Medications administered per orders. Dressing to abdomen, CDI with abdominal binder in place. No acute events overnight. Patient resting comfortably at present. Bed locked in lowest position with side rails up x 2. Call light within reach of patient. Will continue purposeful rounding.

## 2022-09-06 NOTE — PT/OT/SLP PROGRESS
Physical Therapy  Not Seen    Patient Name:  Glenda Peña   MRN:  [id number]    Patient not seen today secondary to Patient fatigue. Will follow-up tomorrow, 9/7.

## 2022-09-06 NOTE — PLAN OF CARE
AAOX4. VSS.Pt free of trauma, falls, injury and skin breakdown. Island dressing to transverse incision intact w/ small amount of dried drainage note. Abd binder on. JONAH x2;draining serosanguineous fluid. +BS. Pt passing flatus. No BM this shift.Pt pain controlled w/ Oxycodone 10mg q4h PRN.Pt has been eating and voiding adequately throughout shift. Blood glucose monitoring.Pt ambulates w/ assist x1 and repositions self independently. PT this shift(see notes).Purposeful hourly rounding. Pt has call light in reach, side rails up X2, bed in low position and nonskid socks on. Pt up in recliner; wheels locked, in distress. Will continue to monitor.       ATTENDING STATEMENT    I discussed the case with the Resident. I agree with the Resident's findings and plan, as documented in today's note    Jorje Sampson, DO

## 2022-09-06 NOTE — PLAN OF CARE
Problem: Infection  Goal: Absence of Infection Signs and Symptoms  Outcome: Ongoing, Progressing     Problem: Adult Inpatient Plan of Care  Goal: Plan of Care Review  Outcome: Ongoing, Progressing  Goal: Patient-Specific Goal (Individualized)  Outcome: Ongoing, Progressing  Goal: Absence of Hospital-Acquired Illness or Injury  Outcome: Ongoing, Progressing  Goal: Optimal Comfort and Wellbeing  Outcome: Ongoing, Progressing  Goal: Readiness for Transition of Care  Outcome: Ongoing, Progressing     Problem: Diabetes Comorbidity  Goal: Blood Glucose Level Within Targeted Range  Outcome: Ongoing, Progressing     Problem: Bariatric Environmental Safety  Goal: Safety Maintained with Care  Outcome: Ongoing, Progressing     Problem: Fall Injury Risk  Goal: Absence of Fall and Fall-Related Injury  Outcome: Ongoing, Progressing     Problem: Impaired Wound Healing  Goal: Optimal Wound Healing  Outcome: Ongoing, Progressing

## 2022-09-06 NOTE — CONSULTS
[facility])  Wound Care    Patient Name:  Glenda Peña   MRN:  [id number]  Date: 9/4/2022  Diagnosis: <principal problem not specified>    History:     Past Medical History:   Diagnosis Date    Asthma childhood    Colon polyp     COVID-19     Diabetes mellitus     GERD (gastroesophageal reflux disease)     GERD (gastroesophageal reflux disease)     Morbid obesity     PCOS (polycystic ovarian syndrome)        Social History     Socioeconomic History    Marital status:    Tobacco Use    Smoking status: Never    Smokeless tobacco: Never   Substance and Sexual Activity    Alcohol use: Not Currently     Comment: occasionally apple cider- once every 2 months    Drug use: No    Sexual activity: Yes     Partners: Female     Social Determinants of Health     Financial Resource Strain: Low Risk     Difficulty of Paying Living Expenses: Not hard at all   Food Insecurity: No Food Insecurity    Worried About Running Out of Food in the Last Year: Never true    Ran Out of Food in the Last Year: Never true   Transportation Needs: No Transportation Needs    Lack of Transportation (Medical): No    Lack of Transportation (Non-Medical): No   Physical Activity: Inactive    Days of Exercise per Week: 0 days    Minutes of Exercise per Session: 0 min   Stress: No Stress Concern Present    Feeling of Stress : Only a little   Social Connections: Moderately Isolated    Frequency of Communication with Friends and Family: More than three times a week    Frequency of Social Gatherings with Friends and Family: Twice a week    Attends Sikh Services: Never    Active Member of Clubs or Organizations: No    Attends Club or Organization Meetings: Never    Marital Status:    Housing Stability: Unknown    Unable to Pay for Housing in the Last Year: No    Unstable Housing in the Last Year: No           WOC Assessment Details:        09/04/22 2047        Altered Skin Integrity 09/04/22 1500 Right Buttocks  Blister(s) Partial thickness tissue loss. Shallow open ulcer with a red or pink wound bed, without slough. Intact or Open/Ruptured Serum-filled blister.   Date First Assessed/Time First Assessed: 09/04/22 1500   Altered Skin Integrity Present on Admission: suspected hospital acquired  Side: Right  Location: Buttocks  Primary Wound Type: Blister(s)  Description of Altered Skin Integrity: Partial thicknes...   Wound Image    Description of Altered Skin Integrity Partial thickness tissue loss. Shallow open ulcer with a red or pink wound bed, without slough. Intact or Open/Ruptured Serum-filled blister.   Dressing Appearance Open to air   Drainage Amount None   Tissue loss description Partial thickness   Periwound Area Redness   Wound Edges Open   Periwound Care Cleansed with pH balanced cleanser;Topical treatment applied   Dressing Change Due 09/04/22     Cleansed area of injury and applied triad paste. Recommendations made and orders written.  Provided chair cushion for comfort.  Appears as a ruptured boil on the right side of the buttock cleft.  Recommendations made and orders written, education provided for independent care.

## 2022-09-07 LAB
POCT GLUCOSE: 282 MG/DL (ref 70–110)
POCT GLUCOSE: 302 MG/DL (ref 70–110)
POCT GLUCOSE: 304 MG/DL (ref 70–110)
POCT GLUCOSE: 314 MG/DL (ref 70–110)

## 2022-09-07 PROCEDURE — 11000001 HC ACUTE MED/SURG PRIVATE ROOM

## 2022-09-07 PROCEDURE — 25000003 PHARM REV CODE 250: Performed by: SPECIALIST

## 2022-09-07 PROCEDURE — 94761 N-INVAS EAR/PLS OXIMETRY MLT: CPT

## 2022-09-07 PROCEDURE — 25000003 PHARM REV CODE 250: Performed by: SURGERY

## 2022-09-07 PROCEDURE — 97116 GAIT TRAINING THERAPY: CPT

## 2022-09-07 PROCEDURE — A4216 STERILE WATER/SALINE, 10 ML: HCPCS | Performed by: SPECIALIST

## 2022-09-07 RX ADMIN — INSULIN ASPART 3 UNITS: 100 INJECTION, SOLUTION INTRAVENOUS; SUBCUTANEOUS at 10:09

## 2022-09-07 RX ADMIN — INSULIN ASPART 2 UNITS: 100 INJECTION, SOLUTION INTRAVENOUS; SUBCUTANEOUS at 10:09

## 2022-09-07 RX ADMIN — OXYCODONE 10 MG: 5 TABLET ORAL at 04:09

## 2022-09-07 RX ADMIN — LISINOPRIL 2.5 MG: 2.5 TABLET ORAL at 08:09

## 2022-09-07 RX ADMIN — ACETAMINOPHEN 650 MG: 325 TABLET, FILM COATED ORAL at 08:09

## 2022-09-07 RX ADMIN — Medication 3 ML: at 02:09

## 2022-09-07 RX ADMIN — INSULIN ASPART 4 UNITS: 100 INJECTION, SOLUTION INTRAVENOUS; SUBCUTANEOUS at 12:09

## 2022-09-07 RX ADMIN — OXYCODONE 10 MG: 5 TABLET ORAL at 10:09

## 2022-09-07 RX ADMIN — DOCUSATE SODIUM 100 MG: 100 CAPSULE, LIQUID FILLED ORAL at 08:09

## 2022-09-07 RX ADMIN — OXYCODONE 10 MG: 5 TABLET ORAL at 08:09

## 2022-09-07 RX ADMIN — INSULIN ASPART 4 UNITS: 100 INJECTION, SOLUTION INTRAVENOUS; SUBCUTANEOUS at 05:09

## 2022-09-07 RX ADMIN — OXYCODONE 10 MG: 5 TABLET ORAL at 12:09

## 2022-09-07 RX ADMIN — OXYCODONE 10 MG: 5 TABLET ORAL at 05:09

## 2022-09-07 NOTE — PLAN OF CARE
Problem: Physical Therapy  Goal: Physical Therapy Goal  Description: Goals to be met by: 10/1/2022     Patient will increase functional independence with mobility by performin. Supine to sit with [place]  2. Sit to supine with [place]  3. Sit to stand transfer with [place]  4. Gait  x 300 feet with [place] using No Assistive Device.   5. Stand for 20 minutes with [place] using No Assistive Device    Outcome: Met       Pt tolerated treatment well with no adverse reactions. Pt has met all functional goals. Pt performs ambulation and transfers with independence. Acute PT services no longer needed. Will d/c acute PT. Recommend discharge to home.

## 2022-09-07 NOTE — PROGRESS NOTES
Postop day 6.   Status post exploratory laparotomy, enterolysis of adhesions, repair of complex incarcerated hernia.    Subjective   Moving better   Tolerating solid diet   Positive bowel movement    PE  Afebrile   Vital signs stable   Abdomen-soft, incisional tenderness, dressings clean dry and intact,    Impression/plan  Surgically stable   Discharge in a.m.

## 2022-09-07 NOTE — PROGRESS NOTES
Postop day 5.   Status post laparotomy with enterolysis of adhesions and repair of ventral hernia    Subjective   Passing flatus no bowel movement   Pain improved    PE   Abdomen-soft, mild dist home soon ention, incisions dry and intact, incisional tenderness  Extremities-nontender    Impression/plan  Surgically stable

## 2022-09-07 NOTE — PT/OT/SLP DISCHARGE
Physical Therapy Discharge Summary    Name: Glenda Peña  MRN: [id number]   Principal Problem: s/p ventral hernia repai    Patient Discharged from acute Physical Therapy on 22.  Please refer to prior PT noted date on 22 for functional status.     Assessment:     Patient has met all goals and is not appropriate for therapy.    Objective:     GOALS:   Multidisciplinary Problems       Physical Therapy Goals       Not on file              Multidisciplinary Problems (Resolved)          Problem: Physical Therapy    Goal Priority Disciplines Outcome Goal Variances Interventions   Physical Therapy Goal   (Resolved)     PT, PT/OT Met     Description: Goals to be met by: 10/1/2022     Patient will increase functional independence with mobility by performin. Supine to sit with [place]  2. Sit to supine with [place]  3. Sit to stand transfer with [place]  4. Gait  x 300 feet with [place] using No Assistive Device.   5. Stand for 20 minutes with [place] using No Assistive Device                         Reasons for Discontinuation of Therapy Services  Satisfactory goal achievement.      Plan:     Patient Discharged to: Home no PT services needed.      2022

## 2022-09-07 NOTE — PT/OT/SLP PROGRESS
"Physical Therapy Treatment    Patient Name:  Glenda Peña   MRN:  [id number]    Recommendations:     Discharge Recommendations:  home   Discharge Equipment Recommendations: bedside commode   Barriers to discharge: None    Assessment:     Glenda Peña is a 32 y.o. female admitted s/p ventral hernia repair.      Pt tolerated treatment well with no adverse reactions. Pt has met all functional goals. Pt performs ambulation and transfers with independence. Acute PT services no longer needed. Will d/c acute PT. Recommend discharge to home.       Recent Surgery: Procedure(s) (LRB):  REPAIR, HERNIA, VENTRAL (N/A)  LYSIS, ADHESIONS (N/A)  LAPAROTOMY, EXPLORATORY (N/A) 6 Days Post-Op    Plan:     Pt discharged from PT services. Pt has met all functional goals and is at her baseline functional level. No further acute PT services needed.    Subjective     Chief Complaint: none at this time  Patient/Family Comments/goals: "I feel alright today. I finally got some sleep last night."  Pain/Comfort:  Pain Rating 1:  (minimal)      Objective:     Communicated with RUDY Dubon prior to session.  Patient found sitting edge of bed with peripheral IV, JONAH drain upon PT entry to room.     General Precautions: Standard, fall   Orthopedic Precautions:N/A   Braces: N/A  Respiratory Status: Room air    Functional Mobility: non-slip footwear donned prior to out of bed mobility.  Bed Mobility:     Supine to Sit: independent  Sit to Supine: independent  Transfers:     Sit to Stand:  independent with no AD  Gait: 500 ft independently with no AD.  Demonstrated normal gait pattern. No loss of balance.    AM-PAC 6 CLICK MOBILITY  Turning over in bed (including adjusting bedclothes, sheets and blankets)?: 4  Sitting down on and standing up from a chair with arms (e.g., wheelchair, bedside commode, etc.): 4  Moving from lying on back to sitting on the side of the bed?: 4  Moving to and from a bed to a chair (including a wheelchair)?: 4  Need " to walk in hospital room?: 4  Climbing 3-5 steps with a railing?: 3  Basic Mobility Total Score: 23       Therapeutic Activities and Exercises:  Bed mobility, transfer, and gait training as described above.    PT educated patient re:   Discharge from PT  Safety with OOB mobility  Activity recommendations  Discharge recommendations   Pt verbalized understanding     Patient left ambulatory in room/burr with all lines intact and call button in reach..    GOALS:   Multidisciplinary Problems       Physical Therapy Goals       Not on file              Multidisciplinary Problems (Resolved)          Problem: Physical Therapy    Goal Priority Disciplines Outcome Goal Variances Interventions   Physical Therapy Goal   (Resolved)     PT, PT/OT Met     Description: Goals to be met by: 10/1/2022     Patient will increase functional independence with mobility by performin. Supine to sit with [place]  2. Sit to supine with [place]  3. Sit to stand transfer with [place]  4. Gait  x 300 feet with [place] using No Assistive Device.   5. Stand for 20 minutes with [place] using No Assistive Device                         Time Tracking:     PT Received On: 22  PT Start Time: 08     PT Stop Time: 824  PT Total Time (min): 8 min     Billable Minutes: Gait Training 8    Treatment Type: Treatment  PT/PTA: PT     PTA Visit Number: 0     2022

## 2022-09-07 NOTE — PLAN OF CARE
Patient is AAOx4. Patient reported pain during the night and medications were administered per orders. Ambulated through the halls independently. Bowel movement documented. Dressing to abdomen, CDI. Patient resting comfortably at present. Bed locked in lowest position with side rails up x 2. Call light within reach of patient. Will continue purposeful rounding.

## 2022-09-08 VITALS
HEART RATE: 92 BPM | BODY MASS INDEX: 43.55 KG/M2 | RESPIRATION RATE: 20 BRPM | SYSTOLIC BLOOD PRESSURE: 100 MMHG | TEMPERATURE: 98 F | DIASTOLIC BLOOD PRESSURE: 54 MMHG | OXYGEN SATURATION: 97 % | WEIGHT: 238.13 LBS

## 2022-09-08 LAB
POCT GLUCOSE: 277 MG/DL (ref 70–110)
POCT GLUCOSE: 313 MG/DL (ref 70–110)

## 2022-09-08 PROCEDURE — 25000003 PHARM REV CODE 250: Performed by: SPECIALIST

## 2022-09-08 RX ORDER — OXYCODONE HYDROCHLORIDE 10 MG/1
10 TABLET ORAL EVERY 6 HOURS PRN
Qty: 20 TABLET | Refills: 0 | Status: SHIPPED | OUTPATIENT
Start: 2022-09-08 | End: 2022-12-22

## 2022-09-08 RX ADMIN — INSULIN ASPART 3 UNITS: 100 INJECTION, SOLUTION INTRAVENOUS; SUBCUTANEOUS at 08:09

## 2022-09-08 RX ADMIN — ACETAMINOPHEN 650 MG: 325 TABLET, FILM COATED ORAL at 12:09

## 2022-09-08 RX ADMIN — OXYCODONE 10 MG: 5 TABLET ORAL at 08:09

## 2022-09-08 RX ADMIN — OXYCODONE 10 MG: 5 TABLET ORAL at 04:09

## 2022-09-08 NOTE — DISCHARGE SUMMARY
The patient is a 32-year-old female with a history of multiple abdominal procedures.  The patient was electively admitted for operative repair of a complex recurrent ventral hernia.  On 09/01/2022 the patient was admitted and underwent exploratory laparotomy with enterolysis of adhesions and repair of complex ventral hernia.  Postoperatively the patient had a delay in return to bowel function for several days.  She was eventually advanced to a regular diet which he tolerated without difficulty.  The patient is being discharged to be followed by me in my office she is been instructed as the appropriate limitations of her activity and she is on a regular diet.  New medication at the time of discharge includes oxycodone 10 mg, 1 p.o. q.6 hours p.r.n. pain.  Wound care has been outlined.

## 2022-09-08 NOTE — PLAN OF CARE
Pt is ready for discharge home today, family to provide transportation.    Meds sent to [facility] for bedside delivery.    Pt to schedule her own follow-up apt.    No CM needs identified, pt ready for discharge from CM perspective.    [facility]  Discharge Final Note    Primary Care Provider: Emiliano Dunbar MD    Expected Discharge Date: 9/8/2022    Final Discharge Note (most recent)       Final Note - 09/08/22 1216          Final Note    Assessment Type Final Discharge Note (P)      Anticipated Discharge Disposition Home or Self Care (P)      What phone number can be called within the next 1-3 days to see how you are doing after discharge? [phone] (P)      Hospital Resources/Appts/Education Provided Provided patient/caregiver with written discharge plan information;Appointments scheduled by Navigator/Coordinator (P)         Post-Acute Status    Discharge Delays None known at this time (P)                      Important Message from Medicare             Contact Info       Danny Ortiz Jr, MD   Specialty: General Surgery, Vascular Surgery    [address] [address]   Phone: [phone]       Next Steps: Follow up in 1 week(s)

## 2022-09-08 NOTE — PLAN OF CARE
Patient is AAOx4. Ambulates independently. Patient reported pain during the night and medications were administered per orders. Dressing to abdomen, CDI. No acute events overnight. Patient resting at present. Bed locked in lowest position with side rails up x 2. Call light within reach of patient. Will continue purposeful rounding.

## 2022-09-08 NOTE — NURSING
Pt discharged to home with family. Pt is Aox4 and states verbal understanding of all discharge instructions.  Pt given supplies for JONAH drain output recording and care.  Incision covered with ABD for discharge.  Pt had 1 medication delivered to the bedside. Pt left via wheelchair with all personal belongings. Pt safe

## 2022-09-12 LAB
FINAL PATHOLOGIC DIAGNOSIS: NORMAL
GROSS: NORMAL
Lab: NORMAL

## 2022-09-14 ENCOUNTER — OFFICE VISIT (OUTPATIENT)
Dept: SURGERY | Facility: CLINIC | Age: 32
End: 2022-09-14
Attending: SPECIALIST
Payer: COMMERCIAL

## 2022-09-14 VITALS — SYSTOLIC BLOOD PRESSURE: 131 MMHG | OXYGEN SATURATION: 97 % | HEART RATE: 104 BPM | DIASTOLIC BLOOD PRESSURE: 63 MMHG

## 2022-09-14 DIAGNOSIS — K43.2 VENTRAL INCISIONAL HERNIA WITHOUT OBSTRUCTION OR GANGRENE: Primary | ICD-10-CM

## 2022-09-14 PROCEDURE — 4010F ACE/ARB THERAPY RXD/TAKEN: CPT | Mod: CPTII,S$GLB,, | Performed by: SPECIALIST

## 2022-09-14 PROCEDURE — 99999 PR PBB SHADOW E&M-EST. PATIENT-LVL IV: ICD-10-PCS | Mod: PBBFAC,,, | Performed by: SPECIALIST

## 2022-09-14 PROCEDURE — 99024 PR POST-OP FOLLOW-UP VISIT: ICD-10-PCS | Mod: S$GLB,,, | Performed by: SPECIALIST

## 2022-09-14 PROCEDURE — 3046F HEMOGLOBIN A1C LEVEL >9.0%: CPT | Mod: CPTII,S$GLB,, | Performed by: SPECIALIST

## 2022-09-14 PROCEDURE — 1159F PR MEDICATION LIST DOCUMENTED IN MEDICAL RECORD: ICD-10-PCS | Mod: CPTII,S$GLB,, | Performed by: SPECIALIST

## 2022-09-14 PROCEDURE — 1160F PR REVIEW ALL MEDS BY PRESCRIBER/CLIN PHARMACIST DOCUMENTED: ICD-10-PCS | Mod: CPTII,S$GLB,, | Performed by: SPECIALIST

## 2022-09-14 PROCEDURE — 1159F MED LIST DOCD IN RCRD: CPT | Mod: CPTII,S$GLB,, | Performed by: SPECIALIST

## 2022-09-14 PROCEDURE — 1160F RVW MEDS BY RX/DR IN RCRD: CPT | Mod: CPTII,S$GLB,, | Performed by: SPECIALIST

## 2022-09-14 PROCEDURE — 3075F SYST BP GE 130 - 139MM HG: CPT | Mod: CPTII,S$GLB,, | Performed by: SPECIALIST

## 2022-09-14 PROCEDURE — 99999 PR PBB SHADOW E&M-EST. PATIENT-LVL IV: CPT | Mod: PBBFAC,,, | Performed by: SPECIALIST

## 2022-09-14 PROCEDURE — 99024 POSTOP FOLLOW-UP VISIT: CPT | Mod: S$GLB,,, | Performed by: SPECIALIST

## 2022-09-14 PROCEDURE — 3046F PR MOST RECENT HEMOGLOBIN A1C LEVEL > 9.0%: ICD-10-PCS | Mod: CPTII,S$GLB,, | Performed by: SPECIALIST

## 2022-09-14 PROCEDURE — 3078F DIAST BP <80 MM HG: CPT | Mod: CPTII,S$GLB,, | Performed by: SPECIALIST

## 2022-09-14 PROCEDURE — 3075F PR MOST RECENT SYSTOLIC BLOOD PRESS GE 130-139MM HG: ICD-10-PCS | Mod: CPTII,S$GLB,, | Performed by: SPECIALIST

## 2022-09-14 PROCEDURE — 3078F PR MOST RECENT DIASTOLIC BLOOD PRESSURE < 80 MM HG: ICD-10-PCS | Mod: CPTII,S$GLB,, | Performed by: SPECIALIST

## 2022-09-14 PROCEDURE — 4010F PR ACE/ARB THEARPY RXD/TAKEN: ICD-10-PCS | Mod: CPTII,S$GLB,, | Performed by: SPECIALIST

## 2022-09-14 NOTE — PROGRESS NOTES
Status post laparotomy with enterolysis of adhesions and repair of recurrent incisional hernia.  No complaints   Abdomen-incisions healing without evidence of infection     Impression/plan  Surgically stable   Portion of staples drain removed   RTC 2 weeks

## 2022-09-15 DIAGNOSIS — R11.2 NON-INTRACTABLE VOMITING WITH NAUSEA: ICD-10-CM

## 2022-09-15 DIAGNOSIS — R12 HEARTBURN: ICD-10-CM

## 2022-09-15 RX ORDER — OMEPRAZOLE 40 MG/1
40 CAPSULE, DELAYED RELEASE ORAL
Qty: 30 CAPSULE | Refills: 0 | OUTPATIENT
Start: 2022-09-15 | End: 2023-09-15

## 2022-09-28 ENCOUNTER — OFFICE VISIT (OUTPATIENT)
Dept: SURGERY | Facility: CLINIC | Age: 32
End: 2022-09-28
Attending: SPECIALIST
Payer: COMMERCIAL

## 2022-09-28 VITALS — DIASTOLIC BLOOD PRESSURE: 72 MMHG | HEART RATE: 85 BPM | OXYGEN SATURATION: 97 % | SYSTOLIC BLOOD PRESSURE: 125 MMHG

## 2022-09-28 DIAGNOSIS — K43.2 VENTRAL INCISIONAL HERNIA WITHOUT OBSTRUCTION OR GANGRENE: Primary | ICD-10-CM

## 2022-09-28 PROCEDURE — 3074F SYST BP LT 130 MM HG: CPT | Mod: CPTII,S$GLB,, | Performed by: SPECIALIST

## 2022-09-28 PROCEDURE — 3046F PR MOST RECENT HEMOGLOBIN A1C LEVEL > 9.0%: ICD-10-PCS | Mod: CPTII,S$GLB,, | Performed by: SPECIALIST

## 2022-09-28 PROCEDURE — 1160F PR REVIEW ALL MEDS BY PRESCRIBER/CLIN PHARMACIST DOCUMENTED: ICD-10-PCS | Mod: CPTII,S$GLB,, | Performed by: SPECIALIST

## 2022-09-28 PROCEDURE — 4010F PR ACE/ARB THEARPY RXD/TAKEN: ICD-10-PCS | Mod: CPTII,S$GLB,, | Performed by: SPECIALIST

## 2022-09-28 PROCEDURE — 3046F HEMOGLOBIN A1C LEVEL >9.0%: CPT | Mod: CPTII,S$GLB,, | Performed by: SPECIALIST

## 2022-09-28 PROCEDURE — 1159F PR MEDICATION LIST DOCUMENTED IN MEDICAL RECORD: ICD-10-PCS | Mod: CPTII,S$GLB,, | Performed by: SPECIALIST

## 2022-09-28 PROCEDURE — 1160F RVW MEDS BY RX/DR IN RCRD: CPT | Mod: CPTII,S$GLB,, | Performed by: SPECIALIST

## 2022-09-28 PROCEDURE — 3078F DIAST BP <80 MM HG: CPT | Mod: CPTII,S$GLB,, | Performed by: SPECIALIST

## 2022-09-28 PROCEDURE — 99499 UNLISTED E&M SERVICE: CPT | Mod: S$GLB,,, | Performed by: SPECIALIST

## 2022-09-28 PROCEDURE — 3074F PR MOST RECENT SYSTOLIC BLOOD PRESSURE < 130 MM HG: ICD-10-PCS | Mod: CPTII,S$GLB,, | Performed by: SPECIALIST

## 2022-09-28 PROCEDURE — 1159F MED LIST DOCD IN RCRD: CPT | Mod: CPTII,S$GLB,, | Performed by: SPECIALIST

## 2022-09-28 PROCEDURE — 3078F PR MOST RECENT DIASTOLIC BLOOD PRESSURE < 80 MM HG: ICD-10-PCS | Mod: CPTII,S$GLB,, | Performed by: SPECIALIST

## 2022-09-28 PROCEDURE — 99499 NO LOS: ICD-10-PCS | Mod: S$GLB,,, | Performed by: SPECIALIST

## 2022-09-28 PROCEDURE — 99999 PR PBB SHADOW E&M-EST. PATIENT-LVL III: CPT | Mod: PBBFAC,,, | Performed by: SPECIALIST

## 2022-09-28 PROCEDURE — 99999 PR PBB SHADOW E&M-EST. PATIENT-LVL III: ICD-10-PCS | Mod: PBBFAC,,, | Performed by: SPECIALIST

## 2022-09-28 PROCEDURE — 4010F ACE/ARB THERAPY RXD/TAKEN: CPT | Mod: CPTII,S$GLB,, | Performed by: SPECIALIST

## 2022-09-28 NOTE — LETTER
September 28, 2022      [facility] General Surgery  [address] [facility] [address] [place] [address]  Phone: [phone]  Fax: [phone]       Patient: Glenda Peña   YOB: 1990  Date of Visit: 09/28/2022    To Whom It May Concern:    Opal Peña  was at [facility] on 09/28/2022. The patient may return to work/school on 10-3-22 with restrictions. No lifting over 10 lbs.  If you have any questions or concerns, or if I can be of further assistance, please do not hesitate to contact me.    Sincerely,    Kary Abraham MA

## 2022-09-28 NOTE — PROGRESS NOTES
Status post open primary repair of recurrent ventral hernia with laparotomy and enterolysis of adhesions 09/01/2022  Wounds healed without evidence of infection or recurrence   All staples out   Impression/plan  Surgically stable   RTC 6 weeks

## 2022-10-13 DIAGNOSIS — E11.9 TYPE 2 DIABETES MELLITUS WITHOUT COMPLICATION, WITHOUT LONG-TERM CURRENT USE OF INSULIN: ICD-10-CM

## 2022-10-14 RX ORDER — LISINOPRIL 2.5 MG/1
2.5 TABLET ORAL DAILY
Qty: 90 TABLET | Refills: 0 | Status: SHIPPED | OUTPATIENT
Start: 2022-10-14 | End: 2023-04-11 | Stop reason: SDUPTHER

## 2022-10-14 NOTE — TELEPHONE ENCOUNTER
Care Due:                  Date            Visit Type   Department     Provider  --------------------------------------------------------------------------------                                GIACOMO      [facility]                              [facility]/OF  MEDICINE/  Last Visit: 12-      FICE VISIT   INTERNAL MED   Emiliano A  Page  Next Visit: None Scheduled  None         None Found                                                            Last  Test          Frequency    Reason                     Performed    Due Date  --------------------------------------------------------------------------------    Office Visit  12 months..  dulaglutide, glipiZIDE,    12- 11-                             lisinopriL...............    [employer] Embedded Care Gaps. Reference number: [id number]. 10/13/2022   9:11:35 PM CDT

## 2022-11-02 ENCOUNTER — OFFICE VISIT (OUTPATIENT)
Dept: SURGERY | Facility: CLINIC | Age: 32
End: 2022-11-02
Attending: SPECIALIST
Payer: COMMERCIAL

## 2022-11-02 VITALS — HEART RATE: 99 BPM | OXYGEN SATURATION: 99 % | SYSTOLIC BLOOD PRESSURE: 132 MMHG | DIASTOLIC BLOOD PRESSURE: 83 MMHG

## 2022-11-02 DIAGNOSIS — K43.2 VENTRAL INCISIONAL HERNIA WITHOUT OBSTRUCTION OR GANGRENE: Primary | ICD-10-CM

## 2022-11-02 PROCEDURE — 99999 PR PBB SHADOW E&M-EST. PATIENT-LVL III: CPT | Mod: PBBFAC,,, | Performed by: SPECIALIST

## 2022-11-02 PROCEDURE — 3046F HEMOGLOBIN A1C LEVEL >9.0%: CPT | Mod: CPTII,S$GLB,, | Performed by: SPECIALIST

## 2022-11-02 PROCEDURE — 1160F PR REVIEW ALL MEDS BY PRESCRIBER/CLIN PHARMACIST DOCUMENTED: ICD-10-PCS | Mod: CPTII,S$GLB,, | Performed by: SPECIALIST

## 2022-11-02 PROCEDURE — 1159F MED LIST DOCD IN RCRD: CPT | Mod: CPTII,S$GLB,, | Performed by: SPECIALIST

## 2022-11-02 PROCEDURE — 3075F SYST BP GE 130 - 139MM HG: CPT | Mod: CPTII,S$GLB,, | Performed by: SPECIALIST

## 2022-11-02 PROCEDURE — 99499 NO LOS: ICD-10-PCS | Mod: S$GLB,,, | Performed by: SPECIALIST

## 2022-11-02 PROCEDURE — 4010F PR ACE/ARB THEARPY RXD/TAKEN: ICD-10-PCS | Mod: CPTII,S$GLB,, | Performed by: SPECIALIST

## 2022-11-02 PROCEDURE — 3079F DIAST BP 80-89 MM HG: CPT | Mod: CPTII,S$GLB,, | Performed by: SPECIALIST

## 2022-11-02 PROCEDURE — 3075F PR MOST RECENT SYSTOLIC BLOOD PRESS GE 130-139MM HG: ICD-10-PCS | Mod: CPTII,S$GLB,, | Performed by: SPECIALIST

## 2022-11-02 PROCEDURE — 99999 PR PBB SHADOW E&M-EST. PATIENT-LVL III: ICD-10-PCS | Mod: PBBFAC,,, | Performed by: SPECIALIST

## 2022-11-02 PROCEDURE — 99499 UNLISTED E&M SERVICE: CPT | Mod: S$GLB,,, | Performed by: SPECIALIST

## 2022-11-02 PROCEDURE — 1160F RVW MEDS BY RX/DR IN RCRD: CPT | Mod: CPTII,S$GLB,, | Performed by: SPECIALIST

## 2022-11-02 PROCEDURE — 3079F PR MOST RECENT DIASTOLIC BLOOD PRESSURE 80-89 MM HG: ICD-10-PCS | Mod: CPTII,S$GLB,, | Performed by: SPECIALIST

## 2022-11-02 PROCEDURE — 4010F ACE/ARB THERAPY RXD/TAKEN: CPT | Mod: CPTII,S$GLB,, | Performed by: SPECIALIST

## 2022-11-02 PROCEDURE — 1159F PR MEDICATION LIST DOCUMENTED IN MEDICAL RECORD: ICD-10-PCS | Mod: CPTII,S$GLB,, | Performed by: SPECIALIST

## 2022-11-02 PROCEDURE — 3046F PR MOST RECENT HEMOGLOBIN A1C LEVEL > 9.0%: ICD-10-PCS | Mod: CPTII,S$GLB,, | Performed by: SPECIALIST

## 2022-11-02 NOTE — PROGRESS NOTES
Status post open repair of large ventral hernia 09/01/2022   No complaints     PE  Abdomen-soft, incisions healed, no evidence of recurrent hernia     Impression/plan  Surgically stable   Okay to resume normal activities   RTC p.r.n.

## 2022-12-09 ENCOUNTER — PATIENT OUTREACH (OUTPATIENT)
Dept: ADMINISTRATIVE | Facility: HOSPITAL | Age: 32
End: 2022-12-09

## 2022-12-09 DIAGNOSIS — E11.65 TYPE 2 DIABETES MELLITUS WITH HYPERGLYCEMIA, WITHOUT LONG-TERM CURRENT USE OF INSULIN: ICD-10-CM

## 2022-12-09 DIAGNOSIS — E78.5 HYPERLIPIDEMIA ASSOCIATED WITH TYPE 2 DIABETES MELLITUS: Primary | ICD-10-CM

## 2022-12-09 DIAGNOSIS — E11.69 HYPERLIPIDEMIA ASSOCIATED WITH TYPE 2 DIABETES MELLITUS: Primary | ICD-10-CM

## 2022-12-09 NOTE — LETTER
AUTHORIZATION FOR RELEASE OF   CONFIDENTIAL INFORMATION    Dear [facility] ( Emma ),    We are seeing Glenda Peña, date of birth 1990, in the clinic at [facility]/ INTERNAL MED. Emiliano Dunbar MD is the patient's PCP. Glenda Peña has an outstanding lab/procedure at the time we reviewed her chart. In order to help keep her health information updated, she has authorized us to request the following medical record(s):        (  )  MAMMOGRAM                                      (  )  COLONOSCOPY      (  )  PAP SMEAR                                          (  )  OUTSIDE LAB RESULTS     (  )  DEXA SCAN                                          ( x )  EYE EXAM(diabetic)  &            (  )  FOOT EXAM                                          (  )  ENTIRE RECORD     (  )  OUTSIDE IMMUNIZATIONS                 (  )  _______________         Please fax records to [facility], Brandon A Page, MD,  [phone].     If you have any questions, please contact Martha Lizarraga LPN [facility] at   [phone].     Patient Name: Glenda Peña  : 1990  Patient Phone #: [phone]

## 2022-12-09 NOTE — PROGRESS NOTES
[facility] 1 QB EYE 11.29.2022 - no eye exam completed, Left message for patient to call our office. Please schedule.    Overdue Diabetes Labs w/ Urine - Left message for patient to call our office. Please schedule.

## 2022-12-10 ENCOUNTER — HOSPITAL ENCOUNTER (EMERGENCY)
Facility: OTHER | Age: 32
Discharge: HOME | End: 2022-12-10
Attending: EMERGENCY MEDICINE
Payer: COMMERCIAL

## 2022-12-10 VITALS
HEIGHT: 62 IN | OXYGEN SATURATION: 95 % | DIASTOLIC BLOOD PRESSURE: 73 MMHG | SYSTOLIC BLOOD PRESSURE: 121 MMHG | TEMPERATURE: 98 F | BODY MASS INDEX: 42.33 KG/M2 | RESPIRATION RATE: 18 BRPM | WEIGHT: 230 LBS | HEART RATE: 86 BPM

## 2022-12-10 DIAGNOSIS — B34.9 VIRAL ILLNESS: Primary | ICD-10-CM

## 2022-12-10 LAB
B-HCG UR QL: NEGATIVE
CTP QC/QA: YES
POC MOLECULAR INFLUENZA A AGN: NEGATIVE
POC MOLECULAR INFLUENZA B AGN: NEGATIVE
SARS-COV-2 RDRP RESP QL NAA+PROBE: NEGATIVE

## 2022-12-10 PROCEDURE — 99282 EMERGENCY DEPT VISIT SF MDM: CPT

## 2022-12-10 PROCEDURE — 81025 URINE PREGNANCY TEST: CPT | Performed by: EMERGENCY MEDICINE

## 2022-12-10 PROCEDURE — 87635 SARS-COV-2 COVID-19 AMP PRB: CPT | Performed by: EMERGENCY MEDICINE

## 2022-12-10 NOTE — ED PROVIDER NOTES
Source of History:  Patient    Chief complaint:  COVID-19 Concerns (C/o runny nose, congestion, fatigue, body aches, non productive cough x 2 day. -SOB -CP. Recent COVID and Flu exposure. VSS)      HPI:  Glenda Peña is a 32 y.o. female presenting with diabetes, GERD, and PCOS who is presenting to the emergency department with rhinorrhea, general malaise, fatigue, body aches and a dry cough for the past 2 days.  She has had recent COVID and flu exposure.  She denies shortness of breath.  No fever.  She is also reporting fluid in her right ear.  She also reports decreased taste and smell.  This is the extent to the patients complaints today here in the emergency department.    ROS: As per HPI and below:  General: No fever.  No chills. + general malaise  Eyes: No visual changes.  ENT: + rhinorrhea, right ear pressure and drainage  Head: No headache.    Chest: No shortness of breath. + cough  Cardiovascular: No chest pain.  Abdomen: No abdominal pain.  No nausea or vomiting.  Genito-Urinary: No abnormal urination.  Neurologic: No focal weakness.  No numbness.  MSK: + body aches  Integument: No rashes or lesions.  Immune:  Not immunocompromised    Review of patient's allergies indicates:   Allergen Reactions    Latex, natural rubber Rash     Burning sensation       PMH:  As per HPI and below:  Past Medical History:   Diagnosis Date    Asthma childhood    Colon polyp     COVID-19     Diabetes mellitus     GERD (gastroesophageal reflux disease)     GERD (gastroesophageal reflux disease)     Morbid obesity     PCOS (polycystic ovarian syndrome)      Past Surgical History:   Procedure Laterality Date    APPENDECTOMY  2011    COLONOSCOPY  06/11/2018    IN PROCEDURES: Grade 1 internal hemorrhoids.  Polyp 2mm in the descending colon; repeat in 5 years for surveillance    HERNIA REPAIR      LYSIS OF ADHESIONS N/A 9/1/2022    Procedure: LYSIS, ADHESIONS;  Surgeon: Danny Ortiz Jr., MD;  Location: [facility], operated by [facility] OR;  Service:  "General;  Laterality: N/A;    OOPHORECTOMY      salpingoopherectomy Right 2008       Social History     Tobacco Use    Smoking status: Never    Smokeless tobacco: Never   Substance Use Topics    Alcohol use: Not Currently     Comment: occasionally apple cider- once every 2 months    Drug use: No       Physical Exam:    BP (!) 141/79 (BP Location: Left arm, Patient Position: Sitting)   Pulse 81   Temp 98.1 °F (36.7 °C) (Oral)   Resp 17   Ht 5' 2" (1.575 m)   Wt 104.3 kg (230 lb)   SpO2 98%   BMI 42.07 kg/m²   Nursing note and vital signs reviewed.  Appearance: No acute distress.  Nontoxic appearing.  Eyes: No conjunctival injection.   ENT:  Clear rhinorrhea.  Inflamed nasal turbinates.  Cobblestoning to posterior oropharynx.  No tonsillar swelling or exudate.  Uvula midline.  Left ear without erythema, bulging.  Good cone of light.  Right ear without erythema.  No bulging.  White effusion to base of TM.  No drainage.  Chest/ Respiratory: Clear to auscultation bilaterally.  Good air movement.  No wheezes.  No rhonchi. No rales. No accessory muscle use.  Cardiovascular: Regular rate and rhythm.  No murmurs. No gallops. No rubs.  Musculoskeletal: Good range of motion all joints.  No deformities.  Neck supple.  No meningismus.  Skin: No rashes seen.  Good turgor.  No abrasions.  No ecchymoses.  Neurologic: Motor intact.  Sensation intact.   Mental Status:  Alert and oriented x 3.  Appropriate, conversant    Labs that have been ordered have been independently reviewed and interpreted by myself.    I decided to obtain the patient's medical records.    MDM:    32 y.o. female who is presenting to the emergency department with flu-like symptoms x2 days.  Patient is afebrile, nontoxic appearing hemodynamically stable.  No concerning findings on exam or history to suggest bacterial etiology.  Rapid COVID and flu test are negative.  Patient advised on supportive care for likely viral illness.           Diagnostic " Impression:    1. Viral illness         ED Disposition Condition    Discharge Stable               Nir Jones PA-C  12/10/22 2260

## 2022-12-10 NOTE — Clinical Note
"Glenda Garciaclint Peña was seen and treated in our emergency department on 12/10/2022.  She may return to work on 12/13/2022.       If you have any questions or concerns, please don't hesitate to call.      Nir Jones PA-C"

## 2022-12-14 DIAGNOSIS — R10.9 ABDOMINAL PAIN, UNSPECIFIED ABDOMINAL LOCATION: Primary | ICD-10-CM

## 2022-12-22 ENCOUNTER — OFFICE VISIT (OUTPATIENT)
Dept: OBSTETRICS AND GYNECOLOGY | Facility: CLINIC | Age: 32
End: 2022-12-22
Attending: OBSTETRICS & GYNECOLOGY
Payer: COMMERCIAL

## 2022-12-22 VITALS
BODY MASS INDEX: 42.54 KG/M2 | WEIGHT: 232.56 LBS | SYSTOLIC BLOOD PRESSURE: 118 MMHG | DIASTOLIC BLOOD PRESSURE: 68 MMHG

## 2022-12-22 DIAGNOSIS — N92.6 IRREGULAR MENSES: ICD-10-CM

## 2022-12-22 DIAGNOSIS — N83.201 RIGHT OVARIAN CYST: Primary | ICD-10-CM

## 2022-12-22 DIAGNOSIS — Z30.011 ENCOUNTER FOR INITIAL PRESCRIPTION OF CONTRACEPTIVE PILLS: ICD-10-CM

## 2022-12-22 DIAGNOSIS — E28.2 PCOS (POLYCYSTIC OVARIAN SYNDROME): ICD-10-CM

## 2022-12-22 PROCEDURE — 99999 PR PBB SHADOW E&M-EST. PATIENT-LVL III: ICD-10-PCS | Mod: PBBFAC,,, | Performed by: OBSTETRICS & GYNECOLOGY

## 2022-12-22 PROCEDURE — 4010F PR ACE/ARB THEARPY RXD/TAKEN: ICD-10-PCS | Mod: CPTII,S$GLB,, | Performed by: OBSTETRICS & GYNECOLOGY

## 2022-12-22 PROCEDURE — 3074F PR MOST RECENT SYSTOLIC BLOOD PRESSURE < 130 MM HG: ICD-10-PCS | Mod: CPTII,S$GLB,, | Performed by: OBSTETRICS & GYNECOLOGY

## 2022-12-22 PROCEDURE — 3008F PR BODY MASS INDEX (BMI) DOCUMENTED: ICD-10-PCS | Mod: CPTII,S$GLB,, | Performed by: OBSTETRICS & GYNECOLOGY

## 2022-12-22 PROCEDURE — 1160F PR REVIEW ALL MEDS BY PRESCRIBER/CLIN PHARMACIST DOCUMENTED: ICD-10-PCS | Mod: CPTII,S$GLB,, | Performed by: OBSTETRICS & GYNECOLOGY

## 2022-12-22 PROCEDURE — 1159F MED LIST DOCD IN RCRD: CPT | Mod: CPTII,S$GLB,, | Performed by: OBSTETRICS & GYNECOLOGY

## 2022-12-22 PROCEDURE — 99999 PR PBB SHADOW E&M-EST. PATIENT-LVL III: CPT | Mod: PBBFAC,,, | Performed by: OBSTETRICS & GYNECOLOGY

## 2022-12-22 PROCEDURE — 3078F PR MOST RECENT DIASTOLIC BLOOD PRESSURE < 80 MM HG: ICD-10-PCS | Mod: CPTII,S$GLB,, | Performed by: OBSTETRICS & GYNECOLOGY

## 2022-12-22 PROCEDURE — 3008F BODY MASS INDEX DOCD: CPT | Mod: CPTII,S$GLB,, | Performed by: OBSTETRICS & GYNECOLOGY

## 2022-12-22 PROCEDURE — 3074F SYST BP LT 130 MM HG: CPT | Mod: CPTII,S$GLB,, | Performed by: OBSTETRICS & GYNECOLOGY

## 2022-12-22 PROCEDURE — 1160F RVW MEDS BY RX/DR IN RCRD: CPT | Mod: CPTII,S$GLB,, | Performed by: OBSTETRICS & GYNECOLOGY

## 2022-12-22 PROCEDURE — 3078F DIAST BP <80 MM HG: CPT | Mod: CPTII,S$GLB,, | Performed by: OBSTETRICS & GYNECOLOGY

## 2022-12-22 PROCEDURE — 4010F ACE/ARB THERAPY RXD/TAKEN: CPT | Mod: CPTII,S$GLB,, | Performed by: OBSTETRICS & GYNECOLOGY

## 2022-12-22 PROCEDURE — 99213 OFFICE O/P EST LOW 20 MIN: CPT | Mod: S$GLB,,, | Performed by: OBSTETRICS & GYNECOLOGY

## 2022-12-22 PROCEDURE — 3046F HEMOGLOBIN A1C LEVEL >9.0%: CPT | Mod: CPTII,S$GLB,, | Performed by: OBSTETRICS & GYNECOLOGY

## 2022-12-22 PROCEDURE — 3046F PR MOST RECENT HEMOGLOBIN A1C LEVEL > 9.0%: ICD-10-PCS | Mod: CPTII,S$GLB,, | Performed by: OBSTETRICS & GYNECOLOGY

## 2022-12-22 PROCEDURE — 99213 PR OFFICE/OUTPT VISIT, EST, LEVL III, 20-29 MIN: ICD-10-PCS | Mod: S$GLB,,, | Performed by: OBSTETRICS & GYNECOLOGY

## 2022-12-22 PROCEDURE — 1159F PR MEDICATION LIST DOCUMENTED IN MEDICAL RECORD: ICD-10-PCS | Mod: CPTII,S$GLB,, | Performed by: OBSTETRICS & GYNECOLOGY

## 2022-12-22 RX ORDER — NORETHINDRONE ACETATE AND ETHINYL ESTRADIOL .02; 1 MG/1; MG/1
1 TABLET ORAL DAILY
Qty: 21 TABLET | Refills: 11 | Status: SHIPPED | OUTPATIENT
Start: 2022-12-22 | End: 2024-01-30 | Stop reason: SDUPTHER

## 2022-12-22 NOTE — PROGRESS NOTES
"Chief Complaint   Patient presents with    Well Woman       HPI:  Glenda Peña is a 32 y.o. female patient  who presents today as a new patient to establish care and discuss her GYN issues.  S/P RSO  with Dr. Goetz for a suspected dermoid.  She has a history of PCOS with irregular menses.  Without hormonal regulation, periods generally occur every 2-4 months, lasting a few days to a week in during.  Typically, the flow is light, but sometimes is very heavy.  She has been followed by Dr. Millan with GYN-Onc for a complex left adnexal mass.  She underwent repair of a left ventral hernia with Dr. Ortiz 2022 and is now essentially pain free.  Patient's last menstrual period was 2022 (within days).        Dr Millan' note  1. : Ex-lap converted to a Pfannenstiel (?) and RSO. 4 lb "ovarian tumor."  Operative report and pathology isn't available.  She didn't require staging or adjuvant CT and is still alive.  2. 12: Perforated appendicitis.  Operative laparoscopy converted to an Ex-lap and appendectomy.  3. 16: Lap ventral hernia repair with a 25 x 15 cm Strattice mesh  4. 18: Evaluated by General Surgery during an admission and thought to have sigmoid colitis.  Discussed a sigmoid resection with an end colostomy once the patient lost weight.  5.  -: Admitted to [facility] for abdominal pain and suspicion for a L TOA.  She had it IR drained and was discharged from the hospital with PO Amoxicillin.  6. 20: CT A/P: 10.4 x 7.9 x 6.8 cm  . 20: IR guided drainage. Gm + bacilli resembling diphteroids. Actinomyces without sensitivities  . 20: A1c = 8.9  9. 20: CT A/P: Improved TOA but new fluid collections in the hernia sac.  10. 3/5-: Admitted to [facility] for abdominal pain and persistent L TOA.  IR attempted to drain it on 3/6/20 but was unsuccesful. She was discharged on IV unasyn.   11. 3/27/20: CT A/P with increased size in TOA.  12. " 3/29-4/2/20: Admitted for RLQ pain.  13. 3/30: IR guided drainage of 65 cc.  Aerobic, anaerobic, and fungal cultures were negative.  Negative cytology.  14. 4/2-4/6: COVID-19 admission  15. 4/3: Pigtail drain removed  16. 4/21: CT A/P: Complex fluid collection adjacent to the uterus measuring 9.8 by 5.3 cm, previously measuring 8.9 x 4.3 cm  17. 5/5: Completed course of IV antibiotics  18. 5/5-: Amoxicillin 500mg PO q8h (notes say she was supposed to take it for 6 months but she didn't)  19. 6/6: MRI Pelvis w/ w/o: 11 cm complex R adnexal mass.  No evidence of lymphadenopathy.     20. 3/2/22: Pelvic US: R complex adnexal mass 6 cm                      CT A/P w/:   21. 3/29/22: LoEstrin qD      3/29/22 Pelvic sono:  Left ovarian large multiloculated complex/hemorrhagic cyst versus adjacent smaller complex/hemorrhagic cysts, correlating with findings on recent CT abdomen and pelvis.  Neoplastic process not excluded on the basis of this examination.  Further evaluation with elective/nonemergent MRI pelvis can be obtained for further characterization given the overall increased in size from previous cross-sectional imaging prior to today's studies.  Alternatively, short-term follow-up with pelvic ultrasound in 6-8 weeks can be obtained to ensure stability/resolution, as warranted.      3/30/22 Pap: Negative    9/1/22  HgbA1c: 9.7       Past Medical History:   Diagnosis Date    Asthma childhood    Colon polyp     COVID-19     Diabetes mellitus     GERD (gastroesophageal reflux disease)     GERD (gastroesophageal reflux disease)     Morbid obesity     PCOS (polycystic ovarian syndrome)        Past Surgical History:   Procedure Laterality Date    APPENDECTOMY  2011    COLONOSCOPY  06/11/2018    IN PROCEDURES: Grade 1 internal hemorrhoids.  Polyp 2mm in the descending colon; repeat in 5 years for surveillance    HERNIA REPAIR      LYSIS OF ADHESIONS N/A 9/1/2022    Procedure: LYSIS, ADHESIONS;  Surgeon: Danny Ortiz Jr.,  MD;  Location: [facility];  Service: General;  Laterality: N/A;    OOPHORECTOMY      salpingoopherectomy Right 2008         Diagnosis:  1. Right ovarian cyst    2. PCOS (polycystic ovarian syndrome)    3. Irregular menses    4. Encounter for initial prescription of contraceptive pills          PLAN:    Orders Placed This Encounter    US Pelvis Comp with Transvag NON-OB (xpd    norethindrone-ethinyl estradiol (MICROGESTIN 1/20) 1-20 mg-mcg per tablet       Patient was counseled today on her irregular menses and history of PCOs.  With OCPs, she had a predictable light withdrawal bleed and would like to return to this medication.  We discussed Microgestin: r / b / correct usage.  We also discussed her prior complex left adnexal mass.  Since the repair of the ventral hernia, her LLQ pain has essentially resolved.  She will have a pelvic ultrasound performed for evaluation of her pelvis / adnexa.  We also discussed the importance of improving her glycemic control.    Follow-up for annual exam    I spent a total of 36 minutes on the day of the visit.This includes face to face time and non-face to face time preparing to see the patient (eg, review of tests), Obtaining and/or reviewing separately obtained history, Documenting clinical information in the electronic or other health record, Independently interpreting resultsand communicating results to the patient/family/caregiver, or Care coordination.      Answers submitted by the patient for this visit:  Gynecologic Exam Questionnaire  (Submitted on 12/22/2022)  Chief Complaint: Gynecologic exam  genital itching: Yes  genital lesions: No  genital odor: No  genital rash: No  missed menses: Yes  pelvic pain: No  vaginal bleeding: No  vaginal discharge: No  Chronicity: chronic  Onset: in the past 7 days  Frequency: daily  Progression since onset: waxing and waning  Pain severity: no pain  Pregnant now?: No  abdominal pain: No  anorexia: No  back pain: Yes  chills:  No  constipation: No  diarrhea: No  discolored urine: No  dysuria: No  fever: No  flank pain: No  frequency: No  headaches: Yes  hematuria: No  nausea: No  painful intercourse: No  rash: No  urgency: Yes  vomiting: No  Vaginal bleeding: no bleeding  Passing clots?: No  Passing tissue?: No  treatments tried: anti-fungal cream  Improvement on treatment: significant  Sexual activity: not sexually active  Partner with STD symptoms: no  Birth control: nothing  Menstrual history: irregular  STD: No  abdominal surgery: Yes   section: No  Ectopic pregnancy: No  Endometriosis: No  herpes simplex: No  gynecological surgery: No  menorrhagia: No  metrorrhagia: No  miscarriage: No  ovarian cysts: Yes  perineal abscess: No  PID: No  terminated pregnancy: No  vaginosis: No

## 2022-12-23 ENCOUNTER — HOSPITAL ENCOUNTER (OUTPATIENT)
Dept: RADIOLOGY | Facility: OTHER | Age: 32
Discharge: HOME | End: 2022-12-23
Attending: SPECIALIST
Payer: COMMERCIAL

## 2022-12-23 DIAGNOSIS — R10.9 ABDOMINAL PAIN, UNSPECIFIED ABDOMINAL LOCATION: ICD-10-CM

## 2022-12-23 PROCEDURE — 76705 US SOFT TISSUE, ABDOMEN: ICD-10-PCS | Mod: 26,,, | Performed by: RADIOLOGY

## 2022-12-23 PROCEDURE — 76705 ECHO EXAM OF ABDOMEN: CPT | Mod: TC

## 2022-12-23 PROCEDURE — 76705 ECHO EXAM OF ABDOMEN: CPT | Mod: 26,,, | Performed by: RADIOLOGY

## 2022-12-29 ENCOUNTER — TELEPHONE (OUTPATIENT)
Dept: SURGERY | Facility: CLINIC | Age: 32
End: 2022-12-29

## 2023-01-19 ENCOUNTER — OFFICE VISIT (OUTPATIENT)
Dept: FAMILY MEDICINE | Facility: CLINIC | Age: 33
End: 2023-01-19
Payer: COMMERCIAL

## 2023-01-19 VITALS
HEART RATE: 80 BPM | TEMPERATURE: 98 F | BODY MASS INDEX: 42.6 KG/M2 | DIASTOLIC BLOOD PRESSURE: 80 MMHG | OXYGEN SATURATION: 95 % | WEIGHT: 231.5 LBS | HEIGHT: 62 IN | SYSTOLIC BLOOD PRESSURE: 120 MMHG

## 2023-01-19 DIAGNOSIS — Z00.00 ANNUAL PHYSICAL EXAM: Primary | ICD-10-CM

## 2023-01-19 DIAGNOSIS — E16.2 HYPOGLYCEMIA: ICD-10-CM

## 2023-01-19 DIAGNOSIS — E11.65 TYPE 2 DIABETES MELLITUS WITH HYPERGLYCEMIA, WITHOUT LONG-TERM CURRENT USE OF INSULIN: ICD-10-CM

## 2023-01-19 DIAGNOSIS — E66.01 MORBID OBESITY WITH BMI OF 45.0-49.9, ADULT: ICD-10-CM

## 2023-01-19 PROBLEM — K43.9 VENTRAL HERNIA WITHOUT OBSTRUCTION OR GANGRENE: Status: RESOLVED | Noted: 2018-06-21 | Resolved: 2023-01-19

## 2023-01-19 PROBLEM — K43.6 VENTRAL HERNIA WITH BOWEL OBSTRUCTION: Status: RESOLVED | Noted: 2018-02-28 | Resolved: 2023-01-19

## 2023-01-19 PROCEDURE — 3074F PR MOST RECENT SYSTOLIC BLOOD PRESSURE < 130 MM HG: ICD-10-PCS | Mod: CPTII,S$GLB,, | Performed by: FAMILY MEDICINE

## 2023-01-19 PROCEDURE — 3079F DIAST BP 80-89 MM HG: CPT | Mod: CPTII,S$GLB,, | Performed by: FAMILY MEDICINE

## 2023-01-19 PROCEDURE — 1159F MED LIST DOCD IN RCRD: CPT | Mod: CPTII,S$GLB,, | Performed by: FAMILY MEDICINE

## 2023-01-19 PROCEDURE — 99395 PR PREVENTIVE VISIT,EST,18-39: ICD-10-PCS | Mod: S$GLB,,, | Performed by: FAMILY MEDICINE

## 2023-01-19 PROCEDURE — 99395 PREV VISIT EST AGE 18-39: CPT | Mod: S$GLB,,, | Performed by: FAMILY MEDICINE

## 2023-01-19 PROCEDURE — 3074F SYST BP LT 130 MM HG: CPT | Mod: CPTII,S$GLB,, | Performed by: FAMILY MEDICINE

## 2023-01-19 PROCEDURE — 99999 PR PBB SHADOW E&M-EST. PATIENT-LVL III: ICD-10-PCS | Mod: PBBFAC,,, | Performed by: FAMILY MEDICINE

## 2023-01-19 PROCEDURE — 3008F BODY MASS INDEX DOCD: CPT | Mod: CPTII,S$GLB,, | Performed by: FAMILY MEDICINE

## 2023-01-19 PROCEDURE — 1159F PR MEDICATION LIST DOCUMENTED IN MEDICAL RECORD: ICD-10-PCS | Mod: CPTII,S$GLB,, | Performed by: FAMILY MEDICINE

## 2023-01-19 PROCEDURE — 3079F PR MOST RECENT DIASTOLIC BLOOD PRESSURE 80-89 MM HG: ICD-10-PCS | Mod: CPTII,S$GLB,, | Performed by: FAMILY MEDICINE

## 2023-01-19 PROCEDURE — 3008F PR BODY MASS INDEX (BMI) DOCUMENTED: ICD-10-PCS | Mod: CPTII,S$GLB,, | Performed by: FAMILY MEDICINE

## 2023-01-19 PROCEDURE — 99999 PR PBB SHADOW E&M-EST. PATIENT-LVL III: CPT | Mod: PBBFAC,,, | Performed by: FAMILY MEDICINE

## 2023-01-19 RX ORDER — FLUCONAZOLE 150 MG/1
150 TABLET ORAL DAILY
Qty: 1 TABLET | Refills: 5 | Status: SHIPPED | OUTPATIENT
Start: 2023-01-19 | End: 2023-01-20

## 2023-01-19 RX ORDER — DULAGLUTIDE 4.5 MG/.5ML
4.5 INJECTION, SOLUTION SUBCUTANEOUS
Qty: 4 PEN | Refills: 11 | Status: SHIPPED | OUTPATIENT
Start: 2023-01-19 | End: 2023-01-19 | Stop reason: SDUPTHER

## 2023-01-19 RX ORDER — BLOOD-GLUCOSE TRANSMITTER
EACH MISCELLANEOUS
Qty: 1 EACH | Refills: 11 | Status: SHIPPED | OUTPATIENT
Start: 2023-01-19 | End: 2023-01-19 | Stop reason: SDUPTHER

## 2023-01-19 RX ORDER — BLOOD-GLUCOSE SENSOR
EACH MISCELLANEOUS
Qty: 10 EACH | Refills: 11 | Status: SHIPPED | OUTPATIENT
Start: 2023-01-19 | End: 2023-01-19 | Stop reason: SDUPTHER

## 2023-01-19 RX ORDER — DULAGLUTIDE 4.5 MG/.5ML
4.5 INJECTION, SOLUTION SUBCUTANEOUS
Qty: 4 PEN | Refills: 11 | Status: SHIPPED | OUTPATIENT
Start: 2023-01-19 | End: 2023-01-26 | Stop reason: SDUPTHER

## 2023-01-19 RX ORDER — BLOOD-GLUCOSE,RECEIVER,CONT
EACH MISCELLANEOUS
Qty: 1 EACH | Refills: 1 | Status: SHIPPED | OUTPATIENT
Start: 2023-01-19 | End: 2023-04-25 | Stop reason: ALTCHOICE

## 2023-01-19 RX ORDER — BLOOD-GLUCOSE,RECEIVER,CONT
EACH MISCELLANEOUS
Qty: 1 EACH | Refills: 1 | Status: SHIPPED | OUTPATIENT
Start: 2023-01-19 | End: 2023-01-19 | Stop reason: SDUPTHER

## 2023-01-19 RX ORDER — BLOOD-GLUCOSE SENSOR
EACH MISCELLANEOUS
Qty: 10 EACH | Refills: 11 | Status: SHIPPED | OUTPATIENT
Start: 2023-01-19 | End: 2023-04-25 | Stop reason: ALTCHOICE

## 2023-01-19 RX ORDER — DAPAGLIFLOZIN 10 MG/1
10 TABLET, FILM COATED ORAL DAILY
Qty: 90 EACH | Refills: 3 | Status: SHIPPED | OUTPATIENT
Start: 2023-01-19 | End: 2024-01-30

## 2023-01-19 RX ORDER — FLUCONAZOLE 150 MG/1
150 TABLET ORAL DAILY
Qty: 1 TABLET | Refills: 5 | Status: SHIPPED | OUTPATIENT
Start: 2023-01-19 | End: 2023-01-19 | Stop reason: SDUPTHER

## 2023-01-19 RX ORDER — BLOOD-GLUCOSE TRANSMITTER
EACH MISCELLANEOUS
Qty: 1 EACH | Refills: 11 | Status: SHIPPED | OUTPATIENT
Start: 2023-01-19 | End: 2023-04-25 | Stop reason: ALTCHOICE

## 2023-01-19 NOTE — PROGRESS NOTES
"Chief Complaint   Patient presents with    Establish Care       SUBJECTIVE:   32 y.o. female for annual routine checkup.  Establish care  Current Outpatient Medications   Medication Sig Dispense Refill    glipiZIDE (GLUCOTROL) 10 MG TR24 Take 1 tablet (10 mg total) by mouth 2 (two) times daily with meals. 60 tablet 2    lisinopriL (PRINIVIL,ZESTRIL) 2.5 MG tablet Take 1 tablet (2.5 mg total) by mouth once daily. 90 tablet 0    norethindrone-ethinyl estradiol (MICROGESTIN 1/20) 1-20 mg-mcg per tablet Take 1 tablet by mouth once daily. 21 tablet 11    blood-glucose meter,continuous (DEXCOM G6 ) Misc Use per instructions 1 each 1    blood-glucose sensor (DEXCOM G6 SENSOR) Berenice Use per instructions 10 each 11    blood-glucose transmitter (DEXCOM G6 TRANSMITTER) Berenice Use as directed 1 each 11    dapagliflozin (FARXIGA) 10 mg tablet Take 1 tablet (10 mg total) by mouth once daily. 90 each 3    dulaglutide (TRULICITY) 4.5 mg/0.5 mL pen injector Inject 4.5 mg into the skin every 7 days. 4 pen 11     No current facility-administered medications for this visit.     Allergies: Latex, natural rubber   Patient's last menstrual period was 01/12/2023.    ROS:  Feeling well. No dyspnea or chest pain on exertion.  No abdominal pain, change in bowel habits, black or bloody stools.  No urinary tract symptoms. GYN ROS: . No neurological complaints.    OBJECTIVE:   The patient appears well, alert, oriented x 3, in no distress.  /80   Pulse 80   Temp 97.9 °F (36.6 °C) (Oral)   Ht 5' 2" (1.575 m)   Wt 105 kg (231 lb 7.7 oz)   LMP 01/12/2023   SpO2 95%   BMI 42.34 kg/m²   Wt Readings from Last 5 Encounters:   01/19/23 105 kg (231 lb 7.7 oz)   12/22/22 105.5 kg (232 lb 9.4 oz)   12/10/22 104.3 kg (230 lb)   08/31/22 108 kg (238 lb 1.6 oz)   08/25/22 108 kg (238 lb 1.6 oz)       ENT normal.  Neck supple. No adenopathy or thyromegaly. SAIRA. Lungs are clear, good air entry, no wheezes, rhonchi or rales. S1 and S2 normal, " no murmurs, regular rate and rhythm. Abdomen soft without tenderness, guarding, mass or organomegaly. Extremities show no edema, normal peripheral pulses. Neurological is normal, no focal findings.      BREAST EXAM: deferred    PELVIC EXAM: deferred    ASSESSMENT:   1. Annual physical exam    2. Type 2 diabetes mellitus with hyperglycemia, without long-term current use of insulin    3. Morbid obesity with BMI of 45.0-49.9, adult    4. Hypoglycemia          PLAN:   Counseled on age appropriate medical preventative services, including age appropriate cancer screenings, over all nutritional health, need for a consistent exercise regimen and an over all push towards maintaining a vigorous and active lifestyle.  Counseled on age appropriate vaccines and discussed upcoming health care needs based on age/gender.  Spent time with patient counseling on need for a good patient/doctor relationship moving forward.  Discussed use of common OTC medications and supplements.  Discussed common dietary aids and use of caffeine and the need for good sleep hygiene and stress management.    Problem List Items Addressed This Visit       Type 2 diabetes mellitus with hyperglycemia, without long-term current use of insulin    Relevant Medications    dapagliflozin (FARXIGA) 10 mg tablet    blood-glucose meter,continuous (DEXCOM G6 ) Misc    blood-glucose sensor (DEXCOM G6 SENSOR) Berenice    blood-glucose transmitter (DEXCOM G6 TRANSMITTER) Berenice    Morbid obesity with BMI of 45.0-49.9, adult    Hypoglycemia    Relevant Medications    blood-glucose meter,continuous (DEXCOM G6 ) Misc    blood-glucose sensor (DEXCOM G6 SENSOR) Berenice    blood-glucose transmitter (DEXCOM G6 TRANSMITTER) Berenice     Other Visit Diagnoses       Annual physical exam    -  Primary            F/u in 1 year for wellness

## 2023-01-22 ENCOUNTER — PATIENT MESSAGE (OUTPATIENT)
Dept: ADMINISTRATIVE | Facility: OTHER | Age: 33
End: 2023-01-22

## 2023-01-26 DIAGNOSIS — E16.2 HYPOGLYCEMIA: ICD-10-CM

## 2023-01-26 DIAGNOSIS — E11.65 TYPE 2 DIABETES MELLITUS WITH HYPERGLYCEMIA, WITHOUT LONG-TERM CURRENT USE OF INSULIN: ICD-10-CM

## 2023-01-26 RX ORDER — DULAGLUTIDE 4.5 MG/.5ML
4.5 INJECTION, SOLUTION SUBCUTANEOUS
Qty: 4 PEN | Refills: 11 | Status: SHIPPED | OUTPATIENT
Start: 2023-01-26 | End: 2023-04-11 | Stop reason: SDUPTHER

## 2023-01-30 PROBLEM — E16.2 HYPOGLYCEMIA: Status: ACTIVE | Noted: 2023-01-30

## 2023-03-24 ENCOUNTER — PATIENT MESSAGE (OUTPATIENT)
Dept: ADMINISTRATIVE | Facility: HOSPITAL | Age: 33
End: 2023-03-24

## 2023-03-28 DIAGNOSIS — E11.9 TYPE 2 DIABETES MELLITUS WITHOUT COMPLICATION, UNSPECIFIED WHETHER LONG TERM INSULIN USE: ICD-10-CM

## 2023-04-11 DIAGNOSIS — E11.9 TYPE 2 DIABETES MELLITUS WITHOUT COMPLICATION, WITHOUT LONG-TERM CURRENT USE OF INSULIN: ICD-10-CM

## 2023-04-11 RX ORDER — LISINOPRIL 2.5 MG/1
2.5 TABLET ORAL DAILY
Qty: 90 TABLET | Refills: 0 | Status: SHIPPED | OUTPATIENT
Start: 2023-04-11

## 2023-04-11 NOTE — TELEPHONE ENCOUNTER
Care Due:                  Date            Visit Type   Department     Provider  --------------------------------------------------------------------------------                                [facility] FAM[facility]                              PRIMARY      MEDICINE/INTERN  Last Visit: 01-      CARE ([facility])   GEORGIE Serrano                              [facility]                              PRIMARY      MEDICINE/INTERN  Next Visit: 04-      CARE ([facility])   AL CHRIS Serrano                                                            Last  Test          Frequency    Reason                     Performed    Due Date  --------------------------------------------------------------------------------    HBA1C.......  6 months...  dapagliflozin,             09- 02-                             dulaglutide, glipiZIDE...    [employer] Embedded Care Gaps. Reference number: [id number]. 4/11/2023   11:29:46 AM CDT

## 2023-04-11 NOTE — TELEPHONE ENCOUNTER
Refill Routing Note   Medication(s) are not appropriate for processing by [facility] for the following reason(s):      Responsible provider unclear    [facility] action(s):  Defer Labs due          Appointments  past 12m or future 3m with PCP    Date Provider   Last Visit   12/1/2021 Emiliano Dunbar MD   Next Visit   Visit date not found Emiliano Dunbar MD   ED visits in past 90 days: 0        Note composed:11:37 AM 04/11/2023

## 2023-04-12 ENCOUNTER — PATIENT MESSAGE (OUTPATIENT)
Dept: ADMINISTRATIVE | Facility: HOSPITAL | Age: 33
End: 2023-04-12
Payer: COMMERCIAL

## 2023-04-19 ENCOUNTER — PATIENT MESSAGE (OUTPATIENT)
Dept: ADMINISTRATIVE | Facility: HOSPITAL | Age: 33
End: 2023-04-19
Payer: COMMERCIAL

## 2023-04-19 DIAGNOSIS — E11.00 TYPE 2 DIABETES MELLITUS WITH HYPEROSMOLARITY WITHOUT COMA, WITHOUT LONG-TERM CURRENT USE OF INSULIN: Primary | ICD-10-CM

## 2023-04-25 ENCOUNTER — OFFICE VISIT (OUTPATIENT)
Dept: FAMILY MEDICINE | Facility: CLINIC | Age: 33
End: 2023-04-25
Payer: COMMERCIAL

## 2023-04-25 ENCOUNTER — PATIENT OUTREACH (OUTPATIENT)
Dept: ADMINISTRATIVE | Facility: HOSPITAL | Age: 33
End: 2023-04-25
Payer: COMMERCIAL

## 2023-04-25 ENCOUNTER — CLINICAL SUPPORT (OUTPATIENT)
Dept: FAMILY MEDICINE | Facility: CLINIC | Age: 33
End: 2023-04-25
Attending: FAMILY MEDICINE
Payer: COMMERCIAL

## 2023-04-25 VITALS
HEART RATE: 63 BPM | DIASTOLIC BLOOD PRESSURE: 80 MMHG | OXYGEN SATURATION: 99 % | TEMPERATURE: 98 F | WEIGHT: 214.06 LBS | SYSTOLIC BLOOD PRESSURE: 120 MMHG | HEIGHT: 62 IN | BODY MASS INDEX: 39.39 KG/M2

## 2023-04-25 DIAGNOSIS — N76.3 CHRONIC VULVITIS: ICD-10-CM

## 2023-04-25 DIAGNOSIS — E11.65 TYPE 2 DIABETES MELLITUS WITH HYPERGLYCEMIA, WITHOUT LONG-TERM CURRENT USE OF INSULIN: Primary | ICD-10-CM

## 2023-04-25 DIAGNOSIS — E11.9 TYPE 2 DIABETES MELLITUS WITHOUT COMPLICATION, UNSPECIFIED WHETHER LONG TERM INSULIN USE: ICD-10-CM

## 2023-04-25 PROCEDURE — 4010F ACE/ARB THERAPY RXD/TAKEN: CPT | Mod: CPTII,S$GLB,, | Performed by: FAMILY MEDICINE

## 2023-04-25 PROCEDURE — 4010F PR ACE/ARB THEARPY RXD/TAKEN: ICD-10-PCS | Mod: CPTII,S$GLB,, | Performed by: FAMILY MEDICINE

## 2023-04-25 PROCEDURE — 99999 PR PBB SHADOW E&M-EST. PATIENT-LVL III: CPT | Mod: PBBFAC,,, | Performed by: FAMILY MEDICINE

## 2023-04-25 PROCEDURE — 2025F 7 FLD RTA PHOTO W/O RTNOPTHY: CPT | Mod: CPTII,S$GLB,, | Performed by: FAMILY MEDICINE

## 2023-04-25 PROCEDURE — 92228 IMG RTA DETC/MNTR DS PHY/QHP: CPT | Mod: TC,S$GLB,, | Performed by: FAMILY MEDICINE

## 2023-04-25 PROCEDURE — 99214 PR OFFICE/OUTPT VISIT, EST, LEVL IV, 30-39 MIN: ICD-10-PCS | Mod: S$GLB,,, | Performed by: FAMILY MEDICINE

## 2023-04-25 PROCEDURE — 1159F MED LIST DOCD IN RCRD: CPT | Mod: CPTII,S$GLB,, | Performed by: FAMILY MEDICINE

## 2023-04-25 PROCEDURE — 92228 DIABETIC EYE SCREENING PHOTO: ICD-10-PCS | Mod: 26,S$GLB,, | Performed by: OPTOMETRIST

## 2023-04-25 PROCEDURE — 92228 IMG RTA DETC/MNTR DS PHY/QHP: CPT | Mod: 26,S$GLB,, | Performed by: OPTOMETRIST

## 2023-04-25 PROCEDURE — 2025F PR 7 STANDARD FLD STEREO RETINAL PHOTOS W/O EVID OF RETINOPATHY W/INTERPT BY OPHTH/OPT: ICD-10-PCS | Mod: CPTII,S$GLB,, | Performed by: FAMILY MEDICINE

## 2023-04-25 PROCEDURE — 3079F PR MOST RECENT DIASTOLIC BLOOD PRESSURE 80-89 MM HG: ICD-10-PCS | Mod: CPTII,S$GLB,, | Performed by: FAMILY MEDICINE

## 2023-04-25 PROCEDURE — 3074F PR MOST RECENT SYSTOLIC BLOOD PRESSURE < 130 MM HG: ICD-10-PCS | Mod: CPTII,S$GLB,, | Performed by: FAMILY MEDICINE

## 2023-04-25 PROCEDURE — 3008F PR BODY MASS INDEX (BMI) DOCUMENTED: ICD-10-PCS | Mod: CPTII,S$GLB,, | Performed by: FAMILY MEDICINE

## 2023-04-25 PROCEDURE — 3008F BODY MASS INDEX DOCD: CPT | Mod: CPTII,S$GLB,, | Performed by: FAMILY MEDICINE

## 2023-04-25 PROCEDURE — 99999 PR PBB SHADOW E&M-EST. PATIENT-LVL III: ICD-10-PCS | Mod: PBBFAC,,, | Performed by: FAMILY MEDICINE

## 2023-04-25 PROCEDURE — 3074F SYST BP LT 130 MM HG: CPT | Mod: CPTII,S$GLB,, | Performed by: FAMILY MEDICINE

## 2023-04-25 PROCEDURE — 1159F PR MEDICATION LIST DOCUMENTED IN MEDICAL RECORD: ICD-10-PCS | Mod: CPTII,S$GLB,, | Performed by: FAMILY MEDICINE

## 2023-04-25 PROCEDURE — 92228 DIABETIC EYE SCREENING PHOTO: ICD-10-PCS | Mod: TC,S$GLB,, | Performed by: FAMILY MEDICINE

## 2023-04-25 PROCEDURE — 3079F DIAST BP 80-89 MM HG: CPT | Mod: CPTII,S$GLB,, | Performed by: FAMILY MEDICINE

## 2023-04-25 PROCEDURE — 99214 OFFICE O/P EST MOD 30 MIN: CPT | Mod: S$GLB,,, | Performed by: FAMILY MEDICINE

## 2023-04-25 RX ORDER — FLUCONAZOLE 150 MG/1
150 TABLET ORAL DAILY
Qty: 1 TABLET | Refills: 5 | Status: SHIPPED | OUTPATIENT
Start: 2023-04-25 | End: 2023-05-07 | Stop reason: SDUPTHER

## 2023-04-25 NOTE — PROGRESS NOTES
Glenda Peña is a 32 y.o. female here for a diabetic eye screening with non-dilated fundus photos per DR OLIVAREZ.    Patient cooperative?: Yes  Small pupils?: No  Last eye exam: 2 YEARS AGO    For exam results, see Encounter Report.

## 2023-04-25 NOTE — PROGRESS NOTES
Health Maintenance Due   Topic Date Due    Pneumococcal Vaccines (Age 0-64) (1 - PCV) Never done    TETANUS VACCINE  09/23/2012    Foot Exam  02/28/2019    Diabetes Urine Screening  12/01/2022    Lipid Panel  12/01/2022    Hemoglobin A1c  12/01/2022    Colonoscopy  06/11/2023     Chart review done. HM updated. Immunizations reviewed & updated. Care Everywhere updated.  DIABETIC EYE CAM DONE.

## 2023-04-25 NOTE — PROGRESS NOTES
"Chief Complaint   Patient presents with    Diabetes    Follow-up       SUBJECTIVE:   32 y.o. female for annual routine checkup.  F/u on her diabetes and the medicaTIONS  Current Outpatient Medications   Medication Sig Dispense Refill    dapagliflozin (FARXIGA) 10 mg tablet Take 1 tablet (10 mg total) by mouth once daily. 90 each 3    dulaglutide (TRULICITY) 4.5 mg/0.5 mL pen injector Inject 4.5 mg into the skin every 7 days. 4 pen 11    glipiZIDE (GLUCOTROL) 10 MG TR24 Take 1 tablet (10 mg total) by mouth 2 (two) times daily with meals. 60 tablet 2    lisinopriL (PRINIVIL,ZESTRIL) 2.5 MG tablet Take 1 tablet (2.5 mg total) by mouth once daily. 90 tablet 0    norethindrone-ethinyl estradiol (MICROGESTIN 1/20) 1-20 mg-mcg per tablet Take 1 tablet by mouth once daily. 21 tablet 11     No current facility-administered medications for this visit.     Allergies: Latex, natural rubber   Patient's last menstrual period was 04/11/2023.    ROS:  Feeling well. No dyspnea or chest pain on exertion.  No abdominal pain, change in bowel habits, black or bloody stools.  No urinary tract symptoms. GYN ROS: normal menses, no abnormal bleeding, pelvic pain or discharge, no breast pain or new or enlarging lumps on self exam. No neurological complaints.    OBJECTIVE:   The patient appears well, alert, oriented x 3, in no distress.  /80   Pulse 63   Temp 97.9 °F (36.6 °C) (Oral)   Ht 5' 2" (1.575 m)   Wt 97.1 kg (214 lb 1.1 oz)   LMP 04/11/2023   SpO2 99%   BMI 39.15 kg/m²   Wt Readings from Last 5 Encounters:   04/25/23 97.1 kg (214 lb 1.1 oz)   01/19/23 105 kg (231 lb 7.7 oz)   12/22/22 105.5 kg (232 lb 9.4 oz)   12/10/22 104.3 kg (230 lb)   08/31/22 108 kg (238 lb 1.6 oz)       ENT normal.  Neck supple. No adenopathy or thyromegaly. SAIRA. Lungs are clear, good air entry, no wheezes, rhonchi or rales. S1 and S2 normal, no murmurs, regular rate and rhythm. Abdomen soft without tenderness, guarding, mass or organomegaly. " Extremities show no edema, normal peripheral pulses. Neurological is normal, no focal findings.      BREAST EXAM: deferred    PELVIC EXAM: deferred    ASSESSMENT:   1. Type 2 diabetes mellitus with hyperglycemia, without long-term current use of insulin    2. Chronic vulvitis          PLAN:   Counseled on age appropriate medical preventative services, including age appropriate cancer screenings, over all nutritional health, need for a consistent exercise regimen and an over all push towards maintaining a vigorous and active lifestyle.  Counseled on age appropriate vaccines and discussed upcoming health care needs based on age/gender.  Spent time with patient counseling on need for a good patient/doctor relationship moving forward.  Discussed use of common OTC medications and supplements.  Discussed common dietary aids and use of caffeine and the need for good sleep hygiene and stress management.    Problem List Items Addressed This Visit       Type 2 diabetes mellitus with hyperglycemia, without long-term current use of insulin - Primary    Chronic vulvitis     The current medical regimen is effective;  continue present plan and medications.  She has lost great weight  She is doing fantastic    Lab Results   Component Value Date    HGBA1C 9.7 (H) 09/01/2022    HGBA1C 11.5 (H) 12/01/2021    HGBA1C 10.7 (H) 01/15/2021     Lab Results   Component Value Date    LDLCALC Invalid, Trig>400.0 12/01/2021    CREATININE 0.7 09/02/2022     Much better controlled.    F/u in 1 year for wellness

## 2023-05-07 DIAGNOSIS — E11.65 TYPE 2 DIABETES MELLITUS WITH HYPERGLYCEMIA, WITHOUT LONG-TERM CURRENT USE OF INSULIN: ICD-10-CM

## 2023-05-07 DIAGNOSIS — N76.3 CHRONIC VULVITIS: ICD-10-CM

## 2023-05-08 RX ORDER — FLUCONAZOLE 150 MG/1
150 TABLET ORAL DAILY
Qty: 1 TABLET | Refills: 5 | Status: SHIPPED | OUTPATIENT
Start: 2023-05-08 | End: 2023-05-14

## 2023-06-19 DIAGNOSIS — E11.65 TYPE 2 DIABETES MELLITUS WITH HYPERGLYCEMIA, WITHOUT LONG-TERM CURRENT USE OF INSULIN: ICD-10-CM

## 2023-06-19 NOTE — TELEPHONE ENCOUNTER
Care Due:                  Date            Visit Type   Department     Provider  --------------------------------------------------------------------------------                                [facility] FAMILY                              PRIMARY      MEDICINE/INTERN  Last Visit: 04-      CARE ([facility])   AL CHRIS Serrano                              [facility]                              PRIMARY      MEDICINE/INTERN  Next Visit: 06-      CARE ([facility])   AL CHRIS Serrano                                                            Last  Test          Frequency    Reason                     Performed    Due Date  --------------------------------------------------------------------------------    CMP.........  12 months..  dapagliflozin, glipiZIDE,   03- 08-                             lisinopriL...............    HBA1C.......  6 months...  dapagliflozin,             09- 02-                             dulaglutide, glipiZIDE...    [facility] Embedded Care Due Messages. Reference number: [id number].   6/19/2023 6:19:46 PM CDT

## 2023-06-20 RX ORDER — GLIPIZIDE 10 MG/1
10 TABLET, FILM COATED, EXTENDED RELEASE ORAL 2 TIMES DAILY WITH MEALS
Qty: 60 TABLET | Refills: 2 | Status: SHIPPED | OUTPATIENT
Start: 2023-06-20 | End: 2024-01-30

## 2023-06-20 NOTE — TELEPHONE ENCOUNTER
Refill Routing Note   Medication(s) are not appropriate for processing by [facility] for the following reason(s):      Required labs outdated    [facility] action(s):  Defer Labs due            Appointments  past 12m or future 3m with PCP    Date Provider   Last Visit   4/25/2023 Matthew Serrano MD   Next Visit   6/27/2023 Matthew Serrano MD   ED visits in past 90 days: 0        Note composed:11:58 PM 06/19/2023

## 2023-06-22 ENCOUNTER — PATIENT OUTREACH (OUTPATIENT)
Dept: ADMINISTRATIVE | Facility: HOSPITAL | Age: 33
End: 2023-06-22
Payer: COMMERCIAL

## 2023-06-22 NOTE — PROGRESS NOTES

## 2023-07-11 ENCOUNTER — OFFICE VISIT (OUTPATIENT)
Dept: FAMILY MEDICINE | Facility: CLINIC | Age: 33
End: 2023-07-11
Payer: COMMERCIAL

## 2023-07-11 ENCOUNTER — PATIENT MESSAGE (OUTPATIENT)
Dept: FAMILY MEDICINE | Facility: CLINIC | Age: 33
End: 2023-07-11

## 2023-07-11 DIAGNOSIS — Z12.11 COLON CANCER SCREENING: ICD-10-CM

## 2023-07-11 DIAGNOSIS — J45.909 ASTHMA, UNSPECIFIED ASTHMA SEVERITY, UNSPECIFIED WHETHER COMPLICATED, UNSPECIFIED WHETHER PERSISTENT: ICD-10-CM

## 2023-07-11 DIAGNOSIS — E11.65 TYPE 2 DIABETES MELLITUS WITH HYPERGLYCEMIA, WITHOUT LONG-TERM CURRENT USE OF INSULIN: ICD-10-CM

## 2023-07-11 DIAGNOSIS — J06.9 UPPER RESPIRATORY TRACT INFECTION, UNSPECIFIED TYPE: Primary | ICD-10-CM

## 2023-07-11 DIAGNOSIS — E66.01 MORBID OBESITY WITH BMI OF 45.0-49.9, ADULT: ICD-10-CM

## 2023-07-11 PROCEDURE — 4010F ACE/ARB THERAPY RXD/TAKEN: CPT | Mod: CPTII,95,, | Performed by: NURSE PRACTITIONER

## 2023-07-11 PROCEDURE — 4010F PR ACE/ARB THEARPY RXD/TAKEN: ICD-10-PCS | Mod: CPTII,95,, | Performed by: NURSE PRACTITIONER

## 2023-07-11 PROCEDURE — 99214 PR OFFICE/OUTPT VISIT, EST, LEVL IV, 30-39 MIN: ICD-10-PCS | Mod: 95,,, | Performed by: NURSE PRACTITIONER

## 2023-07-11 PROCEDURE — 99214 OFFICE O/P EST MOD 30 MIN: CPT | Mod: 95,,, | Performed by: NURSE PRACTITIONER

## 2023-07-11 RX ORDER — BENZONATATE 200 MG/1
200 CAPSULE ORAL 3 TIMES DAILY PRN
Qty: 15 CAPSULE | Refills: 0 | Status: SHIPPED | OUTPATIENT
Start: 2023-07-11 | End: 2024-01-30

## 2023-07-11 RX ORDER — DEXTROMETHORPHAN HBR AND GUAIFENESIN 5; 100 MG/5ML; MG/5ML
20 LIQUID ORAL 3 TIMES DAILY PRN
Qty: 355 ML | Refills: 1 | Status: SHIPPED | OUTPATIENT
Start: 2023-07-11 | End: 2023-08-14 | Stop reason: SDUPTHER

## 2023-07-11 RX ORDER — ACETAMINOPHEN AND CHLORPHENIRAMINE MALEATE 325; 2 MG/1; MG/1
1 TABLET, FILM COATED ORAL 4 TIMES DAILY PRN
Qty: 30 TABLET | Refills: 1 | Status: SHIPPED | OUTPATIENT
Start: 2023-07-11 | End: 2024-01-30

## 2023-07-11 NOTE — PROGRESS NOTES
The patient location is:  Patient Home  The chief complaint leading to consultation is: as below  Visit type: Virtual visit with synchronous audio and video  Total time spent with patient: 15 minutes  Each patient to whom he or she provides medical services by telemedicine is:  (1) informed of the relationship between the physician and patient and the respective role of any other health care provider with respect to management of the patient; and (2) notified that she may decline to receive medical services by telemedicine and may withdraw from such care at any time.      HPI     Chief Complaint:  Cough and congestion      Glenda Peña is a 32 y.o. female with multiple medical diagnoses as listed in the medical history and problem list that presents for cough and congestion.  Pt is new to me but is known to this clinic with her last appointment being Visit date not found.      Cough  This is a new problem. The current episode started in the past 7 days. The problem has been gradually worsening. The problem occurs hourly. The cough is Non-productive and productive of sputum. Associated symptoms include headaches, myalgias, nasal congestion, rhinorrhea, sweats and wheezing. Pertinent negatives include no chest pain, chills, ear congestion, ear pain, fever, heartburn, hemoptysis, postnasal drip, rash, sore throat, shortness of breath or weight loss. The symptoms are aggravated by exercise. Treatments tried: sudafed, claritin. The treatment provided mild relief. Her past medical history is significant for asthma, bronchitis, environmental allergies and pneumonia. There is no history of bronchiectasis, COPD or emphysema.     Reports her partner is also sick and experiencing similar symptoms. Reports home COVID-19 screen was negative.    Relevant Hx of asthma. She does have a nebulizer at home.         Assessment & Plan     Problem List Items Addressed This Visit          Pulmonary    Asthma    Reports intermittent  wheezing.     Denies dyspnea     Use nebulizer prn    The current medical regimen is effective;  continue present plan         Endocrine    Type 2 diabetes mellitus with hyperglycemia, without long-term current use of insulin    -discussed with patient about routine diabetic care that includes but are not limited to regular eye exams, skin care, daily foot exam, proper nutrition, regular BG monitoring at home (fasting and mealtime) and medication compliance in a diabetic.  Target morning BS  and meal-time BS <180      Relevant Orders    Ambulatory referral/consult to Podiatry    Morbid obesity with BMI of 45.0-49.9, adult    We discussed weight issues and safe, effective ways of losing pounds, includin) diet:  low carbohydrate, low fat diet, stay away from fast food, fried and processed food, use whole grain, lot of fruits and vegetables, use healthy fat such as avocado, nuts and olive oil in reasonable quantity, stay away from sodas. Regular meals with lean proteins.  2) physical activity: ideally 150 min a week, with cardiovascular and resistance activity.  Patient was encouraged to set realistic attainable goals for weight loss, and we will follow up periodically.    Discussed Mediterranean Diet recommendations (Adopted from Ronak et al, [place], 2018.)  - Eat primarily plant-based foods, such as fruits and vegetables, whole grains, legumes (beans) and nuts  - Limit refined carbohydrates (white pasta, bread, rice).  - Replace butter with healthy fats such as olive oil.  - Use herbs and spices instead of salt to flavor foods.  - Limit red meat and processed meats to no more than a few times a month.  - Avoid sugary sodas, bakery goods, and sweets.  - Eat fish and poultry at least twice a week.       Other Visit Diagnoses       Upper respiratory tract infection, unspecified type    -  Primary    -Mild symptoms, most likely viral etiology.  -reports home COVID19 screen was negative.   -denies  fever.  -recent sick contact with her partner.   -based on clinical findings today, does not warrant Abx treatment at this time. D/w pt about the potential risks of inappropriate Abx use and that if patient's Sx worsens, we will re-evaluate to assess the need to change the treatment plan.    -Warm tea with honey and lemon, and/or warm salt water gargles every 4 hours PRN for sore throat. May try OTC Cepacol if pt desires.  -advised frequent hand washing, rest, and plenty of fluids. Increase water intake to 64-80 oz daily to help thin mucus.  -Tylenol tablets as needed for fever, headaches, sore throat, ear pain, bodyaches, and/or nasal/sinus inflammation.   -Nasal Saline spray (Over the counter [place] spray or [place])  2 sprays each nostril 2-3 times a day for nasal congestion.     Medications as below.    I counseled the patient on fluids, rest, OTC medications that can safely be used, hand/cough hygiene, expected course of illness, and when further medical attention would be warranted.      Discussed DDx, condition, and treatment.   Education sent to patient portal/included in after visit summary.  ED precautions given.   Notify provider if symptoms do not resolve or increase in severity.   Patient verbalizes understanding and agrees with plan of care.    Relevant Medications   benzonatate (TESSALON) 200 MG capsule   chlorpheniramine-acetaminophen (CORICIDIN HBP COLD AND FLU) 2-325 mg Tab   dextromethorphan-guaiFENesin 5-100 mg/5 mL Liqd       Colon cancer screening        Relevant Orders    Ambulatory referral/consult to Endo Procedure             --------------------------------------------      Health Maintenance:  Health Maintenance         Date Due Completion Date    Pneumococcal Vaccines (Age 0-64) (1 - PCV) Never done ---    TETANUS VACCINE 09/23/2012 9/23/2002    Foot Exam 02/28/2019 2/28/2018    COVID-19 Vaccine (6 - Pfizer series) 06/18/2022 4/23/2022    Diabetes Urine Screening 12/01/2022 12/1/2021     Lipid Panel 12/01/2022 12/1/2021    Hemoglobin A1c 12/01/2022 9/1/2022    Colonoscopy 06/11/2023 6/11/2018    Influenza Vaccine (1) 09/01/2023 11/11/2022    Eye Exam 04/25/2024 4/25/2023    Cervical Cancer Screening 03/30/2025 3/30/2022            Colon cancer screening ordered and Diabetic foot screening ordered    Follow Up:  Follow up in about 2 weeks (around 7/25/2023), or if symptoms worsen or fail to improve.    Exam     Review of Systems:  (as noted above)  Review of Systems   Constitutional:  Negative for chills, fever and weight loss.   HENT:  Positive for rhinorrhea. Negative for ear pain, postnasal drip, sore throat and trouble swallowing.    Eyes:  Negative for visual disturbance.   Respiratory:  Positive for cough and wheezing. Negative for hemoptysis, chest tightness and shortness of breath.    Cardiovascular:  Negative for chest pain.   Gastrointestinal:  Negative for blood in stool and heartburn.   Musculoskeletal:  Positive for myalgias.   Skin:  Negative for rash.   Allergic/Immunologic: Positive for environmental allergies.   Neurological:  Positive for headaches.     Physical Exam:   Physical Exam  Constitutional:       General: She is not in acute distress.     Appearance: She is obese. She is ill-appearing. She is not toxic-appearing or diaphoretic.   Pulmonary:      Effort: Pulmonary effort is normal.   Neurological:      Mental Status: She is alert.   Psychiatric:         Mood and Affect: Mood normal.     There were no vitals filed for this visit.   There is no height or weight on file to calculate BMI.        History     Past Medical History:  Past Medical History:   Diagnosis Date    Asthma childhood    Colon polyp     COVID-19     Diabetes mellitus     GERD (gastroesophageal reflux disease)     GERD (gastroesophageal reflux disease)     Morbid obesity     PCOS (polycystic ovarian syndrome)        Past Surgical History:  Past Surgical History:   Procedure Laterality Date    APPENDECTOMY   2011    COLONOSCOPY  06/11/2018    IN PROCEDURES: Grade 1 internal hemorrhoids.  Polyp 2mm in the descending colon; repeat in 5 years for surveillance    HERNIA REPAIR      LYSIS OF ADHESIONS N/A 9/1/2022    Procedure: LYSIS, ADHESIONS;  Surgeon: Danny Ortiz Jr., MD;  Location: [facility];  Service: General;  Laterality: N/A;    OOPHORECTOMY      salpingoopherectomy Right 2008       Social History:  Social History     Socioeconomic History    Marital status: Significant Other   Tobacco Use    Smoking status: Never    Smokeless tobacco: Never   Substance and Sexual Activity    Alcohol use: Not Currently     Comment: occasionally apple cider- once every 2 months    Drug use: No    Sexual activity: Not Currently     Partners: Female     Social Determinants of Health     Financial Resource Strain: Low Risk     Difficulty of Paying Living Expenses: Not hard at all   Food Insecurity: No Food Insecurity    Worried About Running Out of Food in the Last Year: Never true    Ran Out of Food in the Last Year: Never true   Transportation Needs: No Transportation Needs    Lack of Transportation (Medical): No    Lack of Transportation (Non-Medical): No   Physical Activity: Insufficiently Active    Days of Exercise per Week: 4 days    Minutes of Exercise per Session: 10 min   Stress: No Stress Concern Present    Feeling of Stress : Only a little   Social Connections: Socially Isolated    Frequency of Communication with Friends and Family: More than three times a week    Frequency of Social Gatherings with Friends and Family: Once a week    Attends Orthodoxy Services: Never    Active Member of Clubs or Organizations: No    Attends Club or Organization Meetings: Patient refused    Marital Status:    Housing Stability: Low Risk     Unable to Pay for Housing in the Last Year: No    Number of Places Lived in the Last Year: 1    Unstable Housing in the Last Year: No       Family History:  Family History   Problem Relation Age of  Onset    Heart attack Father     Hypertension Father     Diabetes Mother     Heart attack Mother     Breast cancer Neg Hx     Colon cancer Neg Hx     Ovarian cancer Neg Hx     Crohn's disease Neg Hx     Ulcerative colitis Neg Hx     Stomach cancer Neg Hx     Esophageal cancer Neg Hx        Allergies and Medications: (updated and reviewed)  Review of patient's allergies indicates:   Allergen Reactions    Latex, natural rubber Rash     Burning sensation     Current Outpatient Medications   Medication Sig Dispense Refill    dapagliflozin propanediol (FARXIGA) 10 mg tablet Take 1 tablet (10 mg total) by mouth once daily. 90 each 3    dulaglutide (TRULICITY) 4.5 mg/0.5 mL pen injector Inject 4.5 mg into the skin every 7 days. 4 pen 11    glipiZIDE (GLUCOTROL) 10 MG TR24 Take 1 tablet (10 mg total) by mouth 2 (two) times daily with meals. 60 tablet 2    lisinopriL (PRINIVIL,ZESTRIL) 2.5 MG tablet Take 1 tablet (2.5 mg total) by mouth once daily. 90 tablet 0    norethindrone-ethinyl estradiol (MICROGESTIN 1/20) 1-20 mg-mcg per tablet Take 1 tablet by mouth once daily. 21 tablet 11     No current facility-administered medications for this visit.       Patient Care Team:  Matthew Serrano MD as PCP - General (Family Medicine)  [facility] [facility] [employer]-Emma Suh LPN as Licensed Practical Nurse       - The patient was sent an After Visit Summary virtually that lists all medications with directions, allergies, education, orders placed during this encounter and follow-up instructions.      - I have reviewed the patient's medical information including past medical, family, and social history sections including the medications and allergies.      - We discussed the patient's current medications.     This note was created by combination of typed  and MModal dictation.  Transcription errors may be present.  If there are any questions, please contact me.       Roberto Nathan NP

## 2023-07-11 NOTE — LETTER
July 11, 2023    Glenda Peña  [address] [place] [address]         [facility]  [address]  [place] [address]  Phone: [phone]  Fax: [phone] July 11, 2023     Patient: Glenda Peña   YOB: 1990   Date of Visit: 7/11/2023       To Whom It May Concern:    It is my medical opinion that Glenda Peña may return to work on 7/17/23 .    If you have any questions or concerns, please don't hesitate to call.    Sincerely,        Roberto Nathan NP

## 2023-07-11 NOTE — PATIENT INSTRUCTIONS
"Medical [facility]itness--[phone]  Imaging, Xray, CT, MRI, Ultrasound---[phone]  Bariatrics---[phone]  Breast Surgery---[phone]  Case Management---[phone]  Colonoscopy---[phone]  DME---[phone]  Infectious Disease---[phone]  Interventional Radiology---[phone]  Medical Records---[phone]  [facility] On [facility]---[phone]  Optometry/Ophthalmology---[phone]  [facility]---[phone]  Physical Therapy---[phone]  Psychiatry---[phone] or [phone]  Plastic Surgery---[phone]  Recovery--[phone] option 2, or [phone].  Sleep Study---[phone]  Smoking Cessation---[phone]  Wound Care---[phone]  Referral Desk---[phone]    Patient Education       Weight Loss Diet   About this topic   There are many "trendy" weight loss diets that are popular today. Many of these diets can end up being more harmful than helpful. The healthiest way to lose weight is to burn more calories than you eat.  A weight loss diet should help you have a healthy view of eating. It is NOT healthy to stop eating to try and lose weight. A good diet plan will help you cut down your food intake and make healthy choices.  A healthy weight loss goal is 1 to 2 pounds (0.5 to 1 kg) per week. Reducing calories in your diet, burning calories through exercise, or both can help you lose weight. Combining a healthy diet with regular physical activity can help you get the best results.  To cut calories in your diet you can:  Switch from whole milk to 1% or skim milk.  Switch from regular cheese to low-fat or fat-free cheese.  Use healthier condiment choices:  Fat-free or low-fat sour cream or salad dressings  Spray butter  Diet syrups or jellies over regular  Try frozen yogurt as a dessert rather than eating ice cream.  Skip the chips. Snack on carrots, vegetables, or fruit. If chips are a favorite of yours, try the baked style and watch portion size.  Eat grilled, roasted, boiled, broiled, " or baked meats. Avoid deep-frying. Choose skinless poultry, lean red meat, lean cuts of pork, and fish for good protein sources.  Try flavored no-calorie braxton. Do not drink soda and juices that have many calories.  Choose fruit instead of sweets.  General   Eating smaller meals more often may be helpful. This will keep you from overeating at your next meal. Also, eating meals slowly helps you feel full faster.  If eating 3 meals is a part of your lifestyle, choose more lean proteins and higher fiber foods to fill you up at each meal.  Do not skip meals. Most often if you skip a meal, you eat too much at the next meal.  Eat smaller portions. Use a smaller plate or bowl for meals, and when you are eating out, eat half and take the rest home.  Plan ahead. Plan your meals and grocery list before going to the store. Planning will keep you from getting meals from restaurants.  Do not go to the grocery store hungry. You are more likely to buy snacks that are not good for you.  Portion out snacks. When you are having a snack, instead of grabbing the whole bag, portion a small amount out to give yourself a stopping point.  Drink water before and after your meals to help fill you up without the calories.  When eating starchy foods, choose whole-grain products. These have a lot of fiber which will make you feel full. Fiber also helps lower cholesterol and helps with bowel function.  If you need a helpful start, ask your doctor to send you to a dietitian for weight loss help.         What will the results be?   Losing excess weight will make your whole body healthier. You will have more energy for your daily activities and lower your risk for health problems.  What lifestyle changes are needed?   Stay active. Eating healthy is not always enough to lose weight. Burning calories by exercising is a big part of weight loss.  What foods are good to eat?   The key is to watch your portion sizes. It is best to choose foods that are  lower in fat and calories.  Choose lean meats:  Boneless, skinless chicken breast  Pork loin  90% lean beef  Lean turkey meat  Fresh fish (not fried)  Choose low-fat dairy products:  1% or skim milk  Spray butter or margarine  Low-fat or fat-free cheese  Frozen yogurt or low-calorie ice cream  Choose fresh fruits, vegetables, beans and lentils, and whole wheat products more often.  Choose water to drink more often. Drink diet or no-calorie beverages when you want something other than water. Aim to get your calories from the foods you eat.  Choose smart snacks:  Fruits  Vegetables  Low-fat or nonfat yogurt  Low-fat or no-fat cheese, such as cottage cheese  Unsalted nuts  Hard-boiled egg  Hummus  Guacamole  Natural peanut butter  Popcorn with no butter ? use pepper, garlic, or another spice to taste  Whole grain crackers  What foods should be limited or avoided?   Limit high-fat, high-sodium, and high-calorie foods like:  Fried foods  Processed meats  Whole-fat dairy products  Candy, cookies, chips, pastries  Sausage, roblero, any full-fat meats  Soda, juice  Beer, wine, and mixed drinks (alcohol)  Will there be any other care needed?   What do I do first before trying to lose weight?  Talk to your doctor and dietitian to see if you need to lose weight. Work with them to set your weight loss goals.  If you have a chronic illness, such as high blood sugar or high blood pressure, ask a doctor or dietitian what diet and exercise is right for you.  Ask your doctor about how much you are able to exercise and what type of exercise is good for you.  Helpful tips   Keep a food journal to help keep you on track.  Join a support group.  Tips for burning calories:  If your workplace is near your house, choose to walk or bike to work instead of driving.  Take 20-minute walks each day. Walk around during your lunch break. You will not only burn calories, but will raise your energy for the rest of the day.  Take the stairs over the  elevator.  Join a gym or exercise class with a friend.  Try to exercise 30 minutes a day for overall health. Three 10-minute sessions work too. Aim for 60 to 90 minutes a day to lose weight.  Drink lots of water before, during, and after exercise.  Where can I learn more?   Academy of Nutrition and Dietetics  https://www.eatright.org/health/weight-loss/your-health-and-your-weight/back-to-basics-for-healthy-weight-loss   Centers for Disease Control and Prevention  https://www.cdc.gov/healthyweight/healthy_eating/index.html   Familydoctor.org  https://familydoctor.org/nutrition-weight-loss-need-know-fad-diets/   NHS  https://www.nhs.uk/live-well/healthy-weight/12-tips-to-help-you-lose-weight/?tabname=you-and-your-weight   Last Reviewed Date   2021-06-24  Consumer Information Use and Disclaimer   This information is not specific medical advice and does not replace information you receive from your health care provider. This is only a brief summary of general information. It does NOT include all information about conditions, illnesses, injuries, tests, procedures, treatments, therapies, discharge instructions or life-style choices that may apply to you. You must talk with your health care provider for complete information about your health and treatment options. This information should not be used to decide whether or not to accept your health care providers advice, instructions or recommendations. Only your health care provider has the knowledge and training to provide advice that is right for you.  Copyright   Copyright © 2021 UpToDate, Inc. and its affiliates and/or licensors. All rights reserved.    Patient Education       Viral Upper Respiratory Infection Discharge Instructions, Adult   About this topic   You have an upper respiratory infection or URI. A URI can affect your nose, throat, ears, and sinuses. A virus is the cause of almost all URIs and antibiotics will not help you feel better more quickly. The  common cold is an example of a viral URI.  URIs are easy to spread from person to person, most often through coughing or sneezing. A URI will almost always get better in a week or two without any treatment.         What care is needed at home?   Ask your doctor what you need to do when you go home. Make sure you ask questions if you do not understand what the doctor says.  If you smoke, try to quit. Your doctor or nurse can help.  Drink lots of fluids like water, juice, or broth. This will help replace any fluids lost if you have a runny nose or fever. Warm tea or soup can help soothe a sore throat.  If the air in your home feels dry, use a cool mist humidifier. This can help a stuffy nose and make it easier to breathe.  You can also use saline nose drops to relieve stuffiness.  If you decide to take over-the-counter cough or cold medicines, follow the directions on the label carefully. Be sure you do not take more than 1 medicine that contains acetaminophen. Also, if you have a heart problem or high blood pressure, check with your doctor before you take any of these medicines.  Wash your hands often. Cough or sneeze into a tissue or your elbow instead of your hands. This will help keep others healthy.  What follow-up care is needed?   Your doctor may ask you to make visits to the office to check on your progress. Be sure to keep these visits.  What drugs may be needed?   The doctor may order drugs to:  Open up the tubes of your lungs  Treat viral infection  Relieve or stop coughing  Help with pain from a sore throat  Relieve runny and stuffy nose  Provide oxygen  Will physical activity be limited?   You need to rest for a few days to let your body recover from the infection.  What changes to diet are needed?   Eat soft foods like soup if swallowing is too painful.  What problems could happen?   Asthma attack  Sinus infections  Lung problems like pneumonia and bronchitis  Severe fluid loss. This is  dehydration.  What can be done to prevent this health problem?   Wash your hands often with soap and water for at least 20 seconds, especially after coughing or sneezing. Alcohol-based hand sanitizers also work to kill the virus.  If you are sick, cover your mouth and nose with tissue when you cough or sneeze. You can also cough into your elbow. Throw away tissues in the trash and wash your hands after touching used tissues.  Do not get too close (kissing, hugging) to people who are sick.  Do not share towels or hankies with anyone who is sick. Clean commonly handled things like door handles, remotes, toys, and phones. Wipe them with a disinfectant.  Stay away from crowded places.  Cover your nose and mouth when you sneeze or cough.  Take vitamin C to help build up your body's ability to fight disease.  Get a flu shot each year.  When do I need to call the doctor?   You have trouble breathing when talking or sitting still.  You have a fever of 100.4°F (38°C) or higher for several days, chills, a very bad sore throat, or ear or sinus pain.  You develop a new fever after several days of feeling the same or improving.  You develop chest pain when you cough.  You have a cough that lasts more than 10 days.  You cough up blood, or the color of the mucus you cough up changes.  Teach Back: Helping You Understand   The Teach Back Method helps you understand the information we are giving you. After you talk with the staff, tell them in your own words what you learned. This helps to make sure the staff has described each thing clearly. It also helps to explain things that may have been confusing. Before going home, make sure you can do these:  I can tell you about my condition.  I can tell you what may help ease my signs.  I can tell you what I will do if I have a fever, chills, breathing very fast, or trouble breathing.  Where can I learn more?   American Lung  Association  https://www.lung.org/blog/can-you-exercise-with-a-cold   American Lung Association  https://www.lung.org/lung-health-diseases/lung-disease-lookup/influenza/facts-about-the-common-cold   NHS Choices  https://www.nhs.uk/conditions/respiratory-tract-infection/   UpToDate  https://www.Liquidnet/contents/the-common-cold-in-adults-beyond-the-basics   Last Reviewed Date   2021-06-08  Consumer Information Use and Disclaimer   This information is not specific medical advice and does not replace information you receive from your health care provider. This is only a brief summary of general information. It does NOT include all information about conditions, illnesses, injuries, tests, procedures, treatments, therapies, discharge instructions or life-style choices that may apply to you. You must talk with your health care provider for complete information about your health and treatment options. This information should not be used to decide whether or not to accept your health care providers advice, instructions or recommendations. Only your health care provider has the knowledge and training to provide advice that is right for you.  Copyright   Copyright © 2021 UpToDate, Inc. and its affiliates and/or licensors. All rights reserved.  Patient Education       Sinusitis Discharge Instructions, Adult   About this topic   Your sinuses are hollow areas in the bones of your face. They have a thin lining that normally makes a small amount of mucus. When you have sinusitis, the lining gets swollen and makes extra mucus. You may have sinusitis with or after a cold. Most of the time sinusitis will get better in 1 to 2 weeks.  Sinusitis is most often caused by a virus, so antibiotics wont help. But some people do need antibiotics. If the doctor ordered antibiotics for you, be sure to follow the instructions. It is important to take all of your antibiotics even if you start to feel better.       What care is needed at  home?   Ask your doctor what you need to do when you go home. Make sure you ask questions if you do not understand what you need to do.  Try to thin the mucus.  Drink lots of liquids to stay hydrated.  Use a cool mist humidifier to avoid dry air.  Use saline nose drops or a saline nose rinse to relieve stuffiness.  Wash your hands often. This will help keep others healthy.  Do not smoke or be in smoke-filled places. Avoid things that may cause breathing problems like fumes, pollution, dust, and other common allergens and irritants.  You may want to take medicine like ibuprofen, naproxen, or acetaminophen to help with pain.  What follow-up care is needed?   Your doctor may ask you to make visits to the office to check on your progress. Be sure to keep your visits.  Your doctor will tell you if other tests are needed.  Your doctor may send you for allergy tests or to an allergy expert.   What lifestyle changes are needed?   Avoid drinks that contain caffeine or alcohol.  Try to stop smoking. Avoid being around others who smoke. Smoke can damage the small hairs inside your sinuses.  What drugs may be needed?   The doctor may order drugs to:  Help with pain and swelling  Fight an infection  Control coughing  Dry up the sinuses  Help a runny or stuffy nose  Will physical activity be limited?   You do not have to limit your activity. You may want to rest more if you have a fever or headache.   What problems could happen?   Infections that happen again and again  Asthma attack  Coughing  Loss of voice  What can be done to prevent this health problem?   Keep your nose as moist as possible. Use saline sprays, washes, and a humidifier often.  Avoid being around cigarette and cigar smoke or strong odors from chemicals.  Avoid long periods of swimming in pools treated with chlorine. This can bother the lining of the nose and sinuses.  Avoid water diving. This forces water into the sinuses from the nasal passages.  Manage your  allergies with your doctor's help.  Use an air conditioner if allergies are a problem.  Before air travel, use a drug to dry up mucus. As the plane takes off or lands, the pressure can cause sinus pain.  When do I need to call the doctor?   You have a stiff neck, especially if you also have fever, chills, vomiting, or severe headache  You have trouble thinking clearly.  You have trouble seeing or have double vision.  You have swelling or redness or pain around one or both eyes.  You have a fever of 102°F (38.9°C) or higher, or have shaking chills or sweats.  You have an upset stomach and throwing up.  You have more pain in your face and head.  You are not getting better within 1 to 2 weeks.  Teach Back: Helping You Understand   The Teach Back Method helps you understand the information we are giving you. After you talk with the staff, tell them in your own words what you learned. This helps to make sure the staff has covered each thing clearly. It also helps to explain things that may have been confusing. Before going home, make sure you can do these:  I can tell you about my condition.  I can tell you what may help ease my breathing.  I can tell you what I will do if I have a fever, chills, or trouble breathing.  Where can I learn more?   American Academy of Family Physicians  https://familydoctor.org/condition/sinusitis/   Centers for Disease Control and Prevention  https://www.cdc.gov/antibiotic-use/community/for-hcp/outpatient-hcp/adult-treatment-rec.html   Last Reviewed Date   2021-06-09  Consumer Information Use and Disclaimer   This information is not specific medical advice and does not replace information you receive from your health care provider. This is only a brief summary of general information. It does NOT include all information about conditions, illnesses, injuries, tests, procedures, treatments, therapies, discharge instructions or life-style choices that may apply to you. You must talk with your  health care provider for complete information about your health and treatment options. This information should not be used to decide whether or not to accept your health care providers advice, instructions or recommendations. Only your health care provider has the knowledge and training to provide advice that is right for you.  Copyright   Copyright © 2021 UpToDate, Inc. and its affiliates and/or licensors. All rights reserved.  Patient Education       Sinusitis in Adults   The Basics   Written by the doctors and editors at [facility]   What is sinusitis? -- Sinusitis is a condition that can cause a stuffy nose, pain in the face, and discharge (mucus) from the nose. The sinuses are hollow areas in the bones of the face (figure 1). They have a thin lining that normally makes a small amount of mucus. When this lining gets irritated or infected, it swells and makes extra mucus. This causes symptoms.  Sinusitis can occur when a person gets sick with a cold. The germs causing the cold can also infect the sinuses. Many times, a person feels like their cold is getting better. But then they get sinusitis and begin to feel sick again.  What are the symptoms of sinusitis? -- Common symptoms of sinusitis include:  Stuffy or blocked nose  Thick white, yellow, or green discharge from the nose  Pain in the teeth  Pain or pressure in the face - This often feels worse when a person bends forward.  People with sinusitis can also have other symptoms that include:  Fever  Cough  Trouble smelling  Ear pressure or fullness  Headache  Bad breath  Feeling tired  Most of the time, symptoms start to improve in 7 to 10 days.  Should I see a doctor or nurse? -- See your doctor or nurse if your symptoms last more than 10 days, or if your symptoms first get better but then get worse.  Rarely, sinusitis can lead to serious problems. See your doctor or nurse right away (do not wait 10 days) if you have:  Fever higher than 102°F (38.9°C)  Sudden  and severe pain in the face and head  Trouble seeing or seeing double  Trouble thinking clearly  Swelling or redness around one or both eyes  A stiff neck  Is there anything I can do on my own to feel better? -- Yes. To reduce your symptoms, you can:  Take an over-the-counter pain reliever to reduce the pain  Rinse your nose and sinuses with salt water a few times a day - Ask your doctor or nurse about the best way to do this.  Your doctor might also prescribe a steroid nose spray to reduce the swelling in your nose. (These kinds of steroid nose sprays are safe to take, and do not contain the same steroids that some athletes take illegally.)  How is sinusitis treated? -- Most of the time, sinusitis does not need to be treated with antibiotic medicines. This is because most sinusitis is caused by viruses, not bacteria, and antibiotics do not kill viruses. In fact, even sinusitis caused by bacteria will usually get better on its own without antibiotics.   Some people with sinusitis do need treatment with antibiotics. If your symptoms have not improved after 10 days, ask your doctor if you should take antibiotics. Your doctor might recommend that you wait 1 more week to see if your symptoms improve. But if you have symptoms such as a fever or a lot of pain, they might prescribe antibiotics. It is important to follow your doctor's instructions about taking your antibiotics.  What if my symptoms do not get better? -- If your symptoms do not get better, talk with your doctor or nurse. They might order tests to figure out why you still have symptoms. These can include:  CT scan or other imaging tests - Imaging tests create pictures of the inside of the body.  A test to look inside the sinuses - For this test, a doctor puts a thin tube with a camera on the end into the nose and up into the sinuses.  Some people get a lot of sinus infections or have symptoms that last at least 3 months. These people can have a different type  "of sinusitis called "chronic sinusitis." Chronic sinusitis can be caused by different things. For example, some people have growths inside their nose or sinuses that are called "polyps." Other people have allergies that cause their symptoms.  Chronic sinusitis can be treated in different ways. If you have chronic sinusitis, talk with your doctor about which treatments are right for you.  All topics are updated as new evidence becomes available and our peer review process is complete.  This topic retrieved from [employer] on: Sep 21, 2021.  Topic 25119 Version 17.0  Release: 29.4.2 - C29.263  © 2021 UpToDate, Inc. and/or its affiliates. All rights reserved.  figure 1: Sinuses of the face     This drawing shows the sinuses of the face, from the side and front views.  Graphic 153165 Version 1.0    Consumer Information Use and Disclaimer   This information is not specific medical advice and does not replace information you receive from your health care provider. This is only a brief summary of general information. It does NOT include all information about conditions, illnesses, injuries, tests, procedures, treatments, therapies, discharge instructions or life-style choices that may apply to you. You must talk with your health care provider for complete information about your health and treatment options. This information should not be used to decide whether or not to accept your health care provider's advice, instructions or recommendations. Only your health care provider has the knowledge and training to provide advice that is right for you. The use of this information is governed by the [employer] End User License Agreement, available at https://[email or web address]/en/solutions/Attributor/about/maris.The use of [employer] content is governed by the [employer] Terms of Use. ©2021 UpToDate, Inc. All rights reserved.  Copyright   © 2021 UpToDate, Inc. and/or its affiliates. All rights reserved.        "

## 2023-07-12 ENCOUNTER — PATIENT MESSAGE (OUTPATIENT)
Dept: FAMILY MEDICINE | Facility: CLINIC | Age: 33
End: 2023-07-12
Payer: COMMERCIAL

## 2023-08-14 DIAGNOSIS — J06.9 UPPER RESPIRATORY TRACT INFECTION, UNSPECIFIED TYPE: ICD-10-CM

## 2023-08-15 RX ORDER — DEXTROMETHORPHAN HBR AND GUAIFENESIN 5; 100 MG/5ML; MG/5ML
20 LIQUID ORAL 3 TIMES DAILY PRN
Qty: 355 ML | Refills: 1 | Status: SHIPPED | OUTPATIENT
Start: 2023-08-15 | End: 2024-01-30

## 2023-09-28 NOTE — PROGRESS NOTES
----- Message from Arabella Wagner sent at 9/28/2023  1:01 PM CDT -----  Regarding: Attn: CARRINGTON  Contact: Mrs. Barron  Type:  Same Day Appointment Request    Caller is requesting a same day appointment.  Caller declined first available appointment listed below.    Name of Caller: Mrs. Barron  When is the first available appointment? 11/2023  Symptoms: head, neck pain or strain due to fall   Best Call Back Number: [phone] (home)    Additional Information:  Mrs. Barron would like to speak to you today     [facility]  General Surgery  Progress Note    Subjective:     History of Present Illness:  30 y/o F with RLQ pain for the past 3-4 days. She was recently admitted at [facility] on 02/17/2020 for lower abdominal pain and diagnosed with tubo-ovarian abscess, which was treated with pigtail catheter and IV antibiotics.  Cultures grew out Actinomyces, currently taking amoxicillin.   She has continued to have chronic pain since the surgery, but now the pain has localized to the right side.     She has a right-sided ventral wall hernia from previous open appendectomy. This was repaired in 2016 using Strattice, but has recurred.    Post-Op Info:  * No surgery found *         Interval History: continues to do well, pain improving    Medications:  Continuous Infusions:   sodium chloride 0.9% 100 mL/hr at 03/07/20 0033     Scheduled Meds:   ampicillin-sulbactim (UNASYN) IVPB  3 g Intravenous Q6H     PRN Meds:acetaminophen, dextrose 50%, glucagon (human recombinant), HYDROmorphone, HYDROmorphone, influenza, insulin aspart U-100, ondansetron, oxyCODONE-acetaminophen, oxyCODONE-acetaminophen, pneumoc 13-mary conj-dip cr(PF)     Review of patient's allergies indicates:   Allergen Reactions    Shellfish containing products Hives     To hands    Latex, natural rubber Rash     Burning sensation     Objective:     Vital Signs (Most Recent):  Temp: 99.4 °F (37.4 °C) (03/08/20 0756)  Pulse: 88 (03/08/20 0756)  Resp: 20 (03/08/20 0756)  BP: (!) 114/56 (03/08/20 0756)  SpO2: 97 % (03/07/20 1621) Vital Signs (24h Range):  Temp:  [98.2 °F (36.8 °C)-99.4 °F (37.4 °C)] 99.4 °F (37.4 °C)  Pulse:  [] 88  Resp:  [16-20] 20  SpO2:  [97 %] 97 %  BP: (112-135)/(56-80) 114/56     Weight: 112.5 kg (248 lb)  Body mass index is 45.36 kg/m².    Intake/Output - Last 3 Shifts       03/06 0700 - 03/07 0659 03/07 0700 - 03/08 0659 03/08 0700 - 03/09 0659    P.O. 2100      I.V. (mL/kg)       IV Piggyback       Total  Intake(mL/kg) 2100 (18.7)      Urine (mL/kg/hr) 1550 (0.6)      Total Output 1550      Net +550                   Physical Exam   Constitutional: She is oriented to person, place, and time. She appears well-developed and well-nourished.   HENT:   Head: Normocephalic and atraumatic.   Cardiovascular: Normal rate and regular rhythm.   Pulmonary/Chest: Effort normal.   Abdominal: Soft. There is tenderness. A hernia is present.   Neurological: She is alert and oriented to person, place, and time.   Skin: Skin is warm and dry.       Significant Labs:  none    Significant Diagnostics:  none    Assessment/Plan:     * Intra-abdominal abscess  Continue conservative treatment with IV antibiotics  Will continue to monitor  No surgical intervention planned          Ramila Zapata MD  General Surgery  [facility]

## 2023-11-28 ENCOUNTER — PATIENT OUTREACH (OUTPATIENT)
Dept: ADMINISTRATIVE | Facility: HOSPITAL | Age: 33
End: 2023-11-28
Payer: COMMERCIAL

## 2023-11-28 NOTE — PROGRESS NOTES
Health Maintenance Due   Topic Date Due    Pneumococcal Vaccines (Age 0-64) (1 - PCV) Never done    TETANUS VACCINE  09/23/2012    Foot Exam  02/28/2019    Diabetes Urine Screening  12/01/2022    Lipid Panel  12/01/2022    Hemoglobin A1c  12/01/2022    Colonoscopy  06/11/2023    Influenza Vaccine (1) 09/01/2023    COVID-19 Vaccine (6 - 2023-24 season) 09/01/2023   Chart review done. HM updated. Immunizations reviewed & updated. Care Everywhere updated.  I CALLED THE PATIENT ABOUT HER OVERDUE HM. NO ANSWER AND UNABLE TO L/M. PORTAL MESSAGE SENT

## 2023-12-06 NOTE — DISCHARGE INSTRUCTIONS
Follow-up with Dr. Estrada some time early in the coming week.    Return to the emergency department for any new or worsening symptoms including fever, vomiting, constipation, worsening abdominal pain.   #1:

## 2024-01-30 ENCOUNTER — OFFICE VISIT (OUTPATIENT)
Dept: FAMILY MEDICINE | Facility: CLINIC | Age: 34
End: 2024-01-30
Payer: COMMERCIAL

## 2024-01-30 VITALS
OXYGEN SATURATION: 97 % | HEIGHT: 62 IN | DIASTOLIC BLOOD PRESSURE: 80 MMHG | TEMPERATURE: 98 F | HEART RATE: 97 BPM | BODY MASS INDEX: 39.15 KG/M2 | SYSTOLIC BLOOD PRESSURE: 116 MMHG

## 2024-01-30 DIAGNOSIS — S82.892D CLOSED FRACTURE DISLOCATION OF LEFT ANKLE WITH ROUTINE HEALING, SUBSEQUENT ENCOUNTER: ICD-10-CM

## 2024-01-30 DIAGNOSIS — E28.2 PCOS (POLYCYSTIC OVARIAN SYNDROME): ICD-10-CM

## 2024-01-30 DIAGNOSIS — Z09 HOSPITAL DISCHARGE FOLLOW-UP: Primary | ICD-10-CM

## 2024-01-30 DIAGNOSIS — I10 ESSENTIAL HYPERTENSION: ICD-10-CM

## 2024-01-30 DIAGNOSIS — E11.65 TYPE 2 DIABETES MELLITUS WITH HYPERGLYCEMIA, WITHOUT LONG-TERM CURRENT USE OF INSULIN: ICD-10-CM

## 2024-01-30 DIAGNOSIS — K21.9 GASTROESOPHAGEAL REFLUX DISEASE, UNSPECIFIED WHETHER ESOPHAGITIS PRESENT: ICD-10-CM

## 2024-01-30 DIAGNOSIS — S14.129D CENTRAL CORD SYNDROME, SUBSEQUENT ENCOUNTER: ICD-10-CM

## 2024-01-30 PROCEDURE — 1159F MED LIST DOCD IN RCRD: CPT | Mod: CPTII,S$GLB,, | Performed by: NURSE PRACTITIONER

## 2024-01-30 PROCEDURE — 3051F HG A1C>EQUAL 7.0%<8.0%: CPT | Mod: CPTII,S$GLB,, | Performed by: NURSE PRACTITIONER

## 2024-01-30 PROCEDURE — 3079F DIAST BP 80-89 MM HG: CPT | Mod: CPTII,S$GLB,, | Performed by: NURSE PRACTITIONER

## 2024-01-30 PROCEDURE — 99999 PR PBB SHADOW E&M-EST. PATIENT-LVL V: CPT | Mod: PBBFAC,,, | Performed by: NURSE PRACTITIONER

## 2024-01-30 PROCEDURE — 4010F ACE/ARB THERAPY RXD/TAKEN: CPT | Mod: CPTII,S$GLB,, | Performed by: NURSE PRACTITIONER

## 2024-01-30 PROCEDURE — 1160F RVW MEDS BY RX/DR IN RCRD: CPT | Mod: CPTII,S$GLB,, | Performed by: NURSE PRACTITIONER

## 2024-01-30 PROCEDURE — 99214 OFFICE O/P EST MOD 30 MIN: CPT | Mod: S$GLB,,, | Performed by: NURSE PRACTITIONER

## 2024-01-30 PROCEDURE — 3074F SYST BP LT 130 MM HG: CPT | Mod: CPTII,S$GLB,, | Performed by: NURSE PRACTITIONER

## 2024-01-30 RX ORDER — METHOCARBAMOL 500 MG/1
500 TABLET, FILM COATED ORAL 4 TIMES DAILY PRN
COMMUNITY
Start: 2024-01-16 | End: 2024-02-15

## 2024-01-30 RX ORDER — GABAPENTIN 300 MG/1
600 CAPSULE ORAL
COMMUNITY
Start: 2024-01-02 | End: 2025-01-01

## 2024-01-30 RX ORDER — SENNOSIDES 8.6 MG/1
2 TABLET ORAL NIGHTLY
COMMUNITY
Start: 2024-01-09 | End: 2025-01-08

## 2024-01-30 RX ORDER — ERGOCALCIFEROL 1.25 MG/1
1 CAPSULE ORAL
COMMUNITY
Start: 2024-01-10 | End: 2024-02-09

## 2024-01-30 RX ORDER — AMITRIPTYLINE HYDROCHLORIDE 25 MG/1
1 TABLET, FILM COATED ORAL NIGHTLY
COMMUNITY
Start: 2024-01-02 | End: 2025-01-01

## 2024-01-30 RX ORDER — METFORMIN HYDROCHLORIDE 500 MG/1
1 TABLET ORAL 2 TIMES DAILY WITH MEALS
COMMUNITY
Start: 2024-01-09 | End: 2024-02-08

## 2024-01-30 RX ORDER — OMEPRAZOLE 20 MG/1
20 CAPSULE, DELAYED RELEASE ORAL DAILY
Qty: 30 CAPSULE | Refills: 11 | Status: SHIPPED | OUTPATIENT
Start: 2024-01-30 | End: 2025-01-29

## 2024-01-30 RX ORDER — OXYCODONE HYDROCHLORIDE 5 MG/1
TABLET ORAL
COMMUNITY
Start: 2024-01-09

## 2024-01-30 RX ORDER — MULTIPLE VITAMINS W/ MINERALS TAB 9MG-400MCG
1 TAB ORAL
COMMUNITY
Start: 2024-01-09

## 2024-01-30 RX ORDER — NORETHINDRONE ACETATE AND ETHINYL ESTRADIOL .02; 1 MG/1; MG/1
1 TABLET ORAL DAILY
Qty: 21 TABLET | Refills: 11 | Status: SHIPPED | OUTPATIENT
Start: 2024-01-30 | End: 2025-01-29

## 2024-01-30 RX ORDER — PANTOPRAZOLE SODIUM 40 MG/1
1 TABLET, DELAYED RELEASE ORAL DAILY
COMMUNITY
Start: 2024-01-09 | End: 2024-01-30

## 2024-01-30 RX ORDER — GLIPIZIDE 5 MG/1
5 TABLET ORAL
COMMUNITY
Start: 2024-01-09 | End: 2024-02-08
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
## 2024-04-03 ENCOUNTER — TELEPHONE (OUTPATIENT)
Dept: PHARMACY | Facility: CLINIC | Age: 34
End: 2024-04-03
Payer: COMMERCIAL

## 2024-04-24 ENCOUNTER — PATIENT OUTREACH (OUTPATIENT)
Dept: ADMINISTRATIVE | Facility: HOSPITAL | Age: 34
End: 2024-04-24
Payer: COMMERCIAL

## 2024-04-24 NOTE — PROGRESS NOTES
Health Maintenance Due   Topic Date Due    Pneumococcal Vaccines (Age 0-64) (1 of 2 - PCV) Never done    Foot Exam  02/28/2019    Diabetes Urine Screening  12/01/2022    Lipid Panel  12/01/2022    Colonoscopy  06/11/2023    Influenza Vaccine (1) 09/01/2023    COVID-19 Vaccine (6 - 2023-24 season) 09/01/2023    Eye Exam  04/25/2024     Chart review done. HM updated. Immunizations reviewed & updated. Care Everywhere updated.

## 2024-07-14 ENCOUNTER — E-VISIT (OUTPATIENT)
Dept: FAMILY MEDICINE | Facility: CLINIC | Age: 34
End: 2024-07-14
Payer: COMMERCIAL

## 2024-07-14 DIAGNOSIS — E11.65 TYPE 2 DIABETES MELLITUS WITH HYPERGLYCEMIA, WITHOUT LONG-TERM CURRENT USE OF INSULIN: Primary | ICD-10-CM

## 2024-07-16 PROCEDURE — 99499 UNLISTED E&M SERVICE: CPT | Mod: ,,, | Performed by: FAMILY MEDICINE

## 2024-07-16 RX ORDER — DULAGLUTIDE 4.5 MG/.5ML
4.5 INJECTION, SOLUTION SUBCUTANEOUS
Qty: 4 PEN | Refills: 11 | Status: SHIPPED | OUTPATIENT
Start: 2024-07-16 | End: 2025-07-16

## 2024-07-16 NOTE — PATIENT INSTRUCTIONS
"Diabetes Management Status    Statin: Not taking  ACE/ARB: Taking    Screening or Prevention Patient's value Goal Complete/Controlled?   HgA1C Testing and Control   Lab Results   Component Value Date    HGBA1C 7.3 (H) 01/04/2024      Annually/Less than 8% Yes   Lipid profile : 12/01/2021 Annually No   LDL control Lab Results   Component Value Date    LDLCALC Invalid, Trig>400.0 12/01/2021    Annually/Less than 100 mg/dl  No   Nephropathy screening Lab Results   Component Value Date    LABMICR 474.0 12/01/2021     Lab Results   Component Value Date    PROTEINUA 1+ (A) 03/29/2022     No results found for: "UTPCR"   Annually No   Blood pressure BP Readings from Last 1 Encounters:   01/30/24 116/80    Less than 140/90 Yes   Dilated retinal exam : 04/25/2023 Annually No   Foot exam   Most Recent Foot Exam Date: Not Found Annually No        Get the new labs when you can gita!    "

## 2024-07-16 NOTE — PROGRESS NOTES
Patient ID: Glenda Peña is a 33 y.o. female.    Chief Complaint: Weight Loss (Entered automatically based on patient selection in [employer].)    The patient initiated a request through [employer] on 7/14/2024 for evaluation and management with a chief complaint of Weight Loss (Entered automatically based on patient selection in [employer].)     I evaluated the questionnaire submission on 7/16/2024.    [facility] Peq Evisit Weight Management    7/14/2024 11:41 PM CDT - Filed by Patient   Do you agree to participate in an E-Visit? Yes   If you have any of the following symptoms, please present to your local emergency room or call 911: I acknowledge   Medication requests for narcotics will not be addressed via an E-Visit.  Please schedule an appointment. I acknowledge   Are you pregnant, could you be pregnant, or are you breast feeding? None of the above   What best describes your appetite? Average   Do you have specific food cravings? Yes   Describe your specific food cravings: Salty   When you eat, how quickly do you feel full Other   Please explain: Depends   Give an example of what you have eaten in a day in the past 2 weeks:    Breakfast: Instant grit pack   Lunch: Nothing   Dinner: Spaghetti   Snacks: Chip snack size   Drinks: Water, coffee   Have you exercised in the past week? No   Do you have a personal or family history of thyroid cancer? I don't know   Do you have a personal history of kidney stones? No   Do you have a personal history of seizures? No   Do you have a personal history of pancreatitis? No   I would like to address: Medication for weight loss   Do you want to address a new or existing medication? I would like to address a medication I currently take   Would you like to change or continue your medication? Continue medication   What medication would you like to continue?  Trulicity 4.5   Are you taking it as prescribed? No   What is your current dose? 1x week-last dr never out me back on it   How often  do you take your medication? 1 time a week   Which option below best descibes the reason for your request? Prior authorization is required    What medical condition is the  medication intended to treat? Diabetes   Is the medication helping your condition? Yes   Are you having any side effects from the medication? No   Provide any additional information you feel is important.    Please attach any relevant images or files    Are you able to take your vital signs? Yes   Systolic Blood Pressure:    Diastolic Blood Pressure:    Weight: 214   Height:    Pulse:    Temperature:    Respiration rate:    Pulse Oxygen:          Encounter Diagnosis   Name Primary?    Type 2 diabetes mellitus with hyperglycemia, without long-term current use of insulin Yes        No orders of the defined types were placed in this encounter.     Medications Ordered This Encounter   Medications    dulaglutide (TRULICITY) 4.5 mg/0.5 mL pen injector     Sig: Inject 4.5 mg into the skin every 7 days.     Dispense:  4 pen      Refill:  11    Get her back into care  seborrheic dermatitis  Lab Results   Component Value Date    HGBA1C 7.3 (H) 01/04/2024    HGBA1C 9.7 (H) 09/01/2022    HGBA1C 11.5 (H) 12/01/2021     Lab Results   Component Value Date    LDLCALC Invalid, Trig>400.0 12/01/2021    CREATININE 0.7 09/02/2022         No follow-ups on file.      E-Visit Time Tracking:    Day 1 Time (in minutes): 11    Total Time (in minutes): 11

## 2024-07-17 ENCOUNTER — LAB VISIT (OUTPATIENT)
Dept: LAB | Facility: HOSPITAL | Age: 34
End: 2024-07-17
Payer: COMMERCIAL

## 2024-07-17 ENCOUNTER — OFFICE VISIT (OUTPATIENT)
Dept: FAMILY MEDICINE | Facility: CLINIC | Age: 34
End: 2024-07-17
Payer: COMMERCIAL

## 2024-07-17 VITALS
RESPIRATION RATE: 18 BRPM | OXYGEN SATURATION: 97 % | HEART RATE: 80 BPM | HEIGHT: 62 IN | WEIGHT: 209 LBS | DIASTOLIC BLOOD PRESSURE: 68 MMHG | TEMPERATURE: 99 F | BODY MASS INDEX: 38.46 KG/M2 | SYSTOLIC BLOOD PRESSURE: 116 MMHG

## 2024-07-17 DIAGNOSIS — Z11.1 SCREENING-PULMONARY TB: ICD-10-CM

## 2024-07-17 DIAGNOSIS — I10 ESSENTIAL HYPERTENSION: ICD-10-CM

## 2024-07-17 DIAGNOSIS — E11.69 HYPERLIPIDEMIA ASSOCIATED WITH TYPE 2 DIABETES MELLITUS: ICD-10-CM

## 2024-07-17 DIAGNOSIS — E11.65 TYPE 2 DIABETES MELLITUS WITH HYPERGLYCEMIA, WITHOUT LONG-TERM CURRENT USE OF INSULIN: ICD-10-CM

## 2024-07-17 DIAGNOSIS — E78.5 HYPERLIPIDEMIA ASSOCIATED WITH TYPE 2 DIABETES MELLITUS: ICD-10-CM

## 2024-07-17 DIAGNOSIS — E11.65 TYPE 2 DIABETES MELLITUS WITH HYPERGLYCEMIA, WITHOUT LONG-TERM CURRENT USE OF INSULIN: Primary | ICD-10-CM

## 2024-07-17 PROBLEM — R10.32 LEFT LOWER QUADRANT PAIN: Status: RESOLVED | Noted: 2018-04-23 | Resolved: 2024-07-17

## 2024-07-17 PROBLEM — N83.291 COMPLEX CYST OF RIGHT OVARY: Status: ACTIVE | Noted: 2022-03-02

## 2024-07-17 PROBLEM — S14.129A CENTRAL CORD SYNDROME: Status: ACTIVE | Noted: 2023-12-21

## 2024-07-17 PROBLEM — E16.2 HYPOGLYCEMIA: Status: RESOLVED | Noted: 2023-01-30 | Resolved: 2024-07-17

## 2024-07-17 LAB
ALBUMIN SERPL BCP-MCNC: 3.8 G/DL (ref 3.5–5.2)
ALBUMIN SERPL BCP-MCNC: 3.8 G/DL (ref 3.5–5.2)
ALP SERPL-CCNC: 64 U/L (ref 55–135)
ALP SERPL-CCNC: 64 U/L (ref 55–135)
ALT SERPL W/O P-5'-P-CCNC: 26 U/L (ref 10–44)
ALT SERPL W/O P-5'-P-CCNC: 26 U/L (ref 10–44)
ANION GAP SERPL CALC-SCNC: 9 MMOL/L (ref 8–16)
ANION GAP SERPL CALC-SCNC: 9 MMOL/L (ref 8–16)
AST SERPL-CCNC: 18 U/L (ref 10–40)
AST SERPL-CCNC: 18 U/L (ref 10–40)
BACTERIA #/AREA URNS HPF: ABNORMAL /HPF
BASOPHILS # BLD AUTO: 0.03 K/UL (ref 0–0.2)
BASOPHILS NFR BLD: 0.5 % (ref 0–1.9)
BILIRUB SERPL-MCNC: 0.3 MG/DL (ref 0.1–1)
BILIRUB SERPL-MCNC: 0.3 MG/DL (ref 0.1–1)
BILIRUB UR QL STRIP: NEGATIVE
BUN SERPL-MCNC: 13 MG/DL (ref 6–20)
BUN SERPL-MCNC: 13 MG/DL (ref 6–20)
CALCIUM SERPL-MCNC: 9.5 MG/DL (ref 8.7–10.5)
CALCIUM SERPL-MCNC: 9.5 MG/DL (ref 8.7–10.5)
CHLORIDE SERPL-SCNC: 105 MMOL/L (ref 95–110)
CHLORIDE SERPL-SCNC: 105 MMOL/L (ref 95–110)
CHOLEST SERPL-MCNC: 213 MG/DL (ref 120–199)
CHOLEST/HDLC SERPL: 5 {RATIO} (ref 2–5)
CLARITY UR: ABNORMAL
CO2 SERPL-SCNC: 24 MMOL/L (ref 23–29)
CO2 SERPL-SCNC: 24 MMOL/L (ref 23–29)
COLOR UR: YELLOW
CREAT SERPL-MCNC: 0.7 MG/DL (ref 0.5–1.4)
CREAT SERPL-MCNC: 0.7 MG/DL (ref 0.5–1.4)
DIFFERENTIAL METHOD BLD: NORMAL
EOSINOPHIL # BLD AUTO: 0 K/UL (ref 0–0.5)
EOSINOPHIL NFR BLD: 0.5 % (ref 0–8)
ERYTHROCYTE [DISTWIDTH] IN BLOOD BY AUTOMATED COUNT: 13.2 % (ref 11.5–14.5)
EST. GFR  (NO RACE VARIABLE): >60 ML/MIN/1.73 M^2
EST. GFR  (NO RACE VARIABLE): >60 ML/MIN/1.73 M^2
GLUCOSE SERPL-MCNC: 132 MG/DL (ref 70–110)
GLUCOSE SERPL-MCNC: 132 MG/DL (ref 70–110)
GLUCOSE UR QL STRIP: ABNORMAL
HCT VFR BLD AUTO: 40.6 % (ref 37–48.5)
HDLC SERPL-MCNC: 43 MG/DL (ref 40–75)
HDLC SERPL: 20.2 % (ref 20–50)
HGB BLD-MCNC: 13.2 G/DL (ref 12–16)
HGB UR QL STRIP: ABNORMAL
HYALINE CASTS #/AREA URNS LPF: 0 /LPF
IMM GRANULOCYTES # BLD AUTO: 0.01 K/UL (ref 0–0.04)
IMM GRANULOCYTES NFR BLD AUTO: 0.2 % (ref 0–0.5)
KETONES UR QL STRIP: NEGATIVE
LDLC SERPL CALC-MCNC: 120.8 MG/DL (ref 63–159)
LEUKOCYTE ESTERASE UR QL STRIP: NEGATIVE
LYMPHOCYTES # BLD AUTO: 2.1 K/UL (ref 1–4.8)
LYMPHOCYTES NFR BLD: 36.4 % (ref 18–48)
MCH RBC QN AUTO: 27.8 PG (ref 27–31)
MCHC RBC AUTO-ENTMCNC: 32.5 G/DL (ref 32–36)
MCV RBC AUTO: 86 FL (ref 82–98)
MICROSCOPIC COMMENT: ABNORMAL
MONOCYTES # BLD AUTO: 0.4 K/UL (ref 0.3–1)
MONOCYTES NFR BLD: 6.8 % (ref 4–15)
NEUTROPHILS # BLD AUTO: 3.3 K/UL (ref 1.8–7.7)
NEUTROPHILS NFR BLD: 55.6 % (ref 38–73)
NITRITE UR QL STRIP: NEGATIVE
NONHDLC SERPL-MCNC: 170 MG/DL
NRBC BLD-RTO: 0 /100 WBC
PH UR STRIP: 6 [PH] (ref 5–8)
PLATELET # BLD AUTO: 181 K/UL (ref 150–450)
PMV BLD AUTO: 11.2 FL (ref 9.2–12.9)
POTASSIUM SERPL-SCNC: 3.9 MMOL/L (ref 3.5–5.1)
POTASSIUM SERPL-SCNC: 3.9 MMOL/L (ref 3.5–5.1)
PROT SERPL-MCNC: 8 G/DL (ref 6–8.4)
PROT SERPL-MCNC: 8 G/DL (ref 6–8.4)
PROT UR QL STRIP: ABNORMAL
PTH-INTACT SERPL-MCNC: 46.3 PG/ML (ref 9–77)
RBC # BLD AUTO: 4.75 M/UL (ref 4–5.4)
RBC #/AREA URNS HPF: 0 /HPF (ref 0–4)
SODIUM SERPL-SCNC: 138 MMOL/L (ref 136–145)
SODIUM SERPL-SCNC: 138 MMOL/L (ref 136–145)
SP GR UR STRIP: >1.03 (ref 1–1.03)
SQUAMOUS #/AREA URNS HPF: 14 /HPF
T4 FREE SERPL-MCNC: 0.9 NG/DL (ref 0.71–1.51)
TRIGL SERPL-MCNC: 246 MG/DL (ref 30–150)
TSH SERPL DL<=0.005 MIU/L-ACNC: 1.3 UIU/ML (ref 0.4–4)
URN SPEC COLLECT METH UR: ABNORMAL
UROBILINOGEN UR STRIP-ACNC: NEGATIVE EU/DL
WBC # BLD AUTO: 5.85 K/UL (ref 3.9–12.7)
WBC #/AREA URNS HPF: 1 /HPF (ref 0–5)

## 2024-07-17 PROCEDURE — 3051F HG A1C>EQUAL 7.0%<8.0%: CPT | Mod: CPTII,S$GLB,, | Performed by: NURSE PRACTITIONER

## 2024-07-17 PROCEDURE — 99999 PR PBB SHADOW E&M-EST. PATIENT-LVL IV: CPT | Mod: PBBFAC,,, | Performed by: NURSE PRACTITIONER

## 2024-07-17 PROCEDURE — 1159F MED LIST DOCD IN RCRD: CPT | Mod: CPTII,S$GLB,, | Performed by: NURSE PRACTITIONER

## 2024-07-17 PROCEDURE — 85025 COMPLETE CBC W/AUTO DIFF WBC: CPT | Performed by: FAMILY MEDICINE

## 2024-07-17 PROCEDURE — 3060F POS MICROALBUMINURIA REV: CPT | Mod: CPTII,S$GLB,, | Performed by: NURSE PRACTITIONER

## 2024-07-17 PROCEDURE — 4010F ACE/ARB THERAPY RXD/TAKEN: CPT | Mod: CPTII,S$GLB,, | Performed by: NURSE PRACTITIONER

## 2024-07-17 PROCEDURE — 3074F SYST BP LT 130 MM HG: CPT | Mod: CPTII,S$GLB,, | Performed by: NURSE PRACTITIONER

## 2024-07-17 PROCEDURE — 99214 OFFICE O/P EST MOD 30 MIN: CPT | Mod: S$GLB,,, | Performed by: NURSE PRACTITIONER

## 2024-07-17 PROCEDURE — 3078F DIAST BP <80 MM HG: CPT | Mod: CPTII,S$GLB,, | Performed by: NURSE PRACTITIONER

## 2024-07-17 PROCEDURE — 86580 TB INTRADERMAL TEST: CPT | Mod: S$GLB,,, | Performed by: NURSE PRACTITIONER

## 2024-07-17 PROCEDURE — 3008F BODY MASS INDEX DOCD: CPT | Mod: CPTII,S$GLB,, | Performed by: NURSE PRACTITIONER

## 2024-07-17 PROCEDURE — 3066F NEPHROPATHY DOC TX: CPT | Mod: CPTII,S$GLB,, | Performed by: NURSE PRACTITIONER

## 2024-07-17 PROCEDURE — 82570 ASSAY OF URINE CREATININE: CPT | Performed by: FAMILY MEDICINE

## 2024-07-17 PROCEDURE — 80061 LIPID PANEL: CPT | Performed by: FAMILY MEDICINE

## 2024-07-17 PROCEDURE — 83036 HEMOGLOBIN GLYCOSYLATED A1C: CPT | Performed by: NURSE PRACTITIONER

## 2024-07-17 PROCEDURE — 1160F RVW MEDS BY RX/DR IN RCRD: CPT | Mod: CPTII,S$GLB,, | Performed by: NURSE PRACTITIONER

## 2024-07-17 PROCEDURE — 84439 ASSAY OF FREE THYROXINE: CPT | Performed by: NURSE PRACTITIONER

## 2024-07-17 PROCEDURE — 81000 URINALYSIS NONAUTO W/SCOPE: CPT | Performed by: FAMILY MEDICINE

## 2024-07-17 PROCEDURE — 80053 COMPREHEN METABOLIC PANEL: CPT | Performed by: NURSE PRACTITIONER

## 2024-07-17 PROCEDURE — 84443 ASSAY THYROID STIM HORMONE: CPT | Performed by: NURSE PRACTITIONER

## 2024-07-17 PROCEDURE — 82043 UR ALBUMIN QUANTITATIVE: CPT | Performed by: FAMILY MEDICINE

## 2024-07-17 PROCEDURE — 36415 COLL VENOUS BLD VENIPUNCTURE: CPT | Mod: PO | Performed by: NURSE PRACTITIONER

## 2024-07-17 PROCEDURE — 83970 ASSAY OF PARATHORMONE: CPT | Performed by: FAMILY MEDICINE

## 2024-07-17 NOTE — PROGRESS NOTES
Instructed patient to avoid scratching, irritating, rubbing or applying soap, lotion or perfume to site. Patient advised that if he has ppd read somewhere else that we are not responsible for documenting result into patients electronic medical record.  Parent advised to RTC for ppd reading in 48-72 hours or test is invalid and needs to be repeated. Parent agrees to plan of care.

## 2024-07-18 ENCOUNTER — PATIENT OUTREACH (OUTPATIENT)
Dept: ADMINISTRATIVE | Facility: HOSPITAL | Age: 34
End: 2024-07-18
Payer: COMMERCIAL

## 2024-07-18 ENCOUNTER — PATIENT MESSAGE (OUTPATIENT)
Dept: FAMILY MEDICINE | Facility: CLINIC | Age: 34
End: 2024-07-18
Payer: COMMERCIAL

## 2024-07-18 DIAGNOSIS — R80.9 TYPE 2 DIABETES MELLITUS WITH DIABETIC MICROALBUMINURIA, WITHOUT LONG-TERM CURRENT USE OF INSULIN: ICD-10-CM

## 2024-07-18 DIAGNOSIS — E78.5 HYPERLIPIDEMIA ASSOCIATED WITH TYPE 2 DIABETES MELLITUS: ICD-10-CM

## 2024-07-18 DIAGNOSIS — E66.01 MORBID OBESITY: ICD-10-CM

## 2024-07-18 DIAGNOSIS — E11.29 TYPE 2 DIABETES MELLITUS WITH DIABETIC MICROALBUMINURIA, WITHOUT LONG-TERM CURRENT USE OF INSULIN: ICD-10-CM

## 2024-07-18 DIAGNOSIS — E11.65 TYPE 2 DIABETES MELLITUS WITH HYPERGLYCEMIA, WITHOUT LONG-TERM CURRENT USE OF INSULIN: Primary | ICD-10-CM

## 2024-07-18 DIAGNOSIS — E11.69 HYPERLIPIDEMIA ASSOCIATED WITH TYPE 2 DIABETES MELLITUS: ICD-10-CM

## 2024-07-18 LAB
ALBUMIN/CREAT UR: 139.3 UG/MG (ref 0–30)
CREAT UR-MCNC: 150 MG/DL (ref 15–325)
ESTIMATED AVG GLUCOSE: 169 MG/DL (ref 68–131)
ESTIMATED AVG GLUCOSE: 169 MG/DL (ref 68–131)
HBA1C MFR BLD: 7.5 % (ref 4–5.6)
HBA1C MFR BLD: 7.5 % (ref 4–5.6)
MICROALBUMIN UR DL<=1MG/L-MCNC: 209 UG/ML

## 2024-07-18 RX ORDER — TIRZEPATIDE 2.5 MG/.5ML
2.5 INJECTION, SOLUTION SUBCUTANEOUS
Qty: 4 PEN | Refills: 0 | Status: SHIPPED | OUTPATIENT
Start: 2024-07-18 | End: 2024-08-09

## 2024-07-18 RX ORDER — OMEGA-3-ACID ETHYL ESTERS 1 G/1
2 CAPSULE, LIQUID FILLED ORAL 2 TIMES DAILY
Qty: 360 CAPSULE | Refills: 3 | Status: SHIPPED | OUTPATIENT
Start: 2024-07-18 | End: 2025-07-18

## 2024-07-18 RX ORDER — TIRZEPATIDE 5 MG/.5ML
5 INJECTION, SOLUTION SUBCUTANEOUS
Qty: 4 PEN | Refills: 0 | Status: SHIPPED | OUTPATIENT
Start: 2024-08-15 | End: 2024-09-06

## 2024-07-18 NOTE — LETTER
AUTHORIZATION FOR RELEASE OF   CONFIDENTIAL INFORMATION    Dear MEDICAL RECORDS,    We are seeing Glenda Peña, date of birth 1990, in the clinic at [facility] FAMILY MEDICINE/INTERNAL MED. Matthew Serrano MD is the patient's PCP. Glenda Peña has an outstanding lab/procedure at the time we reviewed her chart. In order to help keep her health information updated, she has authorized us to request the following medical record(s):        (  )  MAMMOGRAM                                      (  )  COLONOSCOPY      (  )  PAP SMEAR                                          (  )  OUTSIDE LAB RESULTS     (  )  DEXA SCAN                                          ( X )   DIABETIC EYE EXAM            (  )  FOOT EXAM                                          (  )  ENTIRE RECORD     (  )  OUTSIDE IMMUNIZATIONS                 (  )  _______________         Please fax records to [facility], Rodi, Jake J., MD, [phone]       Patient Name: Glenda Peña  : 1990  Patient Phone #: [phone]

## 2024-07-18 NOTE — PROGRESS NOTES
Health Maintenance Due   Topic Date Due    Pneumococcal Vaccines (Age 0-64) (1 of 2 - PCV) Never done    Foot Exam  02/28/2019    Diabetes Urine Screening  12/01/2022    Colonoscopy  06/11/2023    COVID-19 Vaccine (6 - 2023-24 season) 09/01/2023    Eye Exam  04/25/2024    Hemoglobin A1c  07/04/2024    Chart review done. HM updated. Immunizations reviewed & updated. Care Everywhere updated.  LABS COMPLETED 7/17/24  [facility] PATIENT [facility]

## 2024-07-19 ENCOUNTER — CLINICAL SUPPORT (OUTPATIENT)
Dept: FAMILY MEDICINE | Facility: CLINIC | Age: 34
End: 2024-07-19
Payer: COMMERCIAL

## 2024-07-19 DIAGNOSIS — Z11.1 ENCOUNTER FOR PPD SKIN TEST READING: Primary | ICD-10-CM

## 2024-07-19 LAB
TB INDURATION - 48 HR READ: 0 MM
TB INDURATION - 72 HR READ: NORMAL
TB SKIN TEST - 48 HR READ: NEGATIVE
TB SKIN TEST - 72 HR READ: NORMAL

## 2024-07-19 PROCEDURE — 99999 PR PBB SHADOW E&M-EST. PATIENT-LVL I: CPT | Mod: PBBFAC,,,

## 2024-07-21 NOTE — PROGRESS NOTES
Subjective:      Patient ID: Glenda Peña is a 33 y.o. female.  Pt presents to clinic for check up. She has diabetes and has been off of all medications for months. 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 She has type 2 diabetes mellitus. No MedicAlert identification noted. The initial diagnosis of diabetes was made 7 years ago. Pertinent negatives for hypoglycemia include no confusion, dizziness, headaches, hunger, mood changes, nervousness/anxiousness, pallor, seizures, sleepiness, speech difficulty, sweats or tremors. Pertinent negatives for diabetes include no blurred vision, no chest pain, no fatigue, no foot paresthesias, no foot ulcerations, no polydipsia, no polyphagia, no polyuria, no visual change, no weakness and no weight loss. Pertinent negatives for hypoglycemia complications include no blackouts, no hospitalization, no nocturnal hypoglycemia, no required assistance and no required glucagon injection. Symptoms are worsening. Pertinent negatives for diabetic complications include no autonomic neuropathy, CVA, heart disease, nephropathy, peripheral neuropathy, PVD or retinopathy. Risk factors for coronary artery disease include family history, obesity, stress and diabetes mellitus. When asked about current treatments, none were reported. She is compliant with treatment most of the time. Her weight is increasing steadily. She is following a generally healthy and generally unhealthy diet. Meal planning includes avoidance of concentrated sweets and carbohydrate counting. She has not had a previous visit with a dietitian. She participates in exercise three times a week. She monitors blood glucose at home 1-2 x per week. She monitors urine at home <1 x per month. Blood glucose monitoring compliance is inadequate. Her home blood glucose trend is increasing steadily. An ACE inhibitor/angiotensin II receptor blocker  "is not being taken. She does not see a podiatrist.Eye exam is current.     Review of Systems   Constitutional:  Negative for activity change, appetite change, fatigue, fever, unexpected weight change and weight loss.   Eyes:  Negative for blurred vision.   Respiratory:  Negative for chest tightness and shortness of breath.    Cardiovascular:  Negative for chest pain and palpitations.   Gastrointestinal:  Negative for abdominal pain, constipation, diarrhea, nausea and vomiting.   Endocrine: Negative for polydipsia, polyphagia and polyuria.   Genitourinary:  Negative for difficulty urinating, dysuria and menstrual problem.   Musculoskeletal:  Negative for arthralgias, back pain, gait problem and myalgias.   Integumentary:  Negative for pallor.   Neurological:  Negative for dizziness, tremors, seizures, speech difficulty, weakness and headaches.   Psychiatric/Behavioral:  Negative for confusion. The patient is not nervous/anxious.    All other systems reviewed and are negative.        Objective:     Vitals:    07/17/24 1012   BP: 116/68   BP Location: Right arm   Patient Position: Sitting   BP Method: Large (Manual)   Pulse: 80   Resp: 18   Temp: 98.7 °F (37.1 °C)   TempSrc: Oral   SpO2: 97%   Weight: 94.8 kg (208 lb 15.9 oz)   Height: 5' 2" (1.575 m)     Physical Exam  Vitals and nursing note reviewed.   Constitutional:       General: She is not in acute distress.     Appearance: Normal appearance. She is well-developed and well-groomed. She is not ill-appearing.   HENT:      Head: Normocephalic and atraumatic.      Right Ear: External ear normal.      Left Ear: External ear normal.      Nose: Nose normal.      Mouth/Throat:      Lips: Pink.      Mouth: Mucous membranes are moist.   Eyes:      General: Lids are normal. Vision grossly intact. Gaze aligned appropriately.      Conjunctiva/sclera: Conjunctivae normal.      Pupils: Pupils are equal, round, and reactive to light.   Neck:      Trachea: Phonation normal. "   Cardiovascular:      Rate and Rhythm: Normal rate and regular rhythm.      Heart sounds: Normal heart sounds.   Pulmonary:      Effort: Pulmonary effort is normal. No accessory muscle usage or respiratory distress.      Breath sounds: Normal breath sounds and air entry.   Abdominal:      General: Abdomen is flat. Bowel sounds are normal. There is no distension.      Palpations: Abdomen is soft.      Tenderness: There is no abdominal tenderness.   Musculoskeletal:      Cervical back: Neck supple.      Right lower leg: No edema.      Left lower leg: No edema.   Skin:     General: Skin is warm and dry.      Findings: No rash.   Neurological:      General: No focal deficit present.      Mental Status: She is alert and oriented to person, place, and time. Mental status is at baseline.      Sensory: Sensation is intact.      Motor: Motor function is intact.      Coordination: Coordination is intact.      Gait: Gait is intact.   Psychiatric:         Attention and Perception: Attention and perception normal.         Mood and Affect: Mood and affect normal.         Speech: Speech normal.         Behavior: Behavior normal. Behavior is cooperative.         Thought Content: Thought content normal.         Cognition and Memory: Cognition and memory normal.         Judgment: Judgment normal.        Latest Reference Range & Units 07/17/24 11:22 07/17/24 11:25 07/19/24 11:21   WBC 3.90 - 12.70 K/uL  5.85    RBC 4.00 - 5.40 M/uL  4.75    Hemoglobin 12.0 - 16.0 g/dL  13.2    Hematocrit 37.0 - 48.5 %  40.6    MCV 82 - 98 fL  86    MCH 27.0 - 31.0 pg  27.8    MCHC 32.0 - 36.0 g/dL  32.5    RDW 11.5 - 14.5 %  13.2    Platelet Count 150 - 450 K/uL  181    MPV 9.2 - 12.9 fL  11.2    Gran % 38.0 - 73.0 %  55.6    Lymph % 18.0 - 48.0 %  36.4    Mono % 4.0 - 15.0 %  6.8    Eos % 0.0 - 8.0 %  0.5    Basophil % 0.0 - 1.9 %  0.5    Immature Granulocytes 0.0 - 0.5 %  0.2    Gran # (ANC) 1.8 - 7.7 K/uL  3.3    Lymph # 1.0 - 4.8 K/uL  2.1     Mono # 0.3 - 1.0 K/uL  0.4    Eos # 0.0 - 0.5 K/uL  0.0    Baso # 0.00 - 0.20 K/uL  0.03    Immature Grans (Abs) 0.00 - 0.04 K/uL  0.01    nRBC 0 /100 WBC  0    Differential Method   Automated    Sodium 136 - 145 mmol/L  136 - 145 mmol/L  138  138    Potassium 3.5 - 5.1 mmol/L  3.5 - 5.1 mmol/L  3.9  3.9    Chloride 95 - 110 mmol/L  95 - 110 mmol/L  105  105    CO2 23 - 29 mmol/L  23 - 29 mmol/L  24  24    Anion Gap 8 - 16 mmol/L  8 - 16 mmol/L  9  9    BUN 6 - 20 mg/dL  6 - 20 mg/dL  13  13    Creatinine 0.5 - 1.4 mg/dL  0.5 - 1.4 mg/dL  0.7  0.7    eGFR >60 mL/min/1.73 m^2  >60 mL/min/1.73 m^2  >60  >60    Glucose 70 - 110 mg/dL  70 - 110 mg/dL  132 (H)  132 (H)    Calcium 8.7 - 10.5 mg/dL  8.7 - 10.5 mg/dL  9.5  9.5    ALP 55 - 135 U/L  55 - 135 U/L  64  64    PROTEIN TOTAL 6.0 - 8.4 g/dL  6.0 - 8.4 g/dL  8.0  8.0    Albumin 3.5 - 5.2 g/dL  3.5 - 5.2 g/dL  3.8  3.8    BILIRUBIN TOTAL 0.1 - 1.0 mg/dL  0.1 - 1.0 mg/dL  0.3  0.3    AST 10 - 40 U/L  10 - 40 U/L  18  18    ALT 10 - 44 U/L  10 - 44 U/L  26  26    Cholesterol Total 120 - 199 mg/dL  213 (H)    HDL 40 - 75 mg/dL  43    HDL/Cholesterol Ratio 20.0 - 50.0 %  20.2    Non-HDL Cholesterol mg/dL  170    Total Cholesterol/HDL Ratio 2.0 - 5.0   5.0    Triglycerides 30 - 150 mg/dL  246 (H)    LDL Cholesterol 63.0 - 159.0 mg/dL  120.8    Hemoglobin A1C External 4.0 - 5.6 %  4.0 - 5.6 %  7.5 (H)  7.5 (H)    Estimated Avg Glucose 68 - 131 mg/dL  68 - 131 mg/dL  169 (H)  169 (H)    TSH 0.400 - 4.000 uIU/mL  1.299    Free T4 0.71 - 1.51 ng/dL  0.90    PTH 9.0 - 77.0 pg/mL  46.3    Specimen UA  Urine, Clean Catch     Color, UA Yellow, Straw, Meghan  Yellow     Appearance, UA Clear  Hazy !     Spec Grav UA 1.005 - 1.030  >1.030 !     pH, UA 5.0 - 8.0  6.0     Protein, UA Negative  1+ !     Glucose, UA Negative  Trace !     Ketones, UA Negative  Negative     Blood, UA Negative  3+ !     NITRITE UA Negative  Negative     UROBILINOGEN UA <2.0 EU/dL Negative     Bilirubin  (UA) Negative  Negative     Leukocyte Esterase, UA Negative  Negative     RBC, UA 0 - 4 /hpf 0     WBC, UA 0 - 5 /hpf 1     Bacteria, UA None-Occ /hpf Many !     Squam Epithel, UA /hpf 14     Hyaline Casts, UA 0-1/lpf /lpf 0     Microscopic Comment  SEE COMMENT     Urine Creatinine 15.0 - 325.0 mg/dL 150.0     Urine Microalbumin ug/mL 209.0     MICROALB/CREAT RATIO 0.0 - 30.0 ug/mg 139.3 (H)     TB Skin Test - 48 hr read Negative    Negative   TB Induration - 48 hr read 0 mm   0   POCT TB SKIN TEST    Rpt   (H): Data is abnormally high  !: Data is abnormal  Rpt: View report in Results Review for more information  Assessment and Plan:     1. Type 2 diabetes mellitus with hyperglycemia, without long-term current use of insulin  Will resume GLP1 injections   Discussed general issues about diabetes pathophysiology and management.  Addressed ADA diet.  Suggested low cholesterol diet.  Encouraged aerobic exercise.  Discussed foot care.  Reminded to get yearly retinal exam.  Discussed ways to avoid symptomatic hypoglycemia.  Discussed sick day management.  Reminded to bring in blood sugar diary at next visit.  Follow up in 3 months or as needed.  - TSH; Future  - T4, Free; Future    2. Hyperlipidemia associated with type 2 diabetes mellitus  Continue dietary measures.  Continue regular exercise.  Lipid-lowering medications: None indicated unless lipids or risk profile worsen in the future..  - TSH; Future  - T4, Free; Future    3. Essential hypertension  On no meds for BP and needs none at this time.  Dietary sodium restriction.  Regular aerobic exercise.  Weight loss.  Patient Education: Reviewed risks of hypertension and principles of treatment.    4. Screening-pulmonary TB  - POCT TB Skin Test Read           DEMETRIA Ballard, FNP-C  Family/Internal Medicine  [facility]

## 2024-07-22 ENCOUNTER — PATIENT MESSAGE (OUTPATIENT)
Dept: FAMILY MEDICINE | Facility: CLINIC | Age: 34
End: 2024-07-22
Payer: COMMERCIAL

## 2024-07-23 ENCOUNTER — HOSPITAL ENCOUNTER (EMERGENCY)
Facility: HOSPITAL | Age: 34
Discharge: HOME | End: 2024-07-24
Attending: EMERGENCY MEDICINE
Payer: COMMERCIAL

## 2024-07-23 DIAGNOSIS — K30 DELAYED GASTRIC EMPTYING: Primary | ICD-10-CM

## 2024-07-23 PROCEDURE — 99285 EMERGENCY DEPT VISIT HI MDM: CPT | Mod: 25

## 2024-07-24 VITALS
WEIGHT: 198 LBS | OXYGEN SATURATION: 98 % | BODY MASS INDEX: 36.44 KG/M2 | TEMPERATURE: 98 F | RESPIRATION RATE: 18 BRPM | SYSTOLIC BLOOD PRESSURE: 143 MMHG | HEIGHT: 62 IN | HEART RATE: 86 BPM | DIASTOLIC BLOOD PRESSURE: 88 MMHG

## 2024-07-24 LAB
ALBUMIN SERPL BCP-MCNC: 4.1 G/DL (ref 3.5–5.2)
ALP SERPL-CCNC: 69 U/L (ref 55–135)
ALT SERPL W/O P-5'-P-CCNC: 31 U/L (ref 10–44)
ANION GAP SERPL CALC-SCNC: 13 MMOL/L (ref 8–16)
AST SERPL-CCNC: 25 U/L (ref 10–40)
B-HCG UR QL: NEGATIVE
BASOPHILS # BLD AUTO: 0.01 K/UL (ref 0–0.2)
BASOPHILS NFR BLD: 0.1 % (ref 0–1.9)
BILIRUB SERPL-MCNC: 0.5 MG/DL (ref 0.1–1)
BILIRUB UR QL STRIP: NEGATIVE
BUN SERPL-MCNC: 11 MG/DL (ref 6–20)
CALCIUM SERPL-MCNC: 10 MG/DL (ref 8.7–10.5)
CHLORIDE SERPL-SCNC: 104 MMOL/L (ref 95–110)
CLARITY UR: CLEAR
CO2 SERPL-SCNC: 22 MMOL/L (ref 23–29)
COLOR UR: YELLOW
CREAT SERPL-MCNC: 0.8 MG/DL (ref 0.5–1.4)
CTP QC/QA: YES
DIFFERENTIAL METHOD BLD: NORMAL
EOSINOPHIL # BLD AUTO: 0.1 K/UL (ref 0–0.5)
EOSINOPHIL NFR BLD: 0.6 % (ref 0–8)
ERYTHROCYTE [DISTWIDTH] IN BLOOD BY AUTOMATED COUNT: 12.8 % (ref 11.5–14.5)
EST. GFR  (NO RACE VARIABLE): >60 ML/MIN/1.73 M^2
GLUCOSE SERPL-MCNC: 129 MG/DL (ref 70–110)
GLUCOSE UR QL STRIP: NEGATIVE
HCT VFR BLD AUTO: 41.3 % (ref 37–48.5)
HGB BLD-MCNC: 13.8 G/DL (ref 12–16)
HGB UR QL STRIP: NEGATIVE
IMM GRANULOCYTES # BLD AUTO: 0.02 K/UL (ref 0–0.04)
IMM GRANULOCYTES NFR BLD AUTO: 0.3 % (ref 0–0.5)
KETONES UR QL STRIP: NEGATIVE
LEUKOCYTE ESTERASE UR QL STRIP: NEGATIVE
LIPASE SERPL-CCNC: 29 U/L (ref 4–60)
LYMPHOCYTES # BLD AUTO: 1.9 K/UL (ref 1–4.8)
LYMPHOCYTES NFR BLD: 24.6 % (ref 18–48)
MAGNESIUM SERPL-MCNC: 1.8 MG/DL (ref 1.6–2.6)
MCH RBC QN AUTO: 28.3 PG (ref 27–31)
MCHC RBC AUTO-ENTMCNC: 33.4 G/DL (ref 32–36)
MCV RBC AUTO: 85 FL (ref 82–98)
MONOCYTES # BLD AUTO: 0.5 K/UL (ref 0.3–1)
MONOCYTES NFR BLD: 6 % (ref 4–15)
NEUTROPHILS # BLD AUTO: 5.3 K/UL (ref 1.8–7.7)
NEUTROPHILS NFR BLD: 68.4 % (ref 38–73)
NITRITE UR QL STRIP: NEGATIVE
NRBC BLD-RTO: 0 /100 WBC
PH UR STRIP: 6 [PH] (ref 5–8)
PLATELET # BLD AUTO: 187 K/UL (ref 150–450)
PMV BLD AUTO: 10.9 FL (ref 9.2–12.9)
POTASSIUM SERPL-SCNC: 4.2 MMOL/L (ref 3.5–5.1)
PROT SERPL-MCNC: 8.5 G/DL (ref 6–8.4)
PROT UR QL STRIP: ABNORMAL
RBC # BLD AUTO: 4.88 M/UL (ref 4–5.4)
SODIUM SERPL-SCNC: 139 MMOL/L (ref 136–145)
SP GR UR STRIP: 1.02 (ref 1–1.03)
URN SPEC COLLECT METH UR: ABNORMAL
UROBILINOGEN UR STRIP-ACNC: NEGATIVE EU/DL
WBC # BLD AUTO: 7.73 K/UL (ref 3.9–12.7)

## 2024-07-24 PROCEDURE — 81025 URINE PREGNANCY TEST: CPT | Performed by: PHYSICIAN ASSISTANT

## 2024-07-24 PROCEDURE — 83735 ASSAY OF MAGNESIUM: CPT | Performed by: PHYSICIAN ASSISTANT

## 2024-07-24 PROCEDURE — 25500020 PHARM REV CODE 255: Performed by: EMERGENCY MEDICINE

## 2024-07-24 PROCEDURE — 85025 COMPLETE CBC W/AUTO DIFF WBC: CPT | Performed by: PHYSICIAN ASSISTANT

## 2024-07-24 PROCEDURE — 96375 TX/PRO/DX INJ NEW DRUG ADDON: CPT

## 2024-07-24 PROCEDURE — 80053 COMPREHEN METABOLIC PANEL: CPT | Performed by: PHYSICIAN ASSISTANT

## 2024-07-24 PROCEDURE — 96374 THER/PROPH/DIAG INJ IV PUSH: CPT | Mod: 59

## 2024-07-24 PROCEDURE — 83690 ASSAY OF LIPASE: CPT | Performed by: PHYSICIAN ASSISTANT

## 2024-07-24 PROCEDURE — 96361 HYDRATE IV INFUSION ADD-ON: CPT

## 2024-07-24 PROCEDURE — 63600175 PHARM REV CODE 636 W HCPCS: Performed by: PHYSICIAN ASSISTANT

## 2024-07-24 PROCEDURE — 81003 URINALYSIS AUTO W/O SCOPE: CPT | Performed by: PHYSICIAN ASSISTANT

## 2024-07-24 RX ORDER — METOCLOPRAMIDE 10 MG/1
10 TABLET ORAL 3 TIMES DAILY PRN
Qty: 30 TABLET | Refills: 0 | Status: SHIPPED | OUTPATIENT
Start: 2024-07-24

## 2024-07-24 RX ORDER — ONDANSETRON HYDROCHLORIDE 2 MG/ML
4 INJECTION, SOLUTION INTRAVENOUS
Status: COMPLETED | OUTPATIENT
Start: 2024-07-24 | End: 2024-07-24

## 2024-07-24 RX ORDER — ALUMINUM HYDROXIDE, MAGNESIUM HYDROXIDE, AND SIMETHICONE 1200; 120; 1200 MG/30ML; MG/30ML; MG/30ML
30 SUSPENSION ORAL ONCE
Status: DISCONTINUED | OUTPATIENT
Start: 2024-07-24 | End: 2024-07-24 | Stop reason: HOSPADM

## 2024-07-24 RX ORDER — LIDOCAINE HYDROCHLORIDE 20 MG/ML
15 SOLUTION OROPHARYNGEAL ONCE
Status: DISCONTINUED | OUTPATIENT
Start: 2024-07-24 | End: 2024-07-24 | Stop reason: HOSPADM

## 2024-07-24 RX ORDER — METOCLOPRAMIDE HYDROCHLORIDE 5 MG/ML
10 INJECTION INTRAMUSCULAR; INTRAVENOUS
Status: COMPLETED | OUTPATIENT
Start: 2024-07-24 | End: 2024-07-24

## 2024-07-24 RX ORDER — PANTOPRAZOLE SODIUM 40 MG/10ML
80 INJECTION, POWDER, LYOPHILIZED, FOR SOLUTION INTRAVENOUS
Status: COMPLETED | OUTPATIENT
Start: 2024-07-24 | End: 2024-07-24

## 2024-07-24 RX ORDER — DIPHENHYDRAMINE HYDROCHLORIDE 50 MG/ML
25 INJECTION INTRAMUSCULAR; INTRAVENOUS
Status: COMPLETED | OUTPATIENT
Start: 2024-07-24 | End: 2024-07-24

## 2024-07-24 RX ADMIN — IOHEXOL 75 ML: 350 INJECTION, SOLUTION INTRAVENOUS at 02:07

## 2024-07-24 RX ADMIN — SODIUM CHLORIDE, POTASSIUM CHLORIDE, SODIUM LACTATE AND CALCIUM CHLORIDE 1000 ML: 600; 310; 30; 20 INJECTION, SOLUTION INTRAVENOUS at 12:07

## 2024-07-24 RX ADMIN — METOCLOPRAMIDE 10 MG: 5 INJECTION, SOLUTION INTRAMUSCULAR; INTRAVENOUS at 03:07

## 2024-07-24 RX ADMIN — ONDANSETRON 4 MG: 2 INJECTION INTRAMUSCULAR; INTRAVENOUS at 12:07

## 2024-07-24 RX ADMIN — DIPHENHYDRAMINE HYDROCHLORIDE 25 MG: 50 INJECTION INTRAMUSCULAR; INTRAVENOUS at 03:07

## 2024-07-24 RX ADMIN — PANTOPRAZOLE SODIUM 80 MG: 40 INJECTION, POWDER, FOR SOLUTION INTRAVENOUS at 12:07

## 2024-07-24 NOTE — ED TRIAGE NOTES
Pt presents to ED c/o right upper abd pain, n/v, one episode of dark stool, loose, bloating and abd distention onset 4 days and worsening.  Denies cp, sob, headache, dizziness or any other symptoms.  Pt reports taking Protonix, Maalox, Pepto-bismol with no relief.  Hx of Gerd. Pt reports starting Mounjaro on Friday.

## 2024-07-24 NOTE — DISCHARGE INSTRUCTIONS
Begin taking omeprazole or Protonix daily. Over-the-counter MiraLax to help with constipation.  Reglan up to 3 times daily as needed for continued nausea vomiting.     Please contact your primary care provider for re-evaluation further recommendations.    Return to this ED if worsening pain, if you continue with vomiting, if unable to eat or drink, if you develop black or bloody stools, if you begin with fever, if unable to have a bowel movement, if any other problems occur.

## 2024-07-24 NOTE — ED PROVIDER NOTES
Encounter Date: 7/23/2024       History     Chief Complaint   Patient presents with    Abdominal Pain    Vomiting    Influenza     Pt presents to ER with complaints of abdominal pain(6/10), nausea, vomiting and belly distention since Saturday. Pt states she has taken Protonix, pepto, zofran and phenergan and has had no relief. Pt states her burps smell like the . Pt denies any other issues at this time.      33-year-old female presents to ED complaining of 4 day history of worsening upper abdominal pain, bloating and distention, nausea vomiting.    Began Saturday with upper abdominal pain, thought to be related to history of reflux.  Has been taking Protonix, omeprazole without relief.  Has been taking Maalox, Pepto-Bismol without relief.  Denies any urinary complaints.  No fever.  Admits to mild chills today, no myalgias.  No recent illness.  No cough.  No chest pain.  No shortness of breath.  States constant upper abdominal pain, bloating, associated nausea with multiple episodes of emesis.  Only tolerating a few sips of liquid per day.  Admits to 1 episode of loose stools earlier today, was dark but denies melena or hematochezia.  No history of GI bleeding.  No urinary complaints.  No flank pain.  No back pain.  She does admit to history of similar symptoms related to severe GERD in the past.  States has been under control for years.  Continues passing flatus.    No exogenous estrogen.  No history of VTE.    Has been off of any diabetic medications for the past few months.  Was recently started on Mounjaro on Friday, 1 day prior to onset of symptoms.     Abdominal surgical history (complicated hx):  Multiple right-sided and ventral hernia repairs, appendectomy, R sided salpingoophorectomy, lysis of adhesions, appendectomy    PMH:  Central cord syndrome  Hirsutism  Psoriasis  Asthma  Abnormal CXR  Essential hypertension  NIDDM  Hyperlipidemia associated with type 2 diabetes mellitus  History of PCOS  Chronic  vulvitis  Complex cyst of right ovary  Obesity  Recurrent ventral hernia  GERD  Colonic polyps      Stress echo 06/24/2020:   Normal left ventricular systolic function. The estimated ejection fraction is 63%.  Concentric left ventricular remodeling.  The test was stopped because the patient experienced fatigue.  Normal LV diastolic function.  No wall motion abnormalities.  Normal right ventricular systolic function.  The study was difficult due to patient's poor endocardial visualization, patient body habitus.  There were no arrhythmias during stress.  The patient's exercise capacity was below average.  Indeterminate central venous pressure. Estimated PA pressure is at least 17 mmHg.  The ECG portion of this study is negative for myocardial ischemia.  The stress echo portion of this study is negative for myocardial ischemia.      Review of patient's allergies indicates:   Allergen Reactions    Latex, natural rubber Rash     Burning sensation     Past Medical History:   Diagnosis Date    Asthma childhood    Colon polyp     COVID-19     Diabetes mellitus     GERD (gastroesophageal reflux disease)     GERD (gastroesophageal reflux disease)     Morbid obesity     PCOS (polycystic ovarian syndrome)      Past Surgical History:   Procedure Laterality Date    ANKLE FRACTURE SURGERY Left 12/2023    APPENDECTOMY  2011    BACK SURGERY  12/2023    Removed V3-V6    COLONOSCOPY  06/11/2018    IN PROCEDURES: Grade 1 internal hemorrhoids.  Polyp 2mm in the descending colon; repeat in 5 years for surveillance    HERNIA REPAIR      LYSIS OF ADHESIONS N/A 09/01/2022    Procedure: LYSIS, ADHESIONS;  Surgeon: Danny Ortiz Jr., MD;  Location: [facility];  Service: General;  Laterality: N/A;    OOPHORECTOMY      salpingoopherectomy Right 2008     Family History   Problem Relation Name Age of Onset    Heart attack Father Darci     Hypertension Father Darci     Diabetes Mother Mei     Heart attack Mother Mei     Arthritis Mother Mei      Breast cancer Neg Hx      Colon cancer Neg Hx      Ovarian cancer Neg Hx      Crohn's disease Neg Hx      Ulcerative colitis Neg Hx      Stomach cancer Neg Hx      Esophageal cancer Neg Hx       Social History     Tobacco Use    Smoking status: Never    Smokeless tobacco: Never   Substance Use Topics    Alcohol use: Not Currently     Comment: occasionally apple cider- once every 2 months    Drug use: Never     Review of Systems   Constitutional:  Positive for appetite change. Negative for chills and fever.   Respiratory:  Negative for cough and shortness of breath.    Cardiovascular:  Negative for chest pain and leg swelling.   Gastrointestinal:  Positive for abdominal distention, abdominal pain, diarrhea, nausea and vomiting. Negative for blood in stool.   Genitourinary:  Negative for dysuria, flank pain and frequency.   Musculoskeletal:  Negative for back pain and myalgias.   Neurological:  Negative for syncope.       Physical Exam     Initial Vitals [07/23/24 2253]   BP Pulse Resp Temp SpO2   (!) 174/102 82 20 97.7 °F (36.5 °C) 100 %      MAP       --         Physical Exam    Nursing note and vitals reviewed.  Constitutional: She appears well-developed and well-nourished. She is not diaphoretic. No distress.   Uncomfortable appearing, nontoxic   HENT:   Head: Normocephalic and atraumatic.   Neck: Neck supple.   Normal range of motion.  Cardiovascular:  Intact distal pulses.           2+ DP bilaterally, no unilateral leg swelling or calf tenderness, no pretibial edema.  Normal sinus rhythm.   Pulmonary/Chest: No respiratory distress.   Abdominal:   Abdomen overall soft, normal bowel sounds ×4.  Tenderness to right-sided periumbilical abdomen.  Suspected right-sided abdominal wall diastasis/hernia defect.  Healed right-sided and suprapubic surgical incision.  No flank tenderness, no CVA tenderness.  Mild right upper quadrant tenderness.  Negative Martinez sign.  No rebound or guarding.     Musculoskeletal:          General: No tenderness. Normal range of motion.      Cervical back: Normal range of motion and neck supple.     Neurological: She is alert and oriented to person, place, and time.   Skin: Skin is warm.   Psychiatric: She has a normal mood and affect. Thought content normal.         ED Course   Procedures  Labs Reviewed   COMPREHENSIVE METABOLIC PANEL - Abnormal       Result Value    Sodium 139      Potassium 4.2      Chloride 104      CO2 22 (*)     Glucose 129 (*)     BUN 11      Creatinine 0.8      Calcium 10.0      Total Protein 8.5 (*)     Albumin 4.1      Total Bilirubin 0.5      Alkaline Phosphatase 69      AST 25      ALT 31      eGFR >60      Anion Gap 13     URINALYSIS, REFLEX TO URINE CULTURE - Abnormal    Specimen UA Urine, Clean Catch      Color, UA Yellow      Appearance, UA Clear      pH, UA 6.0      Specific Gravity, UA 1.020      Protein, UA Trace (*)     Glucose, UA Negative      Ketones, UA Negative      Bilirubin (UA) Negative      Occult Blood UA Negative      Nitrite, UA Negative      Urobilinogen, UA Negative      Leukocytes, UA Negative      Narrative:     Specimen Source->Urine   CBC W/ AUTO DIFFERENTIAL    WBC 7.73      RBC 4.88      Hemoglobin 13.8      Hematocrit 41.3      MCV 85      MCH 28.3      MCHC 33.4      RDW 12.8      Platelets 187      MPV 10.9      Immature Granulocytes 0.3      Gran # (ANC) 5.3      Immature Grans (Abs) 0.02      Lymph # 1.9      Mono # 0.5      Eos # 0.1      Baso # 0.01      nRBC 0      Gran % 68.4      Lymph % 24.6      Mono % 6.0      Eosinophil % 0.6      Basophil % 0.1      Differential Method Automated     LIPASE    Lipase 29     MAGNESIUM    Magnesium 1.8     POCT URINE PREGNANCY    POC Preg Test, Ur Negative       Acceptable Yes            Imaging Results               CT Abdomen Pelvis With IV Contrast NO Oral Contrast (Final result)  Result time 07/24/24 02:30:36      Final result by Erasmo Franco MD (07/24/24 02:30:36)                    Impression:      Pelvic mass lesion again noted, increased in size when compared to the prior examination, clinical and historical correlation is needed, neoplasm would be in the differential, if clinically warranted further evaluation with nonemergent pelvic MRI examination may be helpful for further characterization.    There are a few mildly prominent small bowel loops, not a pattern to suggest obstruction may relate to mild ileus or enteritis.    Moderate to prominent distention of the stomach with ingested material, correlation for delayed gastric emptying or gastric outlet obstructive symptomatology is needed.    Additional findings as above.    This report was flagged in Epic as abnormal.      Electronically signed by: Erasmo Franco  Date:    07/24/2024  Time:    02:30               Narrative:    EXAMINATION:  CT ABDOMEN PELVIS WITH IV CONTRAST    CLINICAL HISTORY:  Nausea/vomiting;R sided abdominal pain; r/o SBO or ileus;    TECHNIQUE:  Low dose axial images, sagittal and coronal reformations were obtained from the lung bases to the pubic symphysis following the IV administration of 75 mL of Omnipaque 350 oral contrast was not utilized.  Single phase postcontrast CT examination of the abdomen and pelvis is submitted peer    COMPARISON:  CT examination of the abdomen pelvis March 29, 2022    FINDINGS:  The lung bases demonstrate mild atelectatic change, there is a small air cyst at the left lung base.  There are small pulmonary nodules, these appears stable.  The stomach demonstrates nonspecific appearance of moderate to prominent distention with ingested material and air.    There are a few mildly prominent small bowel loops, not a pattern to suggest obstruction may relate to mild ileus or enteritis.  There is no significant inflammatory change seen.  The appendix is not definitively identified.  There is mild prominence of the right colon with air and fluid.  Mild air and fluid and stool seen  throughout the colon, there is no evidence for inflammatory or obstructive change of the colon.  There is no evidence for free intraperitoneal air.    There is no evidence for pericholecystic or peripancreatic inflammatory change, there is no abnormal pancreatic or biliary ductal dilatation.  Diminished attenuation of the liver may relate to diffuse fatty infiltrate.  There is no evidence for acute process of the spleen or adrenal glands.  There is no evidence for hydronephrosis or obstructive uropathy or perinephric inflammatory change bilaterally.  The abdominal aorta appears normal in caliber, demonstrates appropriate opacification.  The urinary bladder appears unremarkable for degree of distention.  There are postoperative changes of the lower anterior abdominopelvic wall noted.    As on the prior examination there is a pelvic mass that demonstrates appearance of multilobulated or multiloculated cystic mass configuration, the cystic components appear complex, and the mass has increased in size when compared to the prior examination, now measuring approximately 12.1 x 12.4 cm on axial imaging.    The visualized osseous structures demonstrate chronic change.                                       Medications   aluminum-magnesium hydroxide-simethicone 200-200-20 mg/5 mL suspension 30 mL (has no administration in time range)     And   LIDOcaine viscous HCl 2% oral solution 15 mL (has no administration in time range)   pantoprazole injection 80 mg (80 mg Intravenous Given 7/24/24 0021)   ondansetron injection 4 mg (4 mg Intravenous Given 7/24/24 0019)   lactated ringers bolus 1,000 mL (0 mLs Intravenous Stopped 7/24/24 0305)   iohexoL (OMNIPAQUE 350) injection 75 mL (75 mLs Intravenous Given 7/24/24 0202)   metoclopramide injection 10 mg (10 mg Intravenous Given 7/24/24 0308)   diphenhydrAMINE injection 25 mg (25 mg Intravenous Given 7/24/24 0304)     Medical Decision Making  Differential diagnosis:  Small-bowel  obstruction, enteritis, colitis, hiatal hernia, cholecystitis, cholelithiasis, hernia incarceration, hernia strangulation, UTI, pyelonephritis, nephrolithiasis    Amount and/or Complexity of Data Reviewed  External Data Reviewed: notes.  Labs: ordered. Decision-making details documented in ED Course.  Radiology: ordered and independent interpretation performed. Decision-making details documented in ED Course.    Risk  OTC drugs.  Prescription drug management.               ED Course as of 07/24/24 0414   Tue Jul 23, 2024   2329 Most recent A1c 7.5% on 07/17/2024 [SM]   Wed Jul 24, 2024   0306 CT with moderate to severe amount of ingested material within the stomach, likely related to recent Mounjaro use and delayed gastric emptying.  Chronic adnexal mass.  No other convincing acute findings.  Few prominent small bowel loops, no convincing obstructive process, possibly mild ileus.  Suspect her dark stools related to Pepto use rather than GI bleeding. [SM]   0410 Feels much better on re-evaluation.  Nausea much improved.  Will discharge with Reglan to be used p.r.n..  Advised lots of fluids, high-fiber diet, daily laxative as she is having trouble with constipation.  Advised to discuss Mounjaro prescription with PCP.  Patient is comfortable with plan. [SM]      ED Course User Index  [SM] Ishmael Dotson PA-C                           Clinical Impression:  Final diagnoses:  [K30] Delayed gastric emptying (Primary)          ED Disposition Condition    Discharge Stable          ED Prescriptions       Medication Sig Dispense Start Date End Date Auth. Provider    metoclopramide HCl (REGLAN) 10 MG tablet Take 1 tablet (10 mg total) by mouth 3 (three) times daily as needed (Nausea/vomiting). 30 tablet 7/24/2024 -- Ishmael Dotson PA-C          Follow-up Information       Follow up With Specialties Details Why Contact Info    Matthew Serrano MD Family Medicine Schedule an appointment as soon as possible for a visit   For reevaluation [address] [address]  [phone]               Ishmael Dotson, PA-C  07/24/24 0414

## 2024-08-21 ENCOUNTER — HOSPITAL ENCOUNTER (EMERGENCY)
Facility: HOSPITAL | Age: 34
Discharge: HOME | End: 2024-08-21
Attending: EMERGENCY MEDICINE
Payer: COMMERCIAL

## 2024-08-21 VITALS
BODY MASS INDEX: 38.64 KG/M2 | WEIGHT: 210 LBS | RESPIRATION RATE: 16 BRPM | OXYGEN SATURATION: 98 % | HEART RATE: 108 BPM | TEMPERATURE: 99 F | HEIGHT: 62 IN | SYSTOLIC BLOOD PRESSURE: 129 MMHG | DIASTOLIC BLOOD PRESSURE: 80 MMHG

## 2024-08-21 DIAGNOSIS — U07.1 COVID-19 VIRUS DETECTED: ICD-10-CM

## 2024-08-21 DIAGNOSIS — U07.1 COVID-19: Primary | ICD-10-CM

## 2024-08-21 LAB
B-HCG UR QL: NEGATIVE
CTP QC/QA: YES
CTP QC/QA: YES
INFLUENZA A ANTIGEN, POC: NEGATIVE
INFLUENZA B ANTIGEN, POC: NEGATIVE
POC RAPID STREP A: NEGATIVE
POCT GLUCOSE: 124 MG/DL (ref 70–110)
SARS-COV-2 RDRP RESP QL NAA+PROBE: POSITIVE

## 2024-08-21 PROCEDURE — 99284 EMERGENCY DEPT VISIT MOD MDM: CPT | Mod: 25,ER

## 2024-08-21 PROCEDURE — 87635 SARS-COV-2 COVID-19 AMP PRB: CPT | Mod: ER | Performed by: NURSE PRACTITIONER

## 2024-08-21 PROCEDURE — 25000003 PHARM REV CODE 250: Mod: ER | Performed by: NURSE PRACTITIONER

## 2024-08-21 PROCEDURE — 81025 URINE PREGNANCY TEST: CPT | Mod: ER

## 2024-08-21 PROCEDURE — 82962 GLUCOSE BLOOD TEST: CPT | Mod: ER

## 2024-08-21 PROCEDURE — 87804 INFLUENZA ASSAY W/OPTIC: CPT | Mod: ER

## 2024-08-21 PROCEDURE — 81025 URINE PREGNANCY TEST: CPT | Mod: ER | Performed by: EMERGENCY MEDICINE

## 2024-08-21 PROCEDURE — 87880 STREP A ASSAY W/OPTIC: CPT | Mod: ER

## 2024-08-21 RX ORDER — ACETAMINOPHEN 500 MG
1000 TABLET ORAL
Status: COMPLETED | OUTPATIENT
Start: 2024-08-21 | End: 2024-08-21

## 2024-08-21 RX ORDER — IBUPROFEN 600 MG/1
600 TABLET ORAL EVERY 6 HOURS PRN
Qty: 20 TABLET | Refills: 0 | Status: SHIPPED | OUTPATIENT
Start: 2024-08-21

## 2024-08-21 RX ORDER — ACETAMINOPHEN 500 MG
1000 TABLET ORAL EVERY 6 HOURS PRN
Qty: 20 TABLET | Refills: 0 | Status: SHIPPED | OUTPATIENT
Start: 2024-08-21

## 2024-08-21 RX ORDER — ALBUTEROL SULFATE 90 UG/1
1-2 INHALANT RESPIRATORY (INHALATION) EVERY 6 HOURS PRN
Qty: 8 G | Refills: 0 | Status: SHIPPED | OUTPATIENT
Start: 2024-08-21 | End: 2025-08-21

## 2024-08-21 RX ORDER — BENZONATATE 100 MG/1
100 CAPSULE ORAL 3 TIMES DAILY PRN
Qty: 20 CAPSULE | Refills: 0 | Status: SHIPPED | OUTPATIENT
Start: 2024-08-21 | End: 2024-08-31

## 2024-08-21 RX ADMIN — ACETAMINOPHEN 1000 MG: 500 TABLET ORAL at 05:08

## 2024-08-21 NOTE — Clinical Note
"Glenda Barnharta" Casey was seen and treated in our emergency department on 8/21/2024.  She may return to work on 08/24/2024.       If you have any questions or concerns, please don't hesitate to call.      MB RN    "

## 2024-08-21 NOTE — ED PROVIDER NOTES
Encounter Date: 8/21/2024    SCRIBE #1 NOTE: I, Wendy Mccauley, am scribing for, and in the presence of,  Arlette Wang NP. I have scribed the following portions of the note - Other sections scribed: HPI, ROS.       History     Chief Complaint   Patient presents with    URI     Pt tested positive for covid at home and is c/o HA, fever, and fatigue since this morning.      CC: URI symptoms     HPI: This 34 y.o. female, with a past medical history of asthma and DM (on Mounjaro), who presents to the ED with URI symptoms that began today. Patient states she took 2 at home COVID tests today, one was negative and one was positive. Patient reports cough, fever, fatigue, body aches, headaches, congestion, rhinorrhea, and a sore throat. Patient reports attempting treatment with ibuprofen this morning without relief.       The history is provided by the patient. No  was used.     Review of patient's allergies indicates:   Allergen Reactions    Latex, natural rubber Rash     Burning sensation     Past Medical History:   Diagnosis Date    Asthma childhood    Colon polyp     COVID-19     Diabetes mellitus     GERD (gastroesophageal reflux disease)     GERD (gastroesophageal reflux disease)     Morbid obesity     PCOS (polycystic ovarian syndrome)      Past Surgical History:   Procedure Laterality Date    ANKLE FRACTURE SURGERY Left 12/2023    APPENDECTOMY  2011    BACK SURGERY  12/2023    Removed V3-V6    COLONOSCOPY  06/11/2018    IN PROCEDURES: Grade 1 internal hemorrhoids.  Polyp 2mm in the descending colon; repeat in 5 years for surveillance    HERNIA REPAIR      LYSIS OF ADHESIONS N/A 09/01/2022    Procedure: LYSIS, ADHESIONS;  Surgeon: Danny Ortiz Jr., MD;  Location: [facility];  Service: General;  Laterality: N/A;    OOPHORECTOMY      salpingoopherectomy Right 2008     Family History   Problem Relation Name Age of Onset    Heart attack Father Darci     Hypertension Father Darci     Diabetes Mother  Mei     Heart attack Mother Mei     Arthritis Mother Mei     Breast cancer Neg Hx      Colon cancer Neg Hx      Ovarian cancer Neg Hx      Crohn's disease Neg Hx      Ulcerative colitis Neg Hx      Stomach cancer Neg Hx      Esophageal cancer Neg Hx       Social History     Tobacco Use    Smoking status: Never    Smokeless tobacco: Never   Substance Use Topics    Alcohol use: Not Currently     Comment: occasionally apple cider- once every 2 months    Drug use: Never     Review of Systems   Constitutional:  Positive for fatigue and fever.   HENT:  Positive for congestion, rhinorrhea and sore throat. Negative for trouble swallowing.    Respiratory:  Positive for cough. Negative for shortness of breath.    Cardiovascular:  Negative for chest pain.   Gastrointestinal:  Negative for abdominal pain, constipation, diarrhea, nausea and vomiting.   Genitourinary:  Negative for dysuria, flank pain, frequency and urgency.   Musculoskeletal:  Positive for myalgias. Negative for back pain.   Skin:  Negative for rash.   Neurological:  Positive for headaches.       Physical Exam     Initial Vitals [08/21/24 1605]   BP Pulse Resp Temp SpO2   134/82 (!) 118 16 99 °F (37.2 °C) 98 %      MAP       --         Physical Exam    Constitutional: She appears well-developed and well-nourished. She is not diaphoretic. No distress.   Body mass index is 38.41 kg/m².     HENT:   Head: Normocephalic and atraumatic.   Right Ear: External ear normal.   Left Ear: External ear normal.   Eyes: Conjunctivae and EOM are normal. Pupils are equal, round, and reactive to light.   Neck:   Normal range of motion.  Cardiovascular:  Normal rate, regular rhythm, normal heart sounds and intact distal pulses.           Pulmonary/Chest: Breath sounds normal. No respiratory distress.   Musculoskeletal:         General: Normal range of motion.      Cervical back: Normal range of motion.     Neurological: She is alert and oriented to person, place, and time.    Skin: Skin is warm and dry.   Psychiatric: She has a normal mood and affect. Her behavior is normal.         ED Course   Procedures  Labs Reviewed   SARS-COV-2 RDRP GENE - Abnormal       Result Value    POC Rapid COVID Positive (*)      Acceptable Yes      Narrative:     This test utilizes isothermal nucleic acid amplification technology to detect the SARS-CoV-2 RdRp nucleic acid segment. The analytical sensitivity (limit of detection) is 500 copies/swab.     A POSITIVE result is indicative of the presence of SARS-CoV-2 RNA; clinical correlation with patient history and other diagnostic information is necessary to determine patient infection status.    A NEGATIVE result means that SARS-CoV-2 nucleic acids are not present above the limit of detection. A NEGATIVE result should be treated as presumptive. It does not rule out the possibility of COVID-19 and should not be the sole basis for treatment decisions. If COVID-19 is strongly suspected based on clinical and exposure history, re-testing using an alternate molecular assay should be considered.     Commercial kits are provided by [employer].   _________________________________________________________________   The authorized Fact Sheet for Healthcare Providers and the authorized Fact Sheet for Patients of the ID NOW COVID-19 are available on the FDA website:    https://www.fda.gov/media/527883/download      https://www.fda.gov/media/771563/download      POCT GLUCOSE - Abnormal    POCT Glucose 124 (*)    POCT URINE PREGNANCY    POC Preg Test, Ur Negative       Acceptable Yes     POCT INFLUENZA A/B MOLECULAR   POCT STREP A MOLECULAR   POCT GLUCOSE, HAND-HELD DEVICE   POCT STREP A, RAPID    POC Rapid Strep A negative     POCT RAPID INFLUENZA A/B    Influenza B Ag negative      Inflenza A Ag negative            Imaging Results    None          Medications   acetaminophen tablet 1,000 mg (1,000 mg Oral Given 8/21/24 2791)      Medical Decision Making  34-year-old female presenting to the ED for evaluation of URI symptoms. The patient is a non-toxic, afebrile, and well appearing female.  Appears well hydrated with moist mucus membranes. Neck soft and supple with no meningeal signs or cervical lymphadenopathy. Breath sounds are clear and equal bilaterally with no adventitious breath sounds, tachypnea or respiratory distress with room air pulse ox of 98% and no evidence of hypoxia.     Vital Signs Are Reassuring.  RESULTS: UPT negative.  Glucose 124.  COVID positive.  Strep and flu negative.    Diabetic.  High risk for complications.  Discussed treatment with Paxlovid.  She would like to receive a prescription.  Will also provide supportive care.    My overall impression is COVID-19. I considered, but at this time, do not suspect OM, OE, strep pharyngitis, meningitis, pneumonia, or acute bacterial sinusitis.    ED return precautions were discussed and understanding was verbalized. All questions or concerns have been addressed.          Amount and/or Complexity of Data Reviewed  Labs: ordered. Decision-making details documented in ED Course.    Risk  OTC drugs.  Prescription drug management.            Scribe Attestation:   Scribe #1: I performed the above scribed service and the documentation accurately describes the services I performed. I attest to the accuracy of the note.        ED Course as of 08/21/24 1732   Wed Aug 21, 2024   1659 POC Rapid Strep A: negative [MM]   1702 SARS-CoV-2 RNA, Amplification, Qual(!): Positive [MM]   1708 Influenza B Ag: negative [MM]   1708 Inflenza A Ag: negative [MM]      ED Course User Index  [MM] Arlette Wang, MARIAH OJEDA, personally performed the services described in this documentation. All medical record entries made by the scribe were at my direction and in my presence. I have reviewed the chart and agree that the record reflects my personal performance and is accurate  and complete.      DISCLAIMER: This note was prepared with [employer] voice recognition transcription software. Garbled syntax, mangled pronouns, and other bizarre constructions may be attributed to that software system.    Clinical Impression:  Final diagnoses:  [U07.1] COVID-19 (Primary)          ED Disposition Condition    Discharge Stable          ED Prescriptions       Medication Sig Dispense Start Date End Date Auth. Provider    ibuprofen (ADVIL,MOTRIN) 600 MG tablet Take 1 tablet (600 mg total) by mouth every 6 (six) hours as needed for Pain. 20 tablet 8/21/2024 -- Arlette Wang NP    acetaminophen (TYLENOL) 500 MG tablet Take 2 tablets (1,000 mg total) by mouth every 6 (six) hours as needed for Pain or Temperature greater than (100.4). 20 tablet 8/21/2024 -- Arlette Wang NP    benzonatate (TESSALON) 100 MG capsule Take 1 capsule (100 mg total) by mouth 3 (three) times daily as needed for Cough. 20 capsule 8/21/2024 8/31/2024 Arlette Wang NP    albuterol (PROVENTIL/VENTOLIN HFA) 90 mcg/actuation inhaler Inhale 1-2 puffs into the lungs every 6 (six) hours as needed for Wheezing. Rescue 8 g 8/21/2024 8/21/2025 Arlette Wang NP    nirmatrelvir-ritonavir 300 mg (150 mg x 2)-100 mg copackaged tablets (EUA) Take 3 tablets by mouth 2 (two) times daily for 5 days. Each dose contains 2 nirmatrelvir (pink tablets) and 1 ritonavir (white tablet). Take all 3 tablets together 30 tablet 8/21/2024 8/26/2024 Arlette Wang NP          Follow-up Information       Follow up With Specialties Details Why Contact Info    Matthew Serrano MD Family Medicine Schedule an appointment as soon as possible for a visit  For follow-up [address] [address] [phone]      [facility] ED Emergency Medicine Go to  If symptoms worsen [address] [facility] [address] [id number] [phone]             Arlette Wang NP  08/21/24 1259

## 2024-08-21 NOTE — DISCHARGE INSTRUCTIONS
Thank you for coming to our Emergency Department today. It is important to remember that some problems or medical conditions are difficult to diagnose and may not be found during your Emergency Department visit.     Be sure to follow up with your primary care doctor and review all labs/imaging/tests that were performed during your ER visit with them. Some labs/tests may be outside of the normal range and require non-emergent follow-up and further investigation to help diagnose/exclude/prevent complications or other potentially serious medical conditions that were not addressed during your ER visit.    If you do not have a primary care doctor, you may contact the one listed on your discharge paperwork or you may also call the [facility] Appointment Desk at [phone] to schedule an appointment and establish care with one. It is important to your health that you have a primary care doctor.    Please take all medications as directed. All medications may potentially have side-effects and it is impossible to predict which medications may give you side-effects or what side-effects (if any) they will give you.. If you feel that you are having a negative effect or side-effect of any medication you should immediately stop taking them and seek medical attention. If you feel that you are having a life-threatening reaction call 911.    Return to the ER with any questions/concerns, new/concerning symptoms, worsening or failure to improve.     Do not drive, swim, climb to height, take a bath, operate heavy machinery, drink alcohol or take potentially sedating medications, sign any legal documents or make any important decisions for 24 hours if you have received any pain medications, sedatives or mood altering drugs during your ER visit or within 24 hours of taking them if they have been prescribed to you.     You can find additional resources for Dentists, hearing aids, durable medical equipment, low cost pharmacies and  other resources at https://[facility].org    BELOW THIS LINE ONLY APPLIES IF YOU HAVE A COVID TEST PENDING OR IF YOU HAVE BEEN DIAGNOSED WITH COVID:  Please access [facility] to review the results of your test. Until the results of your COVID test return, you should isolate yourself so as not to potentially spread illness to others.   If your COVID test returns positive, you should isolate yourself so as not to spread illness to others. After five full days, if you are feeling better and you have not had fever for 24 hours, you can return to your typical daily activities, but you must wear a mask around others for an additional 5 days.   If your COVID test returns negative and you are either unvaccinated or more than six months out from your two-dose vaccine and are not yet boosted, you should still quarantine for 5 full days followed by strict mask use for an additional 5 full days.   If your COVID test returns negative and you have received your 2-dose initial vaccine as well as a booster, you should continue strict mask use for 10 full days after the exposure.  For all those exposed, best practice includes a test at day 5 after the exposure. This can be a home test or a test through one of the many testing centers throughout our community.   Masking is always advised to limit the spread of COVID. Cdc.gov is an excellent site to obtain the latest up to date recommendations regarding COVID and COVID testing.     CDC Testing and Quarantine Guidelines for patients with exposure to a known-positive COVID-19 person:  A close exposure is defined as anyone who has had an exposure (masked or unmasked) to a known COVID -19 positive person within 6 feet of someone for a cumulative total of 15 minutes or more over a 24-hour period.   Vaccinated and/or if you recently had a positive covid test within 90 days do NOT need to quarantine after contact with someone who had COVID-19 unless you develop symptoms.   Fully vaccinated  people who have not had a positive test within 90 days, should get tested 3-5 days after their exposure, even if they don't have symptoms and wear a mask indoors in public for 14 days following exposure or until their test result is negative.      Unvaccinated and/or NOT had a positive test within 90 days and meet close exposure  You are required by CDC guidelines to quarantine for at least 5 days from time of exposure followed by 5 days of strict masking. It is recommended, but not required to test after 5 days, unless you develop symptoms, in which case you should test at that time.  If you get tested after 5 days and your test is positive, your 5 day period of isolation starts the day of the positive test.    If your exposure does not meet the above definition, you can return to your normal daily activities to include social distancing, wearing a mask and frequent handwashing.      Here is a link to guidance from the CDC:  https://www.cdc.gov/media/releases/2021/s1227-isolation-quarantine-guidance.html      [place] Dept Of Health Testing Sites:  https://ldh.la.gov/page/3934      [facility] website with testing locations and guidance:  [email or web address]

## 2024-08-24 ENCOUNTER — NURSE TRIAGE (OUTPATIENT)
Dept: ADMINISTRATIVE | Facility: CLINIC | Age: 34
End: 2024-08-24
Payer: COMMERCIAL

## 2024-08-24 NOTE — TELEPHONE ENCOUNTER
Pt responded to Covid HSM message for new or worsening symptoms.  Attempts x 3 made to contact pt without success; LVM x 2; # verified x 3.  No contact call.    Reason for Disposition   Message left on identified voice mail    Additional Information   Negative: Caller has already spoken with the PCP and has no further questions.   Negative: Caller has already spoken with another triager and has no further questions.   Negative: Caller has already spoken with another triager or PCP AND has further questions AND triager able to answer questions.   Negative: Busy signal.  First attempt to contact caller.  Follow-up call scheduled within 15 minutes.   Negative: No answer.  First attempt to contact caller.  Follow-up call scheduled within 15 minutes.    Protocols used: No Contact or Duplicate Contact Call-A-

## 2024-09-26 ENCOUNTER — HOSPITAL ENCOUNTER (EMERGENCY)
Facility: HOSPITAL | Age: 34
Discharge: HOME | End: 2024-09-26
Attending: EMERGENCY MEDICINE
Payer: COMMERCIAL

## 2024-09-26 VITALS
WEIGHT: 210 LBS | HEIGHT: 62 IN | RESPIRATION RATE: 18 BRPM | BODY MASS INDEX: 38.64 KG/M2 | HEART RATE: 82 BPM | DIASTOLIC BLOOD PRESSURE: 78 MMHG | OXYGEN SATURATION: 98 % | TEMPERATURE: 99 F | SYSTOLIC BLOOD PRESSURE: 118 MMHG

## 2024-09-26 DIAGNOSIS — E11.65 HYPERGLYCEMIA DUE TO DIABETES MELLITUS: ICD-10-CM

## 2024-09-26 DIAGNOSIS — S39.012A STRAIN OF LUMBAR REGION, INITIAL ENCOUNTER: Primary | ICD-10-CM

## 2024-09-26 LAB
ALBUMIN SERPL-MCNC: 2.9 G/DL (ref 3.3–5.5)
ALLENS TEST: ABNORMAL
ALP SERPL-CCNC: 98 U/L (ref 42–141)
B-HCG UR QL: NEGATIVE
BILIRUB SERPL-MCNC: 0.6 MG/DL (ref 0.2–1.6)
BILIRUBIN, POC UA: NEGATIVE
BLOOD, POC UA: ABNORMAL
BUN SERPL-MCNC: 9 MG/DL (ref 7–22)
CALCIUM SERPL-MCNC: 10.4 MG/DL (ref 8–10.3)
CHLORIDE SERPL-SCNC: 94 MMOL/L (ref 98–108)
CLARITY, UA: CLEAR
COLOR, UA: YELLOW
CREAT SERPL-MCNC: 0.6 MG/DL (ref 0.6–1.2)
CTP QC/QA: YES
GLUCOSE SERPL-MCNC: 401 MG/DL (ref 73–118)
GLUCOSE, POC UA: ABNORMAL
HCO3 UR-SCNC: 29.9 MMOL/L (ref 24–28)
HCT, POC: NORMAL
HGB, POC: NORMAL (ref 14–18)
KETONES, POC UA: NEGATIVE
LDH SERPL L TO P-CCNC: 1.57 MMOL/L (ref 0.5–2.2)
LEUKOCYTE EST, POC UA: NEGATIVE
MCH, POC: NORMAL
MCHC, POC: NORMAL
MCV, POC: NORMAL
MPV, POC: NORMAL
NITRITE, POC UA: NEGATIVE
PCO2 BLDA: 45.1 MMHG (ref 35–45)
PH SMN: 7.43 [PH] (ref 7.35–7.45)
PH UR STRIP: 6 [PH] (ref 5–8)
PO2 BLDA: 35 MMHG (ref 40–60)
POC ALT (SGPT): 18 U/L (ref 10–47)
POC AST (SGOT): 19 U/L (ref 11–38)
POC BE: 5 MMOL/L
POC PLATELET COUNT: NORMAL
POC SATURATED O2: 69 % (ref 95–100)
POC TCO2: 29 MMOL/L (ref 18–33)
POC TCO2: 31 MMOL/L (ref 24–29)
POCT GLUCOSE: 365 MG/DL (ref 70–110)
POCT GLUCOSE: 453 MG/DL (ref 70–110)
POTASSIUM BLD-SCNC: 4.2 MMOL/L (ref 3.6–5.1)
PROTEIN, POC UA: NEGATIVE
PROTEIN, POC: 8.7 G/DL (ref 6.4–8.1)
RBC, POC: NORMAL
RDW, POC: NORMAL
SAMPLE: ABNORMAL
SITE: ABNORMAL
SODIUM BLD-SCNC: 133 MMOL/L (ref 128–145)
SPECIFIC GRAVITY, POC UA: 1.01 (ref 1–1.03)
UROBILINOGEN, POC UA: 0.2 E.U./DL
WBC, POC: NORMAL

## 2024-09-26 PROCEDURE — 83605 ASSAY OF LACTIC ACID: CPT | Mod: ER

## 2024-09-26 PROCEDURE — 82803 BLOOD GASES ANY COMBINATION: CPT | Mod: ER

## 2024-09-26 PROCEDURE — 82962 GLUCOSE BLOOD TEST: CPT | Mod: ER

## 2024-09-26 PROCEDURE — 96361 HYDRATE IV INFUSION ADD-ON: CPT | Mod: ER

## 2024-09-26 PROCEDURE — 81025 URINE PREGNANCY TEST: CPT | Mod: ER

## 2024-09-26 PROCEDURE — 99284 EMERGENCY DEPT VISIT MOD MDM: CPT | Mod: 25,ER

## 2024-09-26 PROCEDURE — 25000003 PHARM REV CODE 250: Mod: ER

## 2024-09-26 PROCEDURE — 85025 COMPLETE CBC W/AUTO DIFF WBC: CPT | Mod: ER

## 2024-09-26 PROCEDURE — 96372 THER/PROPH/DIAG INJ SC/IM: CPT

## 2024-09-26 PROCEDURE — 81025 URINE PREGNANCY TEST: CPT | Mod: ER | Performed by: EMERGENCY MEDICINE

## 2024-09-26 PROCEDURE — 80053 COMPREHEN METABOLIC PANEL: CPT | Mod: ER

## 2024-09-26 PROCEDURE — 96374 THER/PROPH/DIAG INJ IV PUSH: CPT | Mod: ER

## 2024-09-26 PROCEDURE — 63600175 PHARM REV CODE 636 W HCPCS: Mod: ER

## 2024-09-26 RX ORDER — NAPROXEN 500 MG/1
500 TABLET ORAL 2 TIMES DAILY
Qty: 10 TABLET | Refills: 0 | Status: SHIPPED | OUTPATIENT
Start: 2024-09-26 | End: 2024-10-01

## 2024-09-26 RX ORDER — KETOROLAC TROMETHAMINE 30 MG/ML
15 INJECTION, SOLUTION INTRAMUSCULAR; INTRAVENOUS
Status: COMPLETED | OUTPATIENT
Start: 2024-09-26 | End: 2024-09-26

## 2024-09-26 RX ORDER — METHOCARBAMOL 500 MG/1
500 TABLET, FILM COATED ORAL 4 TIMES DAILY
Qty: 20 TABLET | Refills: 0 | Status: SHIPPED | OUTPATIENT
Start: 2024-09-26 | End: 2024-10-01

## 2024-09-26 RX ORDER — LIDOCAINE 50 MG/G
1 PATCH TOPICAL DAILY
Qty: 15 PATCH | Refills: 0 | Status: SHIPPED | OUTPATIENT
Start: 2024-09-26

## 2024-09-26 RX ORDER — LIDOCAINE 50 MG/G
1 PATCH TOPICAL
Status: DISCONTINUED | OUTPATIENT
Start: 2024-09-26 | End: 2024-09-26 | Stop reason: HOSPADM

## 2024-09-26 RX ADMIN — KETOROLAC TROMETHAMINE 15 MG: 30 INJECTION, SOLUTION INTRAMUSCULAR; INTRAVENOUS at 01:09

## 2024-09-26 RX ADMIN — SODIUM CHLORIDE, POTASSIUM CHLORIDE, SODIUM LACTATE AND CALCIUM CHLORIDE 1000 ML: 600; 310; 30; 20 INJECTION, SOLUTION INTRAVENOUS at 01:09

## 2024-09-26 RX ADMIN — INSULIN HUMAN 2 UNITS: 100 INJECTION, SOLUTION PARENTERAL at 02:09

## 2024-09-26 RX ADMIN — LIDOCAINE 1 PATCH: 50 PATCH TOPICAL at 01:09

## 2024-09-26 NOTE — ED PROVIDER NOTES
Encounter Date: 9/26/2024       History     Chief Complaint   Patient presents with    Back Pain     Lower back pain, onset over a week, denies injury or fall, denies urinary issues     Patient is a 34 y.o. female with a past medical history of asthma, diabetes, GERD who presents to the Emergency Department for evaluation of intermittent achy lower back pain x 1 week.  She has not taken any medications for the symptoms.  She denies unexplained weight loss, trauma/injury, history of epidural injections, prolonged steroid use, IV drug use, bowel/bladder incontinence, overflow incontinence. 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 She denies fever, chills, headache, chest pain, shortness of breath, abdominal pain, nausea, vomiting, diarrhea, dysuria, urinary frequency, hematuria, flank pain, vaginal bleeding, vaginal discharge, lightheadedness, dizziness, or numbness.     The history is provided by the patient.     Review of patient's allergies indicates:   Allergen Reactions    Latex, natural rubber Rash     Burning sensation     Past Medical History:   Diagnosis Date    Asthma childhood    Colon polyp     COVID-19     Diabetes mellitus     GERD (gastroesophageal reflux disease)     GERD (gastroesophageal reflux disease)     Morbid obesity     PCOS (polycystic ovarian syndrome)      Past Surgical History:   Procedure Laterality Date    ANKLE FRACTURE SURGERY Left 12/2023    APPENDECTOMY  2011    BACK SURGERY  12/2023    Removed V3-V6    COLONOSCOPY  06/11/2018    IN PROCEDURES: Grade 1 internal hemorrhoids.  Polyp 2mm in the descending colon; repeat in 5 years for surveillance    HERNIA REPAIR      LYSIS OF ADHESIONS N/A 09/01/2022    Procedure: LYSIS, ADHESIONS;  Surgeon: Danny Ortiz Jr., MD;  Location: [facility];  Service: General;  Laterality: N/A;    OOPHORECTOMY      salpingoopherectomy Right 2008     Family History   Problem Relation Name Age of Onset    Heart attack Father Darci      Hypertension Father Darci     Diabetes Mother Mei     Heart attack Mother Mei     Arthritis Mother Mei     Breast cancer Neg Hx      Colon cancer Neg Hx      Ovarian cancer Neg Hx      Crohn's disease Neg Hx      Ulcerative colitis Neg Hx      Stomach cancer Neg Hx      Esophageal cancer Neg Hx       Social History     Tobacco Use    Smoking status: Never    Smokeless tobacco: Never   Substance Use Topics    Alcohol use: Not Currently     Comment: occasionally apple cider- once every 2 months    Drug use: Never     Review of Systems   Constitutional:  Negative for chills and fever.   Respiratory:  Negative for shortness of breath.    Cardiovascular:  Negative for chest pain.   Gastrointestinal:  Negative for abdominal pain, diarrhea, nausea and vomiting.   Genitourinary:  Negative for dysuria, flank pain, frequency, hematuria, vaginal bleeding and vaginal discharge.   Musculoskeletal:  Positive for back pain. Negative for neck pain and neck stiffness.   Neurological:  Negative for dizziness, light-headedness, numbness and headaches.       Physical Exam     Initial Vitals [09/26/24 1232]   BP Pulse Resp Temp SpO2   124/81 85 19 98.5 °F (36.9 °C) 97 %      MAP       --         Physical Exam    Nursing note and vitals reviewed.  Constitutional: She appears well-developed and well-nourished.   HENT:   Head: Normocephalic and atraumatic.   Right Ear: External ear normal.   Left Ear: External ear normal.   Eyes: Conjunctivae and EOM are normal. Pupils are equal, round, and reactive to light.   Neck: Carotid bruit is not present.   Normal range of motion.  Cardiovascular:  Normal rate, regular rhythm, normal heart sounds and intact distal pulses.     Exam reveals no gallop and no friction rub.       No murmur heard.  Pulmonary/Chest: Breath sounds normal. No respiratory distress. She has no wheezes. She has no rhonchi. She has no rales.   Abdominal: Abdomen is soft. Bowel sounds are normal. She exhibits no  distension. There is no abdominal tenderness. There is no rebound and no guarding.   Musculoskeletal:         General: Normal range of motion.      Cervical back: Normal range of motion.      Comments: There is no tenderness to the lumbar spine or paraspinal muscles with palpation.  Reassuring neurological exam.  Patient is ambulatory.     Neurological: She is alert and oriented to person, place, and time. She has normal strength. No cranial nerve deficit or sensory deficit. GCS score is 15. GCS eye subscore is 4. GCS verbal subscore is 5. GCS motor subscore is 6.   Psychiatric: She has a normal mood and affect.         ED Course   Procedures  Labs Reviewed   POCT URINALYSIS W/O SCOPE - Abnormal       Result Value    Glucose, UA 3+ (*)     Bilirubin, UA Negative      Ketones, UA Negative      Spec Grav UA 1.010      Blood, UA Trace-intact (*)     PH, UA 6.0      Protein, UA Negative      Urobilinogen, UA 0.2      Nitrite, UA Negative      Leukocytes, UA Negative      Color, UA POC Yellow      Clarity, UA, POC Clear     POCT GLUCOSE - Abnormal    POCT Glucose 453 (*)    ISTAT PROCEDURE - Abnormal    POC PH 7.429      POC PCO2 45.1 (*)     POC PO2 35 (*)     POC HCO3 29.9 (*)     POC BE 5 (*)     POC SATURATED O2 69      POC Lactate 1.57      POC TCO2 31 (*)     Sample VENOUS      Site Other      Allens Test N/A     POCT CMP - Abnormal    Albumin, POC 2.9      Alkaline Phosphatase, POC 98      ALT (SGPT), POC 18      AST (SGOT), POC 19      POC BUN 9      Calcium, POC 10.4 (*)     POC Chloride 94 (*)     POC Creatinine 0.6      POC Glucose 401 (*)     POC Potassium 4.2      POC Sodium 133      Bilirubin, POC 0.6      POC TCO2 29      Protein, POC 8.7 (*)    POCT GLUCOSE - Abnormal    POCT Glucose 365 (*)    POCT URINE PREGNANCY    POC Preg Test, Ur Negative       Acceptable Yes     POCT URINALYSIS W/O SCOPE   POCT CBC    Hematocrit        Hemoglobin        RBC        WBC        MCV        MCH, POC         [facility]        RDW-CV        Platelet Count, POC        MPV       POCT GLUCOSE MONITORING CONTINUOUS   POCT CMP   POCT LIVER PANEL   POCT GLUCOSE MONITORING CONTINUOUS   POCT GLUCOSE MONITORING CONTINUOUS          Imaging Results              X-Ray Lumbar Spine Ap And Lateral (Final result)  Result time 09/26/24 13:13:32      Final result by Justino Harding MD (09/26/24 13:13:32)                   Impression:      No evidence of acute fracture or dislocation.      Electronically signed by: Justino Harding MD  Date:    09/26/2024  Time:    13:13               Narrative:    EXAMINATION:  XR LUMBAR SPINE AP AND LATERAL    CLINICAL HISTORY:  low back pain;    TECHNIQUE:  AP, lateral and spot images were performed of the lumbar spine.    COMPARISON:  None    FINDINGS:  Levoscoliosis of the lumbar spine.  Vertebral body heights are maintained.  Mild intervertebral disc space narrowing of L4-5.  No evidence of acute fracture or dislocation.  No soft tissue abnormality.  Prominent anterior osteophytes present.                                       Medications   LIDOcaine 5 % patch 1 patch (1 patch Transdermal Patch Applied 9/26/24 1311)   insulin regular injection 2 Units 0.02 mL (has no administration in time range)   ketorolac injection 15 mg (15 mg Intramuscular Given 9/26/24 1311)   lactated ringers bolus 1,000 mL (0 mLs Intravenous Stopped 9/26/24 1405)     Medical Decision Making  This is an emergent evaluation of a 34 y.o. female with a past medical history of asthma, diabetes, GERD who presents to the Emergency Department for evaluation of intermittent achy lower back pain x 1 week.    Patient looks well clinically. There is no tenderness to the lumbar spine or paraspinal muscles with palpation.  Reassuring neurological exam.  Patient is ambulatory. Regular rate rhythm without murmurs.  No carotid bruits appreciated on exam. Lungs are clear to auscultation bilaterally.  Abdomen is soft, nontender, non  distended, with normal bowel sounds.     Differential diagnosis includes but is not limited to fracture, dislocation, strain.  Considered cauda equina syndrome but highly doubtful given no trauma or injury, no bowel or bladder incontinence, normal neuro exam without deficits.  Considered conus medullaris syndrome but highly doubtful given no trauma or injury, no overflow incontinence, normal neuro exam without deficits.  Considered infectious etiology such as meningitis, encephalitis, epidural abscess but highly doubtful given no history of fever, no history of epidural injections, no IV drug use, normal neuro exam without deficits.  Considered abdominal aortic aneurysm but highly doubtful given no pulsatile mass on abdominal exam, 2+ radial pulses that are equal and symmetric on exam.      Workup initiated with x-ray, urinalysis, UPT, Accu-Chek.  Ordered Toradol, Lidoderm patches.  Vital signs, chart, labs, and/or imaging were all reviewed.  See ED course below and interpretations above. My overall impression is low back pain, hyperglycemia.  No evidence for DKA.  Will discharge home with naproxen, Robaxin.  Instructed patient to follow up with PCP for further diabetes management. Patient is very well appearing, and in no acute distress. Vital signs are reassuring here in the emergency department, patient is afebrile, breathing comfortable, satting 97 % on room air. Patient/Caregiver is stable for discharge at this time.  Patient/Caregiver was informed of results and plan of care. Patient/Caregiver verbalized understanding of care plan. All questions and concerns were addressed. Discussed strict return precautions with the patient/caregiver. Instructed follow up with primary care provider within 1 week.      José Manuel Waite PA-C    DISCLAIMER: This note was prepared with [employer] voice recognition transcription software. Garbled syntax, mangled pronouns, and other bizarre constructions may be attributed to that  software system.       Amount and/or Complexity of Data Reviewed  Labs: ordered. Decision-making details documented in ED Course.  Radiology: ordered. Decision-making details documented in ED Course.    Risk  OTC drugs.  Prescription drug management.               ED Course as of 09/26/24 1415   Thu Sep 26, 2024   1235 BP: 124/81 [TM]   1235 Temp: 98.5 °F (36.9 °C) [TM]   1235 Pulse: 85 [TM]   1235 Resp: 19 [TM]   1235 SpO2: 97 % [TM]   1251 Labs showing normal renal function in July 2024. [TM]   1251 A1C 7.5% in July 2024. [TM]   1251 POCT urine pregnancy  UPT negative. [TM]   1252 POCT URINALYSIS W/O SCOPE(!)  Urinalysis showing no signs of infection, negative for nitrites or leukocytes.  Trace intact blood, 3+ glucose. [TM]   1252 Low concern for ureterolithiasis given symptoms for 1 week, nonradiating in nature.  No history of kidney stones. [TM]   1319 POCT glucose(!!)  Will initiate DKA workup. No ketones on UA. [TM]   1325 X-Ray Lumbar Spine Ap And Lateral  No evidence of acute fracture or dislocation. [TM]   1337 ISTAT PROCEDURE(!!)  PH of 7.429, bicarb 29.9, normal lactate. [TM]   1352 POCT CBC  CBC is with mild leukocytosis of 12.9, nonspecific but likely due to hyperglycemia.  H&H of 10.9/31.8. [TM]   1358 POCT CMP(!)  CMP with normal renal function, chloride of 94, calcium of 10.4, glucose of 401. [TM]   1359 Ordered Accu-Chek recheck. [TM]   1407 POCT glucose(!)  Ordered 2 units of insulin. Will recheck and discharge home with Naproxen, Robaxin. [TM]      ED Course User Index  [TM] José Manuel Waite PA-C                           Clinical Impression:  Final diagnoses:  [S39.012A] Strain of lumbar region, initial encounter (Primary)  [E11.65] Hyperglycemia due to diabetes mellitus                 José Manuel Waite PA-C  09/26/24 1415

## 2024-09-26 NOTE — Clinical Note
"Glenda"Tyler Peña was seen and treated in our emergency department on 9/26/2024.  She may return to work on 09/30/2024.       If you have any questions or concerns, please don't hesitate to call.      José Manuel Waite PA-C"

## 2024-09-26 NOTE — DISCHARGE INSTRUCTIONS
You were seen in the emergency department today for back pain, hyperglycemia.  Please take all medications as prescribed and as we discussed.  Follow-up with specialist if instructed to do so.  It is important to remember that some problems are difficult to diagnose and may not be found during your Emergency Department visit. Be sure to follow up with your primary care doctor and review all labs/imaging/tests that were performed during this visit with them. Some labs/tests may be outside of the normal range and require non-emergent follow-up and further investigation to help diagnose/exclude/prevent complications or other medical conditions. Return to the emergency department for any new or worsening symptoms. Thank you for allowing me to care for you today, it was my pleasure. I hope you get to feeling better soon!

## 2025-04-15 ENCOUNTER — HOSPITAL ENCOUNTER (EMERGENCY)
Facility: HOSPITAL | Age: 35
Discharge: HOME | End: 2025-04-15
Attending: EMERGENCY MEDICINE
Payer: MEDICAID

## 2025-04-15 VITALS
WEIGHT: 215 LBS | DIASTOLIC BLOOD PRESSURE: 57 MMHG | RESPIRATION RATE: 20 BRPM | SYSTOLIC BLOOD PRESSURE: 112 MMHG | HEIGHT: 62 IN | OXYGEN SATURATION: 96 % | TEMPERATURE: 98 F | HEART RATE: 76 BPM | BODY MASS INDEX: 39.56 KG/M2

## 2025-04-15 DIAGNOSIS — R10.9 ABDOMINAL PAIN, UNSPECIFIED ABDOMINAL LOCATION: Primary | ICD-10-CM

## 2025-04-15 DIAGNOSIS — K63.89 EPIPLOIC APPENDAGITIS: ICD-10-CM

## 2025-04-15 DIAGNOSIS — K42.9 PERIUMBILICAL HERNIA: ICD-10-CM

## 2025-04-15 LAB
ABSOLUTE EOSINOPHIL (OHS): 0.06 K/UL
ABSOLUTE MONOCYTE (OHS): 0.36 K/UL (ref 0.3–1)
ABSOLUTE NEUTROPHIL COUNT (OHS): 2.84 K/UL (ref 1.8–7.7)
ALBUMIN SERPL BCP-MCNC: 3.6 G/DL (ref 3.5–5.2)
ALLENS TEST: NORMAL
ALP SERPL-CCNC: 87 UNIT/L (ref 40–150)
ALT SERPL W/O P-5'-P-CCNC: 48 UNIT/L (ref 10–44)
ANION GAP (OHS): 14 MMOL/L (ref 8–16)
AST SERPL-CCNC: 25 UNIT/L (ref 11–45)
BACTERIA #/AREA URNS AUTO: NORMAL /HPF
BASOPHILS # BLD AUTO: 0.03 K/UL
BASOPHILS NFR BLD AUTO: 0.6 %
BILIRUB SERPL-MCNC: 0.1 MG/DL (ref 0.1–1)
BILIRUB UR QL STRIP.AUTO: NEGATIVE
BUN SERPL-MCNC: 11 MG/DL (ref 6–20)
CALCIUM SERPL-MCNC: 9.5 MG/DL (ref 8.7–10.5)
CHLORIDE SERPL-SCNC: 102 MMOL/L (ref 95–110)
CLARITY UR: CLEAR
CO2 SERPL-SCNC: 20 MMOL/L (ref 23–29)
COLOR UR AUTO: YELLOW
CREAT SERPL-MCNC: 0.7 MG/DL (ref 0.5–1.4)
DELSYS: NORMAL
ERYTHROCYTE [DISTWIDTH] IN BLOOD BY AUTOMATED COUNT: 14 % (ref 11.5–14.5)
GFR SERPLBLD CREATININE-BSD FMLA CKD-EPI: >60 ML/MIN/1.73/M2
GLUCOSE SERPL-MCNC: 335 MG/DL (ref 70–110)
GLUCOSE UR QL STRIP: ABNORMAL
HCT VFR BLD AUTO: 40.6 % (ref 37–48.5)
HGB BLD-MCNC: 13.3 GM/DL (ref 12–16)
HGB UR QL STRIP: NEGATIVE
IMM GRANULOCYTES # BLD AUTO: 0.01 K/UL (ref 0–0.04)
IMM GRANULOCYTES NFR BLD AUTO: 0.2 % (ref 0–0.5)
KETONES UR QL STRIP: ABNORMAL
LDH SERPL L TO P-CCNC: 1.36 MMOL/L (ref 0.5–2.2)
LEUKOCYTE ESTERASE UR QL STRIP: NEGATIVE
LIPASE SERPL-CCNC: 62 U/L (ref 4–60)
LYMPHOCYTES # BLD AUTO: 1.73 K/UL (ref 1–4.8)
MCH RBC QN AUTO: 28.1 PG (ref 27–31)
MCHC RBC AUTO-ENTMCNC: 32.8 G/DL (ref 32–36)
MCV RBC AUTO: 86 FL (ref 82–98)
MICROSCOPIC COMMENT: NORMAL
NITRITE UR QL STRIP: NEGATIVE
NUCLEATED RBC (/100WBC) (OHS): 0 /100 WBC
PH UR STRIP: 6 [PH]
PLATELET # BLD AUTO: 149 K/UL (ref 150–450)
PMV BLD AUTO: 11.2 FL (ref 9.2–12.9)
POTASSIUM SERPL-SCNC: 4.2 MMOL/L (ref 3.5–5.1)
PROT SERPL-MCNC: 8.7 GM/DL (ref 6–8.4)
PROT UR QL STRIP: ABNORMAL
RBC # BLD AUTO: 4.73 M/UL (ref 4–5.4)
RELATIVE EOSINOPHIL (OHS): 1.2 %
RELATIVE LYMPHOCYTE (OHS): 34.4 % (ref 18–48)
RELATIVE MONOCYTE (OHS): 7.2 % (ref 4–15)
RELATIVE NEUTROPHIL (OHS): 56.4 % (ref 38–73)
SAMPLE: NORMAL
SITE: NORMAL
SODIUM SERPL-SCNC: 136 MMOL/L (ref 136–145)
SP GR UR STRIP: >=1.03
UROBILINOGEN UR STRIP-ACNC: NEGATIVE EU/DL
WBC # BLD AUTO: 5.03 K/UL (ref 3.9–12.7)
YEAST UR QL AUTO: NORMAL /HPF

## 2025-04-15 PROCEDURE — 25500020 PHARM REV CODE 255: Performed by: EMERGENCY MEDICINE

## 2025-04-15 PROCEDURE — 82374 ASSAY BLOOD CARBON DIOXIDE: CPT

## 2025-04-15 PROCEDURE — 85025 COMPLETE CBC W/AUTO DIFF WBC: CPT

## 2025-04-15 PROCEDURE — 96375 TX/PRO/DX INJ NEW DRUG ADDON: CPT

## 2025-04-15 PROCEDURE — 81001 URINALYSIS AUTO W/SCOPE: CPT

## 2025-04-15 PROCEDURE — 83605 ASSAY OF LACTIC ACID: CPT

## 2025-04-15 PROCEDURE — 83690 ASSAY OF LIPASE: CPT

## 2025-04-15 PROCEDURE — 96374 THER/PROPH/DIAG INJ IV PUSH: CPT

## 2025-04-15 PROCEDURE — 99285 EMERGENCY DEPT VISIT HI MDM: CPT | Mod: 25

## 2025-04-15 PROCEDURE — 63600175 PHARM REV CODE 636 W HCPCS

## 2025-04-15 PROCEDURE — 99900035 HC TECH TIME PER 15 MIN (STAT)

## 2025-04-15 RX ORDER — MORPHINE SULFATE 4 MG/ML
4 INJECTION, SOLUTION INTRAMUSCULAR; INTRAVENOUS
Refills: 0 | Status: DISCONTINUED | OUTPATIENT
Start: 2025-04-15 | End: 2025-04-15

## 2025-04-15 RX ORDER — ONDANSETRON HYDROCHLORIDE 2 MG/ML
4 INJECTION, SOLUTION INTRAVENOUS
Status: COMPLETED | OUTPATIENT
Start: 2025-04-15 | End: 2025-04-15

## 2025-04-15 RX ORDER — MORPHINE SULFATE 4 MG/ML
6 INJECTION, SOLUTION INTRAMUSCULAR; INTRAVENOUS
Status: COMPLETED | OUTPATIENT
Start: 2025-04-15 | End: 2025-04-15

## 2025-04-15 RX ORDER — HYDROMORPHONE HYDROCHLORIDE 1 MG/ML
1 INJECTION, SOLUTION INTRAMUSCULAR; INTRAVENOUS; SUBCUTANEOUS
Refills: 0 | Status: COMPLETED | OUTPATIENT
Start: 2025-04-15 | End: 2025-04-15

## 2025-04-15 RX ADMIN — ONDANSETRON 4 MG: 2 INJECTION INTRAMUSCULAR; INTRAVENOUS at 08:04

## 2025-04-15 RX ADMIN — IOHEXOL 100 ML: 350 INJECTION, SOLUTION INTRAVENOUS at 10:04

## 2025-04-15 RX ADMIN — MORPHINE SULFATE 6 MG: 4 INJECTION INTRAVENOUS at 10:04

## 2025-04-15 RX ADMIN — HYDROMORPHONE HYDROCHLORIDE 1 MG: 1 INJECTION, SOLUTION INTRAMUSCULAR; INTRAVENOUS; SUBCUTANEOUS at 08:04

## 2025-04-15 NOTE — Clinical Note
"Glenda Barnharta" Casey was seen and treated in our emergency department on 4/15/2025.  She may return to work on 04/16/2025.       If you have any questions or concerns, please don't hesitate to call.      Edy Hyman MD"

## 2025-04-15 NOTE — ED PROVIDER NOTES
Encounter Date: 4/15/2025       History     Chief Complaint   Patient presents with    Abdominal Pain     Pt presents to the ED with c/o LLQ pain x 3 days. Last BM today. Pt reports taking OTC medications yesterday to treat without any relief. Pt denies CP, SOB, N/V/D/C       34-year-old female with past medical history diabetes, morbid obesity, extensive abdominal history including hernia repair and hysterectomy/salpingo-oophorectomy for tubo-ovarian abscess in October 2024 presenting for left lower quadrant abdominal pain.  Patient reports she has had pain for 3 days that has been waxing and waning but became worse over night and is now constant.  Her pain is worsened with standing.  She describes it as a cramping or twisting pain in the left lower quadrant.  She reports intermittent nausea without vomiting.  She denies diarrhea, constipation, shortness of breath, dysuria, vaginal discharge.    The history is provided by the patient. No  was used.     Review of patient's allergies indicates:   Allergen Reactions    Latex, natural rubber Rash     Burning sensation     Past Medical History:   Diagnosis Date    Asthma childhood    Colon polyp     COVID-19     Diabetes mellitus     GERD (gastroesophageal reflux disease)     GERD (gastroesophageal reflux disease)     Morbid obesity     PCOS (polycystic ovarian syndrome)      Past Surgical History:   Procedure Laterality Date    ANKLE FRACTURE SURGERY Left 12/2023    APPENDECTOMY  2011    BACK SURGERY  12/2023    Removed V3-V6    COLONOSCOPY  06/11/2018    IN PROCEDURES: Grade 1 internal hemorrhoids.  Polyp 2mm in the descending colon; repeat in 5 years for surveillance    HERNIA REPAIR      LYSIS OF ADHESIONS N/A 09/01/2022    Procedure: LYSIS, ADHESIONS;  Surgeon: Danny Ortiz Jr., MD;  Location: [facility];  Service: General;  Laterality: N/A;    OOPHORECTOMY      salpingoopherectomy Right 2008     Family History   Problem Relation Name Age of Onset     Heart attack Father Darci     Hypertension Father Darci     Diabetes Mother Mei     Heart attack Mother Mei     Arthritis Mother Mei     Breast cancer Neg Hx      Colon cancer Neg Hx      Ovarian cancer Neg Hx      Crohn's disease Neg Hx      Ulcerative colitis Neg Hx      Stomach cancer Neg Hx      Esophageal cancer Neg Hx       Social History[1]      Physical Exam     Initial Vitals [04/15/25 0740]   BP Pulse Resp Temp SpO2   (!) 165/82 82 18 97.8 °F (36.6 °C) 100 %      MAP       --         Physical Exam    Nursing note and vitals reviewed.  Constitutional: She is Obese .   HENT:   Head: Normocephalic and atraumatic.   Eyes: Conjunctivae and EOM are normal. Pupils are equal, round, and reactive to light.   Cardiovascular:  Normal rate, regular rhythm and normal heart sounds.           Pulses:       Radial pulses are 2+ on the right side and 2+ on the left side.        Dorsalis pedis pulses are 2+ on the right side and 2+ on the left side.   Pulmonary/Chest: Breath sounds normal. No respiratory distress. She has no wheezes. She has no rhonchi. She has no rales.   Abdominal: Abdomen is soft.   Left lower quadrant tenderness to palpation without guarding or rebound         Neurological: She is alert and oriented to person, place, and time.         ED Course   Procedures  Labs Reviewed   URINALYSIS, REFLEX TO URINE CULTURE - Abnormal       Result Value    Color, UA Yellow      Appearance, UA Clear      pH, UA 6.0      Spec Grav UA >=1.030 (*)     Protein, UA Trace (*)     Glucose, UA 4+ (*)     Ketones, UA Trace (*)     Bilirubin, UA Negative      Blood, UA Negative      Nitrites, UA Negative      Urobilinogen, UA Negative      Leukocyte Esterase, UA Negative     COMPREHENSIVE METABOLIC PANEL - Abnormal    Sodium 136      Potassium 4.2      Chloride 102      CO2 20 (*)     Glucose 335 (*)     BUN 11      Creatinine 0.7      Calcium 9.5      Protein Total 8.7 (*)     Albumin 3.6      Bilirubin Total 0.1       ALP 87      AST 25      ALT 48 (*)     Anion Gap 14      eGFR >60     LIPASE - Abnormal    Lipase Level 62 (*)    CBC WITH DIFFERENTIAL - Abnormal    WBC 5.03      RBC 4.73      HGB 13.3      HCT 40.6      MCV 86      MCH 28.1      MCHC 32.8      RDW 14.0      Platelet Count 149 (*)     MPV 11.2      Nucleated RBC 0      Neut % 56.4      Lymph % 34.4      Mono % 7.2      Eos % 1.2      Basophil % 0.6      Imm Grans % 0.2      Neut # 2.84      Lymph # 1.73      Mono # 0.36      Eos # 0.06      Baso # 0.03      Imm Grans # 0.01     CBC W/ AUTO DIFFERENTIAL    Narrative:     The following orders were created for panel order CBC auto differential.  Procedure                               Abnormality         Status                     ---------                               -----------         ------                     CBC with Differential[[id number]]       Abnormal            Final result                 Please view results for these tests on the individual orders.   URINALYSIS MICROSCOPIC    Bacteria, UA None      Yeast, UA None      Microscopic Comment       GREY TOP URINE HOLD   ISTAT LACTATE    POC Lactate 1.36      Sample VENOUS      Site Other      Allens Test N/A      DelSys Room Air            Imaging Results              CT Abdomen Pelvis With IV Contrast NO Oral Contrast (Final result)  Result time 04/15/25 11:40:34      Final result by Justino Mcintyre MD (04/15/25 11:40:34)                   Impression:      1. Short segment of inflammatory changes noted involving the descending colon, which appear centered about ovoid fat density lesion measuring on the order of 13 mm at the anti mesenteric border of the involved loop of colon.  Differential considerations include epiploic appendagitis and omental infarct.  2. Umbilical and left paramedian periumbilical ventral hernias both contain short segments of nonobstructed, noninflamed appearing small-bowel, appearing to represent a change compared to most  recent CT performed 07/24/2024. correlation with physical examination recommended.  3. Additional details of chronic and incidental findings, as provided in the body of the report.      Electronically signed by: Justino Mcintyre  Date:    04/15/2025  Time:    11:40               Narrative:    EXAMINATION:  CT ABDOMEN PELVIS WITH IV CONTRAST    CLINICAL HISTORY:  LLQ abdominal pain;    TECHNIQUE:  Low dose axial images, sagittal and coronal reformations were obtained from the lung bases to the pubic symphysis following the IV administration of 100 mL of Omnipaque 350    COMPARISON:  CT of the abdomen and pelvis performed 07/24/2024    FINDINGS:  Lower chest: Several small solid nodules at the visualized lung bases appear grossly unchanged dating back to at least the 03/29/2022 CT, and as such is felt most likely benign.  Areas of atelectasis are noted elsewhere.    Liver: Normal contour.  Findings in keeping with hepatic steatosis.    Gallbladder and bile ducts: Unremarkable. No biliary ductal dilatation.    Pancreas: Unremarkable.    Spleen: Normal contour.  Accessory splenule suggested.    Adrenals: Unremarkable.    Kidneys: Unremarkable.    Lymph nodes: No abdominal or pelvic lymphadenopathy.    Bowel and mesentery: No evidence of bowel obstruction.  Short segment of inflammatory changes noted involving the descending colon, which appear centered about ovoid fat density lesion measuring on the order of 13 mm at the anti mesenteric border of the involved loop of colon (axial series 2, image 119; coronal reformat series 601, image 63).  Moderate volume colonic stool.  Additional areas of submucosal fat attenuation involving the colon may reflect sequela of chronic nonspecific inflammation.  Appendix not confidently identified.  No definite focal pericecal inflammation.    Abdominal aorta: Unremarkable.    Inferior vena cava: Unremarkable.    Free fluid or free air: None.    Pelvis: Unremarkable.    Body wall: Remote  operative sequela noted in the anterior abdominal wall.  Umbilical and left paramedian periumbilical ventral hernias both contain short segments of nonobstructed, noninflamed appearing small-bowel, appearing to represent a change compared to most recent CT performed 07/24/2024.    Bones: No definite acute change.  Redemonstrated scoliotic curvature of the spine with associated degenerative change.                                       Medications   ondansetron injection 4 mg (4 mg Intravenous Given 4/15/25 0821)   HYDROmorphone injection 1 mg (1 mg Intravenous Given 4/15/25 0821)   iohexoL (OMNIPAQUE 350) injection 100 mL (100 mLs Intravenous Given 4/15/25 1005)   morphine injection 6 mg (6 mg Intravenous Given 4/15/25 1034)     Medical Decision Making  34-year-old female with past medical history diabetes, morbid obesity, extensive abdominal history including hernia repair and hysterectomy/salpingo-oophorectomy for tubo-ovarian abscess in October 2024 presenting for left lower quadrant abdominal pain.      Differential diagnosis includes, but is not limited to, hernia, SBO, DKA, diverticulitis/diverticulosis.    Amount and/or Complexity of Data Reviewed  Labs: ordered. Decision-making details documented in ED Course.  Radiology: ordered.    Risk  Prescription drug management.              Attending Attestation:   Physician Attestation Statement for Resident:  As the supervising MD   Physician Attestation Statement: I have personally seen and examined this patient.   I agree with the above history.  -: This patient presented with abdominal pain that we considered in her differential small-bowel obstruction DKA diverticulosis diverticulitis other GI  obstructive and infectious etiologies.   As the supervising MD I agree with the above PE.   -: Patient with reproducible pain in her abdomen with no signs of peritonitis.   As the supervising MD I agree with the above treatment, course, plan, and disposition.   -:  Patient will be discharged home with Tylenol Motrin and pain patches and instructions to follow up with the primary health care provider.  Patient will follow up with General surgery for herniation evaluation.  I was personally present during the critical portions of the procedure(s) performed by the resident and was immediately available in the ED to provide services and assistance as needed during the entire procedure.  I have reviewed and agree with the residents interpretation of the following: lab data and x-rays.                 ED Course as of 04/15/25 1331   Tue Apr 15, 2025   0824 CBC auto differential(!)  No anemia or leukocytosis   Thrombocytopenia []   0845 Urinalysis, Reflex to Urine Culture(!)  Not concerning for infection []   0849 Lipase(!)  Minimally elevated, not concerning for pancreatitis []   0849 Comprehensive metabolic panel(!)  Hyperglycemia, consistent with a history of diabetes []   1224 ISTAT Lactate  Within normal limits, lowering concern for acute intra-abdominal ischemia []      ED Course User Index  [] Edy Hyman MD                           Clinical Impression:  Final diagnoses:  [R10.9] Abdominal pain, unspecified abdominal location (Primary)  [K42.9] Periumbilical hernia  [K63.89] Epiploic appendagitis          ED Disposition Condition    Discharge Stable          ED Prescriptions    None       Follow-up Information       Follow up With Specialties Details Why Contact Info    Matthew Serrano MD Family Medicine   [address]  [place] [address]  [phone]      Matthew Serrano MD Family Medicine   [address]  [place] [address]  [phone]                   [1]   Social History  Tobacco Use    Smoking status: Never    Smokeless tobacco: Never   Substance Use Topics    Alcohol use: Not Currently     Comment: occasionally apple cider- once every 2 months    Drug use: Never        Tunde Echols MD  04/15/25 1331

## 2025-04-15 NOTE — DISCHARGE INSTRUCTIONS
You were evaluated in the emergency department today for abdominal pain.  Although there were no findings of concern to necessitate admission to the hospital or warrant immediate surgical intervention, disease exists on a spectrum and your disease process may progress.  If this is the case, please watch your symptoms and return to the emergency department if you feel worse and are unable to discuss care with your primary care doctor in follow up in the next several days.  Specific information regarding your complaint has been provided.  Thank you for choosing [facility]!  Take Tylenol and ibuprofen according to instructions on the package for pain.  You may also try lidocaine patches for relief.  Do not take more than the recommended doses of these medications.  Be aware that ibuprofen, Motrin, Advil, and Aleve are all in the same family of medication and should not be taken together.

## 2025-04-16 ENCOUNTER — PATIENT OUTREACH (OUTPATIENT)
Dept: ADMINISTRATIVE | Facility: HOSPITAL | Age: 35
End: 2025-04-16
Payer: MEDICAID

## 2025-04-16 NOTE — PROGRESS NOTES
Left message to schedule annual visit with PCP.  HM and immunization's reviewed and updated.  Patient is due for Diabetic Eye Exam, Colon Screening, HgA1C.   Please help schedule or if already done please get information to get records.

## 2025-05-28 ENCOUNTER — PATIENT MESSAGE (OUTPATIENT)
Dept: FAMILY MEDICINE | Facility: CLINIC | Age: 35
End: 2025-05-28

## 2025-06-23 NOTE — ED PROVIDER NOTES
Encounter Date: 3/5/2020       History     Chief Complaint   Patient presents with    Abdominal Pain     pt reports severe RLQ cramping abdominal pain ongoing since being discharged from hospital last Saturday 2/29/20 but started to worsen last night; pt reports being in the hospital last week for an ovarian abscess (left sided) and is still currently taking ABX; pt took 2 Percocets at 7pm tonight with no relief; pt crying and appears uncomfortable     Patient presents with complaints of right-sided lower abdominal pain that began approximately 3 days ago.  Patient was recently admitted at [facility] on 02/17/2020 for lower abdominal pain and diagnosed with tubo-ovarian abscess.  Patient was treated with pigtail catheter and IV antibiotics.  Cultures grew out Actinomyces.  Patient is currently taking and amoxicillin.  Patient had a follow-up appointment yesterday [facility]  She had some mild-to-moderate pain at that time that is low become worse since.  Patient states she has never been pain free since surgery.  The abscess was located in the left adnexa.  This pain is on the right side.  Patient has a history of chronic right-sided ventral wall hernias because intermittent pain however she feels this pain is different.  She denies fever or chills. She denies back pain.  She denies urinary symptoms.  She admits to diarrhea that she attributes to the amoxicillin.  No rectal bleeding.  No nausea or vomiting.        Review of patient's allergies indicates:   Allergen Reactions    Shellfish containing products Hives     To hands    Latex, natural rubber Rash     Burning sensation     Past Medical History:   Diagnosis Date    Asthma childhood    Diabetes mellitus     GERD (gastroesophageal reflux disease)     Morbid obesity     PCOS (polycystic ovarian syndrome)      Past Surgical History:   Procedure Laterality Date    APPENDECTOMY  2011    HERNIA REPAIR      OOPHORECTOMY       salpingoopherectomy Right 2008     Family History   Problem Relation Age of Onset    Heart attack Father     Hypertension Father     Diabetes Mother     Heart attack Mother     Breast cancer Neg Hx     Colon cancer Neg Hx     Ovarian cancer Neg Hx      Social History     Tobacco Use    Smoking status: Never Smoker    Smokeless tobacco: Never Used   Substance Use Topics    Alcohol use: Not Currently     Comment: occasionally apple cider    Drug use: No     Review of Systems   Constitutional: Negative for chills, diaphoresis and fever.   HENT: Negative for congestion.    Respiratory: Negative for cough and shortness of breath.    Cardiovascular: Negative for chest pain.   Gastrointestinal: Positive for abdominal pain and nausea. Negative for diarrhea and vomiting.        No melena.   Genitourinary: Negative for dysuria, flank pain, hematuria and vaginal discharge.   Musculoskeletal: Negative for back pain.   Skin: Negative for rash and wound.   Neurological: Negative for headaches.   All other systems reviewed and are negative.      Physical Exam     Initial Vitals [03/05/20 2234]   BP Pulse Resp Temp SpO2   136/88 (!) 120 18 99.5 °F (37.5 °C) 98 %      MAP       --         Physical Exam    Nursing note and vitals reviewed.  Constitutional: She appears well-developed and well-nourished. She is not diaphoretic. No distress.   Morbidly obese.  Patient appears uncomfortable.   HENT:   Head: Normocephalic and atraumatic.   Nose: Nose normal.   Mouth/Throat: Oropharynx is clear and moist.   Eyes: Conjunctivae and EOM are normal. Pupils are equal, round, and reactive to light. Right eye exhibits no discharge. Left eye exhibits no discharge.   Neck: Normal range of motion. Neck supple. No thyromegaly present.   Cardiovascular: Regular rhythm and normal heart sounds.   No murmur heard.  Tachycardic.   Pulmonary/Chest: Breath sounds normal. No stridor. No respiratory distress. She has no wheezes. She has no  rhonchi. She has no rales.   Abdominal: Soft. She exhibits no distension. There is tenderness (Moderate tenderness in the right lower abdomen..  Large pannus present.  No guarding or rebound..  Palpable ventral wall hernias present.  Mild tenderness overlying these.  No evidence of incarceration on exam..). There is no rebound and no guarding.   Musculoskeletal: Normal range of motion.   Neurological: She is alert and oriented to person, place, and time.   Skin: Skin is warm and dry. No rash noted. No erythema.   Psychiatric: She has a normal mood and affect. Her behavior is normal. Judgment and thought content normal.         ED Course   Procedures  Labs Reviewed   CBC W/ AUTO DIFFERENTIAL   COMPREHENSIVE METABOLIC PANEL   URINALYSIS, REFLEX TO URINE CULTURE   POCT URINE PREGNANCY          Imaging Results    None          Medical Decision Making:   Initial Assessment:   Patient presents complaints of right-sided abdominal pain this been aggressively worsening over 3 days.  Patient recently had a tubo-ovarian abscess drained by pigtail catheter 3 weeks ago [facility].  Patient denies fever.  Patient is currently on antibiotics.  Mild to moderate tenderness in the right-sided abdomen exam.  Patient has associated ventral wall hernias are chronic in nature but did not appear to be strangulated.  Will obtain CT of abdomen pelvis to rule out with reaccumulation of the abscess.  Will also rule out complications of her known ventral wall hernias.  Differential Diagnosis:   Strangulated hernia, pelvic abscess, abdominal abscess  Clinical Tests:   Lab Tests: Ordered and Reviewed  The following lab test(s) were unremarkable: CBC, CMP, Urinalysis and UPT  Radiological Study: Ordered and Reviewed  ED Management:  0145:  Pain improved.  White blood cell count is not elevated.  No renal dysfunction.  LFTs normal. Patient is not pregnant.  CT shows persistent fluid collection left adnexa.  There is also an additional  ring enhancing fluid collection in her right ventral hernia sac and around the cecum.  Suspect that this is migration of the infection that originated in her left adnexa.  Will admit on IV antibiotics.  Discussed with Dr. Sharma with OBGYN.  Will also consult General surgery for evaluation of hernia.  Discussed plan with patient and family.                                 Clinical Impression:       ICD-10-CM ICD-9-CM   1. Intra-abdominal abscess K65.1 567.22   2. Tubo-ovarian abscess N70.93 614.2   3. Abdominal hernia without obstruction and without gangrene, recurrence not specified, unspecified hernia type K46.9 553.20         Disposition:   Disposition: Admitted  Condition: Stable                        Rubens Emery MD  03/06/20 0152     6

## 2025-08-06 ENCOUNTER — PATIENT MESSAGE (OUTPATIENT)
Dept: FAMILY MEDICINE | Facility: CLINIC | Age: 35
End: 2025-08-06

## (undated) DEVICE — BINDER ABD TRI PANEL LG XL 9IN

## (undated) DEVICE — DRAPE NEPHRSCPY SURG 72X118IN

## (undated) DEVICE — DRESSING MEPORE 3.6 X 10

## (undated) DEVICE — DRAPE LAP T SHT W/ INSTR PAD

## (undated) DEVICE — APPLICATOR CHLORAPREP ORN 26ML

## (undated) DEVICE — Device

## (undated) DEVICE — SOL PVP-I SCRUB 7.5% 4OZ

## (undated) DEVICE — TOWEL OR DISP STRL BLUE 4/PK

## (undated) DEVICE — DRESSING TRANS 4X10 TEGADERM

## (undated) DEVICE — DRESSING ANTIMICROBIAL 1 INCH

## (undated) DEVICE — GOWN SMART IMP BREATHABLE XXLG

## (undated) DEVICE — SUT 2-0 12-18IN SILK

## (undated) DEVICE — GLOVE BIOGEL SKINSENSE PI 8.0

## (undated) DEVICE — SEE L#120831

## (undated) DEVICE — GLOVE BIOGEL SKINSENSE PI 6.5

## (undated) DEVICE — ADHESIVE DERMABOND ADVANCED

## (undated) DEVICE — KIT URINE METER 350ML STAT LOC

## (undated) DEVICE — CLIPPER BLADE MOD 4406 (CAREF)

## (undated) DEVICE — SUT VICRYL PLUS 3-0 SH 18IN

## (undated) DEVICE — DRESSING TEGADERM 2 3/8 X 2.75

## (undated) DEVICE — CLOSURE SKIN STERI STRIP 1/2X4

## (undated) DEVICE — SPONGE LAP 18X18 PREWASHED

## (undated) DEVICE — SPONGE COTTON TRAY 4X4IN

## (undated) DEVICE — BLADE SURG STAINLESS STEEL #10

## (undated) DEVICE — DISSECTOR LIGASURE EXACT 21CM

## (undated) DEVICE — SYR ONLY LUER LOCK 20CC

## (undated) DEVICE — ELECTRODE REM PLYHSV RETURN 9

## (undated) DEVICE — GLOVE BIOGEL SKINSENSE PI 6.0

## (undated) DEVICE — NDL SPINAL SPINOCAN 22GX3.5

## (undated) DEVICE — SUT VICRYL PLUS 3-0 18IN